# Patient Record
Sex: FEMALE | Race: WHITE | Employment: FULL TIME | URBAN - METROPOLITAN AREA
[De-identification: names, ages, dates, MRNs, and addresses within clinical notes are randomized per-mention and may not be internally consistent; named-entity substitution may affect disease eponyms.]

---

## 2019-04-05 PROBLEM — R53.82 CHRONIC FATIGUE: Status: ACTIVE | Noted: 2018-04-06

## 2019-04-05 PROBLEM — F33.0 MILD EPISODE OF RECURRENT MAJOR DEPRESSIVE DISORDER (HCC): Status: ACTIVE | Noted: 2018-09-20

## 2019-04-05 PROBLEM — F51.01 PRIMARY INSOMNIA: Status: ACTIVE | Noted: 2019-04-02

## 2019-04-05 PROBLEM — K29.60 EROSIVE GASTRITIS: Status: ACTIVE | Noted: 2017-09-05

## 2019-04-05 PROBLEM — K21.9 HIATAL HERNIA WITH GERD: Status: ACTIVE | Noted: 2017-09-05

## 2019-04-05 PROBLEM — M54.12 RADICULOPATHY OF CERVICAL REGION: Status: ACTIVE | Noted: 2019-04-05

## 2019-04-05 PROBLEM — R68.81 EARLY SATIETY: Status: ACTIVE | Noted: 2017-06-05

## 2019-04-05 PROBLEM — Z86.010 HISTORY OF COLONIC POLYPS: Status: ACTIVE | Noted: 2017-06-05

## 2019-04-05 PROBLEM — K44.9 HIATAL HERNIA WITH GERD: Status: ACTIVE | Noted: 2017-09-05

## 2019-04-05 PROBLEM — K58.2 IRRITABLE BOWEL SYNDROME WITH BOTH CONSTIPATION AND DIARRHEA: Status: ACTIVE | Noted: 2017-06-05

## 2019-04-05 PROBLEM — K30 FUNCTIONAL DYSPEPSIA: Status: ACTIVE | Noted: 2017-06-05

## 2019-04-05 PROBLEM — G56.02 LEFT CARPAL TUNNEL SYNDROME: Status: ACTIVE | Noted: 2018-02-23

## 2019-04-05 PROBLEM — G44.221 CHRONIC TENSION-TYPE HEADACHE, INTRACTABLE: Status: ACTIVE | Noted: 2018-11-02

## 2019-04-15 ENCOUNTER — HOSPITAL ENCOUNTER (OUTPATIENT)
Dept: GENERAL RADIOLOGY | Age: 49
Discharge: HOME | End: 2019-04-15
Attending: NURSE PRACTITIONER
Payer: COMMERCIAL

## 2019-04-15 DIAGNOSIS — M25.511 PAIN OF BOTH SHOULDER JOINTS: ICD-10-CM

## 2019-04-15 DIAGNOSIS — M25.512 PAIN OF BOTH SHOULDER JOINTS: ICD-10-CM

## 2019-04-15 PROCEDURE — 73030 X-RAY EXAM OF SHOULDER: CPT

## 2019-06-17 PROBLEM — R13.14 PHARYNGOESOPHAGEAL DYSPHAGIA: Status: ACTIVE | Noted: 2019-05-02

## 2019-07-25 ENCOUNTER — HOSPITAL ENCOUNTER (OUTPATIENT)
Dept: GENERAL RADIOLOGY | Age: 49
Discharge: HOME | End: 2019-07-25
Payer: COMMERCIAL

## 2019-07-25 PROCEDURE — 72040 X-RAY EXAM NECK SPINE 2-3 VW: CPT

## 2019-08-05 ENCOUNTER — HOSPITAL ENCOUNTER (OUTPATIENT)
Dept: PHYSICAL THERAPY | Age: 49
Discharge: HOME | End: 2019-08-05
Attending: NURSE PRACTITIONER
Payer: COMMERCIAL

## 2019-08-05 DIAGNOSIS — M40.03 POSTURAL KYPHOSIS OF CERVICOTHORACIC REGION: ICD-10-CM

## 2019-08-05 DIAGNOSIS — M54.2 NECK PAIN: ICD-10-CM

## 2019-08-05 PROCEDURE — 97162 PT EVAL MOD COMPLEX 30 MIN: CPT

## 2019-08-05 NOTE — THERAPY EVALUATION
Mele Black  : 1970  Primary: [facility] [place]  Secondary:  Therapy Center at [facility] Heidi Keenanjace [facility]. [place] [address], [address]  [phone]   [phone]      OUTPATIENT PHYSICAL THERAPY:Initial Assessment 2019    ICD-10: Treatment Diagnosis:   Cervicalgia (M54.2)      Chronic Pain Syndrome (G89.21)     Muscle weakness (generalized) (M62.81)       Precautions/Allergies:   Sulfa (sulfonamide antibiotics)   Fall Risk Score: 0 (? 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 Mele Black has attended 1 physical therapy session including initial evaluation. Mele Black presents with S/S of chronic neck pain,constant headaches and decreased functional tolerance. Her condition is complicated by significant difficulties sleeping and a history of anxiety and depression. The evaluation determined that her cervical and shoulder range of motion and strength are within normal limits although painful. She presents with minimal postural deficits. Mele Black will benefit from pain science education, introduction to mindfulness and relaxation techniques, aerobic activity,and  use of  manual therapeutic techniques (ie. Distraction, SOR, myofascial release/soft tissue mobilization) as appropriate to address Nivia Gannon's current condition. In addition the patient would highly benefit from a referral to a multi disciplinary pain management program.    Mele Black will benefit from skilled PT (medically necessary) to address above deficits affecting participation in basic ADLs and overall functional tolerance. PROBLEM LIST (Impacting functional limitations):  1. Chronic Pain?  Central Sensitization  2. Decreased ADL/Functional Activities  3. Anxiety and Depression  4. Poor sleep habits  5. Decreased Activity Tolerance/ lack of exercise  6. Increased Fatigue  7. Decreased [place] with Home Exercise Program INTERVENTIONS PLANNED:  1. Pain Science Education  2. Relaxation Techniques  3. Aerobic Exercise  4. Health and Wellness Education  5. Manual Therapy  6. Range of Motion (ROM)  7. Therapeutic Activites  8. Therapeutic Exercise/Strengthening     TREATMENT PLAN:  Effective Dates: 8/5/19 TO 9/5/2019 (30 days). Frequency/Duration: 2 times a week for 30 Days  GOALS: (Goals have been discussed and agreed upon with patient.)  SHORT-TERM FUNCTIONAL GOALS: Time Frame: 4 weeks  1. Duane Finney will report <=4/10 pain to cervical spine with performance of functional spinal mobility and rotation. 2.  Duane Finney will demonstrate improved NDI score, indicating spinal improvement from 17/50 to 13/50 affecting minimal to no difficulty with performance of activities of daily living. 3.  Duane Finney will understand the basic principles of pain science   4. Duane Finney will participate in daily use of relaxation/mindfulness techniques to help decrease her pain and stress levels. [address] [place] [address] Way will be able walk for 20 minutes a day     DISCHARGE GOALS: Time Frame: 8 weeks  1. Duane Finney will report <=3/10 pain to cervical spine with performance of functional spinal mobility and rotation and minimal to no difficulty with such tasks. 2. Duane Finney will demonstrate improved NDI score, indicating spinal improvement from 13/50 to 10/50 affecting minimal to no difficulty with performance of cervical mobility and strengthening. 3. Duane Finney to be independent with advanced HEP for cervical region and UE's.      Rehabilitation Potential For Stated Goals: Guarded due comorbidities  Regarding Nivia Gannon's therapy, I certify that the treatment plan above will be carried out by a therapist or under their direction. Thank you for this referral,  Rupinder Corea, PT CHT     Referring Physician Signature: Petrona Nurse, NP              Date                    HISTORY:   History of Present Injury/Illness (Reason for Referral): Over a year gradual onset bilateral upper trap and neck pain. Was diagnosed with GERD and IBS about 3 years ago. Chronic history of anxiety and depression. Patient states that she works about 60 hours a week at a sedentary job which is quite stressful. She feels that the only way to reduce her stress is to not work and this is impossible. She has no time to exercise. · Aggravating factors: end of the day the pain is the worst, very stiff. Has difficulty sleeping at night wakes up and is up about 2 hours. · Relieving factors: nothing      Past Medical History/Comorbidities:   Ms. Neelima Hernández  has a past medical history of Anxiety, Depressed, GERD (gastroesophageal reflux disease), Hypertension, IBS (irritable bowel syndrome), and Insomnia. Ms. Neelima Hernández  has a past surgical history that includes pr egd deliver thermal energy sphnctr/cardia gerd; hx colonoscopy; hx tubal ligation; and hx lipectomy.     Active Ambulatory Problems     Diagnosis Date Noted    Chronic fatigue 04/06/2018    Chronic tension-type headache, intractable 11/02/2018    Early satiety 06/05/2017    Erosive gastritis 09/05/2017    Functional dyspepsia 06/05/2017    Hiatal hernia with GERD 09/05/2017    History of colonic polyps 06/05/2017    Irritable bowel syndrome with both constipation and diarrhea 06/05/2017    Left carpal tunnel syndrome 02/23/2018    Mild episode of recurrent major depressive disorder ([facility] [address] 09/20/2018    Primary insomnia 04/02/2019    Radiculopathy of cervical region 04/05/2019    Pharyngoesophageal dysphagia 05/02/2019     Resolved Ambulatory Problems     Diagnosis Date Noted    No Resolved Ambulatory Problems     Past Medical History: Diagnosis Date    Anxiety     Depressed     GERD (gastroesophageal reflux disease)     Hypertension     IBS (irritable bowel syndrome)     Insomnia      Social History/Living Environment:    lives with family, has 21year old son with sickle cell anemia    Social History     Socioeconomic History    Marital status:      Spouse name: Not on file    Number of children: Not on file    Years of education: Not on file    Highest education level: Not on file   Occupational History    Not on file   Social Needs    Financial resource strain: Not on file    Food insecurity:     Worry: Not on file     Inability: Not on file    Transportation needs:     Medical: Not on file     Non-medical: Not on file   Tobacco Use    Smoking status: Never Smoker    Smokeless tobacco: Never Used   Substance and Sexual Activity    Alcohol use: Never     Frequency: Never    Drug use: Never    Sexual activity: Not Currently   Lifestyle    Physical activity:     Days per week: Not on file     Minutes per session: Not on file    Stress: Not on file   Relationships    Social connections:     Talks on phone: Not on file     Gets together: Not on file     Attends Buddhist service: Not on file     Active member of club or organization: Not on file     Attends meetings of clubs or organizations: Not on file     Relationship status: Not on file    Intimate partner violence:     Fear of current or ex partner: Not on file     Emotionally abused: Not on file     Physically abused: Not on file     Forced sexual activity: Not on file   Other Topics Concern    Not on file   Social History Narrative    Not on file      Prior Level of Function/Work/Activity:  Full time human resources for [employer]        Current Medications:    Current Outpatient Medications:     omeprazole (PRILOSEC) 20 mg capsule, Take 20 mg by mouth two (2) times a day., Disp: , Rfl:     FLUoxetine (PROZAC) 10 mg tablet, Take 5 mg by mouth daily. , Disp: , Rfl:     ergocalciferol (ERGOCALCIFEROL) 50,000 unit capsule, Take 1 Cap by mouth every seven (7) days. , Disp: 12 Cap, Rfl: 0    amitriptyline (ELAVIL) 25 mg tablet, Take 1 Tab by mouth nightly., Disp: 30 Tab, Rfl: 1    gabapentin (NEURONTIN) 100 mg capsule, Take 1 tab PO TID., Disp: 90 Cap, Rfl: 5    cyanocobalamin 1,000 mcg tablet, Take 1 Tab by mouth daily. , Disp: 90 Tab, Rfl: 3    cod liver oil oil, Take  by mouth., Disp: , Rfl:     vitamin E, dl,tocopheryl acet, (VITAMIN E, DL, ACETATE,) 1,000 unit cap, take one tablet oral daily, Disp: , Rfl:     hydroCHLOROthiazide (MICROZIDE) 12.5 mg capsule, Take 12.5 mg by mouth., Disp: , Rfl:     cyclobenzaprine (FLEXERIL) 5 mg tablet, Take 5 mg by mouth., Disp: , Rfl:     nystatin-triamcinolone (MYCOLOG II) topical cream, Apply topically 2 (two) times a day., Disp: , Rfl:         Ambulatory/Rehab Services H2 Model Falls Risk Assessment    Risk Factors:       (2)  Symptomatic Depression Ability to Rise from Chair:       (0)  Ability to rise in a single movement    Falls Prevention Plan:       No modifications necessary   Total: (5 or greater = High Risk): 2    ©2010 [facility] [employer]. All Rights Reserved. [facility] Patent #[id number]. Federal Law prohibits the replication, distribution or use without written permission from [facility] [employer]         Date Last Reviewed:  8/5/2019     3+: HIGH COMPLEXITY   EXAMINATION:   Observation/Orthostatic Postural Assessment:          Normal postural presentation. Upper chest breathing pattern.   Palpation:          Increased tone bilateral upper traps  ROM:      AROM/PROM                  Joint: Eval Date:   8/5/19  Re-Assess Date:  Re-Assess Date:    Active ROM         Cervical Extension 50 pain         Cervical Flexion 50         RIGHT LEFT RIGHT LEFT RIGHT LEFT   Cervical Sidebending 45 45       Cervical Rotation 50 60 pain                Shoulder Flexion 170 170       Shoulder Abduction 175 175                Lumbar Flexion Not assessed today        Lumber Extension               Strength:     Eval Date:  Re-Assess Date:  Re-Assess Date:      RIGHT LEFT RIGHT LEFT RIGHT LEFT   Shoulder Flexion 5-/5 5-/5       Shoulder Abduction (C5) 5-/5 5-/5       Shoulder Internal Rotation 5/5 5/5       Shoulder External Rotation 5-/5 5-/5       Elbow Flexion (C6) 5/5 5/5       Elbow Extension (C7) 5/5 5/5       Wrist Flexion (C7) 5/5 5/5       Wrist Extension (C6) 5/5 5/5       Resisted Thumb Extension/Finger Abduction (C8/T1) Normal     Normal       Resisted Cervical Rotation (C1):         Resisted Shoulder Shrug (C2, 3, 4):  4/5 4/5        Strength                    Reflex Testing (Biceps, Brachioradialis, Triceps):    Special Tests:    Spurling's Test: Positive/Negative              Sharp Pursar (instability of the atlanto-axial joint):    *Denies dysarthria, diplopia, drop attacks, dizziness, dysphagia      Neurological Screen: Radiating symptoms? Yes/No     Functional Mobility:  Shoulder range of motion WNL   Balance:  NA today     Body Structures Involved:  1. CNS sensitivity  2. Muscles   Body Functions Affected:  1. Sensory/Pain  2. Neuromusculoskeletal   Activities and Participation Affected:  1. Mobility  2. Sleep   Number of elements that affect the Plan of Care: 4+: HIGH COMPLEXITY   CLINICAL PRESENTATION:   Presentation: MODERATE COMPLEXITY   CLINICAL DECISION MAKING:   Outcome Measure: Tool Used: Neck Disability Index (NDI)  Score:  Initial: 17/50  Most Recent: X/50 (Date: -- )   Interpretation of Score: The Neck Disability Index is a revised form of the Oswestry Low Back Pain Index and is designed to measure the activities of daily living in person's with neck pain. Each section is scored on a 0-5 scale, 5 representing the greatest disability. The scores of each section are added together for a total score of 50.      Medical Necessity:   · Skilled intervention continues to be required due to address above deficits affecting participation in basic ADLs and overall functional tolerance. Reason for Services/Other Comments:  · Patient continues to require skilled intervention due to address above deficits affecting participation in basic ADLs and overall functional tolerance.    Use of outcome tool(s) and clinical judgement create a POC that gives a: Questionable prediction of patient's progress: MODERATE COMPLEXITY       Future Appointments   Date Time Provider Jorje Moulton   8/8/2019  7:30 AM PapsusyfuJ Luis arango, RT [facility]   8/12/2019  7:30 AM PapenfuJLuis arangon, RT [facility]   8/16/2019  7:30 AM PapenfussJ Luisn, RT [facility]   8/19/2019  7:30 AM PapsusyfuJ Luis arango, RT [facility]   8/19/2019  3:20 PM Andrew Hernandez NP [facility]   8/23/2019  7:30 AM J Luis Sim, RT [facility]   8/28/2019  4:00 PM J Luis Sim, RT [facility]   9/25/2019  8:00 AM Jean Bailey MD [facility]       Total Treatment Duration:  PT Patient Time In/Time Out  Time In: 1603  Time Out: [address], RT

## 2019-08-08 ENCOUNTER — HOSPITAL ENCOUNTER (OUTPATIENT)
Dept: PHYSICAL THERAPY | Age: 49
Discharge: HOME | End: 2019-08-08
Attending: NURSE PRACTITIONER
Payer: COMMERCIAL

## 2019-08-08 PROCEDURE — 97110 THERAPEUTIC EXERCISES: CPT

## 2019-08-08 PROCEDURE — 97140 MANUAL THERAPY 1/> REGIONS: CPT

## 2019-08-08 NOTE — PROGRESS NOTES
Smiley Galvez  : 1970  Payor: Mame Gonzalez / Plan: [facility] / Product Type: PPO /  [address] [facility] [place] [address], [address] [place] [address]  [phone]   [phone]      ICD-10: Treatment Diagnosis:   Cervicalgia (M54.2)      Chronic Pain Syndrome (G89.21)     Muscle weakness (generalized) (M62.81)                                                   DIAGNOSIS/TREATMENT      OUTPATIENT PHYSICAL THERAPY: Daily Treatment Note 2019 Visit Count:  2    Pre-treatment Symptoms/Complaints:  I have less pain today, I think that it because I was able to sleep better last night. Went to bed around 10:30 slept till 6:00. Pain: Initial:   5 Post Session:  3/10 no back pain after relaxation tech   Medications Last Reviewed:  2019   Updated Objective Findings:  See evaluation   TREATMENT:   THERAPEUTIC EXERCISE: (15 minutes):  Exercises per grid below to improve mobility, strength and balance. Required minimal visual, verbal and manual cues to promote proper body alignment and promote proper body posture. Progressed resistance and complexity of movement as indicated. Date:  19 Date:   Date:     Activity/Exercise Parameters Parameters Parameters   Mindfulness/breathing practice  10 min     AROM cervical supine  2 min     patient education Pain science principles                                   MANUAL THERAPY: (30 minutes): Joint mobilization and Soft tissue mobilization was utilized and necessary because of the patient's restricted joint motion and restricted motion of soft tissue. (SOR, MFR, manual traction) cervical spine    MedBridge Portal  Treatment/Session Summary:    Response to Treatment:  No increase in pain or adverse reactions  Communication/Consultation:  Faxed initial evaluation to MD  Equipment provided today:  HEP.   Recommendations/Intent for next treatment session: Next visit will focus on patient education.     Total Treatment Billable Duration:  45 Rx  PT Patient Time In/Time Out  Time In: 9645  Time Out: [address], RT    Future Appointments   Date Time Provider Jroje Moulton   8/12/2019  7:30 AM Michele Sim, RT [facility]   8/19/2019  7:30 AM Michele Sim, RT [facility]   8/19/2019  3:20 PM Brandy Hodgkins, NP [facility]   8/23/2019  7:30 AM [facility]   8/28/2019  4:00 PM Michele Sim, RT [facility]   9/25/2019  8:00 AM Maik Rodriguez MD [facility]

## 2019-08-12 ENCOUNTER — HOSPITAL ENCOUNTER (OUTPATIENT)
Dept: PHYSICAL THERAPY | Age: 49
Discharge: HOME | End: 2019-08-12
Attending: NURSE PRACTITIONER
Payer: COMMERCIAL

## 2019-08-12 PROCEDURE — 97110 THERAPEUTIC EXERCISES: CPT

## 2019-08-12 PROCEDURE — 97140 MANUAL THERAPY 1/> REGIONS: CPT

## 2019-08-16 ENCOUNTER — APPOINTMENT (OUTPATIENT)
Dept: PHYSICAL THERAPY | Age: 49
End: 2019-08-16
Attending: NURSE PRACTITIONER
Payer: COMMERCIAL

## 2019-08-19 ENCOUNTER — HOSPITAL ENCOUNTER (OUTPATIENT)
Dept: PHYSICAL THERAPY | Age: 49
Discharge: HOME | End: 2019-08-19
Attending: NURSE PRACTITIONER
Payer: COMMERCIAL

## 2019-08-19 PROBLEM — G89.4 CHRONIC PAIN DISORDER: Status: ACTIVE | Noted: 2019-08-19

## 2019-08-19 PROBLEM — M54.12 CERVICAL RADICULAR PAIN: Status: ACTIVE | Noted: 2019-08-19

## 2019-08-19 PROBLEM — E55.9 VITAMIN D DEFICIENCY: Status: ACTIVE | Noted: 2019-08-19

## 2019-08-19 PROBLEM — E53.8 VITAMIN B 12 DEFICIENCY: Status: ACTIVE | Noted: 2019-08-19

## 2019-08-19 PROCEDURE — 97110 THERAPEUTIC EXERCISES: CPT

## 2019-08-19 NOTE — PROGRESS NOTES
Monster Garcia  : 1970  Payor: Danya Meter / Plan: [facility] / Product Type: PPO /  [address] [facility] at [address] [place] [address]. [facility], [address], [facility], [address] [place] [address]  [phone]   [phone]      ICD-10: Treatment Diagnosis:   Cervicalgia (M54.2)      Chronic Pain Syndrome (G89.21)     Muscle weakness (generalized) (M62.81)                                                   DIAGNOSIS/TREATMENT      OUTPATIENT PHYSICAL THERAPY: Daily Treatment Note 2019 Visit Count:  4    Pre-treatment Symptoms/Complaints: Patient states that she is 50% better. She has been working through the Pain Ed workbook, tracking her steps daily, doing some mindfulness training prior to sleep. Patient states that she is going to see her primary care physician today. She says that she is going to tell her that sending her to physical therapy was the best referral she has ever gotten. Pain: Initial:   0 Post Session:  0 no complaints of pain   Medications Last Reviewed:  2019   Updated Objective Findings:  See evaluation   TREATMENT:   THERAPEUTIC EXERCISE: (30 minutes):  Exercises per grid below to improve mobility, strength and balance. Required minimal visual, verbal and manual cues to promote proper body alignment and promote proper body posture. Progressed resistance and complexity of movement as indicated. Date:  19 Date:  19 Date:  19   Activity/Exercise Parameters Parameters Parameters   Mindfulness/breathing practice  10 min 10 min 15 min   AROM cervical supine  2 min     patient education Pain science principles Pain alarm system, goal setting for exercise and meditation  Pain ed, benefits of ex, continued work on goal setting.  Nutrition basics                                 MANUAL THERAPY: (0 minutes): Joint mobilization and Soft tissue mobilization was utilized and necessary because of the patient's restricted joint motion and restricted motion of soft tissue. (SOR, MFR, manual traction) cervical spine    MedBridge Portal  Treatment/Session Summary:  Patient has been compliant with self ed program and has been going through her workbook writing down answers to the questions at end of each chapter. Jaiden Prince Response to Treatment: 50% decrease in pain. Communication/Consultation:  Continue to work on pain ed workbook, mindfullness/breathing tech, daily walking.   Equipment provided today: none  Recommendations/Intent for next treatment session: Next visit will focus on patient education add cervical strengthening ex    Total Treatment Billable Duration:  45 Rx  PT Patient Time In/Time Out  Time In: 0732  Time Out: [address], RT    Future Appointments   Date Time Provider Jorje Moulton   8/19/2019  3:20 PM Andrea Marks NP [facility]   8/28/2019  4:00 PM Rustam Sim, [facility]   9/3/2019  7:30 AM [facility]   9/25/2019  8:00 AM Humaira Singh MD [facility]

## 2019-08-23 ENCOUNTER — APPOINTMENT (OUTPATIENT)
Dept: PHYSICAL THERAPY | Age: 49
End: 2019-08-23
Attending: NURSE PRACTITIONER
Payer: COMMERCIAL

## 2019-08-28 ENCOUNTER — HOSPITAL ENCOUNTER (OUTPATIENT)
Dept: PHYSICAL THERAPY | Age: 49
Discharge: HOME | End: 2019-08-28
Attending: NURSE PRACTITIONER
Payer: COMMERCIAL

## 2019-08-28 ENCOUNTER — APPOINTMENT (OUTPATIENT)
Dept: PHYSICAL THERAPY | Age: 49
End: 2019-08-28
Attending: NURSE PRACTITIONER
Payer: COMMERCIAL

## 2019-08-28 PROCEDURE — 97110 THERAPEUTIC EXERCISES: CPT

## 2019-08-28 PROCEDURE — 97140 MANUAL THERAPY 1/> REGIONS: CPT

## 2019-08-28 NOTE — PROGRESS NOTES
Javi Suarez  : 1970  Payor: Eliot Duran / Plan: [facility] / Product Type: O /  [address] [facility] at [address] [place] [address]. [facility], [address], [facility], [address] [place] [address]  [phone]   [phone]      ICD-10: Treatment Diagnosis:   Cervicalgia (M54.2)      Chronic Pain Syndrome (G89.21)     Muscle weakness (generalized) (M62.81)                                                   DIAGNOSIS/TREATMENT      OUTPATIENT PHYSICAL THERAPY: Daily Treatment Note 2019 Visit Count:  5    Pre-treatment Symptoms/Complaints: Patient continues to work on meditation, walking and education. On whole feeling better. Today is a bad day, came after work, very stressed. Pain: Initial:   8 Post Session:  5   Medications Last Reviewed:  2019   Updated Objective Findings:  Upper chest breathing generalized increased tone throughout cervical musculature. TREATMENT:   THERAPEUTIC EXERCISE: (15 minutes):  Exercises per grid below to improve mobility, strength and balance. Required minimal visual, verbal and manual cues to promote proper body alignment and promote proper body posture. Progressed resistance and complexity of movement as indicated. Date:  19 Date:  19 Date:  19 Date   19   Activity/Exercise Parameters Parameters Parameters    Mindfulness/breathing practice  10 min 10 min 15 min 15 min   AROM cervical supine  2 min      patient education Pain science principles Pain alarm system, goal setting for exercise and meditation  Pain ed, benefits of ex, continued work on goal setting.  Nutrition basics                                      MANUAL THERAPY: (30 minutes): Joint mobilization and Soft tissue mobilization was utilized and necessary because of the patient's restricted joint motion and restricted motion of soft tissue. (SOR, MFR, manual traction) cervical spine    MedBridge Portal  Treatment/Session Summary:  Patient has been compliant with self ed program and has been going through her workbook writing down answers to the questions at end of each chapter. Jane Green Response to Treatment: decrease in pain. Communication/Consultation:  Continue to work on pain ed workbook, mindfullness/breathing tech, daily walking.   Equipment provided today: none  Recommendations/Intent for next treatment session: Next visit will focus on patient education add cervical strengthening ex    Total Treatment Billable Duration:  45 Rx  PT Patient Time In/Time Out  Time In: 1553  Time Out: [address], RT    Future Appointments   Date Time Provider Jorje Moulton   8/28/2019  4:00 PM Marylin Sim, RT [facility]   9/3/2019  7:30 AM Marylin Sim, RT [facility]   9/25/2019  8:00 AM Abad Knutson MD [facility]   10/14/2019  9:45 AM MAT LAB [facility] MAT MAT   10/21/2019  3:00 PM RHONA [place] MAT

## 2019-09-03 ENCOUNTER — HOSPITAL ENCOUNTER (OUTPATIENT)
Dept: PHYSICAL THERAPY | Age: 49
Discharge: HOME | End: 2019-09-03
Attending: NURSE PRACTITIONER
Payer: COMMERCIAL

## 2019-09-03 PROCEDURE — 97110 THERAPEUTIC EXERCISES: CPT

## 2019-09-03 NOTE — PROGRESS NOTES
Alonso Almendarez  : 1970  Payor: Dania Beltran / Plan: [place] Pending sale to [facility] / Product Type: PPO /  Maida Padilla at [address] [facility] [address], [facility], [address] [place] [address]  [phone]   [phone]      ICD-10: Treatment Diagnosis:   Cervicalgia (M54.2)      Chronic Pain Syndrome (G89.21)     Muscle weakness (generalized) (M62.81)                                                   DIAGNOSIS/TREATMENT      OUTPATIENT PHYSICAL THERAPY: Daily Treatment Note 9/3/2019 Visit Count:  6    Pre-treatment Symptoms/Complaints: NDI score 10/55  Pain: Initial:   3 Post Session: 3   Medications Last Reviewed:  9/3/2019   Updated Objective Findings: see DC note   TREATMENT:   THERAPEUTIC EXERCISE: (15 minutes):  Exercises per grid below to improve mobility, strength and balance. Required minimal visual, verbal and manual cues to promote proper body alignment and promote proper body posture. Progressed resistance and complexity of movement as indicated. Date:  19 Date:  19 Date:  19 Date   19   Activity/Exercise Parameters Parameters Parameters    Mindfulness/breathing practice  10 min 10 min 15 min 15 min   AROM cervical supine  2 min      patient education Pain science principles Pain alarm system, goal setting for exercise and meditation  Pain ed, benefits of ex, continued work on goal setting. Nutrition basics Pain science principles long term plans, self managment                                     MANUAL THERAPY: (0 minutes): Joint mobilization and Soft tissue mobilization was utilized and necessary because of the patient's restricted joint motion and restricted motion of soft tissue. (SOR, MFR, manual traction) cervical spine    [facility] Portal  Treatment/Session Summary:  Patient has been compliant with self ed program and has been going through her workbook writing down answers to the questions at end of each chapter.  Patient will continue to pursue mediation, aerobic ex, nutrition improvement, self education, and sleep improvement   Response to Treatment: decrease in pain. Communication/Consultation:  Continue to work independent self care.   Equipment provided today: none  Recommendations: DC to independent home program    Total Treatment Billable Duration:  15 Rx  PT Patient Time In/Time Out  Time In: 8014  Time Out: 0805  Steven Ohm, RT    Future Appointments   Date Time Provider Jorje Moulton   9/25/2019  8:00 AM Janine Morfin MD [facility]   10/14/2019  9:45 AM MAT LAB [facility]   10/21/2019  3:00 PM Brianda Pinedo NP [facility]

## 2019-09-03 NOTE — THERAPY DISCHARGE
Javi Suarez  : 1970  Primary: Eren Mathew [facility]*  Secondary:  [facility] Heidi Mao   [address]. [address], [address]  [phone]   [phone]      OUTPATIENT PHYSICAL THERAPY: DISCHARGE 9/3/2019    ICD-10: Treatment Diagnosis:   Cervicalgia (M54.2)      Chronic Pain Syndrome (G89.21)     Muscle weakness (generalized) (M62.81)       Precautions/Allergies:   Sulfa (sulfonamide antibiotics)   Fall Risk Score: 0 (? 5 = High Risk)  MD Orders: Eval and Treat MEDICAL/REFERRING DIAGNOSIS:  Neck pain   DATE OF ONSET: chronic over one year  REFERRING PHYSICIAN: Sanju Pedersen NP  RETURN PHYSICIAN APPOINTMENT: 19     FINAL ASSESSMENT:  Ms. Javi Suarez has attended 6 physical therapy sessions including initial evaluation. Javi Suarez presented with S/S of chronic neck pain,constant headaches and decreased functional tolerance. Her condition is complicated by significant difficulties sleeping and a history of anxiety and depression. The evaluation determined that her cervical and shoulder range of motion and strength are within normal limits although painful. She presents with minimal postural deficits. Javi Suarez benefited from pain science education, introduction to mindfulness and relaxation techniques, recommendations concerning improving sleep quality, nutrition basics, aerobic activity and manual therapy techniques. At this time her NDI score has improved from 17/55 to 10/55. TREATMENT PLAN:  Effective Dates: 19 TO 2019 (30 days). Frequency/Duration: 2 times a week for 30 Days  GOALS: (Goals have been discussed and agreed upon with patient.)  SHORT-TERM FUNCTIONAL GOALS: Time Frame: 4 weeks  1. Javi Suarez will report <=4/10 pain to cervical spine with performance of functional spinal mobility and rotation. GOAL MET 9/3/2019    2.   Javi Suarez will demonstrate improved NDI score, indicating spinal improvement from 17/50 to 13/50 affecting minimal to no difficulty with performance of activities of daily living. GOAL MET 9/3/2019    3. Serafin Patel will understand the basic principles of pain science GOAL MET 9/3/2019  4. Serafin Patel will participate in daily use of relaxation/mindfulness techniques to help decrease her pain and stress levels. GOAL MET 9/3/2019    5. Serafin Patel will be able walk for 20 minutes a day Goal not met, patient has not been able to \"find the time on a regular basis\"    DISCHARGE GOALS: Time Frame: 8 weeks  1. Serafin Patel will report <=3/10 pain to cervical spine with performance of functional spinal mobility and rotation and minimal to no difficulty with such tasks. 2. Serafin Patel will demonstrate improved NDI score, indicating spinal improvement from 13/50 to 10/50 affecting minimal to no difficulty with performance of cervical mobility and strengthening. GOAL MET 9/3/2019    3. Serafin Patel to be independent with advanced HEP for cervical region and UE's. GOAL MET 9/3/2019                  HISTORY:   History of Present Injury/Illness (Reason for Referral): Over a year gradual onset bilateral upper trap and neck pain. Was diagnosed with GERD and IBS about 3 years ago. Chronic history of anxiety and depression. Patient states that she works about 60 hours a week at a sedentary job which is quite stressful. She feels that the only way to reduce her stress is to not work and this is impossible. She has no time to exercise. · Aggravating factors: stress at work and at home. · Relieving factors: meditation techniques, walking, improving sleep quality      Past Medical History/Comorbidities:   Ms. Severiano Bellamy  has a past medical history of Anxiety, Depressed, GERD (gastroesophageal reflux disease), Hypertension, IBS (irritable bowel syndrome), and Insomnia.   Ms. Severiano Bellamy  has a past surgical history that includes pr egd deliver thermal energy sphnctr/cardia gerd; hx colonoscopy; hx tubal ligation; and hx lipectomy.     Active Ambulatory Problems     Diagnosis Date Noted    Chronic fatigue 04/06/2018    Chronic tension-type headache, intractable 11/02/2018    Early satiety 06/05/2017    Erosive gastritis 09/05/2017    Functional dyspepsia 06/05/2017    Hiatal hernia with GERD 09/05/2017    History of colonic polyps 06/05/2017    Irritable bowel syndrome with both constipation and diarrhea 06/05/2017    Left carpal tunnel syndrome 02/23/2018    Mild episode of recurrent major depressive disorder ([facility] [address] 09/20/2018    Primary insomnia 04/02/2019    Radiculopathy of cervical region 04/05/2019    Pharyngoesophageal dysphagia 05/02/2019    Vitamin B 12 deficiency 08/19/2019    Chronic pain disorder 08/19/2019    Vitamin D deficiency 08/19/2019    Cervical radicular pain 08/19/2019     Resolved Ambulatory Problems     Diagnosis Date Noted    No Resolved Ambulatory Problems     Past Medical History:   Diagnosis Date    Anxiety     Depressed     GERD (gastroesophageal reflux disease)     Hypertension     IBS (irritable bowel syndrome)     Insomnia      Social History/Living Environment:    lives with family, has 21year old son with sickle cell anemia    Social History     Socioeconomic History    Marital status:      Spouse name: Not on file    Number of children: Not on file    Years of education: Not on file    Highest education level: Not on file   Occupational History    Not on file   Social Needs    Financial resource strain: Not on file    Food insecurity:     Worry: Not on file     Inability: Not on file    Transportation needs:     Medical: Not on file     Non-medical: Not on file   Tobacco Use    Smoking status: Never Smoker    Smokeless tobacco: Never Used   Substance and Sexual Activity    Alcohol use: Never     Frequency: Never    Drug use: Never    Sexual activity: Not Currently   Lifestyle    Physical activity:     Days per week: Not on file     Minutes per session: Not on file    Stress: Not on file   Relationships    Social connections:     Talks on phone: Not on file     Gets together: Not on file     Attends Confucianism service: Not on file     Active member of club or organization: Not on file     Attends meetings of clubs or organizations: Not on file     Relationship status: Not on file    Intimate partner violence:     Fear of current or ex partner: Not on file     Emotionally abused: Not on file     Physically abused: Not on file     Forced sexual activity: Not on file   Other Topics Concern    Not on file   Social History Narrative    Not on file      Prior Level of Function/Work/Activity:  Full time human resources for [employer]        Current Medications:    Current Outpatient Medications:     MAGNESIUM GLUCONATE, BULK,, Take  by mouth., Disp: , Rfl:     clorazepate (TRANXENE) 3.75 mg tablet, Take 3.75 mg by mouth., Disp: , Rfl:     perphenazine (TRILAFON) 2 mg tablet, Take  by mouth., Disp: , Rfl:     gabapentin (NEURONTIN) 100 mg capsule, Take 2 Caps by mouth nightly., Disp: 60 Cap, Rfl: 5    ergocalciferol (ERGOCALCIFEROL) 50,000 unit capsule, Take 1 Cap by mouth every seven (7) days. , Disp: 12 Cap, Rfl: 2    omeprazole (PRILOSEC) 20 mg capsule, Take 20 mg by mouth two (2) times a day., Disp: , Rfl:     FLUoxetine (PROZAC) 10 mg tablet, Take 5 mg by mouth daily. , Disp: , Rfl:     ergocalciferol (ERGOCALCIFEROL) 50,000 unit capsule, Take 1 Cap by mouth every seven (7) days. , Disp: 12 Cap, Rfl: 0    cyanocobalamin 1,000 mcg tablet, Take 1 Tab by mouth daily. , Disp: 90 Tab, Rfl: 3    cod liver oil oil, Take  by mouth., Disp: , Rfl:     vitamin E, dl,tocopheryl acet, (VITAMIN E, DL, ACETATE,) 1,000 unit cap, take one tablet oral daily, Disp: , Rfl:     hydroCHLOROthiazide (MICROZIDE) 12.5 mg capsule, Take 12.5 mg by mouth., Disp: , Rfl:     cyclobenzaprine (FLEXERIL) 5 mg tablet, Take 5 mg by mouth., Disp: , Rfl:     nystatin-triamcinolone (MYCOLOG II) topical cream, Apply topically 2 (two) times a day., Disp: , Rfl:              Date Last Reviewed:  9/3/2019   EXAMINATION:   Observation/Orthostatic Postural Assessment:          Normal postural presentation. Upper chest breathing pattern. Palpation:          Increased tone bilateral upper traps  ROM:      AROM/PROM                  Joint: Eval Date:   8/5/19  Re-Assess Date:  9/3/19  Re-Assess Date:    Active ROM         Cervical Extension 50 pain   60 no pain      Cervical Flexion 50  55       RIGHT LEFT RIGHT LEFT RIGHT LEFT   Cervical Sidebending 45 45 45 45     Cervical Rotation 50 60 pain 70 70              Shoulder Flexion 170 170 170 170     Shoulder Abduction 175 175 175 175              Lumbar Flexion Not assessed today        Lumber Extension               Strength:     Eval Date:  Re-Assess Date:  Re-Assess Date:      RIGHT LEFT RIGHT LEFT RIGHT LEFT   Shoulder Flexion 5-/5 5-/5 5 5     Shoulder Abduction (C5) 5-/5 5-/5 5 5     Shoulder Internal Rotation 5/5 5/5 5 5     Shoulder External Rotation 5-/5 5-/5 5 5     Elbow Flexion (C6) 5/5 5/5 5 5     Elbow Extension (C7) 5/5 5/5 5 5     Wrist Flexion (C7) 5/5 5/5 5 5     Wrist Extension (C6) 5/5 5/5       Resisted Thumb Extension/Finger Abduction (C8/T1) Normal     Normal normal normal     Resisted Cervical Rotation (C1):         Resisted Shoulder Shrug (C2, 3, 4):  4/5 4/5 5- 5-      Strength                       Neurological Screen: Radiating symptoms? No     Functional Mobility:  Shoulder range of motion WNL   Balance:  NA today       1. CLINICAL PRESENTATION:   CLINICAL DECISION MAKING:   Outcome Measure: Tool Used: Neck Disability Index (NDI)  Score:  Initial: 17/50  Most Recent: 10/50 (Date: 9/3/19)   Interpretation of Score:  The Neck Disability Index is a revised form of the Oswestry Low Back Pain Index and is designed to measure the activities of daily living in person's with neck pain. Each section is scored on a 0-5 scale, 5 representing the greatest disability. The scores of each section are added together for a total score of 50. Medical Necessity:   · Skilled intervention continues to be required due to address above deficits affecting participation in basic ADLs and overall functional tolerance. Reason for Services/Other Comments:  · Patient continues to require skilled intervention due to address above deficits affecting participation in basic ADLs and overall functional tolerance.        Future Appointments   Date Time Provider Jorje Moulton   9/25/2019  8:00 AM Donna Browne MD [facility]   10/14/2019  9:45 AM MAT LAB [facility]   10/21/2019  3:00 PM Marylene Patricia, NP [facility]       Total Treatment Duration:  PT Patient Time In/Time Out  Time In: 0735  Time Out: 0820    Rhett Sim, LILLI CHT

## 2019-09-23 PROBLEM — R20.2 NUMBNESS AND TINGLING: Status: ACTIVE | Noted: 2019-09-23

## 2019-09-23 PROBLEM — R20.0 NUMBNESS AND TINGLING: Status: ACTIVE | Noted: 2019-09-23

## 2019-09-23 PROBLEM — M79.642 LEFT HAND PAIN: Status: ACTIVE | Noted: 2019-09-23

## 2019-11-27 ENCOUNTER — HOSPITAL ENCOUNTER (OUTPATIENT)
Dept: GENERAL RADIOLOGY | Age: 49
Discharge: HOME | End: 2019-11-27
Attending: NURSE PRACTITIONER
Payer: COMMERCIAL

## 2019-11-27 DIAGNOSIS — M54.50 ACUTE MIDLINE LOW BACK PAIN WITHOUT SCIATICA: ICD-10-CM

## 2019-11-27 PROCEDURE — 72100 X-RAY EXAM L-S SPINE 2/3 VWS: CPT

## 2019-12-03 NOTE — PROGRESS NOTES
This has been fully explained to the patient, who indicates understanding that per Malaika NP noted Xray showed suspected lumbar muscular strain. I can refer to PT for evaluation.

## 2019-12-04 ENCOUNTER — HOSPITAL ENCOUNTER (OUTPATIENT)
Dept: PHYSICAL THERAPY | Age: 49
Discharge: HOME | End: 2019-12-04
Payer: COMMERCIAL

## 2019-12-04 DIAGNOSIS — S39.012A LUMBAR STRAIN, INITIAL ENCOUNTER: ICD-10-CM

## 2019-12-04 PROCEDURE — 97110 THERAPEUTIC EXERCISES: CPT

## 2019-12-04 PROCEDURE — 97162 PT EVAL MOD COMPLEX 30 MIN: CPT

## 2019-12-04 NOTE — THERAPY EVALUATION
Arti Valencia  : 1970      Payor: Erin Winters / Plan: [place] [facility] / Product Type: PPO /  Sadi Cuevas at [address] [facility]. [facility] [address], [facility], [address] [place] [address]  [phone]   [phone]       OUTPATIENT PHYSICAL THERAPY:Initial Assessment 2019    ICD-10: Treatment Diagnosis:    Low back pain (M54.5)              Chronic pain syndrome (G89.29)        Muscle weakness (M62.81)              Precautions/Allergies:   Sulfa (sulfonamide antibiotics)   Fall Risk Score: 1 (? 5 = High Risk)  MD Orders: Eval and Treat  MEDICAL/REFERRING DIAGNOSIS:  Lumbar strain, initial encounter [S39.012A]   DATE OF ONSET: one year  REFERRING PHYSICIAN: Belinda Sahni NP  RETURN PHYSICIAN APPOINTMENT: TBD     INITIAL ASSESSMENT:  Ms. Arti Valencia presents to physical therapy with chronic pain complaints involving the back that have gotten worse over the last two weeks for no specific reason. Range of motion of the lumbar spine is within normal limits although painful in all directions. She also presents with generalized muscle weakness and pain complaints with resisted muscle testing of the lower extremities. Postural presentation is normal These S/S are consistent with chronic pain syndrome. Arti Valencia will benefit from skilled physical therapy (medically necessary) to address above deficits affecting participation in basic ADLs and functional mobility/tolerance. Patient will benefit from pain neuroscience education, aerobic conditioning,  Mindfulness techniques,  therapeutic exercises and activities, postural strengthening/education, and comprehensive home exercises program to address current impairments and functional limitations. PROBLEM LIST (Impacting functional limitations):  1. Decreased Strength  2. Decreased ADL/Functional Activities  3. Decreased Ambulation Ability  4. Increased Pain  5.  Decreased Activity Tolerance  6. Increased Fatigue  7. Difficulty Sleeping  8. Decreased Flexibility/Joint Mobility  9. Decreased [place] with Home Exercise Program INTERVENTIONS PLANNED:  1. Patient Education  2. Aerobic conditioning  3. Mindfulness/relaxation training  4. Home Exercise Program (HEP)  5. Manual Therapy  6. Neuromuscular Re-education/Strengthening  7. Range of Motion (ROM)  8. Therapeutic Activites  9. Therapeutic Exercise/Strengthening     TREATMENT PLAN:  Effective Dates: 12/419 TO 2/2/2020 (60 days). Frequency/Duration: 2 times a week for 60 Days  GOALS: (Goals have been discussed and agreed upon with patient.)  Short Term Goals 4 weeks   1. Barbara Archer will be independent with HEP  2. Barbara Boys will participate in LE stretching program to increase flexibility  3. Barbara Boys will participate in core stabilization exercises to help with stabilization during ADLs  4. Barbara Boys will participate in LE strengthening program with weights/resistance as appropriate to help with gait and elevations  5. Barbara Boys will participate in aerobic conditioning program and will tolerate 10 min of constant walking  6. Barbara Boys will understand basic pain science priniciples  7. Long Term Goals 8 weeks   1. Barbara Boys will demonstrate a 8 point improvement on the Oswestry to show improvement in function  2. Barbara Boys will report 3/10 pain at rest and during ADLs  3. Barbara Boys will demonstrate 5/5 LE strength on manual muscle testing  4. Barbara Archer will be able to ambulate for 20 minutes without increase in symptoms. Outcome Measure: Tool Used: Modified Oswestry Low Back Pain Questionnaire  Score:  Initial: 20/50  Most Recent: X/50 (Date: -- )   Interpretation of Score: Each section is scored on a 0-5 scale, 5 representing the greatest disability. The scores of each section are added together for a total score of 50.         Medical Necessity:   · Skilled intervention continues to be required due to above deficits affecting participation in basic ADLs and overall functional tolerance. Reason for Services/Other Comments:  · Patient continues to require skilled intervention due to  above deficits affecting participation in basic ADLs and overall functional tolerance. Total Treatment Duration:  PT Patient Time In/Time Out  Time In: 0730  Time Out: 0830    Rehabilitation Potential For Stated Goals: GOOD  Regarding Nivia Gannon's therapy, I certify that the treatment plan above will be carried out by a therapist or under their direction. Thank you for this referral,  Saqib Snyder, PT CHT     Referring Physician Signature: Lorene Bolaños NP              Date                    HISTORY:   History of Present Injury/Illness (Reason for Referral):  Gradual increase in back pain has been present for about a year. Pain got worse about two weeks ago noticed difficulty driving more than 20 min. -Present symptoms/complaints (on day of evaluation)  Pain Scale:  · Current: 4/10  · Best: 2/10  · Worst: 8/10      · Aggravating factors: driving, sitting   · Relieving factors: hot shower, rest, doing meditation 10 min at night      Past Medical History/Comorbidities:   Ms. Gerber Soto  has a past medical history of Anxiety, Depressed, GERD (gastroesophageal reflux disease), Hypertension, IBS (irritable bowel syndrome), and Insomnia. Ms. Gerber Soto  has a past surgical history that includes pr egd deliver thermal energy sphnctr/cardia gerd; hx colonoscopy; hx tubal ligation; and hx lipectomy.   Social History/Living Environment:     lives with family    Social History     Socioeconomic History    Marital status:      Spouse name: Not on file    Number of children: Not on file    Years of education: Not on file    Highest education level: Not on file   Occupational History    Not on file   Social Needs    Financial resource strain: Not on file   Human [employer]-Nataliia insecurity:     Worry: Not on file     Inability: Not on file    Transportation needs:     Medical: Not on file     Non-medical: Not on file   Tobacco Use    Smoking status: Never Smoker    Smokeless tobacco: Never Used   Substance and Sexual Activity    Alcohol use: Never     Frequency: Never    Drug use: Never    Sexual activity: Not Currently   Lifestyle    Physical activity:     Days per week: Not on file     Minutes per session: Not on file    Stress: Not on file   Relationships    Social connections:     Talks on phone: Not on file     Gets together: Not on file     Attends Faith service: Not on file     Active member of club or organization: Not on file     Attends meetings of clubs or organizations: Not on file     Relationship status: Not on file    Intimate partner violence:     Fear of current or ex partner: Not on file     Emotionally abused: Not on file     Physically abused: Not on file     Forced sexual activity: Not on file   Other Topics Concern    Not on file   Social History Narrative    Not on file     Prior Level of Function/Work/Activity:  Until two weeks ago stated she did not have trouble driving  Previous Treatment Approach  Physical therapy for 6 visits in the summer 2019 was helpful   Dominant Side right  Other Clinical Tests  Recent x rays  Active Ambulatory Problems     Diagnosis Date Noted    Chronic fatigue 04/06/2018    Chronic tension-type headache, intractable 11/02/2018    Early satiety 06/05/2017    Erosive gastritis 09/05/2017    Functional dyspepsia 06/05/2017    Hiatal hernia with GERD 09/05/2017    History of colonic polyps 06/05/2017    Irritable bowel syndrome with both constipation and diarrhea 06/05/2017    Left carpal tunnel syndrome 02/23/2018    Mild episode of recurrent major depressive disorder ([facility] [address] 09/20/2018    Primary insomnia 04/02/2019    Radiculopathy of cervical region 04/05/2019    Pharyngoesophageal dysphagia 05/02/2019    B12 deficiency 08/19/2019    Chronic pain syndrome 08/19/2019    Vitamin D deficiency 08/19/2019    Cervical radicular pain 08/19/2019    Numbness and tingling 09/23/2019    Left hand pain 09/23/2019     Resolved Ambulatory Problems     Diagnosis Date Noted    No Resolved Ambulatory Problems     Past Medical History:   Diagnosis Date    Anxiety     Depressed     GERD (gastroesophageal reflux disease)     Hypertension     IBS (irritable bowel syndrome)     Insomnia      Note: Patient denies any increase of symptoms with cough, sneeze or valsalva. Patient denies any saddle paresthesia or bowel/bladder deficits. Current Medications:    Current Outpatient Medications:     docusate sodium (COLACE CLEAR) 50 mg capsule, kimani 1 capsule as needed. , Disp: , Rfl:     tiZANidine (ZANAFLEX) 4 mg tablet, Take 1 Tab by mouth every six (6) hours as needed for Pain., Disp: 40 Tab, Rfl: 0    gabapentin (NEURONTIN) 100 mg capsule, Take 2 Caps by mouth nightly., Disp: 180 Cap, Rfl: 1    hydroCHLOROthiazide (MICROZIDE) 12.5 mg capsule, Take 1 Cap by mouth every morning., Disp: 90 Cap, Rfl: 1    colchicine (MITIGARE) 0.6 mg capsule, Take 0.6 mg by mouth., Disp: , Rfl:     MITIGARE 0.6 mg capsule, TK 1 C PO BID, Disp: , Rfl: 0    valACYclovir (VALTREX) 1 gram tablet, TK 1 T PO  TID FOR 7 DAYS, Disp: , Rfl: 0    MAGNESIUM GLUCONATE, BULK,, Take  by mouth., Disp: , Rfl:     ergocalciferol (ERGOCALCIFEROL) 50,000 unit capsule, Take 1 Cap by mouth every seven (7) days. , Disp: 12 Cap, Rfl: 2    omeprazole (PRILOSEC) 20 mg capsule, Take 20 mg by mouth two (2) times a day., Disp: , Rfl:     cyanocobalamin 1,000 mcg tablet, Take 1 Tab by mouth daily. , Disp: 90 Tab, Rfl: 3    cod liver oil oil, Take  by mouth., Disp: , Rfl:     vitamin E, dl,tocopheryl acet, (VITAMIN E, DL, ACETATE,) 1,000 unit cap, take one tablet oral daily, Disp: , Rfl:     nystatin-triamcinolone (MYCOLOG II) topical cream, Apply topically 2 (two) times a day., Disp: , Rfl:       Ambulatory/Rehab Services H2 Model Falls Risk Assessment    Risk Factors:       (2)  Symptomatic Depression Ability to Rise from Chair:       (0)  Ability to rise in a single movement    Falls Prevention Plan:       Physical Limitations to Exercise (specify):  pain complaints   Total: (5 or greater = High Risk): 2    ©2010 [facility]. All Rights Reserved. [facility] Patent #[id number]. Federal Law prohibits the replication, distribution or use without written permission from [facility] [address] [place] [address]       Date Last Reviewed:  12/4/2019   Number of Personal Factors/Comorbidities that affect the Plan of Care: 3+: HIGH COMPLEXITY   EXAMINATION:   Observation/Orthostatic Postural Assessment:          Normal postural presentation, note upper chest breathing pattern and guarded motion with active spinal movements all directions. Palpation:          Pain with light palpation PAs lumbar spine.   ROM:            AROM/PROM         Joint: Eval Date: 12/4/19  Re-Assess Date:  Re-Assess Date:    Active ROM RIGHT LEFT RIGHT LEFT RIGHT LEFT   Knee Extension 0 0       Knee Flexion 130 130       Hip Flexion         Hip Abduction         Lumbar Flexion 65 pain endrange and return to neutral        Lumbar Extension 20 pain endrange        Lumbar Side-bending 25 pain 30 pain        Lumbar Rotation           Strength:     Eval Date:  12/4/19  Re-Assess Date:  Re-Assess Date:      RIGHT LEFT RIGHT LEFT RIGHT LEFT   Knee Flexion (L5-S2) 4/5 pain in hamstring 4/5 pain hamstring       Knee Extension (L3, L4) 4/5 pain in thigh 4/5 pain in back       Hip Flexion (L1, L2) 3+/5 3+/5       Hip Extension         Hip Abduction (L5, S1) 3/5  3/5       Ankle Dorsiflexion  4/5 4/5       Great Toe Extension (L5) NT NT       Ankle Plantar Flexion (S1-S2) 4 4           Single leg stance right/left: 7/5 seconds              Deep squat:  Able to touch floor with hands    Special Tests:   · CHRISTINE= negative  · SLR (<60 degrees)= negative  · SLUMP= negative  · SI Joint Tests: negative    Joint Mobility Eval Date:  Re-Assess Date:  Re-Assess Date:     RIGHT LEFT RIGHT LEFT RIGHT LEFT   Lumbar WNL WNL       Hip WNL WNL         Neurological Screen:    RADIATING SYMPTOMS?: YES complaints of intermittent radiating pain down either or both posterior legs. States that she does not know of a precipitating factor  ·   Functional Mobility:  Affecting participation in basic ADLs and functional tasks. Balance:     Single Leg Stance: R= 7/ L= 5  TUG=  30 second STS= 10 s   Body Structures Involved:  1. CNS mediated pain  2. Muscles   Body Functions Affected:  1. Sensory/Pain  2. Neuromusculoskeletal  3. Movement Related Activities and Participation Affected:  1. Mobility  2.  Self Care   Number of elements that affect the Plan of Care: 4+: HIGH COMPLEXITY   CLINICAL PRESENTATION:   Presentation: Evolving clinical presentation with changing clinical characteristics: MODERATE COMPLEXITY   CLINICAL DECISION MAKING:      Use of outcome tool(s) and clinical judgement create a POC that gives a: Questionable prediction of patient's progress: MODERATE COMPLEXITY     See associated treatment note for treatment provided today      Future Appointments   Date Time Provider Jorje Moulton   12/13/2019  9:40 AM RHONA Pitts, RT          remember to save as eval note

## 2019-12-04 NOTE — PROGRESS NOTES
Dahiana Leyva  : 1970  Payor: Cindy Score / Plan: [facility] / Product Type: PPO /  [address] at [address] [facility], [address], [id number] [place] [address]  [phone]   [phone]                                                               OUTPATIENT PHYSICAL THERAPY: Daily Treatment Note 2019   Visit Count:  1    ICD-10: Treatment Diagnosis:    Low back pain (M54.5)                                      Chronic pain syndrome (G89.29)        Muscle weakness (M62.81)           Pre-treatment Symptoms/Complaints:  Back pain  Pain: Initial:   5 Post Session:  5/10   Medications Last Reviewed:  2019   Updated Objective Findings:  See evaluation   TREATMENT:   Therapeutic Exercises: (15 minutes):  Exercises per grid below to improve mobility, strength and balance. Required minimal visual, verbal and manual cues to promote proper body alignment and promote proper body posture. Progressed resistance and complexity of movement as indicated. Date:  19 Date:   Date:     Activity/Exercise Parameters Parameters Parameters   Patient education Treatment expectations, rational, HEP     Cat camel 10 x     Child pose 10 x     Prop on elbows 1 min     Lower trunk rotation 10 x                      Manual Therapy Interventions: ( minutes): Manual interventions performed to improve joint/soft tissue mobility and ROM to improve ability to perform ADLs. Patient responded well to all manual interventions with no significant increase in pain/symptoms. Improved pain reported below.        Technique Used Grade  Level # Time(s) Effect while being performed                                                         [employer] Portal  Treatment/Session Summary:  Patient understands HEP and POC  Response to Treatment:  No increase in pain or adverse reactions  Communication/Consultation:  Faxed initial evaluation to MD  Equipment provided today: HEP.  Recommendations/Intent for next treatment session: Next visit will focu  s on aerobic activity, pain education.     Total Treatment Billable Duration:  15min Rx  PT Patient Time In/Time Out  Time In: 0730  Time Out: [address] Hernandez, RT    Future Appointments   Date Time Provider Jorje Moulton   12/9/2019  7:30 AM Sonny Sim, RT [facility]   12/12/2019  7:30 AM Sonny Sim, RT [facility]   12/13/2019  9:40 AM Lorene Bolaños NP [facility]

## 2019-12-09 ENCOUNTER — HOSPITAL ENCOUNTER (OUTPATIENT)
Dept: PHYSICAL THERAPY | Age: 49
Discharge: HOME | End: 2019-12-09
Payer: COMMERCIAL

## 2019-12-09 PROCEDURE — 97110 THERAPEUTIC EXERCISES: CPT

## 2019-12-09 NOTE — PROGRESS NOTES
Anita Hwang  : 1970  Payor: Nguyen Pulliam / Plan: [facility] / Product Type: PPO /  [address] at [address], [place], [address] [place] [address]  [phone]   [phone]                                                               OUTPATIENT PHYSICAL THERAPY: Daily Treatment Note 2019   Visit Count:  2    ICD-10: Treatment Diagnosis:    Low back pain (M54.5)                                      Chronic pain syndrome (G89.29)        Muscle weakness (M62.81)           Pre-treatment Symptoms/Complaints:  Back pain less because I didn't do anything over weekend. Can only stay till 8:00 today have another appointment. Pain: Initial:   4 Post Session:  4/10   Medications Last Reviewed:  2019   Updated Objective Findings:  See evaluation   TREATMENT:   Therapeutic Exercises: (30 minutes):  Exercises per grid below to improve mobility, strength and balance. Required minimal visual, verbal and manual cues to promote proper body alignment and promote proper body posture. Progressed resistance and complexity of movement as indicated. Date:  19 Date:  19 Date:     Activity/Exercise Parameters Parameters Parameters   Patient education Treatment expectations, rational, HEP     Cat camel 10 x 10 x    Child pose 10 x 10 x    Prop on elbows 1 min 1 min    Lower trunk rotation 10 x 10 x    Treadmill   2.5 mph 10 min     L stretch   5 x     Standing forward fold to overhead reach  6x    Standing side bending stretch  5 x ea way               Manual Therapy Interventions: ( minutes): Manual interventions performed to improve joint/soft tissue mobility and ROM to improve ability to perform ADLs. Patient responded well to all manual interventions with no significant increase in pain/symptoms. Improved pain reported below.        Technique Used Grade  Level # Time(s) Effect while being performed [employer] Portal  Treatment/Session Summary:  Patient understands HEP and POC  Response to Treatment:  No increase in pain or adverse reactions  Communication/Consultation:  Faxed initial evaluation to MD  Equipment provided today:  HEP. Recommendations/Intent for next treatment session: Next visit will focu  s on aerobic activity, pain education.     Total Treatment Billable Duration:  15min Rx  PT Patient Time In/Time Out  Time In: 6196  Time Out: 0815  Saqib Snyder RT    Future Appointments   Date Time Provider Jorje Moulton   12/12/2019  7:30 AM Sonny Sim, RT [facility]   12/13/2019  9:40 AM Lorene Bolaños NP [facility]

## 2019-12-12 ENCOUNTER — HOSPITAL ENCOUNTER (OUTPATIENT)
Dept: PHYSICAL THERAPY | Age: 49
Discharge: HOME | End: 2019-12-12
Payer: COMMERCIAL

## 2019-12-12 PROCEDURE — 97110 THERAPEUTIC EXERCISES: CPT

## 2019-12-12 NOTE — PROGRESS NOTES
Ting Carney  : 1970  Payor: Shelley Broderick / [place]: [place] [facility] / Product Type: O /  [address] [facility] at [address] [place] [address]. [facility], [address], [facility], [address] [place] [address]  [phone]   [phone]                                                               OUTPATIENT PHYSICAL THERAPY: Daily Treatment Note 2019   Visit Count:  3    ICD-10: Treatment Diagnosis:    Low back pain (M54.5)                                      Chronic pain syndrome (G89.29)        Muscle weakness (M62.81)           Pre-treatment Symptoms/Complaints:  Back pain is the same. Can only stay till 8:00 have to get to work. Pain: Initial:   4 Post Session:  3/10   Medications Last Reviewed:  2019   Updated Objective Findings:  Full lumbar flexion without pain complaints. TREATMENT:   Therapeutic Exercises: (30 minutes):  Exercises per grid below to improve mobility, strength and balance. Required minimal visual, verbal and manual cues to promote proper body alignment and promote proper body posture. Progressed resistance and complexity of movement as indicated. Date:  19 Date:  19 Date:  19   Activity/Exercise Parameters Parameters Parameters   Patient education Treatment expectations, rational, HEP     Cat camel 10 x 10 x 10 x    Child pose 10 x 10 x 10 x   Prop on elbows 1 min 1 min    Lower trunk rotation 10 x 10 x 10 x   Treadmill   2.5 mph 10 min  2.5 mph 10 min   L stretch   5 x  5 x   Standing forward fold to overhead reach  6x 5 x   Standing side bending stretch  5 x ea way 5 x ea   Hip hinge   10 x   Supine marching    10 x   Sciatic nerve glides   10 x ea   Squat with overhead reach   Heels on 2x4 10 x        Manual Therapy Interventions: ( minutes): Manual interventions performed to improve joint/soft tissue mobility and ROM to improve ability to perform ADLs.  Patient responded well to all manual interventions with no significant increase in pain/symptoms. Improved pain reported below. Technique Used Grade  Level # Time(s) Effect while being performed                                                         [employer] Portal  Treatment/Session Summary:  Patient understands HEP and POC  Response to Treatment:  No increase in pain or adverse reactions  Communication/Consultation:  Try to walk daily  Equipment provided today:  HEP. Recommendations/Intent for next treatment session: Next visit will focu  s on aerobic activity, pain education, addition of functional movements.     Total Treatment Billable Duration: 30 min Rx  PT Patient Time In/Time Out  Time In: 0730  Time Out: 0800  Harjit Buck RT    Future Appointments   Date Time Provider Jorje Moulton   12/13/2019  9:40 AM Belinda Hernández NP [facility]   12/16/2019  2:45 PM Reddy Sim Drilling[facility]   12/27/2019  8:15 AM Reddy Sim Drilling, RT [facility]   12/30/2019  2:45 PM PapReddy mason Drilling, RT [facility]

## 2019-12-16 ENCOUNTER — APPOINTMENT (OUTPATIENT)
Dept: PHYSICAL THERAPY | Age: 49
End: 2019-12-16
Payer: COMMERCIAL

## 2019-12-27 ENCOUNTER — HOSPITAL ENCOUNTER (OUTPATIENT)
Dept: PHYSICAL THERAPY | Age: 49
Discharge: HOME | End: 2019-12-27
Payer: COMMERCIAL

## 2019-12-27 PROCEDURE — 97110 THERAPEUTIC EXERCISES: CPT

## 2019-12-27 NOTE — PROGRESS NOTES
Alix Pérez  : 1970  Payor: Loura Kussmaul / Plan: [place] [facility] / Product Type: PPO /  [address] [facility], [address], [place], [id number] [place] [address]  [phone]   [phone]                                                               OUTPATIENT PHYSICAL THERAPY: Daily Treatment Note 2019   Visit Count:  4    ICD-10: Treatment Diagnosis:    Low back pain (M54.5)                                      Chronic pain syndrome (G89.29)        Muscle weakness (M62.81)           Pre-treatment Symptoms/Complaints: pain is resolving likes doing the stretching ex  Pain: Initial:   3 Post Session:  3/10   Medications Last Reviewed:  2019   Updated Objective Findings:  Full lumbar motion all planes without pain complaints. TREATMENT:   Therapeutic Exercises: (45 minutes):  Exercises per grid below to improve mobility, strength and balance. Required minimal visual, verbal and manual cues to promote proper body alignment and promote proper body posture. Progressed resistance and complexity of movement as indicated.      Date:  19 Date:  19 Date:  19 Date  19   Activity/Exercise Parameters Parameters Parameters    Patient education Treatment expectations, rational, HEP      Cat camel 10 x 10 x 10 x  10 x   Child pose 10 x 10 x 10 x 10 x   Prop on elbows 1 min 1 min     Lower trunk rotation 10 x 10 x 10 x 10 x   Treadmill   2.5 mph 10 min  2.5 mph 10 min 3.0 mph 15 min   L stretch   5 x  5 x 5 x   Standing forward fold to overhead reach  6x 5 x    Standing side bending stretch  5 x ea way 5 x ea    Hip hinge   10 x 20 x   Supine marching    10 x 10 x ea   Sciatic nerve glides   10 x ea 10 x ea   Squat with overhead reach   Heels on 2x4 10 x    Cone  lumbar flex/ext    3 x 5 floor   Warrior 1 and 2    5 x ea pose               Manual Therapy Interventions: ( minutes): Manual interventions performed to improve joint/soft tissue mobility and ROM to improve ability to perform ADLs. Patient responded well to all manual interventions with no significant increase in pain/symptoms. Improved pain reported below. Technique Used Grade  Level # Time(s) Effect while being performed                                                         [employer] Portal  Treatment/Session Summary:  Patient understands HEP and POC  Response to Treatment:  No increase in pain or adverse reactions  Communication/Consultation:  Try to walk daily consider yoga at home  Equipment provided today:  HEP. Recommendations/Intent for next treatment session: Next visit will focu  s on aerobic activity, pain education, addition of functional movements.     Total Treatment Billable Duration: 30 min Rx  PT Patient Time In/Time Out  Time In: 0815  Time Out: 0900  Tom Silva, RT    Future Appointments   Date Time Provider Jorje Moulton   12/30/2019  2:45 PM Kai Sim, RT [facility]   3/6/2020  9:30 AM MAT LAB [facility]   3/12/2020  5:00 PM Marek Reid NP [facility]

## 2019-12-30 ENCOUNTER — HOSPITAL ENCOUNTER (OUTPATIENT)
Dept: PHYSICAL THERAPY | Age: 49
Discharge: HOME | End: 2019-12-30
Payer: COMMERCIAL

## 2019-12-30 PROCEDURE — 97110 THERAPEUTIC EXERCISES: CPT

## 2019-12-30 NOTE — PROGRESS NOTES
Lorraine Lewis  : 1970  Payor: Aletha Johnson / [place] [facility] / Product Type: O /  [address] [facility] [address]. [address], [id number] [place] [address]  [phone]   [phone]                                                               OUTPATIENT PHYSICAL THERAPY: Daily Treatment Note 2019   Visit Count:  5    ICD-10: Treatment Diagnosis:    Low back pain (M54.5)                                      Chronic pain syndrome (G89.29)        Muscle weakness (M62.81)           Pre-treatment Symptoms/Complaints: pain is resolving likes doing the stretching ex ABBY 15  Pain: Initial:   2 Post Session: 2/10   Medications Last Reviewed:  2019   Updated Objective Findings:  Full lumbar motion all planes without pain complaints. TREATMENT:   Therapeutic Exercises: (30 minutes):  Exercises per grid below to improve mobility, strength and balance. Required minimal visual, verbal and manual cues to promote proper body alignment and promote proper body posture. Progressed resistance and complexity of movement as indicated.      Date:  19 Date:  19 Date:  19 Date  19 Date  19   Activity/Exercise Parameters Parameters Parameters     Patient education Treatment expectations, rational, HEP    DC HEP benefits of yoga practice   Cat camel 10 x 10 x 10 x  10 x 5 x   Child pose 10 x 10 x 10 x 10 x 5 x   Prop on elbows 1 min 1 min      Lower trunk rotation 10 x 10 x 10 x 10 x    Treadmill   2.5 mph 10 min  2.5 mph 10 min 3.0 mph 15 min 3.0 mph 10 min   L stretch   5 x  5 x 5 x    Standing forward fold to overhead reach  6x 5 x     Standing side bending stretch  5 x ea way 5 x ea     Hip hinge   10 x 20 x 5 x   Supine marching    10 x 10 x ea    Sciatic nerve glides   10 x ea 10 x ea    Squat with overhead reach   Heels on 2x4 10 x     Cone  lumbar flex/ext    3 x 5 floor    Warrior 1 and 2    5 x ea pose 5 x ea Manual Therapy Interventions: ( minutes): Manual interventions performed to improve joint/soft tissue mobility and ROM to improve ability to perform ADLs. Patient responded well to all manual interventions with no significant increase in pain/symptoms. Improved pain reported below. Technique Used Grade  Level # Time(s) Effect while being performed                                                         [employer] Portal  Treatment/Session Summary:  Patient DC to HEP  Response to Treatment:  No increase in pain or adverse reactions  Communication/Consultation:  Try to walk daily consider yoga at home  Equipment provided today:  HEP.   Recommendations DC to HEP    Total Treatment Billable Duration: 30 min Rx  PT Patient Time In/Time Out  Time In: 1450  Time Out: [address], RT    Future Appointments   Date Time Provider Jorje Moulton   3/6/2020  9:30 AM MAT LAB [facility]   3/12/2020  5:00 PM Georgi Rondon NP [facility]

## 2019-12-30 NOTE — THERAPY DISCHARGE
Cass Chávez  : 1970      Payor: Philip Eric / Plan: [facility] / Product Type: PPO /  [address] [facility], [address], [address] [facility]  [phone]   [phone]       OUTPATIENT PHYSICAL THERAPY:Discharge 2019    ICD-10: Treatment Diagnosis:    Low back pain (M54.5)              Chronic pain syndrome (G89.29)        Muscle weakness (M62.81)              Precautions/Allergies:   Sulfa (sulfonamide antibiotics)   Fall Risk Score: 1 (? 5 = High Risk)  MD Orders: Eval and Treat  MEDICAL/REFERRING DIAGNOSIS:  low back   DATE OF ONSET: one year  REFERRING PHYSICIAN: Katelyn Solo NP  RETURN PHYSICIAN APPOINTMENT: TBD     FINAL ASSESSMENT:  Ms. Cass Chávez has been discharged to an independent program of aerobic activity, yoga stretching ex and advice to continue with daily meditation program. She also has been provided with an updated pain science education manual. At this time her functional ABBY scale has improved from 20/50 to 15/50 and her lumbar range of motion is within normal limits. TREATMENT PLAN:  Effective Dates:  TO 2020 (60 days). Frequency/Duration: 2 times a week for 60 Days  GOALS: (Goals have been discussed and agreed upon with patient.)  Short Term Goals 4 weeks   1. Cass Chávez will be independent with HEP GOAL MET 2019  2. Cass Chávez will participate in LE stretching program to increase flexibility GOAL MET 2019  3. Cass Chávez will participate in core stabilization exercises to help with stabilization during ADLs GOAL MET 2019  4. Cass Chávez will participate in LE strengthening program with weights/resistance as appropriate to help with gait and elevations GOAL MET 2019  5. Cass Chávez will participate in aerobic conditioning program and will tolerate 10 min of constant walking GOAL MET 2019  6.  Kristen Trevino Yong Nunes will understand basic pain science principles GOAL MET 12/30/2019    Long Term Goals 8 weeks   1. Kalyani Copeland will demonstrate a 8 point improvement on the Oswestry to show improvement in function   2. Kalyani Copeland will report 3/10 pain at rest and during ADLs  3. Kalyani Copeland will demonstrate 5/5 LE strength on manual muscle testing GOAL MET 12/30/2019  4. Kalyani Copeland will be able to ambulate for 20 minutes without increase in symptoms. GOAL MET 12/30/2019      Outcome Measure: Tool Used: Modified Oswestry Low Back Pain Questionnaire  Score:  Initial: 20/50  Most Recent: 15/50 (Date: 12/30/19 )   Interpretation of Score: Each section is scored on a 0-5 scale, 5 representing the greatest disability. The scores of each section are added together for a total score of 50. Total Treatment Duration:  PT Patient Time In/Time Out  Time In: 1450  Time Out: 1535                HISTORY:   History of Present Injury/Illness (Reason for Referral):  Gradual increase in back pain has been present for about a year. Pain got worse about two weeks ago noticed difficulty driving more than 20 min. -Present symptoms/complaints (on day of evaluation)  Pain Scale:  · Current: 3/10  · Best: 2/10  · Worst: 5/10      · Aggravating factors: driving, sitting, stress  Relieving factors: hot shower, rest, doing meditation 10 min at night, riding ex bike    Past Medical History/Comorbidities:   Ms. Yong Nunes  has a past medical history of Anxiety, Depressed, GERD (gastroesophageal reflux disease), Hypertension, IBS (irritable bowel syndrome), and Insomnia. Ms. Yong Nunes  has a past surgical history that includes pr egd deliver thermal energy sphnctr/cardia gerd; hx colonoscopy; hx tubal ligation; and hx lipectomy.   Social History/Living Environment:     lives with family       Date Last Reviewed:  12/30/2019   Number of Personal Factors/Comorbidities that affect the Plan of Care: 3+: HIGH COMPLEXITY EXAMINATION:   Observation/Orthostatic Postural Assessment:          Normal postural presentation, note upper chest breathing pattern and guarded motion with active spinal movements all directions. Palpation:          Pain with light palpation PAs lumbar spine. ROM:            AROM/PROM         Joint: Eval Date: 12/4/19  Re-Assess Date: 12/30/19  Re-Assess Date:    Active ROM RIGHT LEFT RIGHT LEFT RIGHT LEFT   Knee Extension 0 0 0 0     Knee Flexion 130 130 130 130     Hip Flexion         Hip Abduction         Lumbar Flexion 65 pain endrange and return to neutral  70 no pain complaints      Lumbar Extension 20 pain endrange  20 pain end range      Lumbar Side-bending 25 pain 30 pain  30 no pain 30 no pain     Lumbar Rotation           Strength:     Eval Date:  12/4/19  Re-Assess Date:  Re-Assess Date:      RIGHT LEFT RIGHT LEFT RIGHT LEFT   Knee Flexion (L5-S2) 4/5 pain in hamstring 4/5 pain hamstring 5 5     Knee Extension (L3, L4) 4/5 pain in thigh 4/5 pain in back 5 5     Hip Flexion (L1, L2) 3+/5 3+/5 4 4     Hip Extension         Hip Abduction (L5, S1) 3/5  3/5 4 4     Ankle Dorsiflexion  4/5 4/5       Great Toe Extension (L5) NT NT       Ankle Plantar Flexion (S1-S2) 4 4 4 4         Single leg stance right/left10/10 seconds              Deep squat:  Able to touch floor with hands    Special Tests:   · CHRISTINE= negative  · SLR (<60 degrees)= negative  · SLUMP= negative  · SI Joint Tests: negative      Neurological Screen:    RADIATING SYMPTOMS?: YES complaints of intermittent radiating pain down either or both posterior legs. States that she does not know of a precipitating factor  ·   Functional Mobility:  Affecting participation in basic ADLs and functional tasks.     Balance:     30 second STS= 13     1.  1.    CLINICAL PRESENTATION:   CLINICAL DECISION MAKING:        See associated treatment note for treatment provided today      Future Appointments   Date Time Provider Jorje Moulton   3/6/2020  9:30 AM MAT LAB [facility] MAT MAT   3/12/2020  5:00 PM Kishan Whittington NP [facility] MAT MAT         Aidan Sim, RT          remember to save as eval note

## 2020-04-24 PROBLEM — F31.9 BIPOLAR DEPRESSION (HCC): Status: ACTIVE | Noted: 2018-09-20

## 2020-12-10 ENCOUNTER — HOSPITAL ENCOUNTER (OUTPATIENT)
Dept: GENERAL RADIOLOGY | Age: 50
Discharge: HOME | End: 2020-12-10
Payer: COMMERCIAL

## 2020-12-10 DIAGNOSIS — M79.641 RIGHT HAND PAIN: ICD-10-CM

## 2020-12-10 PROCEDURE — 73130 X-RAY EXAM OF HAND: CPT

## 2021-02-10 PROBLEM — E16.2 HYPOGLYCEMIA: Status: ACTIVE | Noted: 2021-02-10

## 2021-02-10 PROBLEM — F41.9 ANXIETY: Status: ACTIVE | Noted: 2021-02-10

## 2021-06-07 ENCOUNTER — HOSPITAL ENCOUNTER (OUTPATIENT)
Dept: CT IMAGING | Age: 51
Discharge: HOME | End: 2021-06-07
Attending: PHYSICIAN ASSISTANT

## 2021-06-07 DIAGNOSIS — R25.1 TREMOR: ICD-10-CM

## 2021-06-07 DIAGNOSIS — R42 DIZZINESS: ICD-10-CM

## 2021-06-07 DIAGNOSIS — R20.2 PARESTHESIA OF RIGHT LEG: ICD-10-CM

## 2021-06-07 DIAGNOSIS — R25.3 MUSCLE TWITCHING: ICD-10-CM

## 2021-06-07 NOTE — PROGRESS NOTES
Pt notified that her CT scan was normal and to keep her follow-up appt with Masha next week. Pt verbalized understanding.

## 2021-06-30 ENCOUNTER — HOSPITAL ENCOUNTER (OUTPATIENT)
Dept: SLEEP MEDICINE | Age: 51
Discharge: HOME | End: 2021-06-30
Payer: COMMERCIAL

## 2021-06-30 PROCEDURE — 95810 POLYSOM 6/> YRS 4/> PARAM: CPT

## 2021-07-22 ENCOUNTER — HOSPITAL ENCOUNTER (OUTPATIENT)
Dept: GENERAL RADIOLOGY | Age: 51
Discharge: HOME | End: 2021-07-22
Payer: COMMERCIAL

## 2021-07-22 DIAGNOSIS — M15.9 GENERALIZED OSTEOARTHRITIS: ICD-10-CM

## 2021-07-22 DIAGNOSIS — R89.4 SEROLOGIC ABNORMALITY: ICD-10-CM

## 2021-07-22 DIAGNOSIS — E55.9 HYPOVITAMINOSIS D: ICD-10-CM

## 2021-07-22 DIAGNOSIS — M54.10 RADICULOPATHY, UNSPECIFIED SPINAL REGION: ICD-10-CM

## 2021-07-22 DIAGNOSIS — R76.0 ANTINUCLEAR ANTIBODY (ANA) TITER GREATER THAN 1:80: ICD-10-CM

## 2021-07-22 PROCEDURE — 72100 X-RAY EXAM L-S SPINE 2/3 VWS: CPT

## 2021-08-02 PROBLEM — G25.0 ESSENTIAL TREMOR: Status: ACTIVE | Noted: 2021-08-02

## 2021-08-02 PROBLEM — G25.81 RESTLESS LEGS SYNDROME (RLS): Status: ACTIVE | Noted: 2021-08-02

## 2021-08-02 PROBLEM — M79.2 NEUROPATHIC PAIN: Status: ACTIVE | Noted: 2021-08-02

## 2021-08-02 PROBLEM — Z79.899 ENCOUNTER FOR MEDICATION MANAGEMENT: Status: ACTIVE | Noted: 2021-08-02

## 2021-08-11 PROBLEM — K59.09 OTHER CONSTIPATION: Status: ACTIVE | Noted: 2021-04-22

## 2021-08-11 PROBLEM — K21.00 GERD WITH ESOPHAGITIS: Status: ACTIVE | Noted: 2019-07-24

## 2021-08-11 PROBLEM — K29.80 DUODENITIS: Status: ACTIVE | Noted: 2019-07-24

## 2021-08-11 PROBLEM — R11.0 NAUSEA: Status: ACTIVE | Noted: 2021-02-02

## 2021-08-11 PROBLEM — K52.832 LYMPHOCYTIC COLITIS: Status: ACTIVE | Noted: 2021-02-02

## 2021-08-11 PROBLEM — F33.0 MILD EPISODE OF RECURRENT MAJOR DEPRESSIVE DISORDER (HCC): Status: ACTIVE | Noted: 2018-09-20

## 2021-08-11 PROBLEM — K22.2 SCHATZKI'S RING: Status: ACTIVE | Noted: 2019-07-24

## 2021-08-11 PROBLEM — K50.00 TERMINAL ILEITIS WITHOUT COMPLICATION (HCC): Status: ACTIVE | Noted: 2021-04-22

## 2021-10-12 ENCOUNTER — HOSPITAL ENCOUNTER (OUTPATIENT)
Dept: GENERAL RADIOLOGY | Age: 51
Discharge: HOME | End: 2021-10-12
Attending: PHYSICIAN ASSISTANT
Payer: COMMERCIAL

## 2021-10-12 DIAGNOSIS — M79.671 RIGHT FOOT PAIN: ICD-10-CM

## 2021-10-12 DIAGNOSIS — M54.6 ACUTE LEFT-SIDED THORACIC BACK PAIN: ICD-10-CM

## 2021-10-12 DIAGNOSIS — M54.2 CERVICALGIA: ICD-10-CM

## 2021-10-12 PROCEDURE — 72040 X-RAY EXAM NECK SPINE 2-3 VW: CPT

## 2021-10-12 PROCEDURE — 73630 X-RAY EXAM OF FOOT: CPT

## 2021-10-12 PROCEDURE — 72072 X-RAY EXAM THORAC SPINE 3VWS: CPT

## 2021-10-12 NOTE — PROGRESS NOTES
Cervical spine shows no acute injury but diffuse spondylosis (degenerative changes) starting at C3 down through the cervical spine to C7 level.

## 2021-10-13 NOTE — PROGRESS NOTES
Pt notified that her thoracic spine shows no acute injury just minimal spondylosis (degenerative changes). Pt verbalized understanding.

## 2021-10-13 NOTE — PROGRESS NOTES
Pt notified that her cervical spine shows no acute injury but diffuse spondylosis (degenerative changes) starting at C3 down through the cervical spine to C7 level. Pt verbalized understanding.

## 2021-11-08 ENCOUNTER — HOSPITAL ENCOUNTER (OUTPATIENT)
Dept: PHYSICAL THERAPY | Age: 51
Discharge: HOME | End: 2021-11-08
Payer: COMMERCIAL

## 2021-11-08 DIAGNOSIS — S16.1XXA NECK STRAIN, INITIAL ENCOUNTER: ICD-10-CM

## 2021-11-08 DIAGNOSIS — M25.512 ACUTE PAIN OF LEFT SHOULDER: ICD-10-CM

## 2021-11-08 DIAGNOSIS — S39.012A LUMBAR STRAIN, INITIAL ENCOUNTER: ICD-10-CM
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
## 2021-11-08 PROCEDURE — 97162 PT EVAL MOD COMPLEX 30 MIN: CPT

## 2021-11-08 PROCEDURE — 97110 THERAPEUTIC EXERCISES: CPT

## 2021-11-08 NOTE — PROGRESS NOTES
Emiliano Fee  : 1970  Payor: BLUE CROSS / [place] [employer] [facility] / Product Type: PPO /  [address] [place]. [address], [address] [place] [address] Road  [phone]   [phone]                                                          Reinaldo Pandya PA-C      OUTPATIENT PHYSICAL THERAPY: Daily Treatment Note 2021 Visit Count:  1    Tx Diagnosis:  Cervicalgia (M54.2)  Pain in thoracic spine (M54.6)  Pain in Left Shoulder (M25.512)  Low back pain (M54.5)  Muscle spasm of back (M62.830)      Pre-treatment Symptoms/Complaints: See Initial Eval Dated 21 for more details. Pain: Initial:8/10  Medications Last Reviewed:  2021     Post Session: 8/10   Updated Objective Findings: See Initial Eval for more details. TREATMENT:   THERAPEUTIC EXERCISE: (20 minutes):  Exercises per grid below to improve mobility, strength and balance. Required minimal visual, verbal and manual cues to promote proper body alignment and promote proper body posture. Progressed resistance and complexity of movement as indicated. Date:  21 Date:   Date:     Activity/Exercise Parameters Parameters Parameters   Education HEP, POC, PT goals, anatomy/pathology     Open books x15/side     Lower trunk rotation 3x10     Cat-camel x15     myra pose x15                       THERAPEUTIC ACTIVITY: ( 0 minutes): Activities per gid below to improve functional movement related mobility, strength and balance to improve neuro-muscular carryover to daily functional activities for improving patient's quality of life. Required visual, verbal and manual cues to promote proper body alignment and promote proper body posture/mechanics. Progressed resistance and complexity of movement as indicated.      Date:   Date:   Date:     Activity/Exercise Parameters Parameters Parameters                                                   MANUAL THERAPY: (0 minutes): Joint mobilization, Soft tissue mobilization was utilized and necessary because of the patient's restricted joint motion and restricted motion of soft tissue mobility. Date  11/8/2021    Technique Used Grade  Level # Time(s) Effect while being performed                                                                 HEP Log Date       2.     3.    4.    5.           [facility] Portal  Treatment/Session Summary:    Response to Treatment: Pt demonstrated understanding of POC and initial HEP. No increase in pain or adverse reactions. Communication/Consultation:  POC, HEP, PT goals, Faxed initial evaluation to MD.   Equipment provided today: HEP Handout   Recommendations/Intent for next treatment session:   Next visit will focus on Core Stability Pain Science Education Quad strengthening Hip strengthening RTC strengthening, periscapular strengthening         Treatment Plan of Care Effective Dates: 11/8/2021 TO 1/7/2022 (60 days).   Frequency/Duration: 2 times a week for 60 Days             Total Treatment Billable Duration:   20  Rx plus Eval   PT Patient Time In/Time Out  Time In: 5973  Time Out: 97 [address] Leo PT    Future Appointments   Date Time Provider Jorje Moulton   11/10/2021 12:15 PM POA INT MRI [facility]   11/11/2021 11:15 AM Rima Verdugo, PT [facility]   11/15/2021  9:35 AM Kimberly Ba MD [facility] POA   11/16/2021  9:00 AM Larsedrick Majors, PT [facility]   11/19/2021  9:00 AM Denae Blanchard, PT [facility]   11/22/2021 10:30 AM Laray Majors, PT [facility]   11/30/2021  9:45 AM Laray Majors, PT [facility]   12/2/2021  9:45 AM Jewel Mitchell, PT [facility]   12/7/2021  9:00 AM Laray Majors, PT [facility]   12/9/2021  9:00 AM Laray Majors, PT [facility]   12/14/2021  9:00 AM Laray Majors, PT [facility]   12/16/2021  9:30 AM Laray Majors, PT [facility]   12/16/2021  3:30 PM Erasto Jacobs, [facility]   2/2/2022 11:00 AM Suzan Truong MD [facility]   2/15/2022  3:00 PM Vinicio Smyth MD [facility]
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
## 2021-11-11 ENCOUNTER — HOSPITAL ENCOUNTER (OUTPATIENT)
Dept: PHYSICAL THERAPY | Age: 51
Discharge: HOME | End: 2021-11-11
Payer: COMMERCIAL

## 2021-11-11 PROCEDURE — 97110 THERAPEUTIC EXERCISES: CPT

## 2021-11-11 NOTE — PROGRESS NOTES
Shaina Frias  : 1970  Payor: BLUE CROSS / [place] Formerly [facility] / Product Type: PPO /  [place] m Langing at [address] [place] [address], [address]  [phone]   [phone]                                                          Miguel Eduardo PA-C      OUTPATIENT PHYSICAL THERAPY: Daily Treatment Note 2021 Visit Count:  2    Tx Diagnosis:  Cervicalgia (M54.2)  Pain in thoracic spine (M54.6)  Pain in Left Shoulder (M25.512)  Low back pain (M54.5)  Muscle spasm of back (M62.830)      Pre-treatment Symptoms/Complaints: Pt states the exercises for her back feel helpful but notes that sometime on Monday she felt another \"pop\" in her L shoulder and then felt some tingling in her whole hand. She took some medicine and went to sleep and feels fine now. Pain: Initial:8/10  Medications Last Reviewed:  2021     Post Session: 8/10   Updated Objective Findings: See Initial Eval for more details. TREATMENT:   THERAPEUTIC EXERCISE: (40 minutes):  Exercises per grid below to improve mobility, strength and balance. Required minimal visual, verbal and manual cues to promote proper body alignment and promote proper body posture. Progressed resistance and complexity of movement as indicated. Date:  21 Date:  21 Date:     Activity/Exercise Parameters Parameters Parameters   Education HEP, POC, PT goals, anatomy/pathology Discussed possible causes of non-painful pops in shoulder, possible cause of occasional tingling to hand    Open books x15/side x15/side    Lower trunk rotation 3x10 3x10, arms out at side    Cat-camel x15     myra pose x15     Treadmill  8 min    L stretch  x15    Standing pec stretch  10x10 sec holds    bridges  3x10    Rows  [id number] lbs    Lat pulldowns  [id number] lbs    Shoulder taps  Next session          THERAPEUTIC ACTIVITY: ( 0 minutes):  Activities per gid below to improve functional movement related mobility, strength and balance to improve neuro-muscular carryover to daily functional activities for improving patient's quality of life. Required visual, verbal and manual cues to promote proper body alignment and promote proper body posture/mechanics. Progressed resistance and complexity of movement as indicated. Date:   Date:   Date:     Activity/Exercise Parameters Parameters Parameters                                                   MANUAL THERAPY: (0 minutes): Joint mobilization, Soft tissue mobilization was utilized and necessary because of the patient's restricted joint motion and restricted motion of soft tissue mobility. Date  11/11/2021    Technique Used Grade  Level # Time(s) Effect while being performed                                                                 HEP Log Date       2.     3.    4.    5.           [employer]  Treatment/Session Summary:    Response to Treatment: Pt continues to present with full and pain-free ROM to B shoulders. As pt reports correlation between pop in shoulder and tingling to hand, it is likely thoracic outlet in origin vs cervicogenic. Added standing pec stretch to address possible pec tightness as cause and added to HEP. Communication/Consultation:  POC, HEP, PT goals, Faxed initial evaluation to MD.   Equipment provided today: HEP Handout   Recommendations/Intent for next treatment session:   Next visit will focus on Core Stability Pain Science Education Quad strengthening Hip strengthening RTC strengthening, periscapular strengthening         Treatment Plan of Care Effective Dates: 11/8/2021 TO 1/7/2022 (60 days).   Frequency/Duration: 2 times a week for 60 Days             Total Treatment Billable Duration:   20  Rx plus Karen Multani, PT    Future Appointments   Date Time Provider Jorje Moulton   11/15/2021  9:35 AM MD ALEJANDRO Jamil   11/16/2021  9:00 AM Krissy Camp PT [facility] 11/22/2021  6:30 AM [facility] MRI UNIT 1 [facility]   11/22/2021  4:30 PM Matthias Verdugo, PT [facility]   11/30/2021  1:45 PM Karoronan Victor, PT [facility]   12/2/2021  9:45 AM Dahiana Dennis, Lydia Abdi, PT [facility]   12/7/2021  9:00 AM Karo Piper, PT [facility]   12/9/2021  9:00 AM Karo Piper, PT [facility]   12/14/2021  9:00 AM Karo Piper, PT [facility]   12/16/2021  9:30 AM Karo Piper, PT [facility]   12/16/2021  3:30 PM Susan Shin PA-C [facility]   2/2/2022 11:00 AM David Denny MD [facility]   2/15/2022  3:00 PM Roalndo Monk MD [facility]

## 2021-11-16 ENCOUNTER — HOSPITAL ENCOUNTER (OUTPATIENT)
Dept: PHYSICAL THERAPY | Age: 51
Discharge: HOME | End: 2021-11-16
Payer: COMMERCIAL

## 2021-11-16 PROCEDURE — 97110 THERAPEUTIC EXERCISES: CPT

## 2021-11-16 NOTE — PROGRESS NOTES
Amita Tricia  : 1970  Payor: BLUE CROSS / Plan: [place] [employer] / Product Type: O /  [address] [facility] at [address] [place] [address]. [address] [place] [address]  [phone]   [phone]                                                          Meena Frank PA-C      OUTPATIENT PHYSICAL THERAPY: Daily Treatment Note 2021 Visit Count:  3    Tx Diagnosis:  Cervicalgia (M54.2)  Pain in thoracic spine (M54.6)  Pain in Left Shoulder (M25.512)  Low back pain (M54.5)  Muscle spasm of back (M62.830)      Pre-treatment Symptoms/Complaints: Got a new script from Dr. Tawnya Vargas for her B feet and wants to add a new case for that. Neck and back are doing alright - still feels some pain on the right side of her back and L shoulder still hurts across the pec area. Intensity of the shoulder pain still feels the same. Pain: Initial:8/10  Medications Last Reviewed:  2021     Post Session: 8/10   Updated Objective Findings:         TREATMENT:   THERAPEUTIC EXERCISE: (40 minutes):  Exercises per grid below to improve mobility, strength and balance. Required minimal visual, verbal and manual cues to promote proper body alignment and promote proper body posture. Progressed resistance and complexity of movement as indicated.      Date:  21 Date:  21 Date:  21   Activity/Exercise Parameters Parameters Parameters   Education HEP, POC, PT goals, anatomy/pathology Discussed possible causes of non-painful pops in shoulder, possible cause of occasional tingling to hand    Open books x15/side x15/side x15/side   Lower trunk rotation 3x10 3x10, arms out at side    Cat-camel x15     myra pose x15     Treadmill  8 min 8 min   L stretch  x15 x15   Standing pec stretch  10x10 sec holds    bridges  3x10 3x15   Rows  [id number] lbs    Lat pulldowns  [id number] lbs [id number] lbs   Shoulder taps  Next session 3x10, at wall   Knee pull glute stretch   10x10 sec holds   scap retract with ER   3x10, red   Bird dogs   3x10, legs only         THERAPEUTIC ACTIVITY: ( 0 minutes): Activities per gid below to improve functional movement related mobility, strength and balance to improve neuro-muscular carryover to daily functional activities for improving patient's quality of life. Required visual, verbal and manual cues to promote proper body alignment and promote proper body posture/mechanics. Progressed resistance and complexity of movement as indicated. Date:   Date:   Date:     Activity/Exercise Parameters Parameters Parameters                                                   MANUAL THERAPY: (0 minutes): Joint mobilization, Soft tissue mobilization was utilized and necessary because of the patient's restricted joint motion and restricted motion of soft tissue mobility. Date  11/16/2021    Technique Used Grade  Level # Time(s) Effect while being performed                                                                 HEP Log Date       2.     3.    4.    5.           [employer] Portal  Treatment/Session Summary:    Response to Treatment: Continued focus on gentle lumbar ROM for pain modulation. Able to add bird dogs to progress core muscle activation to reduce spasm and tone. Also able to increase volume of periscapular strengthening and stabilization. Also incorporated pec isometrics for pain relief. Communication/Consultation:  POC, HEP, PT goals, Faxed initial evaluation to MD.   Equipment provided today: HEP Handout   Recommendations/Intent for next treatment session:   Next visit will focus on Core Stability Pain Science Education Quad strengthening Hip strengthening RTC strengthening, periscapular strengthening         Treatment Plan of Care Effective Dates: 11/8/2021 TO 1/7/2022 (60 days).   Frequency/Duration: 2 times a week for 60 Days             Total Treatment Billable Duration:    PT Patient Time In/Time Out  Time In: 0900  Time Out: [address] [facility] Kartik PT    Future Appointments   Date Time Provider Jorje Ríosi   11/17/2021  2:30 PM Laray Majors, [facility] [place] [facility]   11/22/2021  6:30 AM [facility] MRI UNIT 1 [facility]   11/22/2021  4:30 PM Laray Majors, PT [facility]   11/23/2021  2:30 PM Laray Majors, PT [facility]   11/29/2021  4:45 PM Laray Majors, PT [facility]   11/30/2021  1:45 PM Laray Majors, PT [facility]   12/2/2021  9:45 AM Erich Carpenter, PT [facility]   12/6/2021  8:15 AM Laray Majors, PT [facility]   12/7/2021  9:00 AM Laray Majors, PT [facility]   12/9/2021  9:00 AM Laray Majors, PT [facility]   12/14/2021  9:00 AM Laray Majors, PT [facility]   12/16/2021  9:30 AM Laray Majors, PT [facility]   12/16/2021  3:30 PM Sarah Ashley PA-C [facility]   2/2/2022 11:00 AM Rose Chen MD [facility]   2/15/2022  3:00 PM Zach Sanchez MD [facility]

## 2021-11-17 ENCOUNTER — HOSPITAL ENCOUNTER (OUTPATIENT)
Dept: PHYSICAL THERAPY | Age: 51
Discharge: HOME | End: 2021-11-17
Payer: COMMERCIAL

## 2021-11-17 DIAGNOSIS — M79.672 LEFT FOOT PAIN: ICD-10-CM

## 2021-11-17 DIAGNOSIS — M79.671 RIGHT FOOT PAIN: ICD-10-CM

## 2021-11-17 PROCEDURE — 97110 THERAPEUTIC EXERCISES: CPT

## 2021-11-17 PROCEDURE — 97161 PT EVAL LOW COMPLEX 20 MIN: CPT

## 2021-11-17 NOTE — PROGRESS NOTES
Shaina Frias  : 1970  Payor: BLUE CROSS / [place] [facility] / Product Type: PPO /  [address] at [address], [address] [address]  [phone]   [phone]                                                          Miguel Eduardo, [place]      OUTPATIENT PHYSICAL THERAPY: Daily Treatment Note 2021 Visit Count:  1    Tx Diagnosis:  Left foot pain [M79.672]  Right foot pain [M79.671]         Pre-treatment Symptoms/Complaints: See Initial Eval Dated 21 for more details. Pain: Initial:0/10  Medications Last Reviewed:  2021     Post Session: 0/10   Updated Objective Findings: See Initial Eval for more details. TREATMENT:   THERAPEUTIC EXERCISE: (25 minutes):  Exercises per grid below to improve mobility, strength and balance. Required minimal visual, verbal and manual cues to promote proper body alignment and promote proper body posture. Progressed resistance and complexity of movement as indicated. Date:  21 Date:   Date:     Activity/Exercise Parameters Parameters Parameters   Education HEP, POC, PT goals, anatomy/pathology     SLB 1x30 sec     Toe yoga Bilateral, 3 min     gastroc stretch 3x30 sec, bilateral     Posterior tibialis raises 3x12     Heel raises 3x12                 THERAPEUTIC ACTIVITY: ( 0 minutes): Activities per gid below to improve functional movement related mobility, strength and balance to improve neuro-muscular carryover to daily functional activities for improving patient's quality of life. Required visual, verbal and manual cues to promote proper body alignment and promote proper body posture/mechanics. Progressed resistance and complexity of movement as indicated.      Date:   Date:   Date:     Activity/Exercise Parameters Parameters Parameters                                                   MANUAL THERAPY: (0 minutes): Joint mobilization, Soft tissue mobilization was utilized and necessary because of the patient's restricted joint motion and restricted motion of soft tissue mobility. Date  11/17/2021    Technique Used Grade  Level # Time(s) Effect while being performed                                                                 HEP Log Date       2.     3.    4.    5.           [employer] Portal  Treatment/Session Summary:    Response to Treatment: Pt demonstrated understanding of POC and initial HEP. No increase in pain or adverse reactions. Communication/Consultation:  POC, HEP, PT goals, Faxed initial evaluation to MD.   Equipment provided today: HEP Handout   Recommendations/Intent for next treatment session:   Next visit will focus on Core Stability Hip strengthening foot intrinsic strengthening. Treatment Plan of Care Effective Dates: 11/17/2021 TO 1/16/2022 (60 days).   Frequency/Duration: 1 time a week for 60 Days             Total Treatment Billable Duration:   25  Rx plus Karen Villegas, PT    Future Appointments   Date Time Provider Jorje Moulton   11/17/2021  2:30 PM Saroj Meza, PT [facility] AND [place] [facility]   11/22/2021  6:30 AM [facility] MRI UNIT 1 [facility]   11/22/2021  4:30 PM Saroj Atkinse, PT [facility]   11/23/2021  2:30 PM [place] Millye, PT [facility]   11/29/2021  4:45 PM Saroj Terinte, PT [facility]   11/30/2021  1:45 PM Saroj Terinte, PT [facility]   12/2/2021  9:45 AM Thom Khanna, PT [facility]   12/6/2021  8:15 AM Saroj Millye, PT [facility]   12/6/2021 10:30 AM Ramón Jimenez PA-C [facility]   12/7/2021  9:00 AM [place] Millye, PT [facility]   12/9/2021  9:00 AM Saroj Millye, PT [facility]   12/14/2021  9:00 AM Saroj Terinte, PT [facility]   12/16/2021  9:30 AM Saroj Meza, PT [facility]   12/16/2021  3:30 PM Ramón Jimenez PA-C [facility]   2/2/2022 11:00 AM Mary Ellen Barillas MD [facility]   2/15/2022  3:00 PM Schuyler Petty MD [id number] [place]
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
## 2021-11-19 ENCOUNTER — APPOINTMENT (OUTPATIENT)
Dept: PHYSICAL THERAPY | Age: 51
End: 2021-11-19
Payer: COMMERCIAL

## 2021-11-22 ENCOUNTER — HOSPITAL ENCOUNTER (OUTPATIENT)
Dept: PHYSICAL THERAPY | Age: 51
Discharge: HOME | End: 2021-11-22
Payer: COMMERCIAL

## 2021-11-22 ENCOUNTER — APPOINTMENT (OUTPATIENT)
Dept: PHYSICAL THERAPY | Age: 51
End: 2021-11-22
Payer: COMMERCIAL

## 2021-11-22 ENCOUNTER — HOSPITAL ENCOUNTER (OUTPATIENT)
Dept: MRI IMAGING | Age: 51
Discharge: HOME | End: 2021-11-22
Attending: NURSE PRACTITIONER
Payer: COMMERCIAL

## 2021-11-22 DIAGNOSIS — G44.89 CHRONIC MIXED HEADACHE SYNDROME: ICD-10-CM

## 2021-11-22 DIAGNOSIS — G43.001 MIGRAINE WITHOUT AURA AND WITH STATUS MIGRAINOSUS, NOT INTRACTABLE: ICD-10-CM

## 2021-11-22 PROCEDURE — 97110 THERAPEUTIC EXERCISES: CPT

## 2021-11-22 PROCEDURE — 70551 MRI BRAIN STEM W/O DYE: CPT

## 2021-11-22 NOTE — PROGRESS NOTES
Dung Gutierrez  : 1970  Payor: BLUE CROSS / Plan: [place] [facility] / Product Type: PPO /  [address] [facility] at [address] [place] [address]. [address] [place] [address], [facility], [address] [place] [address] Road  [phone]   [phone]                                                          Selma Fofana PA-C      OUTPATIENT PHYSICAL THERAPY: Daily Treatment Note 2021 Visit Count:  4    Tx Diagnosis:  Cervicalgia (M54.2)  Pain in thoracic spine (M54.6)  Pain in Left Shoulder (M25.512)  Low back pain (M54.5)  Muscle spasm of back (M62.830)      Pre-treatment Symptoms/Complaints: Low back is giving her the most trouble today - did a lot of driving today. Her back was also bothering her a lot yesterday because she was on her feet cooking and cleaning for ~9 hours - felt like she was back to baseline by the next morning. Went to her referring provider about her shoulder - ordered and MRI but not scheduled yet. Pain: Initial:8/10  Medications Last Reviewed:  2021     Post Session: 8/10   Updated Objective Findings:         TREATMENT:   THERAPEUTIC EXERCISE: (50 minutes):  Exercises per grid below to improve mobility, strength and balance. Required minimal visual, verbal and manual cues to promote proper body alignment and promote proper body posture. Progressed resistance and complexity of movement as indicated.      Date:  21 Date:  21 Date:  21 Date  21   Activity/Exercise Parameters Parameters Parameters    Education HEP, POC, PT goals, anatomy/pathology Discussed possible causes of non-painful pops in shoulder, possible cause of occasional tingling to hand     Open books x15/side x15/side x15/side x15/side   Lower trunk rotation 3x10 3x10, arms out at side     Cat-camel x15      myra pose x15      Treadmill  8 min 8 min 8 min   L stretch  x15 x15    Standing pec stretch  10x10 sec holds     bridges  3x10 3x15 [id number] lbs   Rows  [id number] lbs  [id number] lbs   Lat pulldowns  [id number] lbs [id number] lbs [id number] lbs   Shoulder taps  Next session 3x10, at wall 3x10, at wall   Knee pull glute stretch   10x10 sec holds    scap retract with ER   3x10, red    Bird dogs   3x10, legs only 3x10, legs only   ER iso to punch, standing    3x10, yellow band   Sit to stand    [id number] lbs   Farmer's carries    3 large laps, 15 lbs each hand   Modified push ups    3x10, edge of shuttle   Sidestepping and monster walks    2x40 feet, red         THERAPEUTIC ACTIVITY: ( 0 minutes): Activities per gid below to improve functional movement related mobility, strength and balance to improve neuro-muscular carryover to daily functional activities for improving patient's quality of life. Required visual, verbal and manual cues to promote proper body alignment and promote proper body posture/mechanics. Progressed resistance and complexity of movement as indicated. Date:   Date:   Date:     Activity/Exercise Parameters Parameters Parameters                                                   MANUAL THERAPY: (0 minutes): Joint mobilization, Soft tissue mobilization was utilized and necessary because of the patient's restricted joint motion and restricted motion of soft tissue mobility. Date  11/22/2021    Technique Used Grade  Level # Time(s) Effect while being performed                                                                 HEP Log Date       2.     3.    4.    5.           [employer]  Treatment/Session Summary:    Response to Treatment: Increased focus on strengthening exercises today, incorporating hip, core, pec, and periscapular strengthening to address pt complaints of low back, neck, and B shoulder pain. Pt demonstrates good tolerance to increased load and denies increase in pain or symptoms into hand.     Communication/Consultation:  POC, HEP, PT goals, Faxed initial evaluation to MD.   Equipment provided today: HEP Handout   Recommendations/Intent for next treatment session:   Next visit will focus on Core Stability Pain Science Education Quad strengthening Hip strengthening RTC strengthening, periscapular strengthening         Treatment Plan of Care Effective Dates: 11/8/2021 TO 1/7/2022 (60 days).   Frequency/Duration: 2 times a week for 60 Days             Total Treatment Billable Duration:    PT Patient Time In/Time Out  Time In: 1610  Time Out: Titi Quiroz, PT    Future Appointments   Date Time Provider Jorje Moulton   11/24/2021  7:00 AM Aiden Pulido NP [facility]   11/29/2021  4:45 PM Malcolm Padron, PT [facility]   11/30/2021  1:45 PM [facility]   12/2/2021  9:45 AM [facility]   12/6/2021  8:15 AM Malcolm Padron, PT [facility]   12/7/2021  9:00 AM Malcolm Padron, PT [facility]   12/9/2021  9:00 AM Malcolm Padron, PT [facility]   12/13/2021  9:45 AM Malcolm Padron, PT [facility]   12/14/2021  9:00 AM Malcolm Padron, [facility]   12/15/2021  9:45 AM Malcolm Padron, PT [facility]   12/16/2021  3:30 PM Reinaldo Pandya PA-C [facility]   2/2/2022 11:00 AM Lio Lazo MD [facility]   2/15/2022  3:00 PM Dennis Lobo MD [facility]

## 2021-11-23 ENCOUNTER — APPOINTMENT (OUTPATIENT)
Dept: PHYSICAL THERAPY | Age: 51
End: 2021-11-23
Payer: COMMERCIAL

## 2021-11-29 ENCOUNTER — HOSPITAL ENCOUNTER (OUTPATIENT)
Dept: PHYSICAL THERAPY | Age: 51
Discharge: HOME | End: 2021-11-29
Payer: COMMERCIAL

## 2021-11-29 ENCOUNTER — HOSPITAL ENCOUNTER (OUTPATIENT)
Dept: MRI IMAGING | Age: 51
Discharge: HOME | End: 2021-11-29
Attending: PHYSICIAN ASSISTANT
Payer: COMMERCIAL

## 2021-11-29 DIAGNOSIS — M25.512 ACUTE PAIN OF LEFT SHOULDER: ICD-10-CM

## 2021-11-29 PROCEDURE — 73221 MRI JOINT UPR EXTREM W/O DYE: CPT

## 2021-11-29 PROCEDURE — 97110 THERAPEUTIC EXERCISES: CPT

## 2021-11-29 NOTE — PROGRESS NOTES
Jose Manuel Gauthier  : 1970  Payor[place] / [place] [facility] / Product Type: PPO /  [address] [place]. [address] [place] [address] [address] [place] [address], [facility], [address] [place] [address] Road  [phone]   [phone]                                                          Марина Petersen PA-C      OUTPATIENT PHYSICAL THERAPY: Daily Treatment Note 2021 Visit Count:  5    Tx Diagnosis:  Cervicalgia (M54.2)  Pain in thoracic spine (M54.6)  Pain in Left Shoulder (M25.512)  Low back pain (M54.5)  Muscle spasm of back (M62.830)      Pre-treatment Symptoms/Complaints: Low back is hurting more today - she thinks it may be related to stress from going back to work. She also says she's been really busy and hasn't been able to try any exercises to manage her pain. Pain: Initial:8/10  Medications Last Reviewed:  2021     Post Session: 8/10   Updated Objective Findings:         TREATMENT:   THERAPEUTIC EXERCISE: (45 minutes):  Exercises per grid below to improve mobility, strength and balance. Required minimal visual, verbal and manual cues to promote proper body alignment and promote proper body posture. Progressed resistance and complexity of movement as indicated.      Date:  21 Date:  21 Date:  21 Date  21 Date  21   Activity/Exercise Parameters Parameters Parameters     Education HEP, POC, PT goals, anatomy/pathology Discussed possible causes of non-painful pops in shoulder, possible cause of occasional tingling to hand   Discussed impact of stress and sleep on pain   Open books x15/side x15/side x15/side x15/side x15/side   Lower trunk rotation 3x10 3x10, arms out at side      Cat-camel x15       myra pose x15       Treadmill  8 min 8 min 8 min 8 min   L stretch  x15 x15     Standing pec stretch  10x10 sec holds      bridges  3x10 3x15 [id number] lbs    Rows  [id number] lbs  6a54d76 lbs [id number] lbs   Lat pulldowns  8p75e48 lbs 3d11e36 lbs 2a67x62 lbs 0i47v13 lbs   Shoulder taps  Next session 3x10, at wall 3x10, at wall    Knee pull glute stretch   10x10 sec holds     scap retract with ER   3x10, red     Bird dogs   3x10, legs only 3x10, legs only    ER iso to punch, standing    3x10, yellow band    Sit to stand    8j66q71 lbs 2o69z81 lbs   Farmer's carries    3 large laps, 15 lbs each hand 3 large laps, 15 lbs each hand   Modified push ups    3x10, edge of shuttle    Sidestepping and monster walks    2x40 feet, red 2x40 feet, red, 5 lbs at chest   Hip hinge     Wall tap, 10 lbs for practice         THERAPEUTIC ACTIVITY: ( 0 minutes): Activities per gid below to improve functional movement related mobility, strength and balance to improve neuro-muscular carryover to daily functional activities for improving patient's quality of life. Required visual, verbal and manual cues to promote proper body alignment and promote proper body posture/mechanics. Progressed resistance and complexity of movement as indicated. Date:   Date:   Date:     Activity/Exercise Parameters Parameters Parameters                                                   MANUAL THERAPY: (0 minutes): Joint mobilization, Soft tissue mobilization was utilized and necessary because of the patient's restricted joint motion and restricted motion of soft tissue mobility. Date  11/29/2021    Technique Used Grade  Level # Time(s) Effect while being performed                                                                 HEP Log Date   Push ups, shoulder taps, ER iso punches 11/29   2.     3.    4.    5.           [employer]  Treatment/Session Summary:    Response to Treatment: Focused on hip and core strengthening to address pt complaints of increased low back pain. Pt reported some muscle tightening throughout session but overall tolerated session well with no increase in pain.     Communication/Consultation:  POC, HEP, PT goals, Faxed initial evaluation to MD.   Equipment provided today: HEP Handout   Recommendations/Intent for next treatment session:   Next visit will focus on Core Stability Pain Science Education Quad strengthening Hip strengthening RTC strengthening, periscapular strengthening         Treatment Plan of Care Effective Dates: 11/8/2021 TO 1/7/2022 (60 days).   Frequency/Duration: 2 times a week for 60 Days             Total Treatment Billable Duration:  45 min Rx  PT Patient Time In/Time Out  Time In: 1645  Time Out: [address], PT    Future Appointments   Date Time Provider Jorje Moulton   11/30/2021  1:45 PM Frelizich Mario, PT [facility] AND [facility]   12/2/2021  9:45 AM [facility] Ortega, PT [facility]   12/6/2021  8:15 AM Frederich Fuchs, PT [facility]   12/7/2021  9:00 AM Frederich Fuchs, PT [facility]   12/9/2021  9:00 AM Frederich Fuchs, PT [facility]   12/9/2021  4:20 PM John Vega MD [facility]   12/13/2021  9:45 AM Frederich Fuchs, PT [facility]   12/14/2021  9:00 AM Frederich Fuchs, PT [facility]   12/15/2021  9:45 AM [facility]uchs[facility]   12/16/2021  3:30 PM Miguel Eduardo PA-C [facility]   2/2/2022 11:00 AM John Curry MD [facility]   2/15/2022  3:00 PM John Vega MD [facility]

## 2021-11-29 NOTE — PROGRESS NOTES
MRI shows findings suggestive of bursitis & mild tendon strain of supraspinatus tendon. There is no rotator cuff tear. I can refer her to ortho if she wants to consider a joint injection and/or PT can review these findings and better target the confirmed injured areas in her sessions with them.

## 2021-11-30 ENCOUNTER — HOSPITAL ENCOUNTER (OUTPATIENT)
Dept: PHYSICAL THERAPY | Age: 51
Discharge: HOME | End: 2021-11-30
Payer: COMMERCIAL

## 2021-11-30 PROCEDURE — 97110 THERAPEUTIC EXERCISES: CPT

## 2021-11-30 NOTE — PROGRESS NOTES
Shira Cristina  : 1970  [place]UE CR[place] / [place] [facility] / Product Type: PPO /  [address] [place]. [address] [place] [address] [address] [place] [address], [facility], [address] [place] [address] Road  [phone]   [phone]                                                          Anahi Brooks PA-C      OUTPATIENT PHYSICAL THERAPY: Daily Treatment Note 2021 Visit Count:  6    Tx Diagnosis:  Cervicalgia (M54.2)  Pain in thoracic spine (M54.6)  Pain in Left Shoulder (M25.512)  Low back pain (M54.5)  Muscle spasm of back (M62.830)      Pre-treatment Symptoms/Complaints: Got the results of her MRI. Per pt, indicated bursitis and strain but no tear of rotator cuff. Acknowledges that a lot of her muscular tension is related to stress. Planning on changing jobs soon when she's able. Pain: Initial:8/10  Medications Last Reviewed:  2021     Post Session: 8/10   Updated Objective Findings:         TREATMENT:   THERAPEUTIC EXERCISE: (38 minutes):  Exercises per grid below to improve mobility, strength and balance. Required minimal visual, verbal and manual cues to promote proper body alignment and promote proper body posture. Progressed resistance and complexity of movement as indicated.      Date:  21 Date:  21 Date  21 Date  21 Date  21   Activity/Exercise Parameters Parameters      Education Discussed possible causes of non-painful pops in shoulder, possible cause of occasional tingling to hand   Discussed impact of stress and sleep on pain    Open books x15/side x15/side x15/side x15/side    Lower trunk rotation 3x10, arms out at side       Cat-camel        myra pose        Treadmill 8 min 8 min 8 min 8 min 10 min   L stretch x15 x15      Standing pec stretch 10x10 sec holds       bridges 3x10 3x15 3l97q24 lbs     Rows 7h93k63 lbs  8x74o87 lbs 9f82l83 lbs 9s40v47 lbs   Lat pulldowns 0u69x17 lbs 2a13g62 lbs 9v06x87 lbs 7s91j31 lbs 7a93j25 lbs   Shoulder taps Next session 3x10, at wall 3x10, at wall     Knee pull glute stretch  10x10 sec holds      scap retract with ER  3x10, red      Bird dogs  3x10, legs only 3x10, legs only     ER iso to punch, standing   3x10, yellow band  3x10, red band   Sit to stand   9u56y49 lbs 7f28b06 lbs    Farmer's carries   3 large laps, 15 lbs each hand 3 large laps, 15 lbs each hand 3 large laps, 15 lbs each hand   Modified push ups   3x10, edge of shuttle  Edge of shuttle, x30   Sidestepping and monster walks   2x40 feet, red 2x40 feet, red, 5 lbs at chest    Hip hinge    Wall tap, 10 lbs for practice    scaption     2x10x3 lbs   Shoulder T's     3x10, red tubing   D2 shoulder flexion, standing     3x10, red tubing         THERAPEUTIC ACTIVITY: ( 0 minutes): Activities per gid below to improve functional movement related mobility, strength and balance to improve neuro-muscular carryover to daily functional activities for improving patient's quality of life. Required visual, verbal and manual cues to promote proper body alignment and promote proper body posture/mechanics. Progressed resistance and complexity of movement as indicated. Date:   Date:   Date:     Activity/Exercise Parameters Parameters Parameters                                                   MANUAL THERAPY: (0 minutes): Joint mobilization, Soft tissue mobilization was utilized and necessary because of the patient's restricted joint motion and restricted motion of soft tissue mobility. Date  11/30/2021    Technique Used Grade  Level # Time(s) Effect while being performed                                                                 HEP Log Date   Push ups, shoulder taps, ER iso punches 11/29   2.     3.    4.    5.           [employer] Portal  Treatment/Session Summary:    Response to Treatment: Focused on shoulder and thoracic region due to consecutive sessions.  Continued periscapular strengthening and introduced loaded scaption holds to address supraspinatus strain found on imaging. Pt denied increase in pain in BUE throughout session but pt reported muscular fatigue, particularly to L shoulder. Communication/Consultation:  POC, HEP, PT goals, Faxed initial evaluation to MD.   Equipment provided today: HEP Handout   Recommendations/Intent for next treatment session:   Next visit will focus on Core Stability Pain Science Education Quad strengthening Hip strengthening RTC strengthening, periscapular strengthening         Treatment Plan of Care Effective Dates: 11/8/2021 TO 1/7/2022 (60 days).   Frequency/Duration: 2 times a week for 60 Days             Total Treatment Billable Duration:  38 min Rx  PT Patient Time In/Time Out  Time In: 1345  Time Out: Brad Tristan, PT    Future Appointments   Date Time Provider Jorje Moulton   12/2/2021  9:45 AM Marissa Felder, PT [facility]   12/6/2021  8:15 AM Ramona Verdugo, PT [facility]   12/7/2021  9:00 AM Laverda Mindi, PT [facility]   12/9/2021  9:00 AM Laverda Mindi, PT [facility]   12/9/2021  4:20 PM Victorina Mcmahan MD [facility]   12/13/2021  9:45 AM Laverda Mindi, PT [facility]   12/14/2021  9:00 AM Laverda Mindi, PT [facility]   12/15/2021  9:45 AM Laverda Mindi, PT [facility]   12/16/2021  3:30 PM Jimbo Schulz PA-C [facility]   2/2/2022 11:00 AM Carl Jo MD [facility]   2/15/2022  3:00 PM Victorina Mcmahan MD [facility]

## 2021-11-30 NOTE — PROGRESS NOTES
Pt notified that her MRI shows findings suggestive of bursitis & mild tendon strain of supraspinatus tendon. There is no rotator cuff tear. Pt states that she is already doing PT on her shoulder and would like to proceed with a referral to POA. She also states that her Physical Therapist is still concerned with the tight tendon in her hand since she can not get any relief with it and would like to know how you would like to proceed with that. She also spoke with [employer] who states that they are still missing the last page of her paperwork and wants to know if we can get that filled out and faxed over to them ASAP. Thank you.

## 2021-12-02 ENCOUNTER — HOSPITAL ENCOUNTER (OUTPATIENT)
Dept: PHYSICAL THERAPY | Age: 51
Discharge: HOME | End: 2021-12-02
Payer: COMMERCIAL

## 2021-12-02 PROCEDURE — 97110 THERAPEUTIC EXERCISES: CPT

## 2021-12-02 NOTE — PROGRESS NOTES
Ann Quesada  : 1970  [place]: [place]UE CR[place] / [place] [employer] [facility] / Product Type: PPO /  [address] [facility], [address], [id number] [place] [address]  [phone]   [phone]                                                          Sandy Wen PA-C      OUTPATIENT PHYSICAL THERAPY: Daily Treatment Note 2021 Visit Count:  3    Tx Diagnosis:  Left foot pain [M79.672]  Right foot pain [M79.671]         Pre-treatment Symptoms/Complaints: Pt reports that she is doing well. Pain: Initial:0/10  Medications Last Reviewed:  2021     Post Session: 0/10   Updated Objective Findings: See Initial Eval for more details. TREATMENT:   THERAPEUTIC EXERCISE: (30 minutes):  Exercises per grid below to improve mobility, strength and balance. Required minimal visual, verbal and manual cues to promote proper body alignment and promote proper body posture. Progressed resistance and complexity of movement as indicated. Date:  21 Date:  2021 Date:     Activity/Exercise Parameters Parameters Parameters   Education HEP, POC, PT goals, anatomy/pathology     SLB 1x30 sec     Toe yoga Bilateral, 3 min     gastroc stretch 3x30 sec, bilateral 3 min    Posterior tibialis raises 3x12 3x10 with ball    Heel raises 3x12 30XS    Toe walks  20 FT 3 XS    Heel walks  20FT 3 XS    Towel curls  2XS LONG TOWEL    Sidesteps/monster walks  2 laps red band    SLS  2 min total on each side          THERAPEUTIC ACTIVITY: ( 0 minutes): Activities per gid below to improve functional movement related mobility, strength and balance to improve neuro-muscular carryover to daily functional activities for improving patient's quality of life. Required visual, verbal and manual cues to promote proper body alignment and promote proper body posture/mechanics. Progressed resistance and complexity of movement as indicated.      Date:   Date:   Date: Activity/Exercise Parameters Parameters Parameters                                                   MANUAL THERAPY: (0 minutes): Joint mobilization, Soft tissue mobilization was utilized and necessary because of the patient's restricted joint motion and restricted motion of soft tissue mobility. Date  12/2/2021    Technique Used Grade  Level # Time(s) Effect while being performed                                                                 HEP Log Date       2.     3.    4.    5.           [employer] Portal  Treatment/Session Summary:    Response to Treatment: Focused on foot and ankle strengthening. No pain wwith exercises but tolerate new exercises well. Communication/Consultation:  POC, HEP, PT goals, Faxed initial evaluation to MD.   Equipment provided today: HEP Handout   Recommendations/Intent for next treatment session:   Next visit will focus on Core Stability Hip strengthening foot intrinsic strengthening. Treatment Plan of Care Effective Dates: 11/17/2021 TO 1/16/2022 (60 days).   Frequency/Duration: 1 time a week for 60 Days             Total Treatment Billable Duration:   30`  Rx   PT Patient Time In/Time Out  Time In: 0940  [facility]: [facility]    Future Appointments   Date Time Provider Jorje Moulton   12/6/2021  8:15 AM Kathryn Antu, PT [facility]   12/7/2021  9:00 AM Kathryn Antu, PT [facility]   12/9/2021  9:00 AM Kathryn Antu, PT [facility]   12/9/2021  4:20 PM Jelly Esparza MD [id number] [place]   12/13/2021  8:30 AM Shakira Rodarte PA [facility]   12/13/2021  9:45 AM Kathryn Antu, PT [facility]   12/14/2021  9:00 AM Kathryn Antu, PT [facility]   12/15/2021  9:45 AM Kathryn Antu, PT [facility]   12/16/2021  3:30 PM Vidya Reyna PA-C [facility]   2/2/2022 11:00 AM Tj Foote MD [facility]   2/15/2022  3:00 PM Jelly Esparza MD [facility]

## 2021-12-06 ENCOUNTER — HOSPITAL ENCOUNTER (OUTPATIENT)
Dept: PHYSICAL THERAPY | Age: 51
Discharge: HOME | End: 2021-12-06
Payer: COMMERCIAL

## 2021-12-06 PROCEDURE — 97110 THERAPEUTIC EXERCISES: CPT

## 2021-12-06 NOTE — PROGRESS NOTES
Dung Gutierrez  : 1970  Payor: BLUE CROSS / Plan: [facility] / Product Type: [facility] /  [address] [facility] at [address] [place] [address]. [address], [id number] [place] [address]  [phone]   [phone]                                                          Selma Fofana PA-C      OUTPATIENT PHYSICAL THERAPY: Daily Treatment Note 2021 Visit Count:  7    Tx Diagnosis:  Cervicalgia (M54.2)  Pain in thoracic spine (M54.6)  Pain in Left Shoulder (M25.512)  Low back pain (M54.5)  Muscle spasm of back (M62.830)      Pre-treatment Symptoms/Complaints: States her low back has been the biggest problem - she reports a spasm after doing a lot of work while bent over and then more immediate pain in her low back the following day when bending down into a cabinet. Pain: Initial:8/10  Medications Last Reviewed:  2021     Post Session: 8/10   Updated Objective Findings:         TREATMENT:   THERAPEUTIC EXERCISE: (45 minutes):  Exercises per grid below to improve mobility, strength and balance. Required minimal visual, verbal and manual cues to promote proper body alignment and promote proper body posture. Progressed resistance and complexity of movement as indicated.      Date:  21 Date  21 Date  21 Date  21 Date  21   Activity/Exercise Parameters       Education   Discussed impact of stress and sleep on pain     Open books x15/side x15/side x15/side     Lower trunk rotation        Cat-camel        myra pose        Treadmill 8 min 8 min 8 min 10 min 10 min   L stretch x15    x15   Standing pec stretch        bridges 3x15 6w57a66 lbs      Rows  1j11s17 lbs 0o81j74 lbs 6d07d74 lbs    Lat pulldowns 6n36k10 lbs 8r63p42 lbs 8y41i71 lbs 7j05d52 lbs    Shoulder taps 3x10, at wall 3x10, at wall      Knee pull glute stretch 10x10 sec holds       scap retract with ER 3x10, red       Bird dogs 3x10, legs only 3x10, legs only      ER iso to punch, standing  3x10, yellow band  3x10, red band    Sit to stand  0d57f48 lbs 9k56d78 lbs  2z17d94 lbs   Farmer's carries  3 large laps, 15 lbs each hand 3 large laps, 15 lbs each hand 3 large laps, 15 lbs each hand 3 large laps, 20 lbs   Modified push ups  3x10, edge of shuttle  Edge of shuttle, x30    Sidestepping and monster walks  2x40 feet, red 2x40 feet, red, 5 lbs at chest  2x40 feet, purple, 8 lbs at chest   Hip hinge   Wall tap, 10 lbs for practice     scaption    2x10x3 lbs    Shoulder T's    3x10, red tubing    D2 shoulder flexion, standing    3x10, red tubing    Pallof     3x10, thick red band   Rack pulls     2f23m56 lbs, plus warm up sets         THERAPEUTIC ACTIVITY: ( 0 minutes): Activities per gid below to improve functional movement related mobility, strength and balance to improve neuro-muscular carryover to daily functional activities for improving patient's quality of life. Required visual, verbal and manual cues to promote proper body alignment and promote proper body posture/mechanics. Progressed resistance and complexity of movement as indicated. Date:   Date:   Date:     Activity/Exercise Parameters Parameters Parameters                                                   MANUAL THERAPY: (0 minutes): Joint mobilization, Soft tissue mobilization was utilized and necessary because of the patient's restricted joint motion and restricted motion of soft tissue mobility. Date  12/6/2021    Technique Used Grade  Level # Time(s) Effect while being performed                                                                 HEP Log Date   Push ups, shoulder taps, ER iso punches 11/29   2.     3.    4.    5.           [employer] Portal  Treatment/Session Summary:    Response to Treatment: Focused on hip and core strengthening today to address pt's primary complaint of low back pain today. Progressed established exercises as noted.  Initiated rack pulls today for body mechanics training and to strengthen posterior chain. Pt required instruction for proper hip hinge and to avoid shoulder shrugging but pt receptive to correction and demonstrated good form by the end of the session. Communication/Consultation:  POC, HEP, PT goals, Faxed initial evaluation to MD.   Equipment provided today: HEP Handout   Recommendations/Intent for next treatment session:   Next visit will focus on Core Stability Pain Science Education Quad strengthening Hip strengthening RTC strengthening, periscapular strengthening         Treatment Plan of Care Effective Dates: 11/8/2021 TO 1/7/2022 (60 days).   Frequency/Duration: 2 times a week for 60 Days             Total Treatment Billable Duration:  45 min Rx  PT Patient Time In/Time Out  Time In: 0750  Time Out: [facility], PT    Future Appointments   Date Time Provider Jorje Moulton   12/7/2021  9:00 AM Matthias Verdugo, PT [facility] AND [facility]   12/9/2021  9:00 AM Javier Cram, PT [facility]   12/9/2021  4:20 PM Sheri Fabry, MD [id number] [place]   12/13/2021  8:30 AM ANNY Lowry [facility]   12/13/2021  9:45 AM J[facility] Cram, PT [facility]   12/14/2021  9:00 AM Javier Cram, PT [facility]   12/15/2021  9:45 AM Javier Cram, PT [facility]   12/16/2021  3:30 PM Shana Marc PA-C [facility]   2/2/2022 11:00 AM MD TERESA BuckleyNE [facility]   2/15/2022  3:00 PM Sheri Fabry, MD [facility]

## 2021-12-07 ENCOUNTER — HOSPITAL ENCOUNTER (OUTPATIENT)
Dept: PHYSICAL THERAPY | Age: 51
Discharge: HOME | End: 2021-12-07
Payer: COMMERCIAL

## 2021-12-07 PROCEDURE — 97110 THERAPEUTIC EXERCISES: CPT

## 2021-12-07 NOTE — PROGRESS NOTES
Shira Cristina  : 1970  Payor: BLUE CROSS / Plan: [facility] / Product Type: PPO /  [place] at [address], [address] [place] [address]  [phone]   [phone]                                                          Anahi Brooks PA-C      OUTPATIENT PHYSICAL THERAPY: Daily Treatment Note 2021 Visit Count:  8    Tx Diagnosis:  Left foot pain [M79.672]  Right foot pain [M79.671]         Pre-treatment Symptoms/Complaints: See Discharge Summary dated 21 for details   Pain: Initial:0/10  Medications Last Reviewed:  2021     Post Session: 010   Updated Objective Findings: See Discharge Summary dated 21 for details        TREATMENT:   THERAPEUTIC EXERCISE: (42 minutes):  Exercises per grid below to improve mobility, strength and balance. Required minimal visual, verbal and manual cues to promote proper body alignment and promote proper body posture. Progressed resistance and complexity of movement as indicated. Date:  21 Date:  2021 Date:  21   Activity/Exercise Parameters Parameters Parameters   Education HEP, POC, PT goals, anatomy/pathology  Updated measurements, discussed progression and regression of HEP   SLB 1x30 sec     Toe yoga Bilateral, 3 min     gastroc stretch 3x30 sec, bilateral 3 min 3x30 sec, incline board   Posterior tibialis raises 3x12 3x10 with ball    Heel raises 3x12 30XS    Toe walks  20 FT 3 XS 2x40 feet   Heel walks  20FT 3 XS 2x40 feet   Towel curls  2XS LONG TOWEL    Sidesteps/monster walks  2 laps red band    SLS  2 min total on each side 3x30 sec each foot, on airex   STS   3x15, 15 lbs at chest   Step ups   3x10, 6 inch to SLB         THERAPEUTIC ACTIVITY: ( 0 minutes):  Activities per gid below to improve functional movement related mobility, strength and balance to improve neuro-muscular carryover to daily functional activities for improving patient's quality of life. Required visual, verbal and manual cues to promote proper body alignment and promote proper body posture/mechanics. Progressed resistance and complexity of movement as indicated. Date:   Date:   Date:     Activity/Exercise Parameters Parameters Parameters                                                   MANUAL THERAPY: (0 minutes): Joint mobilization, Soft tissue mobilization was utilized and necessary because of the patient's restricted joint motion and restricted motion of soft tissue mobility. Date  12/7/2021    Technique Used Grade  Level # Time(s) Effect while being performed                                                                 HEP Log Date       2.     3.    4.    5.           [facility]  Treatment/Session Summary:    Response to Treatment: See Discharge Summary dated 12/7/21 for details   Communication/Consultation:  See Discharge Summary dated 12/7/21 for details   Equipment provided today: See Discharge Summary dated 12/7/21 for details   Recommendations/Intent for next treatment session:   See Discharge Summary dated 12/7/21 for details       Treatment Plan of Care Effective Dates: 11/17/2021 TO 1/16/2022 (60 days).   Frequency/Duration: 1 time a week for 60 Days             Total Treatment Billable Duration:   42 min  Rx   PT Patient Time In/Time Out  Time In: 0984  Time Out: [address], PT    Future Appointments   Date Time Provider Jorje Moulton   12/9/2021  9:00 AM Matthias Verdugo, PT [facility]   12/9/2021  4:20 PM Mary Adair MD [facility]   12/13/2021  8:30 AM ANNY Mays [facility] PO[facility]   12/13/2021  9:45 AM Spenser Garrick, PT [facility]   12/14/2021  9:00 AM Spenser Mccauley, PT [facility]   12/15/2021  9:45 AM Spenser Garrick, PT [facility]   12/16/2021  3:30 PM JASON Lozada MAT [facility]   2/2/2022 11:00 AM Jenise Og MD [facility] BS[facility]   2/15/2022  3:00 PM Mary Adair MD [facility] [address]
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
## 2021-12-09 ENCOUNTER — HOSPITAL ENCOUNTER (OUTPATIENT)
Dept: PHYSICAL THERAPY | Age: 51
Discharge: HOME | End: 2021-12-09
Payer: COMMERCIAL

## 2021-12-09 PROCEDURE — 97110 THERAPEUTIC EXERCISES: CPT

## 2021-12-09 NOTE — PROGRESS NOTES
Po Royal  : 1970  Payor: BLUE CROSS / Plan: [place] [facility]ly [facility] / Product Type: PPO /  [address] at [address]. [address] [place] [address], [facility], [address] [place] [address] Road  [phone]   [phone]                                                          Cheryl Garcia PA-C      OUTPATIENT PHYSICAL THERAPY: Daily Treatment Note 2021 Visit Count:  9    Tx Diagnosis:  Cervicalgia (M54.2)  Pain in thoracic spine (M54.6)  Pain in Left Shoulder (M25.512)  Low back pain (M54.5)  Muscle spasm of back (M62.830)      Pre-treatment Symptoms/Complaints: Pt states she couldn't walk on her foot last night and feels like it's flared up. Feels a little better this morning. Pain: Initial:8/10  Medications Last Reviewed:  2021     Post Session: 8/10   Updated Objective Findings: No antalgia noted on TM        TREATMENT:   THERAPEUTIC EXERCISE: (44 minutes):  Exercises per grid below to improve mobility, strength and balance. Required minimal visual, verbal and manual cues to promote proper body alignment and promote proper body posture. Progressed resistance and complexity of movement as indicated.      Date:  21 Date  21 Date  21 Date  21 Date  21 Date  21   Activity/Exercise Parameters        Education   Discussed impact of stress and sleep on pain      Open books x15/side x15/side x15/side      Lower trunk rotation         Cat-camel         myra pose         Treadmill 8 min 8 min 8 min 10 min 10 min 10 min   L stretch x15    x15    Standing pec stretch         bridges 3x15 0c04u75 lbs       Rows  4v53a96 lbs 0w95y24 lbs 2y47f05 lbs     Lat pulldowns 1t36f68 lbs 1c33q23 lbs 2b43o85 lbs 9a90n24 lbs     Shoulder taps 3x10, at wall 3x10, at wall       Knee pull glute stretch 10x10 sec holds        scap retract with ER 3x10, red        Bird dogs 3x10, legs only 3x10, legs only       ER iso to punch, standing  3x10, yellow band  3x10, red band     Sit to stand  0p98r72 lbs 1m68j78 lbs  2h60p96 lbs    Overhead STS      3x10x5 lbs   Prado's carries  3 large laps, 15 lbs each hand 3 large laps, 15 lbs each hand 3 large laps, 15 lbs each hand 3 large laps, 20 lbs 3 large laps, 25 lbs   Modified push ups  3x10, edge of shuttle  Edge of shuttle, x30     Sidestepping and monster walks  2x40 feet, red 2x40 feet, red, 5 lbs at chest  2x40 feet, purple, 8 lbs at chest 2x40 feet, purple, 10 lbs at chest   Hip hinge   Wall tap, 10 lbs for practice      scaption    2x10x3 lbs  2x10x3 lbs   Shoulder T's    3x10, red tubing     D2 shoulder flexion, standing    3x10, red tubing  3x10, red tubing, Bilateral   Pallof     3x10, thick red band 3x10, thick red band   Rack pulls     3t30p80 lbs, plus warm up sets    Resisted backward walking      x10x27 lbs   Seated glute stretch      10x10 sec         THERAPEUTIC ACTIVITY: ( 0 minutes): Activities per gid below to improve functional movement related mobility, strength and balance to improve neuro-muscular carryover to daily functional activities for improving patient's quality of life. Required visual, verbal and manual cues to promote proper body alignment and promote proper body posture/mechanics. Progressed resistance and complexity of movement as indicated. Date:   Date:   Date:     Activity/Exercise Parameters Parameters Parameters                                                   MANUAL THERAPY: (0 minutes): Joint mobilization, Soft tissue mobilization was utilized and necessary because of the patient's restricted joint motion and restricted motion of soft tissue mobility.         Date  12/9/2021    Technique Used Grade  Level # Time(s) Effect while being performed                                                                 HEP Log Date   Push ups, shoulder taps, ER iso punches 11/29   2.     3.    4.    5.           [employer] Portal  Treatment/Session Summary:    Response to Treatment: Reported pain to lateral R hip with sidestepping today that pt distinguished from muscle fatigue. Addressed with seated glute stretch. Pt reported some relief following, indicating pain is likely myogenic in nature. Continued focus on hip and core strengthening. Able to incorporate overhead STS to further challenge periscapular and postural muscles in addition to glutes. Communication/Consultation:  POC, HEP, PT goals, Faxed initial evaluation to MD.   Equipment provided today: HEP Handout   Recommendations/Intent for next treatment session:   Next visit will focus on Core Stability Pain Science Education Quad strengthening Hip strengthening RTC strengthening, periscapular strengthening         Treatment Plan of Care Effective Dates: 11/8/2021 TO 1/7/2022 (60 days).   Frequency/Duration: 2 times a week for 60 Days             Total Treatment Billable Duration:  44 min Rx  PT Patient Time In/Time Out  Time In: 6764  Time Out: 845 [facility],     Future Appointments   Date Time Provider Jorje Moulton   12/9/2021  4:20 PM Jelly Esparza MD [facility]   12/13/2021  8:30 AM Shakira Rodarte PA [facility] POA   12/13/2021  9:45 AM Kathryn Almonte, PT [facility]   12/14/2021  9:00 AM Kathryn Almonte, PT [facility] AND [facility]   12/15/2021  9:45 AM Kathryn Almonte, PT [facility]   12/16/2021 10:25 AM Susan Fernandes MD POAG POA   12/16/2021  3:30 PM Vidya Reyna PA-C [facility]   2/2/2022 11:00 AM Tj Foote MD BSNE BSNE   2/15/2022  3:00 PM Jelly Esparza MD [facility]

## 2021-12-13 ENCOUNTER — HOSPITAL ENCOUNTER (OUTPATIENT)
Dept: PHYSICAL THERAPY | Age: 51
Discharge: HOME | End: 2021-12-13
Payer: COMMERCIAL

## 2021-12-13 PROCEDURE — 97110 THERAPEUTIC EXERCISES: CPT

## 2021-12-13 NOTE — PROGRESS NOTES
Alanna Faith  : 1970  Payor: BLUE CROSS / Plan: [facility] / Product Type: PPO /  Alejandra Susan at [address] [facility]., [address] [place] [address], [address] [place] [address]  [phone]   [phone]                                                          Richard Jang PA-C      OUTPATIENT PHYSICAL THERAPY: Daily Treatment Note 2021 Visit Count:  10    Tx Diagnosis:  Cervicalgia (M54.2)  Pain in thoracic spine (M54.6)  Pain in Left Shoulder (M25.512)  Low back pain (M54.5)  Muscle spasm of back (M62.830)      Pre-treatment Symptoms/Complaints: Got an injection for her shoulder this morning. Feels no better and no worse right now. Pain: Initial:8/10  Medications Last Reviewed:  2021     Post Session: 8/10   Updated Objective Findings: Equal step length, good arm swing and trunk rotation on treadmill        TREATMENT:   THERAPEUTIC EXERCISE: (37 minutes):  Exercises per grid below to improve mobility, strength and balance. Required minimal visual, verbal and manual cues to promote proper body alignment and promote proper body posture. Progressed resistance and complexity of movement as indicated.      Date  21 Date  21 Date  21 Date  21 Date  21 Date  21   Activity/Exercise         Education  Discussed impact of stress and sleep on pain       Open books x15/side x15/side       Lower trunk rotation         Cat-camel         myra pose         Treadmill 8 min 8 min 10 min 10 min 10 min 10 min   L stretch    x15     Standing pec stretch         bridges 5r97x19 lbs        Rows 9y81q74 lbs 8q95t18 lbs 7h62n81 lbs      Lat pulldowns 8b01d18 lbs 7u90m27 lbs 2l60c77 lbs      Shoulder taps 3x10, at wall        Knee pull glute stretch         scap retract with ER         Bird dogs 3x10, legs only        ER iso to punch, standing 3x10, yellow band  3x10, red band      Sit to stand 6m77v78 lbs 0i65c95 lbs  5f96r29 lbs 3f50z25 lbs   Overhead STS     3x10x5 lbs    Prado's carries 3 large laps, 15 lbs each hand 3 large laps, 15 lbs each hand 3 large laps, 15 lbs each hand 3 large laps, 20 lbs 3 large laps, 25 lbs 3 large laps, 25 lbs   Modified push ups 3x10, edge of shuttle  Edge of shuttle, x30      Sidestepping and monster walks 2x40 feet, red 2x40 feet, red, 5 lbs at chest  2x40 feet, purple, 8 lbs at chest 2x40 feet, purple, 10 lbs at chest 2x40 feet, purple   Hip hinge  Wall tap, 10 lbs for practice       scaption   2x10x3 lbs  2x10x3 lbs    Shoulder T's   3x10, red tubing      D2 shoulder flexion, standing   3x10, red tubing  3x10, red tubing, Bilateral    Pallof    3x10, thick red band 3x10, thick red band    Rack pulls    4p18m61 lbs, plus warm up sets  9a09b83 lbs, plus warm up sets   Resisted backward walking     x10x27 lbs x10x27 lbs   Seated glute stretch     10x10 sec    Step ups      1p69k53 lbs contralateral, 6 inch         THERAPEUTIC ACTIVITY: ( 0 minutes): Activities per gid below to improve functional movement related mobility, strength and balance to improve neuro-muscular carryover to daily functional activities for improving patient's quality of life. Required visual, verbal and manual cues to promote proper body alignment and promote proper body posture/mechanics. Progressed resistance and complexity of movement as indicated. Date:   Date:   Date:     Activity/Exercise Parameters Parameters Parameters                                                   MANUAL THERAPY: (0 minutes): Joint mobilization, Soft tissue mobilization was utilized and necessary because of the patient's restricted joint motion and restricted motion of soft tissue mobility.         Date  12/13/2021    Technique Used Grade  Level # Time(s) Effect while being performed                                                                 HEP Log Date   Push ups, shoulder taps, ER iso punches 11/29   2.     3.    4.    5.           [employer] Portal  Treatment/Session Summary:    Response to Treatment: Deferred working on shoulder strengthening due to injection ~1-2 hours prior to session. Focused on hip and core strengthening. Able to increase resistance with established exercises as noted with no negative signs or symptoms. Also added step ups with contralateral weight carry for dynamic core stabilization. Pt fatigued throughout session and required ~3 seated rest breaks. Communication/Consultation:     Equipment provided today:    Recommendations/Intent for next treatment session:   Next visit will focus on Core Stability Pain Science Education Quad strengthening Hip strengthening RTC strengthening, Progress note next session         Treatment Plan of Care Effective Dates: 11/8/2021 TO 1/7/2022 (60 days).   Frequency/Duration: 2 times a week for 60 Days             Total Treatment Billable Duration:  37 min Rx  PT Patient Time In/Time Out  Time In: 0915  Time Out: 229 [facility], PT    Future Appointments   Date Time Provider Jorje Moulton   12/15/2021  9:45 AM Veronique Badillo, PT [facility] AND [facility]   12/16/2021 10:25 AM Dion Jones MD [facility] POA   12/16/2021  3:30 PM Chauncey Cornejo PA-C [facility]   12/20/2021  3:30 PM RHONA Ma   1/20/2022  8:00 AM Meng Hernandez [facility] KENZIE PO   2/2/2022 11:00 AM MD KATIA Esqueda   2/15/2022  3:00 PM Giselle Solis MD [facility]

## 2021-12-14 ENCOUNTER — APPOINTMENT (OUTPATIENT)
Dept: PHYSICAL THERAPY | Age: 51
End: 2021-12-14
Payer: COMMERCIAL

## 2021-12-15 ENCOUNTER — HOSPITAL ENCOUNTER (OUTPATIENT)
Dept: PHYSICAL THERAPY | Age: 51
Discharge: HOME | End: 2021-12-15
Payer: COMMERCIAL

## 2021-12-15 PROCEDURE — 97110 THERAPEUTIC EXERCISES: CPT

## 2021-12-15 NOTE — PROGRESS NOTES
Will Francis  : 1970  Payor: BLUE CROSS / Plan: [facility] / Product Type: PPO /  Meghna Mayda at [address] [facility] [address], [address] [place] [address]  [phone]   [phone]                                                          Solo Han PA-C      OUTPATIENT PHYSICAL THERAPY: Daily Treatment Note 12/15/2021 Visit Count:  11    Tx Diagnosis:  Cervicalgia (M54.2)  Pain in thoracic spine (M54.6)  Pain in Left Shoulder (M25.512)  Low back pain (M54.5)  Muscle spasm of back (M62.830)      Pre-treatment Symptoms/Complaints: See Progress Report dated 12/15/21 for details   Pain: Initial:8/10  Medications Last Reviewed:  12/15/2021     Post Session: 8/10   Updated Objective Findings: See Progress Report dated 12/15/21 for details        TREATMENT:   THERAPEUTIC EXERCISE: (40 minutes):  Exercises per grid below to improve mobility, strength and balance. Required minimal visual, verbal and manual cues to promote proper body alignment and promote proper body posture. Progressed resistance and complexity of movement as indicated.      Date  21 Date  21 Date  21 Date  21 Date  21 Date  21 Date  12/15/21   Activity/Exercise          Education  Discussed impact of stress and sleep on pain     Updated measurements, discussed POC   Open books x15/side x15/side        Lower trunk rotation          Cat-camel          myra pose          Treadmill 8 min 8 min 10 min 10 min 10 min 10 min 10 min   L stretch    x15      Standing pec stretch          bridges 4c63o70 lbs         Rows 0j73k74 lbs 5q93t08 lbs 7n47h62 lbs       Lat pulldowns 2q12o66 lbs 1g61f13 lbs 1x46y34 lbs       Shoulder taps 3x10, at wall         Knee pull glute stretch          scap retract with ER          Bird dogs 3x10, legs only         ER iso to punch, standing 3x10, yellow band  3x10, red band       Sit to stand 5n02g76 lbs 8g99i16 lbs 3k56p65 lbs  8m42t87 lbs    Overhead STS     3x10x5 lbs     Prado's carries 3 large laps, 15 lbs each hand 3 large laps, 15 lbs each hand 3 large laps, 15 lbs each hand 3 large laps, 20 lbs 3 large laps, 25 lbs 3 large laps, 25 lbs 3 large laps, 25 lbs   Modified push ups 3x10, edge of shuttle  Edge of shuttle, x30       Sidestepping and monster walks 2x40 feet, red 2x40 feet, red, 5 lbs at chest  2x40 feet, purple, 8 lbs at chest 2x40 feet, purple, 10 lbs at chest 2x40 feet, purple    Hip hinge  Wall tap, 10 lbs for practice        scaption   2x10x3 lbs  2x10x3 lbs  3x10x4 lbs, with scap setting   Shoulder T's   3x10, red tubing       D2 shoulder flexion, standing   3x10, red tubing  3x10, red tubing, Bilateral     Pallof    3x10, thick red band 3x10, thick red band     Rack pulls    9e88z34 lbs, plus warm up sets  8m24c71 lbs, plus warm up sets 1x14e72  2g32e03  9z92o54   Resisted backward walking     x10x27 lbs x10x27 lbs    Seated glute stretch     10x10 sec     Step ups      6t03o47 lbs contralateral, 6 inch 2i16t30 lbs contralateral, 6 inch   Bent over rows       4x94n77 lbs each hand   Bicep curls       3x10x8 lbs with scap setting   Overhead press       Next visit         THERAPEUTIC ACTIVITY: ( 0 minutes): Activities per gid below to improve functional movement related mobility, strength and balance to improve neuro-muscular carryover to daily functional activities for improving patient's quality of life. Required visual, verbal and manual cues to promote proper body alignment and promote proper body posture/mechanics. Progressed resistance and complexity of movement as indicated. Date:   Date:   Date:     Activity/Exercise Parameters Parameters Parameters                                                   MANUAL THERAPY: (0 minutes): Joint mobilization, Soft tissue mobilization was utilized and necessary because of the patient's restricted joint motion and restricted motion of soft tissue mobility. Date  12/15/2021    Technique Used Grade  Level # Time(s) Effect while being performed                                                                 HEP Log Date   Push ups, shoulder taps, ER iso punches 11/29   2.     3.    4.    5.           [employer] Portal  Treatment/Session Summary:    Response to Treatment: See Progress Report dated 12/15/21 for details   Communication/Consultation:  See Progress Report dated 12/15/21 for details   Equipment provided today: See Progress Report dated 12/15/21 for details   Recommendations/Intent for next treatment session:   Next visit will focus on Core Stability Pain Science Education Quad strengthening Hip strengthening RTC strengthening, Progress note next session         Treatment Plan of Care Effective Dates: 11/8/2021 TO 1/7/2022 (60 days).   Frequency/Duration: 2 times a week for 60 Days             Total Treatment Billable Duration:  40 min Rx  PT Patient Time In/Time Out  Time In: 6194  Time Out: [address],     Future Appointments   Date Time Provider Jorje Ríosi   12/16/2021 10:25 AM Jhoan Orantes MD [facility] PO   12/16/2021  3:30 PM Sandy Wen PA-C [facility]   12/20/2021  3:30 PM RHONA Wong    1/20/2022  8:00 AM Korene Epley Olivia, [facility]   2/2/2022 11:00 AM MD TERESA MoonNE BSNE   2/15/2022  3:00 PM Zachery Mena MD [facility]
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
## 2021-12-16 ENCOUNTER — APPOINTMENT (OUTPATIENT)
Dept: PHYSICAL THERAPY | Age: 51
End: 2021-12-16
Payer: COMMERCIAL

## 2021-12-21 ENCOUNTER — HOSPITAL ENCOUNTER (OUTPATIENT)
Dept: PHYSICAL THERAPY | Age: 51
Discharge: HOME | End: 2021-12-21
Payer: COMMERCIAL

## 2021-12-21 PROCEDURE — 97110 THERAPEUTIC EXERCISES: CPT

## 2021-12-21 NOTE — PROGRESS NOTES
Jose Manuel Gauthier  : 1970  Payor: BLUE CROSS / Plan: [facility] / Product Type: PPO /  [id number] [facility] at [address] [place] [address] [address] [place] [address], [facility], [address] [place] [address]  [phone]   [phone]                                                          Pauline Vázquez MD      OUTPATIENT PHYSICAL THERAPY: Daily Treatment Note 2021 Visit Count:  12    Tx Diagnosis:  Cervicalgia (M54.2)  Pain in thoracic spine (M54.6)  Pain in Left Shoulder (M25.512)  Low back pain (M54.5)  Muscle spasm of back (M62.830)      Pre-treatment Symptoms/Complaints: Doing alright. Pain: Initial:8/10  Medications Last Reviewed:  2021     Post Session: 8/10   Updated Objective Findings: See Progress Report dated 12/15/21 for details        TREATMENT:   THERAPEUTIC EXERCISE: (30 minutes):  Exercises per grid below to improve mobility, strength and balance. Required minimal visual, verbal and manual cues to promote proper body alignment and promote proper body posture. Progressed resistance and complexity of movement as indicated.      Date  21 Date  21 Date  21 Date  21 Date  21 Date  12/15/21 Date  21   Activity/Exercise          Education Discussed impact of stress and sleep on pain     Updated measurements, discussed POC    Open books x15/side         Lower trunk rotation          Cat-camel          myra pose          Treadmill 8 min 10 min 10 min 10 min 10 min 10 min 5 min   L stretch   x15       Standing pec stretch          bridges          Rows [id number] lbs [id number] lbs        Lat pulldowns 3g11h50 lbs [id number] lbs        Shoulder taps          Knee pull glute stretch          scap retract with ER          Bird dogs          ER iso to punch, standing  3x10, red band        Sit to stand 4z75q77 lbs  [id number] lbs  7p42i58 lbs     Overhead STS    3x10x5 lbs   3x10x8 lbs   Farmer's carries 3 large laps, 15 lbs each hand 3 large laps, 15 lbs each hand 3 large laps, 20 lbs 3 large laps, 25 lbs 3 large laps, 25 lbs 3 large laps, 25 lbs    Modified push ups  Edge of shuttle, x30        Sidestepping and monster walks 2x40 feet, red, 5 lbs at chest  2x40 feet, purple, 8 lbs at chest 2x40 feet, purple, 10 lbs at chest 2x40 feet, purple     Hip hinge Wall tap, 10 lbs for practice         scaption  2x10x3 lbs  2x10x3 lbs  3x10x4 lbs, with scap setting    Shoulder T's  3x10, red tubing        D2 shoulder flexion, standing  3x10, red tubing  3x10, red tubing, Bilateral      Pallof   3x10, thick red band 3x10, thick red band      Rack pulls   1f27t95 lbs, plus warm up sets  3i76l37 lbs, plus warm up sets [id number]  [id number]  [id number]    Resisted backward walking    x10x27 lbs x10x27 lbs     Seated glute stretch    10x10 sec      Step ups     3y86l66 lbs contralateral, 6 inch 5c52g29 lbs contralateral, 6 inch    Bent over rows      1t47o47 lbs each hand 3y66c79 lbs each hand   Bicep curls      3x10x8 lbs with scap setting    Shoulder flexion with ER iso       X30, yellow   Overhead press      Next visit 2i42s18 lbs   Deadlift       2 warm up sets, 2x5x64 lbs         THERAPEUTIC ACTIVITY: ( 0 minutes): Activities per gid below to improve functional movement related mobility, strength and balance to improve neuro-muscular carryover to daily functional activities for improving patient's quality of life. Required visual, verbal and manual cues to promote proper body alignment and promote proper body posture/mechanics. Progressed resistance and complexity of movement as indicated. Date:   Date:   Date:     Activity/Exercise Parameters Parameters Parameters                                                   MANUAL THERAPY: (0 minutes): Joint mobilization, Soft tissue mobilization was utilized and necessary because of the patient's restricted joint motion and restricted motion of soft tissue mobility.         Date  12/21/2021    Technique Used Grade  Level # Time(s) Effect while being performed                                                                 HEP Log Date   Push ups, shoulder taps, ER iso punches 11/29   2.     3.    4.    5.           [facility] Portal  Treatment/Session Summary:    Response to Treatment: Increased focus on core compound lifts today. Pt initially with limited flexion ROM but improved with repetition of overhead press. Pt was also able to progress to full deadlifts from the floor. Pt required some cueing for lat activation and bar path but demonstrated neutral spinal alignment. Pt should be able to progress resistance next session. Shortened session due to pt tardiness. Communication/Consultation:  3 weeks until d/c   Equipment provided today: HEP handout   Recommendations/Intent for next treatment session:   Next visit will focus on Core Stability Pain Science Education Quad strengthening Hip strengthening RTC strengthening,        Treatment Plan of Care Effective Dates: 11/8/2021 TO 1/7/2022 (60 days).   Frequency/Duration: 2 times a week for 60 Days             Total Treatment Billable Duration:  30 min Rx  PT Patient Time In/Time Out  Time In: 0813  Time Out: 12[address] Joseph Dc PT    Future Appointments   Date Time Provider Jorje Moulton   12/23/2021  8:15 AM [facility]   12/28/2021  4:00 PM [facility]   1/3/2022  7:30 AM La[facility]da Mindi, PT [facility]   1/5/2022  7:30 AM Laverda Mindi, PT [facility]   1/10/2022  2:30 PM Laverda Mindi, PT [facility]   1/12/2022  7:30 AM Laverda Mindi, PT [facility]   1/17/2022  7:30 AM Laverda Mindi, PT [facility]   1/19/2022  7:30 AM Laverda Mindi, PT [facility]   1/20/2022  8:00 AM ANNY Lagos [facility]   1/27/2022 10:00 AM Galina Miller MD [facility]   2/2/2022 11:00 AM Carl Jo MD [facility]   2/15/2022  3:00 PM Victorina Mcmahan MD [facility]

## 2021-12-23 ENCOUNTER — HOSPITAL ENCOUNTER (OUTPATIENT)
Dept: PHYSICAL THERAPY | Age: 51
Discharge: HOME | End: 2021-12-23
Payer: COMMERCIAL

## 2021-12-23 PROCEDURE — 97110 THERAPEUTIC EXERCISES: CPT

## 2021-12-23 NOTE — PROGRESS NOTES
Anabell Plant  : 1970  Payor[place] CR[place] / [place] [facility] / Product Type: PPO /  [address] at [address] [facility]. [address] [place] [address], [facility], [address] [place] [address] Road  [phone]   [phone]                                                          Galina Miller MD      OUTPATIENT PHYSICAL THERAPY: Daily Treatment Note 2021 Visit Count:  13    Tx Diagnosis:  Cervicalgia (M54.2)  Pain in thoracic spine (M54.6)  Pain in Left Shoulder (M25.512)  Low back pain (M54.5)  Muscle spasm of back (M62.830)      Pre-treatment Symptoms/Complaints: Didn't get much sleep last night. Pain: Initial:8/10  Medications Last Reviewed:  2021     Post Session: 8/10   Updated Objective Findings: See Progress Report dated 12/15/21 for details        TREATMENT:   THERAPEUTIC EXERCISE: (44 minutes):  Exercises per grid below to improve mobility, strength and balance. Required minimal visual, verbal and manual cues to promote proper body alignment and promote proper body posture. Progressed resistance and complexity of movement as indicated.      Date  21 Date  21 Date  21 Date  21 Date  21 Date  12/15/21 Date  21 Date  21   Activity/Exercise           Education Discussed impact of stress and sleep on pain     Updated measurements, discussed POC     Open books x15/side          Lower trunk rotation           Cat-camel           myra pose           Treadmill 8 min 10 min 10 min 10 min 10 min 10 min 10 min 10 min   L stretch   x15        Standing pec stretch           bridges           Rows 8q45w93 lbs 0c02o91 lbs         Lat pulldowns 4l00s23 lbs 2b59v19 lbs         Shoulder taps           Knee pull glute stretch           scap retract with ER           Bird dogs           ER iso to punch, standing  3x10, red band         Sit to stand 3y91x78 lbs  7t25j11 lbs  8t91k13 lbs      Overhead STS    3x10x5 lbs   3x10x8 lbs Farmer's carries 3 large laps, 15 lbs each hand 3 large laps, 15 lbs each hand 3 large laps, 20 lbs 3 large laps, 25 lbs 3 large laps, 25 lbs 3 large laps, 25 lbs     Modified push ups  Edge of shuttle, x30         Sidestepping and monster walks 2x40 feet, red, 5 lbs at chest  2x40 feet, purple, 8 lbs at chest 2x40 feet, purple, 10 lbs at chest 2x40 feet, purple  2x40 feet, purple, 10 lbs at chest 2x40 feet, purple, 10 lbs at chest   Hip hinge Wall tap, 10 lbs for practice          scaption  2x10x3 lbs  2x10x3 lbs  3x10x4 lbs, with scap setting     Shoulder T's  3x10, red tubing         D2 shoulder flexion, standing  3x10, red tubing  3x10, red tubing, Bilateral       Pallof   3x10, thick red band 3x10, thick red band       Rack pulls   3n45d87 lbs, plus warm up sets  6h17d23 lbs, plus warm up sets 2m65w25  3t76a94  3o82t68     Resisted backward walking    x10x27 lbs x10x27 lbs   x10x30 lbs   Seated glute stretch    10x10 sec       Step ups     8u83j74 lbs contralateral, 6 inch 4p56e35 lbs contralateral, 6 inch     Bent over rows      2y45l19 lbs each hand 1x25k61 lbs each hand    Bicep curls      3x10x8 lbs with scap setting     Shoulder flexion with ER iso       X30, yellow    Overhead press      Next visit 8m56h60 lbs 3x5x15 lbs   Deadlift       2 warm up sets, 2x5x64 lbs 2 warm up sets, 2x5x64 lbs   Lat activation deadlift drill        x30   Ring stretch        x30         THERAPEUTIC ACTIVITY: ( 0 minutes): Activities per gid below to improve functional movement related mobility, strength and balance to improve neuro-muscular carryover to daily functional activities for improving patient's quality of life. Required visual, verbal and manual cues to promote proper body alignment and promote proper body posture/mechanics. Progressed resistance and complexity of movement as indicated.      Date:   Date:   Date:     Activity/Exercise Parameters Parameters Parameters MANUAL THERAPY: (0 minutes): Joint mobilization, Soft tissue mobilization was utilized and necessary because of the patient's restricted joint motion and restricted motion of soft tissue mobility. Date  12/23/2021    Technique Used Grade  Level # Time(s) Effect while being performed                                                                 HEP Log Date   Push ups, shoulder taps, ER iso punches 11/29   2.     3.    4.    5.           [facility]  Treatment/Session Summary:    Response to Treatment: Continued focus on core compound lifts. Pt required some corrective exercises to improve form and intermittent verbal cueing for form but demonstrated improvement with repetition. Overall, it appears load tolerance is improving. Communication/Consultation:  3 weeks until d/c   Equipment provided today: HEP handout   Recommendations/Intent for next treatment session:   Next visit will focus on Core Stability Pain Science Education Quad strengthening Hip strengthening RTC strengthening,        Treatment Plan of Care Effective Dates: 11/8/2021 TO 1/7/2022 (60 days).   Frequency/Duration: 2 times a week for 60 Days             Total Treatment Billable Duration:  44 min Rx  PT Patient Time In/Time Out  Time In: 0805  Time Out: [address], PT    Future Appointments   Date Time Provider Jorje Moulton   12/28/2021  4:00 PM [facility]   1/3/2022  7:30 AM [facility]   1/5/2022  7:30 AM Trey Drew, PT [facility]   1/5/2022  5:30 PM Callie Lauren NP [facility]   1/10/2022  2:30 PM Trey Drew, PT [facility]   1/12/2022  7:30 AM Trey Drew, PT [facility]   1/17/2022  7:30 AM Trey Drew, PT [facility]   1/19/2022  7:30 AM Trey Drew, PT [facility]   1/20/2022  8:00 AM ANNY Duque [facility] POA   1/27/2022 10:00 AM Kassy Fortune MD [facility] POA   2/2/2022 11:00 AM Marissa Monique Josue Mack MD [facility] BS[facility]   2/15/2022  3:00 PM Giuliana Anders MD [facility]

## 2021-12-28 ENCOUNTER — HOSPITAL ENCOUNTER (OUTPATIENT)
Dept: PHYSICAL THERAPY | Age: 51
Discharge: HOME | End: 2021-12-28
Payer: COMMERCIAL

## 2021-12-28 PROCEDURE — 97110 THERAPEUTIC EXERCISES: CPT

## 2021-12-28 NOTE — PROGRESS NOTES
Marysol Nicole  : 1970  Payor: [place]UE CROSS / [place]UE IActionable [facility] / Product Type: PPO /  [address] at [address] Lella Dakin., [address], [id number] [place] [address] Road  [phone]   [phone]                                                          Yenny Simpson MD      OUTPATIENT PHYSICAL THERAPY: Daily Treatment Note 2021 Visit Count:  14    Tx Diagnosis:  Cervicalgia (M54.2)  Pain in thoracic spine (M54.6)  Pain in Left Shoulder (M25.512)  Low back pain (M54.5)  Muscle spasm of back (M62.830)      Pre-treatment Symptoms/Complaints: Had a good day off for David. No new complaints. Pain: Initial:8/10  Medications Last Reviewed:  2021     Post Session: 8/10   Updated Objective Findings: See Progress Report dated 12/15/21 for details        TREATMENT:   THERAPEUTIC EXERCISE: (45 minutes):  Exercises per grid below to improve mobility, strength and balance. Required minimal visual, verbal and manual cues to promote proper body alignment and promote proper body posture. Progressed resistance and complexity of movement as indicated.      Date  21 Date  21 Date  21 Date  21 Date  12/15/21 Date  21 Date  21 Date  21   Activity/Exercise           Education     Updated measurements, discussed POC      Open books           Lower trunk rotation           Cat-camel           myra pose           Treadmill 10 min 10 min 10 min 10 min 10 min 10 min 10 min 10 min   L stretch  x15         Standing pec stretch           bridges           Rows 5q69d62 lbs          Lat pulldowns 1u78d15 lbs          Shoulder taps           Knee pull glute stretch           scap retract with ER           Bird dogs           ER iso to punch, standing 3x10, red band          Sit to stand  3z00d76 lbs  5b54e13 lbs       Overhead STS   3x10x5 lbs   3x10x8 lbs     Farmer's carries 3 large laps, 15 lbs each hand 3 large laps, 20 lbs 3 large laps, 25 lbs 3 large laps, 25 lbs 3 large laps, 25 lbs      Modified push ups Edge of shuttle, x30          Sidestepping and monster walks  2x40 feet, purple, 8 lbs at chest 2x40 feet, purple, 10 lbs at chest 2x40 feet, purple  2x40 feet, purple, 10 lbs at chest 2x40 feet, purple, 10 lbs at chest 2x40 feet, purple, 10 lbs at chest   Hip hinge           scaption 2x10x3 lbs  2x10x3 lbs  3x10x4 lbs, with scap setting      Shoulder T's 3x10, red tubing          D2 shoulder flexion, standing 3x10, red tubing  3x10, red tubing, Bilateral     Bilateral standing Y's, 3x10, yellow band   Pallof  3x10, thick red band 3x10, thick red band        Rack pulls  9d18h37 lbs, plus warm up sets  3p30f32 lbs, plus warm up sets 8c01v67  5c05q41  4h20x14      Resisted backward walking   x10x27 lbs x10x27 lbs   x10x30 lbs    Seated glute stretch   10x10 sec        Step ups    2f33z25 lbs contralateral, 6 inch 3z12k33 lbs contralateral, 6 inch      Bent over rows     0u32y53 lbs each hand 3t39m33 lbs each hand  4y44x81 lbs, bar   Bicep curls     3x10x8 lbs with scap setting      Shoulder flexion with ER iso      X30, yellow     Overhead press     Next visit 0b48n69 lbs 3x5x15 lbs 3x5x20 lbs   Deadlift      2 warm up sets, 2x5x64 lbs 2 warm up sets, 2x5x64 lbs 2 warm up sets, 3x5x74   Lat activation deadlift drill       x30 2x10   Ring stretch       x30 x20         THERAPEUTIC ACTIVITY: ( 0 minutes): Activities per gid below to improve functional movement related mobility, strength and balance to improve neuro-muscular carryover to daily functional activities for improving patient's quality of life. Required visual, verbal and manual cues to promote proper body alignment and promote proper body posture/mechanics. Progressed resistance and complexity of movement as indicated.      Date:   Date:   Date:     Activity/Exercise Parameters Parameters Parameters                                                   MANUAL THERAPY: (0 minutes): Joint mobilization, Soft tissue mobilization was utilized and necessary because of the patient's restricted joint motion and restricted motion of soft tissue mobility. Date  12/28/2021    Technique Used Grade  Level # Time(s) Effect while being performed                                                                 HEP Log Date   Push ups, shoulder taps, ER iso punches 11/29   2.     3.    4.    5.           [facility] Portal  Treatment/Session Summary:    Response to Treatment: Continued focus on core compound lifts. Good tolerance to all exercises today. Pt would continue to benefit from progressive resistance exercise. Communication/Consultation:  3 weeks until d/c   Equipment provided today: HEP handout   Recommendations/Intent for next treatment session:   Next visit will focus on Core Stability Pain Science Education Quad strengthening Hip strengthening RTC strengthening,        Treatment Plan of Care Effective Dates: 11/8/2021 TO 1/7/2022 (60 days).   Frequency/Duration: 2 times a week for 60 Days             Total Treatment Billable Duration:  45 min Rx  PT Patient Time In/Time Out  Time In: 1555  Time Out: [address] [place], PT    Future Appointments   Date Time Provider Jorje Moulton   12/29/2021  7:30 AM Roxy Ades, PT [facility] [place] [facility]   1/3/2022  7:30 AM Roxy Ades, PT [facility]   1/3/2022  9:20 AM Jaskaran Beatty NP [facility] POA   1/5/2022  7:30 AM Roxy Ades, PT [facility]   1/5/2022  5:30 PM RHONA Vasquez [facility]   1/10/2022  2:30 PM Roxy Ades, PT [facility]   1/12/2022  7:30 AM Roxy Ades, PT [facility]   1/17/2022  7:30 AM Roxy Ades, PT [facility]   1/19/2022  7:30 AM Roxy Ades, PT [facility]   1/20/2022  8:00 AM ANNY Rudd [facility] PO[facility] POA   1/27/2022 10:00 AM MD JANET Sesay POA   2/2/2022 11:00 AM aRdha Reyes MD BSNE BSNE   2/15/2022  3:00 PM Gini Mederos MD [id number]

## 2021-12-29 ENCOUNTER — HOSPITAL ENCOUNTER (OUTPATIENT)
Dept: PHYSICAL THERAPY | Age: 51
Discharge: HOME | End: 2021-12-29
Payer: COMMERCIAL

## 2021-12-29 PROCEDURE — 97110 THERAPEUTIC EXERCISES: CPT

## 2021-12-29 NOTE — PROGRESS NOTES
Ulises Odonnell  : 1970  Pay[place]: [place]UE CROSS / [place] [facility] / Product Type: PPO /  Elsie Kraft at [address], [address] [place] [address] Road  [phone]   [phone]                                                          Vin Cook MD      OUTPATIENT PHYSICAL THERAPY: Daily Treatment Note 2021 Visit Count:  15    Tx Diagnosis:  Cervicalgia (M54.2)  Pain in thoracic spine (M54.6)  Pain in Left Shoulder (M25.512)  Low back pain (M54.5)  Muscle spasm of back (M62.830)      Pre-treatment Symptoms/Complaints: No new complaints   Pain: Initial:8/10  Medications Last Reviewed:  2021     Post Session: 8/10   Updated Objective Findings: See Progress Report dated 12/15/21 for details        TREATMENT:   THERAPEUTIC EXERCISE: (43 minutes):  Exercises per grid below to improve mobility, strength and balance. Required minimal visual, verbal and manual cues to promote proper body alignment and promote proper body posture. Progressed resistance and complexity of movement as indicated.      Date  21 Date  21 Date  21 Date  12/15/21 Date  21 Date  21 Date  21 Date  21   Activity/Exercise           Education    Updated measurements, discussed POC       Open books           Lower trunk rotation           Cat-camel           myra pose           Treadmill 10 min 10 min 10 min 10 min 10 min 10 min 10 min 10 min   L stretch x15          Standing pec stretch           bridges           Rows           Lat pulldowns           Shoulder taps           Knee pull glute stretch           scap retract with ER           Bird dogs           ER iso to punch, standing           Sit to stand 4m19g32 lbs  2t20d86 lbs        Overhead STS  3x10x5 lbs   3x10x8 lbs      Farmer's carries 3 large laps, 20 lbs 3 large laps, 25 lbs 3 large laps, 25 lbs 3 large laps, 25 lbs    4 laps, 25 lbs and 30 lbs Modified push ups           Sidestepping and monster walks 2x40 feet, purple, 8 lbs at chest 2x40 feet, purple, 10 lbs at chest 2x40 feet, purple  2x40 feet, purple, 10 lbs at chest 2x40 feet, purple, 10 lbs at chest 2x40 feet, purple, 10 lbs at chest    Hip hinge           scaption  2x10x3 lbs  3x10x4 lbs, with scap setting       Shoulder T's           D2 shoulder flexion, standing  3x10, red tubing, Bilateral     Bilateral standing Y's, 3x10, yellow band    Pallof 3x10, thick red band 3x10, thick red band         Rack pulls 8t01t31 lbs, plus warm up sets  1d60g35 lbs, plus warm up sets 7j12k53  2e99x29  5l13t65       Resisted backward walking  x10x27 lbs x10x27 lbs   x10x30 lbs  x10x33 lbs   Seated glute stretch  10x10 sec         Step ups   9k45n44 lbs contralateral, 6 inch 7o48f22 lbs contralateral, 6 inch    2x10 each leg, 12 inch   Bent over rows    7h56x83 lbs each hand 5c17a99 lbs each hand  0n52b66 lbs, bar    Bicep curls    3x10x8 lbs with scap setting       Shoulder flexion with ER iso     X30, yellow      Overhead press    Next visit 2d35p34 lbs 3x5x15 lbs 3x5x20 lbs    Deadlift     2 warm up sets, 2x5x64 lbs 2 warm up sets, 2x5x64 lbs 2 warm up sets, 3x5x74    Lat activation deadlift drill      x30 2x10    Ring stretch      x30 x20    Back squat        4x5x5 lbs   Prying goblet squat (BW only)        x2 min   Back rack stretch        2 min   Rocking hip stretch        10x10 sec         THERAPEUTIC ACTIVITY: ( 0 minutes): Activities per gid below to improve functional movement related mobility, strength and balance to improve neuro-muscular carryover to daily functional activities for improving patient's quality of life. Required visual, verbal and manual cues to promote proper body alignment and promote proper body posture/mechanics. Progressed resistance and complexity of movement as indicated.      Date:   Date:   Date:     Activity/Exercise Parameters Parameters Parameters MANUAL THERAPY: (0 minutes): Joint mobilization, Soft tissue mobilization was utilized and necessary because of the patient's restricted joint motion and restricted motion of soft tissue mobility. Date  12/29/2021    Technique Used Grade  Level # Time(s) Effect while being performed                                                                 HEP Log Date   Push ups, shoulder taps, ER iso punches 11/29   2.     3.    4.    5.           [facility] Portal  Treatment/Session Summary:    Response to Treatment: Introduced weighted back squats today. Pt limited in squat depth initially and demonstrated heel lift. Depth not improved with heel elevation, indicated dorsiflexion as unlikely factor for limited depth. Pt then stretched her hips in the deep squat position and demonstrated increased depth following (just above parallel). Communication/Consultation:  3 weeks until d/c   Equipment provided today: HEP handout   Recommendations/Intent for next treatment session:   Next visit will focus on Core Stability Pain Science Education Quad strengthening Hip strengthening RTC strengthening,        Treatment Plan of Care Effective Dates: 11/8/2021 TO 1/7/2022 (60 days).   Frequency/Duration: 2 times a week for 60 Days             Total Treatment Billable Duration:  43 min Rx  PT Patient Time In/Time Out  Time In: 0730  Time Out: [address] [facility] [place], LILLI    Future Appointments   Date Time Provider Jorje Moulton   1/3/2022  7:30 AM Kim Sic, PT [facility]   1/3/2022  9:20 AM Patricio Beatty NP [facility] POAI POA   1/5/2022  7:30 AM [facility]   1/5/2022  5:30 PM Pratima Weber NP [facility]   1/10/2022  2:30 PM Kim Sic, PT [facility]   1/12/2022  7:30 AM Kim Sic, PT [facility]   1/17/2022  7:30 AM Kim Sic[facility]   1/19/2022  7:30 AM Kim Sic, PT [facility]   1/20/2022  8:00 AM Sandrine Rodarte, ANNY ALLEN PO POA   1/27/2022 10:00 AM Jt Guzman MD [facility] POA   2/2/2022 11:00 AM Doris Escalera MD BSNE BSNE   2/15/2022  3:00 PM Ady Marte MD [facility]

## 2022-01-03 ENCOUNTER — HOSPITAL ENCOUNTER (OUTPATIENT)
Dept: PHYSICAL THERAPY | Age: 52
Discharge: HOME | End: 2022-01-03
Payer: COMMERCIAL

## 2022-01-03 PROBLEM — M65.4 DE QUERVAIN'S TENOSYNOVITIS, RIGHT: Status: ACTIVE | Noted: 2022-01-03

## 2022-01-03 PROBLEM — M67.40 GANGLION CYST: Status: ACTIVE | Noted: 2022-01-03

## 2022-01-03 PROCEDURE — 97110 THERAPEUTIC EXERCISES: CPT

## 2022-01-03 NOTE — PROGRESS NOTES
Smiley Spurling  : 1970  Payor: BLUE CROSS / [place] formerly [facility] / Product Type: PPO /  Gretchen Banerjee at [address]. [address] [place] [address], [facility], [address] [place] [address]  [phone]   [phone]                                                          Julia Montoya MD      OUTPATIENT PHYSICAL THERAPY: Daily Treatment Note 1/3/2022 Visit Count:  16    Tx Diagnosis:  Cervicalgia (M54.2)  Pain in thoracic spine (M54.6)  Pain in Left Shoulder (M25.512)  Low back pain (M54.5)  Muscle spasm of back (M62.830)      Pre-treatment Symptoms/Complaints: Slipped and fell over the weekend - she's a little sore and feels like she pulled her adductor. Her back was feeling good until she got on her first work call this morning. She feels like she tenses up and is in pain after and it will continue to build until the end of the day. Pain: Initial:8/10  Medications Last Reviewed:  1/3/2022     Post Session: 8/10   Updated Objective Findings: See Progress Report dated 12/15/21 for details        TREATMENT:   THERAPEUTIC EXERCISE: (45 minutes):  Exercises per grid below to improve mobility, strength and balance. Required minimal visual, verbal and manual cues to promote proper body alignment and promote proper body posture. Progressed resistance and complexity of movement as indicated.      Date  21 Date  21 Date  12/15/21 Date  21 Date  21 Date  21 Date  21 Date  1/3/22   Activity/Exercise           Education   Updated measurements, discussed POC     Discussed performing breathing exercises in between stressful phone calls at work   Open books           Lower trunk rotation           Cat-camel           myra pose           Treadmill 10 min 10 min 10 min 10 min 10 min 10 min 10 min 10 min   L stretch           Standing pec stretch           bridges           Rows           Lat pulldowns        [id number] lbs   Shoulder taps Knee pull glute stretch           scap retract with ER           Bird dogs           ER iso to punch, standing           Sit to stand  1e48i89 lbs         Overhead STS 3x10x5 lbs   3x10x8 lbs       Farmer's carries 3 large laps, 25 lbs 3 large laps, 25 lbs 3 large laps, 25 lbs    4 laps, 25 lbs and 30 lbs    Modified push ups           Sidestepping and monster walks 2x40 feet, purple, 10 lbs at chest 2x40 feet, purple  2x40 feet, purple, 10 lbs at chest 2x40 feet, purple, 10 lbs at chest 2x40 feet, purple, 10 lbs at chest     Hip hinge           scaption 2x10x3 lbs  3x10x4 lbs, with scap setting        Shoulder T's           D2 shoulder flexion, standing 3x10, red tubing, Bilateral     Bilateral standing Y's, 3x10, yellow band     Pallof 3x10, thick red band          Rack pulls  9k63p08 lbs, plus warm up sets [id number]  0v76o45  0h42m75        Resisted backward walking x10x27 lbs x10x27 lbs   x10x30 lbs  x10x33 lbs x10x30 lbs   Seated glute stretch 10x10 sec          Step ups  0x62z25 lbs contralateral, 6 inch 2c40j75 lbs contralateral, 6 inch    2x10 each leg, 12 inch    Bent over rows   3f10d75 lbs each hand 0a55g09 lbs each hand  4d10e66 lbs, bar     Bicep curls   3x10x8 lbs with scap setting        Shoulder flexion with ER iso    X30, yellow       Overhead press   Next visit 1o56d08 lbs 3x5x15 lbs 3x5x20 lbs     Deadlift    2 warm up sets, 2x5x64 lbs 2 warm up sets, 2x5x64 lbs 2 warm up sets, 3x5x74     Lat activation deadlift drill     x30 2x10     Ring stretch     x30 x20  x20   Back squat       4x5x5 lbs 3x5x5 lbs   Prying goblet squat (BW only)       x2 min x2 min (bar assisted today)   Back rack stretch       2 min    Rocking hip stretch       10x10 sec 10x10 sec   Hip Add isometric        10x10 sec   Hip adductor stretch        10x10 sec         THERAPEUTIC ACTIVITY: ( 0 minutes):  Activities per gid below to improve functional movement related mobility, strength and balance to improve neuro-muscular carryover to daily functional activities for improving patient's quality of life. Required visual, verbal and manual cues to promote proper body alignment and promote proper body posture/mechanics. Progressed resistance and complexity of movement as indicated. Date:   Date:   Date:     Activity/Exercise Parameters Parameters Parameters                                                   MANUAL THERAPY: (0 minutes): Joint mobilization, Soft tissue mobilization was utilized and necessary because of the patient's restricted joint motion and restricted motion of soft tissue mobility. Date  1/3/2022    Technique Used Grade  Level # Time(s) Effect while being performed                                                                 HEP Log Date   Push ups, shoulder taps, ER iso punches 11/29   2.     3.    4.    5.           [employer] Portal  Treatment/Session Summary:    Response to Treatment: Limited progression today due to exacerbation of pain to L hip adductor from fall over the weekend. Pt symptomatic with squats, so introduced adduction isometrics and gentle stretching with pain threshold of 4/10. Communication/Consultation:     Equipment provided today:    Recommendations/Intent for next treatment session:   Next visit will focus on Core Stability Pain Science Education Quad strengthening Hip strengthening RTC strengthening,        Treatment Plan of Care Effective Dates: 11/8/2021 TO 1/7/2022 (60 days).   Frequency/Duration: 2 times a week for 60 Days             Total Treatment Billable Duration:  45 min Rx  PT Patient Time In/Time Out  Time In: 0730  Time Out: Pr-[address] [place] [place], PT    Future Appointments   Date Time Provider Jorje Moulton   1/3/2022  9:20 AM Conor Beatty NP SSA POAI POA   1/5/2022  7:30 AM Brett Stroud PT [facility] AND [facility]   1/5/2022  5:30 PM RHONA Nicole BSCPC   1/10/2022  2:30 PM Brett Stroud PT [facility]   1/12/2022  7:30 AM Brett Stroud PT [facility]   1/17/2022  7:30 AM Durga Wallace, PT [facility]   1/19/2022  7:30 AM Durga Wallace, PT [facility] AND [facility]   1/20/2022  8:00 AM ANNY Barrientos PO POA   1/27/2022 10:00 AM Linda Salinas MD [facility] POA   2/2/2022 11:00 AM Ana Martin MD BSNE BSNE   2/15/2022  3:00 PM Oral Lr MD [facility][1]

## 2022-01-05 ENCOUNTER — HOSPITAL ENCOUNTER (OUTPATIENT)
Dept: PHYSICAL THERAPY | Age: 52
Discharge: HOME | End: 2022-01-05
Payer: COMMERCIAL

## 2022-01-05 PROCEDURE — 97110 THERAPEUTIC EXERCISES: CPT

## 2022-01-05 NOTE — PROGRESS NOTES
Annamaria Santos  : 1970  [place]: [place]UE CR[place] / [place] [facility] / Product Type: PPO /  [id number] [facility] at [address] [place] [address]. [address] [place] [address] [address] [place] [address], [facility], [address] [place] [address]  [phone]   [phone]                                                          Linda Salinas MD      OUTPATIENT PHYSICAL THERAPY: Daily Treatment Note 2022 Visit Count:  17    Tx Diagnosis:  Cervicalgia (M54.2)  Pain in thoracic spine (M54.6)  Pain in Left Shoulder (M25.512)  Low back pain (M54.5)  Muscle spasm of back (M62.830)      Pre-treatment Symptoms/Complaints: Still having trouble with her adductor. She was working on some ab exercises that involved hip flexion and it really flared her up. Pain: Initial:8/10  Medications Last Reviewed:  2022     Post Session: 8/10   Updated Objective Findings: See Progress Report dated 12/15/21 for details        TREATMENT:   THERAPEUTIC EXERCISE: (47 minutes):  Exercises per grid below to improve mobility, strength and balance. Required minimal visual, verbal and manual cues to promote proper body alignment and promote proper body posture. Progressed resistance and complexity of movement as indicated.      Date  21 Date  12/15/21 Date  21 Date  21 Date  21 Date  21 Date  1/3/22 Date  22   Activity/Exercise           Education  Updated measurements, discussed POC     Discussed performing breathing exercises in between stressful phone calls at work modification of activity to reduce active hip flexion, gradual return to usual activities, ice if needed   Open books           Lower trunk rotation           Cat-camel           myra pose           Treadmill 10 min 10 min 10 min 10 min 10 min 10 min 10 min 10 min   L stretch           Standing pec stretch           bridges           Rows           Lat pulldowns       2v72f36 lbs    Shoulder taps           Knee pull glute stretch           scap retract with ER           Bird dogs           ER iso to punch, standing           Sit to stand 9t12t77 lbs          Overhead STS   3x10x8 lbs     (regular STS) 6u47q64 lbs   Farmer's carries 3 large laps, 25 lbs 3 large laps, 25 lbs    4 laps, 25 lbs and 30 lbs     Modified push ups           Sidestepping and monster walks 2x40 feet, purple  2x40 feet, purple, 10 lbs at chest 2x40 feet, purple, 10 lbs at chest 2x40 feet, purple, 10 lbs at chest   2x40 feet, purple, 10 lbs at chest   Hip hinge           scaption  3x10x4 lbs, with scap setting         Shoulder T's           D2 shoulder flexion, standing     Bilateral standing Y's, 3x10, yellow band      Pallof           Rack pulls 3n91j96 lbs, plus warm up sets 1b63n65  5j71h93  7m60m45         Resisted backward walking x10x27 lbs   x10x30 lbs  x10x33 lbs x10x30 lbs (lateral today) 1x5 each, 17 lbs   Seated glute stretch           Step ups 3d78z83 lbs contralateral, 6 inch 8m88s46 lbs contralateral, 6 inch    2x10 each leg, 12 inch     Bent over rows  3q84i27 lbs each hand 5p71y66 lbs each hand  2l72b98 lbs, bar      Bicep curls  3x10x8 lbs with scap setting         Shoulder flexion with ER iso   X30, yellow        Overhead press  Next visit 3e90s78 lbs 3x5x15 lbs 3x5x20 lbs   3x5x20 lbs   Deadlift   2 warm up sets, 2x5x64 lbs 2 warm up sets, 2x5x64 lbs 2 warm up sets, 3x5x74      Lat activation deadlift drill    x30 2x10      Ring stretch    x30 x20  x20 x20 to snow leonor   Back squat      4x5x5 lbs 3x5x5 lbs    Prying goblet squat (BW only)      x2 min x2 min (bar assisted today)    Back rack stretch      2 min     Rocking hip stretch      10x10 sec 10x10 sec    Hip Add isometric       10x10 sec    Hip adductor stretch       10x10 sec    Wall sit with ADD iso squeeze        10x10 sec         THERAPEUTIC ACTIVITY: ( 0 minutes):  Activities per gid below to improve functional movement related mobility, strength and balance to improve neuro-muscular carryover to daily functional activities for improving patient's quality of life. Required visual, verbal and manual cues to promote proper body alignment and promote proper body posture/mechanics. Progressed resistance and complexity of movement as indicated. Date:   Date:   Date:     Activity/Exercise Parameters Parameters Parameters                                                   MANUAL THERAPY: (0 minutes): Joint mobilization, Soft tissue mobilization was utilized and necessary because of the patient's restricted joint motion and restricted motion of soft tissue mobility. Date  1/5/2022    Technique Used Grade  Level # Time(s) Effect while being performed                                                                 HEP Log Date   Push ups, shoulder taps, ER iso punches 11/29   2.     3.    4.    5.           [employer] Portal  Treatment/Session Summary:    Response to Treatment: Progressed hip ADD isometrics today to help reduce pain from recent adductor strain. Modified squats today to lessen load on adductors. Good tolerance to activities today. Communication/Consultation:     Equipment provided today:    Recommendations/Intent for next treatment session:   Next visit will focus on Core Stability Pain Science Education Quad strengthening Hip strengthening RTC strengthening,        Treatment Plan of Care Effective Dates: 11/8/2021 TO 1/7/2022 (60 days).   Frequency/Duration: 2 times a week for 60 Days             Total Treatment Billable Duration:  47 min Rx  PT Patient Time In/Time Out  Time In: 2631  Time Out: [address] [facility], PT    Future Appointments   Date Time Provider Jorje Moulton   1/5/2022  5:30 PM Viktor Luhter NP [facility]   1/10/2022  2:30 PM Brett Stroud PT [facility] AND [facility]   1/12/2022  7:30 AM Brett Stroud PT [facility]   1/17/2022  7:30 AM [facility]   1/19/2022  7:30 AM Brett Stroud PT [facility]   1/20/2022  8:00 AM Carlos A Rodarte Jose lorenzo, [place] SSA POAI POA   1/21/2022  7:45 AM Rubi Beatty NP POAG POA   1/27/2022 10:00 AM Linda Castle MD POAG POA   2/2/2022 11:00 AM José Miguel Stark MD BSNE BSNE   2/15/2022  3:00 PM Michelle Calles MD [facility]

## 2022-01-06 ENCOUNTER — HOSPITAL ENCOUNTER (OUTPATIENT)
Dept: GENERAL RADIOLOGY | Age: 52
Discharge: HOME | End: 2022-01-06
Attending: NURSE PRACTITIONER
Payer: COMMERCIAL

## 2022-01-06 PROCEDURE — 73630 X-RAY EXAM OF FOOT: CPT

## 2022-01-10 ENCOUNTER — HOSPITAL ENCOUNTER (OUTPATIENT)
Dept: PHYSICAL THERAPY | Age: 52
Discharge: HOME | End: 2022-01-10
Payer: COMMERCIAL

## 2022-01-10 PROCEDURE — 97110 THERAPEUTIC EXERCISES: CPT

## 2022-01-10 NOTE — PROGRESS NOTES
Kathe Sands  : 1970  Payor: SAMANTHA CROSS / Plan: [facility] / Product Type: [facility] /  [id number] [facility] at [address] [place] [address]. Holly Ortiz., [address] [place], [address] [place] [address] Road  [phone]   [phone]                                                          Ab Gandara MD      OUTPATIENT PHYSICAL THERAPY: Daily Treatment Note 1/10/2022 Visit Count:  18    Tx Diagnosis:  Cervicalgia (M54.2)  Pain in thoracic spine (M54.6)  Pain in Left Shoulder (M25.512)  Low back pain (M54.5)  Muscle spasm of back (M62.830)      Pre-treatment Symptoms/Complaints: Still having trouble with her adductor but she's modifying her activities as needed   Pain: Initial:8/10  Medications Last Reviewed:  1/10/2022     Post Session: 8/10   Updated Objective Findings: See Progress Report dated 12/15/21 for details        TREATMENT:   THERAPEUTIC EXERCISE: (40 minutes):  Exercises per grid below to improve mobility, strength and balance. Required minimal visual, verbal and manual cues to promote proper body alignment and promote proper body posture. Progressed resistance and complexity of movement as indicated.      Date  12/15/21 Date  21 Date  21 Date  21 Date  21 Date  1/3/22 Date  1/5/22 1/10/22   Activity/Exercise           Education Updated measurements, discussed POC     Discussed performing breathing exercises in between stressful phone calls at work modification of activity to reduce active hip flexion, gradual return to usual activities, ice if needed    Open books           Lower trunk rotation           Cat-camel           myra pose           Treadmill 10 min 10 min 10 min 10 min 10 min 10 min 10 min 10 min   L stretch           Standing pec stretch           bridges           Rows        TRX rows, 3x10   Lat pulldowns      6k61i11 lbs     Shoulder taps           Knee pull glute stretch           scap retract with ER           Bird dogs ER iso to punch, standing           Sit to stand           Overhead STS  3x10x8 lbs     (regular STS) 3y44g59 lbs    Farmer's carries 3 large laps, 25 lbs    4 laps, 25 lbs and 30 lbs      Modified push ups           Sidestepping and monster walks  2x40 feet, purple, 10 lbs at chest 2x40 feet, purple, 10 lbs at chest 2x40 feet, purple, 10 lbs at chest   2x40 feet, purple, 10 lbs at chest 2x40 feet, purple, 15 lbs at chest   Hip hinge           scaption 3x10x4 lbs, with scap setting          Shoulder T's           D2 shoulder flexion, standing    Bilateral standing Y's, 3x10, yellow band       Pallof           Rack pulls [id number]  5d53k97  8t43h40          Resisted backward walking   x10x30 lbs  x10x33 lbs x10x30 lbs (lateral today) 1x5 each, 17 lbs x10x30 lbs backward, 1x5 each 17 lbs lateral   Seated glute stretch           Step ups 5s98e82 lbs contralateral, 6 inch    2x10 each leg, 12 inch      Bent over rows 7d45o83 lbs each hand 0u03o98 lbs each hand  3i75p66 lbs, bar       Bicep curls 3x10x8 lbs with scap setting          Shoulder flexion with ER iso  X30, yellow         Overhead press Next visit 8m02u40 lbs 3x5x15 lbs 3x5x20 lbs   3x5x20 lbs    Deadlift  2 warm up sets, 2x5x64 lbs 2 warm up sets, 2x5x64 lbs 2 warm up sets, 3x5x74       Lat activation deadlift drill   x30 2x10       Ring stretch   x30 x20  x20 x20 to snow leonor    Back squat     4x5x5 lbs 3x5x5 lbs  3x5x20 lbs, bench for target   Prying goblet squat (BW only)     x2 min x2 min (bar assisted today)     Back rack stretch     2 min      Rocking hip stretch     10x10 sec 10x10 sec  10x10 sec   Hip Add isometric      10x10 sec     Hip adductor stretch      10x10 sec     Wall sit with ADD iso squeeze       10x10 sec          THERAPEUTIC ACTIVITY: ( 0 minutes):  Activities per gid below to improve functional movement related mobility, strength and balance to improve neuro-muscular carryover to daily functional activities for improving patient's quality of life. Required visual, verbal and manual cues to promote proper body alignment and promote proper body posture/mechanics. Progressed resistance and complexity of movement as indicated. Date:   Date:   Date:     Activity/Exercise Parameters Parameters Parameters                                                   MANUAL THERAPY: (0 minutes): Joint mobilization, Soft tissue mobilization was utilized and necessary because of the patient's restricted joint motion and restricted motion of soft tissue mobility. Date  1/10/2022    Technique Used Grade  Level # Time(s) Effect while being performed                                                                 HEP Log Date   Push ups, shoulder taps, ER iso punches 11/29   2.     3.    4.    5.           [employer] Portal  Treatment/Session Summary:    Response to Treatment: Able to add back squats back in to program today with good tolerance. Pt required some instruction for hand placement but otherwise demonstrated good form with bench for depth gauge. Able to add TRX rows for dynamic core stabilization and periscapular strengthening. Communication/Consultation:     Equipment provided today:    Recommendations/Intent for next treatment session:   Next visit will focus on Core Stability Pain Science Education Quad strengthening Hip strengthening RTC strengthening,        Treatment Plan of Care Effective Dates: 11/8/2021 TO 1/7/2022 (60 days).   Frequency/Duration: 2 times a week for 60 Days             Total Treatment Billable Duration:  40 min Rx  PT Patient Time In/Time Out  Time In: 6155  Time Out: [address] [facility] [address], PT    Future Appointments   Date Time Provider Jorje Moulton   1/12/2022  7:30 AM Kennith Cabot, PT [facility] AND [facility]   1/12/2022 10:30 AM Jose Restrepo NP SSA [facility]   1/13/2022  9:15 AM Michelle Murray DO BSUG BSUG   1/17/2022  7:30 AM Kennith Cabot, PT [facility]   1/19/2022  7:30 AM Kennith Cabot, PT [facility]   1/20/2022  8:00 AM Zenon Rodarte PA SSA POAI POA   1/21/2022  7:45 AM Arslan Beatty NP POAG POA   1/24/2022 11:30 AM Herby Mcardle, PA-C [facility]   1/27/2022 10:00 AM Nani Arnett MD POAG POA   2/2/2022 11:00 AM Melanie Vázquez MD BSNE BSNE   2/15/2022  3:00 PM Victorino Badillo MD [facility]

## 2022-01-12 ENCOUNTER — HOSPITAL ENCOUNTER (OUTPATIENT)
Dept: PHYSICAL THERAPY | Age: 52
Discharge: HOME | End: 2022-01-12
Payer: COMMERCIAL

## 2022-01-12 PROCEDURE — 97110 THERAPEUTIC EXERCISES: CPT

## 2022-01-12 NOTE — PROGRESS NOTES
Carlo Lookout  : 1970  Payor[place] BLUE CR[place] / [place] [facility] / Product Type: PPO /  [address] [place]. [address] [place] [address] [address] [place] [address], [facility], [address] [place] [address]  [phone]   [phone]                                                          Adan Correa MD      OUTPATIENT PHYSICAL THERAPY: Daily Treatment Note 2022 Visit Count:  19    Tx Diagnosis:  Cervicalgia (M54.2)  Pain in thoracic spine (M54.6)  Pain in Left Shoulder (M25.512)  Low back pain (M54.5)  Muscle spasm of back (M62.830)      Pre-treatment Symptoms/Complaints: Pt brought in record of being enrolled in a exercise program robe through work - scanned into chart. Pain: Initial:8/10  Medications Last Reviewed:  2022     Post Session: 8/10   Updated Objective Findings: See Progress Report dated 12/15/21 for details        TREATMENT:   THERAPEUTIC EXERCISE: (47 minutes):  Exercises per grid below to improve mobility, strength and balance. Required minimal visual, verbal and manual cues to promote proper body alignment and promote proper body posture. Progressed resistance and complexity of movement as indicated.      Date  21 Date  21 Date  21 Date  21 Date  1/3/22 Date  1/5/22 1/10/22 Date  22   Activity/Exercise           Education     Discussed performing breathing exercises in between stressful phone calls at work modification of activity to reduce active hip flexion, gradual return to usual activities, ice if needed     Open books           Lower trunk rotation           Cat-camel           myra pose           Treadmill 10 min 10 min 10 min 10 min 10 min 10 min 10 min 10 min   L stretch           Standing pec stretch           bridges           Rows       TRX rows, 3x10    Lat pulldowns     [id number] lbs      Shoulder taps           Knee pull glute stretch           scap retract with ER           Bird dogs           ER iso to punch, standing           Sit to stand           Overhead STS 3x10x8 lbs     (regular STS) [id number] lbs     Farmer's carries    4 laps, 25 lbs and 30 lbs       Modified push ups           Sidestepping and monster walks 2x40 feet, purple, 10 lbs at chest 2x40 feet, purple, 10 lbs at chest 2x40 feet, purple, 10 lbs at chest   2x40 feet, purple, 10 lbs at chest 2x40 feet, purple, 15 lbs at chest 2x40 feet, purple, 15 lbs at chest   Hip hinge           scaption           Shoulder T's           D2 shoulder flexion, standing   Bilateral standing Y's, 3x10, yellow band        Pallof           Rack pulls           Resisted backward walking  x10x30 lbs  x10x33 lbs x10x30 lbs (lateral today) 1x5 each, 17 lbs x10x30 lbs backward, 1x5 each 17 lbs lateral  1x5 each 20 lbs lateral   Seated glute stretch           Step ups    2x10 each leg, 12 inch       Bent over rows [id number] lbs each hand  2i20x21 lbs, bar        Bicep curls           Shoulder flexion with ER iso X30, yellow          Overhead press [id number] lbs 3x5x15 lbs 3x5x20 lbs   3x5x20 lbs  3x5x20 lbs   Deadlift 2 warm up sets, 2x5x64 lbs 2 warm up sets, 2x5x64 lbs 2 warm up sets, 3x5x74        Lat activation deadlift drill  x30 2x10        Ring stretch  x30 x20  x20 x20 to snow leonor  x20 to snow leonor   Back squat    4x5x5 lbs 3x5x5 lbs  3x5x20 lbs, bench for target 3x5x20 lbs, bench for target   Prying goblet squat (BW only)    x2 min x2 min (bar assisted today)      Back rack stretch    2 min       Rocking hip stretch    10x10 sec 10x10 sec  10x10 sec 10x10 sec   Hip Add isometric     10x10 sec      Hip adductor stretch     10x10 sec      Wall sit with ADD iso squeeze      10x10 sec           THERAPEUTIC ACTIVITY: ( 0 minutes): Activities per gid below to improve functional movement related mobility, strength and balance to improve neuro-muscular carryover to daily functional activities for improving patient's quality of life.  Required visual, verbal and manual cues to promote proper body alignment and promote proper body posture/mechanics. Progressed resistance and complexity of movement as indicated. Date:   Date:   Date:     Activity/Exercise Parameters Parameters Parameters                                                   MANUAL THERAPY: (0 minutes): Joint mobilization, Soft tissue mobilization was utilized and necessary because of the patient's restricted joint motion and restricted motion of soft tissue mobility. Date  1/12/2022    Technique Used Grade  Level # Time(s) Effect while being performed                                                                 HEP Log Date   Push ups, shoulder taps, ER iso punches 11/29   2.     3.    4.    5.           [employer] Portal  Treatment/Session Summary:    Response to Treatment: Continued focus on core compound lifts with good tolerance. Pt should be able to continue progressing resistance next week. Communication/Consultation:     Equipment provided today:    Recommendations/Intent for next treatment session:   Next visit will focus on Core Stability Pain Science Education Quad strengthening Hip strengthening RTC strengthening,        Treatment Plan of Care Effective Dates: 11/8/2021 TO 1/7/2022 (60 days).   Frequency/Duration: 2 times a week for 60 Days             Total Treatment Billable Duration:  47 min Rx  PT Patient Time In/Time Out  Time In: 0730  Time Out: [address], PT    Future Appointments   Date Time Provider Jorje Moulton   1/12/2022 10:30 AM Jose Restrepo NP [facility]   1/12/2022  4:20 PM Sandra Young MD [facility]   1/13/2022  9:15 AM Michelle Murray DO B[facility]G BSUG   1/17/2022  7:30 AM Kennith Cabot, PT [facility]   1/19/2022  7:30 AM Kenn[facility] Cabot[facility]   1/20/2022  8:00 AM ANNY Blanton SSA [facility] POA   1/21/2022  7:45 AM Ken Beatty NP POAG POA   1/24/2022 11:30 AM Fili Colon PA-C [facility]   1/27/2022 10:00 AM Bhanu Maguire MD POAG POA   2/2/2022 11:00 AM Calvin Robbins MD BSNE BSNE   2/15/2022  3:00 PM Priscilla Hall MD [facility]

## 2022-01-13 PROBLEM — N94.89 HIGH-TONE PELVIC FLOOR DYSFUNCTION: Status: ACTIVE | Noted: 2022-01-13

## 2022-01-13 PROBLEM — D25.9 UTERINE LEIOMYOMA: Status: ACTIVE | Noted: 2022-01-13

## 2022-01-13 PROBLEM — N32.81 OAB (OVERACTIVE BLADDER): Status: ACTIVE | Noted: 2022-01-13

## 2022-01-19 ENCOUNTER — HOSPITAL ENCOUNTER (OUTPATIENT)
Dept: PHYSICAL THERAPY | Age: 52
Discharge: HOME | End: 2022-01-19
Payer: COMMERCIAL

## 2022-01-19 PROCEDURE — 97110 THERAPEUTIC EXERCISES: CPT

## 2022-01-19 NOTE — PROGRESS NOTES
Galindo Hilario  : 1970  [place] CR[place] / [place] [facility] / Product Type: PPO /  [address] Misbah CarranzaLeonarda, Ashly Hassan, [address]  [phone]   [phone]                                                          Lesly Beck MD      OUTPATIENT PHYSICAL THERAPY: Daily Treatment Note 2022 Visit Count:  20    Tx Diagnosis:  Cervicalgia (M54.2)  Pain in thoracic spine (M54.6)  Pain in Left Shoulder (M25.512)  Low back pain (M54.5)  Muscle spasm of back (M62.830)      Pre-treatment Symptoms/Complaints: Pt reports feeling about the same and more confident with managing symptoms   Pain: Initial:8/10  Medications Last Reviewed:  2022     Post Session: 8/10   Updated Objective Findings: See Progress Report dated 12/15/21 for details        TREATMENT:   THERAPEUTIC EXERCISE: (43 minutes):  Exercises per grid below to improve mobility, strength and balance. Required minimal visual, verbal and manual cues to promote proper body alignment and promote proper body posture. Progressed resistance and complexity of movement as indicated.      Date  21 Date  21 Date  21 Date  21 Date  1/3/22 Date  1/5/22 1/10/22 Date  22 Date  22   Activity/Exercise            Education     Discussed performing breathing exercises in between stressful phone calls at work modification of activity to reduce active hip flexion, gradual return to usual activities, ice if needed      Open books            Lower trunk rotation            Cat-camel            myra pose            Treadmill 10 min 10 min 10 min 10 min 10 min 10 min 10 min 10 min 10 min   L stretch         [id number]   Standing pec stretch            bridges            Rows       TRX rows, 3x10  TRX rows, 3x10   Lat pulldowns     [id number] lbs    [id number] lbs   Shoulder taps            Knee pull glute stretch            scap retract with ER Bird dogs            ER iso to punch, standing            Sit to stand            Overhead STS 3x10x8 lbs     (regular STS) [id number] lbs      Farmer's carries    4 laps, 25 lbs and 30 lbs        Modified push ups            Sidestepping and monster walks 2x40 feet, purple, 10 lbs at chest 2x40 feet, purple, 10 lbs at chest 2x40 feet, purple, 10 lbs at chest   2x40 feet, purple, 10 lbs at chest 2x40 feet, purple, 15 lbs at chest 2x40 feet, purple, 15 lbs at chest 2x40 feet, purple, 15 lbs at chest   Hip hinge            scaption            Shoulder T's            D2 shoulder flexion, standing   Bilateral standing Y's, 3x10, yellow band         Pallof            Rack pulls            Resisted backward walking  x10x30 lbs  x10x33 lbs x10x30 lbs (lateral today) 1x5 each, 17 lbs x10x30 lbs backward, 1x5 each 17 lbs lateral  1x5 each 20 lbs lateral x10x30 lbs backward, 1x5 each 20 lbs lateral   Seated glute stretch            Step ups    2x10 each leg, 12 inch        Bent over rows [id number] lbs each hand  0o09l72 lbs, bar         Bicep curls            Shoulder flexion with ER iso X30, yellow           Overhead press [id number] lbs 3x5x15 lbs 3x5x20 lbs   3x5x20 lbs  3x5x20 lbs 4x5x10 lbs DB   Deadlift 2 warm up sets, 2x5x64 lbs 2 warm up sets, 2x5x64 lbs 2 warm up sets, 3x5x74         Lat activation deadlift drill  x30 2x10         Ring stretch  x30 x20  x20 x20 to snow leonor  x20 to snow leonor x20 to snow leonor   Back squat    4x5x5 lbs 3x5x5 lbs  3x5x20 lbs, bench for target 3x5x20 lbs, bench for target 3x5x 10 lbs DB, bench for target   Prying goblet squat (BW only)    x2 min x2 min (bar assisted today)       Back rack stretch    2 min        Rocking hip stretch    10x10 sec 10x10 sec  10x10 sec 10x10 sec    Hip Add isometric     10x10 sec       Hip adductor stretch     10x10 sec       Wall sit with ADD iso squeeze      10x10 sec            THERAPEUTIC ACTIVITY: ( 0 minutes):  Activities per gid below to improve functional movement related mobility, strength and balance to improve neuro-muscular carryover to daily functional activities for improving patient's quality of life. Required visual, verbal and manual cues to promote proper body alignment and promote proper body posture/mechanics. Progressed resistance and complexity of movement as indicated. Date:   Date:   Date:     Activity/Exercise Parameters Parameters Parameters                                                   MANUAL THERAPY: (0 minutes): Joint mobilization, Soft tissue mobilization was utilized and necessary because of the patient's restricted joint motion and restricted motion of soft tissue mobility. Date  1/19/2022    Technique Used Grade  Level # Time(s) Effect while being performed                                                                 HEP Log Date   Push ups, shoulder taps, ER iso punches 11/29   2.     3.    4.    5.           [employer] Portal  Treatment/Session Summary:    Response to Treatment: Continued focus on core compound lifts with good tolerance. Spoke with patient about today being last scheduled visit and option of discharging today or scheduling one more visit with treating therapist.    Communication/Consultation:  Reviewed correct posture during activity, scheduled one more visit for discharge   Equipment provided today:    Recommendations/Intent for next treatment session:   Next visit will focus on Core Stability Pain Science Education Quad strengthening Hip strengthening RTC strengthening,        Treatment Plan of Care Effective Dates: 11/8/2021 TO 1/7/2022 (60 days).   Frequency/Duration: 2 times a week for 60 Days             Total Treatment Billable Duration:  43 min Rx  PT Patient Time In/Time Out  Time In: 0730  Time Out: [address], PT    Future Appointments   Date Time Provider Jorje Moulton   1/20/2022  8:00 AM Malvasio, Shagufta, PA SSA POAI POA   1/20/2022  1:00 PM SFE [facility] E9 ROOM 2 SFERUS SFE   1/21/2022  7:45 AM Ana Beatty, NP POAG POA   1/24/2022 11:30 AM Bebeto Worthington PA-C SSA MAT [facility]   1/26/2022  8:45 AM SFD MRI UNIT 1 SFDRMRI SFD   1/27/2022 10:00 AM Janneth Nation MD POAG POA   2/2/2022 11:00 AM Taylor Angulo MD BSNE BSNE   2/15/2022  3:00 PM Storm Wilkins MD [facility]   4/18/2022  9:15 AM Edita Bui DO [facility] B[facility]   4/28/2022  4:00 PM Storm Wilkins MD [facility]

## 2022-01-20 ENCOUNTER — HOSPITAL ENCOUNTER (OUTPATIENT)
Dept: ULTRASOUND IMAGING | Age: 52
Discharge: HOME | End: 2022-01-20
Attending: OBSTETRICS & GYNECOLOGY
Payer: COMMERCIAL

## 2022-01-20 ENCOUNTER — HOSPITAL ENCOUNTER (OUTPATIENT)
Dept: PHYSICAL THERAPY | Age: 52
Discharge: HOME | End: 2022-01-20
Payer: COMMERCIAL

## 2022-01-20 DIAGNOSIS — D25.9 UTERINE LEIOMYOMA, UNSPECIFIED LOCATION: ICD-10-CM

## 2022-01-20 PROCEDURE — 97110 THERAPEUTIC EXERCISES: CPT

## 2022-01-20 PROCEDURE — 76830 TRANSVAGINAL US NON-OB: CPT

## 2022-01-20 NOTE — PROGRESS NOTES
Ngozi Fisher  : 1970  Payor: BLUE CROSS / Plan: [facility] / Product Type: PPO /  Lerna Gonzalez at [address], [id number] [place] [address] Road  [phone]   [phone]                                                          Hussain Aly MD      OUTPATIENT PHYSICAL THERAPY: Daily Treatment Note 2022 Visit Count:  21    Tx Diagnosis:  Cervicalgia (M54.2)  Pain in thoracic spine (M54.6)  Pain in Left Shoulder (M25.512)  Low back pain (M54.5)  Muscle spasm of back (M62.830)      Pre-treatment Symptoms/Complaints: See Discharge Summary dated 22 for details   Pain: Initial:8/10  Medications Last Reviewed:  2022     Post Session: 8/10   Updated Objective Findings: See Discharge Summary dated 22 for details        TREATMENT:   THERAPEUTIC EXERCISE: (44 minutes):  Exercises per grid below to improve mobility, strength and balance. Required minimal visual, verbal and manual cues to promote proper body alignment and promote proper body posture. Progressed resistance and complexity of movement as indicated.      Date  21 Date  21 Date  21 Date  1/3/22 Date  1/5/22 1/10/22 Date  22 Date  22 Date  22   Activity/Exercise            Education    Discussed performing breathing exercises in between stressful phone calls at work modification of activity to reduce active hip flexion, gradual return to usual activities, ice if needed    Updated measurements   Open books            Lower trunk rotation            Cat-camel            myra pose            Treadmill 10 min 10 min 10 min 10 min 10 min 10 min 10 min 10 min 10 min   L stretch        [id number]    Standing pec stretch            bridges            Rows      TRX rows, 3x10  TRX rows, 3x10 TRX rows, 3x10, Y's and T's also   Lat pulldowns    [id number] lbs    [id number] lbs    Shoulder taps            Knee pull glute stretch scap retract with ER            Bird dogs            ER iso to punch, standing            Sit to stand            Overhead STS     (regular STS) [id number] lbs       Farmer's carries   4 laps, 25 lbs and 30 lbs         Modified push ups            Sidestepping and monster walks 2x40 feet, purple, 10 lbs at chest 2x40 feet, purple, 10 lbs at chest   2x40 feet, purple, 10 lbs at chest 2x40 feet, purple, 15 lbs at chest 2x40 feet, purple, 15 lbs at chest 2x40 feet, purple, 15 lbs at chest 2x40 feet, purple, 15 lbs at chest   Hip hinge            scaption            Shoulder T's            D2 shoulder flexion, standing  Bilateral standing Y's, 3x10, yellow band          Pallof            Rack pulls            Resisted backward walking x10x30 lbs  x10x33 lbs x10x30 lbs (lateral today) 1x5 each, 17 lbs x10x30 lbs backward, 1x5 each 17 lbs lateral  1x5 each 20 lbs lateral x10x30 lbs backward, 1x5 each 20 lbs lateral    Seated glute stretch            Step ups   2x10 each leg, 12 inch         Bent over rows  [id number] lbs, bar          Bicep curls            Shoulder flexion with ER iso            Overhead press 3x5x15 lbs 3x5x20 lbs   3x5x20 lbs  3x5x20 lbs 4x5x10 lbs DB    Deadlift 2 warm up sets, 2x5x64 lbs 2 warm up sets, 3x5x74          Lat activation deadlift drill x30 2x10          Ring stretch x30 x20  x20 x20 to snow leonor  x20 to snow leonor x20 to snow leonor x20 to snow leonor   Back squat   4x5x5 lbs 3x5x5 lbs  3x5x20 lbs, bench for target 3x5x20 lbs, bench for target 3x5x 10 lbs DB, bench for target    Prying goblet squat (BW only)   x2 min x2 min (bar assisted today)        Back rack stretch   2 min         Rocking hip stretch   10x10 sec 10x10 sec  10x10 sec 10x10 sec     Hip Add isometric    10x10 sec        Hip adductor stretch    10x10 sec        Wall sit with ADD iso squeeze     10x10 sec             THERAPEUTIC ACTIVITY: ( 0 minutes):  Activities per gid below to improve functional movement related mobility, strength and balance to improve neuro-muscular carryover to daily functional activities for improving patient's quality of life. Required visual, verbal and manual cues to promote proper body alignment and promote proper body posture/mechanics. Progressed resistance and complexity of movement as indicated. Date:   Date:   Date:     Activity/Exercise Parameters Parameters Parameters                                                   MANUAL THERAPY: (0 minutes): Joint mobilization, Soft tissue mobilization was utilized and necessary because of the patient's restricted joint motion and restricted motion of soft tissue mobility. Date  1/20/2022    Technique Used Grade  Level # Time(s) Effect while being performed                                                                 HEP Log Date   Push ups, shoulder taps, ER iso punches 11/29   2.     3.    4.    5.           [employer] Portal  Treatment/Session Summary:    Response to Treatment: See Discharge Summary dated 1/20/22 for details   Communication/Consultation:  See Discharge Summary dated 1/20/22 for details   Equipment provided today: See Discharge Summary dated 1/20/22 for details   Recommendations/Intent for next treatment session:   See Discharge Summary dated 1/20/22 for details       Treatment Plan of Care Effective Dates: 11/8/2021 TO 1/7/2022 (60 days).   Frequency/Duration: 2 times a week for 60 Days             Total Treatment Billable Duration:  44 min Rx  PT Patient Time In/Time Out  Time In: 1346  Time Out: [place], PT    Future Appointments   Date Time Provider Jorje Moulton   1/21/2022  7:45 AM Trinity Beatty NP POAG POA   1/24/2022  7:30 AM Pauline Day, LILLI [facility]   1/24/2022 11:30 AM Felicia Orellana PA-C [facility]   1/26/2022  8:45 AM SFD MRI UNIT 1 SFDRMRI SFD   1/26/2022 10:00 AM Linnea Lewis NP POAG POA   1/27/2022 10:00 AM Yenny Simpson MD POAG POA   1/29/2022  9:00 AM SFE MRI UNIT 1 SFERMRI SFE   2/2/2022 11:00 AM Malina Curry MD BSNE BSNE   2/15/2022  3:00 PM Gaurang Spivey MD [facility]   3/15/2022  1:30 PM ANNY Morgan   4/18/2022  9:15 AM Keshia Ash DO B[facility]G BSUG   4/28/2022  4:00 PM Gaurang Spivey MD [facility]
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
## 2022-01-24 ENCOUNTER — APPOINTMENT (OUTPATIENT)
Dept: PHYSICAL THERAPY | Age: 52
End: 2022-01-24
Payer: COMMERCIAL

## 2022-01-26 ENCOUNTER — HOSPITAL ENCOUNTER (OUTPATIENT)
Dept: MRI IMAGING | Age: 52
Discharge: HOME | End: 2022-01-26
Attending: NURSE PRACTITIONER
Payer: COMMERCIAL

## 2022-01-26 DIAGNOSIS — G44.021 INTRACTABLE CHRONIC CLUSTER HEADACHE: ICD-10-CM

## 2022-01-26 PROCEDURE — A9576 INJ PROHANCE MULTIPACK: HCPCS | Performed by: NURSE PRACTITIONER

## 2022-01-26 PROCEDURE — 70553 MRI BRAIN STEM W/O & W/DYE: CPT

## 2022-01-26 PROCEDURE — 74011250636 HC RX REV CODE- 250/636: Performed by: NURSE PRACTITIONER

## 2022-01-26 RX ORDER — SODIUM CHLORIDE 0.9 % (FLUSH) 0.9 %
10 SYRINGE (ML) INJECTION
Status: COMPLETED | OUTPATIENT
Start: 2022-01-26 | End: 2022-01-26

## 2022-01-26 RX ADMIN — Medication 10 ML: at 08:54

## 2022-01-26 RX ADMIN — GADOTERIDOL 15 ML: 279.3 INJECTION, SOLUTION INTRAVENOUS at 08:54

## 2022-01-26 NOTE — PROGRESS NOTES
Please reassure patient that her MRI was negative. Follow with neurology for management of uncontrolled headaches as planned.

## 2022-01-29 ENCOUNTER — HOSPITAL ENCOUNTER (OUTPATIENT)
Dept: MRI IMAGING | Age: 52
Discharge: HOME | End: 2022-01-29
Attending: NURSE PRACTITIONER
Payer: COMMERCIAL

## 2022-01-29 DIAGNOSIS — M79.672 LEFT FOOT PAIN: ICD-10-CM

## 2022-01-29 PROCEDURE — 73718 MRI LOWER EXTREMITY W/O DYE: CPT

## 2022-02-01 ENCOUNTER — HOSPITAL ENCOUNTER (OUTPATIENT)
Dept: MRI IMAGING | Age: 52
Discharge: HOME | End: 2022-02-01
Attending: PHYSICIAN ASSISTANT
Payer: COMMERCIAL

## 2022-02-01 DIAGNOSIS — M54.16 LUMBAR BACK PAIN WITH RADICULOPATHY AFFECTING RIGHT LOWER EXTREMITY: ICD-10-CM

## 2022-02-01 PROCEDURE — 72148 MRI LUMBAR SPINE W/O DYE: CPT

## 2022-02-01 NOTE — PROGRESS NOTES
Lumbar spine is completely normal!  They did find evidence of fibroids on uterus though which is a benign issue but just something to be aware that she has.

## 2022-02-07 ENCOUNTER — HOSPITAL ENCOUNTER (OUTPATIENT)
Dept: PHYSICAL THERAPY | Age: 52
Discharge: HOME | End: 2022-02-07
Payer: COMMERCIAL

## 2022-02-07 DIAGNOSIS — M79.671 RIGHT FOOT PAIN: ICD-10-CM

## 2022-02-07 DIAGNOSIS — M79.672 LEFT FOOT PAIN: ICD-10-CM

## 2022-02-07 PROCEDURE — 97110 THERAPEUTIC EXERCISES: CPT

## 2022-02-07 PROCEDURE — 97162 PT EVAL MOD COMPLEX 30 MIN: CPT

## 2022-02-07 NOTE — THERAPY EVALUATION
Marcela Aguiar  : 1970      Payor: BLUE CR[place] / [place] [facility] / Product Type: O /  [address] [facility] at [address] [place] [address]. [facility], [address], [facility], [address] [place] [address]  [phone]   [phone]        OUTPATIENT PHYSICAL THERAPY:Initial Assessment:  2022    ICD-10: Treatment Diagnosis:  Right foot pain [M79.671]  Left foot pain [M79.672]     Precautions/Allergies:   Sulfa (sulfonamide antibiotics)   Fall Risk Score: 0 (? 5 = High Risk)  MD Orders: Eval and Treat MEDICAL/REFERRING DIAGNOSIS:  Right foot pain [M79.671]  Left foot pain [M79.672]   DATE OF ONSET: >3 months  REFERRING PHYSICIAN: Eliz Brush MD  RETURN PHYSICIAN APPOINTMENT: TBD by patient     INITIAL ASSESSMENT:   Ms. Cammy Partida presents to physical therapy with decreased strength, ROM, joint mobility, functional mobility. These S/S are consistent with referring diagnosis. Patient will benefit from skilled physical therapy for manual therapeutic techniques (as appropriate), therapeutic exercises and activities, neuromuscular re-education, balance training and comprehensive home exercises program to address current impairments and functional limitations. Marcela Aguiar will benefit from skilled PT (medically necessary) in order to address above deficits affecting participation in basic ADLs and overall functional tolerance. PROBLEM LIST (Impacting functional limitations):   1. Decreased Strength  2. Decreased ADL/Functional Activities  3. Decreased Ambulation Ability/Technique  4. Decreased Balance  5. Increased Pain  6. Decreased Activity Tolerance  7. Decreased [place] with Home Exercise Program INTERVENTIONS PLANNED:  1. Balance Exercise  2. Bed Mobility  3. Cold  4. Cryotherapy  5. Family Education  6. Gait Training  7. Heat  8. Home Exercise Program (HEP)  9. Manual Therapy  10. Neuromuscular Re-education/Strengthening  11.  Range of Motion (ROM)  12. Therapeutic Activites  13. Therapeutic Exercise/Strengthening  14. Transfer Training  15. Ultrasound (US)   TREATMENT PLAN:  Effective Dates: 2/7/2022 TO 4/8/2022 (60 days). Frequency/Duration: 1 time a week for 60 Day   Short-Term Goals~4 weeks  Goal Met   1. Jana Sinks will be compliant with home exercise program within 4 weeks in order to improve active participation with management of patient's symptoms and/or functional deficits. 1.  [] Date:   2. Jana Sinks will report <=3/10 pain with walking >15 minutes in order to participate in daily exercise and daily activities 2. [] Date:   3. Pt will improve FAAM ADL subscore by 6 points to demonstrate improved functional ability 3. [] Date:   4.  4.  [] Date:   5.  5.  [] Date:   6.  6.  [] Date:              Long Term Goals~8 weeks Goal Met   1. Jana Sinks will be independent with home exercise program within 8 weeks in order to improve independence with management of patient's symptoms and/or functional deficits. 1.  [] Date:   2. Pt will improve FAAM ADL subscore by at least 12 points to demonstrate improved functional ability 2. [] Date:   3. Pt will be able to walks at least 30 minutes with no more than 1/10 pain in her L foot to demonstrate improved functional ability. 3.  [] Date:   4.  4.  [] Date:   5.  5.  [] Date:       Rehabilitation Potential For Stated Goals: Fair  Regarding Nivia Gannon's therapy, I certify that the treatment plan above will be carried out by a therapist or under their direction. Thank you for this referral,  Herlinda Dia, PT     Referring Physician Signature: Natalie Maria MD                        HISTORY:   History of Present Injury/Illness (Reason for Referral):  Pt reports insidious onset of L dorsal foot pain. Her R foot hurts as well but she knows it's due to arthritis, and she's primarily concerned about her L foot.  She states walking increases sensitivity of her foot and she notes that her L foot cramps every so often at night. Pt also reports increased pain when jogging up the stairs when she goes in to work. Pt states the pain does not keep her from walking any distance and it does not interrupt her work, since she's mostly working from home. Pt was given exercises for her feet by this therapist but she reports not focusing on them since her back is more bothersome. Pt denies numbness/tingling to feet. -Present symptoms/complaints (on day of evaluation)   Pain Scale:  · Worst: 7/10     Aggravating factors:  Walking and stairs    Relieving factors: none   Irritability: Medium (Onset of pain is equal to alleviation)      Past Medical History/Comorbidities:   Ms. Silva Coello  has a past medical history of Anxiety, DDD (degenerative disc disease), cervical (11/29/2021), Depressed, GERD (gastroesophageal reflux disease), Hypertension, IBS (irritable bowel syndrome), Insomnia, and TMJ (dislocation of temporomandibular joint) (04/2021). Ms. Silva Coello  has a past surgical history that includes pr egd deliver thermal energy sphnctr/cardia gerd; hx colonoscopy; hx tubal ligation; hx lipectomy; and hx cataract removal (Bilateral, 02/2020).     Active Ambulatory Problems     Diagnosis Date Noted    Chronic fatigue 04/06/2018    Chronic tension-type headache, intractable 11/02/2018    Early satiety 06/05/2017    Erosive gastritis 09/05/2017    Functional dyspepsia 06/05/2017    Hiatal hernia with GERD 09/05/2017    History of colonic polyps 06/05/2017    Irritable bowel syndrome with both constipation and diarrhea 06/05/2017    Left carpal tunnel syndrome 02/23/2018    Bipolar depression ([address] 09/20/2018    Primary insomnia 04/02/2019    Radiculopathy of cervical region 04/05/2019    Pharyngoesophageal dysphagia 05/02/2019    B12 deficiency 08/19/2019    Chronic pain syndrome 08/19/2019    Vitamin D deficiency 08/19/2019    Cervical radicular pain 08/19/2019    Numbness and tingling 09/23/2019    Left hand pain 09/23/2019    Anxiety 02/10/2021    Hypoglycemia 02/10/2021    Essential tremor 08/02/2021    Restless legs syndrome (RLS) 08/02/2021    Neuropathic pain 08/02/2021    Encounter for medication management 08/02/2021    Duodenitis 07/24/2019    GERD with esophagitis 07/24/2019    Lymphocytic colitis 02/02/2021    Mild episode of recurrent major depressive disorder ([address] 09/20/2018    Other constipation 04/22/2021    Nausea 02/02/2021    Schatzki's ring 07/24/2019    Terminal ileitis without complication ([facility] [address] 20/57/6367    Ganglion cyst 01/03/2022    De Quervain's tenosynovitis, right 01/03/2022    OAB (overactive bladder) 01/13/2022    High-tone pelvic floor dysfunction 01/13/2022    Uterine leiomyoma 01/13/2022     Resolved Ambulatory Problems     Diagnosis Date Noted    No Resolved Ambulatory Problems     Past Medical History:   Diagnosis Date    DDD (degenerative disc disease), cervical 11/29/2021    Depressed     GERD (gastroesophageal reflux disease)     Hypertension     IBS (irritable bowel syndrome)     Insomnia     TMJ (dislocation of temporomandibular joint) 04/2021     Social History/Living Environment:        Social History     Socioeconomic History    Marital status:      Spouse name: Not on file    Number of children: Not on file    Years of education: Not on file    Highest education level: Not on file   Occupational History    Not on file   Tobacco Use    Smoking status: Never Smoker    Smokeless tobacco: Never Used   Vaping Use    Vaping Use: Never used   Substance and Sexual Activity    Alcohol use: Never    Drug use: Never    Sexual activity: Not Currently   Other Topics Concern    Not on file   Social History Narrative    Not on file     Social Determinants of Health     Financial Resource Strain:     Difficulty of Paying Living Expenses: Not on file   Food Insecurity:     Worried About Running Out of Food in the Last Year: Not on file    Ran Out of Food in the Last Year: Not on file   Transportation Needs:     Lack of Transportation (Medical): Not on file    Lack of Transportation (Non-Medical): Not on file   Physical Activity:     Days of Exercise per Week: Not on file    Minutes of Exercise per Session: Not on file   Stress:     Feeling of Stress : Not on file   Social Connections:     Frequency of Communication with Friends and Family: Not on file    Frequency of Social Gatherings with Friends and Family: Not on file    Attends Cheondoism Services: Not on file    Active Member of [address] [place] [address] or Organizations: Not on file    Attends Club or Organization Meetings: Not on file    Marital Status: Not on file   Intimate Partner Violence:     Fear of Current or Ex-Partner: Not on file    Emotionally Abused: Not on file    Physically Abused: Not on file    Sexually Abused: Not on file   Housing Stability:     Unable to Pay for Housing in the Last Year: Not on file    Number of [place] in the Last Year: Not on file    Unstable Housing in the Last Year: Not on file     Prior Level of Function/Work/Activity:  Normal    Other Clinical Tests  MRI Negative for soft tissue injury and X-RAY Negative for significant degenerative changes, per pt  Previous Treatment Approach   Previous Physical Therapy   Current Medications:    Current Outpatient Medications:     diclofenac (VOLTAREN) 1 % gel, Apply 1 g to affected area as needed. , Disp: , Rfl:     topiramate (TOPAMAX) 25 mg tablet, Take 1 Tablet by mouth two (2) times a day. Indications: migraine prevention, Disp: 60 Tablet, Rfl: 1    pramipexole (Mirapex) 0.125 mg tablet, Take 2 Tablets by mouth nightly.  Take 1-2 tablets po q hs, Disp: 60 Tablet, Rfl: 5    lidocaine 5 % topical cream, Actual prescription: Lidocaine 5%: Neurontin/gabapentin 5% combined topical cream/gel  Apply to affected area up to 4 times a day as needed for pain, Disp: 45 g, Rfl: 2   tiZANidine (ZANAFLEX) 4 mg tablet, Take 4 mg by mouth three (3) times daily. , Disp: , Rfl:     amitriptyline (ELAVIL) 25 mg tablet, TAKE 1 TO 2 TABLETS BY MOUTH EVERY EVENING, Disp: , Rfl:     pregabalin (LYRICA) 150 mg capsule, TAKE 1 CAPSULE BY MOUTH THREE TIMES DAILY, Disp: , Rfl:     TURMERIC PO, Take 1 Tablet by mouth daily. , Disp: , Rfl:     naratriptan (AMERGE) 2.5 mg tab, , Disp: , Rfl:     clonazePAM (KlonoPIN) 0.5 mg tablet, Take 1 Tablet by mouth daily as needed for Anxiety. , Disp: 30 Tablet, Rfl: 3    Linzess 72 mcg cap capsule, , Disp: , Rfl:     naproxen (NAPROSYN) 500 mg tablet, Take 1 Tablet by mouth two (2) times daily (with meals). , Disp: 60 Tablet, Rfl: 1    hydroCHLOROthiazide (HYDRODIURIL) 25 mg tablet, Take 1 Tablet by mouth daily. , Disp: 90 Tablet, Rfl: 1    benzoyl peroxide 2.5 % topical gel, APPLY TOPICALLY TO FACE AT BEDTIME, Disp: , Rfl:     atomoxetine (Strattera) 80 mg capsule, Take 1 Capsule by mouth daily. , Disp: 90 Capsule, Rfl: 4    citalopram (CELEXA) 20 mg tablet, Take 1 Tablet by mouth every evening., Disp: 90 Tablet, Rfl: 1    traZODone (DESYREL) 50 mg tablet, Take 0.5-1 Tablets by mouth nightly., Disp: 90 Tablet, Rfl: 1    nystatin-triamcinolone (MYCOLOG II) topical cream, Apply  to affected area two (2) times a day., Disp: , Rfl:     vitamin e 600 unit capsule, Take  by mouth daily. , Disp: , Rfl:     norethindrone acetate (AYGESTIN) 5 mg tablet, , Disp: , Rfl:     cyanocobalamin 1,000 mcg tablet, Take 1 Tablet by mouth daily. , Disp: 90 Tablet, Rfl: 3    clindamycin (CLINDAGEL) 1 % topical gel, , Disp: , Rfl:     omeprazole (PRILOSEC) 20 mg capsule, Take 20 mg by mouth two (2) times a day., Disp: , Rfl:     cod liver oil oil, Take  by mouth., Disp: , Rfl:      Date Last Reviewed:  2/7/2022       Ambulatory/Rehab Services H2 Model Falls Risk Assessment    Risk Factors:       No Risk Factors Identified Ability to Rise from Chair:       (0)  Ability to rise in a single movement    Falls Prevention Plan:       No modifications necessary   Total: (5 or greater = High Risk): 0    ©2010 [facility] [employer]. All Rights Reserved. [facility] States Patent #[id number].  Federal Law prohibits the replication, distribution or use without written permission from [facility] [employer]        Number of Personal Factors/Comorbidities that affect the Plan of Care: 1-2: MODERATE COMPLEXITY   EXAMINATION:   Observation/Orthostatic Postural Assessment:  Assessed @ Initial Visit:  Decreased space between toes, mild hallux valgus bilaterally, gross swelling to B ankles      Gait:  normal    Palpation:  Assessed @ Initial Visit: Tenderness to dorsum of L foot to moderate pressure between first and second metatarsals    Girth Measurement:   Joint: Eval Date: 2/7/2022  Re-Assess Date:  Re-Assess Date:    Ankle Figure 8 Measurement RIGHT LEFT RIGHT LEFT RIGHT LEFT                     AROM/PROM         Joint: Eval Date: 2/7/2022  Re-Assess Date:  Re-Assess Date:    Active ROM RIGHT LEFT RIGHT LEFT RIGHT LEFT   Ankle Dorsiflexion knee straight -1 2           Dorsiflexion knee bent 14 15           Ankle Plantarflexion 37 36           Ankle Inversion 33 33           Ankle Eversion 27 14                                     Passive ROM              Ankle Dorsiflexion             Ankle Plantarflexion             Ankle Inversion             Ankle Eversion               Strength:     Eval Date:2/7/2022  Re-Assess Date:  Re-Assess Date:      RIGHT LEFT RIGHT LEFT RIGHT LEFT   Ankle Dorsiflexion 5/5 5/5           Ankle Plantarflexion /5 /5           Ankle Inversion 4+/5 4+/5           Ankle Eversion 4+/5 4+/5           Knee Flexion /5 /5           Knee Extension /5 /5                            Unable to fully assess joint mobility due to increased sensitivity and pain  Joint Mobility Eval Date: 2/7/2022  Re-Assess Date: Re-Assess Date:     RIGHT LEFT RIGHT LEFT RIGHT LEFT   Subtalar             Talocrual                                         Special Tests:   Talar Tilt Test: Negative    Neurological Screen: Assessed @ Initial Visit No radiating symptoms down leg    Functional Mobility:  Assessed @ Initial Visit Limited tolerance of walking and standing    Balance and Mobility:    Test Result   Timed up and Go    30 second Sit to Stand    6 Minute Walk Test    Single Leg Balance             Body Structures Involved:  1. Bones  2. Joints  3. Muscles  4. Ligaments Body Functions Affected:  1. Sensory/Pain  2. Neuromusculoskeletal  3. Movement Related Activities and Participation Affected:  1. Mobility  2. Self Care   Number of elements that affect the Plan of Care: 3: MODERATE COMPLEXITY   CLINICAL PRESENTATION:   Presentation: Evolving clinical presentation with changing clinical characteristics: MODERATE COMPLEXITY   CLINICAL DECISION MAKING:   Tool Used: FOOT AND ANKLE ABILITY MEASURE (FAAM)  Score:  Initial: 63/84 Most Recent: X (Date: -- )   Interpretation of Score: For the \"Activities of Daily Living\", there are 21 questions each scored on a 5 point scale with 0 representing \"Unable to do\" and 4 representing \"No difficulty\". The lower the score, the greater the functional disability. 84/84 represents no disability. Minimal detectable change is 5.7 points. With the addition of the 8 questions in the \"Sports Subscale,\" there are 29 questions, each scored on a 5 point scale with 0 representing \"Unable to do\" and 4 representing \"No difficulty\". The lower the score, the greater the functional disability. 116/116 represents no disability. Minimal detectable change is 12.3 points. Medical Necessity:   · Patient is expected to demonstrate progress in strength and range of motion to improve tolerance to prolonged standing and walking.     Reason for Services/Other Comments:  · Patient continues to require present interventions due to patient's inability to walk without pain. Use of outcome tool(s) and clinical judgement create a POC that gives a: Questionable prediction of patient's progress: MODERATE COMPLEXITY       See associated treatment note for treatment provided today.            Future Appointments   Date Time Provider Jorje Saige   2/10/2022  7:15 AM POD2B [facility]   2/11/2022  4:00 PM POD2B [facility]   2/12/2022  8:30 AM POD3C [facility]   2/15/2022  7:30 AM Marylin Verdugo, PT [facility]   2/15/2022  3:00 PM Angel Abernathy MD [facility]   2/22/2022  7:30 AM Sheri Fowler, PT [facility]   3/1/2022  7:30 AM Sheri Fowler, PT [facility]   3/4/2022  7:45 AM Lonnie Beatty, NP [facility]   3/8/2022  7:30 AM Sheri Fowler, PT [facility]   3/15/2022  7:30 AM Sheri Fowler, PT [facility]   3/15/2022  1:30 PM ANNY Jasmine SSA POAI POA   4/18/2022  9:15 AM Aidee Aguiar DO [facility]   4/28/2022  4:00 PM Angel Abernathy MD [facility]       Total Treatment Duration:  PT Patient Time In/Time Out  Time In: 1032  Time Out: [address], PT

## 2022-02-07 NOTE — PROGRESS NOTES
Annamaria Santos  : 1970  Payor[place] BLUE CR[place] / [place] [facility] / Product Type: PPO /  [address] [facility], [address], [address] [place] [address] Road  [phone]   [phone]                                                          Linda Salinas MD      OUTPATIENT PHYSICAL THERAPY: Daily Treatment Note 2022 Visit Count:  1    Tx Diagnosis:  Right foot pain [M79.671]  Left foot pain [M79.672]       Pre-treatment Symptoms/Complaints: See Initial Eval Dated 22 for more details. Pain: Initial:6/10  Medications Last Reviewed:  2022     Post Session: 5/10   Updated Objective Findings: See Initial Eval for more details. TREATMENT:   THERAPEUTIC EXERCISE: (15 minutes):  Exercises per grid below to improve mobility, strength and balance. Required minimal visual, verbal and manual cues to promote proper body alignment and promote proper body posture. Progressed resistance and complexity of movement as indicated. Date:  22 Date:   Date:     Activity/Exercise Parameters Parameters Parameters   Education HEP, POC, PT goals, anatomy/pathology, discussed shoes with wider toe box     Toe splaying PROM 2 min     Active toe splaying 2 min     Great toe flexion/ext stretching 2 min     gastroc stretching 30 sec                       THERAPEUTIC ACTIVITY: ( 0 minutes): Activities per gid below to improve functional movement related mobility, strength and balance to improve neuro-muscular carryover to daily functional activities for improving patient's quality of life. Required visual, verbal and manual cues to promote proper body alignment and promote proper body posture/mechanics. Progressed resistance and complexity of movement as indicated.      Date:   Date:   Date:     Activity/Exercise Parameters Parameters Parameters                                                   MANUAL THERAPY: (0 minutes): Joint mobilization, Soft tissue mobilization was utilized and necessary because of the patient's restricted joint motion and restricted motion of soft tissue mobility. Date  2/7/2022    Technique Used Grade  Level # Time(s) Effect while being performed                                                                 HEP Log Date       2.     3.    4.    5.           [place] [facility]  Treatment/Session Summary:    Response to Treatment: Pt demonstrated understanding of POC and initial HEP. No increase in pain or adverse reactions. Communication/Consultation:  POC, HEP, PT goals, Faxed initial evaluation to MD.   Equipment provided today: none   Recommendations/Intent for next treatment session:   Next visit will focus on foot and ankle strengthening. Treatment Plan of Care Effective Dates: 2/7/2022 TO 4/8/2022 (60 days).   Frequency/Duration: 1 time a week for 60 Days             Total Treatment Billable Duration:   15  Rx plus Eval   PT Patient Time In/Time Out  Time In: 9978  Time Out: [address], PT    Future Appointments   Date Time Provider Jorje Moulton   2/10/2022  7:15 AM POD2B [facility]   2/11/2022  4:00 PM POD2B [facility]   2/12/2022  8:30 AM POD3C [facility]   2/15/2022  7:30 AM Jemima Verdugo, PT [facility]   2/15/2022  3:00 PM Victorino Badillo MD [facility]   2/22/2022  7:30 AM Afshan Earing, PT [facility]   3/1/2022  7:30 AM Afshan Earing, PT [facility]   3/4/2022  7:45 AM Arslan Beatty, NP [facility]AG POA   3/8/2022  7:30 AM Afshan Earing, PT [facility]   3/15/2022  7:30 AM Afshan Earing, PT [facility]   3/15/2022  1:30 PM Zenon Rodarte PA SSA POAI POA   4/18/2022  9:15 AM Nini Bloom DO [facility]   4/28/2022  4:00 PM Victorino Badillo MD [facility]

## 2022-02-10 ENCOUNTER — HOSPITAL ENCOUNTER (OUTPATIENT)
Dept: INFUSION THERAPY | Age: 52
Discharge: HOME | End: 2022-02-10
Payer: COMMERCIAL

## 2022-02-10 VITALS
WEIGHT: 169 LBS | BODY MASS INDEX: 33.01 KG/M2 | SYSTOLIC BLOOD PRESSURE: 116 MMHG | DIASTOLIC BLOOD PRESSURE: 85 MMHG | OXYGEN SATURATION: 99 % | TEMPERATURE: 98.3 F | HEART RATE: 104 BPM | RESPIRATION RATE: 18 BRPM

## 2022-02-10 PROCEDURE — 74011000250 HC RX REV CODE- 250: Performed by: PSYCHIATRY & NEUROLOGY

## 2022-02-10 PROCEDURE — 96365 THER/PROPH/DIAG IV INF INIT: CPT

## 2022-02-10 PROCEDURE — 74011250636 HC RX REV CODE- 250/636: Performed by: PSYCHIATRY & NEUROLOGY

## 2022-02-10 PROCEDURE — 96375 TX/PRO/DX INJ NEW DRUG ADDON: CPT

## 2022-02-10 PROCEDURE — 74011000258 HC RX REV CODE- 258: Performed by: PSYCHIATRY & NEUROLOGY

## 2022-02-10 RX ORDER — DIPHENHYDRAMINE HYDROCHLORIDE 50 MG/ML
25 INJECTION, SOLUTION INTRAMUSCULAR; INTRAVENOUS ONCE
Status: COMPLETED | OUTPATIENT
Start: 2022-02-10 | End: 2022-02-10

## 2022-02-10 RX ORDER — SODIUM CHLORIDE 9 MG/ML
1000 INJECTION, SOLUTION INTRAVENOUS ONCE
Status: COMPLETED | OUTPATIENT
Start: 2022-02-10 | End: 2022-02-10

## 2022-02-10 RX ORDER — MAGNESIUM SULFATE HEPTAHYDRATE 40 MG/ML
2 INJECTION, SOLUTION INTRAVENOUS ONCE
Status: COMPLETED | OUTPATIENT
Start: 2022-02-10 | End: 2022-02-10

## 2022-02-10 RX ORDER — KETOROLAC TROMETHAMINE 30 MG/ML
30 INJECTION, SOLUTION INTRAMUSCULAR; INTRAVENOUS ONCE
Status: COMPLETED | OUTPATIENT
Start: 2022-02-10 | End: 2022-02-10

## 2022-02-10 RX ADMIN — MAGNESIUM SULFATE HEPTAHYDRATE 2 G: 40 INJECTION, SOLUTION INTRAVENOUS at 08:34

## 2022-02-10 RX ADMIN — SODIUM CHLORIDE 1000 ML: 9 INJECTION, SOLUTION INTRAVENOUS at 07:32

## 2022-02-10 RX ADMIN — DIPHENHYDRAMINE HYDROCHLORIDE 25 MG: 50 INJECTION, SOLUTION INTRAMUSCULAR; INTRAVENOUS at 07:51

## 2022-02-10 RX ADMIN — SODIUM CHLORIDE 500 MG: 9 INJECTION, SOLUTION INTRAVENOUS at 09:45

## 2022-02-10 RX ADMIN — KETOROLAC TROMETHAMINE 30 MG: 30 INJECTION, SOLUTION INTRAMUSCULAR at 08:01

## 2022-02-10 RX ADMIN — FAMOTIDINE 20 MG: 10 INJECTION, SOLUTION INTRAVENOUS at 07:53

## 2022-02-10 NOTE — PROGRESS NOTES
Patient arrived to [facility]. Migraine protocol completed. Patient tolerated well. Any issues or concerns during appointment: None  Patient aware of next Infusion appointment on 2/11 (date) at 0 (time). Discharged ambulatory in stable condition in stable condition.

## 2022-02-11 ENCOUNTER — HOSPITAL ENCOUNTER (OUTPATIENT)
Dept: INFUSION THERAPY | Age: 52
Discharge: HOME | End: 2022-02-11
Payer: COMMERCIAL

## 2022-02-11 VITALS
HEART RATE: 100 BPM | DIASTOLIC BLOOD PRESSURE: 74 MMHG | OXYGEN SATURATION: 99 % | TEMPERATURE: 98.4 F | SYSTOLIC BLOOD PRESSURE: 129 MMHG | RESPIRATION RATE: 16 BRPM

## 2022-02-11 PROCEDURE — 96365 THER/PROPH/DIAG IV INF INIT: CPT

## 2022-02-11 PROCEDURE — 96375 TX/PRO/DX INJ NEW DRUG ADDON: CPT

## 2022-02-11 PROCEDURE — 74011000250 HC RX REV CODE- 250: Performed by: PSYCHIATRY & NEUROLOGY

## 2022-02-11 PROCEDURE — 74011250636 HC RX REV CODE- 250/636: Performed by: PSYCHIATRY & NEUROLOGY

## 2022-02-11 PROCEDURE — 96367 TX/PROPH/DG ADDL SEQ IV INF: CPT

## 2022-02-11 PROCEDURE — 74011000258 HC RX REV CODE- 258: Performed by: PSYCHIATRY & NEUROLOGY

## 2022-02-11 RX ORDER — DIPHENHYDRAMINE HYDROCHLORIDE 50 MG/ML
25 INJECTION, SOLUTION INTRAMUSCULAR; INTRAVENOUS ONCE
Status: COMPLETED | OUTPATIENT
Start: 2022-02-11 | End: 2022-02-11

## 2022-02-11 RX ORDER — MAGNESIUM SULFATE HEPTAHYDRATE 40 MG/ML
2 INJECTION, SOLUTION INTRAVENOUS ONCE
Status: COMPLETED | OUTPATIENT
Start: 2022-02-11 | End: 2022-02-11

## 2022-02-11 RX ORDER — SODIUM CHLORIDE 9 MG/ML
1000 INJECTION, SOLUTION INTRAVENOUS ONCE
Status: COMPLETED | OUTPATIENT
Start: 2022-02-11 | End: 2022-02-11

## 2022-02-11 RX ORDER — SODIUM CHLORIDE 0.9 % (FLUSH) 0.9 %
10 SYRINGE (ML) INJECTION AS NEEDED
Status: DISCONTINUED | OUTPATIENT
Start: 2022-02-11 | End: 2022-02-13 | Stop reason: HOSPADM

## 2022-02-11 RX ORDER — KETOROLAC TROMETHAMINE 30 MG/ML
30 INJECTION, SOLUTION INTRAMUSCULAR; INTRAVENOUS ONCE
Status: COMPLETED | OUTPATIENT
Start: 2022-02-11 | End: 2022-02-11

## 2022-02-11 RX ADMIN — SODIUM CHLORIDE 1000 ML: 9 INJECTION, SOLUTION INTRAVENOUS at 16:25

## 2022-02-11 RX ADMIN — FAMOTIDINE 20 MG: 10 INJECTION, SOLUTION INTRAVENOUS at 16:20

## 2022-02-11 RX ADMIN — Medication 10 ML: at 16:05

## 2022-02-11 RX ADMIN — KETOROLAC TROMETHAMINE 30 MG: 30 INJECTION, SOLUTION INTRAMUSCULAR at 16:22

## 2022-02-11 RX ADMIN — MAGNESIUM SULFATE HEPTAHYDRATE 2 G: 40 INJECTION, SOLUTION INTRAVENOUS at 17:10

## 2022-02-11 RX ADMIN — DIPHENHYDRAMINE HYDROCHLORIDE 25 MG: 50 INJECTION, SOLUTION INTRAMUSCULAR; INTRAVENOUS at 16:18

## 2022-02-11 RX ADMIN — SODIUM CHLORIDE 500 MG: 9 INJECTION, SOLUTION INTRAVENOUS at 16:30

## 2022-02-11 NOTE — PROGRESS NOTES
Arrived to the [facility]. Day 2 Migraine protocol completed. Patient tolerated well. Any issues or concerns during appointment: none. Patient aware of next infusion appointment on 2/12 at 8:30am.  Discharged ambulatory to home, PIV left in place for tomorrow.

## 2022-02-12 ENCOUNTER — HOSPITAL ENCOUNTER (OUTPATIENT)
Dept: INFUSION THERAPY | Age: 52
Discharge: HOME | End: 2022-02-12
Payer: COMMERCIAL

## 2022-02-12 VITALS
DIASTOLIC BLOOD PRESSURE: 86 MMHG | OXYGEN SATURATION: 98 % | RESPIRATION RATE: 18 BRPM | SYSTOLIC BLOOD PRESSURE: 141 MMHG | HEART RATE: 96 BPM | TEMPERATURE: 98.8 F

## 2022-02-12 PROCEDURE — 96375 TX/PRO/DX INJ NEW DRUG ADDON: CPT

## 2022-02-12 PROCEDURE — 74011000250 HC RX REV CODE- 250: Performed by: PSYCHIATRY & NEUROLOGY

## 2022-02-12 PROCEDURE — 74011250636 HC RX REV CODE- 250/636: Performed by: PSYCHIATRY & NEUROLOGY

## 2022-02-12 PROCEDURE — 74011000258 HC RX REV CODE- 258: Performed by: PSYCHIATRY & NEUROLOGY

## 2022-02-12 PROCEDURE — 96365 THER/PROPH/DIAG IV INF INIT: CPT

## 2022-02-12 RX ORDER — DIPHENHYDRAMINE HYDROCHLORIDE 50 MG/ML
25 INJECTION, SOLUTION INTRAMUSCULAR; INTRAVENOUS EVERY 24 HOURS
Status: COMPLETED | OUTPATIENT
Start: 2022-02-12 | End: 2022-02-12

## 2022-02-12 RX ORDER — KETOROLAC TROMETHAMINE 30 MG/ML
30 INJECTION, SOLUTION INTRAMUSCULAR; INTRAVENOUS EVERY 24 HOURS
Status: COMPLETED | OUTPATIENT
Start: 2022-02-12 | End: 2022-02-12

## 2022-02-12 RX ORDER — MAGNESIUM SULFATE HEPTAHYDRATE 40 MG/ML
2 INJECTION, SOLUTION INTRAVENOUS EVERY 24 HOURS
Status: COMPLETED | OUTPATIENT
Start: 2022-02-12 | End: 2022-02-12

## 2022-02-12 RX ADMIN — DIPHENHYDRAMINE HYDROCHLORIDE 25 MG: 50 INJECTION, SOLUTION INTRAMUSCULAR; INTRAVENOUS at 09:33

## 2022-02-12 RX ADMIN — SODIUM CHLORIDE 500 MG: 9 INJECTION, SOLUTION INTRAVENOUS at 09:15

## 2022-02-12 RX ADMIN — SODIUM CHLORIDE 1000 ML: 900 INJECTION, SOLUTION INTRAVENOUS at 09:17

## 2022-02-12 RX ADMIN — KETOROLAC TROMETHAMINE 30 MG: 30 INJECTION, SOLUTION INTRAMUSCULAR at 09:39

## 2022-02-12 RX ADMIN — FAMOTIDINE 20 MG: 10 INJECTION, SOLUTION INTRAVENOUS at 09:37

## 2022-02-12 RX ADMIN — MAGNESIUM SULFATE HEPTAHYDRATE 2 G: 40 INJECTION, SOLUTION INTRAVENOUS at 09:41

## 2022-02-12 NOTE — PROGRESS NOTES
Arrived to the [facility]. Day 3 migraine protocol completed, 5 l O2 placed for 15 min prior to infusion. Patient tolerated well. Any issues or concerns during appointment: no.  Patient to follow up with Md pate. Patient instructed to call provider with temperature of 100.4 or greater or nausea/vomiting/ diarrhea or pain not controlled by medications  Discharged ambulating.

## 2022-02-15 ENCOUNTER — HOSPITAL ENCOUNTER (OUTPATIENT)
Dept: PHYSICAL THERAPY | Age: 52
Discharge: HOME | End: 2022-02-15
Payer: COMMERCIAL

## 2022-02-15 PROCEDURE — 97110 THERAPEUTIC EXERCISES: CPT

## 2022-02-15 NOTE — PROGRESS NOTES
Kareem Winston  : 1970  Payor: BLUE CROSS / Plan[place] [facility] / Product Type: PPO /  [address] at [address] [place]. [address] [place] [address], [facility], [address] [place] [address]  [phone]   [phone]                                                          Mary Ellen Herring MD      OUTPATIENT PHYSICAL THERAPY: Daily Treatment Note 2/15/2022 Visit Count:  2    Tx Diagnosis:  Right foot pain [M79.671]  Left foot pain [M79.672]       Pre-treatment Symptoms/Complaints: The pain on the dorsum of her foot has improved and it only mildly hurts. Feels like the pain has moved around and now it's more on the plantar aspect and is cramping. Feels like the exercises have been helpful. Pain: Initial:6/10  Medications Last Reviewed:  2/15/2022     Post Session: 5/10   Updated Objective Findings: See Initial Eval for more details. TREATMENT:   THERAPEUTIC EXERCISE: (30 minutes):  Exercises per grid below to improve mobility, strength and balance. Required minimal visual, verbal and manual cues to promote proper body alignment and promote proper body posture. Progressed resistance and complexity of movement as indicated. Date:  22 Date:  2/15/22 Date:     Activity/Exercise Parameters Parameters Parameters   Education HEP, POC, PT goals, anatomy/pathology, discussed shoes with wider toe box     Toe splaying PROM 2 min     Active toe splaying 2 min     Great toe flexion/ext stretching 2 min     gastroc stretching 30 sec 3x30 sec    Treadmill  8 min, incline 1.0    Arch lifts  X30, standing with red band    Calf raises  On incline board, 3x10    Post tib raises  3x10    Toe walking  2x40 feet                      THERAPEUTIC ACTIVITY: ( 0 minutes): Activities per gid below to improve functional movement related mobility, strength and balance to improve neuro-muscular carryover to daily functional activities for improving patient's quality of life.  Required visual, verbal and manual cues to promote proper body alignment and promote proper body posture/mechanics. Progressed resistance and complexity of movement as indicated. Date:   Date:   Date:     Activity/Exercise Parameters Parameters Parameters                                                   MANUAL THERAPY: (0 minutes): Joint mobilization, Soft tissue mobilization was utilized and necessary because of the patient's restricted joint motion and restricted motion of soft tissue mobility. Date  2/15/2022    Technique Used Grade  Level # Time(s) Effect while being performed                                                                 HEP Log Date       2.     3.    4.    5.           [employer] Portal  Treatment/Session Summary:    Response to Treatment: Shortened session per pt request to make another appointment. Increased volume of calf and foot intrinsic strengthening exercises to address pt complaints of cramping to the bottom of her foot. Good tolerance to all exercises aside from muscle fatigue. Communication/Consultation:  POC, HEP, PT goals, Faxed initial evaluation to MD.   Equipment provided today: none   Recommendations/Intent for next treatment session:   Next visit will focus on foot and ankle strengthening. Treatment Plan of Care Effective Dates: 2/15/2022 TO 4/8/2022 (60 days).   Frequency/Duration: 1 time a week for 60 Days             Total Treatment Billable Duration:   30  Rx  PT Patient Time In/Time Out  Time In: 0730  Time Out: 0800  Piero Guerrero PT    Future Appointments   Date Time Provider Jorje Moulton   2/15/2022  3:00 PM Nikki Marks MD [facility]   2/17/2022  8:45 AM Jorge Olson, PT, DPT [facility]   2/22/2022  7:30 AM Chasidy Elizabeth, PT [facility]   3/1/2022  7:30 AM Chasidy Elizabeth, PT [facility]   3/4/2022  7:45 AM Edgar Beatty, [facility]   3/8/2022  7:30 AM Chasidy Elizabeth, PT [facility]   3/15/2022  7:30 AM Chasidy Elizabeth, PT [facility]   3/15/2022  1:30 PM ANNY Jasmine SSA [facility]   4/18/2022  9:15 AM Aidee Aguiar DO [facility]   4/28/2022  4:00 PM Angel Abernathy MD [facility]

## 2022-02-17 ENCOUNTER — HOSPITAL ENCOUNTER (OUTPATIENT)
Dept: PHYSICAL THERAPY | Age: 52
Discharge: HOME | End: 2022-02-17
Attending: OBSTETRICS & GYNECOLOGY
Payer: COMMERCIAL

## 2022-02-17 DIAGNOSIS — N32.81 OAB (OVERACTIVE BLADDER): ICD-10-CM

## 2022-02-17 DIAGNOSIS — N94.89 HIGH-TONE PELVIC FLOOR DYSFUNCTION: ICD-10-CM

## 2022-02-17 PROCEDURE — 97161 PT EVAL LOW COMPLEX 20 MIN: CPT

## 2022-02-17 PROCEDURE — 97110 THERAPEUTIC EXERCISES: CPT

## 2022-02-17 NOTE — PROGRESS NOTES
Alex Nickerson  : 1970  Primary: Mary Rodriguez Of [facility] Walsh*  Secondary:  [facility]  [address] , [address], [place] [address]  [phone]   [phone]      OUTPATIENT PHYSICAL THERAPY: Daily Treatment Note 2022   Visit Count:  1  ICD-10: Treatment Diagnosis: Lack of coordination (muscle incoordination) (R27.8), Pelvic floor dysfunction in female (M62.98), Urge incontinence (N39.41) and Myalgia (M79.1)  Precautions/Allergies:   Sulfa (sulfonamide antibiotics)   TREATMENT PLAN:  Effective Dates: 2022 TO 2022 (90 days). Frequency/Duration: 1 time a week for 90 Day(s) MEDICAL/REFERRING DIAGNOSIS:  OAB (overactive bladder) [N32.81]  High-tone pelvic floor dysfunction [N94.89]   DATE OF ONSET: chronic  REFERRING PHYSICIAN: Linda Lakhani DO MD Orders: Evaluate and treat  Return MD Appointment: 22     Pre-treatment Symptoms/Complaints:  UUI, HTPFD  Pain: Initial: Pain Intensity 1: 0  Post Session:  0/10   Medications Last Reviewed:  2022  Updated Objective Findings:  See evaluation note from today   TREATMENT:   THERAPEUTIC EXERCISE: (10 minutes):  Exercises per grid below to improve mobility and coordination. Required moderate visual, verbal and tactile cues to promote proper body breathing techniques and voluntary relaxation of PFM. Progressed range and repetitions as indicated.   TE= therapeutic exercise, TA= therapeutic activity, MT= manual therapy, NE= neuro re-education   Date:  22 Date:   Date:     Activity/Exercise Parameters Parameters Parameters   PFM coordination PF drops to improve mm tension and coordinaiton     Diaphragmatic breathing To improve PFM coordination and excursion     HEP Drops x10 3-4x/day; DB 2x5min       THERAPEUTIC ACTIVITY: ( 10 minutes): Functional activity education regarding anatomy, pathology and role of pelvic floor muscle (PFM) function in relation to presenting symptoms and role of pelvic floor therapy in conservative treatment. TA Educational Topic Notes Date Completed   Pathology/Anatomy/PFM Function UUI 2/17/22   Bladder health education     Urinary urge suppression     The knack     Voiding strategies     Bowel/Bladder log     Bowel health education     Constipation care     Diarrhea/Fecal leakage     Colonic massage     Toilet positioning     Defecation dynamics     Sources of fiber     Return to intercourse/Dilator training     Sexual positioning     Lubricants/vaginal moisturizers     Vaginal irritants     Body mechanics     Posture/ergonomics     Diaphragmatic breathing     Resources and technology       Pt gives verbal consent to internal vaginal assessment/treatment without chaperon present. Treatment/Session Summary:    · Response to Treatment:  Pt reports good understanding of plan of care, as well as prescribed home exercise program.  All questions were answered to pt's satisfaction. Pt was invited to call with any further questions or concerns. · Communication/Consultation:  None today  · Equipment provided today:  None today  · Recommendations/Intent for next treatment session: Next visit will focus on down training, manual therapy, flexibility.   · Variation from POC: N/A  Total Treatment Billable Duration: Evaluation 30 minutes, treatment 20 minutes (10 minutes TE, 10 minutes TA)  PT Patient Time In/Time Out  Time In: 0830  Time Out: [address], PT, DPT     Future Appointments   Date Time Provider Jorje Ríosi   2/18/2022  4:00 PM Alka Bruce NP SSA [facility]   2/22/2022  7:30 AM Chasidy Elizabeth, PT [facility]   2/23/2022  8:00 AM Jorge Olson, PT, DPT [facility]   3/1/2022  7:30 AM Chasidy Elizabeth PT [facility]   3/2/2022  9:40 AM Edgar Beatty, [facility]   3/8/2022  7:30 AM Chasidy Elizabeth, PT [facility]   3/10/2022  8:00 AM Maxine Tony, PT, DPT [facility]   3/15/2022  7:30 AM Chasidy Elizabeth, PT [facility] 3/15/2022  1:30 PM Pete Rodarte PA [facility]   3/15/2022  3:00 PM Sourav Olson, PT, DPT [facility]   3/21/2022  8:00 AM Sourav Olson PT, DPT [facility]   3/28/2022  8:00 AM Durga Shepherd PT, DPT [facility]   4/18/2022  9:15 AM Maurice Corbett DO [facility]   4/28/2022  4:00 PM Azeem Ta MD [facility]   5/16/2022  4:00 PM Azeem Ta MD [facility]   6/15/2022 12:00 PM Daniel Johnson MD [facility]

## 2022-02-17 NOTE — THERAPY EVALUATION
Nelda Pace  : 1970  Primary: Regine Torres Of Sarah Walsh*  Secondary:  [facility]  [facility] [address] [place]  [phone]   [phone]       OUTPATIENT PHYSICAL THERAPY:Initial Assessment 2022   ICD-10: Treatment Diagnosis: Lack of coordination (muscle incoordination) (R27.8), Pelvic floor dysfunction in female (M62.98), Urge incontinence (N39.41) and Myalgia (M79.1)  Precautions/Allergies:   Sulfa (sulfonamide antibiotics)   TREATMENT PLAN:  Effective Dates: 2022 TO 2022 (90 days). Frequency/Duration: 1 time a week for 90 Day(s) MEDICAL/REFERRING DIAGNOSIS:  OAB (overactive bladder) [N32.81]  High-tone pelvic floor dysfunction [N94.89]   DATE OF ONSET: chronic  REFERRING PHYSICIAN: Herrera Partida DO MD Orders: Evaluate and treat  Return MD Appointment: 22     INITIAL ASSESSMENT: Nelda Pace presents with musculoskeletal limitations including pelvic floor muscle (PFM) tension, reduced PFM Range of Motion (ROM), increased tenderness to palpation of the PFM, reduced coordination and awareness of PFM, abdominal soft tissue restrictions and poor diaphragm excursion. These limitations are impairing the patient's ability to properly coordinate perineal elevation and descent for normalized PFM function, contributing to urinary dysfunction, including urge urinary incontinence. As a result, she is limited in her/his ability to participate in maintaining normal urge control in order to make it to the restroom without urinary incontinence. PROBLEM LIST (Impacting functional limitations):  1. Decreased ADL/Functional Activities  2. Increased Pain  3. Decreased Activity Tolerance  4. Decreased Flexibility/Joint Mobility  5. Decreased [place] with Home Exercise Program  6. Decreased timing, coordination and awareness  7.  Decreased strength of pelvic floor which limits bladder control INTERVENTIONS PLANNED: (Treatment may consist of any combination of the following)  1. Neuromuscular re-education  2. Biofeedback as needed  3. HEP  4. Bladder retraining  5. Bladder education  6. Home Exercise Program (HEP)  7. Manual Therapy  8. Neuromuscular Re-education/Strengthening  9. Therapeutic Activites  10. Therapeutic Exercise/Strengthening     GOALS: (Goals have been discussed and agreed upon with patient.)  Short-Term Functional Goals: Time Frame: 6 weeks  1. Pt will demonstrate I with basic PFM HEP to improve awareness, coordination, and timing of PFM. 2. Patient will demonstrate understanding of and ability to teach back appropriate water intake, bladder irritants, toileting frequency, and positioning for improved self-management of symptoms. 3. Patient will demonstrate appropriate use of the pelvic floor muscle group (quick flicks and/or drops), without compensation, to implement urge suppression appropriately with urgency of urination and decrease the number of pads per day or UI episodes by 50%. 4. Patient will demonstrate ability to perform diaphragmatic breathing in multiple positions to assist in pelvic floor tension reduction. Discharge Goals: Time Frame: 12 weeks  1. Patient will demonstrate independence with an advanced HEP for general conditioning, core stabilization, and mobility to facilitate carry over and independent management of symptoms. 2. Patient will be independent with implementation of a timed voiding schedule and use of urge suppression to reduce urinary frequency to 6-8/day. 3. Patient will improve outcome score by 12%.       OUTCOME MEASURE:   Tool Used: Pelvic Floor Impact Questionnaire--short form 7 (PFIQ-7)   Score:  Initial: 2/17/22  · Bladder or Urine: 23.81/100  · Bowel or Rectum: 19.05/100  · Vagina or Pelvis: 0/100 Most Recent: X (Date: -- )  · Bladder or Urine: X  · Bowel or Rectum: X  · Vagina or Pelvis: X   Interpretation of Score: Each of the 7 sections is scored on a scale from 0-3; 0 representing \"Not at all\", 3 representing \"Quite a bit\". The mean value is taken from all the answered items, then multiplied by 100 to obtain the scale score, ranging from 0-100. This process is repeated for each column representing bowel, bladder, and pelvic pain. MEDICAL NECESSITY:   · Patient is expected to demonstrate progress in strength, range of motion, coordination and functional technique to improve urinary control in order to minimize/eliminate pad usage. REASON FOR SERVICES/OTHER COMMENTS:  · Patient continues to require skilled intervention due to above mentioned deficits. Total Duration:  PT Patient Time In/Time Out  Time In: 0830  Time Out: 0925    Rehabilitation Potential For Stated Goals: Good  Regarding Nivia Gannon's therapy, I certify that the treatment plan above will be carried out by a therapist or under their direction. Thank you for this referral,  Thalia Luevano, PT, DPT     Referring Physician Signature: Librado Miller DO No Signature is Required for this note. PAIN/SUBJECTIVE:   Initial: Pain Intensity 1: 0  Post Session:  0/10   HISTORY:   History of Injury/Illness (Reason for Referral):  Ms. Esmer Hall is a 45 yo female referred to pelvic floor physical therapy (PFPT) by Librado Miller DO 2/2 OAB and high tone pelvic floor dysfunction. Pt reports that symptoms began a while ago, but has been worsening. Previous treatment includes nothing. Pt reports not being able to control urinary urge in order to make it to the restroom without leakage. This has been getting worse over time. Used to wear just a panty liner, not wearing a size 3 pad. Leakage is small and large amounts. This is very embarrassing for her. She denies urinary leakage with coughing, laughing, sneezing and movement. She does have a history of IBS-C. She takes Linzess for this. Her stool is much softer now. Has a history of pelvic pain 2/2 fibroid.  Taking Aygestin which has helped to resolve pain, but dealt with this for a long time. Concern about weight gain and wonders if this medication has contributed to this. Urinary: Frequency every 2 hours, 0 x/night. Positive for urge urinary incontinence and urinary frequency. Negative for stress urinary incontinence, urinary urgency, incomplete emptying, urinary hesitancy/intermittency, dysuria, hematuria, nocturia and nocturnal enuresis. Pt does use pads for urinary protection; 1 pads per day (PPD). Wearing size 3 pad. Fluid intake: 48-56 oz water/day; bladder irritants include: 8-12oz tea or soda. Pt does not limit fluid intake due to bladder control. Bowel: Frequency at least 1x/day. Taking Linzess Negative for pain with bowel movement, pushing/straining with bowel movement, fecal incontinence, constipation and incomplete emptying. Pt does not use pads for bowel protection; 0 pads per day (PPD). [place] stool type: 5 and 6. Use of stool softeners or laxatives? NO; Linzess (managed by GI)    Sexual: Pt is not sexually active. Pelvic Organ Prolapse/Pelvic Pain: History of abdominal pain due to fibroids. Taking Aygsetin which has resolved pain. Occasional LBP    OB History: Number of pregnancies: 2 Number of vaginal deliveries: 2 Number of c-sections: 0. Patient stated goals for PT:  Patients goal(s) for PT are to \"help control not making it to the restroom\". Past Medical History/Comorbidities:   Ms. Jazz Becerril  has a past medical history of Anxiety, DDD (degenerative disc disease), cervical (11/29/2021), Depressed, GERD (gastroesophageal reflux disease), Hypertension, IBS (irritable bowel syndrome), Insomnia, and TMJ (dislocation of temporomandibular joint) (04/2021). Ms. Jazz Becerril  has a past surgical history that includes pr egd deliver thermal energy sphnctr/cardia gerd; hx colonoscopy; hx tubal ligation; hx lipectomy; and hx cataract removal (Bilateral, 02/2020).     Social History/Living Environment:   Have you ever had any pelvic trauma (orthopedic in nature, fall, MVA, etc.)? NO   Have you ever experienced any unwanted physical or sexual contact? NO   Have you ever experienced any form of medical trauma (GYN, urological, GI, etc)? NO     Alcohol use? No  Tobacco use? No    Prior Level of Function/Work/Activity:  Prior level of function: independent; OA  Occupation:   Exercise activities: Yes        Risk Factors:       No Risk Factors Identified Ability to Rise from Chair:       (1)  Pushes up, successful in one attempt   Falls Prevention Plan:       No modifications necessary   Total: (5 or greater = High Risk): 1   ©2010 [facility] . [facility] States Patent #[id number]. Federal Law prohibits the replication, distribution or use without written permission from [facility]   Current Medications:       Current Outpatient Medications:     pantoprazole (PROTONIX) 40 mg tablet, Take 40 mg by mouth two (2) times a day., Disp: , Rfl:     diclofenac (VOLTAREN) 1 % gel, Apply 1 g to affected area as needed. , Disp: , Rfl:     topiramate (TOPAMAX) 25 mg tablet, Take 1 Tablet by mouth two (2) times a day. Indications: migraine prevention, Disp: 60 Tablet, Rfl: 1    pramipexole (Mirapex) 0.125 mg tablet, Take 2 Tablets by mouth nightly. Take 1-2 tablets po q hs, Disp: 60 Tablet, Rfl: 5    lidocaine 5 % topical cream, Actual prescription: Lidocaine 5%: Neurontin/gabapentin 5% combined topical cream/gel  Apply to affected area up to 4 times a day as needed for pain, Disp: 45 g, Rfl: 2    tiZANidine (ZANAFLEX) 4 mg tablet, Take 4 mg by mouth three (3) times daily. , Disp: , Rfl:     amitriptyline (ELAVIL) 25 mg tablet, TAKE 1 TO 2 TABLETS BY MOUTH EVERY EVENING, Disp: , Rfl:     pregabalin (LYRICA) 150 mg capsule, TAKE 1 CAPSULE BY MOUTH THREE TIMES DAILY, Disp: , Rfl:     TURMERIC PO, Take 1 Tablet by mouth daily. , Disp: , Rfl: (NOT TAKING PER PATIENT 2/17/22)    naratriptan (AMERGE) 2.5 mg tab, , Disp: , Rfl: (NOT TAKING PER PATIENT 2/17/22)    clonazePAM (KlonoPIN) 0.5 mg tablet, Take 1 Tablet by mouth daily as needed for Anxiety. , Disp: 30 Tablet, Rfl: 3    Linzess 72 mcg cap capsule, , Disp: , Rfl:     naproxen (NAPROSYN) 500 mg tablet, Take 1 Tablet by mouth two (2) times daily (with meals). (Patient not taking: Reported on 2/15/2022), Disp: 60 Tablet, Rfl: 1    hydroCHLOROthiazide (HYDRODIURIL) 25 mg tablet, Take 1 Tablet by mouth daily. , Disp: 90 Tablet, Rfl: 12    benzoyl peroxide 2.5 % topical gel, APPLY TOPICALLY TO FACE AT BEDTIME, Disp: , Rfl:     atomoxetine (Strattera) 80 mg capsule, Take 1 Capsule by mouth daily. , Disp: 90 Capsule, Rfl: 4    citalopram (CELEXA) 20 mg tablet, Take 1 Tablet by mouth every evening., Disp: 90 Tablet, Rfl: 1    traZODone (DESYREL) 50 mg tablet, Take 0.5-1 Tablets by mouth nightly., Disp: 90 Tablet, Rfl: 1    nystatin-triamcinolone (MYCOLOG II) topical cream, Apply  to affected area two (2) times a day., Disp: , Rfl:     vitamin e 600 unit capsule, Take  by mouth daily. , Disp: , Rfl:     norethindrone acetate (AYGESTIN) 5 mg tablet, , Disp: , Rfl:     cyanocobalamin 1,000 mcg tablet, Take 1 Tablet by mouth daily. , Disp: 90 Tablet, Rfl: 3    clindamycin (CLINDAGEL) 1 % topical gel, , Disp: , Rfl:     omeprazole (PRILOSEC) 20 mg capsule, Take 20 mg by mouth two (2) times a day.  (Patient not taking: Reported on 2/15/2022), Disp: , Rfl:     cod liver oil oil, Take  by mouth., Disp: , Rfl:    Date Last Reviewed: 2/17/2022   Number of Personal Factors/Comorbidities that affect the Plan of Care: 0: LOW COMPLEXITY   EXAMINATION:   External Observations:   Voluntary contraction: [] absent     [x] present   Involuntary contraction: [x] absent     [] present   Involuntary relaxation: [] absent     [x] present   Perineal descent at rest: [x] absent     [] present   Perineal descent with bearing: [x] absent     [] present   Skin integrity: WNL    Pelvic Floor Muscle (PFM) Assessment:   Vaginal vault size: [] decreased     [] increased     [x] WNL   Muscle volume: [] decreased     [x] WNL     [] Defect   PFM tension: [] decreased     [x] increased     [] WNL    Contraction ability:  Voluntary contraction: [] absent     [x] weak     [] moderate      [] strong  Voluntary relaxation: [] absent     [x] partial     [] complete   MMT: 1/5   PFM endurance: DNT  Repetitions of endurance contractions: DNT  Number of quick contractions in 10 seconds: DNT  Quality of contractions: activation with compensation of abdominals and breath holding; delayed and incomplete relaxation    Tissue laxity test:  Anterior wall: [] minimum     [] moderate     [] severe      [x] WNL  Posterior wall: [] minimum     [] moderate     [] severe      [x] WNL    Palpation: via vaginal canal ; * indicates urgency with palpation  Superficial Pelvic Floor Musculature (PFM): Tender? Intermediate PFM Tender? Deep PFM Tender? Superficial Transverse Perineal [] Right  [] Left  []DNT Deep Transverse Perineal [x] Right  [x] Left  []DNT Puborectalis [x] Right  [] Left  []DNT   Ischiocavernosus [] Right  [] Left  []DNT Compressor Urethra [] Right  [] Left*  []DNT Pubococcygeus [x] Right  [] Left  []DNT   Bulbocavernosus [] Right  [] Left  []DNT   Iliococcygeus [x] Right  [x] Left  []DNT       Obturator Internus [] Right  [] Left  [x]DNT       Coccygeus [] Right  [] Left  [x]DNT     Breath assessment:  Observation: A/P chest expansion  Coordination of pelvic floor muscles with breath: minimal pelvic floor muscle excursion  Co-contraction of PFM with transversus abdominis: absent and Limited 2/2 overactivity     Body Structures Involved:  1. Nerves  2. Muscles Body Functions Affected:  1. Sensory/Pain  2. Genitourinary  3. Neuromusculoskeletal Activities and Participation Affected:  1. Learning and Applying Knowledge  2. General Tasks and Demands  3. Mobility  4.  Self Care  5.  Community, Social and [place]   Number of elements (examined above) that affect the Plan of Care: 1-2: LOW COMPLEXITY   CLINICAL PRESENTATION:   Presentation: Stable and uncomplicated: LOW COMPLEXITY   CLINICAL DECISION MAKING:   Use of outcome tool(s) and clinical judgement create a POC that gives a: Clear prediction of patient's progress: LOW COMPLEXITY

## 2022-02-22 ENCOUNTER — NURSE TRIAGE (OUTPATIENT)
Dept: OTHER | Facility: CLINIC | Age: 52
End: 2022-02-22

## 2022-02-22 ENCOUNTER — HOSPITAL ENCOUNTER (OUTPATIENT)
Dept: PHYSICAL THERAPY | Age: 52
Discharge: HOME | End: 2022-02-22
Payer: COMMERCIAL

## 2022-02-22 PROCEDURE — 97110 THERAPEUTIC EXERCISES: CPT

## 2022-02-22 NOTE — PROGRESS NOTES
Yvno Menjivar  : 1970  Payor: BLUE CROSS / Plan: [facility] / Product Type: PPO /  [id number] [facility] at [address] [place] [address]. [address] [place] [address] [address] [place] [address], [facility], [address] [place] [address]  [phone]   [phone]                                                          Linda Castle MD      OUTPATIENT PHYSICAL THERAPY: Daily Treatment Note 2022 Visit Count:  3    Tx Diagnosis:  Right foot pain [M79.671]  Left foot pain [M79.672]       Pre-treatment Symptoms/Complaints: Not having the dorsal foot pain as much anymore. Just having more of the hindfoot pain. Pain: Initial:6/10  Medications Last Reviewed:  2022     Post Session: 5/10   Updated Objective Findings: See Initial Eval for more details. TREATMENT:   THERAPEUTIC EXERCISE: (39 minutes):  Exercises per grid below to improve mobility, strength and balance. Required minimal visual, verbal and manual cues to promote proper body alignment and promote proper body posture. Progressed resistance and complexity of movement as indicated. Date:  22 Date:  2/15/22 Date:  22   Activity/Exercise Parameters Parameters Parameters   Education HEP, POC, PT goals, anatomy/pathology, discussed shoes with wider toe box     Toe splaying PROM 2 min     Active toe splaying 2 min     Great toe flexion/ext stretching 2 min     gastroc stretching 30 sec 3x30 sec 3x30 sec   Treadmill  8 min, incline 1.0 8 min, incline 1.0   Arch lifts  X30, standing with red band    Calf raises  On incline board, 3x10 On incline board, 3x10   Post tib raises  3x10 3x10   Toe walking  2x40 feet 2x40 feet   Lateral step downs   3x10, 6 inch   SLB   10 sec with red band for arch lift x10   Sled push            THERAPEUTIC ACTIVITY: ( 0 minutes):  Activities per gid below to improve functional movement related mobility, strength and balance to improve neuro-muscular carryover to daily functional activities for improving patient's quality of life. Required visual, verbal and manual cues to promote proper body alignment and promote proper body posture/mechanics. Progressed resistance and complexity of movement as indicated. Date:   Date:   Date:     Activity/Exercise Parameters Parameters Parameters                                                   MANUAL THERAPY: (0 minutes): Joint mobilization, Soft tissue mobilization was utilized and necessary because of the patient's restricted joint motion and restricted motion of soft tissue mobility. Date  2/22/2022    Technique Used Grade  Level # Time(s) Effect while being performed                                                                 HEP Log Date       2.     3.    4.    5.           [employer] Portal  Treatment/Session Summary:    Response to Treatment: Continued calf and intrinsic strengthening today with good tolerance. Pt did report some mild pain to her L knee with step downs by the third set but it dissipated after. Pt most challenged by sled pushes today but reported no increase in pain. Communication/Consultation:  POC, HEP, PT goals, Faxed initial evaluation to MD.   Equipment provided today: none   Recommendations/Intent for next treatment session:   Next visit will focus on foot and ankle strengthening. Treatment Plan of Care Effective Dates: 2/22/2022 TO 4/8/2022 (60 days).   Frequency/Duration: 1 time a week for 60 Days             Total Treatment Billable Duration:   39  Rx  PT Patient Time In/Time Out  Time In: 0730  Time Out: Amanuel Devlin [place] [address], PT    Future Appointments   Date Time Provider Jorje Moulton   2/23/2022  8:00 AM Nydia Olson, PT, DPT [facility]   3/1/2022  7:30 AM Aldentiti Hwang, PT [facility]   3/2/2022  9:40 AM Hammad Beatty NP POAP POA   3/8/2022  7:30 AM Alden Jaiden, PT [facility]   3/10/2022  8:00 AM Taylor Mendez, PT, DPT [facility]AS[facility]   3/15/2022  7:30 AM Aldentiti Hwang, PT [facility] AND [facility]   3/15/2022  1:30 PM ANNY Solis POAI POA   3/15/2022  3:00 PM NixZana, PT, [facility]   3/21/2022  8:00 AM Zana Olson, PT, DPT [facility]   3/28/2022  8:00 AM Malinda Sebastian PT, DPT [facility]   4/18/2022  9:15 AM Phil Blanca DO BSUG BSUG   4/28/2022  4:00 PM Agustin Arias MD [facility]   5/4/2022  3:00 PM Shannan Kelly MD BSNE BSNE   5/16/2022  4:00 PM Agustin Arias MD [facility]

## 2022-02-22 NOTE — TELEPHONE ENCOUNTER
Received call from Jody at [facility] with Red Flag Complaint. Subjective: Caller states \"Last night I became dizzy after the shower, I got light-headed, dizzy, and felt something pull with my left arm, and had sternum pain. Then I started to get nervous and shaky with flashing lights in my vision. I took my BP and it was 133/78 and BS 91 and I feel like I have a pulled muscle in my chest and it feels painful when I press down around the right breast and I feel like I have a migraine coming on. Sometimes I also breath heavy and fast for no reason. \"     Current Symptoms: Dizzy, lightheaded    Onset: 1 days ago; rapid    Associated Symptoms: Reduced calorie intake 1200 calories per day and 64 oz (48-56) water States she is doing [employer] online program    Pain Severity: 9/10 last night, 4-5/10 today; pulling; intermittent    Temperature: None at this time, but got shaky last night    What has been tried:     LMP: NA Pregnant: NA    Recommended disposition: See PCP in office today, at least by Friday. If no openings, advised patient to be seen in UC or walk-in and discussed to seek care in ER if s/s worsen. Care advice provided, patient verbalizes understanding; denies any other questions or concerns; instructed to call back for any new or worsening symptoms. Patient/Caller agrees with recommended disposition; writer provided warm transfer to Carlitos Ramirez at [facility] for appointment scheduling    Attention Provider: Thank you for allowing me to participate in the care of your patient. The patient was connected to triage in response to information provided to the [facility]. Please do not respond through this encounter as the response is not directed to a shared pool.       Reason for Disposition   Patient wants to be seen    Protocols used: [id number]

## 2022-02-23 ENCOUNTER — HOSPITAL ENCOUNTER (OUTPATIENT)
Dept: PHYSICAL THERAPY | Age: 52
Discharge: HOME | End: 2022-02-23
Attending: OBSTETRICS & GYNECOLOGY
Payer: COMMERCIAL

## 2022-02-23 PROCEDURE — 97110 THERAPEUTIC EXERCISES: CPT

## 2022-02-23 PROCEDURE — 97140 MANUAL THERAPY 1/> REGIONS: CPT

## 2022-02-23 PROCEDURE — 97530 THERAPEUTIC ACTIVITIES: CPT

## 2022-02-23 NOTE — PROGRESS NOTES
Mine Soler  : 1970  Primary: [facility]*  Secondary:  [facility] at [facility]  [address] , [address], [place] [address]  [phone]   [phone]      OUTPATIENT PHYSICAL THERAPY: Daily Treatment Note 2022   Visit Count:  2  ICD-10: Treatment Diagnosis: Lack of coordination (muscle incoordination) (R27.8), Pelvic floor dysfunction in female (M62.98), Urge incontinence (N39.41) and Myalgia (M79.1)  Precautions/Allergies:   Sulfa (sulfonamide antibiotics)   TREATMENT PLAN:  Effective Dates: 2022 TO 2022 (90 days). Frequency/Duration: 1 time a week for 90 Day(s) MEDICAL/REFERRING DIAGNOSIS:  OAB (overactive bladder) [N32.81]  High-tone pelvic floor dysfunction [N94.89]   DATE OF ONSET: chronic  REFERRING PHYSICIAN: Lizz Kennedy DO MD Orders: Evaluate and treat  Return MD Appointment: 22     Pre-treatment Symptoms/Complaints:  UUI, HTPFD    Did well with breathing. Found drops to be a little challenging. No new changes. Pain: Initial: Pain Intensity 1: 0  Post Session:  0/10   Medications Last Reviewed:  2022  Updated Objective Findings:  moderate tension throughout, accessory use with contraction, delayed relaxation; oblique activation with breathing   TREATMENT:   THERAPEUTIC EXERCISE: (20 minutes):  Exercises per grid below to improve mobility and coordination. Required moderate visual, verbal and tactile cues to promote proper body breathing techniques and voluntary relaxation of PFM. Progressed range and repetitions as indicated.   TE= therapeutic exercise, TA= therapeutic activity, MT= manual therapy, NE= neuro re-education   Date:  22 Date:  22 Date:     Activity/Exercise Parameters Parameters Parameters   PFM coordination PF drops to improve mm tension and coordinaiton Drops to improve PFM relaxation and tension    Diaphragmatic breathing To improve PFM coordination and excursion To improve PFM coordination and excursion    HEP Drops x10 3-4x/day; DB 2x5min Continue with drops and DB; add below stretches 2x/day    Lumbar rotation  x20    Adductor stretch  3x30s    Happy baby  3x30s; modified w/ feet on bench for HEP      THERAPEUTIC ACTIVITY: ( 10 minutes): Functional activity training to improve toilet positioning and pelvic floor muscle relaxation, to increase anorectal angle in order to improve bowel/bladder emptying and minimize straining/paradoxical PFM activation during defecation. TA Educational Topic Notes Date Completed   Pathology/Anatomy/PFM Function UUI 2/17/22   Bladder health education     Urinary urge suppression     [employer]     Voiding strategies     Bowel/Bladder log     Bowel health education     Constipation care     Diarrhea/Fecal leakage     Colonic massage     Toilet positioning  2/23/22   Defecation dynamics     Sources of fiber     Return to intercourse/Dilator training     Sexual positioning     Lubricants/vaginal moisturizers     Vaginal irritants     Body mechanics     Posture/ergonomics     Diaphragmatic breathing     Resources and technology       MANUAL THERAPY: (25 minutes): Soft tissue mobilization was utilized and necessary because of the patient's painful spasm and restricted motion of soft tissue.     Date Type Location Comments   2/23/2022 Internal assessment/treatment Via vaginal canal Single digital MT to all PFM to improve relaxation, length; CR/SCS to improve discomfort    STM Abdominal wall Skin rolling and scar mobilization    STM adductors Skin rolling                           (Used abbreviations: MET - muscle energy technique; SCS- Strain counter strain; CTM-Connective tissue mobilizations; CR- Contract/Relax; SP- Sustained pressure; PIT- Positional inhibition techniques; STM Soft -tissue mobilization; MM- Myofascial mobilization; TrP-Trigger point release; IASTM- Instrument assisted soft tissue mobilizations, TDN-Trigger point dry needling)    Pt gives verbal consent to internal vaginal assessment/treatment without chaperon present. Treatment/Session Summary:    · Response to Treatment:  Global tension throughout the PFM today with some improvements/yeilding to gentle MT. She does have pain and urgency with palpation at various PFM layers with increased LBP with palpation on L puborectalis. With relaxation techniques there is improvement/reduction of pain. Pt requires frequent reminding for mm relaxation vs. Tightening. Flexibility/mobility initiated today with addition to HEP, which she tolerated well during session. Discussed performance and use of toilet positioning in order to improve PFM relaxation during defecation in order to improve ease of bowel evacuation 2/2 IBS-C. Pt verbalized good understanding by end of session. Pt will continue to benefit from skilled PT in order to improve ability for relaxation and mobility of PFM in order to implement bladder control technique to reduce urinary urgency and UUI. · Communication/Consultation:  None today  · Equipment provided today:  None today  · Recommendations/Intent for next treatment session: Next visit will focus on down training, manual therapy, progress flexibility, check in on toilet positioning, urge suppression?   · Variation from POC: N/A  Total Treatment Billable Duration: 20 minutes TE, 10 minutes TA, 25 minutes MT  PT Patient Time In/Time Out  Time In: 3944  Time Out: Patti Mallory, PT, DPT     Future Appointments   Date Time Provider Jorje Moulton   2/23/2022  8:00 AM Sourav Olson, PT, DPT [facility]   2/23/2022 10:00 AM Jose Solis NP [facility]   3/1/2022  7:30 AM Eun Napier PT [facility]   3/8/2022  7:30 AM Eun Napier PT [facility]   3/10/2022  8:00 AM Durga Shepherd, PT, DPT [facility]   3/15/2022  7:30 AM Eun Napier PT [facility]   3/15/2022  1:30 PM Pete Rodarte, PA [facility] POAI POA   3/15/2022  3:00 PM [facility]   3/18/2022  8:00 AM Zachary Beattyr, NP POAG POA   3/21/2022  8:00 AM [facility]ino [facility]   3/28/2022  8:00 AM Anuradha Mccauley, PT, [facility]   4/18/2022  9:15 AM Holland Gleason DO BSUG BSUG   4/28/2022  4:00 PM Zarina Ellis MD [facility]   5/4/2022  3:00 PM Carmen Whaley MD BSNE BSNE   5/16/2022  4:00 PM Zarina Ellis MD [facility]

## 2022-03-01 ENCOUNTER — HOSPITAL ENCOUNTER (OUTPATIENT)
Dept: PHYSICAL THERAPY | Age: 52
Discharge: HOME | End: 2022-03-01
Payer: COMMERCIAL

## 2022-03-01 PROCEDURE — 97110 THERAPEUTIC EXERCISES: CPT

## 2022-03-01 NOTE — PROGRESS NOTES
Neelima Lou  : 1970  Payor: BLUE CROSS / Plan[place] [facility] / Product Type: [facility] /  [facility] at [address] [place] [address]. [facility], [address], [address] [place] [address]  [phone]   [phone]                                                          Salma José MD      OUTPATIENT PHYSICAL THERAPY: Daily Treatment Note 3/1/2022 Visit Count:  4    Tx Diagnosis:  Right foot pain [M79.671]  Left foot pain [M79.672]       Pre-treatment Symptoms/Complaints: Has had trouble with her back since the weekend when she bent forward to clean out her spice rack. None of the exercises she has learned (LTR, child's pose, etc) helped. Pain: Initial:6/10  Medications Last Reviewed:  3/1/2022     Post Session: 5/10   Updated Objective Findings: No notable guarding with gait        TREATMENT:   THERAPEUTIC EXERCISE: (41 minutes):  Exercises per grid below to improve mobility, strength and balance. Required minimal visual, verbal and manual cues to promote proper body alignment and promote proper body posture. Progressed resistance and complexity of movement as indicated.      Date:  22 Date:  2/15/22 Date:  22 Date  3/1/22   Activity/Exercise Parameters Parameters Parameters    Education HEP, POC, PT goals, anatomy/pathology, discussed shoes with wider toe box      Toe splaying PROM 2 min      Active toe splaying 2 min      Great toe flexion/ext stretching 2 min      gastroc stretching 30 sec 3x30 sec 3x30 sec 3x30 sec   Treadmill  8 min, incline 1.0 8 min, incline 1.0 8 min, incline 1.5, 29 kcal   Arch lifts  X30, standing with red band     Calf raises  On incline board, 3x10 On incline board, 3x10    Post tib raises  3x10 3x10    Toe walking  2x40 feet 2x40 feet    Lateral step downs   3x10, 6 inch 3x10, 6 inch   SLB   10 sec with red band for arch lift x10    Sled push       Sidestepping and monster walks    2x40 ft, purple, on toes   Tandem walking with heel raise    1x40 feet   Rolling patterns    X5/side          THERAPEUTIC ACTIVITY: ( 0 minutes): Activities per gid below to improve functional movement related mobility, strength and balance to improve neuro-muscular carryover to daily functional activities for improving patient's quality of life. Required visual, verbal and manual cues to promote proper body alignment and promote proper body posture/mechanics. Progressed resistance and complexity of movement as indicated. Date:   Date:   Date:     Activity/Exercise Parameters Parameters Parameters                                                   MANUAL THERAPY: (0 minutes): Joint mobilization, Soft tissue mobilization was utilized and necessary because of the patient's restricted joint motion and restricted motion of soft tissue mobility. Date  3/1/2022    Technique Used Grade  Level # Time(s) Effect while being performed                                                                 HEP Log Date       2.     3.    4.    5.           [employer] Portal  Treatment/Session Summary:    Response to Treatment: Continued calf muscle endurance with good tolerance. Also incorporated rolling patterns for management of LBP in the future to allow for continued performance of HEP   Communication/Consultation:  POC, HEP, PT goals, Faxed initial evaluation to MD.   Equipment provided today: none   Recommendations/Intent for next treatment session:   Next visit will focus on foot and ankle strengthening. Treatment Plan of Care Effective Dates: 3/1/2022 TO 4/8/2022 (60 days).   Frequency/Duration: 1 time a week for 60 Days             Total Treatment Billable Duration:   41  Rx  PT Patient Time In/Time Out  Time In: 0730  Time Out: [address]ilio Hernandez PT    Future Appointments   Date Time Provider Jorje Moulton   3/3/2022  1:10 PM SFE [facility] BI ROOM 2 SFERMAM SFE   3/3/2022  1:45 PM SFE [facility] BI GE LOGIC S8 US UNIT 1 SFERMAM SFE   3/8/2022  7:30 AM Sumeet Quiles, PT [facility]   3/10/2022  8:00 AM Boris Olson, PT, DPT [facility]   3/15/2022  7:30 AM Sumeet Quiles, PT [facility] AND HOME [facility]   3/15/2022  1:30 PM Betty Rodarte, PA SSA POAI POA   3/15/2022  3:00 PM [facility], [facility]   3/18/2022  8:00 AM Leah Beatty, NP POAG POA   3/21/2022  8:00 AM Ed Parada, PT, DPT [facility]   3/28/2022  8:00 AM Ed Parada, PT, DPT [facility]   4/18/2022  9:15 AM Salvador Monday,  BSUG BSUG   4/28/2022  4:00 PM Gabe Chambers MD [facility]   5/4/2022  3:00 PM Kendrick Shen MD BSNE BSNE   5/16/2022  4:00 PM Gabe Chambers MD [facility]

## 2022-03-03 ENCOUNTER — HOSPITAL ENCOUNTER (OUTPATIENT)
Dept: MAMMOGRAPHY | Age: 52
Discharge: HOME | End: 2022-03-03
Attending: NURSE PRACTITIONER
Payer: COMMERCIAL

## 2022-03-03 DIAGNOSIS — N64.4 BREAST PAIN IN FEMALE: ICD-10-CM

## 2022-03-03 PROCEDURE — 76642 ULTRASOUND BREAST LIMITED: CPT

## 2022-03-03 PROCEDURE — 77065 DX MAMMO INCL CAD UNI: CPT

## 2022-03-08 ENCOUNTER — HOSPITAL ENCOUNTER (OUTPATIENT)
Dept: PHYSICAL THERAPY | Age: 52
Discharge: HOME | End: 2022-03-08
Payer: COMMERCIAL

## 2022-03-08 PROCEDURE — 97110 THERAPEUTIC EXERCISES: CPT

## 2022-03-08 NOTE — PROGRESS NOTES
Elle Dowling  : 1970  Payor: BLUE CROSS / [place] [facility] / Product Type: PPO /  [address] at [address] [place]. [address] [place] [address] [address] [place] [address], [facility], [address] [place] [address]  [phone]   [phone]                                                          Nani Arnett MD      OUTPATIENT PHYSICAL THERAPY: Daily Treatment Note 3/8/2022 Visit Count:  5    Tx Diagnosis:  Right foot pain [M79.671]  Left foot pain [M79.672]       Pre-treatment Symptoms/Complaints: No new complaints. Had a good weekend in [place]. Pain: Initial:6/10  Medications Last Reviewed:  3/8/2022     Post Session: 5/10   Updated Objective Findings:         TREATMENT:   THERAPEUTIC EXERCISE: (41 minutes):  Exercises per grid below to improve mobility, strength and balance. Required minimal visual, verbal and manual cues to promote proper body alignment and promote proper body posture. Progressed resistance and complexity of movement as indicated.      Date:  22 Date:  2/15/22 Date:  22 Date  3/1/22 Date  3/8/22   Activity/Exercise Parameters Parameters Parameters     Education HEP, POC, PT goals, anatomy/pathology, discussed shoes with wider toe box       Toe splaying PROM 2 min       Active toe splaying 2 min       Great toe flexion/ext stretching 2 min       gastroc stretching 30 sec 3x30 sec 3x30 sec 3x30 sec 3x30 sec   Treadmill  8 min, incline 1.0 8 min, incline 1.0 8 min, incline 1.5, 29 kcal 8 min, incline 1.5, 40 kcal   Arch lifts  X30, standing with red band      Calf raises  On incline board, 3x10 On incline board, 3x10  [id number] lbs, incline board   Post tib raises  3x10 3x10     Toe walking  2x40 feet 2x40 feet     Lateral step downs   3x10, 6 inch 3x10, 6 inch 3x10, 6 inch   SLB   10 sec with red band for arch lift x10     Sled push     75 lbs, 2x40 feet   Sidestepping and monster walks    2x40 ft, purple, on toes 2x40 ft, purple, on toes   Tandem walking with heel raise    1x40 feet 1x40 feet   Rolling patterns    X5/side     SL RDL     2x8x15, bilateral         THERAPEUTIC ACTIVITY: ( 0 minutes): Activities per gid below to improve functional movement related mobility, strength and balance to improve neuro-muscular carryover to daily functional activities for improving patient's quality of life. Required visual, verbal and manual cues to promote proper body alignment and promote proper body posture/mechanics. Progressed resistance and complexity of movement as indicated. Date:   Date:   Date:     Activity/Exercise Parameters Parameters Parameters                                                                               MANUAL THERAPY: (0 minutes): Joint mobilization, Soft tissue mobilization was utilized and necessary because of the patient's restricted joint motion and restricted motion of soft tissue mobility. Date  3/8/2022    Technique Used Grade  Level # Time(s) Effect while being performed                                                                 HEP Log Date 1.       2.     3.    4.    5.           [employer] Portal  Treatment/Session Summary:    Response to Treatment: Progressed SL balance and foot intrinsic endurance with SL RDLs. Pt was fatigued by the end of the exercise but otherwise demonstrated neutral spinal alignment and good balance. As pt has had minimal pain in her foot over the past 1-2 weeks, she should be appropriate to discharge next week with HEP. Communication/Consultation:  POC, HEP, PT goals, Faxed initial evaluation to MD.   Equipment provided today: none   Recommendations/Intent for next treatment session:   Next visit will focus on foot and ankle strengthening. Treatment Plan of Care Effective Dates: 3/8/2022 TO 4/8/2022 (60 days).   Frequency/Duration: 1 time a week for 60 Days             Total Treatment Billable Duration:   41  Rx  PT Patient Time In/Time Out  Time In: 0730  Time Out: [address], PT    Future Appointments   Date Time Provider Jorje Moulton   3/10/2022  8:00 AM Celestino Olson, PT, DPT [facility]   3/15/2022  7:30 AM Sudha Beauchamp, PT [facility]   3/15/2022  1:30 PM Malvasio, Niel Lefort, PA SSA POAI POA   3/15/2022  3:00 PM Anuradha Mccauley, PT, [facility]   3/18/2022  8:00 AM Zachary Beatty, NP POAG POA   3/21/2022  8:00 AM Anuradha Mccauley PT, [facility]PT [facility]   3/28/2022  8:00 AM Anuradha Mccauley PT, DPT SFOORPT [facility]   4/18/2022  9:15 AM Holland Gleason DO BSUG BSUG   4/28/2022  4:00 PM Zarina Ellis MD [facility]   5/4/2022  3:00 PM Carmen Whaley MD BSNE BSNE   5/16/2022  4:00 PM Zarina Ellis MD [facility]

## 2022-03-10 ENCOUNTER — HOSPITAL ENCOUNTER (OUTPATIENT)
Dept: PHYSICAL THERAPY | Age: 52
Discharge: HOME | End: 2022-03-10
Attending: OBSTETRICS & GYNECOLOGY
Payer: COMMERCIAL

## 2022-03-10 PROCEDURE — 97530 THERAPEUTIC ACTIVITIES: CPT

## 2022-03-10 PROCEDURE — 97110 THERAPEUTIC EXERCISES: CPT

## 2022-03-10 PROCEDURE — 97140 MANUAL THERAPY 1/> REGIONS: CPT

## 2022-03-10 NOTE — PROGRESS NOTES
Jami Parks  : 1970  Primary: Livan Marcelino Of Sarah Walsh*  Secondary:  [facility]  [address] , [address], [place] [address]  [phone]   [phone]      OUTPATIENT PHYSICAL THERAPY: Daily Treatment Note 3/10/2022   Visit Count:  3  ICD-10: Treatment Diagnosis: Lack of coordination (muscle incoordination) (R27.8), Pelvic floor dysfunction in female (M62.98), Urge incontinence (N39.41) and Myalgia (M79.1)  Precautions/Allergies:   Sulfa (sulfonamide antibiotics)   TREATMENT PLAN:  Effective Dates: 2022 TO 2022 (90 days). Frequency/Duration: 1 time a week for 90 Day(s) MEDICAL/REFERRING DIAGNOSIS:  OAB (overactive bladder) [N32.81]  High-tone pelvic floor dysfunction [N94.89]   DATE OF ONSET: chronic  REFERRING PHYSICIAN: Sue Car DO MD Orders: Evaluate and treat  Return MD Appointment: 22     Pre-treatment Symptoms/Complaints:  UUI, HTPFD    Doing well with exercises. Urges are improving with leakage. Using toilet positioning to help empty bowel better. Pain: Initial: Pain Intensity 1: 0  Post Session:  0/10   Medications Last Reviewed:  3/10/2022  Updated Objective Findings:  moderate tension throughout, accessory use with contraction, delayed relaxation; oblique activation with breathing   TREATMENT:   THERAPEUTIC EXERCISE: (20 minutes):  Exercises per grid below to improve mobility and coordination. Required moderate visual, verbal and tactile cues to promote proper body breathing techniques and voluntary relaxation of PFM. Progressed range and repetitions as indicated.   TE= therapeutic exercise, TA= therapeutic activity, MT= manual therapy, NE= neuro re-education   Date:  22 Date:  22 Date:  3/10/22   Activity/Exercise Parameters Parameters Parameters   PFM coordination PF drops to improve mm tension and coordinaiton Drops to improve PFM relaxation and tension Drops w/ digital palpation   Diaphragmatic breathing To improve PFM coordination and excursion To improve PFM coordination and excursion W/ stretching and digital palpation to improve PFM ROM   HEP Drops x10 3-4x/day; DB 2x5min Continue with drops and DB; add below stretches 2x/day Continue w/ drops and DB; urge suppression; Stretching 2x/day   Lumbar rotation  x20 x30   Adductor stretch  3x30s 3x30s   Happy baby  3x30s; modified w/ feet on bench for HEP    Single knee to chest stretch   3x30s B   Piriformis stretch   3x30s B; demo'd seated   Hamstring stretch   3x30s     THERAPEUTIC ACTIVITY: ( 10 minutes): Functional activity training on role and use of urge suppression in order to delay voiding, minimize urge urinary incontinence and improve control in the presence of urge triggers (ie. running water, key in lock, cold, etc.) and Patient instructed in use of various voiding techniques including double voiding and splinting (perineal support and intravaginal support) to improve bladder emptying. TA Educational Topic Notes Date Completed   Pathology/Anatomy/PFM Function UUI 2/17/22   Bladder health education     Urinary urge suppression  3/10/22   The knack     Voiding strategies Double voiding 3/10/22   Bowel/Bladder log     Bowel health education     Constipation care     Diarrhea/Fecal leakage     Colonic massage     Toilet positioning  2/23/22   Defecation dynamics     Sources of fiber     Return to intercourse/Dilator training     Sexual positioning     Lubricants/vaginal moisturizers     Vaginal irritants     Body mechanics     Posture/ergonomics     Diaphragmatic breathing     Resources and technology       MANUAL THERAPY: (25 minutes): Soft tissue mobilization was utilized and necessary because of the patient's painful spasm and restricted motion of soft tissue.     Date Type Location Comments   3/10/2022 Internal assessment/treatment Via vaginal canal Single digital MT to all PFM to improve relaxation, length; CR/SCS to improve discomfort    STM Abdominal wall Skin rolling and scar mobilization    STM adductors Skin rolling                           (Used abbreviations: MET - muscle energy technique; SCS- Strain counter strain; CTM-Connective tissue mobilizations; CR- Contract/Relax; SP- Sustained pressure; PIT- Positional inhibition techniques; STM Soft -tissue mobilization; MM- Myofascial mobilization; TrP-Trigger point release; IASTM- Instrument assisted soft tissue mobilizations, TDN-Trigger point dry needling)    Pt gives verbal consent to internal vaginal assessment/treatment without chaperon present. Treatment/Session Summary:    · Response to Treatment: Continues to have moderate tension throughout all PFM layers with tenderness R>L. Minimal urge reproduction today. Stretching reviewed as pt had a few questions about proper performance of lumbar rotation stretch. Stretching program progressed with continued emphasis on relaxation. Pt instructed today in role and use of urge suppression; she has tried using holds in past to control urges and this doesn't work well. We discussed the need to use short, gentle contraction vs. Holds in order to inhibit bladder activation and reduce urge intensity. Pt verbalize understanding.   · Communication/Consultation:  None today  · Equipment provided today:  None today  · Recommendations/Intent for next treatment session: Next visit will focus on down training, manual therapy, progress flexibility, check in urge suppression  · Variation from POC: N/A  Total Treatment Billable Duration: 20 minutes TE, 10 minutes TA, 25 minutes MT  PT Patient Time In/Time Out  Time In: 0800  Time Out: 0900  Jyoti Cui PT, DPT     Future Appointments   Date Time Provider Jorje Mcfaddenisti   3/15/2022  7:30 AM Sumeet Quiles PT [facility]   3/15/2022  1:30 PM Betty Rodarte PA [facility] POA   3/15/2022  3:00 PM [facility]ada [facility], D[facility]   3/16/2022  9:35 AM Cristina Ferrell MD [facility] POA   3/18/2022 8:00 AM Li Beatty, NP [facility]   3/21/2022  8:00 [facility]   3/28/2022  8:00 AM Marilee Nunez, [facility]   4/18/2022  9:15 AM Fernando Chirinos DO [facility]   4/28/2022  4:00 PM Tatianna Cee MD [facility]   5/4/2022  3:00 PM Mariaelena Scott MD [facility] BS[facility]   5/16/2022  4:00 PM Tatianna Cee MD [facility]

## 2022-03-15 ENCOUNTER — HOSPITAL ENCOUNTER (OUTPATIENT)
Dept: PHYSICAL THERAPY | Age: 52
Discharge: HOME | End: 2022-03-15
Attending: OBSTETRICS & GYNECOLOGY
Payer: COMMERCIAL

## 2022-03-15 ENCOUNTER — HOSPITAL ENCOUNTER (OUTPATIENT)
Dept: PHYSICAL THERAPY | Age: 52
Discharge: HOME | End: 2022-03-15
Payer: COMMERCIAL

## 2022-03-15 PROCEDURE — 97110 THERAPEUTIC EXERCISES: CPT

## 2022-03-15 NOTE — PROGRESS NOTES
Mine Soler  : 1970  Payor: BLUE CROSS / Plan: [facility] / Product Type: PPO /  [address] [facility], [address], [address]  [phone]   [phone]                                                          Cash Peña MD      OUTPATIENT PHYSICAL THERAPY: Daily Treatment Note 3/15/2022 Visit Count:  6    Tx Diagnosis:  Right foot pain [M79.671]  Left foot pain [M79.672]       Pre-treatment Symptoms/Complaints: See Discharge Summary dated 3/15/22 for details    Pain: Initial:6/10  Medications Last Reviewed:  3/15/2022     Post Session: 5/10   Updated Objective Findings: See Discharge Summary dated 3/15/22 for details        TREATMENT:   THERAPEUTIC EXERCISE: (42 minutes):  Exercises per grid below to improve mobility, strength and balance. Required minimal visual, verbal and manual cues to promote proper body alignment and promote proper body posture. Progressed resistance and complexity of movement as indicated.      Date:  22 Date:  2/15/22 Date:  22 Date  3/1/22 Date  3/8/22 Date  3/15/22   Activity/Exercise Parameters Parameters Parameters      Education HEP, POC, PT goals, anatomy/pathology, discussed shoes with wider toe box     Updated measurements, updated HEP   Toe splaying PROM 2 min        Active toe splaying 2 min        Great toe flexion/ext stretching 2 min        gastroc stretching 30 sec 3x30 sec 3x30 sec 3x30 sec 3x30 sec    Treadmill  8 min, incline 1.0 8 min, incline 1.0 8 min, incline 1.5, 29 kcal 8 min, incline 1.5, 40 kcal 8 min, incline 2, 37 kcal   Arch lifts  X30, standing with red band       Calf raises  On incline board, 3x10 On incline board, 3x10  [id number] lbs, incline board    Post tib raises  3x10 3x10      Toe walking  2x40 feet 2x40 feet   2x40 feet   Lateral step downs   3x10, 6 inch 3x10, 6 inch 3x10, 6 inch 3x10, 6 inch   SLB   10 sec with red band for arch lift x10      Sled push     75 lbs, 2x40 feet    Sidestepping and monster walks    2x40 ft, purple, on toes 2x40 ft, purple, on toes    Tandem walking with heel raise    1x40 feet 1x40 feet 1x40 feet, 10 lbs each hand   Rolling patterns    X5/side      SL RDL     2x8x15, bilateral [id number], bilateral         THERAPEUTIC ACTIVITY: ( 0 minutes): Activities per gid below to improve functional movement related mobility, strength and balance to improve neuro-muscular carryover to daily functional activities for improving patient's quality of life. Required visual, verbal and manual cues to promote proper body alignment and promote proper body posture/mechanics. Progressed resistance and complexity of movement as indicated. Date:   Date:   Date:     Activity/Exercise Parameters Parameters Parameters                                                                               MANUAL THERAPY: (0 minutes): Joint mobilization, Soft tissue mobilization was utilized and necessary because of the patient's restricted joint motion and restricted motion of soft tissue mobility. Date  3/15/2022    Technique Used Grade  Level # Time(s) Effect while being performed                                                                 HEP Log Date 1.       2.     3.    4.    5.           [employer]  Treatment/Session Summary:    Response to Treatment: See Discharge Summary dated 3/15/22 for details   Communication/Consultation:  See Discharge Summary dated 3/15/22 for details   Equipment provided today: none   Recommendations/Intent for next treatment session:   See Discharge Summary dated 3/15/22 for details         Treatment Plan of Care Effective Dates: 3/15/2022 TO 4/8/2022 (60 days).   Frequency/Duration: 1 time a week for 60 Days             Total Treatment Billable Duration:   42  Rx  PT Patient Time In/Time Out  Time In: 0732  Time Out: [facility],     Future Appointments   Date Time Provider Jorje Saige   3/15/2022  1:30 PM Boni Jack, [address] [facility] POA   3/15/2022  3:00 PM Daisy Santos, PT, DPT [facility]   3/16/2022  9:35 AM Gorge Sever, MD [facility] PO[facility]   3/18/2022  8:00 AM Terry Beatty, NP [facility]   3/21/2022  8:00 AM Daisy Santos, PT, DPT [facility]   3/28/2022  8:00 AM Daisy Santos, PT, DPT [facility]   4/18/2022  9:15 AM Blank Novoa DO [facility]   4/28/2022  4:00 PM Marjorie Connor MD [facility]   5/4/2022  3:00 PM Montez Stovall MD [facility]   5/16/2022  4:00 PM Marjorie Connor MD [facility]

## 2022-03-15 NOTE — THERAPY DISCHARGE
Rae Schmidt  : 1970      Payor: BLUE CR[place] / Plan[place] [facility] / Product Type: O /  [id number] [facility] at [address] [place] [address]. [address], [address] [place] [address]  [phone]   [phone]        OUTPATIENT PHYSICAL THERAPY: Discharge Summary:  3/15/2022    ICD-10: Treatment Diagnosis:  Right foot pain [M79.671]  Left foot pain [M79.672]     Precautions/Allergies:   Sulfa (sulfonamide antibiotics)   Fall Risk Score: 0 (? 5 = High Risk)  MD Orders: Eval and Treat MEDICAL/REFERRING DIAGNOSIS:   Right foot pain   DATE OF ONSET: >3 months  REFERRING PHYSICIAN: Janneth Nation MD  RETURN PHYSICIAN APPOINTMENT: TBD by patient     Discharge Summary:   Ms. Susan Schwarz has made significant progress with ankle ROM, ankle strength, and reported decrease in pain. Pt does still complain of a \"burning\" sensation when walking >15 minutes, though she feels that may be due to neuropathy. Pt demonstrates independence with HEP and should be appropriate to discharge at this time. Rae Schmidt will benefit from skilled PT (medically necessary) in order to address above deficits affecting participation in basic ADLs and overall functional tolerance. TREATMENT PLAN:  Effective Dates: 3/15/2022 TO 2022 (60 days). Frequency/Duration: 1 time a week for 60 Day   Short-Term Goals~4 weeks  Goal Met   1. Rae Schmidt will be compliant with home exercise program within 4 weeks in order to improve active participation with management of patient's symptoms and/or functional deficits. 1.  [x] Date: 3/15/22   2. Rae Schmidt will report <=3/10 pain with walking >15 minutes in order to participate in daily exercise and daily activities 2. [x] Date: 3/15/22   3. Pt will improve FAAM ADL subscore by 6 points to demonstrate improved functional ability 3.   [] Date:   4.  4.  [] Date:   5.  5.  [] Date:   6.  6.  [] Date:              Long Term Goals~8 weeks Goal Met   1. Goldy Sims will be independent with home exercise program within 8 weeks in order to improve independence with management of patient's symptoms and/or functional deficits. 1.  [x] Date: 3/15/22   2. Pt will improve FAAM ADL subscore by at least 12 points to demonstrate improved functional ability 2. [] Date:   3. Pt will be able to walks at least 30 minutes with no more than 1/10 pain in her L foot to demonstrate improved functional ability. 3.  [] Date:   4.  4.  [] Date:   5.  5.  [] Date:       Rehabilitation Potential For Stated Goals: Fair  Regarding Nivia Gannon's therapy, I certify that the treatment plan above will be carried out by a therapist or under their direction. Thank you for this referral,  Lennie Ramirez PT     Referring Physician Signature: Prabhakar Rendon MD                        HISTORY:   History of Present Injury/Illness (Reason for Referral):  Pt reports insidious onset of L dorsal foot pain. Her R foot hurts as well but she knows it's due to arthritis, and she's primarily concerned about her L foot. She states walking increases sensitivity of her foot and she notes that her L foot cramps every so often at night. Pt also reports increased pain when jogging up the stairs when she goes in to work. Pt states the pain does not keep her from walking any distance and it does not interrupt her work, since she's mostly working from home. Pt was given exercises for her feet by this therapist but she reports not focusing on them since her back is more bothersome. Pt denies numbness/tingling to feet. Discharge 3/15/22: She has some days when she's fine and other days where her foot bothers her some.  Complains of \"cramping\" to the bottom of her L foot when she does child's pose    -Present symptoms/complaints (on day of evaluation)   Pain Scale:  · Worst: 7/10     Aggravating factors:  Walking and stairs    Relieving factors: none   Irritability: Medium (Onset of pain is equal to alleviation)      Past Medical History/Comorbidities:   Ms. Francesco Douglass  has a past medical history of Anxiety, DDD (degenerative disc disease), cervical (11/29/2021), Depressed, GERD (gastroesophageal reflux disease), Hypertension, IBS (irritable bowel syndrome), Insomnia, and TMJ (dislocation of temporomandibular joint) (04/2021). Ms. Francesco Douglass  has a past surgical history that includes pr egd deliver thermal energy sphnctr/cardia gerd; hx colonoscopy; hx tubal ligation; hx lipectomy; and hx cataract removal (Bilateral, 02/2020).     Active Ambulatory Problems     Diagnosis Date Noted    Chronic fatigue 04/06/2018    Chronic tension-type headache, intractable 11/02/2018    Early satiety 06/05/2017    Erosive gastritis 09/05/2017    Functional dyspepsia 06/05/2017    Hiatal hernia with GERD 09/05/2017    History of colonic polyps 06/05/2017    Irritable bowel syndrome with both constipation and diarrhea 06/05/2017    Left carpal tunnel syndrome 02/23/2018    Bipolar depression ([address] 09/20/2018    Primary insomnia 04/02/2019    Radiculopathy of cervical region 04/05/2019    Pharyngoesophageal dysphagia 05/02/2019    B12 deficiency 08/19/2019    Chronic pain syndrome 08/19/2019    Vitamin D deficiency 08/19/2019    Cervical radicular pain 08/19/2019    Numbness and tingling 09/23/2019    Left hand pain 09/23/2019    Anxiety 02/10/2021    Hypoglycemia 02/10/2021    Essential tremor 08/02/2021    Restless legs syndrome (RLS) 08/02/2021    Neuropathic pain 08/02/2021    Encounter for medication management 08/02/2021    Duodenitis 07/24/2019    GERD with esophagitis 07/24/2019    Lymphocytic colitis 02/02/2021    Mild episode of recurrent major depressive disorder ([address] 09/20/2018    Other constipation 04/22/2021    Nausea 02/02/2021    Schatzki's ring 07/24/2019    Terminal ileitis without complication ([address] 34/43/0681    Ganglion cyst 01/03/2022   Lester Srinivasan Quervain's tenosynovitis, right 01/03/2022    OAB (overactive bladder) 01/13/2022    High-tone pelvic floor dysfunction 01/13/2022    Uterine leiomyoma 01/13/2022     Resolved Ambulatory Problems     Diagnosis Date Noted    No Resolved Ambulatory Problems     Past Medical History:   Diagnosis Date    DDD (degenerative disc disease), cervical 11/29/2021    Depressed     GERD (gastroesophageal reflux disease)     Hypertension     IBS (irritable bowel syndrome)     Insomnia     TMJ (dislocation of temporomandibular joint) 04/2021     Social History/Living Environment:        Social History     Socioeconomic History    Marital status:      Spouse name: Not on file    Number of children: Not on file    Years of education: Not on file    Highest education level: Not on file   Occupational History    Not on file   Tobacco Use    Smoking status: Never Smoker    Smokeless tobacco: Never Used   Vaping Use    Vaping Use: Never used   Substance and Sexual Activity    Alcohol use: Never    Drug use: Never    Sexual activity: Not Currently   Other Topics Concern    Not on file   Social History Narrative    Not on file     Social Determinants of Health     Financial Resource Strain:     Difficulty of Paying Living Expenses: Not on file   Food Insecurity:     Worried About Running Out of Food in the Last Year: Not on file    Valentino of Food in the Last Year: Not on file   Transportation Needs:     Lack of Transportation (Medical): Not on file    Lack of Transportation (Non-Medical):  Not on file   Physical Activity:     Days of Exercise per Week: Not on file    Minutes of Exercise per Session: Not on file   Stress:     Feeling of Stress : Not on file   Social Connections:     Frequency of Communication with Friends and Family: Not on file    Frequency of Social Gatherings with Friends and Family: Not on file    Attends Yarsani Services: Not on file   [employer] of Clubs or Organizations: Not on file    Attends Club or Organization Meetings: Not on file    Marital Status: Not on file   Intimate Partner Violence:     Fear of Current or Ex-Partner: Not on file    Emotionally Abused: Not on file    Physically Abused: Not on file    Sexually Abused: Not on file   Housing Stability:     Unable to Pay for Housing in the Last Year: Not on file    Number of [place] in the Last Year: Not on file    Unstable Housing in the Last Year: Not on file     Prior Level of Function/Work/Activity:  Normal    Other Clinical Tests  MRI Negative for soft tissue injury and X-RAY Negative for significant degenerative changes, per pt  Previous Treatment Approach   Previous Physical Therapy   Current Medications:    Current Outpatient Medications:     topiramate (TOPAMAX) 50 mg tablet, Take 1 Tablet by mouth two (2) times a day. Indications: migraine prevention, Disp: 60 Tablet, Rfl: 2    cholecalciferol (VITAMIN D3) (50,000 UNITS /1250 MCG) capsule, Take 1 Capsule by mouth every seven (7) days. , Disp: 12 Capsule, Rfl: 2    celecoxib (CeleBREX) 200 mg capsule, Take 200 mg by mouth two (2) times a day., Disp: , Rfl:     pantoprazole (PROTONIX) 40 mg tablet, Take 40 mg by mouth two (2) times a day., Disp: , Rfl:     diclofenac (VOLTAREN) 1 % gel, Apply 1 g to affected area as needed. , Disp: , Rfl:     pramipexole (Mirapex) 0.125 mg tablet, Take 2 Tablets by mouth nightly. Take 1-2 tablets po q hs, Disp: 60 Tablet, Rfl: 5    lidocaine 5 % topical cream, Actual prescription: Lidocaine 5%: Neurontin/gabapentin 5% combined topical cream/gel  Apply to affected area up to 4 times a day as needed for pain, Disp: 45 g, Rfl: 2    tiZANidine (ZANAFLEX) 4 mg tablet, Take 4 mg by mouth three (3) times daily. , Disp: , Rfl:     amitriptyline (ELAVIL) 25 mg tablet, TAKE 1 TO 2 TABLETS BY MOUTH EVERY EVENING, Disp: , Rfl:     pregabalin (LYRICA) 150 mg capsule, TAKE 1 CAPSULE BY MOUTH THREE TIMES DAILY, Disp: , Rfl:     TURMERIC PO, Take 1 Tablet by mouth daily. , Disp: , Rfl:     clonazePAM (KlonoPIN) 0.5 mg tablet, Take 1 Tablet by mouth daily as needed for Anxiety. , Disp: 30 Tablet, Rfl: 3    Linzess 72 mcg cap capsule, , Disp: , Rfl:     hydroCHLOROthiazide (HYDRODIURIL) 25 mg tablet, Take 1 Tablet by mouth daily. , Disp: 90 Tablet, Rfl: 1    benzoyl peroxide 2.5 % topical gel, APPLY TOPICALLY TO FACE AT BEDTIME, Disp: , Rfl:     atomoxetine (Strattera) 80 mg capsule, Take 1 Capsule by mouth daily. , Disp: 90 Capsule, Rfl: 4    citalopram (CELEXA) 20 mg tablet, Take 1 Tablet by mouth every evening., Disp: 90 Tablet, Rfl: 1    traZODone (DESYREL) 50 mg tablet, Take 0.5-1 Tablets by mouth nightly., Disp: 90 Tablet, Rfl: 1    nystatin-triamcinolone (MYCOLOG II) topical cream, Apply  to affected area two (2) times a day., Disp: , Rfl:     vitamin e 600 unit capsule, Take  by mouth daily. , Disp: , Rfl:     norethindrone acetate (AYGESTIN) 5 mg tablet, , Disp: , Rfl:     cyanocobalamin 1,000 mcg tablet, Take 1 Tablet by mouth daily. , Disp: 90 Tablet, Rfl: 3    clindamycin (CLINDAGEL) 1 % topical gel, , Disp: , Rfl:     cod liver oil oil, Take  by mouth., Disp: , Rfl:      Date Last Reviewed:  3/15/2022       Ambulatory/Rehab Services H2 Model Falls Risk Assessment    Risk Factors:       No Risk Factors Identified Ability to Rise from Chair:       (0)  Ability to rise in a single movement    Falls Prevention Plan:       No modifications necessary   Total: (5 or greater = High Risk): 0    ©2010 [facility] [address] [place] [address] States Patent #[id number].  Federal Law prohibits the replication, distribution or use without written permission from [facility]        Number of Personal Factors/Comorbidities that affect the Plan of Care: 1-2: MODERATE COMPLEXITY   EXAMINATION:   Observation/Orthostatic Postural Assessment:  Assessed @ Initial Visit:  Decreased space between toes, mild hallux valgus bilaterally, gross swelling to B ankles      Gait:  normal    Palpation:  Assessed @ Initial Visit: Tenderness to dorsum of L foot to moderate pressure between first and second metatarsals    Girth Measurement:   Joint: Eval Date: 2/7/2022  Re-Assess Date:  Re-Assess Date:    Ankle Figure 8 Measurement RIGHT LEFT RIGHT LEFT RIGHT LEFT                     AROM/PROM         Joint: Eval Date: 2/7/2022  Re-Assess Date: 3/15/22  Re-Assess Date:    Active ROM RIGHT LEFT RIGHT LEFT RIGHT LEFT   Ankle Dorsiflexion knee straight -1 2 10 6       Dorsiflexion knee bent 14 15           Ankle Plantarflexion 37 36           Ankle Inversion 33 33           Ankle Eversion 27 14                                     Passive ROM              Ankle Dorsiflexion             Ankle Plantarflexion             Ankle Inversion             Ankle Eversion               Strength:     Eval Date:2/7/2022  Re-Assess Date: 3/15/22  Re-Assess Date:      RIGHT LEFT RIGHT LEFT RIGHT LEFT   Ankle Dorsiflexion 5/5 5/5           Ankle Plantarflexion /5 /5 5/5 5-/5       Ankle Inversion 4+/5 4+/5 5/5 5/5       Ankle Eversion 4+/5 4+/5           Knee Flexion /5 /5           Knee Extension /5 /5                            Unable to fully assess joint mobility due to increased sensitivity and pain  Joint Mobility Eval Date: 3/15/2022  Re-Assess Date:  Re-Assess Date:     RIGHT LEFT RIGHT LEFT RIGHT LEFT   Subtalar             Talocrual                                         Special Tests:   Talar Tilt Test: Negative    Neurological Screen: Assessed @ Initial Visit No radiating symptoms down leg    Functional Mobility:  Assessed @ Initial Visit Limited tolerance of walking and standing    Balance and Mobility:    Test Result   Timed up and Go    30 second Sit to Stand    6 Minute Walk Test    Single Leg Balance             Body Structures Involved:  1. Bones  2. Joints  3. Muscles  4. Ligaments Body Functions Affected:  1. Sensory/Pain  2. Neuromusculoskeletal  3. Movement Related Activities and Participation Affected:  1. Mobility  2. Self Care   Number of elements that affect the Plan of Care: 3: MODERATE COMPLEXITY   CLINICAL PRESENTATION:   Presentation: Evolving clinical presentation with changing clinical characteristics: MODERATE COMPLEXITY   CLINICAL DECISION MAKING:   Tool Used: FOOT AND ANKLE ABILITY MEASURE (FAAM)  Score:  Initial: 63/84 Most Recent: 61 (Date: 3/15/22 )   Interpretation of Score: For the \"Activities of Daily Living\", there are 21 questions each scored on a 5 point scale with 0 representing \"Unable to do\" and 4 representing \"No difficulty\". The lower the score, the greater the functional disability. 84/84 represents no disability. Minimal detectable change is 5.7 points. With the addition of the 8 questions in the \"Sports Subscale,\" there are 29 questions, each scored on a 5 point scale with 0 representing \"Unable to do\" and 4 representing \"No difficulty\". The lower the score, the greater the functional disability. 116/116 represents no disability. Minimal detectable change is 12.3 points. Medical Necessity:   · Patient is expected to demonstrate progress in strength and range of motion to improve tolerance to prolonged standing and walking. Reason for Services/Other Comments:  · Patient continues to require present interventions due to patient's inability to walk without pain. Use of outcome tool(s) and clinical judgement create a POC that gives a: Questionable prediction of patient's progress: MODERATE COMPLEXITY       See associated treatment note for treatment provided today.            Future Appointments   Date Time Provider Jorje Moulton   3/15/2022  7:30 AM Shakeel Gutiérrez PT [facility]   3/15/2022  1:30 PM Dewitt Mortimer, PA SSA POAI POA   3/15/2022  3:00 PM Tesha Olson, PT, DPT [facility]   3/16/2022  9:35 AM Salma José MD SSA POAI POA   3/18/2022  8:00 AM Zaria Beatty NP POAG POA   3/21/2022  8:00 [facility]   3/28/2022  8:00 AM [facility]   4/18/2022  9:15 AM Marcial Benson DO BSUG BSUG   4/28/2022  4:00 PM Diana Norton MD [facility]   5/4/2022  3:00 PM Casa Harden MD [facility] BSNE   5/16/2022  4:00 PM Diana Norton MD [facility]       Total Treatment Duration:       Baby General, PT

## 2022-03-16 ENCOUNTER — HOSPITAL ENCOUNTER (OUTPATIENT)
Dept: PHYSICAL THERAPY | Age: 52
Discharge: HOME | End: 2022-03-16
Attending: OBSTETRICS & GYNECOLOGY
Payer: COMMERCIAL

## 2022-03-16 PROCEDURE — 97110 THERAPEUTIC EXERCISES: CPT

## 2022-03-16 PROCEDURE — 97140 MANUAL THERAPY 1/> REGIONS: CPT

## 2022-03-16 NOTE — PROGRESS NOTES
Mesha Fonseca  : 1970  Primary: [facility]  Secondary:  [facility]  [address] , [address], [place] [address]  [phone]   [phone]      OUTPATIENT PHYSICAL THERAPY: Daily Treatment Note 3/16/2022   Visit Count:  4  ICD-10: Treatment Diagnosis: Lack of coordination (muscle incoordination) (R27.8), Pelvic floor dysfunction in female (M62.98), Urge incontinence (N39.41) and Myalgia (M79.1)  Precautions/Allergies:   Sulfa (sulfonamide antibiotics)   TREATMENT PLAN:  Effective Dates: 2022 TO 2022 (90 days). Frequency/Duration: 1 time a week for 90 Day(s) MEDICAL/REFERRING DIAGNOSIS:  OAB (overactive bladder) [N32.81]  High-tone pelvic floor dysfunction [N94.89]   DATE OF ONSET: chronic  REFERRING PHYSICIAN: Rosendo Nicole DO MD Orders: Evaluate and treat  Return MD Appointment: 22     Pre-treatment Symptoms/Complaints:  UUI, HTPFD    Using quick contractions vs. squeeze and hold and this is helping to control urine better with urges. Notices, on occasion, that she with have some L LBP with needing to urinate. This is occasional. Pain is sharp and lower back. Pt reports falling in the [employer] restroom on 21. States that she slipped and \"did a split\" with her L LE behind her. She has had L groin pain since then. This is also the side her LBP is on. She saw her PCP about this in January. Pain has not improved. Pain often limits her motion. Pain: Initial: Pain Intensity 1: 0  Post Session:  2/10 \"soreness\" inner thigh   Medications Last Reviewed:  3/16/2022  Updated Objective Findings:  TTP levator ani B, QL & proximal adductor on L side; L>R tension of PFM; - thigh thrust, inconclusive Scour test   TREATMENT:   THERAPEUTIC EXERCISE: (30 minutes):  Exercises per grid below to improve mobility and coordination.   Required moderate visual, verbal and tactile cues to promote proper body breathing techniques and voluntary relaxation of PFM. Progressed range and repetitions as indicated. TE= therapeutic exercise, TA= therapeutic activity, MT= manual therapy, NE= neuro re-education   Date:  2/17/22 Date:  2/23/22 Date:  3/10/22 Date:  3/16/22   Activity/Exercise Parameters Parameters Parameters    PFM coordination PF drops to improve mm tension and coordinaiton Drops to improve PFM relaxation and tension Drops w/ digital palpation PF drops w/ digital palpation   Diaphragmatic breathing To improve PFM coordination and excursion To improve PFM coordination and excursion W/ stretching and digital palpation to improve PFM ROM    HEP Drops x10 3-4x/day; DB 2x5min Continue with drops and DB; add below stretches 2x/day Continue w/ drops and DB; urge suppression; Stretching 2x/day    Lumbar rotation  x20 x30 x30   Adductor stretch  3x30s 3x30s 3x30s   Happy baby  3x30s; modified w/ feet on bench for HEP  X10, modified with feet on ball   Single knee to chest stretch   3x30s B 3x30s B   Piriformis stretch   3x30s B; demo'd seated 3x30s B ER  3x30s B IR   Hamstring stretch   3x30s    Stepper    6 minutes L1   Karl pose    3x30s   Hip ROM    Passive & active     THERAPEUTIC ACTIVITY: ( 0 minutes): Functional activity training on role and use of urge suppression in order to delay voiding, minimize urge urinary incontinence and improve control in the presence of urge triggers (ie. running water, key in lock, cold, etc.) and Patient instructed in use of various voiding techniques including double voiding and splinting (perineal support and intravaginal support) to improve bladder emptying.   TA Educational Topic Notes Date Completed   Pathology/Anatomy/PFM Function UUI 2/17/22   Bladder health education     Urinary urge suppression  3/10/22   The knack     Voiding strategies Double voiding 3/10/22   Bowel/Bladder log     Bowel health education     Constipation care     Diarrhea/Fecal leakage     Colonic massage     Toilet positioning 2/23/22   Defecation dynamics     Sources of fiber     Return to intercourse/Dilator training     Sexual positioning     Lubricants/vaginal moisturizers     Vaginal irritants     Body mechanics     Posture/ergonomics     Diaphragmatic breathing     Resources and technology       MANUAL THERAPY: (25 minutes): Soft tissue mobilization was utilized and necessary because of the patient's painful spasm and restricted motion of soft tissue. Date Type Location Comments   3/16/2022 Internal assessment/treatment Via vaginal canal Single digital MT to all PFM to improve relaxation, length; CR/SCS to improve discomfort   Not today STM Abdominal wall Skin rolling and scar mobilization    STM adductors Skin rolling                           (Used abbreviations: MET - muscle energy technique; SCS- Strain counter strain; CTM-Connective tissue mobilizations; CR- Contract/Relax; SP- Sustained pressure; PIT- Positional inhibition techniques; STM Soft -tissue mobilization; MM- Myofascial mobilization; TrP-Trigger point release; IASTM- Instrument assisted soft tissue mobilizations, TDN-Trigger point dry needling)    Pt gives verbal consent to internal vaginal assessment/treatment without chaperon present. Treatment/Session Summary:    · Response to Treatment: Pt demonstrating good coordination of PFM drop with decreasing tension throughout the PFM overall. She continues to have mild-moderate tension throughout the L side and tenderness B at levator ani. She revealed today that she had a fall back in December and since then has had L groin pain. Description of pain appears to be more muscular, along proximal adductor. Discussed that groin pain may be causing mm guarding along the L side in both PF and QL. Encouraged pt to pay attention to guarding patterns this week. Will plan to assess hip strength and progress with strengthening activities as indicated at next visit.   · Communication/Consultation:  possible referral to ortho for hip if not improving  · Equipment provided today:  None today  · Recommendations/Intent for next treatment session: Next visit will focus on down training, manual therapy, progress flexibility, hip strength testing/strengthening activities  · Variation from POC: N/A  Total Treatment Billable Duration: 30 minutes TE, 25 minutes MT  PT Patient Time In/Time Out  Time In: 0745  Time Out: Ameya, PT, DPT     Future Appointments   Date Time Provider Jorje Saige   3/16/2022  9:35 AM Maikel Lo MD [facility] POAI POA   3/18/2022  8:00 AM Hammad Beatty NP [facility] POA   3/21/2022  8:00 AM [facility]   3/28/2022  8:00 AM [facility]   3/29/2022  8:00 AM ANNY Loeng [facility] POAI POA   4/18/2022  9:15 AM Tonia Stephen DO [facility] BSUG   4/28/2022  4:00 PM Saturnino Gonzalez MD [facility]   5/4/2022  3:00 PM Albina Santacruz MD BSNE BSNE   5/16/2022  4:00 PM Saturnino Gonzalez MD [facility]

## 2022-03-18 PROBLEM — G25.0 ESSENTIAL TREMOR: Status: ACTIVE | Noted: 2021-08-02

## 2022-03-18 PROBLEM — M54.12 RADICULOPATHY OF CERVICAL REGION: Status: ACTIVE | Noted: 2019-04-05

## 2022-03-18 PROBLEM — M79.631 RIGHT FOREARM PAIN: Status: ACTIVE | Noted: 2022-03-18

## 2022-03-18 PROBLEM — M79.2 NEUROPATHIC PAIN: Status: ACTIVE | Noted: 2021-08-02

## 2022-03-18 PROBLEM — D25.9 UTERINE LEIOMYOMA: Status: ACTIVE | Noted: 2022-01-13

## 2022-03-18 PROBLEM — M18.11 ARTHRITIS OF CARPOMETACARPAL (CMC) JOINT OF RIGHT THUMB: Status: ACTIVE | Noted: 2022-03-18

## 2022-03-18 PROBLEM — K52.832 LYMPHOCYTIC COLITIS: Status: ACTIVE | Noted: 2021-02-02

## 2022-03-18 PROBLEM — G44.221 CHRONIC TENSION-TYPE HEADACHE, INTRACTABLE: Status: ACTIVE | Noted: 2018-11-02

## 2022-03-18 PROBLEM — M25.531 RIGHT WRIST PAIN: Status: ACTIVE | Noted: 2022-03-18

## 2022-03-18 PROBLEM — E16.2 HYPOGLYCEMIA: Status: ACTIVE | Noted: 2021-02-10

## 2022-03-18 PROBLEM — M67.40 GANGLION CYST: Status: ACTIVE | Noted: 2022-01-03

## 2022-03-19 PROBLEM — M54.12 CERVICAL RADICULAR PAIN: Status: ACTIVE | Noted: 2019-08-19

## 2022-03-19 PROBLEM — Z86.0100 HISTORY OF COLONIC POLYPS: Status: ACTIVE | Noted: 2017-06-05

## 2022-03-19 PROBLEM — Z86.010 HISTORY OF COLONIC POLYPS: Status: ACTIVE | Noted: 2017-06-05

## 2022-03-19 PROBLEM — F31.9 BIPOLAR DEPRESSION (HCC): Status: ACTIVE | Noted: 2018-09-20

## 2022-03-19 PROBLEM — K50.00 TERMINAL ILEITIS WITHOUT COMPLICATION (HCC): Status: ACTIVE | Noted: 2021-04-22

## 2022-03-19 PROBLEM — E53.8 B12 DEFICIENCY: Status: ACTIVE | Noted: 2019-08-19

## 2022-03-19 PROBLEM — R13.14 PHARYNGOESOPHAGEAL DYSPHAGIA: Status: ACTIVE | Noted: 2019-05-02

## 2022-03-19 PROBLEM — G25.81 RESTLESS LEGS SYNDROME (RLS): Status: ACTIVE | Noted: 2021-08-02

## 2022-03-19 PROBLEM — K58.2 IRRITABLE BOWEL SYNDROME WITH BOTH CONSTIPATION AND DIARRHEA: Status: ACTIVE | Noted: 2017-06-05

## 2022-03-19 PROBLEM — R68.81 EARLY SATIETY: Status: ACTIVE | Noted: 2017-06-05

## 2022-03-19 PROBLEM — K30 FUNCTIONAL DYSPEPSIA: Status: ACTIVE | Noted: 2017-06-05

## 2022-03-19 PROBLEM — F41.9 ANXIETY: Status: ACTIVE | Noted: 2021-02-10

## 2022-03-19 PROBLEM — M79.642 LEFT HAND PAIN: Status: ACTIVE | Noted: 2019-09-23

## 2022-03-19 PROBLEM — K44.9 HIATAL HERNIA WITH GERD: Status: ACTIVE | Noted: 2017-09-05

## 2022-03-19 PROBLEM — M65.4 DE QUERVAIN'S TENOSYNOVITIS, RIGHT: Status: ACTIVE | Noted: 2022-01-03

## 2022-03-19 PROBLEM — K29.80 DUODENITIS: Status: ACTIVE | Noted: 2019-07-24

## 2022-03-19 PROBLEM — N94.89 HIGH-TONE PELVIC FLOOR DYSFUNCTION: Status: ACTIVE | Noted: 2022-01-13

## 2022-03-19 PROBLEM — K21.00 GERD WITH ESOPHAGITIS: Status: ACTIVE | Noted: 2019-07-24

## 2022-03-19 PROBLEM — M62.89 HIGH-TONE PELVIC FLOOR DYSFUNCTION: Status: ACTIVE | Noted: 2022-01-13

## 2022-03-19 PROBLEM — E55.9 VITAMIN D DEFICIENCY: Status: ACTIVE | Noted: 2019-08-19

## 2022-03-19 PROBLEM — K21.9 HIATAL HERNIA WITH GERD: Status: ACTIVE | Noted: 2017-09-05

## 2022-03-19 PROBLEM — K59.09 OTHER CONSTIPATION: Status: ACTIVE | Noted: 2021-04-22

## 2022-03-19 PROBLEM — G89.4 CHRONIC PAIN SYNDROME: Status: ACTIVE | Noted: 2019-08-19

## 2022-03-19 PROBLEM — N32.81 OAB (OVERACTIVE BLADDER): Status: ACTIVE | Noted: 2022-01-13

## 2022-03-19 PROBLEM — R11.0 NAUSEA: Status: ACTIVE | Noted: 2021-02-02

## 2022-03-19 PROBLEM — R20.0 NUMBNESS AND TINGLING: Status: ACTIVE | Noted: 2019-09-23

## 2022-03-19 PROBLEM — R53.82 CHRONIC FATIGUE: Status: ACTIVE | Noted: 2018-04-06

## 2022-03-19 PROBLEM — R20.2 NUMBNESS AND TINGLING: Status: ACTIVE | Noted: 2019-09-23

## 2022-03-19 PROBLEM — G56.02 LEFT CARPAL TUNNEL SYNDROME: Status: ACTIVE | Noted: 2018-02-23

## 2022-03-19 PROBLEM — K22.2 SCHATZKI'S RING: Status: ACTIVE | Noted: 2019-07-24

## 2022-03-19 PROBLEM — F51.01 PRIMARY INSOMNIA: Status: ACTIVE | Noted: 2019-04-02

## 2022-03-19 PROBLEM — Z79.899 ENCOUNTER FOR MEDICATION MANAGEMENT: Status: ACTIVE | Noted: 2021-08-02

## 2022-03-20 PROBLEM — F33.0 MILD EPISODE OF RECURRENT MAJOR DEPRESSIVE DISORDER (HCC): Status: ACTIVE | Noted: 2018-09-20

## 2022-03-20 PROBLEM — K29.60 EROSIVE GASTRITIS: Status: ACTIVE | Noted: 2017-09-05

## 2022-03-21 ENCOUNTER — HOSPITAL ENCOUNTER (OUTPATIENT)
Dept: PHYSICAL THERAPY | Age: 52
Discharge: HOME | End: 2022-03-21
Attending: OBSTETRICS & GYNECOLOGY
Payer: COMMERCIAL

## 2022-03-21 PROCEDURE — 97110 THERAPEUTIC EXERCISES: CPT

## 2022-03-21 PROCEDURE — 97140 MANUAL THERAPY 1/> REGIONS: CPT

## 2022-03-21 NOTE — PROGRESS NOTES
Waclifsp Drew  : 1970  Primary: [facility]*  Secondary:  [facility]  [address] , [address], [place] [address]  [phone]   [phone]      OUTPATIENT PHYSICAL THERAPY: Daily Treatment Note 3/21/2022   Visit Count:  5  ICD-10: Treatment Diagnosis: Lack of coordination (muscle incoordination) (R27.8), Pelvic floor dysfunction in female (M62.98), Urge incontinence (N39.41) and Myalgia (M79.1)  Precautions/Allergies:   Sulfa (sulfonamide antibiotics)   TREATMENT PLAN:  Effective Dates: 2022 TO 2022 (90 days). Frequency/Duration: 1 time a week for 90 Day(s) MEDICAL/REFERRING DIAGNOSIS:  OAB (overactive bladder) [N32.81]  High-tone pelvic floor dysfunction [N94.89]   DATE OF ONSET: chronic  REFERRING PHYSICIAN: Librado Miller DO MD Orders: Evaluate and treat  Return MD Appointment: 22     Pre-treatment Symptoms/Complaints:  UUI, HTPFD    Woke up this AM w/ her back bothering her some. Presents with a slight L side limp. She took a muscle relaxer this AM and hopes this will help. Pain: Initial: Pain Intensity 1: 0  Post Session:  0/10    Medications Last Reviewed:  3/21/2022  Updated Objective Findings:  Global hip strength w/ MMT: 3/5; Functionally WNL   TREATMENT:   THERAPEUTIC EXERCISE: (40 minutes):  Exercises per grid below to improve mobility, strength and coordination. Required moderate visual, verbal and tactile cues to promote proper body alignment, promote proper body posture, promote proper body mechanics and promote proper body breathing techniques. Progressed resistance, range, repetitions and complexity of movement as indicated.   TE= therapeutic exercise, TA= therapeutic activity, MT= manual therapy, NE= neuro re-education   Date:  22 Date:  22 Date:  3/10/22 Date:  3/16/22 Date:  3/21/22   Activity/Exercise Parameters Parameters Parameters Parameters Parameters   PFM coordination PF drops to improve mm tension and coordinaiton Drops to improve PFM relaxation and tension Drops w/ digital palpation PF drops w/ digital palpation    Diaphragmatic breathing To improve PFM coordination and excursion To improve PFM coordination and excursion W/ stretching and digital palpation to improve PFM ROM     HEP Drops x10 3-4x/day; DB 2x5min Continue with drops and DB; add below stretches 2x/day Continue w/ drops and DB; urge suppression; Stretching 2x/day     Lumbar rotation  x20 x30 x30 3x30s B   Adductor stretch  3x30s 3x30s 3x30s 3x30s B   Happy baby  3x30s; modified w/ feet on bench for HEP  X10, modified with feet on ball 3x30s (DKTC)   Single knee to chest stretch   3x30s B 3x30s B 3x30s B   Piriformis stretch   3x30s B; demo'd seated 3x30s B ER  3x30s B IR 3x30s B   Hamstring stretch   3x30s     Stepper    6 minutes L1 10 minutes L1   Karl pose    3x30s    Hip ROM    Passive & active    Hip abduction     X25, lime   Bridge      3x10   Calf stretch     3x30s B     THERAPEUTIC ACTIVITY: ( 0 minutes): Functional activity training on role and use of urge suppression in order to delay voiding, minimize urge urinary incontinence and improve control in the presence of urge triggers (ie. running water, key in lock, cold, etc.) and Patient instructed in use of various voiding techniques including double voiding and splinting (perineal support and intravaginal support) to improve bladder emptying.   TA Educational Topic Notes Date Completed   Pathology/Anatomy/PFM Function UUI 2/17/22   Bladder health education     Urinary urge suppression  3/10/22   The gisell     Voiding strategies Double voiding 3/10/22   Bowel/Bladder log     Bowel health education     Constipation care     Diarrhea/Fecal leakage     Colonic massage     Toilet positioning  2/23/22   Defecation dynamics     Sources of fiber     Return to intercourse/Dilator training     Sexual positioning     Lubricants/vaginal moisturizers     Vaginal irritants Body mechanics     Posture/ergonomics     Diaphragmatic breathing     Resources and technology       MANUAL THERAPY: (15 minutes): Soft tissue mobilization was utilized and necessary because of the patient's pain. Date Type Location Comments   3/21/2022 Internal assessment/treatment Via vaginal canal Single digital MT to all PFM to improve relaxation, length; CR/SCS to improve discomfort   Not today STM Abdominal wall Skin rolling and scar mobilization   Not today STM adductors Skin rolling                           (Used abbreviations: MET - muscle energy technique; SCS- Strain counter strain; CTM-Connective tissue mobilizations; CR- Contract/Relax; SP- Sustained pressure; PIT- Positional inhibition techniques; STM Soft -tissue mobilization; MM- Myofascial mobilization; TrP-Trigger point release; IASTM- Instrument assisted soft tissue mobilizations, TDN-Trigger point dry needling)    Pt gives verbal consent to internal vaginal assessment/treatment without chaperon present. Treatment/Session Summary:    · Response to Treatment: Pt demonstrates global hip weakness with difficulty maintaining positioning for hip strengthen testing. She is performing hip strengthening exercises daily as prescribed to her in a previous POC for her hips and back. Encouraged pt to continue with strength and stretching exercises daily. She continues to have tenderness with palpation along L adductor and L anterior PFM w/ light pressure. Globally muscle tension/tone throughout the PFM and adductors is improving w/o noted spasms during manual therapy. Overall, she is greatly improved with urge control and UUI.   · Communication/Consultation:  None today  · Equipment provided today:  None today  · Recommendations/Intent for next treatment session: Next visit will focus on down training, manual therapy, progress flexibility, hip strength testing/strengthening activities  · Variation from POC: N/A  Total Treatment Billable Duration: 40 minutes TE, 15 minutes MT  PT Patient Time In/Time Out  Time In: 6982  Time Out: Patti Warner 117, PT, DPT     Future Appointments   Date Time Provider Jorje Saige   3/24/2022 10:45 AM PO[facility]   3/25/2022 11:40 AM Zaria Beatty, RHONA POAG POA   3/28/2022  8:00 AM Jazmine Olson, PT, DPT [facility]   3/29/2022  8:00 AM ANNY Plunkett SSA POAI POA   4/8/2022  8:45 AM Salma José MD SSA POAI POA   4/18/2022  9:15 AM Marcial Benson DO BSUG BSUG   4/28/2022  4:00 PM Diana Norton MD [facility]   5/4/2022  3:00 PM Casa Harden MD BSNE BSNE   5/16/2022  4:00 PM Diana Norton MD [facility]

## 2022-03-24 PROBLEM — M79.631 RIGHT FOREARM PAIN: Status: ACTIVE | Noted: 2022-03-18

## 2022-03-24 PROBLEM — M25.531 RIGHT WRIST PAIN: Status: ACTIVE | Noted: 2022-03-18

## 2022-03-24 PROBLEM — M18.11 ARTHRITIS OF CARPOMETACARPAL (CMC) JOINT OF RIGHT THUMB: Status: ACTIVE | Noted: 2022-03-18

## 2022-03-28 ENCOUNTER — HOSPITAL ENCOUNTER (OUTPATIENT)
Dept: PHYSICAL THERAPY | Age: 52
Discharge: HOME | End: 2022-03-28
Attending: OBSTETRICS & GYNECOLOGY
Payer: COMMERCIAL

## 2022-03-28 PROCEDURE — 97110 THERAPEUTIC EXERCISES: CPT

## 2022-03-28 NOTE — THERAPY DISCHARGE
Sonal Escobedo  : 1970  Primary: Kacey [facility] Of [facility]*  Secondary:  [facility] [place] [address], [address], [place] [address]  [phone]   [phone]       OUTPATIENT PHYSICAL THERAPY:Discharge Summary 3/28/2022   ICD-10: Treatment Diagnosis: Lack of coordination (muscle incoordination) (R27.8), Pelvic floor dysfunction in female (M62.98), Urge incontinence (N39.41) and Myalgia (M79.1)  Precautions/Allergies:   Sulfa (sulfonamide antibiotics)   TREATMENT PLAN:  Effective Dates: 2022 TO 2022 (90 days). Frequency/Duration: 1 time a week for 90 Day(s) MEDICAL/REFERRING DIAGNOSIS:  Overactive bladder [N32.81]   DATE OF ONSET: chronic  REFERRING PHYSICIAN: Piedad Davila DO MD Orders: Evaluate and treat  Return MD Appointment: 22   DISCHARGE SUMMARY: Ms. Di Eng has been seen in skilled PT from 22 to 3/28/22. Patient has attended 6 out of 6 scheduled visits, with 0 cancellation(s) and 0 no shows. Treatment has emphasized PFM relaxation/downtraining, flexibility/mobility and bladder health education and retraining. Patient responded well to therapy, with improvements in urinary urge control and decreased urinary leakage. She continues to experience chronic low back pain which is likely contributing to increased levator ani tension. We discussed continuing to emphasis relaxation and flexibility post strengthening activities. Pt has achieved goals as indicated below and all questions have been answered to their satisfaction. Pt was invited to call with any further questions or concerns as needed. INITIAL ASSESSMENT: Sonal Escobedo presents with musculoskeletal limitations including pelvic floor muscle (PFM) tension, reduced PFM Range of Motion (ROM), increased tenderness to palpation of the PFM, reduced coordination and awareness of PFM, abdominal soft tissue restrictions and poor diaphragm excursion.   These limitations are impairing the patient's ability to properly coordinate perineal elevation and descent for normalized PFM function, contributing to urinary dysfunction, including urge urinary incontinence. As a result, she is limited in her/his ability to participate in maintaining normal urge control in order to make it to the restroom without urinary incontinence. GOALS: (Goals have been discussed and agreed upon with patient.)  Short-Term Functional Goals: Time Frame: 6 weeks  1. Pt will demonstrate I with basic PFM HEP to improve awareness, coordination, and timing of PFM. (MET 3/28/22)  2. Patient will demonstrate understanding of and ability to teach back appropriate water intake, bladder irritants, toileting frequency, and positioning for improved self-management of symptoms. (MET 3/28/22)   3. Patient will demonstrate appropriate use of the pelvic floor muscle group (quick flicks and/or drops), without compensation, to implement urge suppression appropriately with urgency of urination and decrease the number of pads per day or UI episodes by 50%. (MET 3/28/22)  4. Patient will demonstrate ability to perform diaphragmatic breathing in multiple positions to assist in pelvic floor tension reduction. (MET 3/28/22)  Discharge Goals: Time Frame: 12 weeks  1. Patient will demonstrate independence with an advanced HEP for general conditioning, core stabilization, and mobility to facilitate carry over and independent management of symptoms. (MET 3/28/22)  2. Patient will be independent with implementation of a timed voiding schedule and use of urge suppression to reduce urinary frequency to 6-8/day. (Pt able to utilize urge suppression in order to put on urgency  3. Patient will improve outcome score by 12%.  (NOT MET, PFIQ slightly increased despite pt reports of 80% improvement in urinary symptoms 3/28/22)    OUTCOME MEASURE:   Tool Used: Pelvic Floor Impact Questionnaire--short form 7 (PFIQ-7)   Score:  Initial: 2/17/22  · Bladder or Urine: 23.81/100  · Bowel or Rectum: 19.05/100  · Vagina or Pelvis: 0/100 Most Recent: 3/28/22  · Bladder or Urine: 28.57/100  · Bowel or Rectum: 0/100  · Vagina or Pelvis: 0/100   Interpretation of Score: Each of the 7 sections is scored on a scale from 0-3; 0 representing \"Not at all\", 3 representing \"Quite a bit\". The mean value is taken from all the answered items, then multiplied by 100 to obtain the scale score, ranging from 0-100. This process is repeated for each column representing bowel, bladder, and pelvic pain. Pt reports 80% improved in urinary symptoms since start of PT. Total Duration:  PT Patient Time In/Time Out  Time In: 0800  Time Out: 0850    Rehabilitation Potential For Stated Goals: Good  Regarding Nivia Gannon's therapy, I certify that the treatment plan above will be carried out by a therapist or under their direction. Thank you for this referral,  Reji Albarado, PT, DPT     Referring Physician Signature: Erich Barajas DO No Signature is Required for this note. PAIN/SUBJECTIVE:   Initial: Pain Intensity 1: 0  Post Session:  0/10   HISTORY:   History of Injury/Illness (Reason for Referral):  Ms. Nehemias Kasper is a 47 yo female referred to pelvic floor physical therapy (PFPT) by Erich Barajas DO 2/2 OAB and high tone pelvic floor dysfunction. Pt reports that symptoms began a while ago, but has been worsening. Previous treatment includes nothing. Pt reports not being able to control urinary urge in order to make it to the restroom without leakage. This has been getting worse over time. Used to wear just a panty liner, not wearing a size 3 pad. Leakage is small and large amounts. This is very embarrassing for her. She denies urinary leakage with coughing, laughing, sneezing and movement. She does have a history of IBS-C. She takes Linzess for this. Her stool is much softer now. Has a history of pelvic pain 2/2 fibroid.  Taking Aygestin which has helped to resolve pain, but dealt with this for a long time. Concern about weight gain and wonders if this medication has contributed to this. 3/28/22 update: pt doing well with reduced urgency and UUI. Pt reports good ability for utilizing urge suppression techniques in order to improve urge control and minimize UUI. She is still wearing pads during the day for protection, however states that she is wearing \"just in case\". Urinary: Frequency \"probably going about every hours because I'm constantly drinking water\", 0 x/night. Positive for urge urinary incontinence and urinary frequency. Negative for stress urinary incontinence, urinary urgency, incomplete emptying, urinary hesitancy/intermittency, dysuria, hematuria, nocturia and nocturnal enuresis. Pt does use pads for urinary protection; 1 pads per day (PPD). Wearing size 3 pad. \"wearing just in case\". Fluid intake: 56-60 oz water/day; bladder irritants include: limiting to about 16oz of juice, tea or soda . Pt does not limit fluid intake due to bladder control. Bowel: Frequency at least 1x/day. Taking Linzess Negative for pain with bowel movement, pushing/straining with bowel movement, fecal incontinence, constipation and incomplete emptying. Pt does not use pads for bowel protection; 0 pads per day (PPD). [place] stool type: 5 and 6. Use of stool softeners or laxatives? NO; Linzess (managed by GI)    Sexual: Pt is not sexually active. Pelvic Organ Prolapse/Pelvic Pain: History of abdominal pain due to fibroids. Taking Aygsetin which has resolved pain. Occasional LBP (sees pain management for LBP, has seen PT in past)    OB History: Number of pregnancies: 2 Number of vaginal deliveries: 2 Number of c-sections: 0.        Current Medications:       Current Outpatient Medications:     pantoprazole (PROTONIX) 40 mg tablet, Take 40 mg by mouth two (2) times a day., Disp: , Rfl:     diclofenac (VOLTAREN) 1 % gel, Apply 1 g to affected area as needed. , Disp: , Rfl:     topiramate (TOPAMAX) 25 mg tablet, Take 1 Tablet by mouth two (2) times a day. Indications: migraine prevention, Disp: 60 Tablet, Rfl: 1    pramipexole (Mirapex) 0.125 mg tablet, Take 2 Tablets by mouth nightly. Take 1-2 tablets po q hs, Disp: 60 Tablet, Rfl: 5    lidocaine 5 % topical cream, Actual prescription: Lidocaine 5%: Neurontin/gabapentin 5% combined topical cream/gel  Apply to affected area up to 4 times a day as needed for pain, Disp: 45 g, Rfl: 2    tiZANidine (ZANAFLEX) 4 mg tablet, Take 4 mg by mouth three (3) times daily. , Disp: , Rfl:     amitriptyline (ELAVIL) 25 mg tablet, TAKE 1 TO 2 TABLETS BY MOUTH EVERY EVENING, Disp: , Rfl:     pregabalin (LYRICA) 150 mg capsule, TAKE 1 CAPSULE BY MOUTH THREE TIMES DAILY, Disp: , Rfl:     TURMERIC PO, Take 1 Tablet by mouth daily. , Disp: , Rfl: (NOT TAKING PER PATIENT 2/17/22)    naratriptan (AMERGE) 2.5 mg tab, , Disp: , Rfl: (NOT TAKING PER PATIENT 2/17/22)    clonazePAM (KlonoPIN) 0.5 mg tablet, Take 1 Tablet by mouth daily as needed for Anxiety. , Disp: 30 Tablet, Rfl: 3    Linzess 72 mcg cap capsule, , Disp: , Rfl:     naproxen (NAPROSYN) 500 mg tablet, Take 1 Tablet by mouth two (2) times daily (with meals). (Patient not taking: Reported on 2/15/2022), Disp: 60 Tablet, Rfl: 1    hydroCHLOROthiazide (HYDRODIURIL) 25 mg tablet, Take 1 Tablet by mouth daily. , Disp: 90 Tablet, Rfl: 12    benzoyl peroxide 2.5 % topical gel, APPLY TOPICALLY TO FACE AT BEDTIME, Disp: , Rfl:     atomoxetine (Strattera) 80 mg capsule, Take 1 Capsule by mouth daily. , Disp: 90 Capsule, Rfl: 4    citalopram (CELEXA) 20 mg tablet, Take 1 Tablet by mouth every evening., Disp: 90 Tablet, Rfl: 1    traZODone (DESYREL) 50 mg tablet, Take 0.5-1 Tablets by mouth nightly., Disp: 90 Tablet, Rfl: 1    nystatin-triamcinolone (MYCOLOG II) topical cream, Apply  to affected area two (2) times a day., Disp: , Rfl:     vitamin e 600 unit capsule, Take  by mouth daily. , Disp: , Rfl:     norethindrone acetate (AYGESTIN) 5 mg tablet, , Disp: , Rfl:     cyanocobalamin 1,000 mcg tablet, Take 1 Tablet by mouth daily. , Disp: 90 Tablet, Rfl: 3    clindamycin (CLINDAGEL) 1 % topical gel, , Disp: , Rfl:     omeprazole (PRILOSEC) 20 mg capsule, Take 20 mg by mouth two (2) times a day. (Patient not taking: Reported on 2/15/2022), Disp: , Rfl:     cod liver oil oil, Take  by mouth., Disp: , Rfl:    Date Last Reviewed: 3/28/2022   EXAMINATION:   External Observations:   Voluntary contraction: [] absent     [x] present   Involuntary contraction: [] absent     [x] present   Involuntary relaxation: [] absent     [x] present   Perineal descent at rest: [x] absent     [] present   Perineal descent with bearing: [x] absent     [] present   Skin integrity: WNL    Pelvic Floor Muscle (PFM) Assessment:   Vaginal vault size: [] decreased     [] increased     [x] WNL   Muscle volume: [] decreased     [x] WNL     [] Defect   PFM tension: [] decreased     [x] increased     [] WNL (mildly at Levator ani)    Contraction ability:  Voluntary contraction: [] absent     [] weak     [x] moderate      [] strong  Voluntary relaxation: [] absent     [] partial     [x] complete   MMT: 2/5   PFM endurance: DNT  Repetitions of endurance contractions: DNT  Number of quick contractions in 10 seconds: DNT  Quality of contractions: good coordination of contraction and relaxation w/ decreased accessory mm use; improve coordination and speed of relaxation    Tissue laxity test:  Anterior wall: [] minimum     [] moderate     [] severe      [x] WNL  Posterior wall: [] minimum     [] moderate     [] severe      [x] WNL    Palpation: via vaginal canal   Superficial Pelvic Floor Musculature (PFM): Tender? Intermediate PFM Tender? Deep PFM Tender?    Superficial Transverse Perineal [] Right  [] Left  []DNT Deep Transverse Perineal [] Right  [x] Left  []DNT Puborectalis [] Right  [] Left  []DNT Ischiocavernosus [] Right  [] Left  []DNT Compressor Urethra [] Right  [] Left  []DNT Pubococcygeus [] Right  [] Left  []DNT   Bulbocavernosus [] Right  [] Left  []DNT   Iliococcygeus [] Right  [x] Left  []DNT       Obturator Internus [] Right  [] Left  [x]DNT       Coccygeus [] Right  [] Left  [x]DNT     Breath assessment:  Observation: A/P chest expansion  Coordination of pelvic floor muscles with breath: minimal pelvic floor muscle excursion  Co-contraction of PFM with transversus abdominis: absent and Limited 2/2 overactivity      External palpation:  Muscle/muscle group Tender?    Adductors [] Right  [x] Left  []DNT   Gluteals [] Right  [] Left  [x]DNT   Piriformis [] Right  [] Left  [x]DNT   Iliopsoas [] Right  [] Left  [x]DNT   Abdominal wall [] Right  [] Left  []DNT   Pubic symphysis [] Right  [x] Left  []DNT

## 2022-03-28 NOTE — PROGRESS NOTES
Nikkie Kim  : 1970  Primary: [facility]*  Secondary:  [facility]  [address] , [address], [place] [address]  [phone]   [phone]      OUTPATIENT PHYSICAL THERAPY: Daily Treatment Note 3/28/2022   Visit Count:  6  ICD-10: Treatment Diagnosis: Lack of coordination (muscle incoordination) (R27.8), Pelvic floor dysfunction in female (M62.98), Urge incontinence (N39.41) and Myalgia (M79.1)  Precautions/Allergies:   Sulfa (sulfonamide antibiotics)   TREATMENT PLAN:  Effective Dates: 2022 TO 2022 (90 days). Frequency/Duration: 1 time a week for 90 Day(s) MEDICAL/REFERRING DIAGNOSIS:  OAB (overactive bladder) [N32.81]  High-tone pelvic floor dysfunction [N94.89]   DATE OF ONSET: chronic  REFERRING PHYSICIAN: Taylor Simon DO MD Orders: Evaluate and treat  Return MD Appointment: 22     Pre-treatment Symptoms/Complaints:  UUI, HTPFD    Doing well with bladder symptoms. Reports 80% improvement. Pain: Initial: Pain Intensity 1: 0  Post Session:  0/10    Medications Last Reviewed:  3/28/2022  Updated Objective Findings:  See discharge assessment   TREATMENT:   THERAPEUTIC EXERCISE: (40 minutes):  Exercises per grid below to improve mobility, strength and coordination. Required moderate visual, verbal and tactile cues to promote proper body alignment, promote proper body posture, promote proper body mechanics and promote proper body breathing techniques. Progressed resistance, range, repetitions and complexity of movement as indicated.   TE= therapeutic exercise, TA= therapeutic activity, MT= manual therapy, NE= neuro re-education   Date:  22 Date:  22 Date:  3/10/22 Date:  3/16/22 Date:  3/21/22 Date:  3/28/22   Activity/Exercise Parameters Parameters Parameters Parameters Parameters    PFM coordination PF drops to improve mm tension and coordinaiton Drops to improve PFM relaxation and tension Drops w/ digital palpation PF drops w/ digital palpation     Diaphragmatic breathing To improve PFM coordination and excursion To improve PFM coordination and excursion W/ stretching and digital palpation to improve PFM ROM      HEP Drops x10 3-4x/day; DB 2x5min Continue with drops and DB; add below stretches 2x/day Continue w/ drops and DB; urge suppression; Stretching 2x/day      Lumbar rotation  x20 x30 x30 3x30s B x20   Adductor stretch  3x30s 3x30s 3x30s 3x30s B 3x30s B   Happy baby  3x30s; modified w/ feet on bench for HEP  X10, modified with feet on ball 3x30s (DKTC) x10 w/ DB, modified w/ feet on SB   Single knee to chest stretch   3x30s B 3x30s B 3x30s B 3x30s B   Piriformis stretch   3x30s B; demo'd seated 3x30s B ER  3x30s B IR 3x30s B 3x30s B   Hamstring stretch   3x30s   5x10s   Stepper    6 minutes L1 10 minutes L1 10 minutes L1   Karl pose    3x30s     Hip ROM    Passive & active     Hip abduction     X25, lime    Bridge      3x10    Calf stretch     3x30s B      THERAPEUTIC ACTIVITY: ( 8 minutes): Functional activity education on avoiding bladder irritants, substitutions for common irritants, recommended fluid intake (types and spacing), appropriate voiding frequency, weight management and overall contributing factors of bowel health on bladder health/function in order to improve independent self management.  Patient was provided a list of common bladder irritants including caffeine, carbonation, alcohol, artificial sweeteners, chocolate, acidic drinks, and tomato based drinks., Functional activity training on role and use of urge suppression in order to delay voiding, minimize urge urinary incontinence and improve control in the presence of urge triggers (ie. running water, key in lock, cold, etc.) and Reducing use of pad/panty liners to minimize tissue irritation long term  TA Educational Topic Notes Date Completed   Pathology/Anatomy/PFM Function UUI 2/17/22   Bladder health education     Urinary urge suppression  3/10/22   The knack     Voiding strategies Double voiding 3/10/22   Bowel/Bladder log     Bowel health education     Constipation care     Diarrhea/Fecal leakage     Colonic massage     Toilet positioning  2/23/22   Defecation dynamics     Sources of fiber     Return to intercourse/Dilator training     Sexual positioning     Lubricants/vaginal moisturizers     Vaginal irritants Decreasing pad usage as able 3/28/22   Body mechanics     Posture/ergonomics     Diaphragmatic breathing     Resources and technology       Treatment/Session Summary:    · Response to Treatment: Ms. Carline Stewart has been seen in skilled PT from 2/17/22 to 3/28/22. Patient has attended 6 out of 6 scheduled visits, with 0 cancellation(s) and 0 no shows. Treatment has emphasized PFM relaxation/downtraining, flexibility/mobility and bladder health education and retraining. Patient responded well to therapy, with improvements in urinary urge control and decreased urinary leakage. She continues to experience chronic low back pain which is likely contributing to increased levator ani tension. We discussed continuing to emphasis relaxation and flexibility post strengthening activities. Pt has achieved goals as indicated below and all questions have been answered to their satisfaction. Pt was invited to call with any further questions or concerns as needed.   · Communication/Consultation:  None today  · Equipment provided today:  None today  Total Treatment Billable Duration: 40 minutes TE, 8 minutes TA  PT Patient Time In/Time Out  Time In: 0800  Time Out: Patti Mallory, PT, DPT     Future Appointments   Date Time Provider Jorje Moulton   3/29/2022  8:00 AM Meng Garcia [facility] POA   3/30/2022 11:30 AM Lebron Boothe NP [facility] MAT [facility]   3/30/2022  1:00 PM Manpreet Beatty NP [facility]AP POA   3/31/2022  4:45 PM Tiarra Doctor, PT [facility]   4/5/2022  7:15 AM Tiarra Choe, PT [facility]   4/13/2022  2:00 PM Rakan Mabry, PT [facility] PO POA   4/18/2022  9:15 AM Rebekah Booker DO [facility]   4/28/2022  4:00 PM Aziza Nunes MD [facility]   5/4/2022  7:55 AM Fan Aguilar MD [facility]   5/4/2022  1:25 PM Le Hi MD [facility]   5/4/2022  3:00 PM David Arellano MD [facility]   5/16/2022  4:00 PM Aziza Nunes MD [facility]

## 2022-03-30 PROBLEM — M25.50 POLYARTHRALGIA: Status: ACTIVE | Noted: 2022-03-30

## 2022-03-31 ENCOUNTER — HOSPITAL ENCOUNTER (OUTPATIENT)
Dept: PHYSICAL THERAPY | Age: 52
Discharge: HOME | End: 2022-03-31
Payer: COMMERCIAL

## 2022-03-31 PROCEDURE — 97110 THERAPEUTIC EXERCISES: CPT

## 2022-03-31 PROCEDURE — 97140 MANUAL THERAPY 1/> REGIONS: CPT

## 2022-03-31 PROCEDURE — 97161 PT EVAL LOW COMPLEX 20 MIN: CPT

## 2022-03-31 NOTE — PROGRESS NOTES
Mimi Hughes  : 1970  Payor: BLUE CROSS / Plan: [place] [facility] / Product Type: [facility] /  [address] [facility] at [address] [place] [address]. [address], [address] [place] [address]  [phone]   [phone]                                                          Yesi Self*      OUTPATIENT PHYSICAL THERAPY: Daily Treatment Note 3/31/2022 Visit Count:  1    ICD-10: Treatment Diagnosis:   Arthralgia of bilateral temporomandibular joint [M26.623]  Myalgia of mastication muscle [M79.11]  Myalgia of auxiliary muscles, head and neck [M79.12]     Precautions/Allergies:   Sulfa (sulfonamide antibiotics)   Fall Risk Score: 0 (? 5 = High Risk)  MD Orders: Eval and Treat MEDICAL/REFERRING DIAGNOSIS:  Arthralgia of bilateral temporomandibular joint [M26.623]  Myalgia of mastication muscle [M79.11]  Myalgia of auxiliary muscles, head and neck [M79.12]   DATE OF ONSET: 3 weeks ago  REFERRING PHYSICIAN: Malinda Grewal 860: TBD by patient            Pre-treatment Symptoms/Complaints: See Initial Eval Dated 2022 for more details. Pain: Initial:3/10  Medications Last Reviewed:  3/31/2022     Post Session: 6/10   Updated Objective Findings: See Initial Eval for more details. TREATMENT:   THERAPEUTIC EXERCISE: (8 minutes):  Exercises per grid below to improve mobility, strength and balance. Required minimal visual, verbal and manual cues to promote proper body alignment and promote proper body posture. Progressed resistance and complexity of movement as indicated. Date:  3/31/2022 Date:   Date:     Activity/Exercise Parameters Parameters Parameters   Education HEP, POC, PT goals, anatomy/pathology     UBE      Rings      Mandible opening  15x each     Mandible lateral excursion  15x each     UT LS [id number]     Scapular Retraction 20x                                   THERAPEUTIC ACTIVITY: ( 0 minutes):  Activities per gid below to improve functional movement related mobility, strength and balance to improve neuro-muscular carryover to daily functional activities for improving patient's quality of life. Required visual, verbal and manual cues to promote proper body alignment and promote proper body posture/mechanics. Progressed resistance and complexity of movement as indicated. Date:  3/31/2022 Date:   Date:     Activity/Exercise Parameters Parameters Parameters                                                                               MANUAL THERAPY: (15 minutes): Joint mobilization, Soft tissue mobilization was utilized and necessary because of the patient's restricted joint motion and restricted motion of soft tissue mobility. Date  3/31/2022    Technique Used Grade  Level # Time(s) Effect while being performed          Jt mobs II III C 3-7 8 min Improved opening 27 to 30mm          Manual Tx  Cervical 5min Decreased pain          Tissue Mobilization  Cervical mandible 2min                        HEP Log Date 1.    3/31/2022   2.  3/31/2022   3. 3/31/2022   4.    5.           [employer]  Treatment/Session Summary:    Response to Treatment: Pt demonstrated understanding of POC and initial HEP. Patient developed an increase in migraine pain with treatment and had difficulty relaxing for mandible mobilization, will try AM treatment vs after work stress. Communication/Consultation:  POC, HEP, PT goals, Faxed initial evaluation to MD.   Equipment provided today: HEP Handout   Recommendations/Intent for next treatment session:   Next visit will focus on Manual Therapy Pain Science Education Traction soft tissue mobilization Scapula and postural strengthening. Treatment Plan of Care Effective Dates: 3/31/2022 TO 5/31/2022 (60 days).   Frequency/Duration: 2 times a week for 60 Days             Total Treatment Billable Duration:   23  Rx plus Eval   PT Patient Time In/Time Out  Time In: 1640  Time Out: [address] PT    Future Appointments   Date Time Provider Jorje Ríosi   4/5/2022  7:15 AM Alethea Walsh, PT [facility]   4/13/2022  2:00 PM Nnamdi Chester, PT [facility] POA   4/18/2022  9:15 AM Heidi Beckett DO [facility]   4/19/2022  1:00 PM William Reddy MD [facility] POA   4/21/2022 11:00 AM Nnamdi Chester, PT [facility] POA   4/28/2022  4:00 PM Hema Mast MD [facility]   5/4/2022  1:25 PM Arturo Banuelos MD [facility] POA   5/4/2022  3:00 PM Sania Schmitz MD [facility] BS[facility]   5/16/2022  4:00 PM Hema Mast MD [facility]

## 2022-03-31 NOTE — THERAPY EVALUATION
Sonal Escobedo  : 1970  Primary: [place] [facility] [place]  Secondary:  [facility] [place] [id number], [address] [facility]  [phone]   [phone]      OUTPATIENT PHYSICAL THERAPY:Initial Evaluation3/    ICD-10: Treatment Diagnosis:   Arthralgia of bilateral temporomandibular joint [M26.623]  Myalgia of mastication muscle [M79.11]  Myalgia of auxiliary muscles, head and neck [M79.12]    Precautions/Allergies:   Sulfa (sulfonamide antibiotics)   Fall Risk Score: 0 (? 5 = High Risk)  MD Orders: Eval and Treat MEDICAL/REFERRING DIAGNOSIS:  Arthralgia of bilateral temporomandibular joint [M26.623]  Myalgia of mastication muscle [M79.11]  Myalgia of auxiliary muscles, head and neck [M79.12]   DATE OF ONSET: 3 weeks ago  REFERRING PHYSICIAN: Malinda Grewal 860: TBD by patient      INITIAL ASSESSMENT:  Ms. Sonal Escobedo has attended 1 physical therapy session including initial evaluation. Sonal Escobedo presents with S/S of increased pain, decreased ROM, decreased strength, decreased functional tolerance consistent with S/S of referring . Sonal Escobedo will benefit from home exercise program, therapeutic and postural strengthening exercises, manual therapeutic techniques (ie. Distraction, SOR, myofascial release/soft tissue mobilization) as appropriate to address Nivia Gannon's current condition. Sonal Escobedo will benefit from skilled PT (medically necessary) to address above deficits affecting participation in basic ADLs and overall functional tolerance. PROBLEM LIST (Impacting functional limitations):  1. Decreased Strength  2. Decreased ADL/Functional Activities  3. Increased Pain  4. Decreased Activity Tolerance  5. Increased Fatigue  6. Decreased Flexibility/Joint Mobility  7.  Decreased [place] with Home Exercise Program INTERVENTIONS PLANNED:  1. Balance Exercise  2. Cryotherapy  3. Family Education  4. Heat  5. Home Exercise Program (HEP)  6. Manual Therapy  7. Neuromuscular Re-education/Strengthening  8. Range of Motion (ROM)  9. Therapeutic Activites  10. Therapeutic Exercise/Strengthenin   TREATMENT PLAN:  Effective Dates: 3/31/2022 TO 5/30/2022 (60 days). Frequency/Duration: 2 times a week for 60 Days  GOALS: (Goals have been discussed and agreed upon with patient.)     Short-Term Goals~4 weeks  Goal Met   1. Eddy Jasso will report <=2/10 pain to right TMJ with normal mouth opening and closing. 1.  [] Date:   2. Eddy Jasso will demonstrate improved NDI score, indicating spinal improvement from 10/50 to 5/50 affecting minimal to no difficulty with performance of cervical mobility and strengthening. 2.  [] Date:   3. Eddy Jasso to be independent with initial HEP for TMJ, cervical region and UE's. 3.  [] Date:   4. Eddy Jasso will improve ROM to >=90% to assist with improved function during instrumental activities of daily living. 4.  [] Date:   5. Eddy Jasso will improve MMT to >=4+/5 to all UE strengthening to return to PLOF and improve functional tolerance. 5.  [] Date:         Long Term Goals~8 weeks Goal Met   1. Eddy Jasso will report <=0-2/10 pain to right TMJ with performance of functional mastication, opening and closing with minimal to no difficulty. 1.  [] Date:   2. Eddy Jasso will demonstrate improved NDI score, indicating spinal improvement from 5/50 to 3/50 affecting minimal to no difficulty with performance of cervical mobility and strengthening. 2.  [] Date:   3. Eddy Jasso to be independent with advanced HEP for cervical region and UE's. 3.  [] Date:               Outcome Measure: Tool Used: Neck Disability Index (NDI)  Score:  Initial: 10/50  Most Recent: X/50 (Date: -- )   Interpretation of Score:  The Neck Disability Index is a revised form of the Oswestry Low Back Pain Index and is designed to measure the activities of daily living in person's with neck pain. Each section is scored on a 0-5 scale, 5 representing the greatest disability. The scores of each section are added together for a total score of 50. Medical Necessity:   · Skilled intervention continues to be required due to address above deficits affecting participation in basic ADLs and overall functional tolerance. Reason for Services/Other Comments:  · Patient continues to require skilled intervention due to address above deficits affecting participation in basic ADLs and overall functional tolerance. Total Treatment Duration:  PT Patient Time In/Time Out  Time In: 1640  Time Out: 1735      Rehabilitation Potential For Stated Goals: GOOD  Regarding Nivia Gannon's therapy, I certify that the treatment plan above will be carried out by a therapist or under their direction. Thank you for this referral,  Sergio Kim, PT     Referring Physician Signature: Abrilluis m Manriqueona _______________________________ Date _____________            HISTORY:   History of Present Injury/Illness (Reason for Referral  Patient reports Hx of jaw pain starting in 2020 secondary to stress at work, leading to her jaw locking in November of 2021. Patient states that she did not want to proceed with recommended sx since injections helped symptoms. She reports doing well managing jaw pain and migraines until 3 weeks ago after having her teeth cleaned. Complains of  limited opening of jaw for eating, clicking with closing, pain in right SCM with opening.     Location:   Pain 1(P1): Right TMJ   Pain 2(P2): Right SCM    Pain Severity:  Current:3/10  Best: 1/10  Worst: 6/10    · Aggravating factors: eating and Chewing  · Relieving factors: Rest  · Irritability: High (onset of Pain is shorter than alleviation of Pain)      Past Medical History/Comorbidities:   Ms. Purvi Zhou  has a past medical history of Anxiety, DDD (degenerative disc disease), cervical (11/29/2021), Depressed, GERD (gastroesophageal reflux disease), Hypertension, IBS (irritable bowel syndrome), Insomnia, and TMJ (dislocation of temporomandibular joint) (04/2021). Ms. Leigha Minor  has a past surgical history that includes pr egd deliver thermal energy sphnctr/cardia gerd; hx colonoscopy; hx tubal ligation; hx lipectomy; and hx cataract removal (Bilateral, 02/2020).     Active Ambulatory Problems     Diagnosis Date Noted    Chronic fatigue 04/06/2018    Chronic tension-type headache, intractable 11/02/2018    Early satiety 06/05/2017    Erosive gastritis 09/05/2017    Functional dyspepsia 06/05/2017    Hiatal hernia with GERD 09/05/2017    History of colonic polyps 06/05/2017    Irritable bowel syndrome with both constipation and diarrhea 06/05/2017    Left carpal tunnel syndrome 02/23/2018    Bipolar depression ([address] 09/20/2018    Primary insomnia 04/02/2019    Radiculopathy of cervical region 04/05/2019    Pharyngoesophageal dysphagia 05/02/2019    B12 deficiency 08/19/2019    Chronic pain syndrome 08/19/2019    Vitamin D deficiency 08/19/2019    Cervical radicular pain 08/19/2019    Numbness and tingling 09/23/2019    Left hand pain 09/23/2019    Anxiety 02/10/2021    Hypoglycemia 02/10/2021    Essential tremor 08/02/2021    Restless legs syndrome (RLS) 08/02/2021    Neuropathic pain 08/02/2021    Encounter for medication management 08/02/2021    Duodenitis 07/24/2019    GERD with esophagitis 07/24/2019    Lymphocytic colitis 02/02/2021    Mild episode of recurrent major depressive disorder ([address] 09/20/2018    Other constipation 04/22/2021    Nausea 02/02/2021    Schatzki's ring 07/24/2019    Terminal ileitis without complication ([address] 10/48/8787    Ganglion cyst 01/03/2022    De Quervain's tenosynovitis, right 01/03/2022    OAB (overactive bladder) 01/13/2022    High-tone pelvic floor dysfunction 01/13/2022    Uterine leiomyoma 01/13/2022    Right wrist pain 03/18/2022    Right forearm pain 03/18/2022    Arthritis of carpometacarpal [place]) joint of right thumb 03/18/2022    Polyarthralgia 03/30/2022     Resolved Ambulatory Problems     Diagnosis Date Noted    No Resolved Ambulatory Problems     Past Medical History:   Diagnosis Date    DDD (degenerative disc disease), cervical 11/29/2021    Depressed     GERD (gastroesophageal reflux disease)     Hypertension     IBS (irritable bowel syndrome)     Insomnia     TMJ (dislocation of temporomandibular joint) 04/2021     Social History/Living Environment:        Social History     Socioeconomic History    Marital status:      Spouse name: Not on file    Number of children: Not on file    Years of education: Not on file    Highest education level: Not on file   Occupational History    Not on file   Tobacco Use    Smoking status: Never Smoker    Smokeless tobacco: Never Used   Vaping Use    Vaping Use: Never used   Substance and Sexual Activity    Alcohol use: Never    Drug use: Never    Sexual activity: Not Currently   Other Topics Concern    Not on file   Social History Narrative    Not on file     Social Determinants of Health     Financial Resource Strain:     Difficulty of Paying Living Expenses: Not on file   Food Insecurity:     Worried About Running Out of Food in the Last Year: Not on file    Valentino of Food in the Last Year: Not on file   Transportation Needs:     Lack of Transportation (Medical): Not on file    Lack of Transportation (Non-Medical):  Not on file   Physical Activity:     Days of Exercise per Week: Not on file    Minutes of Exercise per Session: Not on file   Stress:     Feeling of Stress : Not on file   Social Connections:     Frequency of Communication with Friends and Family: Not on file    Frequency of Social Gatherings with Friends and Family: Not on file    Attends Tenriism Services: Not on file   [employer] of Clubs or Organizations: Not on file    Attends Club or Organization Meetings: Not on file    Marital Status: Not on file   Intimate Partner Violence:     Fear of Current or Ex-Partner: Not on file    Emotionally Abused: Not on file    Physically Abused: Not on file    Sexually Abused: Not on file   Housing Stability:     Unable to Pay for Housing in the Last Year: Not on file    Number of [place] in the Last Year: Not on file    Unstable Housing in the Last Year: Not on file      Prior Level of Function/Work/Activity:  Normal    Other Clinical Tests:          X-RAY Positive for bilateral TMJ Arthralgia    Previous Treatment Approaches:          Massage and Injections  Current Medications:    Current Outpatient Medications:     orphenadrine citrate (NORFLEX) 100 mg sr tablet, TAKE 1 TABLET BY MOUTH TWICE DAILY, Disp: , Rfl:     potassium chloride (K-DUR, KLOR-CON M20) 20 mEq tablet, Take 1 tablet (20 mEq) by mouth See Admin Instructions Take 1 tablet (20mEq) by mouth two times a day for 3 days and then take 1 tablet (20 mEq) daily. , Disp: , Rfl:     lidocaine 5 % topical cream, Actual prescription: Lidocaine 5%: Neurontin/gabapentin 5% combined topical cream/gel  Apply to affected area up to 4 times a day as needed for pain, Disp: 45 g, Rfl: 2    topiramate (TOPAMAX) 50 mg tablet, Take 1 Tablet by mouth two (2) times a day. Indications: migraine prevention, Disp: 60 Tablet, Rfl: 2    cholecalciferol (VITAMIN D3) (50,000 UNITS /1250 MCG) capsule, Take 1 Capsule by mouth every seven (7) days. , Disp: 12 Capsule, Rfl: 2    celecoxib (CeleBREX) 200 mg capsule, Take 200 mg by mouth two (2) times a day., Disp: , Rfl:     pantoprazole (PROTONIX) 40 mg tablet, Take 40 mg by mouth two (2) times a day., Disp: , Rfl:     diclofenac (VOLTAREN) 1 % gel, Apply 1 g to affected area as needed. , Disp: , Rfl:     pramipexole (Mirapex) 0.125 mg tablet, Take 2 Tablets by mouth nightly.  Take 1-2 tablets po q hs, Disp: 60 Tablet, Rfl: 5    lidocaine 5 % topical cream, Actual prescription: Lidocaine 5%: Neurontin/gabapentin 5% combined topical cream/gel  Apply to affected area up to 4 times a day as needed for pain, Disp: 45 g, Rfl: 2    tiZANidine (ZANAFLEX) 4 mg tablet, Take 4 mg by mouth three (3) times daily. , Disp: , Rfl:     amitriptyline (ELAVIL) 25 mg tablet, TAKE 1 TO 2 TABLETS BY MOUTH EVERY EVENING, Disp: , Rfl:     pregabalin (LYRICA) 150 mg capsule, TAKE 1 CAPSULE BY MOUTH THREE TIMES DAILY, Disp: , Rfl:     TURMERIC PO, Take 1 Tablet by mouth daily. , Disp: , Rfl:     clonazePAM (KlonoPIN) 0.5 mg tablet, Take 1 Tablet by mouth daily as needed for Anxiety. , Disp: 30 Tablet, Rfl: 3    Linzess 72 mcg cap capsule, , Disp: , Rfl:     hydroCHLOROthiazide (HYDRODIURIL) 25 mg tablet, Take 1 Tablet by mouth daily. , Disp: 90 Tablet, Rfl: 1    benzoyl peroxide 2.5 % topical gel, APPLY TOPICALLY TO FACE AT BEDTIME, Disp: , Rfl:     atomoxetine (Strattera) 80 mg capsule, Take 1 Capsule by mouth daily. , Disp: 90 Capsule, Rfl: 4    citalopram (CELEXA) 20 mg tablet, Take 1 Tablet by mouth every evening., Disp: 90 Tablet, Rfl: 1    traZODone (DESYREL) 50 mg tablet, Take 0.5-1 Tablets by mouth nightly., Disp: 90 Tablet, Rfl: 1    nystatin-triamcinolone (MYCOLOG II) topical cream, Apply  to affected area two (2) times a day., Disp: , Rfl:     vitamin e 600 unit capsule, Take  by mouth daily. , Disp: , Rfl:     norethindrone acetate (AYGESTIN) 5 mg tablet, , Disp: , Rfl:     cyanocobalamin 1,000 mcg tablet, Take 1 Tablet by mouth daily. , Disp: 90 Tablet, Rfl: 3    clindamycin (CLINDAGEL) 1 % topical gel, , Disp: , Rfl:     cod liver oil oil, Take  by mouth., Disp: , Rfl:         Ambulatory/Rehab Services H2 Model Falls Risk Assessment    Risk Factors:       No Risk Factors Identified Ability to Rise from Chair:       (0)  Ability to rise in a single movement    Falls Prevention Plan:       No modifications necessary   Total: (5 or greater = High Risk): 0    ©2010 [facility] Patent #[id number]. Federal Law prohibits the replication, distribution or use without written permission from [facility]         Date Last Reviewed:  3/31/2022     0: LOW COMPLEXITY   EXAMINATION:   Observation/Orthostatic Postural Assessment:           Forward head and Shoulders  Palpation:         Tenderness right Anterior Temporalis, Deep Masseter, SCM, Pterygoid and Suboccipital  ROM:      AROM/PROM                  Joint: Eval Date: 3/31/2022  Re-Assess Date:  Re-Assess Date:    Active ROM           Cervical Extension 42          Cervical Flexion 53           RIGHT LEFT RIGHT LEFT RIGHT LEFT   Cervical Sidebending 20 20           Cervical Rotation 65 60                        Shoulder Flexion WNL WNL           Shoulder Abduction                          Mandibular opening 27mm ( with right deflection)            Lateral Excursion 2mm 2mm                 Strength:     Eval Date: 3/31/2022  Re-Assess Date:  Re-Assess Date:      RIGHT LEFT RIGHT LEFT RIGHT LEFT   Shoulder Flexion  5/5  5/5           Shoulder Abduction (C5)  5/5  5/5           Shoulder Internal Rotation  5/5  5/5           Shoulder External Rotation  5/5  5/5           Cervical Flexion  4/5  4/5           Cervical Extension  4/5  4/5           Cervical Side Bending  4/5  4/5           Cervical Rotation  4/5  4/5           Scapula 4/5 4/5           Manual:  Joint Directon Grade Treatment Effect   Right TMJ Distraction and Lateral Glide II and III Reproduced Symptoms   Left TMJ Distraction and Lateral Glide II and III Unremarkable   Cervical Distraction II Unremarkable     Reflex Testing (Biceps, Brachioradialis, Triceps):Normal     Reflexes  Location LEFT Right   Biceps Normal Normal   Brachioradialis Normal Normal   Triceps Normal Normal         Special Tests: Cervical Distraction:Negative              Upper Limb Tension Test Median Nerve:Right: - Left: -              Upper Limb Tension Test Ulnar: Right: - Left: -              Upper Limb Tension Test Radial: Right: - Left: -     Patient denies Dysarthria, , Diplopia, Drop attacks,  or Dysphagia      Neurological Screen: Radiating symptoms: NO     Functional Mobility:  Limited with opening/closing of Mandible for mastication of food       Body Structures Involved:  1. Nerves  2. Joints  3. Muscles  4. Ligaments Body Functions Affected:  1. Sensory/Pain  2. Neuromusculoskeletal  3. Movement Related Activities and Participation Affected:  1. Mobility  2. Self Care     Number of elements that affect the Plan of Care: 1-2: LOW COMPLEXITY   CLINICAL PRESENTATION:   Presentation: Stable and uncomplicated: LOW COMPLEXITY   CLINICAL DECISION MAKING:      Use of outcome tool(s) and clinical judgement create a POC that gives a: Clear prediction of patient's progress: LOW COMPLEXITY   See treatment note for associated treatment provided today.       Future Appointments   Date Time Provider Jorje Moulton   3/31/2022  4:45 PM Haris Patel, PT [facility]   4/5/2022  7:15 AM Haris Patel, PT [facility]   4/13/2022  2:00 PM Crista Lux PT [facility] POA   4/18/2022  9:15 AM Mara Dooley DO [facility] B[facility]G   4/19/2022  1:00 PM Anthony Barroso MD [facility] POA   4/21/2022 11:00 AM Crista Lux PT [facility] PO[facility] POA   4/28/2022  4:00 PM Angel Nicole MD [facility]   5/4/2022  1:25 PM Nghia Rodriguez MD [facility] POAI POA   5/4/2022  3:00 PM Amari Hendricks MD BSNE BSNE   5/16/2022  4:00 PM [facility], PT

## 2022-04-05 ENCOUNTER — HOSPITAL ENCOUNTER (OUTPATIENT)
Dept: PHYSICAL THERAPY | Age: 52
Discharge: HOME | End: 2022-04-05
Payer: COMMERCIAL

## 2022-04-05 PROCEDURE — 97110 THERAPEUTIC EXERCISES: CPT

## 2022-04-05 PROCEDURE — 97140 MANUAL THERAPY 1/> REGIONS: CPT

## 2022-04-05 NOTE — PROGRESS NOTES
Mimi Hughes  : 1970  Payor: BLUE CROSS / Plan: [place] [facility] / Product Type: [facility] /  [address] [facility] at [address] [place] [address]. [address], [address] [place] [address]  [phone]   [phone]                                                          Yesi Self*      OUTPATIENT PHYSICAL THERAPY: Daily Treatment Note 2022 Visit Count:  2    ICD-10: Treatment Diagnosis:   Arthralgia of bilateral temporomandibular joint [M26.623]  Myalgia of mastication muscle [M79.11]  Myalgia of auxiliary muscles, head and neck [M79.12]     Precautions/Allergies:   Sulfa (sulfonamide antibiotics)   Fall Risk Score: 0 (? 5 = High Risk)  MD Orders: Eval and Treat MEDICAL/REFERRING DIAGNOSIS:  Arthralgia of bilateral temporomandibular joint [M26.623]  Myalgia of mastication muscle [M79.11]  Myalgia of auxiliary muscles, head and neck [M79.12]   DATE OF ONSET: 3 weeks ago  REFERRING PHYSICIAN: Malinda Grewal 860: TBD by patient            Pre-treatment Symptoms/Complaints: Patient reports having a migraine more often this weekend after first treatment, less tension with AM appointment   Pain: Initial:0/10  Medications Last Reviewed:  2022     Post Session: 2/10   Updated Objective Findings: See Initial Eval for more details. TREATMENT:   THERAPEUTIC EXERCISE: (8 minutes):  Exercises per grid below to improve mobility, strength and balance. Required minimal visual, verbal and manual cues to promote proper body alignment and promote proper body posture. Progressed resistance and complexity of movement as indicated.      Date:  3/31/2022 Date:  2022 Date:     Activity/Exercise Parameters Parameters Parameters   Education HEP, POC, PT goals, anatomy/pathology     UBE      Rings      Mandible opening  15x each 15x each    Mandible lateral excursion  15x each 15x each    UT LS [id number]    Scapular Retraction 20x 20x                                  THERAPEUTIC ACTIVITY: ( 0 minutes): Activities per gid below to improve functional movement related mobility, strength and balance to improve neuro-muscular carryover to daily functional activities for improving patient's quality of life. Required visual, verbal and manual cues to promote proper body alignment and promote proper body posture/mechanics. Progressed resistance and complexity of movement as indicated. Date:  4/5/2022 Date:   Date:     Activity/Exercise Parameters Parameters Parameters                                                                               MANUAL THERAPY: (23 minutes): Joint mobilization, Soft tissue mobilization was utilized and necessary because of the patient's restricted joint motion and restricted motion of soft tissue mobility. Date  4/5/2022    Technique Used Grade  Level # Time(s) Effect while being performed          Jt mobs II III C 3-7 8 min Improved opening 27 to 30mm          Manual Tx  Cervical 5min Decreased pain          Tissue Mobilization  Cervical mandible 2min                        HEP Log Date 1.    4/5/2022   2.  4/5/2022   3. 4/5/2022   4.    5.           [employer]  Treatment/Session Summary:    Response to Treatment: Pt had significant improvement in tolerance to TM joint mobs with improved opening ROM. Communication/Consultation:  POC, HEP, PT goals, Faxed initial evaluation to MD.   Equipment provided today: HEP Handout   Recommendations/Intent for next treatment session:   Next visit will focus on Manual Therapy Pain Science Education Traction soft tissue mobilization Scapula and postural strengthening. Treatment Plan of Care Effective Dates: 3/31/2022 TO 5/31/2022 (60 days).   Frequency/Duration: 2 times a week for 60 Days             Total Treatment Billable Duration:   38  Rx  PT Patient Time In/Time Out  Time In: (P) 6251  Time Out: (P) 8365  Amy Grace Demetrio Lennox, PT    Future Appointments   Date Time Provider Jorje Ríosi   4/7/2022  7:15 AM Tiarra Doctor, PT [facility]   4/11/2022  7:15 AM Tiarra Doctor, PT [facility]   4/13/2022  2:00 PM Alanna Mesa, PT [facility] POA   4/15/2022  9:45 AM Tiarra Doctor, PT [facility]   4/18/2022  9:15 AM Janette Quarles DO [facility] B[facility]G   4/19/2022  7:15 AM Tiarra Doctor, PT [facility]   4/19/2022  1:00 PM Felisa Soto MD POAG POA   4/21/2022 11:00 AM Alanna Mesa, PT [facility] POA   4/26/2022  7:15 AM Tiarra Doctor, PT [facility]   4/28/2022  4:00 PM Chay Morel MD [facility]   4/29/2022  7:30 AM Tiarra Doctor, PT [facility]   5/4/2022  1:25 PM Ahsan Palencia MD [facility] PO[facility] POA   5/4/2022  3:00 PM Rosario Curiel MD BSNE BSNE   5/16/2022  4:00 PM Chay Morel MD [facility]

## 2022-04-07 ENCOUNTER — HOSPITAL ENCOUNTER (OUTPATIENT)
Dept: PHYSICAL THERAPY | Age: 52
Discharge: HOME | End: 2022-04-07
Payer: COMMERCIAL

## 2022-04-07 PROCEDURE — 97110 THERAPEUTIC EXERCISES: CPT

## 2022-04-07 PROCEDURE — 97140 MANUAL THERAPY 1/> REGIONS: CPT

## 2022-04-07 NOTE — PROGRESS NOTES
Nikkie Kim  : 1970  Payor[place] CR[place] / [place] [facility] / Product Type: PPO /  Maylin Surfside at [address] Suite [facility], [address] [place] [address]  [phone]   [phone]                                                          Chucho Espinoza*      OUTPATIENT PHYSICAL THERAPY: Daily Treatment Note 2022 Visit Count:  3    ICD-10: Treatment Diagnosis:   Arthralgia of bilateral temporomandibular joint [M26.623]  Myalgia of mastication muscle [M79.11]  Myalgia of auxiliary muscles, head and neck [M79.12]     Precautions/Allergies:   Sulfa (sulfonamide antibiotics)   Fall Risk Score: 0 (? 5 = High Risk)  MD Orders: Eval and Treat MEDICAL/REFERRING DIAGNOSIS:  Arthralgia of bilateral temporomandibular joint [M26.623]  Myalgia of mastication muscle [M79.11]  Myalgia of auxiliary muscles, head and neck [M79.12]   DATE OF ONSET: 3 weeks ago  REFERRING PHYSICIAN: Malinda Grewal 860: TBD by patient            Pre-treatment Symptoms/Complaints: Patient reports being able to yawn for the first time   Pain: Initial:010  Medications Last Reviewed:  2022     Post Session: 2/10   Updated Objective Findings: See Initial Eval for more details. TREATMENT:   THERAPEUTIC EXERCISE: (23 minutes):  Exercises per grid below to improve mobility, strength and balance. Required minimal visual, verbal and manual cues to promote proper body alignment and promote proper body posture. Progressed resistance and complexity of movement as indicated.      Date:  3/31/2022 Date:  2022 Date:  2022   Activity/Exercise Parameters Parameters Parameters   Education HEP, POC, PT goals, anatomy/pathology     UBE      Rings   [id number]   Mandible opening  15x each 15x each 20x    Mandible lateral excursion  15x each 15x each 20x   UT LS [id number] [id number]   Scapular Retraction 20x 20x 30x THERAPEUTIC ACTIVITY: ( 0 minutes): Activities per gid below to improve functional movement related mobility, strength and balance to improve neuro-muscular carryover to daily functional activities for improving patient's quality of life. Required visual, verbal and manual cues to promote proper body alignment and promote proper body posture/mechanics. Progressed resistance and complexity of movement as indicated. Date:  4/7/2022 Date:   Date:     Activity/Exercise Parameters Parameters Parameters                                                                               MANUAL THERAPY: (23 minutes): Joint mobilization, Soft tissue mobilization was utilized and necessary because of the patient's restricted joint motion and restricted motion of soft tissue mobility. Date  4/7/2022    Technique Used Grade  Level # Time(s) Effect while being performed          Jt mobs II III C 3-7 5 min Improved opening 32mm          Manual Tx  Cervical 5min Decreased pain          Tissue Mobilization  Cervical mandible 5min Decreased muscle tone                       HEP Log Date 1.    4/7/2022   2.  4/7/2022   3. 4/7/2022   4.    5.           [facility] Portal  Treatment/Session Summary:    Response to Treatment: Pt had improved ROM opening, reviewed posture and stretching. Communication/Consultation:  POC, HEP, Posture   Equipment provided today: Not today   Recommendations/Intent for next treatment session:   Next visit will focus on Manual Therapy Pain Science Education Traction soft tissue mobilization Scapula and postural strengthening. Treatment Plan of Care Effective Dates: 3/31/2022 TO 5/31/2022 (60 days).   Frequency/Duration: 2 times a week for 60 Days             Total Treatment Billable Duration:   38  Rx  PT Patient Time In/Time Out  Time In: 0715  Time Out: 0800  Bartolome Nickerson PT    Future Appointments   Date Time Provider Jorje Moulton   4/11/2022 7:15 AM Guanako Jaimie, PT [facility]   4/13/2022  2:00 PM Lexie Chela, PT [facility] POA   4/15/2022  9:45 AM Guanako Jaimie, PT [facility]   4/18/2022  9:15 AM Bonita Tidwell DO [facility]   4/19/2022  7:15 AM Guanako Jaimie, PT [facility]   4/19/2022  1:00 PM Catherine Flynn MD [facility] POA   4/21/2022 11:00 AM Lexie Chela, PT [facility] POA   4/26/2022  7:15 AM Guanako Jaimie, PT [facility]   4/28/2022  4:00 PM Anoop Martin MD [facility]   4/29/2022  7:30 AM Guanako Jaimie, PT [facility]   5/4/2022  1:25 PM Alexy Rivas MD [facility] POA   5/4/2022  3:00 PM Merly Watkins MD [facility] BS[facility]   5/16/2022  4:00 PM Anoop Martin MD [facility]

## 2022-04-11 ENCOUNTER — HOSPITAL ENCOUNTER (OUTPATIENT)
Dept: PHYSICAL THERAPY | Age: 52
Discharge: HOME | End: 2022-04-11
Payer: COMMERCIAL

## 2022-04-11 PROCEDURE — 97110 THERAPEUTIC EXERCISES: CPT

## 2022-04-11 PROCEDURE — 97140 MANUAL THERAPY 1/> REGIONS: CPT

## 2022-04-11 NOTE — PROGRESS NOTES
Cynthiashannan Fairbanks  : 1970  [place] CR[place] / [place] [facility] / Product Type: PPO /  [address] at [address]ka [address], [address] [place] [address] Road  [phone]   [phone]                                                          Graham *      OUTPATIENT PHYSICAL THERAPY: Daily Treatment Note 2022 Visit Count:  4    ICD-10: Treatment Diagnosis:   Arthralgia of bilateral temporomandibular joint [M26.623]  Myalgia of mastication muscle [M79.11]  Myalgia of auxiliary muscles, head and neck [M79.12]     Precautions/Allergies:   Sulfa (sulfonamide antibiotics)   Fall Risk Score: 0 (? 5 = High Risk)  MD Orders: Eval and Treat MEDICAL/REFERRING DIAGNOSIS:  Arthralgia of bilateral temporomandibular joint [M26.623]  Myalgia of mastication muscle [M79.11]  Myalgia of auxiliary muscles, head and neck [M79.12]   DATE OF ONSET: 3 weeks ago  REFERRING PHYSICIAN: Malinda Grewal 860: TBD by patient            Pre-treatment Symptoms/Complaints: Patient reports being continued improvement and eating better   Pain: Initial:010  Medications Last Reviewed:  2022     Post Session: 1/10   Updated Objective Findings: AROM Mandible opening 34mm        TREATMENT:   THERAPEUTIC EXERCISE: (30 minutes):  Exercises per grid below to improve mobility, strength and balance. Required minimal visual, verbal and manual cues to promote proper body alignment and promote proper body posture. Progressed resistance and complexity of movement as indicated.      Date:  3/31/2022 Date:  2022 Date:  2022 Date:  2022   Activity/Exercise Parameters Parameters Parameters    Education HEP, POC, PT goals, anatomy/pathology      UBE       Rings   [id number]    Mandible opening  15x each 15x each 20x  20x   Mandible lateral excursion  15x each 15x each 20x 20x   UT LS [id number] [id number] 6o72sik 6s28ats   Scapular Retraction 20x 20x 30x    Foam roll   FE Abd    [id number]   Bilateral ER    2x10 orange foam roll   Pull apart    2x10 orange foam roll                THERAPEUTIC ACTIVITY: ( 0 minutes): Activities per gid below to improve functional movement related mobility, strength and balance to improve neuro-muscular carryover to daily functional activities for improving patient's quality of life. Required visual, verbal and manual cues to promote proper body alignment and promote proper body posture/mechanics. Progressed resistance and complexity of movement as indicated. Date:  4/11/2022 Date:   Date:     Activity/Exercise Parameters Parameters Parameters                                                                               MANUAL THERAPY: (10 minutes): Joint mobilization, Soft tissue mobilization was utilized and necessary because of the patient's restricted joint motion and restricted motion of soft tissue mobility. Date  4/11/2022    Technique Used Grade  Level # Time(s) Effect while being performed          Jt mobs II III TMJ 5 min Improved opening 34mm          Manual Tx  Cervical            Tissue Mobilization  Cervical mandible 5min Decreased muscle tone                       HEP Log Date 1.    4/11/2022   2.  4/11/2022   3. 4/11/2022   4.    5.           [employer] Portal  Treatment/Session Summary:    Response to Treatment: Pt responded well to foam roll exercises and had good tolerance to TMJ mobilizations. Communication/Consultation:  POC, HEP, Posture   Equipment provided today: HEP Handout   Recommendations/Intent for next treatment session:   Next visit will focus on Manual Therapy Pain Science Education Traction soft tissue mobilization Scapula and postural strengthening. Treatment Plan of Care Effective Dates: 3/31/2022 TO 5/31/2022 (60 days).   Frequency/Duration: 2 times a week for 60 Days             Total Treatment Billable Duration:   40  Rx  PT Patient Time In/Time Out  Time In: 0715  Time Out: 0800  Timothy Vazquez, PT    Future Appointments   Date Time Provider Jorje Ríosi   4/11/2022  7:15 AM Juanita Raw, PT [facility]   4/15/2022  9:45 AM Juanita Raw, PT [facility]   4/18/2022  9:15 AM Radha Conde DO [facility]   4/19/2022  7:15 AM Juanita Raw, PT [facility]   4/19/2022  1:00 PM Elpidio Gonzalez MD [facility] POA   4/26/2022  7:15 AM Juanita Raw, PT [facility]   4/28/2022  4:00 PM Rochelle Abraham MD [facility]   4/29/2022  7:30 AM Juanita Raw, PT [facility]   5/4/2022  1:25 PM Marjorie So MD [facility] POA   5/4/2022  3:00 PM Keiko Whalen MD BS[facility] BS[facility]   5/16/2022  4:00 PM Rochelle Abraham MD [facility]

## 2022-04-14 ENCOUNTER — NURSE TRIAGE (OUTPATIENT)
Dept: OTHER | Facility: CLINIC | Age: 52
End: 2022-04-14

## 2022-04-14 NOTE — TELEPHONE ENCOUNTER
Received call from Malinda Jeff at [facility] with [employer]. Subjective: Caller states \"Chest spasm\"     Current Symptoms: Getting worse for the past month, chest spasm--NP aware, did EKG for similar issue thinks it's an issue with anxiety and not cardiac. Dry mouth. Saw pain management NP  today, was told to follow up with PCP, nothing new or worse since. No new or worsening symptoms, no triage indicated. Care advice provided, patient verbalizes understanding; denies any other questions or concerns; instructed to call back for any new or worsening symptoms. Patient/Caller agrees with recommended disposition; writer provided warm transfer to [facility] at [facility] for appointment scheduling    Attention Provider: Thank you for allowing me to participate in the care of your patient. The patient was connected to triage in response to information provided to the ECC. Please do not respond through this encounter as the response is not directed to a shared pool.       Reason for Disposition   Caller has already spoken with another triager and has no further questions    Protocols used: NO CONTACT OR DUPLICATE CONTACT CALL-ADULT-[place]

## 2022-04-15 ENCOUNTER — HOSPITAL ENCOUNTER (OUTPATIENT)
Dept: GENERAL RADIOLOGY | Age: 52
Discharge: HOME | End: 2022-04-15
Attending: NURSE PRACTITIONER
Payer: COMMERCIAL

## 2022-04-15 ENCOUNTER — HOSPITAL ENCOUNTER (OUTPATIENT)
Dept: PHYSICAL THERAPY | Age: 52
Discharge: HOME | End: 2022-04-15
Payer: COMMERCIAL

## 2022-04-15 DIAGNOSIS — R07.89 COSTOCHONDRAL CHEST PAIN: ICD-10-CM

## 2022-04-15 PROCEDURE — 71046 X-RAY EXAM CHEST 2 VIEWS: CPT

## 2022-04-15 PROCEDURE — 97110 THERAPEUTIC EXERCISES: CPT

## 2022-04-15 PROCEDURE — 97140 MANUAL THERAPY 1/> REGIONS: CPT

## 2022-04-15 NOTE — PROGRESS NOTES
Lisbet Cobos  : 1970  [place] / [place] [facility] / Product Type: PPO /  [id number] [facility] at [address] [place] [address]. [facility], Suite [facility], [address] [place] [address]  [phone]   [phone]                                                          Analisa Akhtar*      OUTPATIENT PHYSICAL THERAPY: Daily Treatment Note 4/15/2022 Visit Count:  5    ICD-10: Treatment Diagnosis:   Arthralgia of bilateral temporomandibular joint [M26.623]  Myalgia of mastication muscle [M79.11]  Myalgia of auxiliary muscles, head and neck [M79.12]     Precautions/Allergies:   Sulfa (sulfonamide antibiotics)   Fall Risk Score: 0 (? 5 = High Risk)  MD Orders: Eval and Treat MEDICAL/REFERRING DIAGNOSIS:  Arthralgia of bilateral temporomandibular joint [M26.623]  Myalgia of mastication muscle [M79.11]  Myalgia of auxiliary muscles, head and neck [M79.12]   DATE OF ONSET: 3 weeks ago  REFERRING PHYSICIAN: Malinda Grewal 860: TBD by patient            Pre-treatment Symptoms/Complaints: Patient reports no problem with eating or yawning. Pain: Initial:0/10  Medications Last Reviewed:  4/15/2022     Post Session: 1/10   Updated Objective Findings: AROM Mandible opening 34mm        TREATMENT:   THERAPEUTIC EXERCISE: (32 minutes):  Exercises per grid below to improve mobility, strength and balance. Required minimal visual, verbal and manual cues to promote proper body alignment and promote proper body posture. Progressed resistance and complexity of movement as indicated.      Date:  3/31/2022 Date:  2022 Date:  2022 Date:  2022 Date:  4/15/2022   Activity/Exercise Parameters Parameters Parameters     Education HEP, POC, PT goals, anatomy/pathology       UBE        Rings   [id number]     Mandible opening  15x each 15x each 20x  20x 20x   Mandible lateral excursion  15x each 15x each 20x 20x 20x   UT LS 2h78vge 7f39eol 4v61gxy 6i90qqx    Scapular Retraction 20x 20x 30x     Foam roll   FE Abd    [id number]   Bilateral ER    2x10 orange foam roll 2x10 blue foam roll   Pull apart    2x10 orange foam roll 2x10 blue foam roll   D2     15x each blue foam roll         THERAPEUTIC ACTIVITY: ( 0 minutes): Activities per gid below to improve functional movement related mobility, strength and balance to improve neuro-muscular carryover to daily functional activities for improving patient's quality of life. Required visual, verbal and manual cues to promote proper body alignment and promote proper body posture/mechanics. Progressed resistance and complexity of movement as indicated. Date:  4/15/2022 Date:   Date:     Activity/Exercise Parameters Parameters Parameters                                                                               MANUAL THERAPY: (10 minutes): Joint mobilization, Soft tissue mobilization was utilized and necessary because of the patient's restricted joint motion and restricted motion of soft tissue mobility. Date  4/15/2022    Technique Used Grade  Level # Time(s) Effect while being performed          Jt mobs II III TMJ 5 min Improved opening 34mm          Manual Tx  Cervical            Tissue Mobilization  Cervical mandible 5min Decreased muscle tone                       HEP Log Date 1.    4/15/2022   2.  4/15/2022   3. 4/15/2022   4.    5.           [facility] Portal  Treatment/Session Summary:    Response to Treatment: Pt responded well to progression of foam roll exercises and continues to improve with mandible opening. Communication/Consultation:  POC, HEP, Posture   Equipment provided today: HEP Handout   Recommendations/Intent for next treatment session:   Next visit will focus on Manual Therapy Pain Science Education Traction soft tissue mobilization Scapula and postural strengthening. Treatment Plan of Care Effective Dates: 3/31/2022 TO 5/31/2022 (60 days). Frequency/Duration: 2 times a week for 60 Days             Total Treatment Billable Duration:   42  Rx  PT Patient Time In/Time Out  Time In: 0943  Time Out: [address], PT    Future Appointments   Date Time Provider Jorje Saige   4/15/2022  9:45 AM Ana Rosa Flores, PT [facility]   4/18/2022  9:15 AM Amari Wynne DO [facility]   4/19/2022  7:15 AM Oneidanathalie Flores, PT [facility]   4/19/2022  1:00 PM Sherie Regalado MD [facility] POA   4/20/2022  5:30 PM Magdy Ruiz NP [facility]   4/26/2022  7:15 AM Oneidanathalie Flores, PT [facility]   4/28/2022  4:00 PM Alyssa Mariscal MD [facility]   4/29/2022  7:30 AM Ana Rosa Flores, PT [facility]   5/4/2022  1:25 PM Tommy Grant MD [facility]A   5/4/2022  3:00 PM Manuel Still MD [facility] BS[facility]   5/16/2022  4:00 PM Alyssa Mariscal MD [facility]

## 2022-04-19 ENCOUNTER — HOSPITAL ENCOUNTER (OUTPATIENT)
Dept: PHYSICAL THERAPY | Age: 52
Discharge: HOME | End: 2022-04-19
Payer: COMMERCIAL

## 2022-04-19 PROCEDURE — 97140 MANUAL THERAPY 1/> REGIONS: CPT

## 2022-04-19 PROCEDURE — 97110 THERAPEUTIC EXERCISES: CPT

## 2022-04-19 NOTE — PROGRESS NOTES
Lisbet Cobos  : 1970  Payor: BLUE CROSS / Plan: [facility] / Product Type: PPO /  Malikaita Angelucci at [address] [facility]. [facility]., [address], [address] [place] [address]  [phone]   [phone]                                                          Analisa Akhtar*      OUTPATIENT PHYSICAL THERAPY: Daily Treatment Note 2022 Visit Count:  6    ICD-10: Treatment Diagnosis:   Arthralgia of bilateral temporomandibular joint [M26.623]  Myalgia of mastication muscle [M79.11]  Myalgia of auxiliary muscles, head and neck [M79.12]     Precautions/Allergies:   Sulfa (sulfonamide antibiotics)   Fall Risk Score: 0 (? 5 = High Risk)  MD Orders: Eval and Treat MEDICAL/REFERRING DIAGNOSIS:  Arthralgia of bilateral temporomandibular joint [M26.623]  Myalgia of mastication muscle [M79.11]  Myalgia of auxiliary muscles, head and neck [M79.12]   DATE OF ONSET: 3 weeks ago  REFERRING PHYSICIAN: Malinda Grewal 860: TBD by patient            Pre-treatment Symptoms/Complaints: Patient reports doing well no pain, just one HA last week   Pain: Initial:0/10  Medications Last Reviewed:  2022     Post Session: 0/10   Updated Objective Findings: AROM Mandible opening 36mm        TREATMENT:   THERAPEUTIC EXERCISE: (32 minutes):  Exercises per grid below to improve mobility, strength and balance. Required minimal visual, verbal and manual cues to promote proper body alignment and promote proper body posture. Progressed resistance and complexity of movement as indicated.      Date:  2022 Date:  2022 Date:  2022 Date:  4/15/2022 Date:  2022   Activity/Exercise Parameters Parameters      Education        UBE        Rings  [id number]      Mandible opening  15x each 20x  20x 20x 20x   Mandible lateral excursion  15x each 20x 20x 20x 20x   UT LS 7r15vsg 2a64jwm 8c52qsb     Scapular Retraction 20x 30x      Foam roll   FE Abd   [id number] 81j38yqz 71m62llo 3 way   Bilateral ER   2x10 orange foam roll 2x10 blue foam roll    Pull apart   2x10 orange foam roll 2x10 blue foam roll    D2    15x each blue foam roll    Prone 3 way arm raise      2x10   Cervical ext w/rotation     x10   Cervical/Thoracic rotation     x10                 THERAPEUTIC ACTIVITY: ( 0 minutes): Activities per gid below to improve functional movement related mobility, strength and balance to improve neuro-muscular carryover to daily functional activities for improving patient's quality of life. Required visual, verbal and manual cues to promote proper body alignment and promote proper body posture/mechanics. Progressed resistance and complexity of movement as indicated. Date:  4/19/2022 Date:   Date:     Activity/Exercise Parameters Parameters Parameters                                                                               MANUAL THERAPY: (10 minutes): Joint mobilization, Soft tissue mobilization was utilized and necessary because of the patient's restricted joint motion and restricted motion of soft tissue mobility. Date  4/19/2022    Technique Used Grade  Level # Time(s) Effect while being performed          Jt mobs II III TMJ 5 min Improved opening 34mm          Manual Tx  Cervical            Tissue Mobilization  Cervical mandible 5min Decreased muscle tone                       HEP Log Date 1.    4/19/2022   2.  4/19/2022   3. 4/19/2022   4.    5.           [facility] Portal  Treatment/Session Summary:    Response to Treatment: Pt continues to improve with mandible opening and tolerance to eating. Communication/Consultation:  POC, HEP, Posture   Equipment provided today: HEP Handout   Recommendations/Intent for next treatment session:   Next visit will focus on Manual Therapy Pain Science Education Traction soft tissue mobilization Scapula and postural strengthening.          Treatment Plan of Care Effective Dates: 3/31/2022 TO 5/31/2022 (60 days).   Frequency/Duration: 2 times a week for 60 Days             Total Treatment Billable Duration:   42  Rx  PT Patient Time In/Time Out  Time In: 0710  Time Out: 0800  Nicci Dela Cruz PT    Future Appointments   Date Time Provider Jorje Saige   4/19/2022  1:00 PM Olya Conde MD [facility] POA   4/20/2022  5:30 PM Saurabh Watkins NP [facility] MAT [address]   4/26/2022  7:15 AM Breonna Moreno, PT [facility]   4/28/2022 10:15 AM MAT LAB [facility]   4/28/2022  4:00 PM Colette Vale MD [facility]   4/29/2022  7:30 AM Breonna Moreno, PT [facility]   5/4/2022  1:25 PM Loren Razo MD [facility] POA   5/4/2022  3:00 PM Idania Owens MD [facility] BS[facility]   5/16/2022  4:00 PM Colette Vale MD [facility]   4/19/2023 10:00 AM Sarbjit Nicholson DO [facility]

## 2022-04-26 ENCOUNTER — HOSPITAL ENCOUNTER (OUTPATIENT)
Dept: PHYSICAL THERAPY | Age: 52
Discharge: HOME | End: 2022-04-26
Payer: COMMERCIAL

## 2022-04-26 PROCEDURE — 97110 THERAPEUTIC EXERCISES: CPT

## 2022-04-26 PROCEDURE — 97140 MANUAL THERAPY 1/> REGIONS: CPT

## 2022-04-26 NOTE — THERAPY DISCHARGE
Johana Rich  : 1970  Primary: [facility] Mor[facility]*  Secondary:  [facility] Heidi Cavanaugh [address] [place] [address] [address]. [address], [address] [facility]  [phone]   [phone]      OUTPATIENT PHYSICAL THERAPY:Discharge Summary2022    ICD-10: Treatment Diagnosis:   Arthralgia of bilateral temporomandibular joint [M26.623]  Myalgia of mastication muscle [M79.11]  Myalgia of auxiliary muscles, head and neck [M79.12]    Precautions/Allergies:   Sulfa (sulfonamide antibiotics)   Fall Risk Score: 0 (? 5 = High Risk)  MD Orders: Eval and Treat MEDICAL/REFERRING DIAGNOSIS:  Arthralgia of bilateral temporomandibular joint [M26.623]  Myalgia of mastication muscle [M79.11]  Myalgia of auxiliary muscles, head and neck [M79.12]   DATE OF ONSET: 3 weeks ago  REFERRING PHYSICIAN: Malinda Grewal 860: TBD by patient      INITIAL ASSESSMENT:  Ms. Johana Rich has attended 1 physical therapy session including initial evaluation. Johana Rich presents with S/S of increased pain, decreased ROM, decreased strength, decreased functional tolerance consistent with S/S of referring . Johana Rich will benefit from home exercise program, therapeutic and postural strengthening exercises, manual therapeutic techniques (ie. Distraction, SOR, myofascial release/soft tissue mobilization) as appropriate to address Nivia Gannon's current condition. Johana Rich will benefit from skilled PT (medically necessary) to address above deficits affecting participation in basic ADLs and overall functional tolerance. DISCHARGE NOTE (2022): Patient has been seen for 7 sessions of physical therapy from 3/31/2022 to 2022. Patient reports feeling 90% better with chewing and yawning.  Patient has currently met all goals for Physical Therapy and is independent with comprehensive home exercise program.    PROBLEM LIST (Impacting functional limitations):  1. Decreased Strength  2. Decreased ADL/Functional Activities  3. Increased Pain  4. Decreased Activity Tolerance  5. Increased Fatigue  6. Decreased Flexibility/Joint Mobility  7. Decreased Hana with Home Exercise Program INTERVENTIONS PLANNED:  1. Balance Exercise  2. Cryotherapy  3. Family Education  4. Heat  5. Home Exercise Program (HEP)  6. Manual Therapy  7. Neuromuscular Re-education/Strengthening  8. Range of Motion (ROM)  9. Therapeutic Activites  10. Therapeutic Exercise/Strengthenin   TREATMENT PLAN:  Effective Dates: 3/31/2022 TO 5/30/2022 (60 days). Frequency/Duration: 2 times a week for 60 Days  GOALS: (Goals have been discussed and agreed upon with patient.)     Short-Term Goals~4 weeks  Goal Met   1. Eddy Jasso will report <=2/10 pain to right TMJ with normal mouth opening and closing. 1.  [x] Date: 4/26/2022   2. Eddy Jasso will demonstrate improved NDI score, indicating spinal improvement from 10/50 to 5/50 affecting minimal to no difficulty with performance of cervical mobility and strengthening. 2.  [x] Date: 4/26/2022   3. Eddy Jasso to be independent with initial HEP for TMJ, cervical region and UE's. 3.  [x] Date: 4/26/2022   4. Eddy Jasso will improve ROM to >=90% to assist with improved function during instrumental activities of daily living. 4.  [x] Date: 4/26/2022   5. Eddy Jasso will improve MMT to >=4+/5 to all UE strengthening to return to PLOF and improve functional tolerance. 5.  [x] Date: 4/26/2022         Long Term Goals~8 weeks Goal Met   1. Eddy Jasso will report <=0-2/10 pain to right TMJ with performance of functional mastication, opening and closing with minimal to no difficulty. 1.  [x] Date: 4/26/2022   2.  Eddy Jasso will demonstrate improved NDI score, indicating spinal improvement from 5/50 to 3/50 affecting minimal to no difficulty with performance of cervical mobility and strengthening. 2.  [x] Date: 4/26/2022   3. Eddy Jasso to be independent with advanced HEP for cervical region and UE's. 3.  [x] Date: 4/26/2022               Outcome Measure: Tool Used: Neck Disability Index (NDI)  Score:  Initial: 10/50    Interpretation of Score: The Neck Disability Index is a revised form of the Oswestry Low Back Pain Index and is designed to measure the activities of daily living in person's with neck pain. Each section is scored on a 0-5 scale, 5 representing the greatest disability. The scores of each section are added together for a total score of 50. Observation/Orthostatic Postural Assessment:           Forward head and Shoulders  Palpation:         Mild Tenderness Deep Masseter, SCM, Pterygoid and Suboccipital  ROM:      AROM/PROM              Joint: Eval Date: 3/31/2022  Re-Assess Date: 4/26/2022    Active ROM       Cervical Extension 42  50    Cervical Flexion 53  60     RIGHT LEFT RIGHT LEFT   Cervical Sidebending 20 20 20 20   Cervical Rotation 65 60 70 70          Shoulder Flexion WNL WNL WNL WNL   Shoulder Abduction   WNL WNL          Mandibular opening 27mm ( with right deflection)  36mm ( with mild right deflection)   Lateral Excursion 2mm 2mm 5mm 5mm         Strength:     Eval Date: 3/31/2022  Re-Assess Date: 4/26/2022      RIGHT LEFT RIGHT LEFT   Shoulder Flexion  5/5  5/5 5/5 5/5   Shoulder Abduction (C5)  5/5  5/5 5/5 5/5   Shoulder Internal Rotation  5/5  5/5 5/5 5/5   Shoulder External Rotation  5/5  5/5 5/5 5/5   Cervical Flexion  4/5  4/5 4+/5 4+/5   Cervical Extension  4/5  4/5 4+/5 4+/5   Cervical Side Bending  4/5  4/5 4+/5 4+/5   Cervical Rotation  4/5  4/5 4+/5 4+/5   Scapula 4/5 4/5 4+/5 4+/5       Manual:  Joint Directon Grade Treatment Effect   Right TMJ Distraction and Lateral Glide II and III Unremarkable   Left TMJ Distraction and Lateral Glide II and III Unremarkable   Cervical Distraction II Unremarkable     Reflex Testing (Biceps, Brachioradialis, Triceps):Normal     Reflexes  Location LEFT Right   Biceps Normal Normal   Brachioradialis Normal Normal   Triceps Normal Normal         Special Tests:    Cervical Distraction:Negative              Upper Limb Tension Test Median Nerve:Right: - Left: -              Upper Limb Tension Test Ulnar: Right: - Left: -              Upper Limb Tension Test Radial: Right: - Left: -      Patient denies Dysarthria, , Diplopia, Drop attacks,  or Dysphagia      Neurological Screen: Radiating symptoms: NO     Functional Mobility:  Returned to normal activity      Reason for Services/Other Comments:    Patient has been seen for 7 sessions of physical therapy from 3/31/2022 to 4/26/2022. Patient reports feeling 90% better with chewing and yawning. Patient has currently met all goals for Physical Therapy and is independent with comprehensive home exercise program.    Regarding Ricky Gannon's therapy, I certify that the treatment plan above will be carried out by a therapist or under their direction. Thank you for this referral,  Patricia Onofre PT     Referring Physician Signature: Hank Durán No Signature is Required for this note.

## 2022-04-26 NOTE — PROGRESS NOTES
Komal Huertas  : 1970  Payor: BLUE CROSS / Plan: [place] [employer]UE [employer] [facility] / Product Type: PPO /  [address] [facility], [address] [facility], [address] [place] [address]  [phone]   [phone]                                                          Jr Henson*      OUTPATIENT PHYSICAL THERAPY: Daily Treatment Note 2022 Visit Count:  7    ICD-10: Treatment Diagnosis:   Arthralgia of bilateral temporomandibular joint [M26.623]  Myalgia of mastication muscle [M79.11]  Myalgia of auxiliary muscles, head and neck [M79.12]     Precautions/Allergies:   Sulfa (sulfonamide antibiotics)   Fall Risk Score: 0 (? 5 = High Risk)  MD Orders: Eval and Treat MEDICAL/REFERRING DIAGNOSIS:  Arthralgia of bilateral temporomandibular joint [M26.623]  Myalgia of mastication muscle [M79.11]  Myalgia of auxiliary muscles, head and neck [M79.12]   DATE OF ONSET: 3 weeks ago  REFERRING PHYSICIAN: Malinda Grewal 860: TBD by patient            Pre-treatment Symptoms/Complaints: Please See Discharge Summary dated 2022 for more details. Pain: Initial:0/10  Medications Last Reviewed:  2022     Post Session: 0/10   Updated Objective Findings: AROM Mandible opening 36mm        TREATMENT:   THERAPEUTIC EXERCISE: (30 minutes):  Exercises per grid below to improve mobility, strength and balance. Required minimal visual, verbal and manual cues to promote proper body alignment and promote proper body posture. Progressed resistance and complexity of movement as indicated.      Date:  2022 Date:  2022 Date:  4/15/2022 Date:  2022 Date:  2022   Activity/Exercise Parameters       Education        UBE        Rings [id number]       Mandible opening  20x  20x 20x 20x 20x   Mandible lateral excursion  20x 20x 20x 20x 20x   UT LS [id number]      Scapular Retraction 30x       Foam roll   FE Abd  [id number] 3 way [id number] 3 way   Bilateral ER  2x10 orange foam roll 2x10 blue foam roll  2x10 blue foam roll   Pull apart  2x10 orange foam roll 2x10 blue foam roll  2x10 blue foam roll   D2   15x each blue foam roll  2x10 blue foam roll   Prone 3 way arm raise     2x10    Cervical ext w/rotation    x10    Cervical/Thoracic rotation    x10                  THERAPEUTIC ACTIVITY: ( 0 minutes): Activities per gid below to improve functional movement related mobility, strength and balance to improve neuro-muscular carryover to daily functional activities for improving patient's quality of life. Required visual, verbal and manual cues to promote proper body alignment and promote proper body posture/mechanics. Progressed resistance and complexity of movement as indicated. Date:  4/26/2022 Date:   Date:     Activity/Exercise Parameters Parameters Parameters                                                                               MANUAL THERAPY: (12 minutes): Joint mobilization, Soft tissue mobilization was utilized and necessary because of the patient's restricted joint motion and restricted motion of soft tissue mobility. Date  4/26/2022    Technique Used Grade  Level # Time(s) Effect while being performed          Jt mobs II III TMJ 5 min Improved opening 34mm          Manual Tx  Cervical            Tissue Mobilization  Cervical mandible 7min Decreased muscle tone                       HEP Log Date 1.    4/26/2022   2.  4/26/2022   3. 4/26/2022   4.    5.           [employer]  Treatment/Session Summary:    Response to Treatment: Please See Discharge Summary dated 4/26/2022 for more details. Communication/Consultation:  Faxed Discharge Summary to MD.   Equipment provided today: HEP Handout   Recommendations/Intent for next treatment session:   Discharge. Treatment Plan of Care Effective Dates: 3/31/2022 TO 5/31/2022 (60 days).   Frequency/Duration: 2 times a week for 60 Days             Total Treatment Billable Duration:   42  Rx  PT Patient Time In/Time Out  Time In: 0715  Time Out: 0800  Dolores Lopez, PT    Future Appointments   Date Time Provider Jorje Moulton   4/28/2022 10:15 AM MAT LAB [facility] MAT [facility]   4/28/2022  4:00 PM Chay Morel MD [facility]   4/29/2022  7:30 AM Tiarra Choe, PT [facility]   5/2/2022  9:00 AM Kemal Smith MD [facility]   5/4/2022  1:25 PM Ahsan Palencia MD [facility] PO[facility] POA   5/4/2022  3:00 PM Rosario Curiel MD BSNE BSNE   5/16/2022  4:00 PM Chay Morel MD [facility]   4/19/2023 10:00 AM DO Abraham Oseguera [address]

## 2022-04-29 ENCOUNTER — APPOINTMENT (OUTPATIENT)
Dept: PHYSICAL THERAPY | Age: 52
End: 2022-04-29
Payer: COMMERCIAL

## 2022-04-29 ENCOUNTER — HOSPITAL ENCOUNTER (OUTPATIENT)
Dept: PHYSICAL THERAPY | Age: 52
Discharge: HOME | End: 2022-04-29
Payer: COMMERCIAL

## 2022-04-29 DIAGNOSIS — M25.552 LEFT HIP PAIN: ICD-10-CM

## 2022-04-29 PROCEDURE — 97140 MANUAL THERAPY 1/> REGIONS: CPT

## 2022-04-29 PROCEDURE — 97161 PT EVAL LOW COMPLEX 20 MIN: CPT

## 2022-04-29 NOTE — THERAPY EVALUATION
Lester Fortune  : 1970    Pay[place] / [place] [facility] / Product Type: O /    [address] [facility] at [address] [place] [address]. [address] [place] [address] [address], [place], [address] [place] [address] Road  [phone]   [phone]        OUTPATIENT PHYSICAL THERAPY:Initial Assessment 2022    ICD-10: Treatment Diagnosis:   Pain in left hip (M25.552)  Stiffness of Left hip, not elsewhere classified (X74.565)   Other Joint Derangements Left Hip (T82.139)  Sprain of Sacroiliac joint (S33.6XXA)          Precautions/Allergies: Other medication and Sulfa (sulfonamide antibiotics)   Fall Risk Score: 1 (? 5 = High Risk)  MD Orders: Eval and Treat  MEDICAL/REFERRING DIAGNOSIS:  Left hip pain [M25.552]   DATE OF ONSET: 2021 From slipping on bathroom floor  REFERRING PHYSICIAN: William Reddy MD  RETURN PHYSICIAN APPOINTMENT: TBD by patient      INITIAL ASSESSMENT:  Lester Fortune presents to physical therapy with decreased postural and hip/core strength, ROM, joint mobility, flexibility, functional mobility, and increased pain. No pelvic malalignment upon initial evaluation but decreased posture. These S/S are consistent with Left hip impingement and Sacroiliac joint dysfunction. Lester Fortune will benefit from skilled physical therapy (medically necessary) to address above deficits affecting participation in basic ADLs and functional mobility/tolerance. Patient will benefit from manual therapeutic techniques (stretching, joint mobilizations, soft tissue mobilization/myofascial release), therapeutic exercises and activities, postural strengthening/education, and comprehensive home exercises program to address current impairments and functional limitations.        PROBLEM LIST (Impacting functional limitations):  · Decreased Strength  · Decreased ADL/Functional Activities  · Decreased Transfer Abilities  · Decreased Ambulation Ability/Technique  · Decreased Balance  · Increased Pain  · Decreased Activity Tolerance  · Increased Fatigue  · Increased Shortness of Breath  · Decreased Flexibility/Joint Mobility  · Decreased [place] with Home Exercise Program INTERVENTIONS PLANNED:  1. Balance Exercise  2. Bed Mobility  3. Cold  4. Cryotherapy  5. Electrical Stimulation  6. Family Education  7. Gait Training  8. Heat  9. Home Exercise Program (HEP)  10. Manual Therapy  11. Neuromuscular Re-education/Strengthening  12. Range of Motion (ROM)  13. Therapeutic Activites  14. Therapeutic Exercise/Strengthening  15. Transfer Training  16. Ultrasound (US)  17. Aquatic Therapy   TREATMENT PLAN:  Effective Dates: 4/29/2022 TO 7/28/2022 (90 days). Frequency/Duration: 2 times a week for 90 Days  GOALS: (Goals have been discussed and agreed upon with patient.)   Short-Term Goals~4 weeks  Goal Met   1. Ernie Meraz will be independent with HEP for strength and ROM 1.  [] Date:   2. Ernie Meraz will participate in LE strengthening exercises for hip, knee, ankle with weight as appropriate for 3 sets of 10 2.  [] Date:   3. Ernie Meraz will report <=2/10 pain with left groin/hip and demonstrate minimal to no difficulty 3. [] Date:   4. Ernie Meraz will demonstrate improvement of MMT from initial eval to 4+ to 5/5 B LE in order to return to participate in ADLs with minimal to no difficulty. 4.  [] Date:   5. Ernie Meraz will participate in static and dynamic balance activities for 5 minutes to help improve proprioception and decrease risk of falls  5. [] Date:   6. Ernie Meraz to increase lower extremity functional scale by 5 points to show improvement in areas of difficulty 6. [] Date:         Long Term Goals~8 weeks Goal Met   1. Ernie Meraz will be independent in HEP of stretching and strengthening 1. [] Date:   2. Ernie Meraz will be able to perform floor to stand transfers independently with decreased compensation  2.   [] Date:   3. Ck Velásquez will get in and out of car without increased left hip pain 3. [] Date:   4. Ck Velásquez to increase lower extremity functional scale by 10 points to show improvement in areas of difficulty 4. [] Date:            CLINICAL DECISION MAKING:   Outcome Measure: Tool Used: Lower Extremity Functional Scale (LEFS)  Score:  Initial: 67/80 Most Recent: X/80 (Date: -- )   Interpretation of Score: 20 questions each scored on a 5 point scale with 0 representing \"extreme difficulty or unable to perform\" and 4 representing \"no difficulty\". The lower the score, the greater the functional disability. 80/80 represents no disability. Minimal detectable change is 9 points. Medical Necessity:   · Skilled intervention continues to be required due to deficits and impairments seen upon initial evaluation affecting patient's participation in ADLs and functional tasks. Reason for Services/Other Comments:  · Patient continues to require skilled intervention due to deficits and impairments seen upon initial evaluation affecting patient's participation in ADLs and functional tasks. Total Treatment Duration:  PT Patient Time In/Time Out  Time In: 0735  Time Out: 0815    Rehabilitation Potential For Stated Goals: good  Regarding Nivia GIRALDO Teressa's therapy, I certify that the treatment plan above will be carried out by a therapist or under their direction. Thank you for this referral,  Goyo Reynolds, PT     Referring Physician Signature: Jesús Moser MD _______________________________ Date _____________            HISTORY:   History of Present Injury/Illness (Reason for Referral):  Patient reports injuring left hip after slipping on a slick floor in the bathroom of a store causing left leg to go back and right leg going forward. Since the that time she has left groin and anterior left hip pain with activity, mainly getting in and out of a car and getting up from the floor.  She has completed 6 weeks of previous Physical Therapy for left groin pain and 6 weeks for bilateral foot pain which included hip strengthening without improvement of symptoms of left groin pain. >Present Symptoms (on day of evaluation)  · Pain; Currently= 4/10  · Best= 2/10  · Worst= 8/10  ·   · Aggravating factors:  Standing, bending, Car transfer and Floor to stand transfer  · Relieving factors: Rest  · Irritability: Medium (Onset of pain is equal to alleviation)      Past Medical History/Comorbidities:   Ms. Akira Mars  has a past medical history of Anxiety, DDD (degenerative disc disease), cervical (11/29/2021), Depressed, GERD (gastroesophageal reflux disease), Hypertension, IBS (irritable bowel syndrome), Insomnia, and TMJ (dislocation of temporomandibular joint) (04/2021). Ms. Akira Mars  has a past surgical history that includes pr egd deliver thermal energy sphnctr/cardia gerd; hx colonoscopy; hx tubal ligation; hx lipectomy; and hx cataract removal (Bilateral, 02/2020).     Active Ambulatory Problems     Diagnosis Date Noted    Chronic fatigue 04/06/2018    Chronic tension-type headache, intractable 11/02/2018    Early satiety 06/05/2017    Erosive gastritis 09/05/2017    Functional dyspepsia 06/05/2017    Hiatal hernia with GERD 09/05/2017    History of colonic polyps 06/05/2017    Irritable bowel syndrome with both constipation and diarrhea 06/05/2017    Left carpal tunnel syndrome 02/23/2018    Bipolar depression ([facility] [address] 09/20/2018    Primary insomnia 04/02/2019    Radiculopathy of cervical region 04/05/2019    Pharyngoesophageal dysphagia 05/02/2019    B12 deficiency 08/19/2019    Chronic pain syndrome 08/19/2019    Vitamin D deficiency 08/19/2019    Cervical radicular pain 08/19/2019    Numbness and tingling 09/23/2019    Left hand pain 09/23/2019    Anxiety 02/10/2021    Hypoglycemia 02/10/2021    Essential tremor 08/02/2021    Restless legs syndrome (RLS) 08/02/2021    Neuropathic pain 08/02/2021    Encounter for medication management 08/02/2021    Duodenitis 07/24/2019    GERD with esophagitis 07/24/2019    Lymphocytic colitis 02/02/2021    Mild episode of recurrent major depressive disorder ([facility] [address] 09/20/2018    Other constipation 04/22/2021    Nausea 02/02/2021    Schatzki's ring 07/24/2019    Terminal ileitis without complication ([facility] [address] 05/63/2964    Ganglion cyst 01/03/2022    De Quervain's tenosynovitis, right 01/03/2022    OAB (overactive bladder) 01/13/2022    High-tone pelvic floor dysfunction 01/13/2022    Uterine leiomyoma 01/13/2022    Right wrist pain 03/18/2022    Right forearm pain 03/18/2022    Arthritis of carpometacarpal [place]) joint of right thumb 03/18/2022    Polyarthralgia 03/30/2022     Resolved Ambulatory Problems     Diagnosis Date Noted    No Resolved Ambulatory Problems     Past Medical History:   Diagnosis Date    DDD (degenerative disc disease), cervical 11/29/2021    Depressed     GERD (gastroesophageal reflux disease)     Hypertension     IBS (irritable bowel syndrome)     Insomnia     TMJ (dislocation of temporomandibular joint) 04/2021     Social History/Living Environment:          Social History     Socioeconomic History    Marital status:      Spouse name: Not on file    Number of children: Not on file    Years of education: Not on file    Highest education level: Not on file   Occupational History    Not on file   Tobacco Use    Smoking status: Never Smoker    Smokeless tobacco: Never Used   Vaping Use    Vaping Use: Never used   Substance and Sexual Activity    Alcohol use: Never    Drug use: Never    Sexual activity: Not Currently   Other Topics Concern    Not on file   Social History Narrative    Not on file     Social Determinants of Health     Financial Resource Strain:     Difficulty of Paying Living Expenses: Not on file   Food Insecurity:     Worried About Running Out of Food in the Last Year: Not on file  Ran Out of Food in the Last Year: Not on file   Transportation Needs:     Lack of Transportation (Medical): Not on file    Lack of Transportation (Non-Medical): Not on file   Physical Activity:     Days of Exercise per Week: Not on file    Minutes of Exercise per Session: Not on file   Stress:     Feeling of Stress : Not on file   Social Connections:     Frequency of Communication with Friends and Family: Not on file    Frequency of Social Gatherings with Friends and Family: Not on file    Attends [employer]ist Services: Not on file    Active Member of [address] [place] [address] [employer] or Organizations: Not on file    Attends Club or Organization Meetings: Not on file    Marital Status: Not on file   Intimate Partner Violence:     Fear of Current or Ex-Partner: Not on file    Emotionally Abused: Not on file    Physically Abused: Not on file    Sexually Abused: Not on file   Housing Stability:     Unable to Pay for Housing in the Last Year: Not on file    Number of [place] in the Last Year: Not on file    Unstable Housing in the Last Year: Not on file     Prior Level of Function/Work/Activity:  Normal  Dominant Side:  right  Previous Treatment Approach:  Previous Physical Therapy   Other Clinical Tests:  X-RAY Negative for Degenerative changes or Fx    Current Medications:    Current Outpatient Medications:     omeprazole 20 mg TbLD, , Disp: , Rfl:     polyethylene glycol (Miralax) 17 gram/dose powder, Take 17 g by mouth daily. , Disp: , Rfl:     citalopram (CELEXA) 20 mg tablet, Take 1 Tablet by mouth every evening., Disp: 90 Tablet, Rfl: 1    traZODone (DESYREL) 50 mg tablet, Take 0.5-1 Tablets by mouth nightly., Disp: 90 Tablet, Rfl: 1    clonazePAM (KlonoPIN) 0.5 mg tablet, Take 1 Tablet by mouth daily as needed for Anxiety. , Disp: 30 Tablet, Rfl: 3    cyclobenzaprine (FLEXERIL) 10 mg tablet, Take 10 mg by mouth three (3) times daily as needed for Muscle Spasm(s). , Disp: , Rfl:     hydroCHLOROthiazide (HYDRODIURIL) 25 mg tablet, TAKE 1 TABLET DAILY, Disp: 90 Tablet, Rfl: 3    cholecalciferol (VITAMIN D3) (50,000 UNITS /1250 MCG) capsule, Take 1 Capsule by mouth every seven (7) days. , Disp: 12 Capsule, Rfl: 2    diclofenac (VOLTAREN) 1 % gel, Apply 1 g to affected area as needed. , Disp: , Rfl:     pramipexole (Mirapex) 0.125 mg tablet, Take 2 Tablets by mouth nightly. Take 1-2 tablets po q hs, Disp: 60 Tablet, Rfl: 5    amitriptyline (ELAVIL) 25 mg tablet, TAKE 1 TO 2 TABLETS BY MOUTH EVERY EVENING, Disp: , Rfl:     pregabalin (LYRICA) 150 mg capsule, TAKE 1 CAPSULE BY MOUTH THREE TIMES DAILY, Disp: , Rfl:     TURMERIC PO, Take 1 Tablet by mouth daily. , Disp: , Rfl:     Linzess 72 mcg cap capsule, , Disp: , Rfl:     benzoyl peroxide 2.5 % topical gel, APPLY TOPICALLY TO FACE AT BEDTIME, Disp: , Rfl:     atomoxetine (Strattera) 80 mg capsule, Take 1 Capsule by mouth daily. , Disp: 90 Capsule, Rfl: 4    nystatin-triamcinolone (MYCOLOG II) topical cream, Apply  to affected area two (2) times a day., Disp: , Rfl:     vitamin e 600 unit capsule, Take  by mouth daily. , Disp: , Rfl:     norethindrone acetate (AYGESTIN) 5 mg tablet, , Disp: , Rfl:     cyanocobalamin 1,000 mcg tablet, Take 1 Tablet by mouth daily. , Disp: 90 Tablet, Rfl: 3    clindamycin (CLINDAGEL) 1 % topical gel, , Disp: , Rfl:     cod liver oil oil, Take  by mouth., Disp: , Rfl:         Ambulatory/Rehab Services H2 Model Falls Risk Assessment    Risk Factors:       No Risk Factors Identified Ability to Rise from Chair:       (1)  Pushes up, successful in one attempt    Falls Prevention Plan:       No modifications necessary   Total: (5 or greater = High Risk): 1    ©2010 [facility] of [employer]. All Rights Reserved. [facility] Patent #[id number].  Federal Law prohibits the replication, distribution or use without written permission from [facility] [employer])       Date Last Reviewed:  4/29/2022   Number of Personal Factors/Comorbidities that affect the Plan of Care: 0: LOW COMPLEXITY   EXAMINATION:   · Observation/Orthostatic Postural Assessment:  Slight elevation left iliac crest standing, Anterior rotation Left innominate  · Gait= Slightly antalgic first 5 steps after sitting  Palpation:  Assessed @ Initial Visit: Tenderness to Anterior left hip and superior left SI joint  ROM: Assessed @ Initial Visit:  AROM/PROM         Joint: Eval Date: 4/29/2022  Re-Assess Date:  Re-Assess Date:    Active ROM RIGHT LEFT RIGHT LEFT RIGHT LEFT   Knee Extension WNL WNL           Knee Flexion WNL WNL           Hip Flexion 110 95           Hip Abduction 40 30           Prone Hip Extension w/ Knee bent  10 5           Hip IR/ER 30/50 20/35             Strength:     Eval Date: 4/29/2022  Re-Assess Date:  Re-Assess Date:      RIGHT LEFT RIGHT LEFT RIGHT LEFT   Knee Flexion 5/5 4+/5           Knee Extension 5/5 4+/5           Hip Flexion 5/5 4/5           Hip Abduction 4+/5 4/5           Hip Extension 4+/5 4/5                            Special Tests: Assessed @ Initial Visit   · Scouring:Negative  · Fabers:Positive  · FADIRs:Positive  · SLR:Negative     Neurological Screen: Patient demonstrates/reports no loss in sensation    Functional Mobility:  Affecting participation in ambulation, standing and transfers    Balance and Mobility:    Test Result   Timed up and Go NT   30 second Sit to Stand NT   Single Leg Balance Right: <30 seconds     Left:<15 seconds          Body Structures Involved:  1. Nerves  2. Bones  3. Joints  4. Muscles  5. Ligaments Body Functions Affected:  1. Sensory/Pain  2. Reproductive  3. Neuromusculoskeletal  4. Movement Related Activities and Participation Affected:  1. Mobility  2.  Self Care   Number of elements that affect the Plan of Care: 1-2: LOW COMPLEXITY   CLINICAL PRESENTATION:   Presentation: Stable and uncomplicated: LOW COMPLEXITY         Use of outcome tool(s) and clinical judgement create a POC that gives a: Clear prediction of patient's progress: LOW COMPLEXITY   See treatment note for associated treatment provided today.     Future Appointments   Date Time Provider Jorje Moulton   5/2/2022  9:00 AM Ron Olmos MD [facility] UCDS UCD   5/2/2022  1:30 PM Elisha Yao NP [facility] POAI POA   5/4/2022  1:25 PM Juliana Caraballo MD [facility] POAI POA   5/4/2022  3:00 PM Lobo Wyatt MD BSNE BSNE   5/16/2022  4:00 PM Bibiana Edgar MD [facility]   8/2/2022 10:20 AM Bibiana Edgar MD [facility]   4/19/2023 10:00 AM Erich Barajas DO BSUG BSUG         Gracy March, PT

## 2022-04-29 NOTE — PROGRESS NOTES
Tony Rowan  : 1970  [place] / [place] [facility] / Product Type: PPO /  [address] [facility] at [address] [place] [address]. [address] [place] [address] [address] [place] [address], [address]  [phone]   [phone]                                                          Evin Rultedge MD      OUTPATIENT PHYSICAL THERAPY: Daily Treatment Note 2022 Visit Count:  1    ICD-10: Treatment Diagnosis:   Pain in left hip (M25.552)  Stiffness of Left hip, not elsewhere classified (M25.652)   Other Joint Derangements Left Hip (M24.852)  Sprain of Sacroiliac joint (S33.6XXA)              Precautions/Allergies: Other medication and Sulfa (sulfonamide antibiotics)   Fall Risk Score: 1 (? 5 = High Risk)  MD Orders: Eval and Treat  MEDICAL/REFERRING DIAGNOSIS:  Left hip pain [M25.552]   DATE OF ONSET: 2021 From slipping on bathroom floor  REFERRING PHYSICIAN: Evin Rutledge MD  RETURN PHYSICIAN APPOINTMENT: TBD by patient            Pre-treatment Symptoms/Complaints: See Initial Eval Dated 2022 for more details. Pain: Initial:4/10  Medications Last Reviewed:  2022     Post Session: 3/10   Updated Objective Findings: See Initial Eval for more details. TREATMENT:   THERAPEUTIC EXERCISE: (0 minutes):  Exercises per grid below to improve mobility, strength and balance. Required minimal visual, verbal and manual cues to promote proper body alignment and promote proper body posture. Progressed resistance and complexity of movement as indicated. Date:  2022 Date:   Date:     Activity/Exercise Parameters Parameters Parameters   Education HEP, POC, PT goals, anatomy/pathology                                               THERAPEUTIC ACTIVITY: ( 0 minutes):  Activities per gid below to improve functional movement related mobility, strength and balance to improve neuro-muscular carryover to daily functional activities for improving patient's quality of life. Required visual, verbal and manual cues to promote proper body alignment and promote proper body posture/mechanics. Progressed resistance and complexity of movement as indicated. Date:  4/29/2022 Date:   Date:     Activity/Exercise Parameters Parameters Parameters                                                                               MANUAL THERAPY: (10 minutes): Joint mobilization, Soft tissue mobilization was utilized and necessary because of the patient's restricted joint motion and restricted motion of soft tissue mobility. Date  4/29/2022    Technique Used Grade  Level # Time(s) Effect while being performed          Belt Hip mobilizations III Left 6min Improved mobility                        MET  Left SI 4min For anterior rotation left innominate                       HEP Log Date 1.    4/29/2022   2.  4/29/2022   3. 4/29/2022   4.    5.           [employer] Portal  Treatment/Session Summary:    Response to Treatment: Pt demonstrated understanding of POC and initial HEP. No increase in pain or adverse reactions. Communication/Consultation:  POC, HEP, PT goals, Faxed initial evaluation to MD.   Equipment provided today: HEP Handout   Recommendations/Intent for next treatment session:   Next visit will focus on Flexion-based Exercises Extension-Based Exercises (EOTA) Manual Therapy Core Stability Pain Science Education Quad strengthening Hip strengthening soft tissue mobilization. Treatment Plan of Care Effective Dates: 4/29/2022 TO 7/28/2022 (90 days).   Frequency/Duration: 2 times a week for 90 Days             Total Treatment Billable Duration:   10  Rx plus Eval   PT Patient Time In/Time Out  Time In: 3070  Time Out: [address], PT    Future Appointments   Date Time Provider Jorje Moulton   5/2/2022  9:00 AM Severiano Gulling, Daune Cotta, MD [facility] PARUL UCD   5/2/2022  1:30 PM RHONA Baez POA   5/4/2022  1:25 PM Ting Reynaldo Sierra MD [facility] POAI POA   5/4/2022  3:00 PM Meme Buitrago MD BSNE BSNE   5/16/2022  4:00 PM Daniela Marin MD [facility]   8/2/2022 10:20 AM Daniela Marin MD [id number]   4/19/2023 10:00 AM Di Beck DO B[facility]G BSUG

## 2022-05-05 ENCOUNTER — HOSPITAL ENCOUNTER (OUTPATIENT)
Dept: PHYSICAL THERAPY | Age: 52
Discharge: HOME | End: 2022-05-05
Payer: COMMERCIAL

## 2022-05-05 PROCEDURE — 97140 MANUAL THERAPY 1/> REGIONS: CPT

## 2022-05-05 PROCEDURE — 97110 THERAPEUTIC EXERCISES: CPT

## 2022-05-09 ENCOUNTER — HOSPITAL ENCOUNTER (OUTPATIENT)
Dept: PHYSICAL THERAPY | Age: 52
Discharge: HOME | End: 2022-05-09
Payer: COMMERCIAL

## 2022-05-09 PROCEDURE — 97110 THERAPEUTIC EXERCISES: CPT

## 2022-05-09 PROCEDURE — 97140 MANUAL THERAPY 1/> REGIONS: CPT

## 2022-05-09 NOTE — PROGRESS NOTES
Lori Marvin  : 1970  [place] / [place] [facility] / Product Type: PPO /  [id number] [facility] at [address] [place] [address] [address] [place] [address], [facility], [address] [place] [address]  [phone]   [phone]                                                          Catherine Flynn MD      OUTPATIENT PHYSICAL THERAPY: Daily Treatment Note 2022 Visit Count:  3    ICD-10: Treatment Diagnosis:   Pain in left hip (M25.552)  Stiffness of Left hip, not elsewhere classified (M25.652)   Other Joint Derangements Left Hip (M24.852)  Sprain of Sacroiliac joint (S33.6XXA)              Precautions/Allergies: Other medication and Sulfa (sulfonamide antibiotics)   Fall Risk Score: 1 (= 5 = High Risk)  MD Orders: Eval and Treat  MEDICAL/REFERRING DIAGNOSIS:  Left hip pain [M25.552]   DATE OF ONSET: 2021 From slipping on bathroom floor  REFERRING PHYSICIAN: Catherine Flynn MD  RETURN PHYSICIAN APPOINTMENT: TBD by patient            Pre-treatment Symptoms/Complaints: Patient reports doing much better, no pain in left hip   Pain: Initial:0/10  Medications Last Reviewed:  2022     Post Session: 0/10   Updated Objective Findings: See Initial Eval for more details. TREATMENT:   THERAPEUTIC EXERCISE: (32 minutes):  Exercises per grid below to improve mobility, strength and balance. Required minimal visual, verbal and manual cues to promote proper body alignment and promote proper body posture. Progressed resistance and complexity of movement as indicated.      Date:  2022 Date:  2022 Date:  2022   Activity/Exercise Parameters Parameters Parameters   Education HEP, POC, PT goals, anatomy/pathology     Nu step  8min L 3 8min L 3   L stretch  [id number] 11s63aet   Seated Piriformis stretch  3l61xrb 6g42tqf   SKTC  2q61wsm 5r24ohf   CHRISTINE  1x96cit 4i63jwm   Bridging  20x Alt LE ext 10x   Clam  20x blue Hooklying 2x10 black   SL Hip Abd  20x L    Prone LE ext 2x10   3 way hip swing   2x10         THERAPEUTIC ACTIVITY: ( 0 minutes): Activities per gid below to improve functional movement related mobility, strength and balance to improve neuro-muscular carryover to daily functional activities for improving patient's quality of life. Required visual, verbal and manual cues to promote proper body alignment and promote proper body posture/mechanics. Progressed resistance and complexity of movement as indicated. Date:  5/9/2022 Date:   Date:     Activity/Exercise Parameters Parameters Parameters                                                   MANUAL THERAPY: (8 minutes): Joint mobilization, Soft tissue mobilization was utilized and necessary because of the patient's restricted joint motion and restricted motion of soft tissue mobility. Date  5/9/2022    Technique Used Grade  Level # Time(s) Effect while being performed          Belt Hip mobilizations III Left 5min Improved mobility          PROM  Left hip 3min           MET  Left SI  For anterior rotation left innominate                       HEP Log Date    5/9/2022   2.  5/9/2022   3. 5/9/2022   4.    5.           [employer] Portal  Treatment/Session Summary:    Response to Treatment: Patient had good tolerance to increased strengthening and ROM   Communication/Consultation:  POC, HEP, PT goals, Faxed initial evaluation to MD.   Equipment provided today: HEP Handout   Recommendations/Intent for next treatment session:   Next visit will focus on Flexion-based Exercises Extension-Based Exercises (EOTA) Manual Therapy Core Stability Pain Science Education Quad strengthening Hip strengthening soft tissue mobilization. Treatment Plan of Care Effective Dates: 4/29/2022 TO 7/28/2022 (90 days).   Frequency/Duration: 2 times a week for 90 Days             Total Treatment Billable Duration:   42  Rx  PT Patient Time In/Time Out  Time In: 0715  Time Out: 0800  Hema Bland PT    Future Appointments   Date Time Provider Jorje Ríosi   5/11/2022  7:15 AM Guanako Etienne, PT [facility] AND [facility]   5/13/2022 10:50 AM Alexy Rivas MD [facility]   5/16/2022  1:00 PM Catherine Flynn MD [facility]   5/17/2022  7:15 AM Guanako Etienne, PT [facility]   5/19/2022  7:15 AM Guanako Etienne, PT [facility]   5/20/2022  9:20 AM MAT LAB [facility] MAT [facility]   5/24/2022 10:00 AM Noni Olmos MD [facility]

## 2022-05-10 PROBLEM — R00.2 PALPITATIONS: Status: ACTIVE | Noted: 2022-05-10

## 2022-05-11 ENCOUNTER — HOSPITAL ENCOUNTER (OUTPATIENT)
Dept: PHYSICAL THERAPY | Age: 52
Discharge: HOME | End: 2022-05-11
Payer: COMMERCIAL

## 2022-05-11 PROCEDURE — 97110 THERAPEUTIC EXERCISES: CPT

## 2022-05-11 NOTE — PROGRESS NOTES
Nicky Warner  : 1970  [place] / [place] [facility] / Product Type: PPO /  [id number] [facility] at [address] [place] [address]. [address] [place] [address] [address] [place] [address], [address]  [phone]   [phone]                                                          Michelle Jackson MD      OUTPATIENT PHYSICAL THERAPY: Daily Treatment Note 2022 Visit Count:  4    ICD-10: Treatment Diagnosis:   Pain in left hip (M25.552)  Stiffness of Left hip, not elsewhere classified (M25.652)   Other Joint Derangements Left Hip (M24.852)  Sprain of Sacroiliac joint (S33.6XXA)              Precautions/Allergies: Other medication and Sulfa (sulfonamide antibiotics)   Fall Risk Score: 1 (= 5 = High Risk)  MD Orders: Eval and Treat  MEDICAL/REFERRING DIAGNOSIS:  Left hip pain [M25.552]   DATE OF ONSET: 2021 From slipping on bathroom floor  REFERRING PHYSICIAN: Michelle Jackson MD  RETURN PHYSICIAN APPOINTMENT: TBD by patient            Pre-treatment Symptoms/Complaints: Patient reports unrelated swelling in ankles, Left knee and hip is doing better. Pain: Initial:0/10  Medications Last Reviewed:  2022     Post Session: 0/10   Updated Objective Findings: See Initial Eval for more details. TREATMENT:   THERAPEUTIC EXERCISE: (40 minutes):  Exercises per grid below to improve mobility, strength and balance. Required minimal visual, verbal and manual cues to promote proper body alignment and promote proper body posture. Progressed resistance and complexity of movement as indicated.        Date:  2022 Date:  2022 Date:  2022 Date:  2022   Activity/Exercise Parameters Parameters Parameters Parameters   Education HEP, POC, PT goals, anatomy/pathology      Nu step  8min L 3 8min L 3 8min L 3   L stretch  29m29gqs 81y94jgx 79e56nwd   Seated Piriformis stretch  8w63ijy 6c75mtn 3k36onc   SKTC  8z60fzb 5c48rsi 7j96jdz   CHRISTINE  4p90tjg 1v71frt 9i31gqd Bridging  20x Alt LE ext 10x    Clam  20x blue Hooklying 2x10 black    SL Hip Abd  20x L     Prone LE ext   2x10    3 way hip swing   2x10    Lateral walk    2 laps green   Monster walk    2 laps green   LAQ    20x 5sec 2#   RDL    TRX 3x5         THERAPEUTIC ACTIVITY: ( 0 minutes): Activities per gid below to improve functional movement related mobility, strength and balance to improve neuro-muscular carryover to daily functional activities for improving patient's quality of life. Required visual, verbal and manual cues to promote proper body alignment and promote proper body posture/mechanics. Progressed resistance and complexity of movement as indicated. Date:  5/11/2022 Date:   Date:     Activity/Exercise Parameters Parameters Parameters                                                   MANUAL THERAPY: (0 minutes): Joint mobilization, Soft tissue mobilization was utilized and necessary because of the patient's restricted joint motion and restricted motion of soft tissue mobility. Date  5/11/2022    Technique Used Grade  Level # Time(s) Effect while being performed          Belt Hip mobilizations III Left  Improved mobility          PROM  Left hip            MET  Left SI  For anterior rotation left innominate                       HEP Log Date    5/11/2022   2.  5/11/2022   3. 5/11/2022   4.    5.           [facility] Portal  Treatment/Session Summary:    Response to Treatment: Patient had fair tolerance to increased closed chain kinetic activity for valgus control of hip and knee   Communication/Consultation:  POC exercise progression   Equipment provided today: HEP Handout   Recommendations/Intent for next treatment session:   Next visit will focus on Flexion-based Exercises Extension-Based Exercises (EOTA) Manual Therapy Core Stability Pain Science Education Quad strengthening Hip strengthening soft tissue mobilization.          Treatment Plan of Care Effective Dates: 4/29/2022 TO 7/28/2022 (90 days).   Frequency/Duration: 2 times a week for 90 Days             Total Treatment Billable Duration:   40  Rx  PT Patient Time In/Time Out  Time In: 0715  Time Out: 0800  Rocio Pinedo PT    Future Appointments   Date Time Provider Jorje Moulton   5/13/2022 10:50 AM Onel Huntley MD [facility]   5/16/2022  1:00 PM Volodymyr Doran MD [facility]   5/17/2022  7:15 AM Cisco Urmila, PT [facility]   5/19/2022  7:15 AM Cisco Urmila, PT [facility]   5/20/2022  9:20 AM MAT LAB [facility]   5/24/2022 10:00 AM Hank Olmos MD [facility]

## 2022-05-12 ENCOUNTER — HOSPITAL ENCOUNTER (EMERGENCY)
Age: 52
Discharge: HOME | End: 2022-05-12
Attending: EMERGENCY MEDICINE
Payer: COMMERCIAL

## 2022-05-12 ENCOUNTER — APPOINTMENT (OUTPATIENT)
Dept: GENERAL RADIOLOGY | Age: 52
End: 2022-05-12
Attending: EMERGENCY MEDICINE
Payer: COMMERCIAL

## 2022-05-12 VITALS
BODY MASS INDEX: 34.99 KG/M2 | RESPIRATION RATE: 31 BRPM | SYSTOLIC BLOOD PRESSURE: 131 MMHG | WEIGHT: 178.2 LBS | HEIGHT: 60 IN | HEART RATE: 102 BPM | OXYGEN SATURATION: 100 % | DIASTOLIC BLOOD PRESSURE: 83 MMHG | TEMPERATURE: 98.2 F

## 2022-05-12 DIAGNOSIS — I10 PRIMARY HYPERTENSION: Primary | ICD-10-CM

## 2022-05-12 DIAGNOSIS — R60.9 PERIPHERAL EDEMA: ICD-10-CM

## 2022-05-12 LAB
ALBUMIN SERPL-MCNC: 3.7 G/DL (ref 3.5–5)
ALBUMIN/GLOB SERPL: 1.4 {RATIO}
ALP SERPL-CCNC: 80 U/L (ref 45–117)
ALT SERPL-CCNC: 36 U/L (ref 13–61)
ANION GAP SERPL CALC-SCNC: 8 MMOL/L (ref 7–16)
AST SERPL-CCNC: 14 U/L (ref 15–37)
BASOPHILS # BLD: 0.1 K/UL (ref 0–0.2)
BASOPHILS NFR BLD: 0 % (ref 0–2)
BILIRUB SERPL-MCNC: 0.4 MG/DL (ref 0.2–1.1)
BNP SERPL-MCNC: 75 PG/ML (ref 0–450)
BUN SERPL-MCNC: 11 MG/DL (ref 7–18)
CALCIUM SERPL-MCNC: 8.9 MG/DL (ref 8.3–10.4)
CHLORIDE SERPL-SCNC: 110 MMOL/L (ref 98–107)
CO2 SERPL-SCNC: 27 MMOL/L (ref 21–32)
CREAT SERPL-MCNC: 0.91 MG/DL (ref 0.6–1)
DIFFERENTIAL METHOD BLD: ABNORMAL
EOSINOPHIL # BLD: 0.1 K/UL (ref 0–0.8)
EOSINOPHIL NFR BLD: 1 % (ref 0.5–7.8)
ERYTHROCYTE [DISTWIDTH] IN BLOOD BY AUTOMATED COUNT: 15.6 % (ref 11.9–14.6)
GLOBULIN SER CALC-MCNC: 2.7 G/DL (ref 2.3–3.5)
GLUCOSE SERPL-MCNC: 88 MG/DL (ref 65–100)
HCT VFR BLD AUTO: 39.5 % (ref 35.8–46.3)
HGB BLD-MCNC: 13.4 G/DL (ref 11.7–15.4)
IMM GRANULOCYTES # BLD AUTO: 0.8 K/UL (ref 0–0.5)
IMM GRANULOCYTES NFR BLD AUTO: 5 % (ref 0–5)
LYMPHOCYTES # BLD: 3.2 K/UL (ref 0.5–4.6)
LYMPHOCYTES NFR BLD: 20 % (ref 13–44)
MAGNESIUM SERPL-MCNC: 2.1 MG/DL (ref 1.2–2.6)
MCH RBC QN AUTO: 27.3 PG (ref 26.1–32.9)
MCHC RBC AUTO-ENTMCNC: 33.9 G/DL (ref 31.4–35)
MCV RBC AUTO: 80.4 FL (ref 79.6–97.8)
MONOCYTES # BLD: 1.4 K/UL (ref 0.1–1.3)
MONOCYTES NFR BLD: 9 % (ref 4–12)
NEUTS SEG # BLD: 10.7 K/UL (ref 1.7–8.2)
NEUTS SEG NFR BLD: 66 % (ref 43–78)
NRBC # BLD: 0 K/UL (ref 0–0.2)
PLATELET # BLD AUTO: 218 K/UL (ref 150–450)
PMV BLD AUTO: 12.1 FL (ref 9.4–12.3)
POTASSIUM SERPL-SCNC: 4.5 MMOL/L (ref 3.5–5.1)
PROT SERPL-MCNC: 6.4 G/DL (ref 6.4–8.2)
RBC # BLD AUTO: 4.91 M/UL (ref 4.05–5.2)
SODIUM SERPL-SCNC: 145 MMOL/L (ref 136–145)
WBC # BLD AUTO: 16.2 K/UL (ref 4.3–11.1)

## 2022-05-12 PROCEDURE — 74011250636 HC RX REV CODE- 250/636: Performed by: EMERGENCY MEDICINE

## 2022-05-12 PROCEDURE — 93005 ELECTROCARDIOGRAM TRACING: CPT | Performed by: EMERGENCY MEDICINE

## 2022-05-12 PROCEDURE — 80053 COMPREHEN METABOLIC PANEL: CPT

## 2022-05-12 PROCEDURE — 85025 COMPLETE CBC W/AUTO DIFF WBC: CPT

## 2022-05-12 PROCEDURE — 99285 EMERGENCY DEPT VISIT HI MDM: CPT

## 2022-05-12 PROCEDURE — 83735 ASSAY OF MAGNESIUM: CPT

## 2022-05-12 PROCEDURE — 74011250637 HC RX REV CODE- 250/637: Performed by: EMERGENCY MEDICINE

## 2022-05-12 PROCEDURE — 96374 THER/PROPH/DIAG INJ IV PUSH: CPT

## 2022-05-12 PROCEDURE — 94762 N-INVAS EAR/PLS OXIMTRY CONT: CPT

## 2022-05-12 PROCEDURE — 83880 ASSAY OF NATRIURETIC PEPTIDE: CPT

## 2022-05-12 PROCEDURE — 71045 X-RAY EXAM CHEST 1 VIEW: CPT

## 2022-05-12 RX ORDER — SODIUM CHLORIDE 0.9 % (FLUSH) 0.9 %
5-10 SYRINGE (ML) INJECTION AS NEEDED
Status: DISCONTINUED | OUTPATIENT
Start: 2022-05-12 | End: 2022-05-12 | Stop reason: HOSPADM

## 2022-05-12 RX ORDER — SODIUM CHLORIDE 0.9 % (FLUSH) 0.9 %
5-10 SYRINGE (ML) INJECTION EVERY 8 HOURS
Status: DISCONTINUED | OUTPATIENT
Start: 2022-05-12 | End: 2022-05-12 | Stop reason: HOSPADM

## 2022-05-12 RX ORDER — FUROSEMIDE 10 MG/ML
40 INJECTION INTRAMUSCULAR; INTRAVENOUS
Status: COMPLETED | OUTPATIENT
Start: 2022-05-12 | End: 2022-05-12

## 2022-05-12 RX ADMIN — NITROGLYCERIN 1 INCH: 20 OINTMENT TOPICAL at 09:22

## 2022-05-12 RX ADMIN — FUROSEMIDE 40 MG: 10 INJECTION, SOLUTION INTRAMUSCULAR; INTRAVENOUS at 09:21

## 2022-05-12 NOTE — ED PROVIDER NOTES
75-year-old female presenting to the emergency department today complaining of swelling in the feet for the last 10 to 14 days without improvement after starting Lasix. The patient was on hydrochlorothiazide but her doctor changed her 20 mg Lasix last week. The patient was told yesterday that she needed to increase it to twice daily says even when she takes the medicine it does not make her urinate anything more than normal.  Patient continues to urinate regularly though. Patient says she is now having some increased tightness in the upper back and heaviness of her breathing. She denies any chest pain. Denies any tobacco alcohol or recreational drug abuse. The patient denies any known history of kidney or heart disease.            Past Medical History:   Diagnosis Date    Anxiety     DDD (degenerative disc disease), cervical 11/29/2021    C3-C7 Disc Degeneration w/ Posterior Spondylotic Disc Displaceemnts per MRI    Depressed     GERD (gastroesophageal reflux disease)     Hypertension     IBS (irritable bowel syndrome)     Insomnia     TMJ (dislocation of temporomandibular joint) 04/2021       Past Surgical History:   Procedure Laterality Date    HX CATARACT REMOVAL Bilateral 02/2020    HX COLONOSCOPY      HX LIPECTOMY      HX TUBAL LIGATION      RI EGD DELIVER THERMAL ENERGY SPHNCTR/CARDIA GERD           Family History:   Problem Relation Age of Onset    High Cholesterol Mother     GERD Mother     Hypertension Father     Heart Disease Father        Social History     Socioeconomic History    Marital status:      Spouse name: Not on file    Number of children: Not on file    Years of education: Not on file    Highest education level: Not on file   Occupational History    Not on file   Tobacco Use    Smoking status: Never Smoker    Smokeless tobacco: Never Used   Vaping Use    Vaping Use: Never used   Substance and Sexual Activity    Alcohol use: Never    Drug use: Never    Sexual activity: Not Currently   Other Topics Concern    Not on file   Social History Narrative    Not on file     Social Determinants of Health     Financial Resource Strain:     Difficulty of Paying Living Expenses: Not on file   Food Insecurity:     Worried About Running Out of Food in the Last Year: Not on file    Valentino of Food in the Last Year: Not on file   Transportation Needs:     Lack of Transportation (Medical): Not on file    Lack of Transportation (Non-Medical): Not on file   Physical Activity:     Days of Exercise per Week: Not on file    Minutes of Exercise per Session: Not on file   Stress:     Feeling of Stress : Not on file   Social Connections:     Frequency of Communication with Friends and Family: Not on file    Frequency of Social Gatherings with Friends and Family: Not on file    Attends [employer] Services: Not on file    Active Member of [address] [place] [address] [employer] or Organizations: Not on file    Attends Club or Organization Meetings: Not on file    Marital Status: Not on file   Intimate Partner Violence:     Fear of Current or Ex-Partner: Not on file    Emotionally Abused: Not on file    Physically Abused: Not on file    Sexually Abused: Not on file   Housing Stability:     Unable to Pay for Housing in the Last Year: Not on file    Number of [place] in the Last Year: Not on file    Unstable Housing in the Last Year: Not on file         ALLERGIES: Other medication and Sulfa (sulfonamide antibiotics)    Review of Systems   Constitutional: Positive for fatigue. Respiratory: Positive for shortness of breath. Cardiovascular: Positive for leg swelling. All other systems reviewed and are negative.       Vitals:    05/12/22 0931 05/12/22 0952 05/12/22 1000 05/12/22 1011   BP: (!) 142/89 129/84 (!) 128/90 (!) 140/87   Pulse: 84 94 91    Resp: (!) 36 (!) 34 (!) 31    Temp:       SpO2: 99% 96% 100%    Weight:       Height:                Physical Exam     GENERAL:The patient has Body mass index is 34.8 kg/m². Well-hydrated. VITAL SIGNS: Heart rate, blood pressure, respiratory rate reviewed as recorded in  nurse's notes  EYES: Pupils reactive. Extraocular motion intact. No conjunctival redness or drainage. NECK: Supple, no meningeal signs. Trachea midline. No masses or thyromegaly. LUNGS: Breath sounds clear and equal bilaterally no accessory muscle use. CHEST: No deformity  CARDIOVASCULAR: Regular rate and rhythm  ABDOMEN: Soft without tenderness. No palpable masses or organomegaly. No  peritoneal signs. No rigidity. EXTREMITIES: No clubbing or cyanosis. Mild pitting edema in the lower extremities. Normal muscle tone. No  restricted range of motion appreciated. NEUROLOGIC: Sensation is grossly intact. Cranial nerve exam reveals face is  symmetrical, tongue is midline speech is clear. SKIN: No rash or petechiae. Good skin turgor palpated. PSYCHIATRIC: Alert and oriented. Appropriate behavior and judgment. MDM  Number of Diagnoses or Management Options  Diagnosis management comments: CHF, COPD, pneumonia, PE,    MI, coronary artery disease, unstable angina, coronary artery disease,    Atrial fibrillation, cardiac arrhythmia, PVC, medication induced palpitations, heart block,  electrolyte induced palpitations,    Aortic dissection, aortic aneurysm,    GERD, musculoskeletal pain, costochondritis, rib fracture, pleurisy,         Amount and/or Complexity of Data Reviewed  Clinical lab tests: ordered and reviewed  Tests in the radiology section of CPT®: ordered and reviewed  Tests in the medicine section of CPT®: ordered and reviewed  Review and summarize past medical records: yes  Independent visualization of images, tracings, or specimens: yes      ED Course as of 05/12/22 1024   u May 12, 2022   0950 XR Chest Port  IMPRESSIONs: No acute findings. [OV]   6906 Patient has been urinating well here in the emergency department.   I talked to her about her hypertension and we discussed her medications. She will call her family doctor to clarify which medicine she supposed be taking for the hypertension. [GUANAKITO]   1024 Patient states she just recently got an injection of steroids in her left knee which is why her white count is elevated. [GUANAKITO]      ED Course User Index  [GUANAKITO] Kavon Worthington DO       EKG    Date/Time: 5/12/2022 9:09 AM  Performed by: Kavon Worthington DO  Authorized by: Kavon Worthington DO     ECG reviewed by ED Physician in the absence of a cardiologist: yes    Comments:      Normal sinus rhythm rate of 91 bpm with a narrow QRS complex no ST elevations or depressions appreciated.

## 2022-05-12 NOTE — ED TRIAGE NOTES
Pt ambulatory from [facility] to [facility]. Pt here with complaint of foot swelling. States this all began a few weeks ago and she consulted with her PCP about this issue. States her PCP changed her BP medications from HCTZ to lasix. Initially pt was instructed to take 20 mg daily, but she feels like the swelling has only gotten worse. She spoke with her PCP about this again yesterday and was told to start taking 20 mg BID. PT states that today she feels like her upper back is tight and that she is very bloated. States she is having slight difficulty breathing. Denies chest pain.

## 2022-05-12 NOTE — ED NOTES
I have reviewed discharge instructions with the patient. The patient verbalized understanding. Patient left ED via Discharge Method: ambulatory to Home with self. Opportunity for questions and clarification provided. Patient given 0 scripts. To continue your aftercare when you leave the hospital, you may receive an automated call from our care team to check in on how you are doing. This is a free service and part of our promise to provide the best care and service to meet your aftercare needs.  If you have questions, or wish to unsubscribe from this service please call [phone]. Thank you for Choosing our [employer] Emergency Department.

## 2022-05-17 ENCOUNTER — HOSPITAL ENCOUNTER (OUTPATIENT)
Dept: PHYSICAL THERAPY | Age: 52
Discharge: HOME | End: 2022-05-17
Payer: COMMERCIAL

## 2022-05-17 PROCEDURE — 97110 THERAPEUTIC EXERCISES: CPT

## 2022-05-17 NOTE — PROGRESS NOTES
Yolanda Quiroz  : 1970  [place]UE [place] / [place] [facility] / Product Type: PPO /  Justyn Pinedoe at [address] [facility]. [address] [place] [address], [address]  [phone]   [phone]                                                          Kaylee Valdez MD      OUTPATIENT PHYSICAL THERAPY: Daily Treatment Note 2022 Visit Count:  5    ICD-10: Treatment Diagnosis:   Pain in left hip (M25.552)  Stiffness of Left hip, not elsewhere classified (M25.652)   Other Joint Derangements Left Hip (M24.852)  Sprain of Sacroiliac joint (S33.6XXA)              Precautions/Allergies: Other medication and Sulfa (sulfonamide antibiotics)   Fall Risk Score: 1 (= 5 = High Risk)  MD Orders: Eval and Treat  MEDICAL/REFERRING DIAGNOSIS:  Left hip pain [M25.552]   DATE OF ONSET: 2021 From slipping on bathroom floor  REFERRING PHYSICIAN: Kaylee Valdez MD  RETURN PHYSICIAN APPOINTMENT: TBD by patient            Pre-treatment Symptoms/Complaints: Patient reports having hip injection yesterday, MD said to hold mobilizations, much less swelling and left knee pain after changing BP meds. Pain: Initial:0/10  Medications Last Reviewed:  2022     Post Session: 0/10   Updated Objective Findings: See Initial Eval for more details. TREATMENT:   THERAPEUTIC EXERCISE: (40 minutes):  Exercises per grid below to improve mobility, strength and balance. Required minimal visual, verbal and manual cues to promote proper body alignment and promote proper body posture. Progressed resistance and complexity of movement as indicated.        Date:  2022 Date:  2022 Date:  2022 Date:  2022 Date:  2022   Activity/Exercise Parameters Parameters Parameters Parameters    Education HEP, POC, PT goals, anatomy/pathology       Nu step  8min L 3 8min L 3 8min L 3 10min L 3   L stretch  67f39dxh 49s54ufl 49h24vwr 21d51xlr   Seated Piriformis stretch  1f60hki 2b42rtg 4s53lei 3q48jkc   SKTC  9m49fme 2w48bje 3g03xnd    CHRISTINE  9l25ele 5k04mtn 0z87apd 6i40moh   Bridging  20x Alt LE ext 10x     Clam  20x blue Hooklying 2x10 black     SL Hip Abd  20x L      Prone LE ext   2x10     3 way hip swing   2x10     Lateral walk    2 laps green 2 laps pink   Monster walk    2 laps green 2 laps pink   LAQ    20x 5sec 2# 3x10  3#   RDL    TRX 3x5 TRX 3x5   Sled     3 laps 50#                 THERAPEUTIC ACTIVITY: ( 0 minutes): Activities per gid below to improve functional movement related mobility, strength and balance to improve neuro-muscular carryover to daily functional activities for improving patient's quality of life. Required visual, verbal and manual cues to promote proper body alignment and promote proper body posture/mechanics. Progressed resistance and complexity of movement as indicated. Date:  5/17/2022 Date:   Date:     Activity/Exercise Parameters Parameters Parameters                                                   MANUAL THERAPY: (0 minutes): Joint mobilization, Soft tissue mobilization was utilized and necessary because of the patient's restricted joint motion and restricted motion of soft tissue mobility.         Date  5/17/2022    Technique Used Grade  Level # Time(s) Effect while being performed          Belt Hip mobilizations III Left  Improved mobility          PROM  Left hip            MET  Left SI  For anterior rotation left innominate                       HEP Log Date    5/17/2022   2.  5/17/2022   3. 5/17/2022   4.    5.           [employer] Portal  Treatment/Session Summary:    Response to Treatment: Patient improving with tolerance to activity and strengthening   Communication/Consultation:  POC exercise progression   Equipment provided today: HEP Handout   Recommendations/Intent for next treatment session:   Next visit will focus on Flexion-based Exercises Extension-Based Exercises (EOTA) Manual Therapy Core Stability Pain Science Education Quad strengthening Hip strengthening soft tissue mobilization. Treatment Plan of Care Effective Dates: 4/29/2022 TO 7/28/2022 (90 days).   Frequency/Duration: 2 times a week for 90 Days             Total Treatment Billable Duration:   40  Rx  PT Patient Time In/Time Out  Time In: 0720  Time Out: 7[facility]    Future Appointments   Date Time Provider Jorje Moulton   5/19/2022  7:15 AM Rachelle Gibson PT [facility] AND [facility]   5/20/2022  9:20 AM MAT LAB [facility]   5/24/2022 10:00 AM Carola Olmos MD [facility]

## 2022-05-19 ENCOUNTER — HOSPITAL ENCOUNTER (OUTPATIENT)
Dept: PHYSICAL THERAPY | Age: 52
Discharge: HOME | End: 2022-05-19
Payer: COMMERCIAL

## 2022-05-19 PROCEDURE — 97110 THERAPEUTIC EXERCISES: CPT

## 2022-05-19 NOTE — PROGRESS NOTES
Komal Huertas  : 1970  Payor: BLUE CROSS / Plan: [place] [facility] / Product Type: PPO /  [address] [facility] at [address] [place] [address]. [address] [place] [address] [address] [place] [address], [address]  [phone]   [phone]                                                          Alverto Escobar MD      OUTPATIENT PHYSICAL THERAPY: Daily Treatment Note 2022 Visit Count:  6    ICD-10: Treatment Diagnosis:   Pain in left hip (M25.552)  Stiffness of Left hip, not elsewhere classified (M25.652)   Other Joint Derangements Left Hip (M24.852)  Sprain of Sacroiliac joint (S33.6XXA)              Precautions/Allergies: Other medication and Sulfa (sulfonamide antibiotics)   Fall Risk Score: 1 (= 5 = High Risk)  MD Orders: Eval and Treat  MEDICAL/REFERRING DIAGNOSIS:  Left hip pain [M25.552]   DATE OF ONSET: 2021 From slipping on bathroom floor  REFERRING PHYSICIAN: Alverto Escobar MD  RETURN PHYSICIAN APPOINTMENT: TBD by patient            Pre-treatment Symptoms/Complaints: Patient reports just fatigue in left hip at the end of day, some left knee pain this AM, \"I think it's the weather\". Pain: Initial:0/10  Medications Last Reviewed:  2022     Post Session: 0/10   Updated Objective Findings: See Initial Eval for more details. TREATMENT:   THERAPEUTIC EXERCISE: (40 minutes):  Exercises per grid below to improve mobility, strength and balance. Required minimal visual, verbal and manual cues to promote proper body alignment and promote proper body posture. Progressed resistance and complexity of movement as indicated.        Date:  2022 Date:  2022 Date:  2022 Date:  2022 Date:  2022   Activity/Exercise Parameters Parameters Parameters     Education        Nu step 8min L 3 8min L 3 8min L 3 10min L 3 10min hills L5 57cals   L stretch 98m26yvg 01r84zmu 97v65urb 31t46rxm 57c03lzz   Seated Piriformis stretch 4n11dbo 0f11bic 9x79cxu 9g15egm 7v64xbc SKTC 8g99sav 1f99rnm 1x68zmu     CHRISTINE 2k65yqp 0d43oso 8r27gza 6k47mef 0i82oyb seated   HSS     1y47ljb seated   Bridging 20x Alt LE ext 10x      Clam 20x blue Hooklying 2x10 black      SL Hip Abd 20x L       Prone LE ext  2x10   Quad kicks 2x10   3 way hip swing  2x10      Lateral walk   2 laps green 2 laps pink 2 laps pink   Monster walk   2 laps green 2 laps pink 2 laps pink   LAQ   20x 5sec 2# 3x10  3# 20x 5sec 4#   RDL   TRX 3x5 TRX 3x5    Sled    3 laps 50#    Squat     TRX 3x5   Lateral step up     Heel tap 20x 4\"         THERAPEUTIC ACTIVITY: ( 0 minutes): Activities per gid below to improve functional movement related mobility, strength and balance to improve neuro-muscular carryover to daily functional activities for improving patient's quality of life. Required visual, verbal and manual cues to promote proper body alignment and promote proper body posture/mechanics. Progressed resistance and complexity of movement as indicated. Date:  5/19/2022 Date:   Date:     Activity/Exercise Parameters Parameters Parameters                                                   MANUAL THERAPY: (0 minutes): Joint mobilization, Soft tissue mobilization was utilized and necessary because of the patient's restricted joint motion and restricted motion of soft tissue mobility.         Date  5/19/2022    Technique Used Grade  Level # Time(s) Effect while being performed          Belt Hip mobilizations III Left  Improved mobility          PROM  Left hip            MET  Left SI  For anterior rotation left innominate                       HEP Log Date    5/19/2022   2.  5/19/2022   3. 5/19/2022   4.    5.           [employer] Portal  Treatment/Session Summary:    Response to Treatment: Patient had good tolerance to increased strengthening   Communication/Consultation:  POC exercise progression   Equipment provided today: HEP Handout   Recommendations/Intent for next treatment session:   Next visit will focus on Flexion-based Exercises Extension-Based Exercises (EOTA) Manual Therapy Core Stability Pain Science Education Quad strengthening Hip strengthening soft tissue mobilization. Treatment Plan of Care Effective Dates: 4/29/2022 TO 7/28/2022 (90 days).   Frequency/Duration: 2 times a week for 90 Days             Total Treatment Billable Duration:   40  Rx  PT Patient Time In/Time Out  Time In: 0715  Time Out: 0800  Onel Day, PT    Future Appointments   Date Time Provider Jorje Moulton   5/20/2022  9:20 AM MAT LAB [facility]   5/24/2022 10:00 AM Gonzalo Olmos MD [facility]

## 2022-05-23 ENCOUNTER — HOSPITAL ENCOUNTER (OUTPATIENT)
Dept: PHYSICAL THERAPY | Age: 52
Setting detail: RECURRING SERIES
Discharge: HOME | End: 2022-05-26
Payer: COMMERCIAL

## 2022-05-23 PROCEDURE — 97110 THERAPEUTIC EXERCISES: CPT

## 2022-05-23 ASSESSMENT — PAIN SCALES - GENERAL: PAINLEVEL_OUTOF10: 2

## 2022-05-23 NOTE — PROGRESS NOTES
Stevo Hernandez  : 1970  [facility]  Secondary:  [address] @ [facility] [address]  Phone: [phone]  Fax: [phone] No data recorded  No data recorded    PT Visit Info:    7 **   OUTPATIENT PHYSICAL THERAPY:OP NOTE TYPE: Treatment Note 2022     Appt Desk   Episode      Treatment Diagnosis:  Pain in left hip (M25.552)  Medical/Referring Diagnosis:  Pain in left hip [M25.552]  Referring Physician:  Rafa Rivera MD MD Orders:  PT Eval and Treat   Date of Onset:  No data recorded   Allergies:  Sulfa antibiotics  Restrictions/Precautions:    No data recordedNo data recorded   Interventions Planned (Treatment may consist of any combination of the following):    No data recorded   Subjective Comments:   (Patient reports increased tightness in bilateral hips )  Initial:     2/10 Post Session:     2/10  Medications Last Reviewed:  2022  Updated Objective Findings:  None Today  Treatment   THERAPEUTIC EXERCISE: (40 minutes):    Exercises per grid below to improve mobility, strength, balance and dynamic movement of left hip to improve functional bending, lifting, carrying and reaching. Required minimal verbal cues to promote proper body alignment and promote proper body mechanics. Progressed resistance, repetitions and complexity of movement as indicated.    Date:  2022 Date:   Date:     Activity/Exercise Parameters Parameters Parameters   Nustep 8min hills L5     L stretch 35f00gdm     Calf stretch 1h43mxq     Seated HSS 7o73pao     Seated Piriformis stretch 2k36fwa     Seated hip ER stretch 8z79wmu     LAQ 11t3amr 5#     Lateral walk 3 laps pink     Monster walk 3 laps pink     Squat 3x5 TRX     RDL 3x5 TRX     Andrade Carry 2x2 laps 15# ea     Walking lunge              MANUAL THERAPY: (0 minutes):   Joint mobilization and Soft tissue mobilization was utilized and necessary because of the patient's restricted joint motion, painful spasm, loss of articular motion and restricted motion of soft tissue. Date:   Date:   Date:     Activity/Exercise Parameters Parameters Parameters                                                   Treatment/Session Summary:    Treatment Assessment:    Patient responded well to increased strengthening without reports of tightness  Communication/Consultation:  None today  Equipment provided today:  None  Recommendations/Intent for next treatment session: Next visit will focus on SL scapular stability exercises.     Total Treatment Billable Duration:  40 minutes  Time In: 0715  Time Out: 0800    Rebecca Lee, PT       Post Session Pain  Charge Capture  [employer] Portal  MD Guidelines  Scanned Media  Benefits  MyChart

## 2022-05-24 ENCOUNTER — APPOINTMENT (OUTPATIENT)
Dept: PHYSICAL THERAPY | Age: 52
End: 2022-05-24
Payer: COMMERCIAL

## 2022-05-24 ENCOUNTER — OFFICE VISIT (OUTPATIENT)
Dept: CARDIOLOGY CLINIC | Age: 52
End: 2022-05-24
Payer: COMMERCIAL

## 2022-05-24 ENCOUNTER — HOSPITAL ENCOUNTER (OUTPATIENT)
Dept: ULTRASOUND IMAGING | Age: 52
Discharge: HOME | End: 2022-05-27

## 2022-05-24 VITALS
BODY MASS INDEX: 34.83 KG/M2 | HEART RATE: 116 BPM | SYSTOLIC BLOOD PRESSURE: 110 MMHG | WEIGHT: 177.4 LBS | DIASTOLIC BLOOD PRESSURE: 60 MMHG | HEIGHT: 60 IN

## 2022-05-24 DIAGNOSIS — I87.2 VENOUS INSUFFICIENCY: ICD-10-CM

## 2022-05-24 DIAGNOSIS — R00.2 PALPITATIONS: Primary | ICD-10-CM

## 2022-05-24 PROCEDURE — 93015 CV STRESS TEST SUPVJ I&R: CPT | Performed by: INTERNAL MEDICINE

## 2022-05-24 RX ORDER — LISINOPRIL AND HYDROCHLOROTHIAZIDE 25; 20 MG/1; MG/1
1 TABLET ORAL DAILY
COMMUNITY
Start: 2022-05-13 | End: 2022-08-03 | Stop reason: SDUPTHER

## 2022-05-24 NOTE — PROGRESS NOTES
[address]  [phone]           EXERCISE TREADMILL STRESS TEST            Date: 2022    Name: Klaudia Morgan  : 1970  MRN: [id number]    Ordering Physician:  Carmen Khan MD    Family Physician: Kanwal Bates NP, APRN - CNP    Age: 46 y.o. Sex: female      Klaudia Morgan presents today for Treadmill Stress Testing. TEST INDICATIONS:dyspnea      May 2022 - 24 hr monitor - normal tracing, no diary entries    Lab Results   Component Value Date    TSH 0.901 2022     INFORMED CONSENT:  The nature of the procedure and its benefits were discussed with the patient. Risks include, but are not limited to: Inability to complete the study,  arrhythmias, MI,  and death. The patient had no further questions and agreed to proceed. FULL STRESS TEST REPORT APPEARS SCANNED TO CARDIOLOGY TAB. Total Exercise Time: 6.00    Peak BP response: normal      Impression:   1. Good exercise tolerance  2. No angina  3. Normal exercise EKG    Normal holter monitor, no inappropriate tachycardia    Patient is encouraged to engage in moderate physical activity for at least 30 minutes on at least 6 days of the week, and to follow a diet rich in whole grains, fruits and vegetables, proven to reduce the incidence of events related to cardiovascular disease. For more detailed information, patient is referred to www.cardiosmart.org.           Carmen Khan MD  2022  10:31 AM

## 2022-05-25 ENCOUNTER — HOSPITAL ENCOUNTER (OUTPATIENT)
Dept: PHYSICAL THERAPY | Age: 52
Setting detail: RECURRING SERIES
Discharge: HOME | End: 2022-05-28
Payer: COMMERCIAL

## 2022-05-25 LAB
STRESS ANGINA INDEX: 0
STRESS BASELINE ST DEPRESSION: 0 MM
STRESS ST DEPRESSION: 0 MM
STRESS TARGET HR: 168 BPM

## 2022-05-25 PROCEDURE — 97110 THERAPEUTIC EXERCISES: CPT

## 2022-05-25 PROCEDURE — 97140 MANUAL THERAPY 1/> REGIONS: CPT

## 2022-05-25 ASSESSMENT — PAIN SCALES - GENERAL: PAINLEVEL_OUTOF10: 7

## 2022-05-25 NOTE — PROGRESS NOTES
Tom Elise  : 1970  Primary: [facility] [address]  Secondary:  [address] @ [address] [facility] [address]  Phone: [phone]  Fax: [phone] No data recorded  No data recorded    PT Visit Info:     2    6+   OUTPATIENT PHYSICAL THERAPY:OP NOTE TYPE: Treatment Note 2022     Appt Desk   Episode      Treatment Diagnosis:  Pain in left hip (M25.552)  Medical/Referring Diagnosis:  Pain in left hip [M25.552]  Referring Physician:  Yessica Urena MD MD Orders:  PT Eval and Treat   Date of Onset:  No data recorded   Allergies:  Sulfa antibiotics  Restrictions/Precautions:    No data recordedNo data recorded   Interventions Planned (Treatment may consist of any combination of the following):    No data recorded   Subjective Comments:  Patient reports increased  right knee pain this AM, she is not sure if it is coming from low back  Initial:     7/10 Post Session:     4/10  Medications Last Reviewed:  2022  Updated Objective Findings:  None Today  Treatment   THERAPEUTIC EXERCISE: (30 minutes):    Exercises per grid below to improve mobility, strength, balance and dynamic movement of left hip to improve functional bending, lifting, carrying and reaching. Required minimal verbal cues to promote proper body alignment and promote proper body mechanics. Progressed resistance, repetitions and complexity of movement as indicated.    Date:  2022 Date:  2022 Date:     Activity/Exercise Parameters Parameters Parameters   Nustep 8min hills L5 8min L 3.5    L stretch 74b22coh 46g87erc    Calf stretch 4w49qxs 2j14krp    Seated HSS 7a92qad 9i74lfx    Seated Piriformis stretch 6c08myk 2v97uzq    Seated hip ER stretch 9a82moc 6l47tbh    LTR  10x    Bridging   11m1vkj    LAQ 19t2dsz 5# 95p8xii 5#    Lateral walk 3 laps pink     Monster walk 3 laps pink     Squat 3x5 TRX     RDL 3x5 TRX     Andrade Carry 2x2 laps 15# ea     Walking lunge MANUAL THERAPY: (8 minutes):   Joint mobilization and Soft tissue mobilization was utilized and necessary because of the patient's restricted joint motion, painful spasm, loss of articular motion and restricted motion of soft tissue. Date:  5/25/2022 Date:   Date:     Activity/Exercise Parameters Parameters Parameters         Jt mobilizations lumbar rotation Grd III 5min                       MET SI for anterior rotation right 3min                 Treatment/Session Summary:    Treatment Assessment:   Patient had decreased pain and equal innominate after manual therapy  Communication/Consultation:  None today  Equipment provided today:  None  Recommendations/Intent for next treatment session: Next visit will focus on SL scapular stability exercises.     Total Treatment Billable Duration:  38 minutes  Time In: 0715  Time Out: 0800    Magalie Denise, SLADE       Post Session Pain  Charge Capture  [employer] Portal  MD Guidelines  Scanned Media  Benefits  MyChart

## 2022-05-31 ENCOUNTER — APPOINTMENT (OUTPATIENT)
Dept: PHYSICAL THERAPY | Age: 52
End: 2022-05-31
Payer: COMMERCIAL

## 2022-06-01 ENCOUNTER — HOSPITAL ENCOUNTER (OUTPATIENT)
Dept: PHYSICAL THERAPY | Age: 52
Setting detail: RECURRING SERIES
Discharge: HOME | End: 2022-06-04
Payer: COMMERCIAL

## 2022-06-01 PROCEDURE — 97110 THERAPEUTIC EXERCISES: CPT

## 2022-06-01 ASSESSMENT — PAIN SCALES - GENERAL: PAINLEVEL_OUTOF10: 1

## 2022-06-01 NOTE — PROGRESS NOTES
Yuan Levy  : 1970  Primary: [facility] [address]  Secondary:  [address] @ [address] [facility] [address]  Phone: [phone]  Fax: [phone] No data recorded  No data recorded    PT Visit Info:     3    6+   OUTPATIENT PHYSICAL THERAPY:OP NOTE TYPE: Treatment Note 2022     Appt Desk   Episode      Treatment Diagnosis:  Pain in left hip (M25.552)  Medical/Referring Diagnosis:  No admission diagnoses are documented for this encounter. Referring Physician:  Cassidy Fonseca MD MD Orders:  PT Eval and Treat   Date of Onset:  No data recorded   Allergies:  Sulfa antibiotics  Restrictions/Precautions:    No data recordedNo data recorded   Interventions Planned (Treatment may consist of any combination of the following):    No data recorded   Subjective Comments:  Patient reports doing better with hip and knee pain , manual therpy helped LBP but it returned in 2 days  Initial:     1/10 Post Session:     1/10  Medications Last Reviewed:  2022  Updated Objective Findings:  Anterior rotation right innominate  Treatment   THERAPEUTIC EXERCISE: (40 minutes):    Exercises per grid below to improve mobility, strength, balance and dynamic movement of left hip to improve functional bending, lifting, carrying and reaching. Required minimal verbal cues to promote proper body alignment and promote proper body mechanics. Progressed resistance, repetitions and complexity of movement as indicated.    Date:  2022 Date:  2022 Date:     Activity/Exercise Parameters Parameters Parameters   Nustep 8min hills L5 8min L 3.5 8min hills L5   L stretch 17t73jqv 85f53vht 96t32css   Calf stretch 5r32piv 5u01brb 2w88xpk   Seated HSS 1x53sgf 3d16rxr 7k11tku   Seated Piriformis stretch 8j78enj 9b62mro 0l90zkj   Seated hip ER stretch 5a98let 5a98bni 8s10rdy   LTR  10x    Bridging   75c9zqg    LAQ 79k9tjx 5# 40r9wag 5#    Lateral walk 3 laps pink  3 laps purple

## 2022-06-02 ENCOUNTER — HOSPITAL ENCOUNTER (OUTPATIENT)
Dept: PHYSICAL THERAPY | Age: 52
Setting detail: RECURRING SERIES
Discharge: HOME | End: 2022-06-05
Payer: COMMERCIAL

## 2022-06-02 PROCEDURE — 97110 THERAPEUTIC EXERCISES: CPT

## 2022-06-02 ASSESSMENT — PAIN SCALES - GENERAL: PAINLEVEL_OUTOF10: 9

## 2022-06-02 NOTE — PROGRESS NOTES
Kathia Arnold  : 1970  Primary: [facility] [address]  Secondary:  [address] @ [address] [facility] [address]  Phone: [phone]  Fax: [phone] No data recorded  No data recorded    PT Visit Info:     4    6+   OUTPATIENT PHYSICAL THERAPY:OP NOTE TYPE: Treatment Note 2022     Appt Desk   Episode      Treatment Diagnosis:  Pain in left hip (M25.552)  Medical/Referring Diagnosis:  No admission diagnoses are documented for this encounter. Referring Physician:  Margaret Rapp MD MD Orders:  PT Eval and Treat   Date of Onset:  No data recorded   Allergies:  Sulfa antibiotics  Restrictions/Precautions:    No data recordedNo data recorded   Interventions Planned (Treatment may consist of any combination of the following):    No data recorded   Subjective Comments:  Patient reports increased pain in bilateral LE's  Initial:     9/10 Post Session:     2/10  Medications Last Reviewed:  2022  Updated Objective Findings:  Anterior rotation right innominate  Treatment   THERAPEUTIC EXERCISE: (40 minutes):    Exercises per grid below to improve mobility, strength, balance and dynamic movement of left hip to improve functional bending, lifting, carrying and reaching. Required minimal verbal cues to promote proper body alignment and promote proper body mechanics. Progressed resistance, repetitions and complexity of movement as indicated.    Date:  2022 Date:  2022 Date:  2022 Date:  2022    Activity/Exercise Parameters Parameters Parameters     Nustep 8min hills L5 8min L 3.5 8min hills L5 8min hills L5    L stretch 16f62ess 32o82kru 64c04ahz 47c67swn    Calf stretch 8t46usb 0i34jhf 4o71zup 9u50uez    Seated HSS 7i53drl 7t07lpe 7f02xul 9b04mhj    Seated Piriformis stretch 0n37mid 9g31foo 4r48seu 2n18hux    Seated hip ER stretch 2e34mho 9q33opg 9b89mip 8t92quy    LTR  10x      Bridging   38a5sen      LAQ 32e1fyz 5# 22a1qqu 5#  89w7rlk 5# Lateral walk 3 laps pink  3 laps purple 3 laps purple    Monster walk 3 laps pink  3 laps purple 3 laps purple    Squat 3x5 TRX  3x5 TRX 3x5 TRX    RDL 3x5 TRX  3x5 TRX 3x5 TRX    Andrade Carry 2x2 laps 15# ea   2/3 laps 15# ea    Walking lunge   2 laps     Sled   3 laps 75#       MANUAL THERAPY: (3 minutes):   Joint mobilization and Soft tissue mobilization was utilized and necessary because of the patient's restricted joint motion, painful spasm, loss of articular motion and restricted motion of soft tissue. Date:6/2/2022 Date:   Date:     Activity/Exercise Parameters Parameters Parameters         Jt mobilizations lumbar rotation                        MET SI for anterior rotation right 3min                 Treatment/Session Summary:    Treatment Assessment:   Patient had significant decrease in pain and good tolerance to activity  Communication/Consultation:  None today  Equipment provided today:  None  Recommendations/Intent for next treatment session: Next visit will focus on progression of LE strengthening.     Total Treatment Billable Duration:  43 minutes  Time In: 0715  Time Out: 0800    Terrence Reich PT       Post Session Pain  Charge Capture  MedBridge Portal  MD Guidelines  Scanned Media  Benefits  MyChart

## 2022-06-03 ENCOUNTER — NURSE ONLY (OUTPATIENT)
Dept: INTERNAL MEDICINE CLINIC | Facility: CLINIC | Age: 52
End: 2022-06-03

## 2022-06-03 ENCOUNTER — OFFICE VISIT (OUTPATIENT)
Dept: INTERNAL MEDICINE CLINIC | Facility: CLINIC | Age: 52
End: 2022-06-03
Payer: COMMERCIAL

## 2022-06-03 VITALS
BODY MASS INDEX: 34.99 KG/M2 | DIASTOLIC BLOOD PRESSURE: 52 MMHG | WEIGHT: 178.2 LBS | TEMPERATURE: 98.3 F | SYSTOLIC BLOOD PRESSURE: 97 MMHG | HEIGHT: 60 IN | HEART RATE: 104 BPM | OXYGEN SATURATION: 98 % | RESPIRATION RATE: 16 BRPM

## 2022-06-03 DIAGNOSIS — R79.89 ELEVATED SERUM CREATININE: ICD-10-CM

## 2022-06-03 DIAGNOSIS — I10 PRIMARY HYPERTENSION: ICD-10-CM

## 2022-06-03 DIAGNOSIS — L72.9 CYST OF SKIN: Primary | ICD-10-CM

## 2022-06-03 DIAGNOSIS — G44.221 CHRONIC TENSION-TYPE HEADACHE, INTRACTABLE: Primary | ICD-10-CM

## 2022-06-03 PROCEDURE — 99214 OFFICE O/P EST MOD 30 MIN: CPT | Performed by: NURSE PRACTITIONER

## 2022-06-03 RX ORDER — UBROGEPANT 50 MG/1
TABLET ORAL
Qty: 16 TABLET | Refills: 11 | Status: SHIPPED | OUTPATIENT
Start: 2022-06-03

## 2022-06-03 RX ORDER — OMEPRAZOLE 40 MG/1
CAPSULE, DELAYED RELEASE ORAL
COMMUNITY
Start: 2022-04-19

## 2022-06-03 RX ORDER — LINACLOTIDE 72 UG/1
1 CAPSULE, GELATIN COATED ORAL DAILY
COMMUNITY
Start: 2022-04-10

## 2022-06-03 RX ORDER — POLYETHYLENE GLYCOL 3350 17 G/17G
17 POWDER, FOR SOLUTION ORAL DAILY
COMMUNITY
End: 2022-06-03

## 2022-06-03 RX ORDER — NAPROXEN 500 MG/1
1 TABLET ORAL PRN
COMMUNITY

## 2022-06-03 RX ORDER — FUROSEMIDE 20 MG/1
TABLET ORAL
COMMUNITY
Start: 2022-05-04 | End: 2022-06-03

## 2022-06-03 RX ORDER — LIDOCAINE HYDROCHLORIDE 20 MG/ML
SOLUTION OROPHARYNGEAL
COMMUNITY
Start: 2022-05-26 | End: 2022-06-03

## 2022-06-03 RX ORDER — SUCRALFATE 1 G/1
TABLET ORAL
COMMUNITY
Start: 2022-05-26 | End: 2022-07-05

## 2022-06-03 ASSESSMENT — ENCOUNTER SYMPTOMS
ABDOMINAL DISTENTION: 0
DIARRHEA: 0
COLOR CHANGE: 0
EYE REDNESS: 0
ABDOMINAL PAIN: 0
EYE PAIN: 0
SHORTNESS OF BREATH: 0
CONSTIPATION: 0
BACK PAIN: 0
APNEA: 0
EYE ITCHING: 0
NAUSEA: 0
SINUS PAIN: 0
COUGH: 0
EYE DISCHARGE: 0

## 2022-06-03 ASSESSMENT — PATIENT HEALTH QUESTIONNAIRE - PHQ9
SUM OF ALL RESPONSES TO PHQ QUESTIONS 1-9: 12
2. FEELING DOWN, DEPRESSED OR HOPELESS: 2
SUM OF ALL RESPONSES TO PHQ QUESTIONS 1-9: 12
1. LITTLE INTEREST OR PLEASURE IN DOING THINGS: 2
5. POOR APPETITE OR OVEREATING: 1
8. MOVING OR SPEAKING SO SLOWLY THAT OTHER PEOPLE COULD HAVE NOTICED. OR THE OPPOSITE, BEING SO FIGETY OR RESTLESS THAT YOU HAVE BEEN MOVING AROUND A LOT MORE THAN USUAL: 0
SUM OF ALL RESPONSES TO PHQ9 QUESTIONS 1 & 2: 4
10. IF YOU CHECKED OFF ANY PROBLEMS, HOW DIFFICULT HAVE THESE PROBLEMS MADE IT FOR YOU TO DO YOUR WORK, TAKE CARE OF THINGS AT HOME, OR GET ALONG WITH OTHER PEOPLE: 1
6. FEELING BAD ABOUT YOURSELF - OR THAT YOU ARE A FAILURE OR HAVE LET YOURSELF OR YOUR FAMILY DOWN: 1
SUM OF ALL RESPONSES TO PHQ QUESTIONS 1-9: 12
3. TROUBLE FALLING OR STAYING ASLEEP: 2
SUM OF ALL RESPONSES TO PHQ QUESTIONS 1-9: 12
4. FEELING TIRED OR HAVING LITTLE ENERGY: 2
7. TROUBLE CONCENTRATING ON THINGS, SUCH AS READING THE NEWSPAPER OR WATCHING TELEVISION: 2
9. THOUGHTS THAT YOU WOULD BE BETTER OFF DEAD, OR OF HURTING YOURSELF: 0
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
## 2022-06-04 LAB
CREAT UR-MCNC: 28 MG/DL
MICROALBUMIN UR-MCNC: <0.5 MG/DL
MICROALBUMIN/CREAT UR-RTO: NORMAL MG/G

## 2022-06-06 DIAGNOSIS — G43.D0 ABDOMINAL MIGRAINE, NOT INTRACTABLE: Primary | ICD-10-CM

## 2022-06-06 RX ORDER — TOPIRAMATE 50 MG/1
50 TABLET, FILM COATED ORAL 2 TIMES DAILY
Qty: 180 TABLET | Refills: 3 | Status: SHIPPED | OUTPATIENT
Start: 2022-06-06 | End: 2022-06-07 | Stop reason: SDUPTHER

## 2022-06-06 NOTE — PROGRESS NOTES
topiramate for abdominal migraine prevention.          Mamta Fuentes MD  Consultative Neurology, Neurodiagnostics   [facility]    [address] [place] [address], [address] [place] [address]  Phone:  [phone]  Fax:   [phone]

## 2022-06-07 ENCOUNTER — HOSPITAL ENCOUNTER (OUTPATIENT)
Dept: PHYSICAL THERAPY | Age: 52
Setting detail: RECURRING SERIES
Discharge: HOME | End: 2022-06-10
Payer: COMMERCIAL

## 2022-06-07 DIAGNOSIS — G43.D0 ABDOMINAL MIGRAINE, NOT INTRACTABLE: ICD-10-CM

## 2022-06-07 PROCEDURE — 97110 THERAPEUTIC EXERCISES: CPT

## 2022-06-07 RX ORDER — TOPIRAMATE 50 MG/1
50 TABLET, FILM COATED ORAL 2 TIMES DAILY
Qty: 180 TABLET | Refills: 3 | Status: SHIPPED | OUTPATIENT
Start: 2022-06-07 | End: 2022-06-10 | Stop reason: DRUGHIGH

## 2022-06-07 ASSESSMENT — PAIN SCALES - GENERAL: PAINLEVEL_OUTOF10: 8

## 2022-06-07 NOTE — PROGRESS NOTES
Antonio Francois  : 1970  Primary: [address]  Secondary:  [address] [facility] [address]  Phone: [phone]  Fax: [phone] No data recorded  No data recorded    PT Visit Info:     5    6+   OUTPATIENT PHYSICAL THERAPY:OP NOTE TYPE: Treatment Note 2022     Appt Desk   Episode      Treatment Diagnosis:  Pain in left hip (M25.552)  Medical/Referring Diagnosis:  No admission diagnoses are documented for this encounter. Referring Physician:  Shivani Rivera MD MD Orders:  PT Eval and Treat   Date of Onset:  No data recorded   Allergies:  Furosemide, Sulfa antibiotics, and Other  Restrictions/Precautions:    No data recordedNo data recorded   Interventions Planned (Treatment may consist of any combination of the following):    No data recorded   Subjective Comments:  Patient reports no pain in left LE all pain is coming from right low back into leg  Initial:     8/10 Post Session:     8/10  Medications Last Reviewed:  2022  Updated Objective Findings:  Anterior rotation right innominate  Treatment   THERAPEUTIC EXERCISE: (39 minutes):    Exercises per grid below to improve mobility, strength, balance and dynamic movement of left hip to improve functional bending, lifting, carrying and reaching. Required minimal verbal cues to promote proper body alignment and promote proper body mechanics. Progressed resistance, repetitions and complexity of movement as indicated.    Date:  2022 Date:  2022 Date:  2022 Date:  2022 Date:  2022   Activity/Exercise Parameters Parameters Parameters     Nustep 8min hills L5 8min L 3.5 8min hills L5 8min hills L5 8min hills L5   L stretch 12a57eqy 96s85ghw 35k27auy 29v53txr 31l46gmu   Calf stretch 3c95vpj 9g37rge 5c56fym 5n44oiw 9f66miz   Seated HSS 1t37lus 8u41ndw 5o34svy 8y36qfb 2o65ddo   Seated Piriformis stretch 8t20eaz 7x89hxb 6p70uay 6d88zpq 8l56qxq   Seated hip ER stretch 1j13rge 8y68mpm 8l83mnk 5m09uev 9p28sna   LTR  10x      Bridging   53g5bxl   78e3wqt blk abd band   Clam     2x10 blk   Abd \"L\"     2x10 blk   Prone Hip ext     2x10   LAQ 03h5wxk 5# 91b0vfc 5#  23q0qaw 5# 49q4pyl 5#   Lateral walk 3 laps pink  3 laps purple 3 laps purple    Monster walk 3 laps pink  3 laps purple 3 laps purple    Squat 3x5 TRX  3x5 TRX 3x5 TRX    RDL 3x5 TRX  3x5 TRX 3x5 TRX    Andrade Carry 2x2 laps 15# ea   2/3 laps 15# ea    Walking lunge   2 laps     Sled   3 laps 75#       MANUAL THERAPY: (0 minutes):   Joint mobilization and Soft tissue mobilization was utilized and necessary because of the patient's restricted joint motion, painful spasm, loss of articular motion and restricted motion of soft tissue. Date:6/2/2022 Date:   Date:     Activity/Exercise Parameters Parameters Parameters         Jt mobilizations lumbar rotation                        MET SI for anterior rotation right                  Treatment/Session Summary:    Treatment Assessment:   Patient's treatment was modified secondary to subjective complaints of right hip and leg pain. Communication/Consultation:  None today  Equipment provided today:  None  Recommendations/Intent for next treatment session: Next visit will focus on progression of LE strengthening.     Total Treatment Billable Duration:  39 minutes  Time In: 0715  Time Out: 0800    Rebecca Lee, PT       Post Session Pain  Charge Capture  MedBridge Portal  MD Guidelines  Scanned Media  Benefits  MyChart

## 2022-06-08 ENCOUNTER — TELEPHONE (OUTPATIENT)
Dept: BEHAVIORAL/MENTAL HEALTH CLINIC | Age: 52
End: 2022-06-08

## 2022-06-08 ENCOUNTER — TELEPHONE (OUTPATIENT)
Dept: INTERNAL MEDICINE CLINIC | Facility: CLINIC | Age: 52
End: 2022-06-08

## 2022-06-08 ENCOUNTER — HOSPITAL ENCOUNTER (OUTPATIENT)
Dept: ULTRASOUND IMAGING | Age: 52
Discharge: HOME | End: 2022-06-11

## 2022-06-08 DIAGNOSIS — M54.50 CHRONIC BILATERAL LOW BACK PAIN WITHOUT SCIATICA: Primary | ICD-10-CM

## 2022-06-08 DIAGNOSIS — G89.29 CHRONIC BILATERAL LOW BACK PAIN WITHOUT SCIATICA: Primary | ICD-10-CM

## 2022-06-08 DIAGNOSIS — L72.9 CYST OF SKIN: ICD-10-CM

## 2022-06-08 NOTE — TELEPHONE ENCOUNTER
I received the following message from the patient. [facility] neurologist said my tremors and involuntary body movement may be part of my anxiety issue. Can we address this with my next visit. This is a continuous issue I am having now. \"  Next appt is 8/2. I will forward this to Dr Mariela Mendosa for review.

## 2022-06-08 NOTE — TELEPHONE ENCOUNTER
----- Message from BRANDON Power CNP sent at 6/8/2022 11:28 AM EDT -----  Please let Kiko Joshua know that her US is suggestive of benign lipoma. If this is bothersome to her I can refer to general surgery for consult.

## 2022-06-09 ENCOUNTER — HOSPITAL ENCOUNTER (OUTPATIENT)
Dept: PHYSICAL THERAPY | Age: 52
Setting detail: RECURRING SERIES
Discharge: HOME | End: 2022-06-12
Payer: COMMERCIAL

## 2022-06-09 ENCOUNTER — TELEPHONE (OUTPATIENT)
Dept: NEUROLOGY | Age: 52
End: 2022-06-09

## 2022-06-09 ENCOUNTER — TELEPHONE (OUTPATIENT)
Dept: ORTHOPEDIC SURGERY | Age: 52
End: 2022-06-09

## 2022-06-09 PROCEDURE — 97140 MANUAL THERAPY 1/> REGIONS: CPT

## 2022-06-09 PROCEDURE — 97110 THERAPEUTIC EXERCISES: CPT

## 2022-06-09 ASSESSMENT — PAIN SCALES - GENERAL: PAINLEVEL_OUTOF10: 10

## 2022-06-09 NOTE — PROGRESS NOTES
Wilfrid Sparks  : 1970     Payor: Jacqueline Meadows / Plan: [address] / Product Type: PPO /     [address] at [address] [place] [address]. [facility], [address] [place] [address]  [phone]   [phone]        OUTPATIENT PHYSICAL THERAPY:Initial Assessment 2022    ICD-10: Treatment Diagnosis:   Pain in left hip (M25.552)  Stiffness of Left hip, not elsewhere classified (D75.742)   Other Joint Derangements Left Hip (M24.852)  Sprain of Sacroiliac joint (S33.6XXA)              Precautions/Allergies: Other medication and Sulfa (sulfonamide antibiotics)   Fall Risk Score: 1 (? 5 = High Risk)  MD Orders: Eval and Treat  MEDICAL/REFERRING DIAGNOSIS:  Left hip pain [M25.552]   DATE OF ONSET: 2021 From slipping on bathroom floor  REFERRING PHYSICIAN: Malen Cabot, MD  RETURN PHYSICIAN APPOINTMENT: TBD by patient       INITIAL ASSESSMENT:  Wilfrid Sparks presents to physical therapy with decreased postural and hip/core strength, ROM, joint mobility, flexibility, functional mobility, and increased pain. No pelvic malalignment upon initial evaluation but decreased posture. These S/S are consistent with Left hip impingement and Sacroiliac joint dysfunction.     Wilfrid Sparks will benefit from skilled physical therapy (medically necessary) to address above deficits affecting participation in basic ADLs and functional mobility/tolerance. Patient will benefit from manual therapeutic techniques (stretching, joint mobilizations, soft tissue mobilization/myofascial release), therapeutic exercises and activities, postural strengthening/education, and comprehensive home exercises program to address current impairments and functional limitations. PROGRESS NOTE (2022): Patient has been seen for 10 sessions of physical therapy from 2022 to 2022. Patient reports feeling 95% better with left hip pain and activity.  She limited with stability with higher level activity. Patient will benefit from continued skilled physical therapy to address remaining goals and deficits.         PROBLEM LIST (Impacting functional limitations):  · Decreased Strength  · Decreased ADL/Functional Activities  · Decreased Transfer Abilities  · Decreased Ambulation Ability/Technique  · Decreased Balance  · Increased Pain  · Decreased Activity Tolerance  · Increased Fatigue  · Increased Shortness of Breath  · Decreased Flexibility/Joint Mobility  · Decreased [place] with Home Exercise Program INTERVENTIONS PLANNED:  1. Balance Exercise  2. Bed Mobility  3. Cold  4. Cryotherapy  5. Electrical Stimulation  6. Family Education  7. Gait Training  8. Heat  9. Home Exercise Program (HEP)  10. Manual Therapy  11. Neuromuscular Re-education/Strengthening  12. Range of Motion (ROM)  13. Therapeutic Activites  14. Therapeutic Exercise/Strengthening  15. Transfer Training  16. Ultrasound (US)  17. Aquatic Therapy   TREATMENT PLAN:  Effective Dates: 4/29/2022 TO 7/28/2022 (90 days). Frequency/Duration: 2 times a week for 90 Days  GOALS: (Goals have been discussed and agreed upon with patient.)    Short-Term Goals~4 weeks  Goal Met   1. Chris Epperson will be independent with HEP for strength and ROM 1.  [x]? Date: 6/2/2022   2. Chris Epperson will participate in LE strengthening exercises for hip, knee, ankle with weight as appropriate for 3 sets of 10 2. [x]? Date: 6/2/2022   3. Chris Epperson will report <=2/10 pain with left groin/hip and demonstrate minimal to no difficulty 3. []? Date:   4. Chris Epperson will demonstrate improvement of MMT from initial eval to 4+ to 5/5 B LE in order to return to participate in ADLs with minimal to no difficulty. 4.  [x]? Date: 6/2/2022   5. Chris Epperson will participate in static and dynamic balance activities for 5 minutes to help improve proprioception and decrease risk of falls  5. [x]? Date: 6/2/2022   6. Leola Ramirez to increase lower extremity functional scale by 5 points to show improvement in areas of difficulty 6. [x]? Date: 6/2/2022             Long Term Goals~8 weeks Goal Met   1. Leola Ramirez will be independent in HEP of stretching and strengthening 1. [x]? Date: 6/2/2022   2. Leola Ramirez will be able to perform floor to stand transfers independently with decreased compensation  2. [x]? Date: 6/2/2022   3. Leola Ramirez will get in and out of car without increased left hip pain 3.  []? Date:   4. Leola Ramirez to increase lower extremity functional scale by 10 points to show improvement in areas of difficulty 4. []? Date:                 Outcome Measure: Tool Used: Lower Extremity Functional Scale (LEFS)  Score:  Initial: 67/80 Most Recent: 74/80 (Date: 6/2/2022 )   Interpretation of Score: 20 questions each scored on a 5 point scale with 0 representing \"extreme difficulty or unable to perform\" and 4 representing \"no difficulty\". The lower the score, the greater the functional disability. 80/80 represents no disability.   Minimal detectable change is 9 points.     · Observation/Orthostatic Postural Assessment:  Slight elevation left iliac crest standing, Anterior rotation Left innominate  · Gait= Normal  Palpation:  No Tenderness  ROM:   AROM/PROM                Joint: Eval Date: 4/29/2022   Re-Assess Date: 6/2/2022   Re-Assess Date:     Active ROM RIGHT LEFT RIGHT LEFT RIGHT LEFT   Knee Extension WNL WNL WNL WNL ·   ·     Knee Flexion WNL WNL  WNL WNL  ·   ·     Hip Flexion 110 95  WNL WNL  ·   ·     Hip Abduction 40 30  WNL WNL ·   ·     Prone Hip Extension w/ Knee bent  10 5  10 10  ·   ·     Hip IR/ER 30/50 20/35  WNL  WNL ·   ·        Strength:      Eval Date: 4/29/2022   Re-Assess Date: 6/2/2022   Re-Assess Date:       RIGHT LEFT RIGHT LEFT RIGHT LEFT   Knee Flexion 5/5 4+/5 5/5   5/5 ·   ·     Knee Extension 5/5 4+/5  5/5  5/5 ·   ·     Hip Flexion 5/5 4/5  5/5 4+/5  ·   ·     Hip Abduction 4+/5 4/5  4+/5 4+/5  ·   ·     Hip Extension 4+/5 4/5  4+/5 4+/5  ·   ·               ·   ·           Special Tests:   · Scouring:Negative  · Fabers:Positive  · FADIRs:Positive  · SLR:Negative                Neurological Screen: Patient demonstrates/reports no loss in sensation     Functional Mobility:  Affecting participation in ambulation, standing and transfers     Balance and Mobility:     Test Result   Timed up and Go 7.5   30 second Sit to Stand 14   Single Leg Balance Right: <30 seconds     Left:<30 seconds                   Medical Necessity:   · Skilled intervention continues to be required due to deficits and impairments seen upon initial evaluation affecting patient's participation in ADLs and functional tasks. Reason for Services/Other Comments:  · Patient continues to require skilled intervention due to deficits and impairments seen upon initial evaluation affecting patient's participation in ADLs and functional tasks.           Rehabilitation Potential For Stated Goals: good  Regarding Lorierosemary JOSÉ Diego's therapy, I certify that the treatment plan above will be carried out by a therapist or under their direction.   Thank you for this referral,  Adrian Roche PT     Referring Physician Signature: Mk Torres MD Signature not required
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
## 2022-06-09 NOTE — PROGRESS NOTES
Selin Neighbor  : 1970  Primary: Christo Paredes  Secondary:  [address] [facility] [address]  Phone: [phone]  Fax: [phone] No data recorded  No data recorded    PT Visit Info:     6    6+   OUTPATIENT PHYSICAL THERAPY:OP NOTE TYPE: Treatment Note 2022     Appt Desk   Episode      Treatment Diagnosis:  Pain in left hip (M25.552)  Medical/Referring Diagnosis:  No admission diagnoses are documented for this encounter. Referring Physician:  Dave Moreno MD MD Orders:  PT Eval and Treat   Date of Onset:  No data recorded   Allergies:  Furosemide, Sulfa antibiotics, and Other  Restrictions/Precautions:    No data recordedNo data recorded   Interventions Planned (Treatment may consist of any combination of the following):    No data recorded   Subjective Comments:  Patient reports right sided low back and leg pain, mild left hip pain  Initial:     10/10 Post Session:     2/10  Medications Last Reviewed:  2022  Updated Objective Findings:  Anterior rotation right innominate  Treatment   THERAPEUTIC EXERCISE: (34 minutes):    Exercises per grid below to improve mobility, strength, balance and dynamic movement of left hip to improve functional bending, lifting, carrying and reaching. Required minimal verbal cues to promote proper body alignment and promote proper body mechanics. Progressed resistance, repetitions and complexity of movement as indicated.    Date:  2022 Date:  2022 Date:  2022 Date:  2022 Date:  2022 Date:  2022   Activity/Exercise Parameters Parameters Parameters      Nustep 8min hills L5 8min L 3.5 8min hills L5 8min hills L5 8min hills L5 8min hills L5   L stretch 21k29vdf 42n41qxg 36i55nbq 99w97mvx 83m71glw 10j73yhd   Calf stretch 1i92eur 4v24dlx 8f76cic 9r93qwa 0t40vrw 9n63zqs   Seated HSS 1s03fii 4u13mjk 3e41cbx 2v35iiz 7m75sne 6k80ofc   Seated Piriformis stretch 8s90ufp 2h79mco 3u48mrb 6p90uuh 1t16srk 3f90iml   Seated hip ER stretch 4j03dxp 7v89hxu 9p44iqh 1i50iyp 2i31cjb 3w42ajn   LTR  10x       Bridging   98n7wym   17t4aji blk abd band    Clam     2x10 blk    Abd \"L\"     2x10 blk    Prone Hip ext     2x10    LAQ 25o5oat 5# 83v4vre 5#  04u0pra 5# 13t3kas 5#    Lateral walk 3 laps pink  3 laps purple 3 laps purple  3 laps purple   Monster walk 3 laps pink  3 laps purple 3 laps purple  3 laps purple   Squat 3x5 TRX  3x5 TRX 3x5 TRX  3x5 TRX   RDL 3x5 TRX  3x5 TRX 3x5 TRX  3x5 TRX   Andrade Carry 2x2 laps 15# ea   2/3 laps 15# ea     Walking lunge   2 laps      Sled   3 laps 75#        MANUAL THERAPY: (8 minutes):   Joint mobilization and Soft tissue mobilization was utilized and necessary because of the patient's restricted joint motion, painful spasm, loss of articular motion and restricted motion of soft tissue. Date:6/9/2022 Date:   Date:     Activity/Exercise Parameters Parameters Parameters         Jt mobilizations lumbar rotation 4min                       MET SI for anterior rotation right 4min                 Treatment/Session Summary:    Treatment Assessment:   Patient had decreased pain and improved tolerance to activity after treatment  Communication/Consultation:  None today  Equipment provided today:  None  Recommendations/Intent for next treatment session: Next visit will focus on progression of LE strengthening.     Total Treatment Billable Duration:  42 minutes  Time In: 0720  Time Out: 0805    Marino Rubio PT       Post Session Pain  Charge Capture  MedBridge Portal  MD Guidelines  Scanned Media  Benefits  MyChart

## 2022-06-10 ENCOUNTER — CLINICAL DOCUMENTATION (OUTPATIENT)
Dept: ORTHOPEDIC SURGERY | Age: 52
End: 2022-06-10

## 2022-06-10 ENCOUNTER — TELEPHONE (OUTPATIENT)
Dept: ORTHOPEDIC SURGERY | Age: 52
End: 2022-06-10

## 2022-06-10 ENCOUNTER — TELEMEDICINE (OUTPATIENT)
Dept: INTERNAL MEDICINE CLINIC | Facility: CLINIC | Age: 52
End: 2022-06-10
Payer: COMMERCIAL

## 2022-06-10 DIAGNOSIS — M54.41 CHRONIC RIGHT-SIDED LOW BACK PAIN WITH RIGHT-SIDED SCIATICA: Primary | ICD-10-CM

## 2022-06-10 DIAGNOSIS — F31.9 BIPOLAR DEPRESSION (HCC): ICD-10-CM

## 2022-06-10 DIAGNOSIS — M25.50 POLYARTHRALGIA: ICD-10-CM

## 2022-06-10 DIAGNOSIS — G89.29 CHRONIC RIGHT-SIDED LOW BACK PAIN WITH RIGHT-SIDED SCIATICA: Primary | ICD-10-CM

## 2022-06-10 DIAGNOSIS — G44.89 HEADACHE SYNDROME: ICD-10-CM

## 2022-06-10 DIAGNOSIS — G89.4 CHRONIC PAIN SYNDROME: ICD-10-CM

## 2022-06-10 LAB
ALBUMIN 24H MFR UR ELPH: 0 %
ALPHA1 GLOB 24H MFR UR ELPH: 0 %
ALPHA2 GLOB 24H MFR UR ELPH: 0 %
B-GLOBULIN MFR UR ELPH: 0 %
COLLECT DURATION TIME UR: NORMAL HR
GAMMA GLOB 24H MFR UR ELPH: 0 %
INTERPRETATION UR IFE-IMP: NORMAL
Lab: NORMAL
M PROTEIN 24H MFR UR ELPH: NORMAL %
M PROTEIN 24H UR ELPH-MRATE: NORMAL MG/24 HR
PROT 24H UR-MRATE: NORMAL MG/24 HR (ref 30–150)
PROT UR-MCNC: 8.4 MG/DL
SPECIMEN VOL ?TM UR: NORMAL ML

## 2022-06-10 PROCEDURE — 99214 OFFICE O/P EST MOD 30 MIN: CPT | Performed by: NURSE PRACTITIONER

## 2022-06-10 RX ORDER — TOPIRAMATE 25 MG/1
25 TABLET ORAL 2 TIMES DAILY
Qty: 180 TABLET | Refills: 1 | Status: SHIPPED | OUTPATIENT
Start: 2022-06-10 | End: 2022-10-14

## 2022-06-10 ASSESSMENT — ENCOUNTER SYMPTOMS
ABDOMINAL PAIN: 0
SHORTNESS OF BREATH: 0
CONSTIPATION: 0
EYE ITCHING: 0
EYE REDNESS: 0
NAUSEA: 0
APNEA: 0
ABDOMINAL DISTENTION: 0
DIARRHEA: 0
SINUS PAIN: 0
COUGH: 0
EYE DISCHARGE: 0
COLOR CHANGE: 0
BACK PAIN: 1
EYE PAIN: 0
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
## 2022-06-10 NOTE — TELEPHONE ENCOUNTER
Patient stated that she is already taking Klonopin 0.5mg 1/2 tablet at night. She stated that if she takes a whole tablet it is very difficult to wake up as she is taking Elavil 25mg at night, Trazodone 50mg at night, Mirapex 0.125mg at night, Celexa 20mg at night. Neurologist advised that the stuttering that she is having is being caused from the high levels of anxiety as well. Said that he did testing and said that there is nothing neurologically found in the testing. Stated that she will discuss with counselor next week. Does not feel she needs sooner appt here but would like to discuss at next appt.

## 2022-06-10 NOTE — TELEPHONE ENCOUNTER
Does she want to take a small dose of klonopin as needed for anxiety? I know she is seeing a therapist and should discuss this with her therapist as well.  Thanks

## 2022-06-10 NOTE — PROGRESS NOTES
[unfilled]  [address] [place], [place] [id number]      PROGRESS NOTE    SUBJECTIVE:   Jacob Cancino is a 46 y.o. female seen for a follow up visit regarding the following chief complaint:     No chief complaint on file. HPI  Patient presents for follow up Chronic pain. She had seen RA yesterday for management of fibromyalgia and OA. Following pain management, Dr. Zhane Jensen for lumbago. She is receiving joint injections with slight relief. She continues to have pain with ambulation and trouble driving at times as it is in her right leg. Continues to have chronic fatigue and daytime drowsiness; She is scheduled for sleep medicine in August. She would like to consider adjusting medications to help with this in the meantime as her drowsiness is making it worse for her to balance tasks. She also has much stress and PTSD with job situation; She is also following psychiatry for mental health management. History of normal sleep study per patient. Sleeping at night but doesn't wake up rested. Feeling like stress and depression is causing her to be increasingly tired and continually effecting her ADLs. She returned back to work recently and noticed increase fatigue, anxiety, stress and pain symptoms.     Current Outpatient Medications   Medication Sig Dispense Refill    topiramate (TOPAMAX) 25 MG tablet Take 1 tablet by mouth 2 times daily 180 tablet 1    cyanocobalamin 1000 MCG tablet Take 1 tablet by mouth daily 90 tablet 3    LINZESS 72 MCG CAPS capsule Take 1 capsule by mouth daily      naproxen (NAPROSYN) 500 MG tablet Take 1 tablet by mouth as needed      norethindrone (AYGESTIN) 5 MG tablet Take 1 tablet by mouth daily      omeprazole (PRILOSEC) 40 MG delayed release capsule Take 1 capsule (40 mg) by mouth 2 (two) times a day 30 mins before breakfast and 30 mins before dinner      sucralfate (CARAFATE) 1 GM tablet Take 1 tablet (1 g) by mouth 3 (three) times a day for 10 days Dissolve tablet in 1-2 oz of water and drink it.  cod liver oil CAPS Take 1 capsule by mouth daily      Ubrogepant (UBRELVY) 50 MG TABS Take 1 tablet at the onset of migraine, may repeat in 2 hours. Do not take more then 3 days a week. 16 tablet 11    lisinopril-hydroCHLOROthiazide (PRINZIDE;ZESTORETIC) 20-25 MG per tablet Take 1 tablet by mouth daily      TURMERIC PO Take 1 tablet by mouth daily      amitriptyline (ELAVIL) 25 MG tablet TAKE 1 TO 2 TABLETS BY MOUTH EVERY EVENING      atomoxetine (STRATTERA) 80 MG capsule Take 80 mg by mouth daily      Benzoyl Peroxide 2.5 % GEL APPLY TOPICALLY TO FACE AT BEDTIME      celecoxib (CELEBREX) 200 MG capsule Take 200 mg by mouth 2 times daily      vitamin D (CHOLECALCIFEROL) 92052 UNIT CAPS Take 50,000 Units by mouth every 7 days      citalopram (CELEXA) 20 MG tablet Take 20 mg by mouth every evening      clonazePAM (KLONOPIN) 0.5 MG tablet Take 0.5 mg by mouth daily as needed.       cyclobenzaprine (FLEXERIL) 10 MG tablet Take 10 mg by mouth 3 times daily as needed      diclofenac sodium (VOLTAREN) 1 % GEL Apply 1 g topically as needed      hydroCHLOROthiazide (HYDRODIURIL) 25 MG tablet TAKE 1 TABLET DAILY      Lidocaine, Anorectal, 5 % CREA Actual prescription: Lidocaine 5%: Neurontin/gabapentin 5% combined topical cream/gelApply to affected area up to 4 times a day as needed for pain      nystatin-triamcinolone (MYCOLOG II) 780354-1.1 UNIT/GM-% cream Apply topically 2 times daily      orphenadrine (NORFLEX) 100 MG extended release tablet TAKE 1 TABLET BY MOUTH TWICE DAILY      pantoprazole (PROTONIX) 40 MG tablet Take 40 mg by mouth 2 times daily      pramipexole (MIRAPEX) 0.125 MG tablet Take 0.25 mg by mouth      pregabalin (LYRICA) 150 MG capsule TAKE 1 CAPSULE BY MOUTH THREE TIMES DAILY      traZODone (DESYREL) 50 MG tablet Take 25-50 mg by mouth      vitamin E 600 units capsule Take by mouth daily       No current facility-administered medications for this visit. Allergies   Allergen Reactions    Furosemide Other (See Comments)     Edema of lower extremities      Sulfa Antibiotics Other (See Comments) and Rash     GI Upset    Other Other (See Comments)     Oral steroids       Past Medical History:   Diagnosis Date    Anxiety     DDD (degenerative disc disease), cervical 11/29/2021    C3-C7 Disc Degeneration w/ Posterior Spondylotic Disc Displaceemnts per MRI    Depressed     GERD (gastroesophageal reflux disease)     Hypertension     IBS (irritable bowel syndrome)     Insomnia     TMJ (dislocation of temporomandibular joint) 04/2021     Past Surgical History:   Procedure Laterality Date    CATARACT REMOVAL Bilateral 02/2020    COLONOSCOPY      EGD DELIVER THERMAL ENERGY SPHNCTR/CARDIA GERD      LIPECTOMY      TUBAL LIGATION       Family History   Problem Relation Age of Onset    Heart Disease Father     Hypertension Father     High Cholesterol Mother     GERD Mother      Social History     Tobacco Use    Smoking status: Never Smoker    Smokeless tobacco: Never Used   Substance Use Topics    Alcohol use: Never       Review of Systems   Constitutional: Positive for fatigue. Negative for activity change, appetite change, chills and fever. HENT: Negative for congestion, ear pain and sinus pain. Eyes: Negative for pain, discharge, redness and itching. Respiratory: Negative for apnea, cough and shortness of breath. Cardiovascular: Negative for chest pain, palpitations and leg swelling. Gastrointestinal: Negative for abdominal distention, abdominal pain, constipation, diarrhea and nausea. Endocrine: Negative for cold intolerance and heat intolerance. Genitourinary: Negative for difficulty urinating, dysuria, enuresis and urgency. Musculoskeletal: Positive for arthralgias and back pain. Negative for joint swelling, myalgias and neck pain. Skin: Negative for color change and rash.    Neurological: Negative for dizziness, weakness and headaches. Psychiatric/Behavioral: Negative for behavioral problems and sleep disturbance. The patient is nervous/anxious. OBJECTIVE:  There were no vitals taken for this visit. Physical Exam  Constitutional:       General: She is not in acute distress. Appearance: Normal appearance. She is not toxic-appearing. Cardiovascular:      Rate and Rhythm: Normal rate. Pulmonary:      Effort: Pulmonary effort is normal. No respiratory distress. Skin:     General: Skin is warm and dry. Neurological:      Mental Status: She is alert. Mental status is at baseline. Psychiatric:         Mood and Affect: Mood normal.         Behavior: Behavior normal.         Thought Content: Thought content normal.           ASSESSMENT and PLAN  Diagnoses and all orders for this visit:    Chronic right-sided low back pain with right-sided sciatica    Headache syndrome  -     topiramate (TOPAMAX) 25 MG tablet; Take 1 tablet by mouth 2 times daily    Chronic pain syndrome    Bipolar depression (HCC)    Polyarthralgia      Decrease topamax to 25mg BID to try to compensate for increased fatigue. We discussed contacting Dr. Lucas Francisco for possible CELY due to physical/mental health stress in relation to emotional demands of work. Will reach out to Dr. Jaiden Abreu for OV notes to review. Pursuant to the emergency declaration under the [facility], 1135 waiver authority and the [employer] Act, this Virtual Visit was conducted, with patient's consent, to reduce the patient's risk of exposure to COVID-19 and provide continuity of care for an established patient. Services were provided through a video synchronous discussion virtually to substitute for in-person clinic visit. Patient consented to virtual visit.   Greater than 50% of this 35 min visit was spent counseling the patient about test results, prognosis, importance of compliance, education about disease process, benefits of medications, instructions for management of acute symptoms, and follow up plans. Follow-up and Dispositions    · Return in about 4 weeks (around 7/8/2022) for recheck.          Jonathan Scherer NP, APRN - CNP

## 2022-06-10 NOTE — TELEPHONE ENCOUNTER
Rob Sin you working on this or do you know anything about this? The patient already had her MRI even though it was initially denied by her insurance. I don't know we proceed when this happens.

## 2022-06-10 NOTE — TELEPHONE ENCOUNTER
Call Lorraine Strong     [phone]   They need  More  Documentation in order to approve  Her  Mri    STW  appeal  Needs to be  Sent to   [facility]  instead  Of  [facility] provider access  Ph [phone] [address] [place]

## 2022-06-11 NOTE — TELEPHONE ENCOUNTER
She should not take klonopin at night and sleep only on elavil and trazodone and take klonopin half tablet as needed for anxiety in the day time. She should make sure she is not dizzy or drowsy before she does anything that requires for her to be alert like driving when she is taking klonopin in the day.  Thanks

## 2022-06-13 ENCOUNTER — TELEPHONE (OUTPATIENT)
Dept: ORTHOPEDIC SURGERY | Age: 52
End: 2022-06-13

## 2022-06-13 NOTE — TELEPHONE ENCOUNTER
Patient complains of mid back pain and low back pain. Patient has been diagnosed with Fibromyalgia. The pain is worse is in thoracic/cervical spine.

## 2022-06-14 ENCOUNTER — HOSPITAL ENCOUNTER (OUTPATIENT)
Dept: PHYSICAL THERAPY | Age: 52
Setting detail: RECURRING SERIES
Discharge: HOME | End: 2022-06-17
Payer: COMMERCIAL

## 2022-06-14 PROCEDURE — 97110 THERAPEUTIC EXERCISES: CPT

## 2022-06-14 ASSESSMENT — PAIN SCALES - GENERAL: PAINLEVEL_OUTOF10: 0

## 2022-06-14 NOTE — PROGRESS NOTES
Otto Jimenez  : 1970  Primary: Antonio Paredes  Secondary:  [address] [facility] [address]  Phone: [phone]  Fax: [phone] No data recorded  No data recorded    PT Visit Info:     7    6+   OUTPATIENT PHYSICAL THERAPY:OP NOTE TYPE: Treatment Note 2022     Appt Desk   Episode      Treatment Diagnosis:  Pain in left hip (M25.552)  Medical/Referring Diagnosis:  No admission diagnoses are documented for this encounter. Referring Physician:  Ron Brasher MD MD Orders:  PT Eval and Treat   Date of Onset:  No data recorded   Allergies:  Furosemide, Sulfa antibiotics, and Other  Restrictions/Precautions:    No data recordedNo data recorded   Interventions Planned (Treatment may consist of any combination of the following):    No data recorded   Subjective Comments:  Patient reports that self mob helps but sitting in the car irritates symptoms  Initial:     010 Post Session:     0/10  4/10 right hip  Medications Last Reviewed:  2022  Updated Objective Findings:  Anterior rotation right innominate  Treatment   THERAPEUTIC EXERCISE: (39 minutes):    Exercises per grid below to improve mobility, strength, balance and dynamic movement of left hip to improve functional bending, lifting, carrying and reaching. Required minimal verbal cues to promote proper body alignment and promote proper body mechanics. Progressed resistance, repetitions and complexity of movement as indicated.    Date:  2022 Date:  2022 Date:  2022 Date:  2022 Date:  2022 Date:  2022 Date:  2022   Activity/Exercise Parameters Parameters Parameters       Nustep 8min hills L5 8min L 3.5 8min hills L5 8min hills L5 8min hills L5 8min hills L5 8min hills L6   L stretch 38m69mkk 14v83dwa 19o62qdh 01d64kwj 41x55rli 09e30faw 06p26xgl   Calf stretch 5a43him 8g34yqd 7j04wiu 9h41ahf 1m13trt 4p38vma 1z69nsz   Seated HSS 6h97hpi 4o83hmc 9i79qmi 0n86vjk 9u86jbx 9d10wcs 9o92myd   Seated Piriformis stretch 7b70efk 6e17ljg 2m91xuf 0m97ntn 1i52giu 5t84mkt 3v75fsl   Seated hip ER stretch 9g46xoc 4s28jzz 5d77eny 7m18wmx 3k98lgr 6l50cps 6w58imk   LTR  10x        Bridging   89k2nmm   88v5ufp blk abd band     Clam     2x10 blk     Abd \"L\"     2x10 blk     Prone Hip ext     2x10     LAQ 78d7lir 5# 21e2loo 5#  94k6aaq 5# 35x7cfa 5#     Lateral walk 3 laps pink  3 laps purple 3 laps purple  3 laps purple 3 laps purple   Monster walk 3 laps pink  3 laps purple 3 laps purple  3 laps purple 3 laps purple   Squat 3x5 TRX  3x5 TRX 3x5 TRX  3x5 TRX 3x5 box   RDL 3x5 TRX  3x5 TRX 3x5 TRX  3x5 TRX 3x5 TRX   Andrade Carry 2x2 laps 15# ea   2/3 laps 15# ea      Walking lunge   2 laps    2 laps 2/5#   Sled   3 laps 75#         MANUAL THERAPY: (3 minutes):   Joint mobilization and Soft tissue mobilization was utilized and necessary because of the patient's restricted joint motion, painful spasm, loss of articular motion and restricted motion of soft tissue. Date:  6/9/2022 Date: 6/14/2022 Date:     Activity/Exercise Parameters Parameters Parameters         Jt mobilizations lumbar rotation 4min                       MET SI for anterior rotation right 4min 3min                Treatment/Session Summary:    Treatment Assessment:   Patient had less pain and improved mobility after manual therapy, reviewed positioning for driving and enter exit car  Communication/Consultation:  None today  Equipment provided today:  None  Recommendations/Intent for next treatment session: Next visit will focus on progression of LE strengthening.     Total Treatment Billable Duration:  42 minutes  Time In: 0715  Time Out: 0800    Josy Zarate PT       Post Session Pain  Charge Capture  [employer] Portal  MD Guidelines  Scanned Media  Benefits  MyChart

## 2022-06-15 ENCOUNTER — OFFICE VISIT (OUTPATIENT)
Dept: ORTHOPEDIC SURGERY | Age: 52
End: 2022-06-15
Payer: COMMERCIAL

## 2022-06-15 VITALS — HEIGHT: 61 IN | BODY MASS INDEX: 33.72 KG/M2 | WEIGHT: 178.6 LBS

## 2022-06-15 DIAGNOSIS — M54.6 MIDLINE THORACIC BACK PAIN, UNSPECIFIED CHRONICITY: ICD-10-CM

## 2022-06-15 DIAGNOSIS — M54.50 LOW BACK PAIN, UNSPECIFIED BACK PAIN LATERALITY, UNSPECIFIED CHRONICITY, UNSPECIFIED WHETHER SCIATICA PRESENT: ICD-10-CM

## 2022-06-15 DIAGNOSIS — M50.30 DDD (DEGENERATIVE DISC DISEASE), CERVICAL: ICD-10-CM

## 2022-06-15 DIAGNOSIS — M54.2 NECK PAIN: Primary | ICD-10-CM

## 2022-06-15 DIAGNOSIS — M54.6 THORACIC SPINE PAIN: ICD-10-CM

## 2022-06-15 PROCEDURE — 99204 OFFICE O/P NEW MOD 45 MIN: CPT | Performed by: PHYSICIAN ASSISTANT

## 2022-06-15 NOTE — LETTER
Guillermina Mendes       1970         46 y.o.     Last seen at [facility]___       Imaging_____________________  Ramila Vitale PA-C  Referring Physician:                                                                 Chief Complaint:          Character/Association:      Pain worsened by: Standing   Sitting   Walking   Getting Up   Stairs   Laying                                        Night   Cough/Sneeze      Treatment to date: NSAID   PT   Chiro   CHAZ   SNRB   Facet   PO steroids                                                                               Steroid shot           MR      Previous Spine Surgery:                                             Previous Ortho Surgery:    Occupation/Activities:                                                 PMH:                                                                                  Gait:   Mild / Mod / Severe Antalgic   Forward Flexed   Myelopathic  Shuffle  Cane  Walker                                                   Spine:                             Alignment:                            Tenderness:                                                Hip:  IR _____  ER _____ Flexion _____   Bursal Tenderness:            Pelvic unlevel      Knee:    ROM       Right __________  Left __________  Alignment __________                 Effusion                  Crepitance                    Patella Grind               Stability      Neuro:      DTR      SLR      Clonus      Babinski         Gonzalez      Tinels Spurlings       Motor:                                                                         Sensory:
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
## 2022-06-15 NOTE — PATIENT INSTRUCTIONS
Patient Education        Learning About a Cervical Epidural Injection  What is a cervical epidural steroid injection? A cervical epidural steroid injection is a shot of medicine into the area around the spinal cord in your neck. You may get it to help with pain, tingling, or numbness in your neck, shoulder, or arm. It may have a steroid toreduce swelling and pain and a local anesthetic to numb the nerves. How is a cervical epidural steroid injection done? The doctor will use a tiny needle to numb the skin where you are getting theinjection. After the skin is numb, your doctor will use a larger needle for the epidural injection. He or she may use X-ray or ultrasound to help guide the needle. Heladio Dose feel some pressure. But you should not feel pain. How long does an epidural steroid injection take? It takes about 10 to 15 minutes to get this injection. You will probably gohome about 20 to 30 minutes after you get it. What can you expect after a cervical epidural steroid injection? If your injection included local anesthetic medicine, your neck, shoulder, arm,or hand may feel heavy or numb right after the shot. With a local anesthetic, your pain may be gone right away. But it may return after a few hours. This is because the steroid hasn't started working yet. Before the steroid starts to work, your neck, shoulder, or arm may be sore fora few days. These injections don't always work. When they do, it takes 1 to 5 days. Thepain relief can last for several days to a few months or longer. Some people are dizzy or feel sick to their stomach after getting this shot. These symptoms usually don't last very long. You may want to do less than normal for a few days. But you may also be able toreturn to your daily routine. If your pain is better, you may be able to keep doing your normal activities or physical therapy. But try not to overdo it, even if your pain has improved a lot.  If your pain is only a little better or if it comes back, your doctor may want you to get another injection in a few weeks. If your pain has not changed,talk to your doctor about other treatment choices. Follow-up care is a key part of your treatment and safety. Be sure to make and go to all appointments, and call your doctor if you are having problems. It's also a good idea to know your test results and keep alist of the medicines you take. Where can you learn more? Go to https://Joust.Accolo. org and sign in to your sarvaMAIL account. Enter L710 in the SOLO box to learn more about \"Learning About a Cervical Epidural Injection. \"     If you do not have an account, please click on the \"Sign Up Now\" link. Current as of: July 1, 2021               Content Version: 13.2  © 8207-4290 [email or web address]. Care instructions adapted under license by [facility] ([facility]). If you have questions about a medical condition or this instruction, always ask your healthcare professional. [place] [address] any warranty or liability for your use of this information. Patient Education        Cervical Disc Disease: Care Instructions  Your Care Instructions     Cervical disc disease results from damage, disease, or wear and tear to the discs between the bones (vertebra) in your neck. The discs act as shock absorbers for the spine and keep the spine flexible. When a disc is damaged, it can bulge out and press against the nerve roots or spinal cord. This is sometimes called a herniated or \"slipped disc. \" This pressure can cause pain and numbness or tingling in your arms and hands. It can also cause weakness inyour legs. An accident can damage a disc and cause it to break open (rupture). Aging andhard physical work can also cause damage to cervical discs. The first treatments for cervical disc disease include physical therapy, special neck exercises, heat, and pain medicine.  If these fail, your doctor may inject steroids and pain medicine into your neck. Surgery is usually done onlyif other treatments have not worked. Follow-up care is a key part of your treatment and safety. Be sure to make and go to all appointments, and call your doctor if you are having problems. It's also a good idea to know your test results and keep alist of the medicines you take. How can you care for yourself at home?  Take pain medicines exactly as directed. ? If the doctor gave you a prescription medicine for pain, take it as prescribed. ? If you are not taking a prescription pain medicine, ask your doctor if you can take an over-the-counter medicine.  Don't spend too long in one position. Take short breaks to move around and change positions.  Wear a seat belt and shoulder harness when you are in a car.  Sleep with a pillow under your head and neck that keeps your neck straight.  Follow your doctor's instructions for gentle neck-stretching exercises.  Do not smoke. Smoking can slow healing of your discs. If you need help quitting, talk to your doctor about stop-smoking programs and medicines. These can increase your chances of quitting for good.  Avoid strenuous work or exercise until your doctor says it is okay. When should you call for help? Call 911 anytime you think you may need emergency care. For example, call if:     You are unable to move an arm or a leg at all. Call your doctor now or seek immediate medical care if:     You have new or worse symptoms in your arms, legs, belly, or buttocks. Symptoms may include:  ? Numbness or tingling. ? Weakness. ? Pain.      You lose bladder or bowel control. Watch closely for changes in your health, and be sure to contact your doctor if:     You do not get better as expected. Where can you learn more? Go to https://sheron.Movolo.com. org and sign in to your [employer] account.  Enter N118 in the [employer] [place] box to learn more about \"Cervical Disc Disease: Care Instructions. \"     If you do not have an account, please click on the \"Sign Up Now\" link. Current as of: July 1, 2021               Content Version: 13.2  © 2006-2022 Healthwise[employer] Inc[employer]. Care instructions adapted under license by [facility] ([facility]). If you have questions about a medical condition or this instruction, always ask your healthcare professional. [address] any warranty or liability for your use of this information.

## 2022-06-15 NOTE — LETTER
Guillermina Tavernierreilly MEJIAT  1970  MRN [id number]                                              ROOM NUMBER______      Radiographic Studies:    Cervical MRI      Thoracic MRI         Lumbar MRI          Pelvis MRI        CONTRAST    CT Myelogram: _______________   NCS/EMG ________________ ( UE  /  LE )     MRI of ___________________          Other: ____________________      Injections:    KNEE    HIP  Depomedrol _____ mg Euflexxa _____    _______________ TFESI/SNRB  _______________ SI Joint  _______________ CHAZ    _______________ Facet  _______________Piriformis/ Sciatica      Medications:    Oral Steroids _______________  NSAIDS _______________    Muscle Relaxers _______________  Neurontin/Lyrica _______________    Pain Medicine _______________  Other _______________                       Physical Therapy:    Lumbar     Thoracic      Cervical     Hip       Knee       Shoulder               Traction          Ultrasound          Dry Needling      Referral:    Pain referral:  [facility]   [facility]   Other: ______________________________    Follow-up/ Refer__________________________________________________    Authorization to hold blood thinners:___________________________________

## 2022-06-16 ENCOUNTER — HOSPITAL ENCOUNTER (OUTPATIENT)
Dept: PHYSICAL THERAPY | Age: 52
Setting detail: RECURRING SERIES
Discharge: HOME | End: 2022-06-19
Payer: COMMERCIAL

## 2022-06-16 PROCEDURE — 97110 THERAPEUTIC EXERCISES: CPT

## 2022-06-16 ASSESSMENT — PAIN SCALES - GENERAL: PAINLEVEL_OUTOF10: 0

## 2022-06-16 NOTE — PROGRESS NOTES
Severiano Dowd  : 1970     Payor: Sheehan Guard / Plan: [address] / Product Type: PPO /     [address] at [address] [facility]. [address] [place] [address] [address] [place] [address], [facility], [address] [place] [address] Road  [phone]   [phone]        OUTPATIENT PHYSICAL THERAPY:Discharge Summary 2022    ICD-10: Treatment Diagnosis:   Pain in left hip (M25.552)  Stiffness of Left hip, not elsewhere classified (N57.543)   Other Joint Derangements Left Hip (M24.852)  Sprain of Sacroiliac joint (S33.6XXA)              Precautions/Allergies: Other medication and Sulfa (sulfonamide antibiotics)   Fall Risk Score: 1 (? 5 = High Risk)  MD Orders: Eval and Treat  MEDICAL/REFERRING DIAGNOSIS:  Left hip pain [M25.552]   DATE OF ONSET: 2021 From slipping on bathroom floor  REFERRING PHYSICIAN: Dave Moreno MD  RETURN PHYSICIAN APPOINTMENT: TBD by patient       INITIAL ASSESSMENT:  Severiano Dowd presents to physical therapy with decreased postural and hip/core strength, ROM, joint mobility, flexibility, functional mobility, and increased pain. No pelvic malalignment upon initial evaluation but decreased posture. These S/S are consistent with Left hip impingement and Sacroiliac joint dysfunction.     Severiano Dowd will benefit from skilled physical therapy (medically necessary) to address above deficits affecting participation in basic ADLs and functional mobility/tolerance. Patient will benefit from manual therapeutic techniques (stretching, joint mobilizations, soft tissue mobilization/myofascial release), therapeutic exercises and activities, postural strengthening/education, and comprehensive home exercises program to address current impairments and functional limitations. PROGRESS NOTE (2022): Patient has been seen for 10 sessions of physical therapy from 2022 to 2022. Patient reports feeling 95% better with left hip pain and activity.  She limited with stability with higher level activity. Patient will benefit from continued skilled physical therapy to address remaining goals and deficits. DISCHARGE NOTE (6/16/2022): Patient has been seen for 14 sessions of physical therapy from 4/29/2022 to 6/16/2022. Patient reports feeling 95% better with left hip pain and activity. She has currently met most goals for referring diagnosis and is independent with home exercise program.        PROBLEM LIST (Impacting functional limitations):  · Decreased Strength  · Decreased ADL/Functional Activities  · Decreased Transfer Abilities  · Decreased Ambulation Ability/Technique  · Decreased Balance  · Increased Pain  · Decreased Activity Tolerance  · Increased Fatigue  · Increased Shortness of Breath  · Decreased Flexibility/Joint Mobility  · Decreased [place] with Home Exercise Program INTERVENTIONS PLANNED:  1. Balance Exercise  2. Bed Mobility  3. Cold  4. Cryotherapy  5. Electrical Stimulation  6. Family Education  7. Gait Training  8. Heat  9. Home Exercise Program (HEP)  10. Manual Therapy  11. Neuromuscular Re-education/Strengthening  12. Range of Motion (ROM)  13. Therapeutic Activites  14. Therapeutic Exercise/Strengthening  15. Transfer Training  16. Ultrasound (US)  17. Aquatic Therapy   TREATMENT PLAN:  Effective Dates: 4/29/2022 TO 7/28/2022 (90 days). Frequency/Duration: 2 times a week for 90 Days  GOALS: (Goals have been discussed and agreed upon with patient.)    Short-Term Goals~4 weeks  Goal Met   1. Nereyda Daniel will be independent with HEP for strength and ROM 1.  [x]? Date: 6/2/2022   2. Nereyda Daniel will participate in LE strengthening exercises for hip, knee, ankle with weight as appropriate for 3 sets of 10 2. [x]? Date: 6/2/2022   3. Nereyda Daniel will report <=2/10 pain with left groin/hip and demonstrate minimal to no difficulty 3. [x]? Date: 6/16/2022   4.  Nereyda Daniel will demonstrate improvement of MMT from initial eval to 4+ to 5/5 B LE in order to return to participate in ADLs with minimal to no difficulty. 4.  [x]? Date: 6/2/2022   5. Karla Hernandez will participate in static and dynamic balance activities for 5 minutes to help improve proprioception and decrease risk of falls  5. [x]? Date: 6/2/2022   6. Karla Hernandez to increase lower extremity functional scale by 5 points to show improvement in areas of difficulty 6. [x]? Date: 6/2/2022             Long Term Goals~8 weeks Goal Met   1. Karla Hernandez will be independent in HEP of stretching and strengthening 1. [x]? Date: 6/2/2022   2. Karla Hernandez will be able to perform floor to stand transfers independently with decreased compensation  2. [x]? Date: 6/2/2022   3. Karla Hernandez will get in and out of car without increased left hip pain 3. [x]? Date: 6/16/2022   4. Karla Hernandez to increase lower extremity functional scale by 10 points to show improvement in areas of difficulty 4. []? Date:                 Outcome Measure: Tool Used: Lower Extremity Functional Scale (LEFS)  Score:  Initial: 67/80 Most Recent: 74/80 (Date: 6/2/2022 )   Interpretation of Score: 20 questions each scored on a 5 point scale with 0 representing \"extreme difficulty or unable to perform\" and 4 representing \"no difficulty\". The lower the score, the greater the functional disability. 80/80 represents no disability.   Minimal detectable change is 9 points.     · Observation/Orthostatic Postural Assessment:  Slight elevation left iliac crest standing, Anterior rotation Left innominate  · Gait= Normal  Palpation:  No Tenderness  ROM:   AROM/PROM                Joint: Eval Date: 4/29/2022   Re-Assess Date: 6/2/2022     Active ROM RIGHT LEFT RIGHT LEFT   Knee Extension WNL WNL WNL WNL   Knee Flexion WNL WNL  WNL WNL    Hip Flexion 110 95  WNL WNL    Hip Abduction 40 30  WNL WNL   Prone Hip Extension w/ Knee bent  10 5  10 10    Hip IR/ER 30/50 20/35  WNL  WNL    Strength:      Eval Date: 4/29/2022   Re-Assess Date: 6/2/2022       RIGHT LEFT RIGHT LEFT   Knee Flexion 5/5 4+/5 5/5   5/5   Knee Extension 5/5 4+/5  5/5  5/5   Hip Flexion 5/5 4/5  5/5 4+/5    Hip Abduction 4+/5 4/5  4+/5 4+/5    Hip Extension 4+/5 4/5  4+/5 4+/5                      Special Tests:   · Scouring:Negative  · Fabers:Negative  · FADIRs:Negative  · SLR:Negative                Neurological Screen: Patient demonstrates/reports no loss in sensation     Functional Mobility:  Affecting participation in ambulation, standing and transfers     Balance and Mobility:     Test Result   Timed up and Go 7.5   30 second Sit to Stand 14   Single Leg Balance Right: <30 seconds     Left:<30 seconds                     Reason for Services/Other Comments:  Patient has been seen for 14 sessions of physical therapy from 4/29/2022 to 6/16/2022. Patient reports feeling 95% better with left hip pain and activity. She has currently met most goals for referring diagnosis and is independent with home exercise program           Regarding Sarah Cortez's therapy, I certify that the treatment plan above will be carried out by a therapist or under their direction.   Thank you for this referral,  Quinn Ortiz PT     Referring Physician Signature: Yobani Salazar MD Signature not required

## 2022-06-16 NOTE — PROGRESS NOTES
Bennett Israel  : 1970  Primary: Kostas Castelan Sc  Secondary:  [address] @ [place] [address] [place]. [address]  Phone: [phone]  Fax: [phone] No data recorded  No data recorded    PT Visit Info:     8    6+   OUTPATIENT PHYSICAL THERAPY:OP NOTE TYPE: Treatment Note 2022     Appt Desk   Episode      Treatment Diagnosis:  Pain in left hip (M25.552)  Medical/Referring Diagnosis:  Pain in left hip [M25.552]  Referring Physician:  Cristal Bates MD MD Orders:  PT Eval and Treat   Date of Onset:  No data recorded   Allergies:  Furosemide, Sulfa antibiotics, and Other  Restrictions/Precautions:    No data recordedNo data recorded   Interventions Planned (Treatment may consist of any combination of the following):    No data recorded   Subjective Comments:  Patient reports having a  new order to treat Cervical spine  Initial:     0/10 Post Session:     0/10  4/10 right hip  Medications Last Reviewed:  2022  Updated Objective Findings:  Anterior rotation right innominate  Treatment   THERAPEUTIC EXERCISE: (38 minutes):    Exercises per grid below to improve mobility, strength, balance and dynamic movement of left hip to improve functional bending, lifting, carrying and reaching. Required minimal verbal cues to promote proper body alignment and promote proper body mechanics. Progressed resistance, repetitions and complexity of movement as indicated.    Date:  2022 Date:  2022 Date:  2022 Date:  2022 Date:  2022 Date:  2022 Date:  2022 Date:  2022   Activity/Exercise Parameters Parameters Parameters        Nustep 8min hills L5 8min L 3.5 8min hills L5 8min hills L5 8min hills L5 8min hills L5 8min hills L6 8min hills L6   L stretch 77m92ndv 87g16fed 53v38bsc 34a19gys 57x06jpe 59w24rqd 47g50uth 47a10dgo   Calf stretch 8e46zgk 6p11kxm 5d19wxa 7m56zlt 1f77hbb 5j06ihp 1w60lzt 3w07awf   Seated HSS 9q33chc 3p57pox

## 2022-06-17 ENCOUNTER — TELEPHONE (OUTPATIENT)
Dept: ORTHOPEDIC SURGERY | Age: 52
End: 2022-06-17

## 2022-06-17 NOTE — TELEPHONE ENCOUNTER
Pt just called and  She  Received  a phone call from  [facility]     She  Had  Asked to be  Referred to where she already  Goes    Dr Soha Ureña at  [address] [place] [address] Nw

## 2022-06-17 NOTE — TELEPHONE ENCOUNTER
Spoke with patient and reassured her that we would send over referral to [facility] for Dr. Lazarus Kay.

## 2022-06-20 ENCOUNTER — TELEPHONE (OUTPATIENT)
Dept: ORTHOPEDIC SURGERY | Age: 52
End: 2022-06-20

## 2022-06-20 ENCOUNTER — CLINICAL DOCUMENTATION (OUTPATIENT)
Dept: ORTHOPEDIC SURGERY | Age: 52
End: 2022-06-20

## 2022-06-20 NOTE — TELEPHONE ENCOUNTER
I received the following message back from the patient  \"My memory is back. I was taking this in the morning and it was making me too drowsy. So she moved it to night. I could really tell the difference this morning of not taking it last night. All day has been upside down. \"

## 2022-06-21 ENCOUNTER — OFFICE VISIT (OUTPATIENT)
Dept: INTERNAL MEDICINE CLINIC | Facility: CLINIC | Age: 52
End: 2022-06-21
Payer: COMMERCIAL

## 2022-06-21 ENCOUNTER — HOSPITAL ENCOUNTER (OUTPATIENT)
Dept: PHYSICAL THERAPY | Age: 52
Setting detail: RECURRING SERIES
Discharge: HOME | End: 2022-06-24
Payer: COMMERCIAL

## 2022-06-21 VITALS
WEIGHT: 182.4 LBS | DIASTOLIC BLOOD PRESSURE: 70 MMHG | RESPIRATION RATE: 16 BRPM | BODY MASS INDEX: 35.81 KG/M2 | HEIGHT: 60 IN | TEMPERATURE: 98.3 F | OXYGEN SATURATION: 100 % | SYSTOLIC BLOOD PRESSURE: 110 MMHG | HEART RATE: 83 BPM

## 2022-06-21 DIAGNOSIS — B35.9 TINEA: Primary | ICD-10-CM

## 2022-06-21 PROCEDURE — 97140 MANUAL THERAPY 1/> REGIONS: CPT

## 2022-06-21 PROCEDURE — 99213 OFFICE O/P EST LOW 20 MIN: CPT | Performed by: INTERNAL MEDICINE

## 2022-06-21 PROCEDURE — 97110 THERAPEUTIC EXERCISES: CPT

## 2022-06-21 PROCEDURE — 97161 PT EVAL LOW COMPLEX 20 MIN: CPT

## 2022-06-21 RX ORDER — KETOCONAZOLE 20 MG/G
CREAM TOPICAL
Qty: 30 G | Refills: 1 | Status: SHIPPED | OUTPATIENT
Start: 2022-06-21 | End: 2022-09-06

## 2022-06-21 ASSESSMENT — ENCOUNTER SYMPTOMS
DIARRHEA: 0
RHINORRHEA: 0
BLOOD IN STOOL: 0
NAUSEA: 0
SINUS PRESSURE: 0
COUGH: 0
VOMITING: 0
SHORTNESS OF BREATH: 0
CHEST TIGHTNESS: 0
ABDOMINAL PAIN: 0

## 2022-06-21 ASSESSMENT — PAIN DESCRIPTION - LOCATION: LOCATION: ARM;BACK

## 2022-06-21 ASSESSMENT — PAIN SCALES - GENERAL: PAINLEVEL_OUTOF10: 3

## 2022-06-21 NOTE — PROGRESS NOTES
Jenae Conteh  : 1970  Primary: Bella Tenisha Sc  Secondary:  Mari Schulte @ [address] [place] [address]  Phone: [phone]  Fax: [phone] No data recorded  Plan of Care/Certification Expiration Date: 22      PT Visit Info: Total # of Visits to Date: 1  Progress Note Counter: 1      OUTPATIENT PHYSICAL THERAPY:OP NOTE TYPE: Treatment Note 2022       Episode  }Appt Desk               Treatment Diagnosis: Cervicalgia (M54.2)  Radiculopathy, Cervical Region (M54.12)  Medical/Referring Diagnosis:  Neck pain [M54.2]  Referring Physician:  Nate Chandler MD Orders:  PT Eval and Treat   Date of Onset:  Onset Date: 10/08/21     Allergies:   Furosemide, Sulfa antibiotics, and Other  Restrictions/Precautions:  Restrictions/Precautions: None  No data recorded   Interventions Planned (Treatment may consist of any combination of the following):    No data recorded   Subjective Comments:  See Eval  Initial:}  Arm,Back 3/10Post Session:     Arm,Back 1/10  Medications Last Reviewed:  2022  Updated Objective Findings:  See evaluation note from today  Treatment       THERAPEUTIC EXERCISE: (8} minutes):  Exercises per grid below to improve mobility, strength and balance. Required minimal visual, verbal and manual cues to promote proper body alignment and promote proper body posture. Progressed resistance and complexity of movement as indicated. Date:  2022 Date:   Date:     Activity/Exercise Parameters Parameters Parameters   Education HEP, POC, PT goals, anatomy/pathology     UBE 6min     Rings [id number]     Scapular Retraction 15X                             THERAPEUTIC ACTIVITY: ( 0 minutes): Activities per gid below to improve functional movement related mobility, strength and balance to improve neuro-muscular carryover to daily functional activities for improving patient's quality of life.  Required visual, verbal and manual cues to promote proper body alignment and promote proper body posture/mechanics. Progressed resistance and complexity of movement as indicated. Date:  6/21/2022 Date:   Date:     Activity/Exercise Parameters Parameters Parameters                                                   MANUAL THERAPY: (15 minutes): Joint mobilization, Soft tissue mobilization was utilized and necessary because of the patient's restricted joint motion and restricted motion of soft tissue mobility. Date  6/21/2022    Technique Used Grade  Level # Time(s) Effect while being performed          Manual Traction  Cervical 5min No radicular pain          Jt Mobs CPA III Cervical 5min Improved Mobility          Manual ROM  Cervical 5min Improved Mobility                       HEP Log Date    6/21/2022   2.     3.    4.    5.              Treatment/Session Summary:    Treatment Assessment:  See Eval  Communication/Consultation:  Spoke with patient in regards to Posture and HEP. Equipment provided today:  None  Recommendations/Intent for next treatment session: Next visit will focus on Progression of ROM and postural strengthening.     Total Treatment Billable Duration:  23 minutes and EVAL  an2}  Time In: 0715  Time Out: 4898    Hayde Gaines, PT       Charge Capture  }Post Session Pain  Med[employer] Portal  MD Guidelines  Scanned Media  Benefits  MyChart    Future Appointments   Date Time Provider Aleah Preston   6/23/2022  9:00 AM Hayde Gaines PT [facility]   6/27/2022 10:30 AM Kaylynn Mcclelland MD POAI [facility]   7/6/2022 11:00 AM Alix Hernandes APRN - CNP MAT [facility]   7/12/2022  9:00 AM Jerry Pozo MD NESS [facility]   8/2/2022 10:20 AM Maria E Allen MD [id number] [facility]   8/18/2022 11:00 AM Marcos Mayorga APRN - CNP PSCD [facility]   11/7/2022  2:30 PM Erma Aragon MD BSNI [facility]   4/19/2023 10:00 AM Irma Chatman DO BSUG [facility]

## 2022-06-21 NOTE — PROGRESS NOTES
[facility]      2022    Patient Name: Leni Jc  :  1970    Subjective:    Chief Complaint:  Chief Complaint   Patient presents with    Rash     Pt c/o rash on right thigh that popped up on Saturday.           HPI c/o rash that she noticed on her R upper thigh 4 days ago; she is not having any itching; she denies trauma, insect bite;     Past Medical History:   Diagnosis Date    Anxiety     DDD (degenerative disc disease), cervical 2021    C3-C7 Disc Degeneration w/ Posterior Spondylotic Disc Displaceemnts per MRI    Depressed     GERD (gastroesophageal reflux disease)     Hypertension     IBS (irritable bowel syndrome)     Insomnia     TMJ (dislocation of temporomandibular joint) 2021       Past Surgical History:   Procedure Laterality Date    CATARACT REMOVAL Bilateral 2020    COLONOSCOPY      EGD DELIVER THERMAL ENERGY SPHNCTR/CARDIA GERD      LIPECTOMY      TUBAL LIGATION         Family History   Problem Relation Age of Onset    Heart Disease Father     Hypertension Father     High Cholesterol Mother     GERD Mother        Social History     Tobacco Use    Smoking status: Never Smoker    Smokeless tobacco: Never Used   Substance Use Topics    Alcohol use: Never    Drug use: Never                 Current Outpatient Medications:     ketoconazole (NIZORAL) 2 % cream, Apply topically daily x2 weeks, Disp: 30 g, Rfl: 1    Wheat Dextrin (BENEFIBER PO), , Disp: , Rfl:     topiramate (TOPAMAX) 25 MG tablet, Take 1 tablet by mouth 2 times daily, Disp: 180 tablet, Rfl: 1    cyanocobalamin 1000 MCG tablet, Take 1 tablet by mouth daily, Disp: 90 tablet, Rfl: 3    LINZESS 72 MCG CAPS capsule, Take 1 capsule by mouth daily, Disp: , Rfl:     naproxen (NAPROSYN) 500 MG tablet, Take 1 tablet by mouth as needed, Disp: , Rfl:     norethindrone (AYGESTIN) 5 MG tablet, Take 1 tablet by mouth daily, Disp: , Rfl:     omeprazole (PRILOSEC) 40 MG delayed release capsule, Take 1 capsule (40 mg) by mouth 2 (two) times a day 30 mins before breakfast and 30 mins before dinner, Disp: , Rfl:     sucralfate (CARAFATE) 1 GM tablet, Take 1 tablet (1 g) by mouth 3 (three) times a day for 10 days Dissolve tablet in 1-2 oz of water and drink it., Disp: , Rfl:     cod liver oil CAPS, Take 1 capsule by mouth daily, Disp: , Rfl:     Ubrogepant (UBRELVY) 50 MG TABS, Take 1 tablet at the onset of migraine, may repeat in 2 hours. Do not take more then 3 days a week., Disp: 16 tablet, Rfl: 11    lisinopril-hydroCHLOROthiazide (PRINZIDE;ZESTORETIC) 20-25 MG per tablet, Take 1 tablet by mouth daily, Disp: , Rfl:     TURMERIC PO, Take 1 tablet by mouth daily, Disp: , Rfl:     amitriptyline (ELAVIL) 25 MG tablet, TAKE 1 TO 2 TABLETS BY MOUTH EVERY EVENING, Disp: , Rfl:     atomoxetine (STRATTERA) 80 MG capsule, Take 80 mg by mouth daily, Disp: , Rfl:     Benzoyl Peroxide 2.5 % GEL, APPLY TOPICALLY TO FACE AT BEDTIME, Disp: , Rfl:     celecoxib (CELEBREX) 200 MG capsule, Take 200 mg by mouth 2 times daily, Disp: , Rfl:     vitamin D (CHOLECALCIFEROL) 96613 UNIT CAPS, Take 50,000 Units by mouth every 7 days, Disp: , Rfl:     citalopram (CELEXA) 20 MG tablet, Take 20 mg by mouth every evening, Disp: , Rfl:     clonazePAM (KLONOPIN) 0.5 MG tablet, Take 0.5 mg by mouth daily as needed. , Disp: , Rfl:     cyclobenzaprine (FLEXERIL) 10 MG tablet, Take 10 mg by mouth 3 times daily as needed, Disp: , Rfl:     diclofenac sodium (VOLTAREN) 1 % GEL, Apply 1 g topically as needed, Disp: , Rfl:     hydroCHLOROthiazide (HYDRODIURIL) 25 MG tablet, TAKE 1 TABLET DAILY, Disp: , Rfl:     Lidocaine, Anorectal, 5 % CREA, Actual prescription: Lidocaine 5%: Neurontin/gabapentin 5% combined topical cream/gelApply to affected area up to 4 times a day as needed for pain, Disp: , Rfl:     nystatin-triamcinolone (MYCOLOG II) 443003-3.1 UNIT/GM-% cream, Apply topically 2 times daily, Disp: , Rfl:    orphenadrine (NORFLEX) 100 MG extended release tablet, TAKE 1 TABLET BY MOUTH TWICE DAILY, Disp: , Rfl:     pantoprazole (PROTONIX) 40 MG tablet, Take 40 mg by mouth 2 times daily , Disp: , Rfl:     pramipexole (MIRAPEX) 0.125 MG tablet, Take 0.25 mg by mouth, Disp: , Rfl:     pregabalin (LYRICA) 150 MG capsule, TAKE 1 CAPSULE BY MOUTH THREE TIMES DAILY, Disp: , Rfl:     traZODone (DESYREL) 50 MG tablet, Take 25-50 mg by mouth, Disp: , Rfl:     vitamin E 600 units capsule, Take by mouth daily, Disp: , Rfl:     Allergies   Allergen Reactions    Furosemide Other (See Comments)     Edema of lower extremities      Sulfa Antibiotics Other (See Comments) and Rash     GI Upset    Other Other (See Comments)     Oral steroids       Review of Systems   Constitutional: Negative for chills, fatigue and fever. HENT: Negative for congestion, postnasal drip, rhinorrhea and sinus pressure. Eyes: Negative for visual disturbance. Respiratory: Negative for cough, chest tightness and shortness of breath. Cardiovascular: Negative for chest pain and palpitations. Gastrointestinal: Negative for abdominal pain, blood in stool, diarrhea, nausea and vomiting. Genitourinary: Negative for dysuria, frequency and urgency. Musculoskeletal: Negative for arthralgias and myalgias. Skin: Positive for rash. Neurological: Negative for numbness and headaches. Psychiatric/Behavioral: Negative for dysphoric mood and sleep disturbance. The patient is not nervous/anxious. Objective:  /70   Pulse 83   Temp 98.3 °F (36.8 °C) (Temporal)   Resp 16   Ht 5' (1.524 m)   Wt 182 lb 6.4 oz (82.7 kg)   SpO2 100%   BMI 35.62 kg/m²     Examination:  Physical Exam  Constitutional:       Appearance: Normal appearance. HENT:      Head: Normocephalic and atraumatic.       Right Ear: Tympanic membrane and ear canal normal.      Left Ear: Tympanic membrane and ear canal normal.      Nose: Nose normal.      Mouth/Throat: Mouth: Mucous membranes are moist.   Eyes:      Extraocular Movements: Extraocular movements intact. Conjunctiva/sclera: Conjunctivae normal.      Pupils: Pupils are equal, round, and reactive to light. Cardiovascular:      Rate and Rhythm: Normal rate and regular rhythm. Pulses: Normal pulses. Heart sounds: Normal heart sounds. Pulmonary:      Effort: Pulmonary effort is normal.      Breath sounds: Normal breath sounds. Abdominal:      General: Abdomen is flat. Bowel sounds are normal.      Palpations: Abdomen is soft. Musculoskeletal:      Cervical back: Normal range of motion and neck supple. Skin:     General: Skin is warm and dry. Findings: Rash present. Neurological:      General: No focal deficit present. Mental Status: She is alert. Mental status is at baseline. Psychiatric:         Mood and Affect: Mood normal.         Thought Content: Thought content normal.           Assessment/Plan:    Tory Bahena was seen today for rash. Diagnoses and all orders for this visit:    Tinea  Instructed to take medications as prescribed, and to call if no improvement in symptoms. -     ketoconazole (NIZORAL) 2 % cream; Apply topically daily x2 weeks          Follow-up and Dispositions    · Return in about 2 weeks (around 7/5/2022), or if symptoms worsen or fail to improve, for f/u. Medication Reconciliation:  Current Medications Verified: Current medications/ immunizations were reviewed, including purpose, with patient. Family History, Social History, Current and Past Medical History was reviewed with patient and updated at today's office visit. Medication Reconciliation list was given to patient/ family. Patient was advised to discard old medication lists and provide all providers with current list at each visit and carry list with them in case of emergency.     Completed By:   Rose Schroeder MD    [place] [address] [facility] [address], [address], [address] [facility]  [phone]  [phone] fax  11:25 AM

## 2022-06-21 NOTE — PLAN OF CARE
[place]co  : 1970  Primary: Rosa M Paredes  Secondary:  [address] [facility] [address]  Phone: [phone]  Fax: [phone] No data recorded  Plan of Care/Certification Expiration Date: 22      PT Visit Info: Total # of Visits to Date: 1  Progress Note Counter: 1      OUTPATIENT PHYSICAL THERAPY:OP NOTE TYPE: Initial Assessment 2022               Episode  Appt Desk         Treatment Diagnosis:  ICD-10: Treatment Diagnosis: Cervicalgia (M54.2)  Radiculopathy, Cervical Region (M54.12)    Medical/Referring Diagnosis:  Neck pain [M54.2]  Referring Physician:  Jarad Banks MD Orders:  PT Eval and Treat   Return MD Appt:  TBD  Date of Onset:  Onset Date: 10/08/21     Allergies:  Furosemide, Sulfa antibiotics, and Other  Restrictions/Precautions:    No data recordedNo data recorded   Medications Last Reviewed:  2022     SUBJECTIVE   History of Injury/Illness (Reason for Referral):  Patient is familiar to this clinic for previous treatment of Shoulder pain, left hip pain, and TMD. She returning to address current s/s of mostly left arm tingling and parascapular pain from Cervical radiculopathy. Patient Stated Goal(s): \"Perform daily activity without left arm, neck and scapular pain\"  Initial:   Arm,Back 3/10 Post Session:   Andres Pantoja 1/10  Past Medical History/Comorbidities:   Ms. Doron Hu  has a past medical history of Anxiety, DDD (degenerative disc disease), cervical, Depressed, GERD (gastroesophageal reflux disease), Hypertension, IBS (irritable bowel syndrome), Insomnia, and TMJ (dislocation of temporomandibular joint). Ms. Doron Hu  has a past surgical history that includes egd deliver thermal energy sphnctr/cardia gerd; Cataract removal (Bilateral, 2020); lipectomy; Tubal ligation; and Colonoscopy.   Social History/Living Environment:   Lives With: Family     Prior Level of Function/Work/Activity:   Prior level of function: Independent  Occupation: Full time employment  No data recordedNo data recorded   Learning: Will there be a co-learner?: No  What is the preferred language of the patient/guardian?: English  Is an  required?: No  How does the patient/guardian prefer to learn new concepts?: Listening; Reading; Demonstration; Pictures/Videos     Fall Risk Scale: Total Score: 0  [place] Fall Risk: Low (0-24)     Dominant Side:  right handed        OBJECTIVE   Observation/Orthostatic Postural Assessment:           Forward head and shoulders  Palpation:          TTP bilateral UT, LS and SP of C7 and C4  ROM:      AROM/PROM                  Joint: Eval Date: 6/21/22  Re-Assess Date:  Re-Assess Date:    Active ROM           Cervical Extension 35          Cervical Flexion 40           RIGHT LEFT RIGHT LEFT RIGHT LEFT   Cervical Sidebending 40 35           Cervical Rotation 55 53                        Shoulder Flexion WNL WNL           Shoulder Abduction WNL WNL                        Lumbar Flexion WNL WNL           Lumber Extension WNL WNL               Strength:     Eval Date: 6/21/22  Re-Assess Date:  Re-Assess Date:      RIGHT LEFT RIGHT LEFT RIGHT LEFT   Shoulder Flexion 5/5 5/5           Shoulder Abduction (C5) 5/5 5/5           Shoulder Internal Rotation 5/5 5/5           Shoulder External Rotation 5/5 4+/5           Elbow Flexion (C6) 5/5 5/5           Elbow Extension (C7) 5/5 5/5           Wrist Flexion (C7) 5/5 5/5           Wrist Extension (C6) 5/5 5/5           Resisted Thumb Extension/Finger Abduction (C8/T1) Normal     Normal           Resisted Cervical Rotation (C1): Normal Normal           Scapula 4/5 4/5            Strength    NT /lbs  NT Nancy Paulo                        Manual:  Joint Directon Grade Treatment Effect   C3-7 Central PA II III Decreased radicular symptoms left UE Reflex Testing (Biceps, Brachioradialis, Triceps): Normal    Location LEFT RIGHT   Biceps 2 2   Brachioradialis 2 2   Triceps 2 2       Special Tests:    Distraction: Negative        Sharp Herminia's: Negative              Upper Limb Tension Test Median Nerve: negative              Upper Limb Tension Test Ulnar: negative              Upper Limb Tension Test Radial: negative       Neurological Screen: Radiating symptoms: Yes     Functional Mobility:  Limited with cervical rotation for driving and maintaining correct posture at work    Balance: \"No Testing Needed\"    ASSESSMENT   Initial Assessment:    Ciaran Spain presents to physical therapy with decreased strength, ROM, joint mobility, functional mobility. These S/S are consistent with referring diagnosis. Patient will benefit from skilled physical therapy for manual therapeutic techniques (as appropriate), therapeutic exercises and activities, balance and comprehensive home exercises program to address current impairments and functional limitations. Problem List: (Impacting functional limitations): Body Structures, Functions, Activity Limitations Requiring Skilled Therapeutic Intervention: Decreased functional mobility ; Decreased ROM; Decreased body mechanics; Decreased tolerance to work activity; Decreased strength; Decreased endurance; Increased pain; Decreased posture     Therapy Prognosis:   Therapy Prognosis: Good     Assessment Complexity:   Low Complexity  PLAN   Effective Dates: 6/21/22 TO Plan of Care/Certification Expiration Date: 09/21/22     Frequency/Duration: No data recorded   Interventions Planned (Treatment may consist of any combination of the following):    No data recorded   Goals: (Goals have been discussed and agreed upon with patient.)    Short-Term Goals~4 weeks  Goal Met   Ciaran Spain will report <=3/10 pain to cervical spine with performance of functional spinal mobility and rotation.  1.  [] Date:   Alejandro Brothers to be independent with initial HEP for cervical region and UE's. 2.  [] Date:   Alejandro Brothers will improve ROM to >=90% to assist with improved function during instrumental activities of daily living. 3.  [] Date:   Alejandro Brothers will improve MMT to >=4+/5 to all UE strengthening to return to PLOF and improve functional tolerance. 4.  [] Date:       Long Term Goals~8 weeks Goal Met   Alejandro Brothers will report <=1/10 pain to cervical spine with performance of functional spinal mobility and rotation and minimal to no difficulty with such tasks. 1.  [] Date:   Alejandro Brothers will demonstrate improved NDI score, indicating spinal improvement from 16/50 to 8/50 affecting minimal to no difficulty with performance of cervical mobility and strengthening. 2.  [] Date:   Alejandro Brothers to be independent with advanced HEP for cervical region and UE's. 3.  [] Date:                    Outcome Measure: Tool Used: Neck Disability Index (NDI)  Score:  Initial: 16/50  Most Recent: X/50 (Date: -- )   Interpretation of Score: The Neck Disability Index is a revised form of the Oswestry Low Back Pain Index and is designed to measure the activities of daily living in person's with neck pain. Each section is scored on a 0-5 scale, 5 representing the greatest disability. The scores of each section are added together for a total score of 50. Medical Necessity:   Patient is expected to demonstrate progress in strength, range of motion and functional technique to improve safety during lifting and activity required for home and work. Reason For Services/Other Comments:  Patient continues to require modification of therapeutic interventions to increase complexity of exercises.   Total Duration:  Time In: 0715  Time Out: 5368    Regarding Aida Cortez's therapy, I certify that the treatment plan above will be carried out by a therapist or under

## 2022-06-23 ENCOUNTER — HOSPITAL ENCOUNTER (OUTPATIENT)
Dept: PHYSICAL THERAPY | Age: 52
Setting detail: RECURRING SERIES
Discharge: HOME | End: 2022-06-26
Payer: COMMERCIAL

## 2022-06-23 ENCOUNTER — APPOINTMENT (OUTPATIENT)
Dept: PHYSICAL THERAPY | Age: 52
End: 2022-06-23
Payer: COMMERCIAL

## 2022-06-23 PROCEDURE — 92610 EVALUATE SWALLOWING FUNCTION: CPT

## 2022-06-23 PROCEDURE — 97110 THERAPEUTIC EXERCISES: CPT

## 2022-06-23 ASSESSMENT — PAIN SCALES - GENERAL: PAINLEVEL_OUTOF10: 0

## 2022-06-23 NOTE — PROGRESS NOTES
Jr Scott  : 1970  Primary: Nicki Ndiaye Sc  Secondary:  [address] [facility] [address]  Phone: [phone]  Fax: [phone] No data recorded  Plan of Care/Certification Expiration Date: 22      PT Visit Info: Total # of Visits to Date: 2  Progress Note Counter: 2      OUTPATIENT PHYSICAL THERAPY:OP NOTE TYPE: Treatment Note 2022       Episode  }Appt Desk               Treatment Diagnosis: Cervicalgia (M54.2)  Radiculopathy, Cervical Region (M54.12)  Medical/Referring Diagnosis:  Neck pain [M54.2]  Referring Physician:  Yong Strong MD Orders:  PT Eval and Treat   Date of Onset:  Onset Date: 10/08/21     Allergies:   Furosemide, Sulfa antibiotics, and Other  Restrictions/Precautions:  Restrictions/Precautions: None  No data recorded   Interventions Planned (Treatment may consist of any combination of the following):     No data recorded   Subjective Comments:  Patient reports min to no symptoms after last treatment  Initial:}    0/10Post Session:       0/10  Medications Last Reviewed:  2022  Updated Objective Findings:  See evaluation note from today  Treatment       THERAPEUTIC EXERCISE: (24} minutes):  Exercises per grid below to improve mobility, strength and balance. Required minimal visual, verbal and manual cues to promote proper body alignment and promote proper body posture. Progressed resistance and complexity of movement as indicated. Date:  2022 Date:  2022 Date:     Activity/Exercise Parameters Parameters Parameters    HEP, POC, PT goals, anatomy/pathology     UBE 6min 6min L3    Rings [id number]    Scapular RetractionFoam roll 3 way stretch 15X       [id number] each    Bilateral EREducation  20x blue    Horizontal Abd  20x blue    D2 Flexion  20x blue                      THERAPEUTIC ACTIVITY: ( 0 minutes):  Activities per gid below to improve functional movement related mobility, strength and balance to improve neuro-muscular carryover to daily functional activities for improving patient's quality of life. Required visual, verbal and manual cues to promote proper body alignment and promote proper body posture/mechanics. Progressed resistance and complexity of movement as indicated. Date:  6/23/2022 Date:   Date:     Activity/Exercise Parameters Parameters Parameters                                                   MANUAL THERAPY: (0 minutes): Joint mobilization, Soft tissue mobilization was utilized and necessary because of the patient's restricted joint motion and restricted motion of soft tissue mobility. Date  6/23/2022    Technique Used Grade  Level # Time(s) Effect while being performed          Manual Traction  Cervical  No radicular pain          Jt Mobs CPA III Cervical  Improved Mobility          Manual ROM  Cervical  Improved Mobility                       HEP Log Date    6/23/2022   2.     3.    4.    5.              Treatment/Session Summary:    Treatment Assessment:  Patient was limited with time secondary to rocio HICKEY appointment, treatment focused on advancing postural exercises   Communication/Consultation:  Spoke with patient in regards to Posture and HEP. Equipment provided today:  None  Recommendations/Intent for next treatment session: Next visit will focus on Progression of ROM and postural strengthening.     Total Treatment Billable Duration:  24 minutes  an2}  Time In: 0720  Time Out: 0745    Nona Ganser, PT       Charge Capture  }Post Session Pain  MedBridge Portal  MD Guidelines  Scanned Media  Benefits  MyChart    Future Appointments   Date Time Provider Aleah Chapmani   6/27/2022  7:15 AM Nona Ganser, PT [facility]   6/27/2022 10:30 AM MD MCKINLEY Ellison AMB   6/28/2022  8:45 AM Nona Ganser, PT [facility]   6/30/2022  7:15 AM Nona Ganser, PT [facility]   7/6/2022 11:00 AM Alix Gonzalez, APRN - CNP [facility] AMB 7/7/2022  7:15 AM Buzz Ashley, PT [facility]   7/12/2022  7:15 AM Buzz Ashley, PT [facility]   7/12/2022  9:00 AM Marycruz Tsang MD NESS [facility] AMB   7/13/2022  9:40 AM Kenya Cedeno MD [id number] AM[facility]   7/14/2022  7:15 AM Buzz Ashley, PT [facility]   7/19/2022  7:15 AM Buzz Ashley, PT [facility]   7/21/2022  7:15 AM Buzz Ashley, PT [facility]   7/26/2022  7:15 AM Buzz Ashley, PT [facility]   7/28/2022  7:15 AM Buzz Ashley, PT [facility]   8/2/2022  7:15 AM Buzz Ashley, PT [facility]   8/2/2022 10:20 AM Kenya Cedeno MD [id number] AM[facility]   8/4/2022  7:15 AM Buzz Ashley, PT [facility]   8/9/2022  7:15 AM Buzz Ashley, PT [facility]   8/11/2022  7:15 AM Buzz Ashley, PT [facility]   8/18/2022 11:00 AM Shyann Cardona, APRN - CNP PSCD [facility] AMB   11/7/2022  2:30 PM Trung Gambino MD BS[facility] AMB   4/19/2023 10:00 AM Jazmyn Chatman DO B[facility]G [facility] AMB

## 2022-06-24 ENCOUNTER — TELEMEDICINE (OUTPATIENT)
Dept: INTERNAL MEDICINE CLINIC | Facility: CLINIC | Age: 52
End: 2022-06-24
Payer: COMMERCIAL

## 2022-06-24 DIAGNOSIS — M79.10 MYALGIA: Primary | ICD-10-CM

## 2022-06-24 DIAGNOSIS — R60.0 FLUID RETENTION IN LEGS: ICD-10-CM

## 2022-06-24 PROCEDURE — 99214 OFFICE O/P EST MOD 30 MIN: CPT | Performed by: NURSE PRACTITIONER

## 2022-06-24 ASSESSMENT — ENCOUNTER SYMPTOMS
ABDOMINAL DISTENTION: 0
DIARRHEA: 0
COUGH: 0
SHORTNESS OF BREATH: 0
BACK PAIN: 0
ABDOMINAL PAIN: 0
APNEA: 0
NAUSEA: 0
SINUS PAIN: 0
COLOR CHANGE: 0
EYE DISCHARGE: 0
EYE REDNESS: 0
EYE PAIN: 0
EYE ITCHING: 0
CONSTIPATION: 0

## 2022-06-24 NOTE — PROGRESS NOTES
(PRINZIDE;ZESTORETIC) 20-25 MG per tablet Take 1 tablet by mouth daily      TURMERIC PO Take 1 tablet by mouth daily      amitriptyline (ELAVIL) 25 MG tablet TAKE 1 TO 2 TABLETS BY MOUTH EVERY EVENING      atomoxetine (STRATTERA) 80 MG capsule Take 80 mg by mouth daily      Benzoyl Peroxide 2.5 % GEL APPLY TOPICALLY TO FACE AT BEDTIME      celecoxib (CELEBREX) 200 MG capsule Take 200 mg by mouth 2 times daily      vitamin D (CHOLECALCIFEROL) 37674 UNIT CAPS Take 50,000 Units by mouth every 7 days      citalopram (CELEXA) 20 MG tablet Take 20 mg by mouth every evening      clonazePAM (KLONOPIN) 0.5 MG tablet Take 0.5 mg by mouth daily as needed.  cyclobenzaprine (FLEXERIL) 10 MG tablet Take 10 mg by mouth 3 times daily as needed      diclofenac sodium (VOLTAREN) 1 % GEL Apply 1 g topically as needed      hydroCHLOROthiazide (HYDRODIURIL) 25 MG tablet TAKE 1 TABLET DAILY      Lidocaine, Anorectal, 5 % CREA Actual prescription: Lidocaine 5%: Neurontin/gabapentin 5% combined topical cream/gelApply to affected area up to 4 times a day as needed for pain      nystatin-triamcinolone (MYCOLOG II) 237147-4.1 UNIT/GM-% cream Apply topically 2 times daily      orphenadrine (NORFLEX) 100 MG extended release tablet TAKE 1 TABLET BY MOUTH TWICE DAILY      pantoprazole (PROTONIX) 40 MG tablet Take 40 mg by mouth 2 times daily       pramipexole (MIRAPEX) 0.125 MG tablet Take 0.25 mg by mouth      pregabalin (LYRICA) 150 MG capsule TAKE 1 CAPSULE BY MOUTH THREE TIMES DAILY      traZODone (DESYREL) 50 MG tablet Take 25-50 mg by mouth      vitamin E 600 units capsule Take by mouth daily       No current facility-administered medications for this visit.      Allergies   Allergen Reactions    Furosemide Other (See Comments)     Edema of lower extremities      Sulfa Antibiotics Other (See Comments) and Rash     GI Upset    Other Other (See Comments)     Oral steroids       Past Medical History:   Diagnosis Date Appearance: Normal appearance. She is not ill-appearing or toxic-appearing. Cardiovascular:      Rate and Rhythm: Normal rate and regular rhythm. Pulmonary:      Effort: Pulmonary effort is normal. No respiratory distress. Musculoskeletal:      Right lower leg: Edema present. Left lower leg: Edema present. Comments: +1 edema to both lower legs/ankles   Skin:     General: Skin is warm and dry. Neurological:      Mental Status: She is alert. Mental status is at baseline. Psychiatric:         Mood and Affect: Mood normal.         Behavior: Behavior normal.         Thought Content: Thought content normal.           ASSESSMENT and PLAN  Dayana Brewster was seen today for joint swelling. Diagnoses and all orders for this visit:    Myalgia    Fluid retention in legs      We discussed wearing tedhose in the meantime to assist with better circulation and improve fluid retention; will try to reach out to GI to discuss possible NSAID prn due to chronic pain that seems to be resolved by this. Pursuant to the emergency declaration under the [facility] waiver authority and the [employer] Act, this Virtual Visit was conducted, with patient's consent, to reduce the patient's risk of exposure to COVID-19 and provide continuity of care for an established patient. Services were provided through a video synchronous discussion virtually to substitute for in-person clinic visit. Patient consented to virtual visit. Follow-up and Dispositions    · Return if symptoms worsen or fail to improve; will contact GI for second opinion.          Romain Valentin NP, APRN - CNP

## 2022-06-27 ENCOUNTER — OFFICE VISIT (OUTPATIENT)
Dept: ORTHOPEDIC SURGERY | Age: 52
End: 2022-06-27
Payer: COMMERCIAL

## 2022-06-27 ENCOUNTER — HOSPITAL ENCOUNTER (OUTPATIENT)
Dept: PHYSICAL THERAPY | Age: 52
Setting detail: RECURRING SERIES
Discharge: HOME | End: 2022-06-30
Payer: COMMERCIAL

## 2022-06-27 ENCOUNTER — TELEPHONE (OUTPATIENT)
Dept: INTERNAL MEDICINE CLINIC | Facility: CLINIC | Age: 52
End: 2022-06-27

## 2022-06-27 DIAGNOSIS — M25.859 FEMOROACETABULAR IMPINGEMENT: Primary | ICD-10-CM

## 2022-06-27 DIAGNOSIS — M25.552 LEFT HIP PAIN: ICD-10-CM

## 2022-06-27 PROCEDURE — 99213 OFFICE O/P EST LOW 20 MIN: CPT | Performed by: ORTHOPAEDIC SURGERY

## 2022-06-27 PROCEDURE — 97140 MANUAL THERAPY 1/> REGIONS: CPT

## 2022-06-27 PROCEDURE — 97110 THERAPEUTIC EXERCISES: CPT

## 2022-06-27 ASSESSMENT — PAIN SCALES - GENERAL: PAINLEVEL_OUTOF10: 8

## 2022-06-27 NOTE — TELEPHONE ENCOUNTER
----- Message from BRANDON Dempsey CNP sent at 6/24/2022  6:08 PM EDT -----  Regarding: GI question  Could you contact Ms. Cortez's GI office/nurse at [facility] and request his opinion/consent for trial of NSAID prn for her chronic fibromyalgia pain as this is the only thing that seems to be effective? He advised her to refrain from NSAIDs originally due to chronic gastritis. Thankyou.

## 2022-06-27 NOTE — PROGRESS NOTES
Efrain Wade  : 1970  Primary: Myke Diaz Sc  Secondary:  [address] [facility] [address]  Phone: [phone]  Fax: [phone] No data recorded  Plan of Care/Certification Expiration Date: 22      PT Visit Info: Total # of Visits to Date: 3  Progress Note Counter: 3      OUTPATIENT PHYSICAL THERAPY:OP NOTE TYPE: Treatment Note 2022       Episode  }Appt Desk               Treatment Diagnosis: Cervicalgia (M54.2)  Radiculopathy, Cervical Region (M54.12)  Medical/Referring Diagnosis:  Neck pain [M54.2]  Referring Physician:  Lilo Morelos MD Orders:  PT Eval and Treat   Date of Onset:  Onset Date: 10/08/21     Allergies:   Furosemide, Sulfa antibiotics, and Other  Restrictions/Precautions:  Restrictions/Precautions: None  No data recorded   Interventions Planned (Treatment may consist of any combination of the following):     No data recorded   Subjective Comments:  Patient reports increased right shoulder and arm pain today  Initial:}    8/10Post Session:       2/10  Medications Last Reviewed:  2022  Updated Objective Findings:  See evaluation note from today  Treatment       THERAPEUTIC EXERCISE: (23 minutes):  Exercises per grid below to improve mobility, strength and balance. Required minimal visual, verbal and manual cues to promote proper body alignment and promote proper body posture. Progressed resistance and complexity of movement as indicated. Date:  2022 Date:  2022 Date:     Activity/Exercise Parameters Parameters Parameters    HEP, POC, PT goals, anatomy/pathology     UBE 6min 6min L3 8min 4/4 L3   Rings [id number]   Scapular 15X  20x   UT LS stretch   [id number]   RetractionFoam roll 3 way stretch  40u93mts each    Bilateral EREducation  20x blue    Horizontal Abd  20x blue    D2 Flexion  20x blue                      THERAPEUTIC ACTIVITY: ( 0 minutes):  Activities per gid below to improve functional movement related mobility, strength and balance to improve neuro-muscular carryover to daily functional activities for improving patient's quality of life. Required visual, verbal and manual cues to promote proper body alignment and promote proper body posture/mechanics. Progressed resistance and complexity of movement as indicated. Date:  6/27/2022 Date:   Date:     Activity/Exercise Parameters Parameters Parameters                                                   MANUAL THERAPY: (15 minutes): Joint mobilization, Soft tissue mobilization was utilized and necessary because of the patient's restricted joint motion and restricted motion of soft tissue mobility. Date  6/27/2022    Technique Used Grade  Level # Time(s) Effect while being performed          Manual Traction  Cervical 8min No radicular pain          Jt Mobs CPA III Cervical 5min Improved Mobility          Manual ROM  Cervical 2min Improved Mobility                       HEP Log Date    6/27/2022   2.     3.    4.    5.              Treatment/Session Summary:    Treatment Assessment:  Patient responded well with manual therapy and manual TX with report of significant decrease of pain post treatment. Communication/Consultation:  Spoke with patient in regards to Posture and HEP. Equipment provided today:  None  Recommendations/Intent for next treatment session: Next visit will focus on Progression of ROM and postural strengthening.     Total Treatment Billable Duration:  38 minutes  an2}  Time In: 0715  Time Out: 0800    Salty Sutherland PT       Charge Capture  }Post Session Pain  MedBridge Portal  MD Guidelines  Scanned Media  Benefits  MyChart    Future Appointments   Date Time Provider Aleah Preston   6/27/2022 10:30 AM MD MCKINLEY Briones AMB   6/30/2022  7:15 AM Salty Sutherland PT [facility] [place] [facility]   7/6/2022 11:00 AM BRANDON Lombardo - CNP [facility] AM[facility]   7/7/2022  7:15 AM Salty Sutherland PT

## 2022-06-27 NOTE — TELEPHONE ENCOUNTER
Truong Polanco from [facility] states that Milad Anna said it should be okay for pt to be on a short-course if absolutely necessary but she needs to stay on a PPI bid while on NSAIDS.

## 2022-06-27 NOTE — TELEPHONE ENCOUNTER
ANTHONY on 6/27/22 @ 9:35 AM for [facility] to return my call regarding NSAIDs for pt.

## 2022-06-27 NOTE — PROGRESS NOTES
Name: Jeovany Cage  YOB: 1970  Gender: female  MRN: [id number]      CC: Follow-up (L hip recheck)       HPI: Jeovany Cage is a 46 y.o. female who returns for follow up on left hip pain. She reports great relief from the corticosteroid injection she received last visit. She has completed physical therapy which she believed she benefited greatly from. She reports no pain as long as she doesn't overexert her leg. Physical Examination:  General: no acute distress  Lungs: breathing easily  CV: regular rhythm by pulse  Left Hip: Nonpainful on exam today she tolerates range of motion without pain negative provocative impingement testing motor and sensory intact        Assessment:     ICD-10-CM    1. Femoroacetabular impingement  M25.859    2. Left hip pain  M25.552        Plan:   She is progressed very well conservative management from a hip standpoint. Continue home exercise program she can follow-up with me as needed            Ad Ferrara MD, [address] [place] [address] and Sports Medicine

## 2022-06-28 ENCOUNTER — APPOINTMENT (OUTPATIENT)
Dept: PHYSICAL THERAPY | Age: 52
End: 2022-06-28
Payer: COMMERCIAL

## 2022-06-29 ENCOUNTER — TELEPHONE (OUTPATIENT)
Dept: ORTHOPEDIC SURGERY | Age: 52
End: 2022-06-29

## 2022-06-30 ENCOUNTER — HOSPITAL ENCOUNTER (OUTPATIENT)
Dept: PHYSICAL THERAPY | Age: 52
Setting detail: RECURRING SERIES
Discharge: HOME | End: 2022-07-03
Payer: COMMERCIAL

## 2022-06-30 PROCEDURE — 97140 MANUAL THERAPY 1/> REGIONS: CPT

## 2022-06-30 PROCEDURE — 97110 THERAPEUTIC EXERCISES: CPT

## 2022-06-30 ASSESSMENT — PAIN SCALES - GENERAL: PAINLEVEL_OUTOF10: 3

## 2022-06-30 NOTE — PROGRESS NOTES
[facility]  : 1970  Primary: David Baeza Sc  Secondary:  Aquiles Mcmullen @ [address] [facility] [address]  Phone: [phone]  Fax: [phone] No data recorded  Plan of Care/Certification Expiration Date: 22      PT Visit Info: Total # of Visits to Date: 4  Progress Note Counter: 4      OUTPATIENT PHYSICAL THERAPY:OP NOTE TYPE: Treatment Note 2022       Episode  }Appt Desk               Treatment Diagnosis: Cervicalgia (M54.2)  Radiculopathy, Cervical Region (M54.12)  Medical/Referring Diagnosis:  Neck pain [M54.2]  Referring Physician:  Feliz Kyle MD Orders:  PT Eval and Treat   Date of Onset:  Onset Date: 10/08/21     Allergies:   Furosemide, Sulfa antibiotics, and Other  Restrictions/Precautions:  Restrictions/Precautions: None  No data recorded   Interventions Planned (Treatment may consist of any combination of the following):     No data recorded   Subjective Comments:  Patient reports no NT into right or left UE, some right shoulder sorness and occational neck popping  Initial:}    3/10Post Session:       2/10  Medications Last Reviewed:  2022  Updated Objective Findings:  TTP distal right shoulder, pain 7/10  Treatment       THERAPEUTIC EXERCISE: (30 minutes):  Exercises per grid below to improve mobility, strength and balance. Required minimal visual, verbal and manual cues to promote proper body alignment and promote proper body posture. Progressed resistance and complexity of movement as indicated.      Date:  2022 Date:  2022 Date:  2022 Date:  2022   Activity/Exercise Parameters Parameters Parameters     HEP, POC, PT goals, anatomy/pathology      UBE 6min 6min L3 8min 4/4 L3 8min 4/4 L4   Rings [id number]   Scapular 15X  20x    UT LS stretch   [id number]    RetractionFoam roll 3 way stretch  [id number] each     Bilateral ER  20x blue  20x blue wall   Horizontal Abd  20x blue  20x blue wall   D2 Flexion  20x blue  15x each wall   Row    2x10 20#   Pull Down    2x10 30#         THERAPEUTIC ACTIVITY: ( 0 minutes): Activities per gid below to improve functional movement related mobility, strength and balance to improve neuro-muscular carryover to daily functional activities for improving patient's quality of life. Required visual, verbal and manual cues to promote proper body alignment and promote proper body posture/mechanics. Progressed resistance and complexity of movement as indicated. Date:  6/30/2022 Date:   Date:     Activity/Exercise Parameters Parameters Parameters                                                   MANUAL THERAPY: (8 minutes): Joint mobilization, Soft tissue mobilization was utilized and necessary because of the patient's restricted joint motion and restricted motion of soft tissue mobility. Date  6/30/2022    Technique Used Grade  Level # Time(s) Effect while being performed          Manual Traction  Cervical            Jt Mobs CPA, rotation seated III Cervical 5min Improved Mobility          Manual ROM  Cervical 3min Improved Mobility                       HEP Log Date    6/30/2022   2.     3.    4.    5.              Treatment/Session Summary:    Treatment Assessment:  Patient had increased fatigue with bilateral scapular exercises. Communication/Consultation:  Spoke with patient in regards to Posture and HEP. Equipment provided today:  None  Recommendations/Intent for next treatment session: Next visit will focus on Progression of ROM and postural strengthening.     Total Treatment Billable Duration:  38 minutes  an2}  Time In: 3625  Time Out: 1328    Veronica Blue PT       Charge Capture  }Post Session Pain  MedBridge Portal  MD Guidelines  Scanned Media  Benefits  MyChart    Future Appointments   Date Time Provider Aleah Preston   7/6/2022 11:00 AM Maye Holter, APRN - CNP MAT [facility]   7/7/2022  7:15 AM Veronica Blue PT [facility]   7/12/2022  7:15 AM Shivam Dung, PT [facility]   7/12/2022  9:00 AM Nel Rader MD NESS [facility]   7/13/2022  9:40 AM Benito Paulino MD [id number] [facility]   7/14/2022  7:15 AM Shivam Dung, PT [facility]   7/19/2022  7:15 AM Shivam Dung, PT [facility]   7/21/2022  7:15 AM Shivam Dung, PT [facility]   7/26/2022  7:15 AM Shivam Dung, PT [facility]   7/28/2022  7:15 AM Shivam Dung, PT [facility]   8/2/2022  7:15 AM Hsivam Dung, PT [facility]   8/2/2022 10:20 AM Benito Paulino MD [id number] [facility]   8/4/2022  7:15 AM Shivam Dung, PT [facility]   8/9/2022  7:15 AM Shivam Dung, PT [facility]   8/11/2022  7:15 AM Shivam Dung, PT [facility]   8/18/2022 11:00 AM Jacksonse Pranav APRN - CNP PSCD [facility]   11/7/2022  2:30 PM Arelis Alva MD BSNI [facility]   4/19/2023 10:00 AM Johnson Chatman DO B[facility]EN [facility]

## 2022-07-01 ENCOUNTER — CLINICAL DOCUMENTATION (OUTPATIENT)
Dept: ORTHOPEDIC SURGERY | Age: 52
End: 2022-07-01

## 2022-07-01 NOTE — PROGRESS NOTES
[id number] [facility] APPEAL APPROVAL RECEIVED, SCANNED TO CHART, EMAILED TO THEM.

## 2022-07-05 ENCOUNTER — OFFICE VISIT (OUTPATIENT)
Dept: CARDIOLOGY CLINIC | Age: 52
End: 2022-07-05
Payer: COMMERCIAL

## 2022-07-05 VITALS
DIASTOLIC BLOOD PRESSURE: 60 MMHG | HEIGHT: 60 IN | BODY MASS INDEX: 35.14 KG/M2 | WEIGHT: 179 LBS | HEART RATE: 110 BPM | SYSTOLIC BLOOD PRESSURE: 102 MMHG

## 2022-07-05 DIAGNOSIS — I95.1 ORTHOSTATIC HYPOTENSION: Primary | ICD-10-CM

## 2022-07-05 PROCEDURE — 99214 OFFICE O/P EST MOD 30 MIN: CPT | Performed by: INTERNAL MEDICINE

## 2022-07-05 ASSESSMENT — ENCOUNTER SYMPTOMS: BACK PAIN: 1

## 2022-07-05 NOTE — PATIENT INSTRUCTIONS
a day for men and 1 drink a day for women. Alcohol may interfere with your medicine. In addition, alcohol can make your low blood pressure worse by causing your body to lose water. ? Wear compression stockings to help improve blood flow. When should you call for help? Call 911 anytime you think you may need emergency care. For example, call if:     You passed out (lost consciousness). Watch closely for changes in your health, and be sure to contact your doctor if:     You do not get better as expected. Where can you learn more? Go to [email or web address] and sign in to your [employer] account. Enter P080 in the Ambassador box to learn more about \"Orthostatic Hypotension: Care Instructions. \"     If you do not have an account, please click on the \"Sign Up Now\" link. Current as of: January 10, 2022               Content Version: 13.3  © 2006-2022 [employer]. Care instructions adapted under license by [facility] ([facility]). If you have questions about a medical condition or this instruction, always ask your healthcare professional. [place] [address] any warranty or liability for your use of this information. 1. Consume 12-16 oz of water before getting out of bed each morning  2. Consume water throughout the day  3. Eat on a regular schedule with high quality, high protein snack every few hours  4. Wear support socks/compression stockings as often as possible, you may get these over the counter at any pharmacy  5. When symptoms begin to occur, assume a sitting or lying position until they resolve.  Perform counter pressure maneuvers (hand , crossed legs, etc)

## 2022-07-05 NOTE — PROGRESS NOTES
[facility] CARDIOLOGY  [address] [facility], [address]  PHONE: [phone]      22    NAME:  Samuel Wilhelm  : 1970  MRN: [id number]         SUBJECTIVE:   Samuel Wilhelm is a 46 y.o. female seen for a follow up visit regarding the following:     Chief Complaint   Patient presents with    Palpitations            HPI:  Follow up  Palpitations   . Patient returns having been to her pain management provider where she mentioned getting out of her car and body felt limp and felt she was going to faint. Has happened 6-7 times, she estimates. First episode was 3-4 months ago. Occurs previously about once a month, now once a week. Last 2-3 minutes. All have been while getting out of the car. She takes her lisinopril in the morning, she eats breakfast and had eaten the days these happened. She was previously consuming very large amounts of caffeine, she has since cut that way down, mostly drinking water and decaf beverages. Only new med is prn Drew Sandifer, she takes it infrequently    She is taking 50 mg hctz daily with lisinopril. Chart review for the last month shows her BP has ranged 97/52 - 120/70. PAST CARDIAC HISTORY:  May 2022 - 24 hr monitor - normal tracing, no diary entries  Normal TMET, [facility] TMS + 6, low risk  Normal LE venous doppler          Past Medical History, Past Surgical History, Family history, Social History, and Medications were all reviewed with the patient today and updated as necessary. Prior to Admission medications    Medication Sig Start Date End Date Taking?  Authorizing Provider   Wheat Dextrin (BENEFIBER PO)  22  Yes Historical Provider, MD   topiramate (TOPAMAX) 25 MG tablet Take 1 tablet by mouth 2 times daily 6/10/22  Yes BRANDON Kyle CNP   cyanocobalamin 1000 MCG tablet Take 1 tablet by mouth daily 22  Yes BRANDON Tuttle CNP   LINZESS 72 MCG CAPS capsule Take 1 capsule by mouth daily 4/10/22  Yes Historical Provider, MD   naproxen (NAPROSYN) 500 MG tablet Take 1 tablet by mouth as needed   Yes Historical Provider, MD   norethindrone (AYGESTIN) 5 MG tablet Take 1 tablet by mouth daily 3/15/22  Yes Historical Provider, MD   omeprazole (PRILOSEC) 40 MG delayed release capsule Take 1 capsule (40 mg) by mouth 2 (two) times a day 30 mins before breakfast and 30 mins before dinner 4/19/22  Yes Historical Provider, MD   cod liver oil CAPS Take 1 capsule by mouth daily   Yes Historical Provider, MD   Ubrogepant (UBRELVY) 50 MG TABS Take 1 tablet at the onset of migraine, may repeat in 2 hours. Do not take more then 3 days a week. 6/3/22  Yes Alexander Borges MD   lisinopril-hydroCHLOROthiazide (PRINZIDE;ZESTORETIC) 20-25 MG per tablet Take 1 tablet by mouth daily 5/13/22  Yes Historical Provider, MD   TURMERIC PO Take 1 tablet by mouth daily   Yes Ar Automatic Reconciliation   amitriptyline (ELAVIL) 25 MG tablet TAKE 1 TO 2 TABLETS BY MOUTH EVERY EVENING 12/16/21  Yes Ar Automatic Reconciliation   atomoxetine (STRATTERA) 80 MG capsule Take 80 mg by mouth daily 10/26/21  Yes Ar Automatic Reconciliation   Benzoyl Peroxide 2.5 % GEL APPLY TOPICALLY TO FACE AT BEDTIME 8/30/21  Yes Ar Automatic Reconciliation   celecoxib (CELEBREX) 200 MG capsule Take 200 mg by mouth 2 times daily   Yes Ar Automatic Reconciliation   vitamin D (CHOLECALCIFEROL) 50365 UNIT CAPS Take 50,000 Units by mouth every 7 days 3/2/22  Yes Ar Automatic Reconciliation   citalopram (CELEXA) 20 MG tablet Take 20 mg by mouth every evening 10/26/21  Yes Ar Automatic Reconciliation   clonazePAM (KLONOPIN) 0.5 MG tablet Take 0.5 mg by mouth daily as needed.  12/11/21  Yes Ar Automatic Reconciliation   cyclobenzaprine (FLEXERIL) 10 MG tablet Take 10 mg by mouth 3 times daily as needed   Yes Ar Automatic Reconciliation   diclofenac sodium (VOLTAREN) 1 % GEL Apply 1 g topically as needed 12/27/21  Yes Ar Automatic Reconciliation   Lidocaine, Anorectal, 5 % CREA Actual prescription: Lidocaine 5%: Neurontin/gabapentin 5% combined topical cream/gelApply to affected area up to 4 times a day as needed for pain 1/27/22  Yes Ar Automatic Reconciliation   nystatin-triamcinolone (MYCOLOG II) 382225-1.1 UNIT/GM-% cream Apply topically 2 times daily 8/31/21  Yes Ar Automatic Reconciliation   pantoprazole (PROTONIX) 40 MG tablet Take 40 mg by mouth 2 times daily  2/14/22  Yes Ar Automatic Reconciliation   pramipexole (MIRAPEX) 0.125 MG tablet Take 0.25 mg by mouth 2/2/22  Yes Ar Automatic Reconciliation   pregabalin (LYRICA) 150 MG capsule TAKE 1 CAPSULE BY MOUTH THREE TIMES DAILY 12/19/21  Yes Ar Automatic Reconciliation   traZODone (DESYREL) 50 MG tablet Take 25-50 mg by mouth 10/26/21  Yes Ar Automatic Reconciliation   vitamin E 600 units capsule Take by mouth daily   Yes Ar Automatic Reconciliation   ketoconazole (NIZORAL) 2 % cream Apply topically daily x2 weeks  Patient not taking: Reported on 7/5/2022 6/21/22   Kike Campbell MD     Allergies   Allergen Reactions    Furosemide Other (See Comments)     Edema of lower extremities      Sulfa Antibiotics Other (See Comments) and Rash     GI Upset    Other Other (See Comments)     Oral steroids     Past Medical History:   Diagnosis Date    Anxiety     DDD (degenerative disc disease), cervical 11/29/2021    C3-C7 Disc Degeneration w/ Posterior Spondylotic Disc Displaceemnts per MRI    Depressed     GERD (gastroesophageal reflux disease)     Hypertension     IBS (irritable bowel syndrome)     Insomnia     TMJ (dislocation of temporomandibular joint) 04/2021     Past Surgical History:   Procedure Laterality Date    CATARACT REMOVAL Bilateral 02/2020    COLONOSCOPY      EGD DELIVER THERMAL ENERGY SPHNCTR/CARDIA GERD      LIPECTOMY      TUBAL LIGATION       Family History   Problem Relation Age of Onset    Heart Disease Father     Hypertension Father     High Cholesterol Mother     GERD Mother Social History     Tobacco Use    Smoking status: Never Smoker    Smokeless tobacco: Never Used   Substance Use Topics    Alcohol use: Never       ROS:    Review of Systems   Constitutional: Positive for malaise/fatigue. Musculoskeletal: Positive for back pain and joint pain. Neurological: Positive for light-headedness. PHYSICAL EXAM:   /60 (Site: Right Upper Arm, Position: Standing)   Pulse (!) 110   Ht 5' (1.524 m)   Wt 179 lb (81.2 kg)   BMI 34.96 kg/m²      Wt Readings from Last 3 Encounters:   07/05/22 179 lb (81.2 kg)   06/21/22 182 lb 6.4 oz (82.7 kg)   06/15/22 178 lb 9.6 oz (81 kg)     BP Readings from Last 3 Encounters:   07/05/22 102/60   06/21/22 110/70   06/03/22 (!) 97/52     Pulse Readings from Last 3 Encounters:   07/05/22 (!) 110   06/21/22 83   06/03/22 (!) 104           Physical Exam  Constitutional:       Appearance: Normal appearance. HENT:      Head: Normocephalic and atraumatic. Eyes:      Conjunctiva/sclera: Conjunctivae normal.   Neck:      Vascular: No carotid bruit. Cardiovascular:      Rate and Rhythm: Normal rate and regular rhythm. Heart sounds: No murmur heard. No friction rub. No gallop. Pulmonary:      Effort: No respiratory distress. Breath sounds: No wheezing or rales. Abdominal:      General: Abdomen is flat. There is no distension. Palpations: Abdomen is soft. Tenderness: There is no abdominal tenderness. Musculoskeletal:         General: No swelling. Cervical back: Neck supple. Skin:     General: Skin is warm and dry. Neurological:      General: No focal deficit present. Mental Status: She is alert. Psychiatric:         Mood and Affect: Mood normal.         Behavior: Behavior normal.         Medical problems and test results were reviewed with the patient today.      DATA REVIEW  Lab Results   Component Value Date    TSH 0.901 05/02/2022     Lab Results   Component Value Date    WBC 16.2 (H) 05/12/2022    HGB 13.4 05/12/2022    HCT 39.5 05/12/2022    MCV 80.4 05/12/2022     05/12/2022     LIPID PANEL     Lab Results   Component Value Date    CHOL 118 10/12/2021    CHOL 166 06/25/2020    CHOL 143 11/15/2019     Lab Results   Component Value Date    TRIG 52 10/12/2021    TRIG 109 06/25/2020    TRIG 88 11/15/2019     Lab Results   Component Value Date    HDL 32 (L) 10/12/2021    HDL 53 06/25/2020    HDL 48 11/15/2019     Lab Results   Component Value Date    LDLCALC 74 10/12/2021    LDLCALC 91 06/25/2020    LDLCALC 77 11/15/2019     Lab Results   Component Value Date    LABVLDL 22 06/25/2020    LABVLDL 18 11/15/2019    VLDL 12 10/12/2021     No results found for: CHOLHDLRATIO    BMP  Lab Results   Component Value Date/Time     05/20/2022 08:50 AM    K 4.0 05/20/2022 08:50 AM    CL 97 05/20/2022 08:50 AM    CO2 24 05/20/2022 08:50 AM    BUN 17 05/20/2022 08:50 AM    CREATININE 1.11 05/20/2022 08:50 AM    GLUCOSE 89 05/20/2022 08:50 AM    CALCIUM 9.0 05/20/2022 08:50 AM          EKG        CXR/IMAGING        DEVICE INTERROGATION        OUTSIDE RECORDS REVIEW    Records from outside providers have been reviewed and summarized as noted in the HPI, past history and data review sections of this note, and reviewed with patient. .       ASSESSMENT and PLAN    Joseluis Fuentes was seen today for palpitations. Diagnoses and all orders for this visit:    Orthostatic hypotension          IMPRESSION:    Symptomatic hypotension, worse since she reduced caffeine. Stop hctz, continue lisinopril/hct for now and re-check BP in 12 weeks. Advised conservative management of orthostatic symptoms with hydration, good po intake, compression socks, isometric exercises with symptoms. Return in about 3 months (around 10/5/2022). Thank you for allowing me to participate in this patient's care. Please call or contact me if there are any questions or concerns regarding the above.       AILEEN LLOYD, MD  07/05/22  11:03 AM

## 2022-07-06 ENCOUNTER — OFFICE VISIT (OUTPATIENT)
Dept: INTERNAL MEDICINE CLINIC | Facility: CLINIC | Age: 52
End: 2022-07-06
Payer: COMMERCIAL

## 2022-07-06 VITALS
SYSTOLIC BLOOD PRESSURE: 110 MMHG | HEART RATE: 102 BPM | BODY MASS INDEX: 35.53 KG/M2 | DIASTOLIC BLOOD PRESSURE: 70 MMHG | WEIGHT: 181 LBS | TEMPERATURE: 97.5 F | HEIGHT: 60 IN | OXYGEN SATURATION: 99 %

## 2022-07-06 DIAGNOSIS — G89.4 CHRONIC PAIN SYNDROME: ICD-10-CM

## 2022-07-06 DIAGNOSIS — M54.12 RADICULOPATHY OF CERVICAL REGION: Primary | ICD-10-CM

## 2022-07-06 PROCEDURE — 99214 OFFICE O/P EST MOD 30 MIN: CPT | Performed by: NURSE PRACTITIONER

## 2022-07-06 ASSESSMENT — PATIENT HEALTH QUESTIONNAIRE - PHQ9
SUM OF ALL RESPONSES TO PHQ9 QUESTIONS 1 & 2: 4
8. MOVING OR SPEAKING SO SLOWLY THAT OTHER PEOPLE COULD HAVE NOTICED. OR THE OPPOSITE, BEING SO FIGETY OR RESTLESS THAT YOU HAVE BEEN MOVING AROUND A LOT MORE THAN USUAL: 3
1. LITTLE INTEREST OR PLEASURE IN DOING THINGS: 2
3. TROUBLE FALLING OR STAYING ASLEEP: 2
10. IF YOU CHECKED OFF ANY PROBLEMS, HOW DIFFICULT HAVE THESE PROBLEMS MADE IT FOR YOU TO DO YOUR WORK, TAKE CARE OF THINGS AT HOME, OR GET ALONG WITH OTHER PEOPLE: 2
SUM OF ALL RESPONSES TO PHQ QUESTIONS 1-9: 20
4. FEELING TIRED OR HAVING LITTLE ENERGY: 3
2. FEELING DOWN, DEPRESSED OR HOPELESS: 2
SUM OF ALL RESPONSES TO PHQ QUESTIONS 1-9: 20
6. FEELING BAD ABOUT YOURSELF - OR THAT YOU ARE A FAILURE OR HAVE LET YOURSELF OR YOUR FAMILY DOWN: 2
SUM OF ALL RESPONSES TO PHQ QUESTIONS 1-9: 20
9. THOUGHTS THAT YOU WOULD BE BETTER OFF DEAD, OR OF HURTING YOURSELF: 0
SUM OF ALL RESPONSES TO PHQ QUESTIONS 1-9: 20
7. TROUBLE CONCENTRATING ON THINGS, SUCH AS READING THE NEWSPAPER OR WATCHING TELEVISION: 3
5. POOR APPETITE OR OVEREATING: 3

## 2022-07-06 ASSESSMENT — ENCOUNTER SYMPTOMS
CONSTIPATION: 0
ABDOMINAL PAIN: 0
ABDOMINAL DISTENTION: 0
EYE PAIN: 0
DIARRHEA: 0
SINUS PAIN: 0
EYE REDNESS: 0
COUGH: 0
NAUSEA: 0
COLOR CHANGE: 0
EYE ITCHING: 0
SHORTNESS OF BREATH: 0
EYE DISCHARGE: 0
APNEA: 0
BACK PAIN: 0

## 2022-07-06 ASSESSMENT — COLUMBIA-SUICIDE SEVERITY RATING SCALE - C-SSRS
1. WITHIN THE PAST MONTH, HAVE YOU WISHED YOU WERE DEAD OR WISHED YOU COULD GO TO SLEEP AND NOT WAKE UP?: NO
2. HAVE YOU ACTUALLY HAD ANY THOUGHTS OF KILLING YOURSELF?: NO
6. HAVE YOU EVER DONE ANYTHING, STARTED TO DO ANYTHING, OR PREPARED TO DO ANYTHING TO END YOUR LIFE?: NO

## 2022-07-06 NOTE — PROGRESS NOTES
[unfilled]  [address] [place] [id number]      PROGRESS NOTE    SUBJECTIVE:   Quinten Lefort is a 46 y.o. female seen for a follow up visit regarding the following chief complaint:     Chief Complaint   Patient presents with    Follow-up     discuss blood pressure meds       HPI     Patient presents for follow up cardiology and pain management. She was recently seen by her cardiologist due to symptoms of presyncopal episodes. She was advised to decrease HCTZ from 50 mg to 25mg and continue zestril 20mg daily due to suspected orthostatic hypotension. She has only started this today. BP normal range. Pain management: She has started on celebrex prn with Prilosec. He states that his is helping manage her lower extremity pains but is not touching her shoulder and neck pains. She continues to have upper shoulder trapezius swelling and tenderness with more radiating sharp pains along right side of neck. She is scheduled to follow Dr. Lyudmila Luna to assess cervical spine next week. Current Outpatient Medications   Medication Sig Dispense Refill    ketoconazole (NIZORAL) 2 % cream Apply topically daily x2 weeks 30 g 1    Wheat Dextrin (BENEFIBER PO)       topiramate (TOPAMAX) 25 MG tablet Take 1 tablet by mouth 2 times daily 180 tablet 1    cyanocobalamin 1000 MCG tablet Take 1 tablet by mouth daily 90 tablet 3    LINZESS 72 MCG CAPS capsule Take 1 capsule by mouth daily      naproxen (NAPROSYN) 500 MG tablet Take 1 tablet by mouth as needed      norethindrone (AYGESTIN) 5 MG tablet Take 1 tablet by mouth daily      omeprazole (PRILOSEC) 40 MG delayed release capsule Take 1 capsule (40 mg) by mouth 2 (two) times a day 30 mins before breakfast and 30 mins before dinner      cod liver oil CAPS Take 1 capsule by mouth daily      Ubrogepant (UBRELVY) 50 MG TABS Take 1 tablet at the onset of migraine, may repeat in 2 hours. Do not take more then 3 days a week.  16 tablet 11    lisinopril-hydroCHLOROthiazide (PRINZIDE;ZESTORETIC) 20-25 MG per tablet Take 1 tablet by mouth daily      TURMERIC PO Take 1 tablet by mouth daily      amitriptyline (ELAVIL) 25 MG tablet TAKE 1 TO 2 TABLETS BY MOUTH EVERY EVENING      atomoxetine (STRATTERA) 80 MG capsule Take 80 mg by mouth daily      Benzoyl Peroxide 2.5 % GEL APPLY TOPICALLY TO FACE AT BEDTIME      celecoxib (CELEBREX) 200 MG capsule Take 200 mg by mouth 2 times daily      vitamin D (CHOLECALCIFEROL) 74268 UNIT CAPS Take 50,000 Units by mouth every 7 days      citalopram (CELEXA) 20 MG tablet Take 20 mg by mouth every evening      clonazePAM (KLONOPIN) 0.5 MG tablet Take 0.5 mg by mouth daily as needed.  cyclobenzaprine (FLEXERIL) 10 MG tablet Take 10 mg by mouth 3 times daily as needed      diclofenac sodium (VOLTAREN) 1 % GEL Apply 1 g topically as needed      Lidocaine, Anorectal, 5 % CREA Actual prescription: Lidocaine 5%: Neurontin/gabapentin 5% combined topical cream/gelApply to affected area up to 4 times a day as needed for pain      nystatin-triamcinolone (MYCOLOG II) 874861-8.1 UNIT/GM-% cream Apply topically 2 times daily      pantoprazole (PROTONIX) 40 MG tablet Take 40 mg by mouth 2 times daily       pramipexole (MIRAPEX) 0.125 MG tablet Take 0.25 mg by mouth      pregabalin (LYRICA) 150 MG capsule TAKE 1 CAPSULE BY MOUTH THREE TIMES DAILY      traZODone (DESYREL) 50 MG tablet Take 25-50 mg by mouth      vitamin E 600 units capsule Take by mouth daily       No current facility-administered medications for this visit.      Allergies   Allergen Reactions    Furosemide Other (See Comments)     Edema of lower extremities      Sulfa Antibiotics Other (See Comments) and Rash     GI Upset    Other Other (See Comments)     Oral steroids       Past Medical History:   Diagnosis Date    Anxiety     DDD (degenerative disc disease), cervical 11/29/2021    C3-C7 Disc Degeneration w/ Posterior Spondylotic Disc Displaceemnts per MRI    Depressed     GERD (gastroesophageal reflux disease)     Hypertension     IBS (irritable bowel syndrome)     Insomnia     TMJ (dislocation of temporomandibular joint) 04/2021     Past Surgical History:   Procedure Laterality Date    CATARACT REMOVAL Bilateral 02/2020    COLONOSCOPY      EGD DELIVER THERMAL ENERGY SPHNCTR/CARDIA GERD      LIPECTOMY      TUBAL LIGATION       Family History   Problem Relation Age of Onset    Heart Disease Father     Hypertension Father     High Cholesterol Mother     GERD Mother      Social History     Tobacco Use    Smoking status: Never Smoker    Smokeless tobacco: Never Used   Substance Use Topics    Alcohol use: Never       Review of Systems   Constitutional: Negative for activity change, appetite change, chills, fatigue and fever. HENT: Negative for congestion, ear pain and sinus pain. Eyes: Negative for pain, discharge, redness and itching. Respiratory: Negative for apnea, cough and shortness of breath. Cardiovascular: Negative for chest pain, palpitations and leg swelling. Gastrointestinal: Negative for abdominal distention, abdominal pain, constipation, diarrhea and nausea. Endocrine: Negative for cold intolerance and heat intolerance. Genitourinary: Negative for difficulty urinating, dysuria, enuresis and urgency. Musculoskeletal: Positive for myalgias and neck pain. Negative for arthralgias, back pain and joint swelling. Skin: Negative for color change and rash. Neurological: Negative for dizziness, weakness and headaches. Psychiatric/Behavioral: Negative for behavioral problems and sleep disturbance. The patient is not nervous/anxious.           OBJECTIVE:  /70 (Site: Left Upper Arm, Position: Sitting, Cuff Size: Large Adult)   Pulse (!) 102   Temp 97.5 °F (36.4 °C) (Temporal)   Ht 5' (1.524 m)   Wt 181 lb (82.1 kg)   SpO2 99%   BMI 35.35 kg/m²      Physical Exam  Constitutional:       General: She is not in acute distress. Appearance: Normal appearance. She is not ill-appearing or toxic-appearing. Cardiovascular:      Rate and Rhythm: Normal rate and regular rhythm. Pulmonary:      Effort: Pulmonary effort is normal. No respiratory distress. Musculoskeletal:         General: Tenderness present. Normal range of motion. Comments: Tenderness and trace edema along trapezius muscle tissue at neck line to left side. Skin:     General: Skin is warm and dry. Neurological:      Mental Status: She is alert. Mental status is at baseline. Psychiatric:         Mood and Affect: Mood normal.         Behavior: Behavior normal.         Thought Content: Thought content normal.           ASSESSMENT and PLAN  Garth Mercedes was seen today for follow-up. Diagnoses and all orders for this visit:    Radiculopathy of cervical region    Chronic pain syndrome    We discussed taking celebrex today upon returning home. She is aware of the risk for gastritis and taking prilosec per GI to help compensate some of this. Her pain is severe and appears to be resistant to primary methods of treatment. Will reach out to rheumatology to inquire about any other options for treating her pain. Follow with pain management next week as scheduled. Greater than 50% of this 25 min visit was spent counseling the patient about test results, prognosis, importance of compliance, education about disease process, benefits of medications, instructions for management of acute symptoms, and follow up plans. Follow-up and Dispositions    · Return in about 3 months (around 10/6/2022) for recheck.          Yandel Miranda NP, APRN - CNP

## 2022-07-07 ENCOUNTER — HOSPITAL ENCOUNTER (OUTPATIENT)
Dept: PHYSICAL THERAPY | Age: 52
Setting detail: RECURRING SERIES
Discharge: HOME | End: 2022-07-10
Payer: COMMERCIAL

## 2022-07-07 PROCEDURE — 97140 MANUAL THERAPY 1/> REGIONS: CPT

## 2022-07-07 PROCEDURE — 97110 THERAPEUTIC EXERCISES: CPT

## 2022-07-07 ASSESSMENT — PAIN SCALES - GENERAL: PAINLEVEL_OUTOF10: 8

## 2022-07-07 NOTE — PROGRESS NOTES
Makenzie Wu  : 1970  Primary: [employer]  Secondary:  Angelic Hernandez @ [address] [facility] [address]  Phone: [phone]  Fax: [phone] No data recorded  Plan of Care/Certification Expiration Date: 22      PT Visit Info: Total # of Visits to Date: 5  Progress Note Counter: 5      OUTPATIENT PHYSICAL THERAPY:OP NOTE TYPE: Treatment Note 2022       Episode  }Appt Desk               Treatment Diagnosis: Cervicalgia (M54.2)  Radiculopathy, Cervical Region (M54.12)  Medical/Referring Diagnosis:  Neck pain [M54.2]  Referring Physician:  Kinjal Diaz MD Orders:  PT Eval and Treat   Date of Onset:  Onset Date: 10/08/21     Allergies:   Furosemide, Sulfa antibiotics, and Other  Restrictions/Precautions:  Restrictions/Precautions: None  No data recorded   Interventions Planned (Treatment may consist of any combination of the following):     No data recorded   Subjective Comments:  Patient reports increased tension across shoulders and neck left > right  Initial:}    8/10Post Session:       6/10  Medications Last Reviewed:  2022  Updated Objective Findings:  TTP mid right UT  Treatment       THERAPEUTIC EXERCISE: (15 minutes):  Exercises per grid below to improve mobility, strength and balance. Required minimal visual, verbal and manual cues to promote proper body alignment and promote proper body posture. Progressed resistance and complexity of movement as indicated.      Date:  2022 Date:  2022 Date:  2022 Date:  2022 Date:  2022   Activity/Exercise Parameters Parameters Parameters      HEP, POC, PT goals, anatomy/pathology       UBE 6min 6min L3 8min 4/4 L3 8min 4/4 L4 8min 4/4 L4   Rings [id number] 51u30afz 83o72dhr 73t34ivh 51y65pax   Scapular 15X  20x     UT LS stretch   [id number]  6s49bbk   Foam roll 3 way stretch  [id number] each      Bilateral ER  20x blue  20x blue wall    Horizontal Abd  20x blue  20x blue wall    D2 Flexion  20x blue  15x each wall    Row    2x10 20#    Pull Down    2x10 30#          THERAPEUTIC ACTIVITY: ( 0 minutes): Activities per gid below to improve functional movement related mobility, strength and balance to improve neuro-muscular carryover to daily functional activities for improving patient's quality of life. Required visual, verbal and manual cues to promote proper body alignment and promote proper body posture/mechanics. Progressed resistance and complexity of movement as indicated. Date:  7/7/2022 Date:   Date:     Activity/Exercise Parameters Parameters Parameters                                                   MANUAL THERAPY: (23 minutes): Joint mobilization, Soft tissue mobilization was utilized and necessary because of the patient's restricted joint motion and restricted motion of soft tissue mobility. Date  7/7/2022    Technique Used Grade  Level # Time(s) Effect while being performed          Manual Traction  Cervical 8min           Jt Mobs CPA, rotation seated III Cervical 5min Improved Mobility          Manual ROM  Cervical 3min Improved Mobility   Tissue mobilization  R UT LS Scalene 7min Decreased tone and tenderness                HEP Log Date    7/7/2022   2.     3.    4.    5.              Treatment/Session Summary:    Treatment Assessment:  Patient had decreased tightness and pain post treatment, reviewed work posture for decreasing tension across shoulders. Communication/Consultation:  Spoke with patient in regards to Posture and HEP. Equipment provided today:  None  Recommendations/Intent for next treatment session: Next visit will focus on Progression of ROM and postural strengthening.     Total Treatment Billable Duration:  38 minutes  an2}  Time In: 0715  Time Out: 0800    Samantha Causey, PT       Charge Capture  }Post Session Pain  MedBridge Portal  MD Guidelines  Scanned Media  Benefits  MyChart    Future Appointments   Date Time Provider [place]   7/12/2022  7:15 AM Missy Cowper, PT [facility]   7/12/2022  9:00 AM Erica Ratliff MD [facility]   7/13/2022  9:40 AM Azeb Garza MD [id number]   7/14/2022  7:15 AM Missy Cowper, PT [facility]   7/14/2022 10:15 AM Haile Lafleur, PA [facility]   7/19/2022  7:15 AM Missy Cowper, PT [facility]   7/21/2022  7:15 AM Missy Cowper, PT [facility]   7/26/2022  7:15 AM Missy Cowper, PT [facility]   7/28/2022  7:15 AM Missy Cowper, PT [facility]   8/2/2022  7:15 AM Missy Cowper, PT [facility]   8/2/2022 10:20 AM Azeb Garza MD [id number]   8/4/2022  7:15 AM Missy Cowper, PT [facility]   8/9/2022  7:15 AM Missy Cowper, PT [facility]   8/11/2022  7:15 AM Missy Cowper, PT [facility]   8/18/2022 11:00 AM Sury Perez APRN - CNP [facility]   10/5/2022 10:30 AM Kristin Moss MD [facility]   10/7/2022  8:30 AM Alix Jenkins APRN - CNP [facility]   11/7/2022  2:30 PM Lucien Guzman MD [facility]   4/19/2023 10:00 AM Camacho Chatman DO [facility]

## 2022-07-11 ENCOUNTER — TELEPHONE (OUTPATIENT)
Dept: ORTHOPEDIC SURGERY | Age: 52
End: 2022-07-11

## 2022-07-11 NOTE — TELEPHONE ENCOUNTER
Pt has  Called  And   Needs  Her  [facility] Dr Keely Campos works for  [employer]. Please call pt  When this is  Done.     She  Was  Supposed to go there today and they did not have  The  Referral

## 2022-07-12 ENCOUNTER — HOSPITAL ENCOUNTER (OUTPATIENT)
Dept: PHYSICAL THERAPY | Age: 52
Setting detail: RECURRING SERIES
Discharge: HOME | End: 2022-07-15
Payer: COMMERCIAL

## 2022-07-12 ENCOUNTER — OFFICE VISIT (OUTPATIENT)
Dept: PHYSICAL MEDICINE AND REHAB | Age: 52
End: 2022-07-12
Payer: COMMERCIAL

## 2022-07-12 VITALS
DIASTOLIC BLOOD PRESSURE: 70 MMHG | SYSTOLIC BLOOD PRESSURE: 104 MMHG | WEIGHT: 181 LBS | HEIGHT: 60 IN | HEART RATE: 106 BPM | BODY MASS INDEX: 35.53 KG/M2

## 2022-07-12 DIAGNOSIS — R25.2 CRAMPING OF HANDS: Primary | ICD-10-CM

## 2022-07-12 PROCEDURE — 97110 THERAPEUTIC EXERCISES: CPT

## 2022-07-12 PROCEDURE — 95886 MUSC TEST DONE W/N TEST COMP: CPT | Performed by: PHYSICAL MEDICINE & REHABILITATION

## 2022-07-12 PROCEDURE — 95912 NRV CNDJ TEST 11-12 STUDIES: CPT | Performed by: PHYSICAL MEDICINE & REHABILITATION

## 2022-07-12 ASSESSMENT — PAIN SCALES - GENERAL: PAINLEVEL_OUTOF10: 5

## 2022-07-12 NOTE — PROGRESS NOTES
Olga Mercer  : 1970  Primary: [employer]  Secondary:  Rhoda Estrada @ [address] [facility] [address]  Phone: [phone]  Fax: [phone] No data recorded  Plan of Care/Certification Expiration Date: 22      PT Visit Info: Total # of Visits to Date: 6  Progress Note Counter: 6      OUTPATIENT PHYSICAL THERAPY:OP NOTE TYPE: Treatment Note 2022       Episode  }Appt Desk               Treatment Diagnosis: Cervicalgia (M54.2)  Radiculopathy, Cervical Region (M54.12)  Medical/Referring Diagnosis:  Neck pain [M54.2]  Referring Physician:  Tram Langley MD Orders:  PT Eval and Treat   Date of Onset:  Onset Date: 10/08/21     Allergies:   Furosemide, Sulfa antibiotics, and Other  Restrictions/Precautions:  Restrictions/Precautions: None  No data recorded   Interventions Planned (Treatment may consist of any combination of the following):     No data recorded   Subjective Comments:  Patient reports doing ok today, increased swelling and soreness after last manual session. Initial:}    5/10Post Session:       3/10  Medications Last Reviewed:  2022  Updated Objective Findings:  right shoulder soreness  Treatment       THERAPEUTIC EXERCISE: (40 minutes):  Exercises per grid below to improve mobility, strength and balance. Required minimal visual, verbal and manual cues to promote proper body alignment and promote proper body posture. Progressed resistance and complexity of movement as indicated.      Date:  2022 Date:  2022 Date:  2022 Date:  2022 Date:  2022 Date:  2022   Activity/Exercise Parameters Parameters Parameters       HEP, POC, PT goals, anatomy/pathology        UBE 6min 6min L3 8min 4/4 L3 8min 4/4 L4 8min 4/4 L4 8min 4/4 L4   Rings [id number] 57p50hhu 38q40ywo 58o18eoc 87t16yil 47p20ddh   Scapular 15X  20x      UT LS stretch   5r10ock  1x75fwn    Foam roll 3 way stretch  [id number] each 70y52atl each   Bilateral ER  20x blue  20x blue wall  20x blue foam roll   Horizontal Abd  20x blue  20x blue wall  20x blue foam rol   D2 Flexion  20x blue  15x each wall  20x blue foam rol   Row    2x10 20#  3x10 23#   Pull Down    2x10 30#  3x10 33#   Andrade Carry      3 laps 2/15#                  THERAPEUTIC ACTIVITY: ( 0 minutes): Activities per gid below to improve functional movement related mobility, strength and balance to improve neuro-muscular carryover to daily functional activities for improving patient's quality of life. Required visual, verbal and manual cues to promote proper body alignment and promote proper body posture/mechanics. Progressed resistance and complexity of movement as indicated. Date:  7/12/2022 Date:   Date:     Activity/Exercise Parameters Parameters Parameters                                                   MANUAL THERAPY: (0 minutes): Joint mobilization, Soft tissue mobilization was utilized and necessary because of the patient's restricted joint motion and restricted motion of soft tissue mobility. Date  7/12/2022    Technique Used Grade  Level # Time(s) Effect while being performed          Manual Traction  Cervical            Jt Mobs CPA, rotation seated III Cervical  Improved Mobility          Manual ROM  Cervical  Improved Mobility   Tissue mobilization  R UT LS Scalene  Decreased tone and tenderness                HEP Log Date    7/12/2022   2.     3.    4.    5.              Treatment/Session Summary:    Treatment Assessment:  Patient had improved tolerance to strengthening activity   Communication/Consultation:  None today  Equipment provided today:  None  Recommendations/Intent for next treatment session: Next visit will focus on Progression of ROM and postural strengthening.     Total Treatment Billable Duration:  40 minutes  an2}  Time In: 0715  Time Out: 0800    Kendell Malcolm PT       Charge Capture  }Post Session Pain  MedBridge Portal  MD Guidelines Scanned Media  Benefits  MyChart    Future Appointments   Date Time Provider Aleah Chapmani   7/12/2022  9:00 AM Kimberli Dobbs MD [facility]   7/13/2022  9:40 AM William Batres MD [id number]   7/14/2022  7:15 AM Nicky Book, PT [facility]   7/14/2022 12:15 PM SU Watson [facility]   7/19/2022  7:15 AM Nicky Book, PT [facility]   7/21/2022  7:15 AM Nicky Book, PT [facility]   7/26/2022  7:15 AM Nicky Book, PT [facility]   7/28/2022  7:15 AM Nicky Book, PT [facility]   8/2/2022  7:15 AM Nicky Book, PT [facility]   8/2/2022 10:20 AM William Batres MD [id number]   8/4/2022  7:15 AM Nicky Book, PT [facility]   8/9/2022  7:15 AM Nicky Book, PT [facility]   8/11/2022  7:15 AM Nicky Book, PT [facility]   8/18/2022 11:00 AM Meaghan Garrison, APRN - CNP [facility]   10/5/2022 10:30 AM Daniela Felipe MD [facility]   10/7/2022  8:30 AM Alix Castaneda APRN - CNP [facility]   11/7/2022  2:30 PM Gaye Ospina MD [facility]   4/19/2023 10:00 AM Yennifer Chatman DO [facility]

## 2022-07-13 ENCOUNTER — TELEMEDICINE (OUTPATIENT)
Dept: BEHAVIORAL/MENTAL HEALTH CLINIC | Age: 52
End: 2022-07-13
Payer: COMMERCIAL

## 2022-07-13 ENCOUNTER — TELEPHONE (OUTPATIENT)
Dept: ORTHOPEDIC SURGERY | Age: 52
End: 2022-07-13

## 2022-07-13 DIAGNOSIS — F33.1 MODERATE EPISODE OF RECURRENT MAJOR DEPRESSIVE DISORDER (HCC): Primary | ICD-10-CM

## 2022-07-13 DIAGNOSIS — F41.1 GENERALIZED ANXIETY DISORDER: ICD-10-CM

## 2022-07-13 DIAGNOSIS — R52 CHRONIC GENERALIZED PAIN DISORDER: ICD-10-CM

## 2022-07-13 DIAGNOSIS — G89.29 CHRONIC GENERALIZED PAIN DISORDER: ICD-10-CM

## 2022-07-13 DIAGNOSIS — F51.05 INSOMNIA DUE TO MENTAL DISORDER: ICD-10-CM

## 2022-07-13 PROCEDURE — 90833 PSYTX W PT W E/M 30 MIN: CPT | Performed by: PSYCHIATRY & NEUROLOGY

## 2022-07-13 PROCEDURE — 99213 OFFICE O/P EST LOW 20 MIN: CPT | Performed by: PSYCHIATRY & NEUROLOGY

## 2022-07-13 RX ORDER — TRAZODONE HYDROCHLORIDE 50 MG/1
25-50 TABLET ORAL NIGHTLY
Qty: 180 TABLET | Refills: 1 | Status: SHIPPED | OUTPATIENT
Start: 2022-07-13

## 2022-07-13 RX ORDER — ATOMOXETINE 80 MG/1
80 CAPSULE ORAL DAILY
Qty: 90 CAPSULE | Refills: 1 | Status: SHIPPED | OUTPATIENT
Start: 2022-07-13

## 2022-07-13 RX ORDER — CLONAZEPAM 0.5 MG/1
0.5 TABLET ORAL DAILY PRN
Qty: 30 TABLET | Refills: 2 | Status: SHIPPED | OUTPATIENT
Start: 2022-07-13 | End: 2022-10-18

## 2022-07-13 RX ORDER — CITALOPRAM 20 MG/1
20 TABLET ORAL EVERY EVENING
Qty: 90 TABLET | Refills: 1 | Status: SHIPPED | OUTPATIENT
Start: 2022-07-13 | End: 2022-10-18

## 2022-07-13 RX ORDER — PREGABALIN 200 MG/1
CAPSULE ORAL
COMMUNITY
Start: 2022-07-11 | End: 2022-07-29

## 2022-07-13 ASSESSMENT — PATIENT HEALTH QUESTIONNAIRE - PHQ9
3. TROUBLE FALLING OR STAYING ASLEEP: 1
8. MOVING OR SPEAKING SO SLOWLY THAT OTHER PEOPLE COULD HAVE NOTICED. OR THE OPPOSITE, BEING SO FIGETY OR RESTLESS THAT YOU HAVE BEEN MOVING AROUND A LOT MORE THAN USUAL: 3
4. FEELING TIRED OR HAVING LITTLE ENERGY: 3
5. POOR APPETITE OR OVEREATING: 0
1. LITTLE INTEREST OR PLEASURE IN DOING THINGS: 3
SUM OF ALL RESPONSES TO PHQ QUESTIONS 1-9: 17
9. THOUGHTS THAT YOU WOULD BE BETTER OFF DEAD, OR OF HURTING YOURSELF: 0
10. IF YOU CHECKED OFF ANY PROBLEMS, HOW DIFFICULT HAVE THESE PROBLEMS MADE IT FOR YOU TO DO YOUR WORK, TAKE CARE OF THINGS AT HOME, OR GET ALONG WITH OTHER PEOPLE: 1
SUM OF ALL RESPONSES TO PHQ9 QUESTIONS 1 & 2: 6
SUM OF ALL RESPONSES TO PHQ QUESTIONS 1-9: 17
6. FEELING BAD ABOUT YOURSELF - OR THAT YOU ARE A FAILURE OR HAVE LET YOURSELF OR YOUR FAMILY DOWN: 1
2. FEELING DOWN, DEPRESSED OR HOPELESS: 3
7. TROUBLE CONCENTRATING ON THINGS, SUCH AS READING THE NEWSPAPER OR WATCHING TELEVISION: 3
SUM OF ALL RESPONSES TO PHQ QUESTIONS 1-9: 17
SUM OF ALL RESPONSES TO PHQ QUESTIONS 1-9: 17

## 2022-07-13 ASSESSMENT — ANXIETY QUESTIONNAIRES
GAD7 TOTAL SCORE: 15
4. TROUBLE RELAXING: 3
5. BEING SO RESTLESS THAT IT IS HARD TO SIT STILL: 3
7. FEELING AFRAID AS IF SOMETHING AWFUL MIGHT HAPPEN: 1
IF YOU CHECKED OFF ANY PROBLEMS ON THIS QUESTIONNAIRE, HOW DIFFICULT HAVE THESE PROBLEMS MADE IT FOR YOU TO DO YOUR WORK, TAKE CARE OF THINGS AT HOME, OR GET ALONG WITH OTHER PEOPLE: VERY DIFFICULT
3. WORRYING TOO MUCH ABOUT DIFFERENT THINGS: 1
2. NOT BEING ABLE TO STOP OR CONTROL WORRYING: 3
1. FEELING NERVOUS, ANXIOUS, OR ON EDGE: 3
6. BECOMING EASILY ANNOYED OR IRRITABLE: 1
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
## 2022-07-14 ENCOUNTER — OFFICE VISIT (OUTPATIENT)
Dept: ORTHOPEDIC SURGERY | Age: 52
End: 2022-07-14
Payer: COMMERCIAL

## 2022-07-14 ENCOUNTER — HOSPITAL ENCOUNTER (OUTPATIENT)
Dept: PHYSICAL THERAPY | Age: 52
Setting detail: RECURRING SERIES
Discharge: HOME | End: 2022-07-17
Payer: COMMERCIAL

## 2022-07-14 DIAGNOSIS — G25.89 SCAPULAR DYSKINESIS: Primary | ICD-10-CM

## 2022-07-14 PROCEDURE — 99214 OFFICE O/P EST MOD 30 MIN: CPT | Performed by: PHYSICIAN ASSISTANT

## 2022-07-14 PROCEDURE — 97110 THERAPEUTIC EXERCISES: CPT

## 2022-07-14 RX ORDER — METHOCARBAMOL 750 MG/1
TABLET, FILM COATED ORAL
COMMUNITY
Start: 2021-11-18 | End: 2022-07-29

## 2022-07-14 RX ORDER — ASPIRIN 81 MG
TABLET, DELAYED RELEASE (ENTERIC COATED) ORAL
COMMUNITY
Start: 2021-08-06 | End: 2022-07-29

## 2022-07-14 RX ORDER — METHOCARBAMOL 500 MG/1
TABLET, FILM COATED ORAL
COMMUNITY
Start: 2021-11-04 | End: 2022-07-29

## 2022-07-14 RX ORDER — HYDROCHLOROTHIAZIDE 25 MG/1
TABLET ORAL
COMMUNITY
Start: 2021-08-06 | End: 2022-07-29

## 2022-07-14 RX ORDER — NARATRIPTAN 2.5 MG/1
TABLET ORAL
COMMUNITY
Start: 2021-11-03 | End: 2022-07-29

## 2022-07-14 RX ORDER — KETOROLAC TROMETHAMINE 10 MG/1
TABLET, FILM COATED ORAL
COMMUNITY
Start: 2021-10-09 | End: 2022-07-29

## 2022-07-14 RX ORDER — ACYCLOVIR 50 MG/G
CREAM TOPICAL
COMMUNITY
Start: 2021-10-16 | End: 2022-07-29

## 2022-07-14 RX ORDER — ACYCLOVIR 800 MG/1
TABLET ORAL
COMMUNITY
Start: 2021-10-10 | End: 2022-07-29

## 2022-07-14 RX ORDER — FLUCONAZOLE 150 MG/1
TABLET ORAL
COMMUNITY
Start: 2021-09-09 | End: 2022-07-29

## 2022-07-14 ASSESSMENT — PAIN SCALES - GENERAL: PAINLEVEL_OUTOF10: 5

## 2022-07-14 NOTE — PROGRESS NOTES
Carl Legacy  : 1970  Primary: David Baeza Sc  Secondary:  Aquiles Mcmullen @ [address] [facility] [address]  Phone: [phone]  Fax: [phone] No data recorded  Plan of Care/Certification Expiration Date: 22      PT Visit Info: Total # of Visits to Date: 7  Progress Note Counter: 7      OUTPATIENT PHYSICAL THERAPY:OP NOTE TYPE: Treatment Note 2022       Episode  }Appt Desk               Treatment Diagnosis: Cervicalgia (M54.2)  Radiculopathy, Cervical Region (M54.12)  Medical/Referring Diagnosis:  Neck pain [M54.2]  Referring Physician:  Feliz Kyle MD Orders:  PT Eval and Treat   Date of Onset:  Onset Date: 10/08/21     Allergies:   Furosemide, Sulfa antibiotics, and Other  Restrictions/Precautions:  Restrictions/Precautions: None  No data recorded   Interventions Planned (Treatment may consist of any combination of the following):     No data recorded   Subjective Comments:  Patient reports doing good after last increase  Initial:}    5/10Post Session:       3/10  Medications Last Reviewed:  2022  Updated Objective Findings:  right shoulder soreness  Treatment       THERAPEUTIC EXERCISE: (40 minutes):  Exercises per grid below to improve mobility, strength and balance. Required minimal visual, verbal and manual cues to promote proper body alignment and promote proper body posture. Progressed resistance and complexity of movement as indicated.      Date:  2022 Date:  2022 Date:  2022 Date:  2022 Date:  2022 Date:  2022 Date:  2022   Activity/Exercise Parameters Parameters Parameters        HEP, POC, PT goals, anatomy/pathology         UBE 6min 6min L3 8min 4/4 L3 8min 4/4 L4 8min 4/4 L4 8min 4/4 L4 10min 5/5 L4   Rings [id number]   Scapular 15X  20x       UT LS stretch   [id number]  [id number]     Foam roll 3 way stretch  [id number] each [id number] each [id number] each   Bilateral ER  20x blue  20x blue wall  20x blue foam roll 20x blue foam roll   Horizontal Abd  20x blue  20x blue wall  20x blue foam roll 20x blue foam roll   D2 Flexion  20x blue  15x each wall  20x blue foam roll 20x blue foam roll   Row    2x10 20#  3x10 23#    Pull Down    2x10 30#  3x10 33#    Andrade Carry      3 laps 2/15# 4 laps 2/15#   90/90 ER       2x10 yellow         THERAPEUTIC ACTIVITY: ( 0 minutes): Activities per gid below to improve functional movement related mobility, strength and balance to improve neuro-muscular carryover to daily functional activities for improving patient's quality of life. Required visual, verbal and manual cues to promote proper body alignment and promote proper body posture/mechanics. Progressed resistance and complexity of movement as indicated. Date:  7/14/2022 Date:   Date:     Activity/Exercise Parameters Parameters Parameters                                                   MANUAL THERAPY: (0 minutes): Joint mobilization, Soft tissue mobilization was utilized and necessary because of the patient's restricted joint motion and restricted motion of soft tissue mobility. Date  7/14/2022    Technique Used Grade  Level # Time(s) Effect while being performed          Manual Traction  Cervical            Jt Mobs CPA, rotation seated III Cervical  Improved Mobility          Manual ROM  Cervical  Improved Mobility   Tissue mobilization  R UT LS Scalene  Decreased tone and tenderness                HEP Log Date    7/14/2022   2.     3.    4.    5.              Treatment/Session Summary:    Treatment Assessment:  Patient had good tolerance to progression of postural exercises   Communication/Consultation:  None today  Equipment provided today:  None  Recommendations/Intent for next treatment session: Next visit will focus on Progression of ROM and postural strengthening.     Total Treatment Billable Duration:  40 minutes  an2}  Time In: 0487  Time Out: 0800    Sherrie Sauer, PT       Charge Capture  }Post Session Pain  [facility] Portal  MD Guidelines  Scanned Media  Benefits  MyChart    Future Appointments   Date Time Provider Aleah Kary   7/14/2022 12:15 PM SU Spaulding [facility]   7/19/2022  7:15 AM Sherrie Sauer, PT [facility]   7/21/2022  7:15 AM [place] Camacho, PT [facility]   7/26/2022  7:15 AM [place] Camacho, PT [facility]   7/28/2022  7:15 AM Sherrie Camacho, PT [facility]   8/2/2022  7:15 AM [place] Camacho, PT [facility]   8/2/2022 10:20 AM Jania Jc MD [id number] AM[facility]   8/4/2022  7:15 AM Sherrie Camacho, PT [facility]   8/9/2022  7:15 AM Sherrie Camacho, PT [facility]   8/11/2022  7:15 AM [place] Camacho, PT [facility]   8/18/2022 11:00 AM Lieutenant Heaton APRN - CNP [facility]   10/5/2022 10:30 AM Tim Irving MD [facility] AMB   10/7/2022  8:30 AM Alix Hidalgo APRN - CNP [facility] AMB   11/7/2022  2:30 PM Lg Taylor MD [facility] AMB   4/19/2023 10:00 AM Yennifer Chatman DO [facility] AMB

## 2022-07-14 NOTE — PROGRESS NOTES
Name: Elora Snellen  YOB: 1970  Gender: female  MRN: [id number]    CC:   Chief Complaint   Patient presents with    Shoulder Pain     recheck right shoulder        HPI: Patient presents today for FU of shoulder pain. She is seeing multiple providers in our practice as she had several complaints at her last visit. She is currently seeing our spine team as well as Dr. William De Luna for her hip. She was given a LEFT injection on 3-29-22 and states mild relief with injection. We also discussed PT for scapulothoracic pain. Patient was also previously given a RIGHT subacromial injection on 3-15-22. Patient has also seen Candi for her cervical spine who recommended traction and possible epidural injection. Dr. Didier Yeung treated her hip with an injection and she stated relief on follow up with him. The patient notes right sided shoulder burning as well as neck pain. She notes she saw rheumatology and was diagnosed with fibromyalgia. Patient denies numbness and tingling. Patient notes pain C spine suggested cervical spine injection, but her pain management physician was not wanting to proceed.       Allergies   Allergen Reactions    Furosemide Other (See Comments)     Edema of lower extremities      Sulfa Antibiotics Other (See Comments) and Rash     GI Upset    Other Other (See Comments)     Oral steroids     Past Medical History:   Diagnosis Date    Anxiety     DDD (degenerative disc disease), cervical 11/29/2021    C3-C7 Disc Degeneration w/ Posterior Spondylotic Disc Displaceemnts per MRI    Depressed     GERD (gastroesophageal reflux disease)     Hypertension     IBS (irritable bowel syndrome)     Insomnia     TMJ (dislocation of temporomandibular joint) 04/2021     Past Surgical History:   Procedure Laterality Date    CATARACT REMOVAL Bilateral 02/2020    COLONOSCOPY      EGD DELIVER THERMAL ENERGY SPHNCTR/CARDIA GERD      LIPECTOMY      TUBAL LIGATION       Family History   Problem Relation Age of Onset    Heart Disease Father     Hypertension Father     High Cholesterol Mother     GERD Mother      Social History     Socioeconomic History    Marital status:      Spouse name: Not on file    Number of children: Not on file    Years of education: Not on file    Highest education level: Not on file   Occupational History    Not on file   Tobacco Use    Smoking status: Never    Smokeless tobacco: Never   Vaping Use    Vaping Use: Never used   Substance and Sexual Activity    Alcohol use: Never    Drug use: Never    Sexual activity: Not on file   Other Topics Concern    Not on file   Social History Narrative    Not on file     Social Determinants of Health     Financial Resource Strain: Not on file   Food Insecurity: Not on file   Transportation Needs: Not on file   Physical Activity: Not on file   Stress: Not on file   Social Connections: Not on file   Intimate Partner Violence: Not on file   Housing Stability: Not on file          AMB PAIN ASSESSMENT 6/15/2022   Location of Pain Neck   Location Modifiers Left;Right   Severity of Pain 7   Quality of Pain Other (Comment)   Duration of Pain Persistent   Frequency of Pain Constant   Date Pain First Started 10/12/2021   Aggravating Factors Standing;Walking; Other (Comment)   Limiting Behavior Yes   Relieving Factors Other (Comment); Ice;Exercise   Result of Injury Yes   Work-Related Injury No   Are there other pain locations you wish to document? Yes       Review of Systems  Non-contributory    PE right shoulder:    Full Rom of the shoulder. Full strength. Negative TTP AC joint and biceps tendon. There is obvious scapular dyskineas of the right scapular thoracic joint. Decreased sensation over the scapula in comparison tot he opposite side. A/Plan:     ICD-10-CM    1. Scapular dyskinesis  G25.89 Amb External Referral To Physical Therapy           I discussed with the patient continued cervical spine pathology.   Our spine team believes her symptoms can be stemming from her neck. I am unsure why pain management did not want to proceed with injection. From my standpoint, we will continue PT and discussed adding dry needling. We discussed FU with pain management to discuss further injections vs trigger point injections. Continue OTC medication as needed. Increased complexity secondary to diagnosis of fibromyalgia, positive MATEO, restless leg, anxiety depression migraines and questionable neuropathy. She is followed by neurology and rheumatology. Return for with pain management .         Nanette Maguire  07/17/22

## 2022-07-19 ENCOUNTER — HOSPITAL ENCOUNTER (OUTPATIENT)
Dept: PHYSICAL THERAPY | Age: 52
Setting detail: RECURRING SERIES
Discharge: HOME | End: 2022-07-22
Payer: COMMERCIAL

## 2022-07-19 PROCEDURE — 97110 THERAPEUTIC EXERCISES: CPT

## 2022-07-19 PROCEDURE — 97140 MANUAL THERAPY 1/> REGIONS: CPT

## 2022-07-19 ASSESSMENT — PAIN SCALES - GENERAL: PAINLEVEL_OUTOF10: 4

## 2022-07-19 NOTE — PROGRESS NOTES
Reji Saeed  : 1970  Primary: Deandra Root Sc  Secondary:  [address] [facility] [address]  Phone: [phone]  Fax: [phone] No data recorded  Plan of Care/Certification Expiration Date: 22      PT Visit Info: Total # of Visits to Date: 8  Progress Note Counter: 8      OUTPATIENT PHYSICAL THERAPY:OP NOTE TYPE: Treatment Note 2022       Episode  Appt Desk               Treatment Diagnosis: Cervicalgia (M54.2)  Radiculopathy, Cervical Region (M54.12)  Medical/Referring Diagnosis:  Neck pain [M54.2]  Referring Physician:  Emerson Friend MD Orders:  PT Eval and Treat   Date of Onset:  Onset Date: 10/08/21     Allergies:   Furosemide, Sulfa antibiotics, and Other  Restrictions/Precautions:  Restrictions/Precautions: None  No data recorded   Interventions Planned (Treatment may consist of any combination of the following):     No data recorded   Subjective Comments:  Patient reports seeing PA for right shoulder pain and has an order for DN  Initial:}    4/10Post Session:       3/10  Medications Last Reviewed:  2022  Updated Objective Findings:   right shoulder soreness  Treatment       THERAPEUTIC EXERCISE: ( 30 minutes):  Exercises per grid below to improve mobility, strength and balance. Required minimal visual, verbal and manual cues to promote proper body alignment and promote proper body posture. Progressed resistance and complexity of movement as indicated.      Date:  2022 Date:  2022 Date:  2022 Date:  2022 Date:  2022 Date:  2022 Date:  2022   Activity/Exercise Parameters Parameters                  UBE 6min L3 8min 4/4 L3 8min 4/4 L4 8min 4/4 L4 8min 4/4 L4 10min 5/5 L4 8min 4/4 L4   Rings [id number] 28c94bvf 83t17abo 61p56khv 62k33onb 88x09uhu 09b03nmz   Scapular  20x        UT LS stretch  7a24tui  6a45qgq      Foam roll 3 way stretch 71n25sbe each    41d60toz each 14g33qza each    Bilateral ER 20x blue  20x blue wall  20x blue foam roll 20x blue foam roll    Horizontal Abd 20x blue  20x blue wall  20x blue foam roll 20x blue foam roll    D2 Flexion 20x blue  15x each wall  20x blue foam roll 20x blue foam roll    Row   2x10 20#  3x10 23#  TRX 3x10   Pull Down   2x10 30#  3x10 33#     Andrade Carry     3 laps 2/15# 4 laps 2/15#    90/90 ER      2x10 yellow 2x10 yellow   Y T        2x10 yellow         THERAPEUTIC ACTIVITY: ( 0 minutes): Activities per gid below to improve functional movement related mobility, strength and balance to improve neuro-muscular carryover to daily functional activities for improving patient's quality of life. Required visual, verbal and manual cues to promote proper body alignment and promote proper body posture/mechanics. Progressed resistance and complexity of movement as indicated. Date:  7/19/2022 Date:   Date:     Activity/Exercise Parameters Parameters Parameters                                                   MANUAL THERAPY: (8 minutes): Joint mobilization, Soft tissue mobilization was utilized and necessary because of the patient's restricted joint motion and restricted motion of soft tissue mobility. Date  7/19/2022    Technique Used Grade  Level # Time(s) Effect while being performed          Manual Traction  Cervical            Jt Mobs CPA, rotation seated III Cervical 5min Improved Mobility          Manual ROM  Cervical  Improved Mobility   Tissue mobilization  R UT LS Scalene 3min Decreased tone and tenderness                HEP Log Date    7/19/2022   2.     3.    4.    5.              Treatment/Session Summary:    Treatment Assessment:  Patient reported less pain after tissue mobilization   Communication/Consultation:  None today  Equipment provided today:  None  Recommendations/Intent for next treatment session: Next visit will focus on Progression of ROM and postural strengthening.     Total Treatment Billable Duration:  38 minutes   Time In: 0720  Time Out: 0800    Bina Oris, PT       Charge Capture  }Post Session Pain  MedBridge Portal  MD Guidelines  Scanned Media  Benefits  MyChart    Future Appointments   Date Time Provider Aleah Kary   7/21/2022  7:15 AM Bina Oris, PT [facility]   7/26/2022  7:15 AM Bina Oris, PT [facility]   7/28/2022  7:15 AM Bina Oris, PT [facility] [place] [facility]   8/2/2022  7:15 AM Bina Oris, PT [facility] [place] [facility]   8/2/2022 10:20 AM Petra Alvarez MD [id number]   8/4/2022  7:15 AM Bina Oris, PT [facility]   8/9/2022  7:15 AM Bina Oris, PT [facility]   8/11/2022  7:15 AM Bina Oris, PT [facility] [place] [facility]   8/18/2022 11:00 AM Delma Isbell, APRN - CNP [facility]   10/5/2022 10:30 AM Mariana Gold MD [facility]   10/7/2022  8:30 AM Alix Ge, APRN - CNP [facility]   11/7/2022  2:30 PM Og Deutsch MD [facility]   4/19/2023 10:00 AM Elvin Chatman DO [facility]

## 2022-07-20 ENCOUNTER — TELEPHONE (OUTPATIENT)
Dept: BEHAVIORAL/MENTAL HEALTH CLINIC | Age: 52
End: 2022-07-20

## 2022-07-21 ENCOUNTER — HOSPITAL ENCOUNTER (OUTPATIENT)
Dept: PHYSICAL THERAPY | Age: 52
Setting detail: RECURRING SERIES
Discharge: HOME | End: 2022-07-24
Payer: COMMERCIAL

## 2022-07-21 ENCOUNTER — PATIENT MESSAGE (OUTPATIENT)
Dept: BEHAVIORAL/MENTAL HEALTH CLINIC | Age: 52
End: 2022-07-21

## 2022-07-21 PROCEDURE — 20560 NDL INSJ W/O NJX 1 OR 2 MUSC: CPT

## 2022-07-21 PROCEDURE — 97110 THERAPEUTIC EXERCISES: CPT

## 2022-07-21 ASSESSMENT — PAIN SCALES - GENERAL: PAINLEVEL_OUTOF10: 3

## 2022-07-21 NOTE — PROGRESS NOTES
Jhonny Jarrett  : 1970  Primary: Anjum Pedraza Sc  Secondary:  [address] [facility] [address]  Phone: [phone]  Fax: [phone] No data recorded  Plan of Care/Certification Expiration Date: 22      PT Visit Info: Total # of Visits to Date: 9  Progress Note Counter: 9      OUTPATIENT PHYSICAL THERAPY:OP NOTE TYPE: Treatment Note 2022       Episode  Appt Desk               Treatment Diagnosis: Cervicalgia (M54.2)  Radiculopathy, Cervical Region (M54.12)  Medical/Referring Diagnosis:  Neck pain [M54.2]  Referring Physician:  Vinita Conroy MD Orders:  PT Eval and Treat   Date of Onset:  Onset Date: 10/08/21     Allergies:   Furosemide, Sulfa antibiotics, and Other  Restrictions/Precautions:  Restrictions/Precautions: None  No data recorded   Interventions Planned (Treatment may consist of any combination of the following):     No data recorded   Subjective Comments:  Patient reports pain ok today  Initial:}    3/10Post Session:       2/10  Medications Last Reviewed:  2022  Updated Objective Findings:   right shoulder soreness  Treatment       THERAPEUTIC EXERCISE: ( 15 minutes):  Exercises per grid below to improve mobility, strength and balance. Required minimal visual, verbal and manual cues to promote proper body alignment and promote proper body posture. Progressed resistance and complexity of movement as indicated.      Date:  2022 Date:  2022 Date:  2022 Date:  2022 Date:  2022 Date:  2022 Date:  2022 Date:  2022   Activity/Exercise Parameters Parameters                    UBE 6min L3 8min 4/4 L3 8min 4/4 L4 8min 4/4 L4 8min 4/4 L4 10min 5/5 L4 8min 4/4 L4 8min 4/4 L4   Rings 00o77iov 40o84ioq 52t58zza 73u97oie 26z41dbs 59l03ksc 58g96jbg 34i54lwx   Scapular  20x      20x   UT LS stretch  3h02htr  9b53skv    5l34mbt   Foam roll 3 way stretch 43m04fvm each    31v87dse each 12r34stw each     Bilateral ER 20x blue  20x blue wall  20x blue foam roll 20x blue foam roll     Horizontal Abd 20x blue  20x blue wall  20x blue foam roll 20x blue foam roll     D2 Flexion 20x blue  15x each wall  20x blue foam roll 20x blue foam roll     Row   2x10 20#  3x10 23#  TRX 3x10    Pull Down   2x10 30#  3x10 33#      Andrade Carry     3 laps 2/15# 4 laps 2/15#     90/90 ER      2x10 yellow 2x10 yellow    Y T        2x10 yellow          THERAPEUTIC ACTIVITY: ( 0 minutes): Activities per gid below to improve functional movement related mobility, strength and balance to improve neuro-muscular carryover to daily functional activities for improving patient's quality of life. Required visual, verbal and manual cues to promote proper body alignment and promote proper body posture/mechanics. Progressed resistance and complexity of movement as indicated. Date:  7/21/2022 Date:   Date:     Activity/Exercise Parameters Parameters Parameters                                                   THERAPEUTIC DRY NEEDLING: (12 minutes) Right Supraspinatus and Levator scapula, no muscle twitch noted   2 needles for 2 muscles    MANUAL THERAPY: (0 minutes): Joint mobilization, Soft tissue mobilization was utilized and necessary because of the patient's restricted joint motion and restricted motion of soft tissue mobility.         Date  7/21/2022    Technique Used Grade  Level # Time(s) Effect while being performed          Manual Traction  Cervical            Jt Mobs CPA, rotation seated III Cervical  Improved Mobility          Manual ROM  Cervical  Improved Mobility   Tissue mobilization  R UT LS Scalene  Decreased tone and tenderness                HEP Log Date    7/21/2022   2.     3.    4.    5.              Treatment/Session Summary:    Treatment Assessment:  Patient had muscle soreness after DN   Communication/Consultation:  None today  Equipment provided today:  None  Recommendations/Intent for next treatment session: Next visit will focus on Progression of ROM and postural strengthening.     Total Treatment Billable Duration:  27 minutes   Time In: 0715  Time Out: 3779    Kendell Malcolm, PT       Charge Capture  }Post Session Pain  MedBridge Portal  MD Guidelines  Scanned Media  Benefits  MyChart    Future Appointments   Date Time Provider Aleah Preston   7/26/2022  7:15 AM Kendell Malcolm, PT [facility]   7/28/2022  7:15 AM Kendell Malcolm, PT [facility]   8/2/2022  7:15 AM Kendell Malcolm, PT [facility] [place] [facility]   8/2/2022 10:20 AM Olivier Miller MD [id number]   8/4/2022  7:15 AM Kendell Malcolm, PT [facility]   8/9/2022  7:15 AM Kendell Malcolm, PT [facility]   8/11/2022  7:15 AM Kendell Malcolm, PT [facility]   8/18/2022 11:00 AM Yousif Gallagher APRN - CNP [facility]   10/5/2022 10:30 AM Darylene Hoyle, MD [facility]   10/7/2022  8:30 AM Alix Gamino APRN - CNP [facility]   11/7/2022  2:30 PM Alison Hidalgo MD [facility]   4/19/2023 10:00 AM Girish Chatman DO [facility]

## 2022-07-22 ENCOUNTER — PATIENT MESSAGE (OUTPATIENT)
Dept: BEHAVIORAL/MENTAL HEALTH CLINIC | Age: 52
End: 2022-07-22

## 2022-07-25 NOTE — TELEPHONE ENCOUNTER
From: Jhonatan Llanos  To: Dr. Emre Hopkins: 7/22/2022 7:50 PM EDT  Subject: Appointment     Hello  I saw my counselor this week and he suggested I get an appointment with you sooner if there was an in person appointment.

## 2022-07-26 ENCOUNTER — OFFICE VISIT (OUTPATIENT)
Dept: BEHAVIORAL/MENTAL HEALTH CLINIC | Age: 52
End: 2022-07-26
Payer: COMMERCIAL

## 2022-07-26 ENCOUNTER — HOSPITAL ENCOUNTER (OUTPATIENT)
Dept: PHYSICAL THERAPY | Age: 52
Setting detail: RECURRING SERIES
Discharge: HOME | End: 2022-07-29
Payer: COMMERCIAL

## 2022-07-26 ENCOUNTER — PATIENT MESSAGE (OUTPATIENT)
Dept: BEHAVIORAL/MENTAL HEALTH CLINIC | Age: 52
End: 2022-07-26

## 2022-07-26 VITALS
OXYGEN SATURATION: 99 % | HEIGHT: 60 IN | SYSTOLIC BLOOD PRESSURE: 121 MMHG | WEIGHT: 187 LBS | DIASTOLIC BLOOD PRESSURE: 66 MMHG | BODY MASS INDEX: 36.71 KG/M2 | TEMPERATURE: 98.3 F | HEART RATE: 107 BPM

## 2022-07-26 DIAGNOSIS — F41.1 GENERALIZED ANXIETY DISORDER: ICD-10-CM

## 2022-07-26 DIAGNOSIS — F51.05 INSOMNIA DUE TO MENTAL DISORDER: ICD-10-CM

## 2022-07-26 DIAGNOSIS — F33.2 SEVERE EPISODE OF RECURRENT MAJOR DEPRESSIVE DISORDER, WITHOUT PSYCHOTIC FEATURES (HCC): Primary | ICD-10-CM

## 2022-07-26 DIAGNOSIS — R52 CHRONIC GENERALIZED PAIN DISORDER: ICD-10-CM

## 2022-07-26 DIAGNOSIS — G89.29 CHRONIC GENERALIZED PAIN DISORDER: ICD-10-CM

## 2022-07-26 PROCEDURE — 97140 MANUAL THERAPY 1/> REGIONS: CPT

## 2022-07-26 PROCEDURE — 99214 OFFICE O/P EST MOD 30 MIN: CPT | Performed by: PSYCHIATRY & NEUROLOGY

## 2022-07-26 PROCEDURE — 97110 THERAPEUTIC EXERCISES: CPT

## 2022-07-26 ASSESSMENT — PATIENT HEALTH QUESTIONNAIRE - PHQ9
10. IF YOU CHECKED OFF ANY PROBLEMS, HOW DIFFICULT HAVE THESE PROBLEMS MADE IT FOR YOU TO DO YOUR WORK, TAKE CARE OF THINGS AT HOME, OR GET ALONG WITH OTHER PEOPLE: 2
SUM OF ALL RESPONSES TO PHQ QUESTIONS 1-9: 21
SUM OF ALL RESPONSES TO PHQ9 QUESTIONS 1 & 2: 5
6. FEELING BAD ABOUT YOURSELF - OR THAT YOU ARE A FAILURE OR HAVE LET YOURSELF OR YOUR FAMILY DOWN: 2
7. TROUBLE CONCENTRATING ON THINGS, SUCH AS READING THE NEWSPAPER OR WATCHING TELEVISION: 2
SUM OF ALL RESPONSES TO PHQ QUESTIONS 1-9: 21
SUM OF ALL RESPONSES TO PHQ QUESTIONS 1-9: 21
2. FEELING DOWN, DEPRESSED OR HOPELESS: 3
1. LITTLE INTEREST OR PLEASURE IN DOING THINGS: 2
4. FEELING TIRED OR HAVING LITTLE ENERGY: 3
3. TROUBLE FALLING OR STAYING ASLEEP: 3
9. THOUGHTS THAT YOU WOULD BE BETTER OFF DEAD, OR OF HURTING YOURSELF: 0
8. MOVING OR SPEAKING SO SLOWLY THAT OTHER PEOPLE COULD HAVE NOTICED. OR THE OPPOSITE, BEING SO FIGETY OR RESTLESS THAT YOU HAVE BEEN MOVING AROUND A LOT MORE THAN USUAL: 3
SUM OF ALL RESPONSES TO PHQ QUESTIONS 1-9: 21
5. POOR APPETITE OR OVEREATING: 3

## 2022-07-26 ASSESSMENT — PAIN SCALES - GENERAL: PAINLEVEL_OUTOF10: 3

## 2022-07-26 ASSESSMENT — ANXIETY QUESTIONNAIRES
6. BECOMING EASILY ANNOYED OR IRRITABLE: 2
3. WORRYING TOO MUCH ABOUT DIFFERENT THINGS: 3
7. FEELING AFRAID AS IF SOMETHING AWFUL MIGHT HAPPEN: 2
IF YOU CHECKED OFF ANY PROBLEMS ON THIS QUESTIONNAIRE, HOW DIFFICULT HAVE THESE PROBLEMS MADE IT FOR YOU TO DO YOUR WORK, TAKE CARE OF THINGS AT HOME, OR GET ALONG WITH OTHER PEOPLE: EXTREMELY DIFFICULT
5. BEING SO RESTLESS THAT IT IS HARD TO SIT STILL: 2
1. FEELING NERVOUS, ANXIOUS, OR ON EDGE: 3
4. TROUBLE RELAXING: 3
2. NOT BEING ABLE TO STOP OR CONTROL WORRYING: 3
GAD7 TOTAL SCORE: 18
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
## 2022-07-26 NOTE — PROGRESS NOTES
Tad Durate  : 1970  Primary: [address]  Secondary:  [address] [facility] [address]  Phone: [phone]  Fax: [phone] No data recorded  Plan of Care/Certification Expiration Date: 22      PT Visit Info: Total # of Visits to Date: 10  Progress Note Counter: 10      OUTPATIENT PHYSICAL THERAPY:OP NOTE TYPE: Treatment Note 2022       Episode  Appt Desk               Treatment Diagnosis: Cervicalgia (M54.2)  Radiculopathy, Cervical Region (M54.12)  Medical/Referring Diagnosis:  Neck pain [M54.2]  Referring Physician:  Smiley Ellis MD Orders:  PT Eval and Treat   Date of Onset:  Onset Date: 10/08/21     Allergies:   Furosemide, Sulfa antibiotics, and Other  Restrictions/Precautions:  Restrictions/Precautions: None  No data recorded   Interventions Planned (Treatment may consist of any combination of the following):     No data recorded   Subjective Comments:  Patient reports that DN tremendouslly helped right UT pain, only feels stiffness when turning head to the right  Initial:}    3/10Post Session:        /10  Medications Last Reviewed:  2022  Updated Objective Findings:   right shoulder soreness  Treatment       THERAPEUTIC EXERCISE: ( 32 minutes):  Exercises per grid below to improve mobility, strength and balance. Required minimal visual, verbal and manual cues to promote proper body alignment and promote proper body posture. Progressed resistance and complexity of movement as indicated.      Date:  2022 Date:  2022 Date:  2022 Date:  2022 Date:  2022 Date:  2022 Date:  2022   Activity/Exercise                    UBE 8min 4/4 L4 8min 4/4 L4 8min 4/4 L4 10min 5/5 L4 8min 4/4 L4 8min 4/4 L4 8min 4/4 L4   Rings [id number] [id number] 13d03lun 25r59ghp [id number]   Scapular      20x    UT LS stretch  1m50bxi    [id number] 3j60enw   Nags Snags       10x R/L   Prone CT rotation       10x each   Prone cervical rotation with Ext       10x each   Foam roll 3 way stretch   [id number] each [id number] each      Bilateral ER 20x blue wall  20x blue foam roll 20x blue foam roll      Horizontal Abd 20x blue wall  20x blue foam roll 20x blue foam roll      D2 Flexion 15x each wall  20x blue foam roll 20x blue foam roll      Row 2x10 20#  3x10 23#  TRX 3x10     Pull Down 2x10 30#  3x10 33#       Andrade Carry   3 laps 2/15# 4 laps 2/15#      90/90 ER    2x10 yellow 2x10 yellow     Y T      2x10 yellow           THERAPEUTIC ACTIVITY: ( 0 minutes): Activities per gid below to improve functional movement related mobility, strength and balance to improve neuro-muscular carryover to daily functional activities for improving patient's quality of life. Required visual, verbal and manual cues to promote proper body alignment and promote proper body posture/mechanics. Progressed resistance and complexity of movement as indicated. Date:  7/26/2022 Date:   Date:     Activity/Exercise Parameters Parameters Parameters                                                   THERAPEUTIC DRY NEEDLING: (0 minutes) Right Supraspinatus and Levator scapula, no muscle twitch noted   2 needles for 2 muscles    MANUAL THERAPY: (4 minutes): Joint mobilization, Soft tissue mobilization was utilized and necessary because of the patient's restricted joint motion and restricted motion of soft tissue mobility.         Date  7/26/2022    Technique Used Grade  Level # Time(s) Effect while being performed          Manual Traction  Cervical 6min Improved cervical rotation right          Jt Mobs CPA, rotation seated III Cervical  Improved Mobility          Manual ROM  Cervical  Improved Mobility   Tissue mobilization  R UT LS Scal[place] 4 min Decreased tone and tenderness                HEP Log Date    7/26/2022   2.     3.    4.    5.              Treatment/Session Summary:    Treatment Assessment:  Patient had improved Cervical ROM

## 2022-07-26 NOTE — PROGRESS NOTES
Bernadette Jordan  : 1970  Primary: Lolly Gloria Paredes  Secondary:  Farideh Chacko @ [address] [facility] [address]  Phone: [phone]  Fax: [phone] No data recorded  Plan of Care/Certification Expiration Date: 22      PT Visit Info: Total # of Visits to Date: 10  Progress Note Counter: 10      OUTPATIENT PHYSICAL THERAPY:OP NOTE TYPE: Progress Report 2022               Episode  Appt Desk         Treatment Diagnosis:  ICD-10: Treatment Diagnosis: Cervicalgia (M54.2)  Radiculopathy, Cervical Region (M54.12)    Medical/Referring Diagnosis:  Neck pain [M54.2]  Referring Physician:  Lynnann Spurling MD Orders:  PT Eval and Treat   Return MD Appt:  TBD  Date of Onset:  Onset Date: 10/08/21     Allergies:  Furosemide, Sulfa antibiotics, and Other  Restrictions/Precautions:    Restrictions/Precautions: None  No data recorded   Medications Last Reviewed:  2022     SUBJECTIVE   History of Injury/Illness (Reason for Referral):  Patient is familiar to this clinic for previous treatment of Shoulder pain, left hip pain, and TMD. She returning to address current s/s of mostly left arm tingling and parascapular pain from Cervical radiculopathy. Patient Stated Goal(s): \"Perform daily activity without left arm, neck and scapular pain\"  Initial:     3/10 Post Session:      /10  Past Medical History/Comorbidities:   Ms. Oneida Khan  has a past medical history of Anxiety, DDD (degenerative disc disease), cervical, Depressed, GERD (gastroesophageal reflux disease), Hypertension, IBS (irritable bowel syndrome), Insomnia, and TMJ (dislocation of temporomandibular joint). Ms. Oneida Khan  has a past surgical history that includes egd deliver thermal energy sphnctr/cardia gerd; Cataract removal (Bilateral, 2020); lipectomy; Tubal ligation; and Colonoscopy.   Social History/Living Environment:   Lives With: Family     Prior Level of Function/Work/Activity: Prior level of function: Independent  Occupation: Full time employment  No data recordedNo data recorded   Learning: Will there be a co-learner?: No  What is the preferred language of the patient/guardian?: English  Is an  required?: No  How does the patient/guardian prefer to learn new concepts?: Listening; Reading; Demonstration; Pictures/Videos     Fall Risk Scale: Total Score: 0  [place] Fall Risk: Low (0-24)     Dominant Side:  right handed        OBJECTIVE   Observation/Orthostatic Postural Assessment:           Forward head and shoulders  Palpation:          TTP bilateral UT, LS and SP of C7 and C4  ROM:      AROM/PROM                  Joint: Eval Date: 6/21/22  Re-Assess Date: 7/26/2022  Re-Assess Date:    Active ROM         Cervical Extension 35  35      Cervical Flexion 40  50       RIGHT LEFT RIGHT LEFT RIGHT LEFT   Cervical Sidebending 40 35 40 35     Cervical Rotation 55 53 75 60              Shoulder Flexion WNL WNL WNL WNL     Shoulder Abduction WNL WNL WNL WNL              Lumbar Flexion WNL WNL WNL WNL     Lumber Extension WNL WNL WNL WNL         Strength:     Eval Date: 6/21/22  Re-Assess Date: 7/26/2022  Re-Assess Date:      RIGHT LEFT RIGHT LEFT RIGHT LEFT   Shoulder Flexion 5/5 5/5 5/5 5/5     Shoulder Abduction (C5) 5/5 5/5 5/5 5/5     Shoulder Internal Rotation 5/5 5/5 5/5 5/5     Shoulder External Rotation 5/5 4+/5 5/5 5/5     Elbow Flexion (C6) 5/5 5/5 5/5 5/5     Elbow Extension (C7) 5/5 5/5 5/5 5/5     Wrist Flexion (C7) 5/5 5/5 5/5 5/5     Wrist Extension (C6) 5/5 5/5 5/5 5/5     Resisted Thumb Extension/Finger Abduction (C8/T1) Normal     Normal Normal Normal     Resisted Cervical Rotation (C1): Normal Normal Normal Normal     Scapula 4/5 4/5 4+/5 4+/5                  Manual:  Joint Directon Grade Treatment Effect   C3-7 Central PA II III Unremarkable left                 Reflex Testing (Biceps, Brachioradialis, Triceps): Normal    Location LEFT RIGHT   Biceps 2 2 Brachioradialis 2 2   Triceps 2 2       Special Tests:    Distraction: Negative        Sharp Herminia's: Negative              Upper Limb Tension Test Median Nerve: negative              Upper Limb Tension Test Ulnar: negative              Upper Limb Tension Test Radial: negative       Neurological Screen: Radiating symptoms: Yes     Functional Mobility:  Limited with cervical rotation for driving and maintaining correct posture at work    Balance: \"No Testing Needed\"    ASSESSMENT   Initial Assessment:    Elora Snellen presents to physical therapy with decreased strength, ROM, joint mobility, functional mobility. These S/S are consistent with referring diagnosis. Patient will benefit from skilled physical therapy for manual therapeutic techniques (as appropriate), therapeutic exercises and activities, balance and comprehensive home exercises program to address current impairments and functional limitations. PROGRESS NOTE (7/26/2022): Patient has been seen for 10 sessions of physical therapy from 6/21/2022 to 7/26/2022. Patient reports feeling 60-70% better with cervical pain and currently no radicular pain in left UE only parascapular pain right. Patient will benefit from continued skilled physical therapy to address remaining goals and deficits. Problem List: (Impacting functional limitations): Body Structures, Functions, Activity Limitations Requiring Skilled Therapeutic Intervention: Decreased functional mobility ; Decreased ROM; Decreased body mechanics; Decreased tolerance to work activity; Decreased strength; Decreased endurance;  Increased pain; Decreased posture     Therapy Prognosis:   Therapy Prognosis: Good     Assessment Complexity:      PLAN   Effective Dates: 6/21/22 TO Plan of Care/Certification Expiration Date: 09/21/22     Frequency/Duration: No data recorded   Interventions Planned (Treatment may consist of any combination of the following):    No data recorded   Goals: (Goals have been discussed and agreed upon with patient.)    Short-Term Goals~4 weeks  Goal Met   Makenzie Wu will report <=3/10 pain to cervical spine with performance of functional spinal mobility and rotation. 1.  [x] Date: 7/26/2022   Makenzie Wu to be independent with initial HEP for cervical region and UE's. 2.  [x] Date: 7/26/2022   Makenzie Wu will improve ROM to >=90% to assist with improved function during instrumental activities of daily living. 3.  [x] Date: 7/26/2022   Makenzie Wu will improve MMT to >=4+/5 to all UE strengthening to return to PLOF and improve functional tolerance. 4.  [x] Date: 7/26/2022       Long Term Goals~8 weeks Goal Met   Makenzie Wu will report <=1/10 pain to cervical spine with performance of functional spinal mobility and rotation and minimal to no difficulty with such tasks. 1.  [] Date:   Makenzie Wu will demonstrate improved NDI score, indicating spinal improvement from 16/50 to 8/50 affecting minimal to no difficulty with performance of cervical mobility and strengthening. 2.  [] Date:   Makenzie Wu to be independent with advanced HEP for cervical region and UE's. 3.  [] Date:                    Outcome Measure: Tool Used: Neck Disability Index (NDI)  Score:  Initial: 16/50  Most Recent: X/50 (Date: -- )   Interpretation of Score: The Neck Disability Index is a revised form of the Oswestry Low Back Pain Index and is designed to measure the activities of daily living in person's with neck pain. Each section is scored on a 0-5 scale, 5 representing the greatest disability. The scores of each section are added together for a total score of 50. Medical Necessity:   Patient is expected to demonstrate progress in strength, range of motion and functional technique to improve safety during lifting and activity required for home and work.   Reason For Services/Other Comments:  Patient continues to require modification of therapeutic interventions to increase complexity of exercises. Total Duration:  Time In: 0715  Time Out: 0800    Regarding Juan Pablolesli Morrow Diego's therapy, I certify that the treatment plan above will be carried out by a therapist or under their direction. Thank you for this referral,  Veronica Blue, PT     Referring Physician Signature: Lucius Ga PA-C No Signature is Required for this note.         Post Session Pain  Charge Capture   POC Link  Treatment Note Link  MD Guidelines  [facility]

## 2022-07-27 ENCOUNTER — TELEPHONE (OUTPATIENT)
Dept: BEHAVIORAL/MENTAL HEALTH CLINIC | Age: 52
End: 2022-07-27

## 2022-07-28 ENCOUNTER — HOSPITAL ENCOUNTER (OUTPATIENT)
Dept: PHYSICAL THERAPY | Age: 52
Setting detail: RECURRING SERIES
Discharge: HOME | End: 2022-07-31
Payer: COMMERCIAL

## 2022-07-28 PROCEDURE — 97110 THERAPEUTIC EXERCISES: CPT

## 2022-07-28 PROCEDURE — 97140 MANUAL THERAPY 1/> REGIONS: CPT

## 2022-07-28 RX ORDER — LEVOMEFOLATE CALCIUM 7.5 MG TABLET
7.5 TABLET DAILY
Qty: 30 TABLET | Refills: 5 | Status: SHIPPED | OUTPATIENT
Start: 2022-07-28

## 2022-07-28 NOTE — PROGRESS NOTES
Petra Lopez  : 1970  Primary: Izzy Tracynt Sc  Secondary:  [address] @ [address] [facility] [address]  Phone: [phone]  Fax: [phone] No data recorded  Plan of Care/Certification Expiration Date: 22      PT Visit Info: Total # of Visits to Date: 6  Progress Note Counter: 11      OUTPATIENT PHYSICAL THERAPY:OP NOTE TYPE: Treatment Note 2022       Episode  Appt Desk               Treatment Diagnosis: Cervicalgia (M54.2)  Radiculopathy, Cervical Region (M54.12)  Medical/Referring Diagnosis:  Neck pain [M54.2]  Referring Physician:  Robert Dorado MD Orders:  PT Eval and Treat   Date of Onset:  Onset Date: 10/08/21     Allergies:   Furosemide, Sulfa antibiotics, and Other  Restrictions/Precautions:  Restrictions/Precautions: None  No data recorded   Interventions Planned (Treatment may consist of any combination of the following):     No data recorded   Subjective Comments:  Patient reports that MD will try trigger point injections right side of neck. Initial:}     /10Post Session:        /10  Medications Last Reviewed:  2022  Updated Objective Findings:   right shoulder soreness  Treatment       THERAPEUTIC EXERCISE: ( 32 minutes):  Exercises per grid below to improve mobility, strength and balance. Required minimal visual, verbal and manual cues to promote proper body alignment and promote proper body posture. Progressed resistance and complexity of movement as indicated.      Date:  2022 Date:  2022 Date:  2022 Date:  2022 Date:  2022 Date:  2022 Date:  2022 Date:  2022   Activity/Exercise                      UBE 8min 4/4 L4 8min 4/4 L4 8min 4/4 L4 10min 5/5 L4 8min 4/4 L4 8min 4/4 L4 8min 4/4 L4 8min 4/4 L4   Rings [id number] [id number] 43b67afj 82x23jec 01z00hlb 07w37vfs 34m57sbv   Scapular      20x     UT LS stretch  1x17aai    9t27zmb 9j69vgv 5y61qab   Nags Snags       10x R/L    Prone CT rotation       10x each    Prone cervical rotation with Ext       10x each    Foam roll 3 way stretch   [id number] each 51u44vop each       FE wall slide w/ lift off        15x  green              Bilateral ER 20x blue wall  20x blue foam roll 20x blue foam roll       Horizontal Abd 20x blue wall  20x blue foam roll 20x blue foam roll       D2 Flexion 15x each wall  20x blue foam roll 20x blue foam roll       Row 2x10 20#  3x10 23#  TRX 3x10   TRX 3x10   Pull Down 2x10 30#  3x10 33#        Andrade Carry   3 laps 2/15# 4 laps 2/15#       90/90 ER    2x10 yellow 2x10 yellow      Y T      2x10 yellow            THERAPEUTIC ACTIVITY: ( 0 minutes): Activities per gid below to improve functional movement related mobility, strength and balance to improve neuro-muscular carryover to daily functional activities for improving patient's quality of life. Required visual, verbal and manual cues to promote proper body alignment and promote proper body posture/mechanics. Progressed resistance and complexity of movement as indicated. Date:  7/28/2022 Date:   Date:     Activity/Exercise Parameters Parameters Parameters                                                   THERAPEUTIC DRY NEEDLING: (0 minutes)     MANUAL THERAPY: (10 minutes): Joint mobilization, Soft tissue mobilization was utilized and necessary because of the patient's restricted joint motion and restricted motion of soft tissue mobility.         Date  7/28/2022    Technique Used Grade  Level # Time(s) Effect while being performed          Manual Traction  Cervical  Improved cervical rotation right          Jt Mobs CPA, rotation seated III Cervical 4min Improved Mobility          Manual ROM  Cervical  Improved Mobility   Tissue mobilization  R UT LS Scalene 6 min Decreased tone and tenderness                HEP Log Date    7/28/2022   2.     3.    4.    5.              Treatment/Session Summary:    Treatment Assessment:  Patient had increased TTP right of C7, less symptoms after manual therapy   Communication/Consultation:  None today  Equipment provided today:  None  Recommendations/Intent for next treatment session: Next visit will focus on Progression of ROM and postural strengthening.     Total Treatment Billable Duration:  42 minutes   Time In: 0715  Time Out: 0800    Dejon Fu, PT       Charge Capture  }Post Session Pain  [facility] Portal  MD Guidelines  Scanned Media  Benefits  MyChart    Future Appointments   Date Time Provider Aleah Kary   7/29/2022  1:00 PM Matthew Peters, APRN - CNP [facility]   8/2/2022  7:15 AM Dejon Fu, PT [facility]   8/4/2022  7:15 AM Dejon Fu, PT [facility]   8/9/2022  7:15 AM Dejon Fu, PT [facility]   8/11/2022  7:15 AM Dejon Fu, PT [facility]   8/18/2022 11:00 AM Anup Mcghee, APRN - CNP [facility]   10/5/2022 10:30 AM Juan Pablo Redding MD [facility]   10/7/2022  8:30 AM Alix Allen APRN - CNP [facility]   10/18/2022 10:00 AM Celio Hardwick MD [id number]   11/7/2022  2:30 PM Glenda Aldrich MD [facility]   4/19/2023 10:00 AM Dexter Chatman DO [facility]

## 2022-07-29 ENCOUNTER — OFFICE VISIT (OUTPATIENT)
Dept: INTERNAL MEDICINE CLINIC | Facility: CLINIC | Age: 52
End: 2022-07-29
Payer: COMMERCIAL

## 2022-07-29 VITALS
HEART RATE: 110 BPM | SYSTOLIC BLOOD PRESSURE: 110 MMHG | OXYGEN SATURATION: 98 % | DIASTOLIC BLOOD PRESSURE: 70 MMHG | WEIGHT: 187 LBS | TEMPERATURE: 97.5 F | BODY MASS INDEX: 36.71 KG/M2 | HEIGHT: 60 IN

## 2022-07-29 DIAGNOSIS — G89.29 CHRONIC IDIOPATHIC PAIN SYNDROME: ICD-10-CM

## 2022-07-29 DIAGNOSIS — S39.012A LUMBAR STRAIN, INITIAL ENCOUNTER: Primary | ICD-10-CM

## 2022-07-29 DIAGNOSIS — E66.9 OBESITY (BMI 35.0-39.9 WITHOUT COMORBIDITY): ICD-10-CM

## 2022-07-29 PROCEDURE — 99214 OFFICE O/P EST MOD 30 MIN: CPT | Performed by: NURSE PRACTITIONER

## 2022-07-29 ASSESSMENT — ENCOUNTER SYMPTOMS
EYE DISCHARGE: 0
COLOR CHANGE: 0
APNEA: 0
EYE ITCHING: 0
NAUSEA: 0
BACK PAIN: 1
EYE REDNESS: 0
ABDOMINAL PAIN: 0
SINUS PAIN: 0
CONSTIPATION: 0
COUGH: 0
ABDOMINAL DISTENTION: 0
SHORTNESS OF BREATH: 0
EYE PAIN: 0
DIARRHEA: 0

## 2022-07-29 ASSESSMENT — PATIENT HEALTH QUESTIONNAIRE - PHQ9
1. LITTLE INTEREST OR PLEASURE IN DOING THINGS: 2
3. TROUBLE FALLING OR STAYING ASLEEP: 2
2. FEELING DOWN, DEPRESSED OR HOPELESS: 2
10. IF YOU CHECKED OFF ANY PROBLEMS, HOW DIFFICULT HAVE THESE PROBLEMS MADE IT FOR YOU TO DO YOUR WORK, TAKE CARE OF THINGS AT HOME, OR GET ALONG WITH OTHER PEOPLE: 2
7. TROUBLE CONCENTRATING ON THINGS, SUCH AS READING THE NEWSPAPER OR WATCHING TELEVISION: 2
5. POOR APPETITE OR OVEREATING: 2
9. THOUGHTS THAT YOU WOULD BE BETTER OFF DEAD, OR OF HURTING YOURSELF: 0
SUM OF ALL RESPONSES TO PHQ9 QUESTIONS 1 & 2: 4
6. FEELING BAD ABOUT YOURSELF - OR THAT YOU ARE A FAILURE OR HAVE LET YOURSELF OR YOUR FAMILY DOWN: 1
SUM OF ALL RESPONSES TO PHQ QUESTIONS 1-9: 15
SUM OF ALL RESPONSES TO PHQ QUESTIONS 1-9: 15
4. FEELING TIRED OR HAVING LITTLE ENERGY: 2
SUM OF ALL RESPONSES TO PHQ QUESTIONS 1-9: 15
SUM OF ALL RESPONSES TO PHQ QUESTIONS 1-9: 15
8. MOVING OR SPEAKING SO SLOWLY THAT OTHER PEOPLE COULD HAVE NOTICED. OR THE OPPOSITE, BEING SO FIGETY OR RESTLESS THAT YOU HAVE BEEN MOVING AROUND A LOT MORE THAN USUAL: 2

## 2022-07-29 NOTE — PROGRESS NOTES
(BENEFIBER PO)       topiramate (TOPAMAX) 25 MG tablet Take 1 tablet by mouth 2 times daily 180 tablet 1    cyanocobalamin 1000 MCG tablet Take 1 tablet by mouth daily 90 tablet 3    LINZESS 72 MCG CAPS capsule Take 1 capsule by mouth daily      naproxen (NAPROSYN) 500 MG tablet Take 1 tablet by mouth as needed      norethindrone (AYGESTIN) 5 MG tablet Take 1 tablet by mouth daily      omeprazole (PRILOSEC) 40 MG delayed release capsule Take 1 capsule (40 mg) by mouth 2 (two) times a day 30 mins before breakfast and 30 mins before dinner      cod liver oil CAPS Take 1 capsule by mouth daily      Ubrogepant (UBRELVY) 50 MG TABS Take 1 tablet at the onset of migraine, may repeat in 2 hours. Do not take more then 3 days a week. 16 tablet 11    lisinopril-hydroCHLOROthiazide (PRINZIDE;ZESTORETIC) 20-25 MG per tablet Take 1 tablet by mouth daily      TURMERIC PO Take 1 tablet by mouth daily      amitriptyline (ELAVIL) 25 MG tablet TAKE 1 TO 2 TABLETS BY MOUTH EVERY EVENING      Benzoyl Peroxide 2.5 % GEL APPLY TOPICALLY TO FACE AT BEDTIME      celecoxib (CELEBREX) 200 MG capsule Take 200 mg by mouth 2 times daily      vitamin D (CHOLECALCIFEROL) 96214 UNIT CAPS Take 50,000 Units by mouth every 7 days      cyclobenzaprine (FLEXERIL) 10 MG tablet Take 10 mg by mouth 3 times daily as needed      diclofenac sodium (VOLTAREN) 1 % GEL Apply 1 g topically as needed      Lidocaine, Anorectal, 5 % CREA Actual prescription: Lidocaine 5%: Neurontin/gabapentin 5% combined topical cream/gelApply to affected area up to 4 times a day as needed for pain      nystatin-triamcinolone (MYCOLOG II) 559994-9.1 UNIT/GM-% cream Apply topically 2 times daily      pramipexole (MIRAPEX) 0.125 MG tablet Take 0.25 mg by mouth      pregabalin (LYRICA) 150 MG capsule TAKE 1 CAPSULE BY MOUTH Four TIMES DAILY      vitamin E 600 units capsule Take by mouth daily       No current facility-administered medications for this visit.      Allergies   Allergen and sleep disturbance. The patient is not nervous/anxious. OBJECTIVE:  /70 (Site: Left Upper Arm, Position: Sitting, Cuff Size: Large Adult)   Pulse (!) 110   Temp 97.5 °F (36.4 °C) (Temporal)   Ht 5' (1.524 m)   Wt 187 lb (84.8 kg)   SpO2 98%   BMI 36.52 kg/m²      Physical Exam  Constitutional:       General: She is not in acute distress. Appearance: Normal appearance. She is obese. She is not ill-appearing or toxic-appearing. Cardiovascular:      Rate and Rhythm: Normal rate and regular rhythm. Pulmonary:      Effort: Pulmonary effort is normal. No respiratory distress. Musculoskeletal:      Comments: Pain with palpation of lumbar musculature bilaterally. No obvious deformity. Skin:     General: Skin is warm and dry. Neurological:      Mental Status: She is alert. Mental status is at baseline. Psychiatric:         Mood and Affect: Mood normal.         Behavior: Behavior normal.         Thought Content: Thought content normal.         ASSESSMENT and PLAN  Lon George was seen today for back pain. Diagnoses and all orders for this visit:    Lumbar strain, initial encounter    Obesity (BMI 35.0-39.9 without comorbidity)    Chronic idiopathic pain syndrome      We discussed following with pain management next week. Continue medication regimen, ice, lidocaine, PT and stretching exercises. Avoid overuse or heavy lifting. Refrain from work for 2 weeks. We discussed weight loss to assist with preventing lumbar strain. She is planning to discuss medication options with Dr. Santhosh Orozco due to increased weight gain/trouble losing weight since starting new medications. Greater than 50% of this 35 min visit was spent counseling the patient about test results, prognosis, importance of compliance, education about disease process, benefits of medications, instructions for management of acute symptoms, and follow up plans.        Bhupinder Harris, NP, APRN - CNP

## 2022-07-30 ENCOUNTER — PATIENT MESSAGE (OUTPATIENT)
Dept: INTERNAL MEDICINE CLINIC | Facility: CLINIC | Age: 52
End: 2022-07-30

## 2022-07-30 DIAGNOSIS — I87.2 VENOUS INSUFFICIENCY OF BOTH LOWER EXTREMITIES: Primary | ICD-10-CM

## 2022-07-30 DIAGNOSIS — I10 PRIMARY HYPERTENSION: ICD-10-CM

## 2022-07-30 DIAGNOSIS — R00.2 PALPITATIONS: ICD-10-CM

## 2022-08-01 ENCOUNTER — TELEMEDICINE (OUTPATIENT)
Dept: BEHAVIORAL/MENTAL HEALTH CLINIC | Age: 52
End: 2022-08-01
Payer: COMMERCIAL

## 2022-08-01 DIAGNOSIS — G89.29 CHRONIC GENERALIZED PAIN DISORDER: ICD-10-CM

## 2022-08-01 DIAGNOSIS — F51.05 INSOMNIA DUE TO MENTAL DISORDER: ICD-10-CM

## 2022-08-01 DIAGNOSIS — R52 CHRONIC GENERALIZED PAIN DISORDER: ICD-10-CM

## 2022-08-01 DIAGNOSIS — F41.1 GENERALIZED ANXIETY DISORDER: ICD-10-CM

## 2022-08-01 DIAGNOSIS — F33.1 MODERATE EPISODE OF RECURRENT MAJOR DEPRESSIVE DISORDER (HCC): Primary | ICD-10-CM

## 2022-08-01 PROCEDURE — 99080 SPECIAL REPORTS OR FORMS: CPT | Performed by: PSYCHIATRY & NEUROLOGY

## 2022-08-01 PROCEDURE — 99214 OFFICE O/P EST MOD 30 MIN: CPT | Performed by: PSYCHIATRY & NEUROLOGY

## 2022-08-01 ASSESSMENT — ANXIETY QUESTIONNAIRES
2. NOT BEING ABLE TO STOP OR CONTROL WORRYING: 2
IF YOU CHECKED OFF ANY PROBLEMS ON THIS QUESTIONNAIRE, HOW DIFFICULT HAVE THESE PROBLEMS MADE IT FOR YOU TO DO YOUR WORK, TAKE CARE OF THINGS AT HOME, OR GET ALONG WITH OTHER PEOPLE: VERY DIFFICULT
6. BECOMING EASILY ANNOYED OR IRRITABLE: 2
1. FEELING NERVOUS, ANXIOUS, OR ON EDGE: 3
7. FEELING AFRAID AS IF SOMETHING AWFUL MIGHT HAPPEN: 1
5. BEING SO RESTLESS THAT IT IS HARD TO SIT STILL: 2
GAD7 TOTAL SCORE: 14
4. TROUBLE RELAXING: 2
3. WORRYING TOO MUCH ABOUT DIFFERENT THINGS: 2

## 2022-08-01 ASSESSMENT — PATIENT HEALTH QUESTIONNAIRE - PHQ9
9. THOUGHTS THAT YOU WOULD BE BETTER OFF DEAD, OR OF HURTING YOURSELF: 0
SUM OF ALL RESPONSES TO PHQ QUESTIONS 1-9: 19
6. FEELING BAD ABOUT YOURSELF - OR THAT YOU ARE A FAILURE OR HAVE LET YOURSELF OR YOUR FAMILY DOWN: 1
7. TROUBLE CONCENTRATING ON THINGS, SUCH AS READING THE NEWSPAPER OR WATCHING TELEVISION: 3
4. FEELING TIRED OR HAVING LITTLE ENERGY: 3
SUM OF ALL RESPONSES TO PHQ9 QUESTIONS 1 & 2: 4
SUM OF ALL RESPONSES TO PHQ QUESTIONS 1-9: 19
10. IF YOU CHECKED OFF ANY PROBLEMS, HOW DIFFICULT HAVE THESE PROBLEMS MADE IT FOR YOU TO DO YOUR WORK, TAKE CARE OF THINGS AT HOME, OR GET ALONG WITH OTHER PEOPLE: 3
1. LITTLE INTEREST OR PLEASURE IN DOING THINGS: 2
3. TROUBLE FALLING OR STAYING ASLEEP: 2
SUM OF ALL RESPONSES TO PHQ QUESTIONS 1-9: 19
2. FEELING DOWN, DEPRESSED OR HOPELESS: 2
8. MOVING OR SPEAKING SO SLOWLY THAT OTHER PEOPLE COULD HAVE NOTICED. OR THE OPPOSITE, BEING SO FIGETY OR RESTLESS THAT YOU HAVE BEEN MOVING AROUND A LOT MORE THAN USUAL: 3
5. POOR APPETITE OR OVEREATING: 3
SUM OF ALL RESPONSES TO PHQ QUESTIONS 1-9: 19

## 2022-08-01 NOTE — PROGRESS NOTES
Patient:  Keyla Busby  Age:  46 y.o.  :  1970     SEX:  female MRN:  [id number]     RACE: Black /      SEEN:  [x]  PATIENT  []  SPOUSE []  OTHER:              PHQ-9  2022   Little interest or pleasure in doing things 2 2 2   Little interest or pleasure in doing things - - -   Feeling down, depressed, or hopeless 2 2 3   Trouble falling or staying asleep, or sleeping too much 2 2 3   Trouble falling or staying asleep, or sleeping too much - - -   Feeling tired or having little energy 3 2 3   Feeling tired or having little energy - - -   Poor appetite or overeating 3 2 3   Poor appetite, weight loss, or overeating - - -   Feeling bad about yourself - or that you are a failure or have let yourself or your family down 1 1 2   Feeling bad about yourself - or that you are a failure or have let yourself or your family down - - -   Trouble concentrating on things, such as reading the newspaper or watching television 3 2 2   Trouble concentrating on things such as school, work, reading, or watching TV - - -   Moving or speaking so slowly that other people could have noticed. Or the opposite - being so fidgety or restless that you have been moving around a lot more than usual 3 2 3   Moving or speaking so slowly that other people could have noticed; or the opposite being so fidgety that others notice - - -   Thoughts that you would be better off dead, or of hurting yourself in some way 0 0 0   Thoughts of being better off dead, or hurting yourself in some way - - -   PHQ-2 Score 4 4 5   Total Score PHQ 2 - - -   PHQ-9 Total Score 19 15 21   PHQ 9 Score - - -   If you checked off any problems, how difficult have these problems made it for you to do your work, take care of things at home, or get along with other people?  3 2 2   How difficult have these problems made it for you to do your work, take care of your home and get along with others - - -       OFELIA-7 discussion virtually to substitute for in-person clinic visit. Due to this being a TeleHealth evaluation, many elements of the physical examination are unable to be assessed. Total Time: minutes: 11-20 minutes. Chief complaint:  Pt says she has been depressed, anxious and fatigued. Subjective:  Seen virtually for a follow up. States has been depressed anxious and fatigued. Sleeps enough but gets up tired, sleep not restful. Has  \"Blown up like a balloon\" and gained a lot of weight. Does talk to friends and does her chores as best as she can. Does shopping online mostly. Does drive. Wants to take off from work for some time to recuperate. [facility] paperwork filled out for her. Supportive psychotherapy provided. She denies suicidal ideation/homicidal ideations. Denies symptoms psychosis.     Patient Active Problem List   Diagnosis    Hypoglycemia    Chronic tension-type headache, intractable    Ganglion cyst    Radiculopathy of cervical region    Lymphocytic colitis    Essential tremor    Uterine leiomyoma    Neuropathic pain    Numbness and tingling    Schatzki's ring    Chronic pain syndrome    B12 deficiency    Hiatal hernia with GERD    History of colonic polyps    Left carpal tunnel syndrome    Pharyngoesophageal dysphagia    Anxiety    Vitamin D deficiency    Cervical radicular pain    Bipolar depression (HCC)    Functional dyspepsia    Chronic fatigue    Irritable bowel syndrome with both constipation and diarrhea    Early satiety    OAB (overactive bladder)    De Quervain's tenosynovitis, right    Restless legs syndrome (RLS)    Encounter for medication management    Left hand pain    Nausea    High-tone pelvic floor dysfunction    GERD with esophagitis    Duodenitis    Other constipation    Terminal ileitis without complication (HCC)    Primary insomnia    Mild episode of recurrent major depressive disorder (HCC)    Erosive gastritis    Right wrist pain    Right forearm pain    Arthritis of carpometacarpal [place]) joint of right thumb    Polyarthralgia    Palpitations     Denies palpitation,SOB, Chest pain, headaches. In no acute distress. MEDICATION REVIEW:    Current Medications:    Current Outpatient Medications   Medication Sig    citalopram (CELEXA) 20 MG tablet Take 1 tablet by mouth every evening    clonazePAM (KLONOPIN) 0.5 MG tablet Take 1 tablet by mouth daily as needed for Anxiety for up to 90 days. traZODone (DESYREL) 50 MG tablet Take 0.5-1 tablets by mouth nightly    atomoxetine (STRATTERA) 80 MG capsule Take 1 capsule by mouth daily    ketoconazole (NIZORAL) 2 % cream Apply topically daily x2 weeks    Wheat Dextrin (BENEFIBER PO)     topiramate (TOPAMAX) 25 MG tablet Take 1 tablet by mouth 2 times daily    cyanocobalamin 1000 MCG tablet Take 1 tablet by mouth daily    LINZESS 72 MCG CAPS capsule Take 1 capsule by mouth daily    naproxen (NAPROSYN) 500 MG tablet Take 1 tablet by mouth as needed    norethindrone (AYGESTIN) 5 MG tablet Take 1 tablet by mouth daily    omeprazole (PRILOSEC) 40 MG delayed release capsule Take 1 capsule (40 mg) by mouth 2 (two) times a day 30 mins before breakfast and 30 mins before dinner    cod liver oil CAPS Take 1 capsule by mouth daily    Ubrogepant (UBRELVY) 50 MG TABS Take 1 tablet at the onset of migraine, may repeat in 2 hours. Do not take more then 3 days a week. TURMERIC PO Take 1 tablet by mouth daily    amitriptyline (ELAVIL) 25 MG tablet TAKE 1 TO 2 TABLETS BY MOUTH EVERY EVENING    Benzoyl Peroxide 2.5 % GEL APPLY TOPICALLY TO FACE AT BEDTIME    celecoxib (CELEBREX) 200 MG capsule Take 200 mg by mouth in the morning.     vitamin D (CHOLECALCIFEROL) 63443 UNIT CAPS Take 50,000 Units by mouth every 7 days    cyclobenzaprine (FLEXERIL) 10 MG tablet Take 10 mg by mouth 3 times daily as needed    diclofenac sodium (VOLTAREN) 1 % GEL Apply 1 g topically as needed    Lidocaine, Anorectal, 5 % CREA Actual prescription: Lidocaine 5%: Neurontin/gabapentin 5% combined topical cream/gelApply to affected area up to 4 times a day as needed for pain    nystatin-triamcinolone (MYCOLOG II) 878058-1.1 UNIT/GM-% cream Apply topically 2 times daily    pramipexole (MIRAPEX) 0.125 MG tablet Take 0.25 mg by mouth    pregabalin (LYRICA) 150 MG capsule TAKE 1 CAPSULE BY MOUTH Four TIMES DAILY    vitamin E 600 units capsule Take by mouth daily    lisinopril-hydroCHLOROthiazide (PRINZIDE;ZESTORETIC) 20-12.5 MG per tablet Take 1 tablet by mouth in the morning. methylfolate (DEPLIN) 7.5 MG TABS tablet Take 1 tablet by mouth in the morning. (Patient not taking: Reported on 8/1/2022)     No current facility-administered medications for this visit. Allergies   Allergen Reactions    Furosemide Other (See Comments)     Edema of lower extremities      Sulfa Antibiotics Other (See Comments) and Rash     GI Upset    Other Other (See Comments)     Oral steroids       Past Medical History, Past Surgical History, Family history, Social History, and Medications were all reviewed with the patient today and updated as necessary.      Compliant with medication: Yes   Side effects from medications:  No     EXAMINATION  Musculoskeletal    GAIT AND STATION   [x] WNL   [] RESTRICTED   [] UNSTEADY WALK        [] ABNORMAL   [] UNBALANCED         PSYCHIATRIC     GENERAL APPEARANCE:   [x]  WELL GROOMED []     DISHEVELED   []  UNKEMPT      []  UNUSUAL/BIZZARE    [] WNL       ATTITUDE:   [x] COOPERATIVE   [] GUARDED   [] SUSPICIOUS      [] HOSTILE                            BEHAVIOR:   [x] CALM   [] HYPERACTIVE   [] MANNERISMS      [] BIZZARE         SPEECH:   [x] NORMAL FOR CLIENT   [] SPONTANEOUS   [] SLURRED   [] WHISPERING      [] LOUD   [] PRESSURED   [] ARTICULATE       EYE CONTACT:   [x] WNL   [] BLANK STARE   [] INTENSE      [] AVOIDANT         MOOD:   [] EUTHYMIC   [x] ANXIOUS   [x] DEPRESSED      [] IRRITABLE   [] ANGRY   [] APATHETIC     AFFECT:   [x] CONGRUENT WITH MOOD [] FLAT   [] CONSTRICTED      [] INAPPROPRIATE   [] LABILE           THOUGHT PROCESS:   [x] LOGICAL/GOAL-DIRECTED   [] FOI   [] CIRCUMSANTIAL      [] INCOHERENT   [] TANGENTIAL   [] CONCRETE      [] PERSEVERATION           THOUGHT CONTENT:                DELUSIONS  [x] DENIES  [] GRANDIOSE  [] PERSECUTORY  [] Yazdanism  [] REFERENCE   HALLUCINATIONS  [x] DENIES  [] AUDITORY  [] VISUAL  [] OLFACTORY  [] TACTILE     [] GUSTATORY  [] SOMATIC         OBSESSIONS  [x] DENIES  [] PRESENT         SUICIDAL IDEATION  [x] DENIES  [] PRESENT W/O PLAN  [] PRESENT W/ PLAN       HOMICIDAL IDEATION  [x] DENIES  [] PRESENT W/O PLAN  [] PRESENT W/ PLAN           JUDGEMENT:   [x] GOOD   [] FAIR   [] POOR   INSIGHT:   [x] GOOD   [] FAIR   [] POOR     COGNITION:           SENSORIUM:   [x] ALERT   [] CLOUDED   [] DROWSY     ORIENTATION:   [x] INTACT   [] TIME:  PLACE  PERSON   RECENT & REMOTE MEMORY:   [] NORMAL   [x] OTHER:                  ATTENTION:   [] INTACT   [x] MILD IMPAIRMENT   [] SEVERE IMPAIRMENT     CONCENTRATION:   [] INTACT   [x] MILD IMPAIRMENT   [] SEVERE IMPAIRMENT     LANGUAGE:   [x] AVERAGE   [] ABOVE AVERAGE   [] BELOW AVERAGE     FUND OF KNOWLEDGE:   [] UNABLE TO ASSESS AT THIS TIME   [x] AVERAGE   [] ABOVE AVERAGE   [] BELOW AVERAGE      [] GOOD TO EXCELLENT KNOWLEDGE OF CURRENT EVENTS   [] POOR TO NO KNLEDGE OF CURRENT EVENTS           ABNORMAL MOVEMENTS:   [x] NONE   [] TICS   [] TREMORS   [] BIZZARE      [] FACE   [] TRUNK   [] EXTREMETIES   [] GESTURES        SLEEP:   [] GOOD   [] FAIR   [x] POOR      MUSCLE STRENGTH AND TONE   [] WNL   [] ATROPHY   [] SPASTIC        [] FLACCID   [] COGWHEEL         Diagnoses/Impressions:    ICD-10-CM    1. Moderate episode of recurrent major depressive disorder (HCC)  F33.1       2. Generalized anxiety disorder  F41.1       3. Insomnia due to mental disorder  F51.05       4.  Chronic generalized pain disorder  R52     G89.29           TREATMENT GOALS:  Symptom reduction, Medication adherence, maintain therapeutic gains    LABS/IMAGING:    []  Ordered [x]  Reviewed []  New Labs Ordered:     LAB  WBC   Date/Time Value Ref Range Status   05/12/2022 09:05 AM 16.2 (H) 4.3 - 11.1 K/uL Final     Hemoglobin   Date/Time Value Ref Range Status   05/12/2022 09:05 AM 13.4 11.7 - 15.4 g/dL Final     Hematocrit   Date/Time Value Ref Range Status   05/12/2022 09:05 AM 39.5 35.8 - 46.3 % Final     Platelets   Date/Time Value Ref Range Status   05/12/2022 09:05  150 - 450 K/uL Final     Sodium   Date/Time Value Ref Range Status   05/20/2022 08:50  134 - 144 mmol/L Final     Potassium   Date/Time Value Ref Range Status   05/20/2022 08:50 AM 4.0 3.5 - 5.2 mmol/L Final     Chloride   Date/Time Value Ref Range Status   05/20/2022 08:50 AM 97 96 - 106 mmol/L Final     CO2   Date/Time Value Ref Range Status   05/20/2022 08:50 AM 24 20 - 29 mmol/L Final     BUN   Date/Time Value Ref Range Status   05/20/2022 08:50 AM 17 6 - 24 mg/dL Final     Magnesium   Date/Time Value Ref Range Status   05/12/2022 09:05 AM 2.1 1.2 - 2.6 mg/dL Final     ALT   Date/Time Value Ref Range Status   05/20/2022 08:50 AM 46 (H) 0 - 32 IU/L Final     AST   Date/Time Value Ref Range Status   05/20/2022 08:50 AM 22 0 - 40 IU/L Final     TSH   Date/Time Value Ref Range Status   05/02/2022 12:00 AM 0.901 0.450 - 4.500 uIU/mL Final       Please refer to the lab tab in the epic and care everywhere for the most recent lab results. Plan:     [x]  Medication ordered: Continue Celexa, Klonopin, trazodone, Strattera to target depression, anxiety, insomnia, attention and concentration. [x]  Medication education/counseling provided  Medication dosage and time to take, purpose/expected benefits/risks, common side effects, lab monitoring required and reason, expected length of treatment, risk of no treatment, effects on pregnancy/nursing, financial availability.  Educated patient on  side effects/risks/benefits of meds including cardiac arrhythmias, suicidal ideations, orthostatic hypotension, serotonin syndrome, risk of jesica/hypomania from antidepressants, withdrawals from abrupt discontinuation of meds, risk of bleeding, risk of seizures, addiction potential, memory impairment with long term use of benzos, respiratory depression, high blood pressure, dizziness, drowsiness, sedation , risk of falls, Risk & benefits discussed: including but not limited to possible off-label medication usage. [x] Follow MSE for sxs improvement     I have reviewed the patients controlled substance prescription history, as maintained in the [address] [place] [address] prescription monitoring program, so that the prescription(s) for a  controlled substance can be given. Recommendations and Referrals: Follow up with : MD, requires monitoring of response to medication, requires monitoring of medication side effects. Time until next PMA:     Follow up with Mental Health Clinicians: psychotherapy interventions, improve level of functioning, monitoring to prevent decompensation /hospitalization, monitoring to maintain therapeutic gains, symptoms (resolving and controlled)           Psychotherapy note:                                __15_ Minutes of psychotherapy     [x]  Supportive psychotherapy, Patient discussed certain situational and personal stressors ongoing in her life at this time, weight management d/w the patient. Sleep hygiene d/w patient. Patient allowed to vent out her emotions. Scenarios were reviewed using role playing and CBT techniques in order to increase insight and decrease anxiety. []  Disposition planning      []  Dangerous and will not contract for safety in the community    **Pateint has been notified: They are to call 911 or go to their nearest E.R. if they are experiencing a medical emergency or suicidal ideations/homicidal ideations. **  All ancillary documentation entered reviewed by provider.       PLEASE NOTE:  This document

## 2022-08-02 ENCOUNTER — HOSPITAL ENCOUNTER (OUTPATIENT)
Dept: PHYSICAL THERAPY | Age: 52
Setting detail: RECURRING SERIES
Discharge: HOME | End: 2022-08-05
Payer: COMMERCIAL

## 2022-08-02 PROCEDURE — 97140 MANUAL THERAPY 1/> REGIONS: CPT

## 2022-08-02 PROCEDURE — 97110 THERAPEUTIC EXERCISES: CPT

## 2022-08-02 ASSESSMENT — PAIN SCALES - GENERAL: PAINLEVEL_OUTOF10: 3

## 2022-08-02 NOTE — PROGRESS NOTES
Ann Marie Knowles  : 1970  Primary: [address]  Secondary:  [address] [facility] [address]  Phone: [phone]  Fax: [phone] No data recorded  Plan of Care/Certification Expiration Date: 22      PT Visit Info: Total # of Visits to Date: 15  Progress Note Counter: 2      OUTPATIENT PHYSICAL THERAPY:OP NOTE TYPE: Treatment Note 2022       Episode  Appt Desk               Treatment Diagnosis: Cervicalgia (M54.2)  Radiculopathy, Cervical Region (M54.12)  Medical/Referring Diagnosis:  Neck pain [M54.2]  Referring Physician:  Dora Mercado MD Orders:  PT Eval and Treat   Date of Onset:  Onset Date: 10/08/21     Allergies:   Furosemide, Sulfa antibiotics, and Other  Restrictions/Precautions:  Restrictions/Precautions: None  No data recorded   Interventions Planned (Treatment may consist of any combination of the following):     No data recorded   Subjective Comments:  Patient reports muscle tightness right side of neck, heating pad helped yesterday  Initial:}    3/10Post Session:        /10  Medications Last Reviewed:  2022  Updated Objective Findings:   right shoulder soreness  Treatment       THERAPEUTIC EXERCISE: ( 23 minutes):  Exercises per grid below to improve mobility, strength and balance. Required minimal visual, verbal and manual cues to promote proper body alignment and promote proper body posture. Progressed resistance and complexity of movement as indicated.      Date:  2022 Date:  2022 Date:  2022 Date:  2022 Date:  2022 Date:  2022 Date:  2022 Date:  2022   Activity/Exercise                      UBE 8min 4/4 L4 8min 4/4 L4 10min 5/5 L4 8min 4/4 L4 8min 4/4 L4 8min 4/4 L4 8min 4/4 L4 8min 4/4 L4   Rings [id number] 26k85unr 52e67mlw [id number] [id number]   Scapular     20x   Doorway stretch [id number]   UT LS stretch [id number]    [id number] [id number] [id number] Assessment:  Patient reports less tension after DN   Communication/Consultation:  None today  Equipment provided today:  None  Recommendations/Intent for next treatment session: Next visit will focus on Progression of ROM and postural strengthening.     Total Treatment Billable Duration:  42 minutes   Time In: 0715  Time Out: 0800    Segundo Martin, PT       Charge Capture  }Post Session Pain  [facility] Portal  MD Guidelines  Scanned Media  Benefits  MyChart    Future Appointments   Date Time Provider Aleah Preston   8/4/2022  7:15 AM Lucasa Gess, PT [facility]   8/9/2022  7:15 AM Segundo Gess, PT [facility]   8/11/2022  7:15 AM Segundo Gess, PT [facility]   8/18/2022 11:00 AM BRANDON Powers - CNP [facility]   10/5/2022 10:30 AM Julia Oliveira MD [facility]   10/7/2022  8:30 AM Alix Gilbert APRN - CNP MAT [facility]   10/18/2022 10:00 AM Pepito Weaver MD [id number]   11/7/2022  2:30 PM Alexander Borges MD [facility]   4/19/2023 10:00 AM Nuno Chatman DO [facility]

## 2022-08-03 ENCOUNTER — TELEMEDICINE (OUTPATIENT)
Dept: INTERNAL MEDICINE CLINIC | Facility: CLINIC | Age: 52
End: 2022-08-03
Payer: COMMERCIAL

## 2022-08-03 ENCOUNTER — PATIENT MESSAGE (OUTPATIENT)
Dept: BEHAVIORAL/MENTAL HEALTH CLINIC | Age: 52
End: 2022-08-03

## 2022-08-03 DIAGNOSIS — M25.50 CHRONIC PAIN OF MULTIPLE JOINTS: ICD-10-CM

## 2022-08-03 DIAGNOSIS — I10 PRIMARY HYPERTENSION: ICD-10-CM

## 2022-08-03 DIAGNOSIS — G89.29 CHRONIC BILATERAL LOW BACK PAIN WITHOUT SCIATICA: ICD-10-CM

## 2022-08-03 DIAGNOSIS — G89.29 CHRONIC IDIOPATHIC PAIN SYNDROME: Primary | ICD-10-CM

## 2022-08-03 DIAGNOSIS — G89.29 CHRONIC PAIN OF MULTIPLE JOINTS: ICD-10-CM

## 2022-08-03 DIAGNOSIS — M54.50 CHRONIC BILATERAL LOW BACK PAIN WITHOUT SCIATICA: ICD-10-CM

## 2022-08-03 DIAGNOSIS — G93.32 CHRONIC FATIGUE SYNDROME: ICD-10-CM

## 2022-08-03 PROCEDURE — 99214 OFFICE O/P EST MOD 30 MIN: CPT | Performed by: NURSE PRACTITIONER

## 2022-08-03 RX ORDER — LISINOPRIL AND HYDROCHLOROTHIAZIDE 20; 12.5 MG/1; MG/1
1 TABLET ORAL DAILY
Qty: 30 TABLET | Refills: 0 | Status: SHIPPED | OUTPATIENT
Start: 2022-08-03 | End: 2022-08-31 | Stop reason: SDUPTHER

## 2022-08-03 RX ORDER — LISINOPRIL AND HYDROCHLOROTHIAZIDE 25; 20 MG/1; MG/1
1 TABLET ORAL DAILY
Qty: 90 TABLET | Refills: 1 | Status: SHIPPED | OUTPATIENT
Start: 2022-08-03 | End: 2022-08-03

## 2022-08-03 ASSESSMENT — ENCOUNTER SYMPTOMS
CONSTIPATION: 0
SINUS PAIN: 0
EYE DISCHARGE: 0
ABDOMINAL PAIN: 0
EYE ITCHING: 0
DIARRHEA: 0
COUGH: 0
APNEA: 0
EYE REDNESS: 0
NAUSEA: 0
COLOR CHANGE: 0
BACK PAIN: 1
EYE PAIN: 0
SHORTNESS OF BREATH: 0
ABDOMINAL DISTENTION: 0

## 2022-08-03 NOTE — PROGRESS NOTES
[unfilled]  [address] [place] [id number]      PROGRESS NOTE    SUBJECTIVE:   Petra Lopez is a 46 y.o. female seen for a follow up visit regarding the following chief complaint:     No chief complaint on file. HPI    Patient presents with request for pain management consult. She has been following pain management for ongoing concerns with chronic pain syndrome involving her back and multiple joint pains. She receives injections periodically with some relief. She has also been on medications intermittently and continuously to help manage her pain. She still feels that her pain is uncontrolled and she would like a second opinion. Complains of severe low back pains radiating across lumbar region, worse with standing. Severe fatigue. Sleep study in process. Additional concerns of fatigue, weakness and hypotension. She was seen by cardiology with suspected orthostatic hypotension and had BP medication reduced last month. She has been taking zestoretic 20-25mg daily but continues to have feelings of light headedness and episodes of presyncope. /70's. She states her psychiatrist recently placed her on leave due to chronic fatigue and anxiety for 90 days, in addition to symptoms of uncontrolled pain, tremors and intermittent tachycardia. Hand tremors: this is ongoing; she was seen by neurology and was told that she had benign essential tremors with normal EMG. Neurological exam was normal. She continues to have hand tremors that are affecting her typing. She is interested in medication for this. Current Outpatient Medications   Medication Sig Dispense Refill    lisinopril-hydroCHLOROthiazide (PRINZIDE;ZESTORETIC) 20-12.5 MG per tablet Take 1 tablet by mouth in the morning. 30 tablet 0    methylfolate (DEPLIN) 7.5 MG TABS tablet Take 1 tablet by mouth in the morning.  (Patient not taking: Reported on 8/1/2022) 30 tablet 5    citalopram (CELEXA) 20 MG tablet Take 1 tablet by mouth every evening 90 tablet 1    clonazePAM (KLONOPIN) 0.5 MG tablet Take 1 tablet by mouth daily as needed for Anxiety for up to 90 days. 30 tablet 2    traZODone (DESYREL) 50 MG tablet Take 0.5-1 tablets by mouth nightly 180 tablet 1    atomoxetine (STRATTERA) 80 MG capsule Take 1 capsule by mouth daily 90 capsule 1    ketoconazole (NIZORAL) 2 % cream Apply topically daily x2 weeks 30 g 1    Wheat Dextrin (BENEFIBER PO)       topiramate (TOPAMAX) 25 MG tablet Take 1 tablet by mouth 2 times daily 180 tablet 1    cyanocobalamin 1000 MCG tablet Take 1 tablet by mouth daily 90 tablet 3    LINZESS 72 MCG CAPS capsule Take 1 capsule by mouth daily      naproxen (NAPROSYN) 500 MG tablet Take 1 tablet by mouth as needed      norethindrone (AYGESTIN) 5 MG tablet Take 1 tablet by mouth daily      omeprazole (PRILOSEC) 40 MG delayed release capsule Take 1 capsule (40 mg) by mouth 2 (two) times a day 30 mins before breakfast and 30 mins before dinner      cod liver oil CAPS Take 1 capsule by mouth daily      Ubrogepant (UBRELVY) 50 MG TABS Take 1 tablet at the onset of migraine, may repeat in 2 hours. Do not take more then 3 days a week. 16 tablet 11    TURMERIC PO Take 1 tablet by mouth daily      amitriptyline (ELAVIL) 25 MG tablet TAKE 1 TO 2 TABLETS BY MOUTH EVERY EVENING      Benzoyl Peroxide 2.5 % GEL APPLY TOPICALLY TO FACE AT BEDTIME      celecoxib (CELEBREX) 200 MG capsule Take 200 mg by mouth in the morning.       vitamin D (CHOLECALCIFEROL) 98592 UNIT CAPS Take 50,000 Units by mouth every 7 days      cyclobenzaprine (FLEXERIL) 10 MG tablet Take 10 mg by mouth 3 times daily as needed      diclofenac sodium (VOLTAREN) 1 % GEL Apply 1 g topically as needed      Lidocaine, Anorectal, 5 % CREA Actual prescription: Lidocaine 5%: Neurontin/gabapentin 5% combined topical cream/gelApply to affected area up to 4 times a day as needed for pain      nystatin-triamcinolone (MYCOLOG II) 665575-9.1 UNIT/GM-% cream Apply topically 2 times daily      pramipexole (MIRAPEX) 0.125 MG tablet Take 0.25 mg by mouth      pregabalin (LYRICA) 150 MG capsule TAKE 1 CAPSULE BY MOUTH Four TIMES DAILY      vitamin E 600 units capsule Take by mouth daily       No current facility-administered medications for this visit. Allergies   Allergen Reactions    Furosemide Other (See Comments)     Edema of lower extremities      Sulfa Antibiotics Other (See Comments) and Rash     GI Upset    Other Other (See Comments)     Oral steroids       Past Medical History:   Diagnosis Date    Anxiety     DDD (degenerative disc disease), cervical 11/29/2021    C3-C7 Disc Degeneration w/ Posterior Spondylotic Disc Displaceemnts per MRI    Depressed     GERD (gastroesophageal reflux disease)     Hypertension     IBS (irritable bowel syndrome)     Insomnia     TMJ (dislocation of temporomandibular joint) 04/2021     Past Surgical History:   Procedure Laterality Date    CATARACT REMOVAL Bilateral 02/2020    COLONOSCOPY      EGD DELIVER THERMAL ENERGY SPHNCTR/CARDIA GERD      LIPECTOMY      TUBAL LIGATION       Family History   Problem Relation Age of Onset    Heart Disease Father     Hypertension Father     High Cholesterol Mother     GERD Mother      Social History     Tobacco Use    Smoking status: Never    Smokeless tobacco: Never   Substance Use Topics    Alcohol use: Never       Review of Systems   Constitutional:  Positive for fatigue. Negative for activity change, appetite change, chills and fever. HENT:  Negative for congestion, ear pain and sinus pain. Eyes:  Negative for pain, discharge, redness and itching. Respiratory:  Negative for apnea, cough and shortness of breath. Cardiovascular:  Negative for chest pain, palpitations and leg swelling. Gastrointestinal:  Negative for abdominal distention, abdominal pain, constipation, diarrhea and nausea. Endocrine: Negative for cold intolerance and heat intolerance.    Genitourinary:  Negative for difficulty urinating, dysuria, enuresis and urgency. Musculoskeletal:  Positive for arthralgias, back pain, myalgias and neck pain. Negative for joint swelling. Skin:  Negative for color change and rash. Neurological:  Positive for tremors. Negative for dizziness, weakness and headaches. Psychiatric/Behavioral:  Negative for behavioral problems and sleep disturbance. The patient is not nervous/anxious. OBJECTIVE:  There were no vitals taken for this visit. Physical Exam      ASSESSMENT and PLAN  Diagnoses and all orders for this visit:    Chronic idiopathic pain syndrome  -     [facility] - Joey Castellanos DO, Pain Management, [place]    Chronic bilateral low back pain without sciatica  -     Joey Singletary DO, Pain Management, [place]    Chronic pain of multiple joints  -     Joey Singletary DO [facility] [place]    Primary hypertension  -     lisinopril-hydroCHLOROthiazide (PRINZIDE;ZESTORETIC) 20-12.5 MG per tablet; Take 1 tablet by mouth in the morning. Chronic fatigue syndrome    We discussed referral to pain management in [facility]. Start on zestoretic 20-12.5mg daily; follow with cardiology as planned. Schedule sleep study. Plan to consult with psychiatry regarding primidone for tremors and will send medication. Pursuant to the emergency declaration under the [facility], [facility] waiver authority and the [employer] Act, this Virtual Visit was conducted, with patient's consent, to reduce the patient's risk of exposure to COVID-19 and provide continuity of care for an established patient. Services were provided through a video synchronous discussion virtually to substitute for in-person clinic visit. Patient consented to virtual visit.   Greater than 50% of this 25 min visit was spent counseling the patient about test results, prognosis, importance of compliance, education about disease process,

## 2022-08-04 ENCOUNTER — TELEPHONE (OUTPATIENT)
Dept: BEHAVIORAL/MENTAL HEALTH CLINIC | Age: 52
End: 2022-08-04

## 2022-08-04 ENCOUNTER — HOSPITAL ENCOUNTER (OUTPATIENT)
Dept: PHYSICAL THERAPY | Age: 52
Setting detail: RECURRING SERIES
Discharge: HOME | End: 2022-08-07
Payer: COMMERCIAL

## 2022-08-04 PROCEDURE — 97110 THERAPEUTIC EXERCISES: CPT

## 2022-08-04 RX ORDER — LISINOPRIL AND HYDROCHLOROTHIAZIDE 20; 12.5 MG/1; MG/1
1 TABLET ORAL DAILY
Qty: 90 TABLET | OUTPATIENT
Start: 2022-08-04

## 2022-08-04 ASSESSMENT — PAIN SCALES - GENERAL: PAINLEVEL_OUTOF10: 4

## 2022-08-04 NOTE — TELEPHONE ENCOUNTER
Received the following message from patient \"You named a certain type tremor and medication. What were they? \"

## 2022-08-04 NOTE — PROGRESS NOTES
Tonny Haque  : 1970  Primary: Kermitt Schlatter Sc  Secondary:  [address] [facility] [address]  Phone: [phone]  Fax: [phone] No data recorded  Plan of Care/Certification Expiration Date: 22      PT Visit Info: Total # of Visits to Date: 15  Progress Note Counter: 3      OUTPATIENT PHYSICAL THERAPY:OP NOTE TYPE: Treatment Note 2022       Episode  Appt Desk               Treatment Diagnosis: Cervicalgia (M54.2)  Radiculopathy, Cervical Region (M54.12)  Medical/Referring Diagnosis:  Neck pain [M54.2]  Referring Physician:  Henok Castillo MD Orders:  PT Eval and Treat   Date of Onset:  Onset Date: 10/08/21     Allergies:   Furosemide, Sulfa antibiotics, and Other  Restrictions/Precautions:  Restrictions/Precautions: None  No data recorded   Interventions Planned (Treatment may consist of any combination of the following):     No data recorded   Subjective Comments:  Patient reports a pop in left anterior shoulder when sweeping yesterday and pain today, DN helped right side and no pain there  Initial:}    4/10Post Session:        /10  Medications Last Reviewed:  2022  Updated Objective Findings:   left biceps pain  Treatment       THERAPEUTIC EXERCISE: ( 38 minutes):  Exercises per grid below to improve mobility, strength and balance. Required minimal visual, verbal and manual cues to promote proper body alignment and promote proper body posture. Progressed resistance and complexity of movement as indicated.      Date:  2022 Date:  2022 Date:  2022 Date:  2022 Date:  2022 Date:  2022   Activity/Exercise                  UBE 8min 4/4 L4 8min 4/4 L4 8min 4/4 L4 8min 4/4 L4 8min 4/4 L4 8min 4/4 L4   Rings [id number] 68t53agn 20e40haf 79a82yer 05h23vvr 37a72izd   Scapular  20x   Doorway stretch 5t94ooa Doorway stretch 9r07xut   UT LS stretch  1d30yrv 8a88jzj 3h83zpa 3i74rjr 8l31fuk   Nags Snags   10x R/L  10x R/L    Prone CT rotation   10x each      Prone cervical rotation with Ext   10x each      Foam roll 3 way stretch      2q22uhu   FE wall slide w/ lift off    15x  green              Bilateral ER      20x blue   Horizontal Abd      20x blue   D2 Flexion      20x blue   Row TRX 3x10   TRX 3x10     Pull Down         Andrade Carry         90/90 ER 2x10 yellow        Y T  2x10 yellow              THERAPEUTIC ACTIVITY: ( 0 minutes): Activities per gid below to improve functional movement related mobility, strength and balance to improve neuro-muscular carryover to daily functional activities for improving patient's quality of life. Required visual, verbal and manual cues to promote proper body alignment and promote proper body posture/mechanics. Progressed resistance and complexity of movement as indicated. Date:  8/4/2022 Date:   Date:     Activity/Exercise Parameters Parameters Parameters                                                   THERAPEUTIC DRY NEEDLING: (0 minutes)      MANUAL THERAPY: (4 minutes): Joint mobilization, Soft tissue mobilization was utilized and necessary because of the patient's restricted joint motion and restricted motion of soft tissue mobility.         Date  8/4/2022    Technique Used Grade  Level # Time(s) Effect while being performed          Manual Traction  Cervical  Improved cervical rotation right          Jt Mobs CPA, rotation seated III Cervical  Improved Mobility          Manual ROM  Cervical  Improved Mobility   Tissue mobilization  Left Biceps tendon 4min Decreased tenderness                HEP Log Date    8/4/2022   2.     3.    4.    5.              Treatment/Session Summary:    Treatment Assessment:  Patient presented with biceps tendon pain from impingement, less pain after treatment   Communication/Consultation:  None today  Equipment provided today:  None  Recommendations/Intent for next treatment session: Next visit will focus on Progression of ROM and postural strengthening.     Total Treatment Billable Duration:  42 minutes   Time In: 0715  Time Out: 0800    Rufus Eddy, PT       Charge Capture  }Post Session Pain  MedBridge Portal  MD Guidelines  Scanned Media  Benefits  MyChart    Future Appointments   Date Time Provider Aleah Kary   8/9/2022  7:15 AM Rufus Eddy, PT [facility]   8/11/2022  7:15 AM Rufus Eddy, PT [facility] AND [facility]   8/18/2022 11:00 AM Shiloh De Guzman APRN - CNP PSCD [facility]   10/5/2022 10:30 AM Wilbur Prasad MD UCDS [facility]   10/7/2022  8:30 AM BRANDON Mayorga - CNP MAT [facility]   10/18/2022 10:00 AM Yoel Campos MD [id number] [facility]   11/7/2022  2:30 PM Evelia Panchal MD BSNI [facility]   4/19/2023 10:00 AM Yarelis Chatman DO BSUG [facility]

## 2022-08-05 ENCOUNTER — TELEPHONE (OUTPATIENT)
Dept: BEHAVIORAL/MENTAL HEALTH CLINIC | Age: 52
End: 2022-08-05

## 2022-08-05 ENCOUNTER — OFFICE VISIT (OUTPATIENT)
Dept: ORTHOPEDIC SURGERY | Age: 52
End: 2022-08-05
Payer: COMMERCIAL

## 2022-08-05 DIAGNOSIS — M75.22 BICEPS TENDINITIS, LEFT: ICD-10-CM

## 2022-08-05 DIAGNOSIS — M19.012 DJD OF LEFT AC (ACROMIOCLAVICULAR) JOINT: Primary | ICD-10-CM

## 2022-08-05 PROCEDURE — 99214 OFFICE O/P EST MOD 30 MIN: CPT | Performed by: PHYSICIAN ASSISTANT

## 2022-08-05 RX ORDER — LISINOPRIL AND HYDROCHLOROTHIAZIDE 25; 20 MG/1; MG/1
TABLET ORAL
COMMUNITY
Start: 2022-08-03 | End: 2022-08-31

## 2022-08-05 NOTE — TELEPHONE ENCOUNTER
Patient stated that at her visit Dr Crow Ng had discussed a medication with her for tremors and named it a specific type of tremor and she did not want to start it but is now trying to find out what the name of the mediation is as she is discussing it with her PCP.

## 2022-08-07 ENCOUNTER — PATIENT MESSAGE (OUTPATIENT)
Dept: BEHAVIORAL/MENTAL HEALTH CLINIC | Age: 52
End: 2022-08-07

## 2022-08-08 ENCOUNTER — TELEPHONE (OUTPATIENT)
Dept: BEHAVIORAL/MENTAL HEALTH CLINIC | Age: 52
End: 2022-08-08

## 2022-08-08 NOTE — TELEPHONE ENCOUNTER
Received the following message from the patient  \"I think I need another in person visit. \"  Have sent a message to the patient to find out what it is regarding so that Dr Neda Ventura can decide how to proceed.

## 2022-08-08 NOTE — TELEPHONE ENCOUNTER
Patient advised that the [facility] had asked why she was not seen more regular. Advised patient that she is scheduled for 10/22. She will keep this appointment and will reach out if she has any further questions or concerns.

## 2022-08-09 ENCOUNTER — HOSPITAL ENCOUNTER (OUTPATIENT)
Dept: PHYSICAL THERAPY | Age: 52
Setting detail: RECURRING SERIES
Discharge: HOME | End: 2022-08-12
Payer: COMMERCIAL

## 2022-08-09 PROCEDURE — 97110 THERAPEUTIC EXERCISES: CPT

## 2022-08-09 ASSESSMENT — PAIN SCALES - GENERAL: PAINLEVEL_OUTOF10: 4

## 2022-08-09 NOTE — PROGRESS NOTES
Benedict Baird  : 1970  Primary: Bill Godinez Sc  Secondary:  [address] [facility] [address]  Phone: [phone]  Fax: [phone] No data recorded  Plan of Care/Certification Expiration Date: 22      PT Visit Info: Total # of Visits to Date: 14  Progress Note Counter: 4      OUTPATIENT PHYSICAL THERAPY:OP NOTE TYPE: Treatment Note 2022       Episode  Appt Desk               Treatment Diagnosis: Cervicalgia (M54.2)  Radiculopathy, Cervical Region (M54.12)  Medical/Referring Diagnosis:  Neck pain [M54.2]  Referring Physician:  Jonathan Gonzalez MD Orders:  PT Eval and Treat   Date of Onset:  Onset Date: 10/08/21     Allergies:   Furosemide, Sulfa antibiotics, and Other  Restrictions/Precautions:  Restrictions/Precautions: None  No data recorded   Interventions Planned (Treatment may consist of any combination of the following):     No data recorded   Subjective Comments:  Patient reports some pain in right side of neck friday last week but better today after icing over the weekend  Initial:    4/10Post Session:       3/10  Medications Last Reviewed:  2022  Updated Objective Findings:   left biceps pain  Treatment       THERAPEUTIC EXERCISE: ( 39 minutes):  Exercises per grid below to improve mobility, strength and balance. Required minimal visual, verbal and manual cues to promote proper body alignment and promote proper body posture. Progressed resistance and complexity of movement as indicated.      Date:  2022 Date:  2022 Date:  2022 Date:  2022 Date:  2022 Date:  2022   Activity/Exercise                  UBE 8min 4/4 L4 8min 4/4 L4 8min 4/4 L4 8min 4/4 L4 8min 4/4 L4 8 min hills L5   Rings [id number] 01m15zfg 44g53pfu 44w62nlj 00u71tzl 61e79are   Scapular 20x   Doorway stretch 1a89ykk Doorway stretch 7w85zjy Doorway stretch 0m52zod   UT LS stretch 6p25cuo 1n05sgp 9u42whs 8p45uym 3x48scm    Nags Snags  10x R/L  10x R/L     Prone CT rotation  10x each       Prone cervical rotation with Ext  10x each       Foam roll 3 way stretch     7u25gia 7h09lvf   FE wall slide w/ lift off   15x  green               Bilateral ER     20x blue    Horizontal Abd     20x blue    D2 Flexion     20x blue    Row   TRX 3x10   2x10 Cable 30#   Pull Down      2x10 Cable 37#   Pitzi         90/90 ER      Prone 2x10   Y T       Prone 2x10         THERAPEUTIC ACTIVITY: ( 0 minutes): Activities per gid below to improve functional movement related mobility, strength and balance to improve neuro-muscular carryover to daily functional activities for improving patient's quality of life. Required visual, verbal and manual cues to promote proper body alignment and promote proper body posture/mechanics. Progressed resistance and complexity of movement as indicated. Date:  8/9/2022 Date:   Date:     Activity/Exercise Parameters Parameters Parameters                                                   THERAPEUTIC DRY NEEDLING: (0 minutes)      MANUAL THERAPY: (4 minutes): Joint mobilization, Soft tissue mobilization was utilized and necessary because of the patient's restricted joint motion and restricted motion of soft tissue mobility.         Date  8/9/2022    Technique Used Grade  Level # Time(s) Effect while being performed          Manual Traction  Cervical  Improved cervical rotation right          Jt Mobs CPA, rotation seated III Cervical  Improved Mobility          Manual ROM  Cervical  Improved Mobility   Tissue mobilization  Left Biceps tendon 4min Decreased tenderness                HEP Log Date    8/9/2022   2.     3.    4.    5.              Treatment/Session Summary:    Treatment Assessment:  Patient was fatigued R>L with increased scapular exercises   Communication/Consultation:  None today  Equipment provided today:  None  Recommendations/Intent for next treatment session: Next visit will focus on Progression of ROM and postural strengthening.     Total Treatment Billable Duration:  43 minutes   Time In: 0715  Time Out: 0800    Ayla Honeycutt PT       Charge Capture  }Post Session Pain  MedBridge Portal  MD Guidelines  Scanned Media  Benefits  MyChart    Future Appointments   Date Time Provider Aleah Preston   8/11/2022  7:15 AM Ayla Honeycutt PT [facility]   8/12/2022  8:30 AM POA INT MRI IN[facility] AMB RAD SC   8/16/2022  1:45 PM SU Esp[place] Mt POAI [facility]   8/18/2022 11:00 AM Marshal Woo APRN - CNP PSCD [facility]   10/5/2022 10:30 AM Risa Cullen MD UCD[facility]   10/7/2022  8:30 AM Alix Martinez APRN - CNP MAT [facility]   10/18/2022 10:00 AM Rhianna Bruce MD [id number] [facility]   11/7/2022  2:30 PM Darian Andrew MD BS[facility]   4/19/2023 10:00 AM Yennifer Chatman DO B[facility]

## 2022-08-10 NOTE — PROGRESS NOTES
Name: Malachi Ordonez  YOB: 1970  Gender: female  MRN: [id number]    CC:   Chief Complaint   Patient presents with    Shoulder Pain     Left shoulder recheck        HPI: Patient presents for FU of left shoulder pain. Patient had some cervical spine pain at her last visit as well which was greatly relieved by dry needling. We also discussed follow up with pain management who initially set her up with injection which she cancelled. She notes her left shoulder had a pop when she was sweeping and began having increased symptoms. The patient is still in PT at this time.      Allergies   Allergen Reactions    Furosemide Other (See Comments)     Edema of lower extremities      Sulfa Antibiotics Other (See Comments) and Rash     GI Upset    Other Other (See Comments)     Oral steroids     Past Medical History:   Diagnosis Date    Anxiety     DDD (degenerative disc disease), cervical 11/29/2021    C3-C7 Disc Degeneration w/ Posterior Spondylotic Disc Displaceemnts per MRI    Depressed     GERD (gastroesophageal reflux disease)     Hypertension     IBS (irritable bowel syndrome)     Insomnia     TMJ (dislocation of temporomandibular joint) 04/2021     Past Surgical History:   Procedure Laterality Date    CATARACT REMOVAL Bilateral 02/2020    COLONOSCOPY      EGD DELIVER THERMAL ENERGY SPHNCTR/CARDIA GERD      LIPECTOMY      TUBAL LIGATION       Family History   Problem Relation Age of Onset    Heart Disease Father     Hypertension Father     High Cholesterol Mother     GERD Mother      Social History     Socioeconomic History    Marital status:      Spouse name: Not on file    Number of children: Not on file    Years of education: Not on file    Highest education level: Not on file   Occupational History    Not on file   Tobacco Use    Smoking status: Never    Smokeless tobacco: Never   Vaping Use    Vaping Use: Never used   Substance and Sexual Activity    Alcohol use: Never    Drug use: Never    Sexual activity: Not on file   Other Topics Concern    Not on file   Social History Narrative    Not on file     Social Determinants of Health     Financial Resource Strain: Not on file   Food Insecurity: Not on file   Transportation Needs: Not on file   Physical Activity: Not on file   Stress: Not on file   Social Connections: Not on file   Intimate Partner Violence: Not on file   Housing Stability: Not on file          AMB PAIN ASSESSMENT 6/15/2022   Location of Pain Neck   Location Modifiers Left;Right   Severity of Pain 7   Quality of Pain Other (Comment)   Duration of Pain Persistent   Frequency of Pain Constant   Date Pain First Started 10/12/2021   Aggravating Factors Standing;Walking; Other (Comment)   Limiting Behavior Yes   Relieving Factors Other (Comment); Ice;Exercise   Result of Injury Yes   Work-Related Injury No   Are there other pain locations you wish to document? Yes       Review of Systems  Non-contributory    PE left shoulder:    Full ROM of the shoulder. Appropriate strength. Tender to palpate AC joint and biceps tendon. Distal motor function, sensation and pulses intact. Positive O'briens test.      A/Plan:     ICD-10-CM    1. DJD of left AC (acromioclavicular) joint  M19.012 MRI SHOULDER LEFT WO CONTRAST      2. Biceps tendinitis, left  M75.22 MRI SHOULDER LEFT WO CONTRAST           I discussed with the patient some AC joint arthropathy as well as biceps tendinitis. We discussed she has tried and failed conservative measures at this time and ultimately her cervical spine has improved. I would like to obtain MRI as she is likely a candidate for arthroscopy. She would like to proceed and we will see her back to make definitive plans based on findings. Given the chronicity and severity of the patient's symptoms, we will obtain an MRI. We will see them back after the scan and make a determination regarding further treatment.   If there is a tear or other problem, they may require surgery. If negative, would recommend NSAID's, PT, or injection. They are amenable to this treatment plan. Return for MRI results.         Nanette Landers  08/10/22

## 2022-08-11 ENCOUNTER — CLINICAL DOCUMENTATION (OUTPATIENT)
Dept: ORTHOPEDIC SURGERY | Age: 52
End: 2022-08-11

## 2022-08-11 ENCOUNTER — HOSPITAL ENCOUNTER (OUTPATIENT)
Dept: PHYSICAL THERAPY | Age: 52
Setting detail: RECURRING SERIES
Discharge: HOME | End: 2022-08-14
Payer: COMMERCIAL

## 2022-08-11 PROCEDURE — 97110 THERAPEUTIC EXERCISES: CPT

## 2022-08-11 PROCEDURE — 97140 MANUAL THERAPY 1/> REGIONS: CPT

## 2022-08-11 ASSESSMENT — PAIN SCALES - GENERAL: PAINLEVEL_OUTOF10: 1

## 2022-08-11 NOTE — PROGRESS NOTES
Ann Marie Eleni  : 1970  Primary: Irena Craig Sc  Secondary:  [address] [facility] [address]  Phone: [phone]  Fax: [phone] No data recorded  Plan of Care/Certification Expiration Date: 22      PT Visit Info: Total # of Visits to Date: 15  Progress Note Counter: 5      OUTPATIENT PHYSICAL THERAPY:OP NOTE TYPE: Treatment Note 2022       Episode  Appt Desk               Treatment Diagnosis: Cervicalgia (M54.2)  Radiculopathy, Cervical Region (M54.12)  Medical/Referring Diagnosis:  Neck pain [M54.2]  Referring Physician:  Dora Mercado MD Orders:  PT Eval and Treat   Date of Onset:  Onset Date: 10/08/21     Allergies:   Furosemide, Sulfa antibiotics, and Other  Restrictions/Precautions:  Restrictions/Precautions: None  No data recorded   Interventions Planned (Treatment may consist of any combination of the following):     No data recorded   Subjective Comments:  Pt reports no pain in left shoulder, just stiffness in right  Initial:    1/10Post Session:       1/10  Medications Last Reviewed:  2022  Updated Objective Findings:  See evaluation note from today  Treatment       THERAPEUTIC EXERCISE: ( 15 minutes):  Exercises per grid below to improve mobility, strength and balance. Required minimal visual, verbal and manual cues to promote proper body alignment and promote proper body posture. Progressed resistance and complexity of movement as indicated.      Date:  2022 Date:  2022 Date:  2022 Date:  2022 Date:  2022 Date:  2022 Date:  2022   Activity/Exercise                 Review HEP   UBE 8min 4/4 L4 8min 4/4 L4 8min 4/4 L4 8min 4/4 L4 8min 4/4 L4 8 min hills L5 8 min hills L5   Rings [id number] [id number] 25a09tmv 56w05cqy 62y96myo 04z91vrm 48r64afh   Scapular 20x   Doorway stretch 2q72gqx Doorway stretch 3i88xqk Doorway stretch 6k98uxr Doorway stretch 3r39sia   UT LS stretch 5i25ydr 2n24ylc 6s76yxq 1i79zjl 5c02jrn  0t89tan   Nags Snags  10x R/L  10x R/L   10x R/L   Prone CT rotation  10x each        Prone cervical rotation with Ext  10x each        Foam roll 3 way stretch     1h74aon 2p85sbt    FE wall slide w/ lift off   15x  green                 Bilateral ER     20x blue     Horizontal Abd     20x blue     D2 Flexion     20x blue     Row   TRX 3x10   2x10 Cable 30#    Pull Down      2x10 Cable 37#    [employer]          90/90 ER      Prone 2x10    Y T       Prone 2x10          THERAPEUTIC ACTIVITY: ( 0 minutes): Activities per gid below to improve functional movement related mobility, strength and balance to improve neuro-muscular carryover to daily functional activities for improving patient's quality of life. Required visual, verbal and manual cues to promote proper body alignment and promote proper body posture/mechanics. Progressed resistance and complexity of movement as indicated. Date:  8/11/2022 Date:   Date:     Activity/Exercise Parameters Parameters Parameters                                                   THERAPEUTIC DRY NEEDLING: (0 minutes)      MANUAL THERAPY: (10 minutes): Joint mobilization, Soft tissue mobilization was utilized and necessary because of the patient's restricted joint motion and restricted motion of soft tissue mobility.         Date  8/11/2022    Technique Used Grade  Level # Time(s) Effect while being performed          Manual Traction  Cervical  Improved cervical rotation right          Jt Mobs CPA, rotation seated III Cervical  Improved Mobility          Manual ROM  Cervical 5min Improved Mobility   Tissue mobilization  Cervical 5min Decreased tenderness                HEP Log Date    8/11/2022   2.     3.    4.    5.              Treatment/Session Summary:    Treatment Assessment:    See Discharge Summary  Communication/Consultation:  None today  Equipment provided today:  None  Recommendations/Intent for next treatment session: Next visit will focus on Progression of ROM and postural strengthening.     Total Treatment Billable Duration:  25 minutes   Time In: 0715  Time Out: 1000 Gisell Valdovinos PT       Charge Capture  Post Session Pain  MedBridge Portal  MD Guidelines  Scanned Media  Benefits  MyChart    Future Appointments   Date Time Provider Aleah Kary   8/12/2022  8:30 AM POA INT MRI INTMXR AMB RAD SC   8/16/2022  1:45 PM SU Perez POAI [facility] AMB   8/18/2022 11:00 AM Jesus Lee APRN - CNP PSCD [facility] AMB   10/5/2022 10:30 AM Nikita Calles MD UCDS [facility] AMB   10/7/2022  8:30 AM Alix Guevara APRN - CNP MAT [facility] AMB   10/18/2022 10:00 AM Jazzy Leung MD [id number] [facility] AMB   11/7/2022  2:30 PM Luis Carlos Benavides MD BSNI [facility] AMB   4/19/2023 10:00 AM Yennifer Chatman DO BSUG [facility] AMB

## 2022-08-11 NOTE — THERAPY DISCHARGE
Tessa Rocha  : 1970  Primary: Shashank Mohamud Sc  Secondary:  [address] [facility] [address]  Phone: [phone]  Fax: [phone] No data recorded  Plan of Care/Certification Expiration Date: 22      PT Visit Info: Total # of Visits to Date: 15  Progress Note Counter: 5      OUTPATIENT PHYSICAL THERAPY:OP NOTE TYPE: Discharge Summary 2022               Episode  Appt Desk         Treatment Diagnosis:  ICD-10: Treatment Diagnosis: Cervicalgia (M54.2)  Radiculopathy, Cervical Region (M54.12)    Medical/Referring Diagnosis:  Neck pain [M54.2]  Referring Physician:  Rupali Rivera MD Orders:  PT Eval and Treat   Return MD Appt:  TBD  Date of Onset:  Onset Date: 10/08/21     Allergies:  Furosemide, Sulfa antibiotics, and Other  Restrictions/Precautions:    Restrictions/Precautions: None  No data recorded   Medications Last Reviewed:  2022     SUBJECTIVE   History of Injury/Illness (Reason for Referral):  Patient is familiar to this clinic for previous treatment of Shoulder pain, left hip pain, and TMD. She returning to address current s/s of mostly left arm tingling and parascapular pain from Cervical radiculopathy. Patient Stated Goal(s): \"Perform daily activity without left arm, neck and scapular pain\"  Initial:     1/10 Post Session:     1/10  Past Medical History/Comorbidities:   Ms. Marlo Gomez  has a past medical history of Anxiety, DDD (degenerative disc disease), cervical, Depressed, GERD (gastroesophageal reflux disease), Hypertension, IBS (irritable bowel syndrome), Insomnia, and TMJ (dislocation of temporomandibular joint). Ms. Marlo Gomez  has a past surgical history that includes egd deliver thermal energy sphnctr/cardia gerd; Cataract removal (Bilateral, 2020); lipectomy; Tubal ligation; and Colonoscopy.   Social History/Living Environment:   Lives With: Family     Prior Level of Function/Work/Activity: Prior level of function: Independent  Occupation: Full time employment  No data recordedNo data recorded   Learning: Will there be a co-learner?: No  What is the preferred language of the patient/guardian?: English  Is an  required?: No  How does the patient/guardian prefer to learn new concepts?: Listening; Reading; Demonstration; Pictures/Videos     Fall Risk Scale: Total Score: 0  [place] Fall Risk: Low (0-24)     Dominant Side:  right handed        OBJECTIVE   Observation/Orthostatic Postural Assessment:           Forward head and shoulders  Palpation:          TTP right UT  ROM:      AROM/PROM                  Joint: Eval Date: 6/21/22  Re-Assess Date: 7/26/2022  Re-Assess Date: 8/11/2022    Active ROM         Cervical Extension 35  35  40    Cervical Flexion 40  50  57     RIGHT LEFT RIGHT LEFT RIGHT LEFT   Cervical Sidebending 40 35 40 35 40 40   Cervical Rotation 55 53 75 60 75 70            Shoulder Flexion WNL WNL WNL WNL WNL WNL   Shoulder Abduction WNL WNL WNL WNL WNL WNL            Lumbar Flexion WNL WNL WNL WNL WNL WNL   Lumber Extension WNL WNL WNL WNL WNL WNL       Strength:     Eval Date: 6/21/22  Re-Assess Date: 7/26/2022  Re-Assess Date: 8/11/2022      RIGHT LEFT RIGHT LEFT RIGHT LEFT   Shoulder Flexion 5/5 5/5 5/5 5/5 5/5 5/5   Shoulder Abduction (C5) 5/5 5/5 5/5 5/5 5/5 5/5   Shoulder Internal Rotation 5/5 5/5 5/5 5/5 5/5 5/5   Shoulder External Rotation 5/5 4+/5 5/5 5/5 5/5 5/5   Elbow Flexion (C6) 5/5 5/5 5/5 5/5 5/5 5/5   Elbow Extension (C7) 5/5 5/5 5/5 5/5 5/5 5/5   Wrist Flexion (C7) 5/5 5/5 5/5 5/5 5/5 5/5   Wrist Extension (C6) 5/5 5/5 5/5 5/5 5/5 5/5   Resisted Thumb Extension/Finger Abduction (C8/T1) Normal     Normal Normal Normal Normal Normal   Resisted Cervical Rotation (C1): Normal Normal Normal Normal Normal Normal   Scapula 4/5 4/5 4+/5 4+/5 5/5 5/5                Manual:  Joint Directon Grade Treatment Effect   C3-7 Central PA II III Unremarkable left Reflex Testing (Biceps, Brachioradialis, Triceps): Normal    Location LEFT RIGHT   Biceps 2 2   Brachioradialis 2 2   Triceps 2 2       Special Tests:    Distraction: Negative        Sharp Herminia's: Negative              Upper Limb Tension Test Median Nerve: negative              Upper Limb Tension Test Ulnar: negative              Upper Limb Tension Test Radial: negative       Neurological Screen: Radiating symptoms: Yes     Functional Mobility:  Limited with cervical rotation for driving and maintaining correct posture at work    Balance: \"No Testing Needed\"    ASSESSMENT   Initial Assessment:    Benedict Baird presents to physical therapy with decreased strength, ROM, joint mobility, functional mobility. These S/S are consistent with referring diagnosis. Patient will benefit from skilled physical therapy for manual therapeutic techniques (as appropriate), therapeutic exercises and activities, balance and comprehensive home exercises program to address current impairments and functional limitations. PROGRESS NOTE (7/26/2022): Patient has been seen for 10 sessions of physical therapy from 6/21/2022 to 7/26/2022. Patient reports feeling 60-70% better with cervical pain and currently no radicular pain in left UE only parascapular pain right. Patient will benefit from continued skilled physical therapy to address remaining goals and deficits. DISCHARGE NOTE (8/11/2022): Patient has been seen for 15 sessions of physical therapy from 6/21/2022 to 8/11/2022. Patient reports feeling 80% better with pain and tolerance to activity. Patient has currently met most goals and is independent with HEP. Problem List: (Impacting functional limitations): Body Structures, Functions, Activity Limitations Requiring Skilled Therapeutic Intervention: Decreased functional mobility ; Decreased ROM; Decreased body mechanics; Decreased tolerance to work activity;  Decreased strength; Decreased endurance; Increased pain; Decreased posture     Therapy Prognosis:   Therapy Prognosis: Good     Assessment Complexity:      PLAN   Effective Dates: 6/21/22 TO Plan of Care/Certification Expiration Date: 09/21/22     Frequency/Duration: Discharge   Interventions Planned (Treatment may consist of any combination of the following):  Discharge HEP     Goals: (Goals have been discussed and agreed upon with patient.)    Short-Term Goals~4 weeks  Goal Met   Bernadette Jordan will report <=3/10 pain to cervical spine with performance of functional spinal mobility and rotation. 1.  [x] Date: 7/26/2022   Bernadette Jordan to be independent with initial HEP for cervical region and UE's. 2.  [x] Date: 7/26/2022   Bernadette Jordan will improve ROM to >=90% to assist with improved function during instrumental activities of daily living. 3.  [x] Date: 7/26/2022   Bernadette Jordan will improve MMT to >=4+/5 to all UE strengthening to return to PLOF and improve functional tolerance. 4.  [x] Date: 7/26/2022       Long Term Goals~8 weeks Goal Met   Bernadette Jordan will report <=1/10 pain to cervical spine with performance of functional spinal mobility and rotation and minimal to no difficulty with such tasks. 1.  [x] Date: 8/11/2022   Bernadette Jordan will demonstrate improved NDI score, indicating spinal improvement from 16/50 to 8/50 affecting minimal to no difficulty with performance of cervical mobility and strengthening. 2.  [] Date:    Bernadette Jordan to be independent with advanced HEP for cervical region and UE's. 3.  [x] Date: 8/11/2022                    Outcome Measure: Tool Used: Neck Disability Index (NDI)  Score:  Initial: 16/50  Most Recent: 10 /50 (Date: 8/11/2022)   Interpretation of Score: The Neck Disability Index is a revised form of the Oswestry Low Back Pain Index and is designed to measure the activities of daily living in person's with neck pain.   Each section is scored on a 0-5 scale, 5 representing the greatest disability. The scores of each section are added together for a total score of 50    Reason For Services/Other Comments:  Patient has been seen for 15 sessions of physical therapy from 6/21/2022 to 8/11/2022. Patient reports feeling 80% better with pain and tolerance to activity. Patient has currently met most goals and is independent with HEP. Regarding Mell Cortez's therapy, I certify that the treatment plan above will be carried out by a therapist or under their direction. Thank you for this referral,  Mayuri Fermin PT     Referring Physician Signature: Ty Gutierres PA-C No Signature is Required for this note.         Post Session Pain  Charge Capture   POC Link  Treatment Note Link  MD Violetta Horowitz

## 2022-08-12 ENCOUNTER — TELEPHONE (OUTPATIENT)
Dept: BEHAVIORAL/MENTAL HEALTH CLINIC | Age: 52
End: 2022-08-12

## 2022-08-12 NOTE — TELEPHONE ENCOUNTER
I received the following message from the patient \"Hello I had a prescription review with my pharmacist.  She suggested taking citalopram in the morning. Please advise if you are okay with this change. \"

## 2022-08-15 ENCOUNTER — OFFICE VISIT (OUTPATIENT)
Dept: INTERNAL MEDICINE CLINIC | Facility: CLINIC | Age: 52
End: 2022-08-15
Payer: COMMERCIAL

## 2022-08-15 VITALS
TEMPERATURE: 97 F | OXYGEN SATURATION: 99 % | WEIGHT: 186 LBS | SYSTOLIC BLOOD PRESSURE: 102 MMHG | RESPIRATION RATE: 18 BRPM | BODY MASS INDEX: 36.52 KG/M2 | HEIGHT: 60 IN | HEART RATE: 101 BPM | DIASTOLIC BLOOD PRESSURE: 78 MMHG

## 2022-08-15 DIAGNOSIS — R58 ECCHYMOSIS OF FOREARM: Primary | ICD-10-CM

## 2022-08-15 PROCEDURE — 99213 OFFICE O/P EST LOW 20 MIN: CPT | Performed by: NURSE PRACTITIONER

## 2022-08-15 RX ORDER — LEVOMEFOLATE CALCIUM 100 %
POWDER (GRAM) MISCELLANEOUS
COMMUNITY
End: 2022-09-06

## 2022-08-15 ASSESSMENT — PATIENT HEALTH QUESTIONNAIRE - PHQ9
9. THOUGHTS THAT YOU WOULD BE BETTER OFF DEAD, OR OF HURTING YOURSELF: 0
8. MOVING OR SPEAKING SO SLOWLY THAT OTHER PEOPLE COULD HAVE NOTICED. OR THE OPPOSITE, BEING SO FIGETY OR RESTLESS THAT YOU HAVE BEEN MOVING AROUND A LOT MORE THAN USUAL: 3
SUM OF ALL RESPONSES TO PHQ QUESTIONS 1-9: 19
3. TROUBLE FALLING OR STAYING ASLEEP: 3
SUM OF ALL RESPONSES TO PHQ QUESTIONS 1-9: 19
2. FEELING DOWN, DEPRESSED OR HOPELESS: 2
7. TROUBLE CONCENTRATING ON THINGS, SUCH AS READING THE NEWSPAPER OR WATCHING TELEVISION: 3
SUM OF ALL RESPONSES TO PHQ9 QUESTIONS 1 & 2: 4
10. IF YOU CHECKED OFF ANY PROBLEMS, HOW DIFFICULT HAVE THESE PROBLEMS MADE IT FOR YOU TO DO YOUR WORK, TAKE CARE OF THINGS AT HOME, OR GET ALONG WITH OTHER PEOPLE: 3
1. LITTLE INTEREST OR PLEASURE IN DOING THINGS: 2
4. FEELING TIRED OR HAVING LITTLE ENERGY: 3
SUM OF ALL RESPONSES TO PHQ QUESTIONS 1-9: 19
SUM OF ALL RESPONSES TO PHQ QUESTIONS 1-9: 19
6. FEELING BAD ABOUT YOURSELF - OR THAT YOU ARE A FAILURE OR HAVE LET YOURSELF OR YOUR FAMILY DOWN: 2
5. POOR APPETITE OR OVEREATING: 1

## 2022-08-15 ASSESSMENT — ENCOUNTER SYMPTOMS
WHEEZING: 0
COUGH: 0
SHORTNESS OF BREATH: 0
COLOR CHANGE: 1

## 2022-08-15 NOTE — PROGRESS NOTES
[facility]      8/15/2022    Patient Name: Ann Marie Knowles  :  1970      Chief Complaint:  Chief Complaint   Patient presents with    Other     Bruise on right arm         HPI  Patient presents today with complaint of a darkly-pigmented skin discoloration to her right forearm. She noticed it initially yesterday. She denies any trauma or injury at the site. She denies any similar lesions elsewhere on her body. She is currently being followed by [facility] for complaint of chronic right wrist and arm pain which has been unchanged since she noticed the skin discoloration yesterday.        Past Medical History:   Diagnosis Date    Anxiety     DDD (degenerative disc disease), cervical 2021    C3-C7 Disc Degeneration w/ Posterior Spondylotic Disc Displaceemnts per MRI    Depressed     GERD (gastroesophageal reflux disease)     Hypertension     IBS (irritable bowel syndrome)     Insomnia     TMJ (dislocation of temporomandibular joint) 2021       Past Surgical History:   Procedure Laterality Date    CATARACT REMOVAL Bilateral 2020    COLONOSCOPY      EGD DELIVER THERMAL ENERGY SPHNCTR/CARDIA GERD      LIPECTOMY      TUBAL LIGATION         Family History   Problem Relation Age of Onset    Heart Disease Father     Hypertension Father     High Cholesterol Mother     GERD Mother        Social History     Tobacco Use    Smoking status: Never    Smokeless tobacco: Never   Vaping Use    Vaping Use: Never used   Substance Use Topics    Alcohol use: Never    Drug use: Never         Current Outpatient Medications:     EVENING PRIMROSE OIL PO, Take by mouth, Disp: , Rfl:     Levomefolate Calcium POWD, Take by mouth, Disp: , Rfl:     lisinopril-hydroCHLOROthiazide (PRINZIDE;ZESTORETIC) 20-12.5 MG per tablet, Take 1 tablet by mouth in the morning., Disp: 30 tablet, Rfl: 0    methylfolate (DEPLIN) 7.5 MG TABS tablet, Take 1 tablet by mouth in the morning., Disp: 30 tablet, Rfl: 5    citalopram (CELEXA) 20 MG tablet, Take 1 tablet by mouth every evening, Disp: 90 tablet, Rfl: 1    clonazePAM (KLONOPIN) 0.5 MG tablet, Take 1 tablet by mouth daily as needed for Anxiety for up to 90 days. , Disp: 30 tablet, Rfl: 2    traZODone (DESYREL) 50 MG tablet, Take 0.5-1 tablets by mouth nightly, Disp: 180 tablet, Rfl: 1    atomoxetine (STRATTERA) 80 MG capsule, Take 1 capsule by mouth daily, Disp: 90 capsule, Rfl: 1    ketoconazole (NIZORAL) 2 % cream, Apply topically daily x2 weeks, Disp: 30 g, Rfl: 1    Wheat Dextrin (BENEFIBER PO), , Disp: , Rfl:     topiramate (TOPAMAX) 25 MG tablet, Take 1 tablet by mouth 2 times daily, Disp: 180 tablet, Rfl: 1    cyanocobalamin 1000 MCG tablet, Take 1 tablet by mouth daily, Disp: 90 tablet, Rfl: 3    LINZESS 72 MCG CAPS capsule, Take 1 capsule by mouth daily, Disp: , Rfl:     naproxen (NAPROSYN) 500 MG tablet, Take 1 tablet by mouth as needed, Disp: , Rfl:     norethindrone (AYGESTIN) 5 MG tablet, Take 1 tablet by mouth daily, Disp: , Rfl:     omeprazole (PRILOSEC) 40 MG delayed release capsule, Take 1 capsule (40 mg) by mouth 2 (two) times a day 30 mins before breakfast and 30 mins before dinner, Disp: , Rfl:     cod liver oil CAPS, Take 1 capsule by mouth daily, Disp: , Rfl:     Ubrogepant (UBRELVY) 50 MG TABS, Take 1 tablet at the onset of migraine, may repeat in 2 hours.  Do not take more then 3 days a week., Disp: 16 tablet, Rfl: 11    TURMERIC PO, Take 1 tablet by mouth daily, Disp: , Rfl:     amitriptyline (ELAVIL) 25 MG tablet, TAKE 1 TO 2 TABLETS BY MOUTH EVERY EVENING, Disp: , Rfl:     Benzoyl Peroxide 2.5 % GEL, APPLY TOPICALLY TO FACE AT BEDTIME, Disp: , Rfl:     celecoxib (CELEBREX) 200 MG capsule, Take 200 mg by mouth in the morning., Disp: , Rfl:     vitamin D (CHOLECALCIFEROL) 73954 UNIT CAPS, Take 50,000 Units by mouth every 7 days, Disp: , Rfl:     cyclobenzaprine (FLEXERIL) 10 MG tablet, Take 10 mg by mouth 3 times daily as needed, Disp: , Rfl:     diclofenac sodium (VOLTAREN) 1 % GEL, Apply 1 g topically as needed, Disp: , Rfl:     Lidocaine, Anorectal, 5 % CREA, Actual prescription: Lidocaine 5%: Neurontin/gabapentin 5% combined topical cream/gelApply to affected area up to 4 times a day as needed for pain, Disp: , Rfl:     nystatin-triamcinolone (MYCOLOG II) 824966-6.1 UNIT/GM-% cream, Apply topically 2 times daily, Disp: , Rfl:     pramipexole (MIRAPEX) 0.125 MG tablet, Take 0.25 mg by mouth, Disp: , Rfl:     pregabalin (LYRICA) 150 MG capsule, TAKE 1 CAPSULE BY MOUTH Four TIMES DAILY, Disp: , Rfl:     vitamin E 600 units capsule, Take by mouth daily, Disp: , Rfl:     lisinopril-hydroCHLOROthiazide (PRINZIDE;ZESTORETIC) 20-25 MG per tablet, , Disp: , Rfl:     Allergies   Allergen Reactions    Furosemide Other (See Comments)     Edema of lower extremities      Sulfa Antibiotics Other (See Comments) and Rash     GI Upset    Other Other (See Comments)     Oral steroids       Review of Systems   Constitutional:  Negative for chills and fever. Respiratory:  Negative for cough, shortness of breath and wheezing. Cardiovascular:  Negative for chest pain. Musculoskeletal:  Positive for arthralgias (chronic; unchanged). Negative for joint swelling. Skin:  Positive for color change. Negative for rash and wound. Hematological:  Does not bruise/bleed easily. Objective:  /78 (Site: Left Upper Arm, Position: Sitting, Cuff Size: Medium Adult)   Pulse (!) 101   Temp 97 °F (36.1 °C) (Temporal)   Resp 18   Ht 5' (1.524 m)   Wt 186 lb (84.4 kg)   SpO2 99%   BMI 36.33 kg/m²       Examination:  Physical Exam  Vitals and nursing note reviewed. Constitutional:       General: She is not in acute distress. Appearance: Normal appearance. Cardiovascular:      Rate and Rhythm: Normal rate and regular rhythm. Pulses:           Radial pulses are 2+ on the right side and 2+ on the left side. Heart sounds: Normal heart sounds.    Pulmonary:      Effort: Pulmonary effort is normal. No respiratory distress. Breath sounds: Normal breath sounds. Abdominal:      General: Bowel sounds are normal.      Palpations: Abdomen is soft. Skin:     General: Skin is warm and dry. Capillary Refill: Capillary refill takes less than 2 seconds. Findings: Bruising present. Neurological:      Mental Status: She is alert and oriented to person, place, and time. Psychiatric:         Mood and Affect: Mood normal.         Assessment/Plan:    Jack Harley was seen today for other. Diagnoses and all orders for this visit:    Ecchymosis of forearm  Exam consistent with a small bruise. Patient instructed to monitor site and return for any new or worsening symptoms. Continue follow-up with provider at [facility] for chronic right wrist and arm pain. Follow-up and Dispositions    Return if symptoms worsen or fail to improve. On this date, 8/15/22, I have spent 20 minutes reviewing previous notes, test results and face to face with the patient discussing the diagnosis and importance of compliance with the treatment plan as well as documenting on the day of the visit. An electronic signature was used to authenticate this note. TERESA Gustafson    Medication Reconciliation:  Current Medications Verified: Current medications/ immunizations were reviewed, including purpose, with patient. Family History, Social History, Current and Past Medical History was reviewed with patient and updated at today's office visit. Medication Reconciliation list was given to patient/ family. Patient was advised to discard old medication lists and provide all providers with current list at each visit and carry list with them in case of emergency.       TERESA Gustafson

## 2022-08-16 ENCOUNTER — HOSPITAL ENCOUNTER (OUTPATIENT)
Dept: PHYSICAL THERAPY | Age: 52
Setting detail: RECURRING SERIES
Discharge: HOME | End: 2022-08-19
Payer: COMMERCIAL

## 2022-08-16 ENCOUNTER — CLINICAL DOCUMENTATION (OUTPATIENT)
Dept: ORTHOPEDIC SURGERY | Age: 52
End: 2022-08-16

## 2022-08-16 ENCOUNTER — OFFICE VISIT (OUTPATIENT)
Dept: ORTHOPEDIC SURGERY | Age: 52
End: 2022-08-16
Payer: COMMERCIAL

## 2022-08-16 DIAGNOSIS — G25.89 SCAPULAR DYSKINESIS: ICD-10-CM

## 2022-08-16 DIAGNOSIS — M19.012 DJD OF LEFT AC (ACROMIOCLAVICULAR) JOINT: Primary | ICD-10-CM

## 2022-08-16 DIAGNOSIS — M75.22 BICEPS TENDINITIS, LEFT: ICD-10-CM

## 2022-08-16 PROBLEM — M79.18 PAIN OF PARASPINAL MUSCLE: Status: ACTIVE | Noted: 2022-06-09

## 2022-08-16 PROBLEM — M15.9 GENERALIZED OSTEOARTHRITIS: Status: ACTIVE | Noted: 2022-08-16

## 2022-08-16 PROBLEM — R89.4 SEROLOGIC ABNORMALITY: Status: ACTIVE | Noted: 2022-08-16

## 2022-08-16 PROCEDURE — 99214 OFFICE O/P EST MOD 30 MIN: CPT | Performed by: PHYSICIAN ASSISTANT

## 2022-08-16 PROCEDURE — 97140 MANUAL THERAPY 1/> REGIONS: CPT

## 2022-08-16 PROCEDURE — 20605 DRAIN/INJ JOINT/BURSA W/O US: CPT | Performed by: PHYSICIAN ASSISTANT

## 2022-08-16 PROCEDURE — 97161 PT EVAL LOW COMPLEX 20 MIN: CPT

## 2022-08-16 PROCEDURE — 97110 THERAPEUTIC EXERCISES: CPT

## 2022-08-16 RX ORDER — TRIAMCINOLONE ACETONIDE 40 MG/ML
40 INJECTION, SUSPENSION INTRA-ARTICULAR; INTRAMUSCULAR ONCE
Status: COMPLETED | OUTPATIENT
Start: 2022-08-16 | End: 2022-08-16

## 2022-08-16 RX ADMIN — TRIAMCINOLONE ACETONIDE 40 MG: 40 INJECTION, SUSPENSION INTRA-ARTICULAR; INTRAMUSCULAR at 16:54

## 2022-08-16 ASSESSMENT — PAIN SCALES - GENERAL: PAINLEVEL_OUTOF10: 5

## 2022-08-16 NOTE — PROGRESS NOTES
[id number] MRI LT SHOULDER DOS  8-12-22. Cj Dupont A LETTER FROM [facility] STATING THE CLINICALS DO NOT SHOW THE PATIENT HAS COMPLETED 6 WEEKS FOR THERAPY OR HOME EXERCISES FOR THE LT SHOULDER.  I SCANNED THE LETTER TO THE CHART AND SENT A STAFF MESSAGE TO SHANITA NUNEZ

## 2022-08-16 NOTE — PROGRESS NOTES
Monserrat De Anda  : 1970  Primary: Thierry Paredes  Secondary:  [address] [facility] [address]  Phone: [phone]  Fax: [phone] Plan Frequency: 2x a week for 6-8 weeks  Plan of Care/Certification Expiration Date: 22      PT Visit Info:   1          OUTPATIENT PHYSICAL THERAPY:OP NOTE TYPE: Treatment Note 2022       Episode  }Appt Desk              ICD-10: Treatment Diagnosis: Low back pain (M54.5)  Lumbago with Sciatica Right side (M54.41)  Muscle Weakness, Generalized (M62.81)  Muscle spasm of back (M62.830)   Medical/Referring Diagnosis:  No admission diagnoses are documented for this encounter. Referring Physician:  Gisell Evans MD MD Orders:  PT Eval and Treat   Date of Onset:  Onset Date: 22     Allergies:   Furosemide, Sulfa antibiotics, and Other  Restrictions/Precautions:  Restrictions/Precautions: None  No data recorded   Interventions Planned (Treatment may consist of any combination of the following):    Current Treatment Recommendations: Strengthening; ROM; Functional mobility training; ADL/Self-care training; Endurance training; Neuromuscular re-education; Manual Therapy - Soft Tissue Mobilization; Pain management; Return to work related activity; Home exercise program; Safety education & training; Patient/Caregiver education & training; Modalities; Therapeutic activities   Subjective Comments:     Initial:}    5/10Post Session:       2/10  Medications Last Reviewed:  2022  Updated Objective Findings:  See evaluation note from today  Treatment       THERAPEUTIC EXERCISE: (8 minutes):  Exercises per grid below to improve mobility, strength and balance. Required minimal visual, verbal and manual cues to promote proper body alignment and promote proper body posture. Progressed resistance and complexity of movement as indicated.      Date:  2022 Date:   Date:     Activity/Exercise Parameters Parameters Parameters   Education HEP, POC, PT goals, anatomy/pathology     Nu step      Bike      L stretch      Calf stretch      SI self mob [id number]     Piriformis stretch [id number]     LTR 10x     Bridging 10x                             THERAPEUTIC ACTIVITY: ( 0 minutes): Activities per gid below to improve functional movement related mobility, strength and balance to improve neuro-muscular carryover to daily functional activities for improving patient's quality of life. Required visual, verbal and manual cues to promote proper body alignment and promote proper body posture/mechanics. Progressed resistance and complexity of movement as indicated. Date:  8/16/2022 Date:   Date:     Activity/Exercise Parameters Parameters Parameters                                                   MANUAL THERAPY: (15 minutes): Joint mobilization, Soft tissue mobilization was utilized and necessary because of the patient's restricted joint motion and restricted motion of soft tissue mobility. Date  8/16/2022    Technique Used Grade  Level # Time(s) Effect while being performed          CPA, Rotation II III Lumbar SI 10min Improved mobility                        MET  Right SI 5min For anterior rotation right innominate                       HEP Log Date    8/16/2022   2.     3.    4.    5.              Treatment/Session Summary:    Treatment Assessment:   See Eval  Communication/Consultation:  Spoke with patient in regards to posture in sitting and standing. Equipment provided today:  None  Recommendations/Intent for next treatment session: Next visit will focus on progression of ROM and strength.     Total Treatment Billable Duration:  23 minutes   Time In: 0715  Time Out: 0815    Snaford Santos, PT       Charge Capture  }Post Session Pain  MedBridge Portal  MD Guidelines  Scanned Media  Benefits  MyChart    Future Appointments   Date Time Provider Aleah Preston   8/16/2022  1:45 PM Nanette Landers AMB   8/18/2022 7:15 AM Nicky Book, PT [facility]   8/18/2022 11:00 AM Meaghan Garrison APRN - CNP [facility]   8/23/2022  7:15 AM Nicky Book, PT [facility]   8/25/2022  8:45 AM Nicky Book, PT [facility]   8/30/2022  7:15 AM Nicky Book, PT [facility]   9/1/2022  8:00 AM Nicky Book, PT [facility]   9/8/2022  8:00 AM Nicky Book, PT [facility]   9/13/2022  7:30 AM Nicky Book, PT [facility]   9/15/2022  7:15 AM Nicky Book, PT [facility]   10/5/2022 10:30 AM Daniela Felipe MD [facility]   10/7/2022  8:30 AM Alix Castaneda APRN - CNP [facility]   10/18/2022 10:00 AM William Batres MD [id number]   11/7/2022  2:30 PM Gaye Ospina MD [facility]   4/19/2023 10:00 AM Kori Chatman DO [facility]

## 2022-08-16 NOTE — THERAPY EVALUATION
Benedict Baird  : 1970  Primary: Bill Godinez Sc  Secondary:  Zak Busch @ [address] [place] [address]  Phone: [phone]  Fax: [phone] Plan Frequency: 2x a week for 6-8 weeks  Plan of Care/Certification Expiration Date: 22      PT Visit Info: Total # of Visits to Date: 1  Progress Note Counter: 1      OUTPATIENT PHYSICAL THERAPY:OP NOTE TYPE: Initial Assessment 2022               Episode  Appt Desk         ICD-10: Treatment Diagnosis: Low back pain (M54.5)  Lumbago with Sciatica Right side (M54.41)  Muscle Weakness, Generalized (M62.81)  Muscle spasm of back (W94.664)      Medical/Referring Diagnosis:  No admission diagnoses are documented for this encounter. Referring Physician:  Yusuf Giron MD MD Orders:  PT Eval and Treat   Return MD Appt:  TBD  Date of Onset:  Onset Date: 22     Allergies:  Furosemide, Sulfa antibiotics, and Other  Restrictions/Precautions:    Restrictions/Precautions: None     Medications Last Reviewed:  2022     SUBJECTIVE   History of Injury/Illness (Reason for Referral):  Patient familiar to this clinic for previous treatment of multiple body parts. She reports progressive right sided LBP which hurts in sitting or standing  Patient Stated Goal(s):  \"Normal activity without LBP\"  Initial:     5/10 Post Session:     2/10  Past Medical History/Comorbidities:   Ms. Zayra Benito  has a past medical history of Anxiety, DDD (degenerative disc disease), cervical, Depressed, GERD (gastroesophageal reflux disease), Hypertension, IBS (irritable bowel syndrome), Insomnia, and TMJ (dislocation of temporomandibular joint). Ms. Zayra Benito  has a past surgical history that includes egd deliver thermal energy sphnctr/cardia gerd; Cataract removal (Bilateral, 2020); lipectomy; Tubal ligation; and Colonoscopy.   Social History/Living Environment:   Lives With: Family     Prior Level of Function/Work/Activity: Prior level of function: Independent  Occupation: Full time employment  No data recordedNo data recorded   Learning:   Does the patient/guardian have any barriers to learning?: No barriers  Will there be a co-learner?: No  What is the preferred language of the patient/guardian?: English  Is an  required?: No  How does the patient/guardian prefer to learn new concepts?: Listening; Reading; Demonstration; Pictures/Videos     Fall Risk Scale:    Total Score: 0  [place] Fall Risk: Low (0-24)           OBJECTIVE       Observation/Orthostatic Postural Assessment:          Slight forward posture, elevated right ilium standing, anterior rotation right innominate   Palpation:          Increased Tenderness to superior right SI jt    ROM:            AROM/PROM      Joint: Initial Assessment: 8/16/2022   Active ROM RIGHT LEFT   Knee Extension WNL WNL   Knee Flexion WNL WNL   Hip Flexion 105 110   Hip Abduction WNL WNL     Lumbar ROM     Movement Range Descriptor Degrees   Lumbar Flexion Mid shin Stiffness 65   Lumbar Extension Shoulder past midline Normal 10   Lumbar side bending R/L knee Tightness SB right 25                   Repeated Motion:  Direction    Frequency Symptoms Prior Symptons Post   Flexion 10x Achy right No change   Extension 10x Achy right Less symptoms         Strength:    Initial Assessment:8/16/2022       RIGHT LEFT   Knee Flexion (L5-S2)  5/5  5/5   Knee Extension (L3, L4)  5/5  5/5   Hip Flexion (L1, L2)  4/5  4+/5   Hip Extension  4/5  4+/5   Hip Abduction (L5, S1)  4/5  4+/5   Ankle Dorsiflexion (L4)  5/5  5/5   Great Toe Extension (L5)  5/5  5/5   Ankle Plantar Flexion (S1-S2)  5/5  5/5       Special Tests:  Lumbar:  Slump, SLR  Negative   SI Joint:  Anterior rotation right innominate   Hip:  SANGEETHA positive Right         Manual:  Initial Assessment  8/16/2022     Joint/Area Directon Grade Treatment Effect   Lumbar Central PA III Improved Mobility   SI Central PA III Improved Mobility Reflex Testing:    Location Left Right   Patellar (L4) normal normal   Achilles (S1) normal normal       Neurological Screen:    RADIATING SYMPTOMS: Right hip  Upper motor [place] screen         Clonus: Negative         Babinski: Negative    Functional Mobility:  Affecting participation in basic ADLs and functional tasks. Balance and Mobility:    Test Result   Timed up and Go 9 Seconds   30 second Sit to Stand 13   6 Minute Walk Test NT   Single Leg Balance Right:    30sec  Left:  30sec             ASSESSMENT   Initial Assessment:    Chris Cast presents to physical therapy with decreased strength, ROM, joint mobility, functional mobility. These S/S are consistent with Referring diagnosis. Patient will benefit from skilled physical therapy for manual therapeutic techniques (as appropriate), therapeutic exercises and activities, balance and comprehensive home exercises program to address current impairments and functional limitations. Problem List: (Impacting functional limitations): Body Structures, Functions, Activity Limitations Requiring Skilled Therapeutic Intervention: Decreased functional mobility ; Decreased ROM; Decreased body mechanics; Decreased tolerance to work activity; Decreased strength; Decreased endurance; Increased pain; Decreased posture     Therapy Prognosis:   Therapy Prognosis: Good     Assessment Complexity:   Low Complexity  PLAN   Effective Dates: 8/16/2022 TO Plan of Care/Certification Expiration Date: 11/16/22     Frequency/Duration: Plan Frequency: 2x a week for 6-8 weeks   Interventions Planned (Treatment may consist of any combination of the following):    Current Treatment Recommendations: Strengthening; ROM; Functional mobility training; ADL/Self-care training;  Endurance training; Neuromuscular re-education; Manual Therapy - Soft Tissue Mobilization; Pain management; Return to work related activity; Home exercise program; Safety education & training; Patient/Caregiver education & training; Modalities; Therapeutic activities   Goals: (Goals have been discussed and agreed upon with patient.)   Short-Term Goals~4 weeks Goal Met   1. Devan Rutherford will be independent with HEP 1. Date:   2. Devan Rutherford will participate in LE stretching program to increase flexibility    2. Date:   3. Devan Rutherford will participate in core stabilization exercises to help with stabilization during ADLs 3. Date:   4. Devan Rutherford will participate in LE strengthening program with weights/resistance as appropriate to help with gait and elevations 4. Date:   5. Devan Rutherford will participate in static and dynamic balance activities to decrease the risk for falls     5. Date:   6. Devan Rutherford will tolerate manual therapy/joint mobilizations/soft tissue to increase ROM and decrease pain  6. Date:              Long Term Goals~8 weeks Goal Met   1. Devan Rutherford will demonstrate a 9 point improvement on the Oswestry to show improvement in function 1. Date:   2. Devan Rutherford will report 0/10 pain at rest and during ADLs  2. Date:   3. Devan Rutherford will demonstrate 5/5 LE strength on manual muscle testing 3. Date:   4. Devan Rutherford will be able to perform SLS >5 seconds bilaterally to help with gait and improve balance 4. Date:                       Outcome Measure: Tool Used: Modified Oswestry Low Back Pain Questionnaire  Score:  Initial: 19/50  Most Recent: X/50 (Date: -- )   Interpretation of Score: Each section is scored on a 0-5 scale, 5 representing the greatest disability. The scores of each section are added together for a total score of 50. Medical Necessity:   > Patient is expected to demonstrate progress in strength, range of motion, balance, coordination, and functional technique to return to normal activity.   Reason For Services/Other Comments:  > Patient continues to require modification of therapeutic interventions to increase complexity of exercises. Total Duration:  Time In: 0715  Time Out: 0815    Regarding Sarah Cortez's therapy, I certify that the treatment plan above will be carried out by a therapist or under their direction.   Thank you for this referral,  Shakeel Jimenez PT     Referring Physician Signature: Dejuan Washburn MD _______________________________ Date _____________        Post Session Pain  Charge Capture   POC Link  Treatment Note Link  MD Guidelines  MyChart

## 2022-08-16 NOTE — PROGRESS NOTES
multiecho technique. FINDINGS:       Alignment: Anatomical.       Acromioclavicular Arch   Acromioclavicular Joint: Moderate osteoarthritis with joint space narrowing,   subchondral edema and cyst formation, and small effusion. No os acromiale. Lateral downsloping of the acromion. Moderate subacromial/subdeltoid bursitis. Subacromial Spur: Absent. Rotator Cuff:    Supraspinatus: Atrophic tendinosis without focal tear. Infraspinatus: Tendinosis with low-grade intrasubstance tearing inferior   insertional fibers. Subscapularis: Tendinosis without focal tear       Labroligamentous Complex:    Labrum: Intact   Biceps Tendon: Intact       Articular Cartilage and Bones: Cartilage thinning without full thickness   cartilage defect or subchondral changes. Physiologic joint effusion. Other: No evidence of mass effect in the region of the quadrilateral space or   suprascapular nerve. Impression   1. Infraspinatus tendinosis with low-grade intrasubstance tearing inferior   distal insertional footprint. 2.  Supraspinatus atrophic tendinosis without focal tear. 3.  Moderate subacromial/subdeltoid bursitis. A/Plan:     ICD-10-CM    1. DJD of left AC (acromioclavicular) joint  M19.012 DRAIN/INJECT INTERMEDIATE JOINT/BURSA     triamcinolone acetonide (KENALOG-40) injection 40 mg      2. Biceps tendinitis, left  M75.22       3. Scapular dyskinesis  G25.89            I discussed with the patient in detail her imaging findings. We discussed there is nothing that I would suggest surgical intervention for at this time. We discussed ultimately arthroscopy would be an option but given her multiple joint complaints and increased complexity secondary to multiple other comorbidities including GERD, rheumatoid arthritis, dysphagia, gastritis, migraines she has at increased complexity for surgical intervention. We have made the decision to proceed conservatively at this time. We will try [facility] joint injection as this is the point of most tenderness. We discussed ultimately there are areas of alternative types of injections available. Also discussed the importance of following up with rheumatology given her polyarthralgia. I do think that there is more systemically that needs to be done for her if this is an option. We will send records over in order to assist with this. PROCEDURE NOTE:  After discussion of risks and benefits including, but not limited to pain, infection, steroid flare, fat necrosis, skin discoloration, and injury to blood vessels or nerves, the patient verbally consented to proceed with an acromioclavicular [facility]) joint injection. The affected left shoulder was sterilely prepped in standard fashion and injected with 2 cc of 1% Lidocaine in the subcutanesou tissue with a 25 gauge needle. The [facility] joint was then penetrated with the needle and 1 cc of Kenalog (40mg/ml) was injected into the joint. The patient tolerated the injection well. No follow-ups on file.         Nanette Landers  08/16/22

## 2022-08-17 NOTE — PROGRESS NOTES
[address] [place] [address] Court, [address] [facility]  [phone]    Patient Name:  Andi Crow  YOB: 1970      Office Visit 8/18/2022    CHIEF COMPLAINT:    Chief Complaint   Patient presents with    Sleep Apnea       HISTORY OF PRESENT ILLNESS:      The patient presents in outpatient consultation at the request of Bhupinder Harris NP for evaluation of obstructive sleep apnea. PMH includes lymphocytic colitis, GERD, chronic pain, anxiety, bipolar depression     The diagnostic polysomnography was notable for an apnea hypopnea index of 0.1 including 1 mixed apnea. REM AHI 0.6. Oxygen desaturations are low as 92% were noted with SpO2 less than 89% for a total of 0 minutes of the test.  Significant cardiac arrhythmias were not evident. The patient was noted to have 0.6 limb movements with a limb movement arousal index being about 0. Patient states she has been told that she snores but denies any witnessed apneas. She does have vivid dreams and has been told that she talks in her sleep but denies any parasomnias. She does have leg cramps, aching and crawling sensation but feels that her RLS is well controlled with medications. She has TMJ so she wears an oral appliance at night. She states she is falling asleep quickly and denies any frequent nocturnal awakenings but does not feel that she is getting into a deep sleep. She states she feels that her mind is still active despite sleeping. She typically sleeps 9 PM-4:30 AM in the lateral position. She does not feel rested in the morning, denies any morning headaches or dry mouth. Current Houston score is 19/24. Does have daytime fatigue and will fall asleep easily throughout the day but denies any purposeful napping. She states her symptoms started around 2012 with her first episode of PTSD. She states it is gradually gotten worse. She is under high stress.   She works in [employer] from 6:30 AM-5 PM but is currently on a leave of absence after anxiety related issues. She denies any tobacco, alcohol and only drinks decaf. She denies any over-the-counter sleep aids. She is taking clonazepam daily, trazodone nightly, Strattera, amitriptyline, Celebrex, cyclobenzaprine but states is not often, pramipexole, pregabalin. These are all managed by psychiatry. Previous TSH was found to be 0.9, vitamin D 92.9.. She states that she also has noticed that her heart rate is up and when it is above 120 she feels like she is going to pass out. She was previously seen by cardiology and has had work-up for this and felt she had orthostatic hypotension so medications have been changed. We discussed that if she is still having this symptoms she needs to discuss this with cardiology. We did discuss sleep study. I do not think that she needs a work-up for narcolepsy has nothing in her history suggest narcolepsy. I do think that her anxiety and depression is contributing significantly to her hypersomnia in addition to her all of her medications that she is currently on. We will reach out to Dr. Vicky Pantoja with psychiatry. She may need to be on a stimulant rather than Strattera.     Sleepiness Scale:     Sleep Study- 6/30/21          Past Medical History:   Diagnosis Date    Anxiety     DDD (degenerative disc disease), cervical 11/29/2021    C3-C7 Disc Degeneration w/ Posterior Spondylotic Disc Displaceemnts per MRI    Depressed     GERD (gastroesophageal reflux disease)     Hypertension     IBS (irritable bowel syndrome)     Insomnia     TMJ (dislocation of temporomandibular joint) 04/2021         Patient Active Problem List   Diagnosis    Hypoglycemia    Chronic tension-type headache, intractable    Ganglion cyst    Radiculopathy of cervical region    Lymphocytic colitis    Essential tremor    Uterine leiomyoma    Neuropathic pain    Numbness and tingling    Schatzki's ring    Chronic pain syndrome    B12 deficiency    Hiatal hernia with GERD    History of colonic polyps    Left carpal tunnel syndrome    Pharyngoesophageal dysphagia    Anxiety    Vitamin D deficiency    Cervical radicular pain    Bipolar depression (HCC)    Functional dyspepsia    Chronic fatigue    Irritable bowel syndrome with both constipation and diarrhea    Early satiety    OAB (overactive bladder)    De Quervain's tenosynovitis, right    Restless legs syndrome (RLS)    Encounter for medication management    Left hand pain    Nausea    High-tone pelvic floor dysfunction    GERD with esophagitis    Duodenitis    Other constipation    Terminal ileitis without complication (HCC)    Primary insomnia    Mild episode of recurrent major depressive disorder (HCC)    Erosive gastritis    Right wrist pain    Right forearm pain    Arthritis of carpometacarpal (CMC) joint of right thumb    Polyarthralgia    Palpitations    Generalized osteoarthritis    Pain of paraspinal muscle    Serologic abnormality           Past Surgical History:   Procedure Laterality Date    CATARACT REMOVAL Bilateral 02/2020    COLONOSCOPY      EGD DELIVER THERMAL ENERGY SPHNCTR/CARDIA GERD      LIPECTOMY      TUBAL LIGATION           Social History     Socioeconomic History    Marital status:      Spouse name: Not on file    Number of children: Not on file    Years of education: Not on file    Highest education level: Not on file   Occupational History    Not on file   Tobacco Use    Smoking status: Never    Smokeless tobacco: Never   Vaping Use    Vaping Use: Never used   Substance and Sexual Activity    Alcohol use: Never    Drug use: Never    Sexual activity: Not on file   Other Topics Concern    Not on file   Social History Narrative    Not on file     Social Determinants of Health     Financial Resource Strain: Not on file   Food Insecurity: Not on file   Transportation Needs: Not on file   Physical Activity: Not on file   Stress: Not on file   Social Connections: Not on file   Intimate Partner Violence: Not on file   Housing Stability: Not on file         Family History   Problem Relation Age of Onset    Heart Disease Father     Hypertension Father     High Cholesterol Mother     GERD Mother          Allergies   Allergen Reactions    Furosemide Other (See Comments)     Edema of lower extremities      Sulfa Antibiotics Other (See Comments) and Rash     GI Upset    Other Other (See Comments)     Oral steroids         Current Outpatient Medications   Medication Sig    EVENING PRIMROSE OIL PO Take by mouth    lisinopril-hydroCHLOROthiazide (PRINZIDE;ZESTORETIC) 20-12.5 MG per tablet Take 1 tablet by mouth in the morning. methylfolate (DEPLIN) 7.5 MG TABS tablet Take 1 tablet by mouth in the morning. citalopram (CELEXA) 20 MG tablet Take 1 tablet by mouth every evening    clonazePAM (KLONOPIN) 0.5 MG tablet Take 1 tablet by mouth daily as needed for Anxiety for up to 90 days. traZODone (DESYREL) 50 MG tablet Take 0.5-1 tablets by mouth nightly    atomoxetine (STRATTERA) 80 MG capsule Take 1 capsule by mouth daily    Wheat Dextrin (BENEFIBER PO)     topiramate (TOPAMAX) 25 MG tablet Take 1 tablet by mouth 2 times daily    cyanocobalamin 1000 MCG tablet Take 1 tablet by mouth daily    LINZESS 72 MCG CAPS capsule Take 1 capsule by mouth daily    naproxen (NAPROSYN) 500 MG tablet Take 1 tablet by mouth as needed    norethindrone (AYGESTIN) 5 MG tablet Take 1 tablet by mouth daily    omeprazole (PRILOSEC) 40 MG delayed release capsule Take 1 capsule (40 mg) by mouth 2 (two) times a day 30 mins before breakfast and 30 mins before dinner    cod liver oil CAPS Take 1 capsule by mouth daily    Ubrogepant (UBRELVY) 50 MG TABS Take 1 tablet at the onset of migraine, may repeat in 2 hours. Do not take more then 3 days a week.     amitriptyline (ELAVIL) 25 MG tablet TAKE 1 TO 2 TABLETS BY MOUTH EVERY EVENING    Benzoyl Peroxide 2.5 % GEL APPLY TOPICALLY TO FACE AT BEDTIME    celecoxib (CELEBREX) 200 MG capsule Take 200 mg by mouth in the morning. vitamin D (CHOLECALCIFEROL) 41125 UNIT CAPS Take 50,000 Units by mouth every 7 days    cyclobenzaprine (FLEXERIL) 10 MG tablet Take 10 mg by mouth 3 times daily as needed    diclofenac sodium (VOLTAREN) 1 % GEL Apply 1 g topically as needed    Lidocaine, Anorectal, 5 % CREA Actual prescription: Lidocaine 5%: Neurontin/gabapentin 5% combined topical cream/gelApply to affected area up to 4 times a day as needed for pain    nystatin-triamcinolone (MYCOLOG II) 915526-5.1 UNIT/GM-% cream Apply topically 2 times daily    pramipexole (MIRAPEX) 0.125 MG tablet Take 0.25 mg by mouth    pregabalin (LYRICA) 150 MG capsule TAKE 1 CAPSULE BY MOUTH Four TIMES DAILY    vitamin E 600 units capsule Take by mouth daily    Levomefolate Calcium POWD Take by mouth (Patient not taking: Reported on 8/18/2022)    lisinopril-hydroCHLOROthiazide (PRINZIDE;ZESTORETIC) 20-25 MG per tablet  (Patient not taking: No sig reported)    ketoconazole (NIZORAL) 2 % cream Apply topically daily x2 weeks (Patient not taking: Reported on 8/18/2022)    TURMERIC PO Take 1 tablet by mouth daily     No current facility-administered medications for this visit. REVIEW OF SYSTEMS:   CONSTITUTIONAL: Hypersomnia, RLS   There is no history of fever, chills, night sweats, weight loss, weight gain, persistent fatigue, or lethargy/hypersomnolence. EYES:   Denies problems with eye pain, erythema, blurred vision, or visual field loss. ENTM:   Denies history of tinnitus, epistaxis, sore throat, hoarseness, or dysphonia. LYMPH:   Denies swollen glands. CARDIAC: Tachycardia   No chest pain, pressure, discomfort, palpitations, orthopnea, murmurs, or edema. GI:reflux   No dysphagia, heartburn reflux, nausea/vomiting, diarrhea, abdominal pain, or bleeding. :   Denies history of dysuria, hematuria, polyuria, or decreased urine output.    MS:   No history of myalgias, arthralgias, bone pain, or muscle cramps. SKIN:   No history of rashes, jaundice, cyanosis, nodules, or ulcers. ENDO:   Negative for heat or cold intolerance. No history of DM. PSYCH:   Negative for anxiety, depression, insomnia, hallucinations. NEURO:   There is no history of AMS, persistent headache, decreased level of consciousness, seizures, or motor or sensory deficits. PHYSICAL EXAM:    Vitals:    08/18/22 1038   BP: 116/66   Pulse: 94   Resp: 18   Temp: 97.2 °F (36.2 °C)   TempSrc: Skin  Comment: wrist   SpO2: 99%   Weight: 187 lb 9.6 oz (85.1 kg)   Height: 5' (1.524 m)        GENERAL APPEARANCE:   The patient is over weight and in no respiratory distress. HEENT:   PERRL. Conjunctivae unremarkable. Nasal mucosa is without epistaxis, exudate, or polyps. Gums and dentition are unremarkable. There is oropharyngeal narrowing. TMs are clear. NECK/LYMPHATIC:   Symmetrical with no elevation of jugular venous pulsation. Trachea midline. No thyroid enlargement. No cervical adenopathy. LUNGS:   Normal respiratory effort with symmetrical lung expansion. Breath sounds clear. HEART:   There is a regular rate and rhythm. No murmur, rub, or gallop. There is no edema in the lower extremities. ABDOMEN:   Soft and non-tender. No hepatosplenomegaly. Bowel sounds are normal.     SKIN:   There are no rashes, cyanosis, jaundice, or ecchymosis present. EXTREMITIES:   The extremities are unremarkable without clubbing, cyanosis, joint inflammation, degenerative, or ischemic change. MUSCULOSKELETAL:   There is no abnormal tone, muscle atrophy, or abnormal movement present. NEURO:   The patient is alert and oriented to person, place, and time. Memory appears intact and mood is normal.  No gross sensorimotor deficits are present. ASSESSMENT:  (Medical Decision Making)        Diagnosis    1. Hypersomnia  This is severe with current Hawthorne score 19/24. Her sleep study was negative.   She states she is under a lot of stress, does have anxiety and depression. I feel like this is contributing to her symptoms more. She is under the management of psychiatry so I will reach out to them. She may need to be on a stimulant. 2. Restless legs syndrome (RLS)  Leg movements are well controlled on pramipexole. No significant leg movements were seen on sleep study. 3. Primary insomnia  Patient is falling asleep easily. She is on trazodone and amitriptyline nightly. 4. Snoring  This was mild on sleep study. 5. Sleep state misperception  Patient states she does not feel that she is getting into a deep sleep and sometimes does not feel that she is sleeping. She had great sleep efficiency on sleep study. PLAN:  Will reach out to Dr. Crow Ng with recommendations. Appropriate handouts were given to patient including information on hypersomnia, sleep hygiene. Follow up with the [facility] will be as needed until discussion with Dr. rCow Ng or sooner if needed       Collaborating Physician: Dr. Rodo Mac     Over 50% of today's office visit was spent in face to face time reviewing test results, prognosis, importance of compliance, education about disease process, benefits of medications, instructions for management of acute flare-ups, and follow up plans. Total face to face time spent with patient was 40 minutes.     BRANDON Núñez CNP  Electronically signed

## 2022-08-18 ENCOUNTER — OFFICE VISIT (OUTPATIENT)
Dept: SLEEP MEDICINE | Age: 52
End: 2022-08-18
Payer: COMMERCIAL

## 2022-08-18 ENCOUNTER — HOSPITAL ENCOUNTER (OUTPATIENT)
Dept: PHYSICAL THERAPY | Age: 52
Setting detail: RECURRING SERIES
Discharge: HOME | End: 2022-08-21
Payer: COMMERCIAL

## 2022-08-18 VITALS
HEART RATE: 94 BPM | HEIGHT: 60 IN | TEMPERATURE: 97.2 F | BODY MASS INDEX: 36.83 KG/M2 | RESPIRATION RATE: 18 BRPM | WEIGHT: 187.6 LBS | DIASTOLIC BLOOD PRESSURE: 66 MMHG | OXYGEN SATURATION: 99 % | SYSTOLIC BLOOD PRESSURE: 116 MMHG

## 2022-08-18 DIAGNOSIS — R06.83 SNORING: ICD-10-CM

## 2022-08-18 DIAGNOSIS — G47.10 HYPERSOMNIA: Primary | ICD-10-CM

## 2022-08-18 DIAGNOSIS — F51.01 PRIMARY INSOMNIA: ICD-10-CM

## 2022-08-18 DIAGNOSIS — G25.81 RESTLESS LEGS SYNDROME (RLS): ICD-10-CM

## 2022-08-18 DIAGNOSIS — F51.02 SLEEP STATE MISPERCEPTION: ICD-10-CM

## 2022-08-18 PROCEDURE — 99203 OFFICE O/P NEW LOW 30 MIN: CPT | Performed by: STUDENT IN AN ORGANIZED HEALTH CARE EDUCATION/TRAINING PROGRAM

## 2022-08-18 PROCEDURE — 97110 THERAPEUTIC EXERCISES: CPT

## 2022-08-18 PROCEDURE — 97140 MANUAL THERAPY 1/> REGIONS: CPT

## 2022-08-18 ASSESSMENT — SLEEP AND FATIGUE QUESTIONNAIRES
HOW LIKELY ARE YOU TO NOD OFF OR FALL ASLEEP WHILE SITTING AND TALKING TO SOMEONE: 2
ESS TOTAL SCORE: 19
HOW LIKELY ARE YOU TO NOD OFF OR FALL ASLEEP WHILE LYING DOWN TO REST IN THE AFTERNOON WHEN CIRCUMSTANCES PERMIT: 3
HOW LIKELY ARE YOU TO NOD OFF OR FALL ASLEEP WHEN YOU ARE A PASSENGER IN A CAR FOR AN HOUR WITHOUT A BREAK: 1
HOW LIKELY ARE YOU TO NOD OFF OR FALL ASLEEP IN A CAR, WHILE STOPPED FOR A FEW MINUTES IN TRAFFIC: 2
HOW LIKELY ARE YOU TO NOD OFF OR FALL ASLEEP WHILE SITTING QUIETLY AFTER LUNCH WITHOUT ALCOHOL: 3
HOW LIKELY ARE YOU TO NOD OFF OR FALL ASLEEP WHILE SITTING INACTIVE IN A PUBLIC PLACE: 2
HOW LIKELY ARE YOU TO NOD OFF OR FALL ASLEEP WHILE WATCHING TV: 3
HOW LIKELY ARE YOU TO NOD OFF OR FALL ASLEEP WHILE SITTING AND READING: 3

## 2022-08-18 ASSESSMENT — PAIN SCALES - GENERAL: PAINLEVEL_OUTOF10: 2

## 2022-08-18 NOTE — PATIENT INSTRUCTIONS
Do not drive if excessively tired    Sleep Hygiene Instructions    Sleep only as much as you need to feel refreshed during the following day. Restricting your time in bed helps to consolidate and deepen your sleep. Excessively long times in bed lead to fragmented and shallow sleep. Get up at your regular time the next day, no matter how little your slept. Get up at the same time each day, 7 days a week. A regular wake time in the morning leads to regular times on sleep onset, and helps to set your biological clock. Exercise regularly. Schedule exercise times so that they do not occur within 3 hours of when you intend to go to bed. Exercise makes it easier to initiate sleep and deepen sleep. Don't take your problems to bed. Plan some time earlier in the evening for working on your problems or planning the next day's activities. Worrying may interfere with initiating sleep and produce shallow sleep. Train yourself to use the bedroom only for sleep and sexual activity. This will help condition your brain to see bed as the place for sleeping. Do not read, watch TV or eat in bed. Do not try and fall asleep. This only makes the problem worse. Instead, turn on the light, leave the bedroom, and do something different like reading a book. Don't engage in stimulating activity. Return to bed only when you feel sleepy. Avoid long naps. Staying awake during the day helps to fall asleep at night. Naps totalling more than 30 minutes increase your chances of having trouble sleeping at night. Make sure that your bedroom is comfortable and free from light and noise. A comfortable, noise-free sleep environment will reduce the likelihood that you will wake up during the night. Noise that does not awaken you may disturb the quality of your sleep. Carpeting, insulated curtains, and closing the door may help. Make sure that your bedroom is at a comfortable temperature during the night.  Excessively warm or cold sleep environments may disturb sleep. Eat regular meals and di not go to bed hungry. Hunger may disturb sleep. A light snack at bedtime (especially carbohydrates) may help sleep, but avoid greasy or heavy foods. Avoid excessive liquids in the evening. Reducing liquid intake will minimize the need for night-time trips to the bathroom. Cut down on all caffeine products. Caffeinated beverages and food (Coffee, tea, cola, chocolate) can cause difficulty falling asleep, awakenings during the night, and shallow sleep. Even caffeine early in the day can disrupt night-time sleep. Avoid alcohol, especially in the evening. Although alcohol helps tense people fall asleep more easily, it causes awakenings later in the night. Smoking may disturb sleep. Nicotine is a stimulant. Try not to smoke during the night when you have trouble sleeping. Learning about Sleeping Well    What does sleeping well mean? Sleeping well means getting enough sleep. How much sleep is enough varies among people. The number of hours you sleep is not as improtant as how you feel when you wake up. If you do not feel refreshed, you probably need more sleep. Another sign of not getting enough sleep is feeling tired during the day. The average totally nightly sleep time is 7 1/2 to 8 hours. Healthy adults may need a little more or a little less than this. Why is getting enough sleep important? Getting enough quality sleep is a basic part of good health. When your sleep suffers, your mood and your thoughts can suffer too. Your thoughts can suffer too. You ma find yourself feeling more grumpy or stressed. No getting enough sleep also can lead to serious problems, including injury, accidents, anxiety, and depression. What might cause poor sleeping? Many things can cause sleep problems, including:    Stress. Stress can be caused by fear about a single event, such as giving a speech.   Or you may have ongoing stress, take medicine right before bed that may keep you awake or make you feel hyper or enegerized. Your doctor can tell you if your medicine may do this and if you can take it earlier in the day. If you can't sleep:    Imagine yourself in a peaceful, pleasant scene. Focus on the details and feelings of being in a place that is relaxing. Get up and do a quiet or boring activity until you feel sleepy. Don't drink liquids after 6 p.m. if you wake up often because you have to go to the bathroom. Where can you learn more? Go to http://www.gray.com/    Enter V093 in the search box to learn more about \"Learning About Sleeping Well. \"

## 2022-08-18 NOTE — PROGRESS NOTES
Timur Castrotyrakeiko  : 1970  Primary: Lyric Cezar Paredes  Secondary:  Brooks Lanza @ [address] [address]  Phone: [phone]  Fax: [phone] Plan Frequency: 2x a week for 6-8 weeks  Plan of Care/Certification Expiration Date: 22      PT Visit Info:   2          OUTPATIENT PHYSICAL THERAPY:OP NOTE TYPE: Treatment Note 2022       Episode  }Appt Desk              ICD-10: Treatment Diagnosis: Low back pain (M54.5)  Lumbago with Sciatica Right side (M54.41)  Muscle Weakness, Generalized (M62.81)  Muscle spasm of back (M62.830)   Medical/Referring Diagnosis:  low back and buttock pain - facet arthropathy - piriformis  Referring Physician:  Sanket Butterfield MD MD Orders:  PT Eval and Treat   Date of Onset:  Onset Date: 22     Allergies:   Furosemide, Sulfa antibiotics, and Other  Restrictions/Precautions:  Restrictions/Precautions: None  No data recorded   Interventions Planned (Treatment may consist of any combination of the following):    Current Treatment Recommendations: Strengthening; ROM; Functional mobility training; ADL/Self-care training; Endurance training; Neuromuscular re-education; Manual Therapy - Soft Tissue Mobilization; Pain management; Return to work related activity; Home exercise program; Safety education & training; Patient/Caregiver education & training; Modalities; Therapeutic activities     Subjective Comments:  Patient reports some improvement with starting HEP  Initial:}    2/10Post Session:       2/10  Medications Last Reviewed:  2022  Updated Objective Findings:  None Today  Treatment       THERAPEUTIC EXERCISE: (32 minutes):  Exercises per grid below to improve mobility, strength and balance. Required minimal visual, verbal and manual cues to promote proper body alignment and promote proper body posture. Progressed resistance and complexity of movement as indicated.      Date:  2022 Date:  2022 Date: Activity/Exercise Parameters Parameters Parameters   Education HEP, POC, PT goals, anatomy/pathology     Nu step  8min L6 hills    Bike      L stretch  [id number]    Calf stretch  [id number]    Cat Camel   [id number]    Alfredo pose  [id number]    SI self mob [id number]     Piriformis stretch [id number] Seated [id number]    LTR 10x [id number]    Bridging 10x Alt 2x10    SB LE flexion  20x    SB LE Ext  20x                THERAPEUTIC ACTIVITY: ( 0 minutes): Activities per gid below to improve functional movement related mobility, strength and balance to improve neuro-muscular carryover to daily functional activities for improving patient's quality of life. Required visual, verbal and manual cues to promote proper body alignment and promote proper body posture/mechanics. Progressed resistance and complexity of movement as indicated. Date:  8/18/2022 Date:   Date:     Activity/Exercise Parameters Parameters Parameters                                                   MANUAL THERAPY: (15 minutes): Joint mobilization, Soft tissue mobilization was utilized and necessary because of the patient's restricted joint motion and restricted motion of soft tissue mobility. Date  8/18/2022    Technique Used Grade  Level # Time(s) Effect while being performed          CPA, Rotation II III Lumbar SI     Manual stretching   4min Improved mobility                 MET  Right SI 4min For anterior rotation right innominate                       HEP Log Date    8/18/2022   2.     3.    4.    5.              Treatment/Session Summary:    Treatment Assessment:     Communication/Consultation:  Spoke with patient in regards to posture in sitting and standing. Equipment provided today:  None  Recommendations/Intent for next treatment session: Next visit will focus on progression of ROM and strength.     Total Treatment Billable Duration:  40 minutes   Time In: 0715  Time Out: 0800    Harsh Vázquez, PT       Charge Capture  }Post Session Pain  [facility] Portal  MD Guidelines  Scanned Media  Benefits  MyChart    Future Appointments   Date Time Provider Aleah Preston   8/18/2022 11:00 AM Sachi Amezcua APRN - CNP [facility]   8/23/2022  7:15 AM Shakeel Jimenez, PT [facility]   8/25/2022  8:45 AM Shakeel Jimenez, PT [facility]   8/30/2022  7:15 AM Shakeel Jimenez, PT [facility]   9/1/2022  8:00 AM Shakeel Jimenez, PT [facility]   9/8/2022  8:00 AM Shakeel Jimenez, PT [facility]   9/13/2022  7:30 AM Shakeel Jimenez, PT [facility]   9/15/2022  7:15 AM Shakeel Jimenez, PT [facility]   10/5/2022 10:30 AM Kailyn Leblanc MD [facility]   10/7/2022  8:30 AM Alix Mcdonald APRN - CNP [facility]   10/18/2022 10:00 AM Peggy Culver MD [id number]   11/7/2022  2:30 PM Audra Rachel MD [facility]   4/19/2023 10:00 AM Farideh Chatman DO [facility]

## 2022-08-23 ENCOUNTER — HOSPITAL ENCOUNTER (OUTPATIENT)
Dept: PHYSICAL THERAPY | Age: 52
Setting detail: RECURRING SERIES
Discharge: HOME | End: 2022-08-26
Payer: COMMERCIAL

## 2022-08-23 PROCEDURE — 97110 THERAPEUTIC EXERCISES: CPT

## 2022-08-23 ASSESSMENT — PAIN SCALES - GENERAL: PAINLEVEL_OUTOF10: 1

## 2022-08-23 NOTE — PROGRESS NOTES
Wild Racer  : 1970  Primary: Laury Campos Sc  Secondary:  Jacob Model @ [address] [address]  Phone: [phone]  Fax: [phone] Plan Frequency: 2x a week for 6-8 weeks  Plan of Care/Certification Expiration Date: 22      PT Visit Info:   3          OUTPATIENT PHYSICAL THERAPY:OP NOTE TYPE: Treatment Note 2022       Episode  }Appt Desk              ICD-10: Treatment Diagnosis: Low back pain (M54.5)  Lumbago with Sciatica Right side (M54.41)  Muscle Weakness, Generalized (M62.81)  Muscle spasm of back (M62.830)   Medical/Referring Diagnosis:  Neck pain [M54.2]  Referring Physician:  Isaac Higgins MD MD Orders:  PT Eval and Treat   Date of Onset:  Onset Date: 22     Allergies:   Furosemide, Sulfa antibiotics, and Other  Restrictions/Precautions:  Restrictions/Precautions: None  No data recorded   Interventions Planned (Treatment may consist of any combination of the following):    Current Treatment Recommendations: Strengthening; ROM; Functional mobility training; ADL/Self-care training; Endurance training; Neuromuscular re-education; Manual Therapy - Soft Tissue Mobilization; Pain management; Return to work related activity; Home exercise program; Safety education & training; Patient/Caregiver education & training; Modalities; Therapeutic activities     Subjective Comments:  Patient reports doing better, riding in car better also  Initial:}    1/10Post Session:       1/10  Medications Last Reviewed:  2022  Updated Objective Findings:  None Today  Treatment       THERAPEUTIC EXERCISE: (38 minutes):  Exercises per grid below to improve mobility, strength and balance. Required minimal visual, verbal and manual cues to promote proper body alignment and promote proper body posture. Progressed resistance and complexity of movement as indicated.      Date:  2022 Date:  2022 Date:  2022   Activity/Exercise Parameters Parameters Parameters   Education HEP, POC, PT goals, anatomy/pathology     Nu step  8min L6 hills 8min L6 hills   Bike      L stretch  07e02xry 42q05fqv   Calf stretch  7l09trl 8b46vza   Cat Camel   2n64kdv    Alfredo pose  0n98znq    SI self mob 8w77sia     Piriformis stretch 5h56tfy Seated 6v64iut Seated 0h32ayx   LTR 10x 1z92irj 1i57yxl   Bridging 10x Alt 2x10    SB LE flexion  20x    SB LE Ext  20x    Self Mob   79b8bwx   Squat   TRX 2x10   Lateral Walk   3 laps pink   Monster Walk   3 laps pink                           THERAPEUTIC ACTIVITY: ( 0 minutes): Activities per gid below to improve functional movement related mobility, strength and balance to improve neuro-muscular carryover to daily functional activities for improving patient's quality of life. Required visual, verbal and manual cues to promote proper body alignment and promote proper body posture/mechanics. Progressed resistance and complexity of movement as indicated. Date:  8/23/2022 Date:   Date:     Activity/Exercise Parameters Parameters Parameters                                                   MANUAL THERAPY: (0 minutes): Joint mobilization, Soft tissue mobilization was utilized and necessary because of the patient's restricted joint motion and restricted motion of soft tissue mobility. Date  8/23/2022    Technique Used Grade  Level # Time(s) Effect while being performed          CPA, Rotation II III Lumbar SI     Manual stretching    Improved mobility                 MET  Right SI  For anterior rotation right innominate                       HEP Log Date    8/23/2022   2.     3.    4.    5.              Treatment/Session Summary:    Treatment Assessment:  Equal innominate after self mob, cueing for correct squat form  Communication/Consultation:  Spoke with patient in regards to posture in sitting and standing.   Equipment provided today:  None  Recommendations/Intent for next treatment session: Next visit will focus on progression of ROM and strength.     Total Treatment Billable Duration:  38 minutes   Time In: 0715  Time Out: 0800    Ayla Honeycutt, PT       Charge Capture  }Post Session Pain  MedBridge Portal  MD Guidelines  Scanned Media  Benefits  MyChart    Future Appointments   Date Time Provider Aleah Kary   8/25/2022  8:45 AM Ayla Honeycutt, PT [facility]   8/30/2022  7:15 AM Ayla Honeycutt, PT [facility]   9/1/2022  8:00 AM Ayla Honeycutt, PT [facility]   9/8/2022  8:00 AM Ayla Honeycutt, PT [facility]   9/13/2022  7:30 AM Ayla Honeycutt, PT [facility]   9/15/2022  7:15 AM Ayla Honeycutt, PT [facility]   10/5/2022 10:30 AM Risa Cullen MD [facility]   10/7/2022  8:30 AM BRANDON Urbina - CNP MAT [facility]   10/18/2022 10:00 AM Rhianna Bruce MD [id number]   11/7/2022  2:30 PM Darian Andrew MD [facility]   4/19/2023 10:00 AM Caesar Chatman DO [facility]

## 2022-08-25 ENCOUNTER — CLINICAL DOCUMENTATION (OUTPATIENT)
Dept: ORTHOPEDIC SURGERY | Age: 52
End: 2022-08-25

## 2022-08-25 ENCOUNTER — HOSPITAL ENCOUNTER (OUTPATIENT)
Dept: PHYSICAL THERAPY | Age: 52
Setting detail: RECURRING SERIES
Discharge: HOME | End: 2022-08-28
Payer: COMMERCIAL

## 2022-08-25 PROCEDURE — 97110 THERAPEUTIC EXERCISES: CPT

## 2022-08-25 PROCEDURE — 97140 MANUAL THERAPY 1/> REGIONS: CPT

## 2022-08-25 ASSESSMENT — PAIN SCALES - GENERAL: PAINLEVEL_OUTOF10: 9

## 2022-08-25 NOTE — PROGRESS NOTES
Jhonny Jarrett  : 1970  Primary: Anjum Pedraza Sc  Secondary:  [address] @ [address] [place] [address]  Phone: [phone]  Fax: [phone] Plan Frequency: 2x a week for 6-8 weeks  Plan of Care/Certification Expiration Date: 22      PT Visit Info:   4          OUTPATIENT PHYSICAL THERAPY:OP NOTE TYPE: Treatment Note 2022       Episode  }Appt Desk              ICD-10: Treatment Diagnosis: Low back pain (M54.5)  Lumbago with Sciatica Right side (M54.41)  Muscle Weakness, Generalized (M62.81)  Muscle spasm of back (M62.830)   Medical/Referring Diagnosis:  Neck pain [M54.2]  Referring Physician:  Carie Morgan MD MD Orders:  PT Eval and Treat   Date of Onset:  Onset Date: 22     Allergies:   Furosemide, Sulfa antibiotics, and Other  Restrictions/Precautions:  Restrictions/Precautions: None  No data recorded   Interventions Planned (Treatment may consist of any combination of the following):    Current Treatment Recommendations: Strengthening; ROM; Functional mobility training; ADL/Self-care training; Endurance training; Neuromuscular re-education; Manual Therapy - Soft Tissue Mobilization; Pain management; Return to work related activity; Home exercise program; Safety education & training; Patient/Caregiver education & training; Modalities; Therapeutic activities     Subjective Comments:  Patient reports being really stiff and achy throughout her intire body today  Initial:}    9/10Post Session:       4/10  Medications Last Reviewed:  2022  Updated Objective Findings:  None Today  Treatment       THERAPEUTIC EXERCISE: (10 minutes):  Exercises per grid below to improve mobility, strength and balance. Required minimal visual, verbal and manual cues to promote proper body alignment and promote proper body posture. Progressed resistance and complexity of movement as indicated.      Date:  2022 Date:  2022 Date:  2022 Date:  8/25/2022    Activity/Exercise Parameters Parameters Parameters     Education HEP, POC, PT goals, anatomy/pathology       Nu step  8min L6 hills 8min L6 hills 8min L6 hills    Bike        L stretch  54n77vtl 12f82ycu 21e78bto    Calf stretch  0t86otz 2b64wpz 6m89mjz    Cat Camel   2v18yno      Alfredo pose  6e84omg      SI self mob 9p42tac       Piriformis stretch 8a93ybp Seated 8w48ebn Seated 0w28owj     LTR 10x 5r41cic 6p22jyc     Bridging 10x Alt 2x10      SB LE flexion  20x      SB LE Ext  20x      Self Mob   59d0jim     Squat   TRX 2x10     Lateral Walk   3 laps pink     Monster Walk   3 laps pink                                   THERAPEUTIC ACTIVITY: ( 0 minutes): Activities per gid below to improve functional movement related mobility, strength and balance to improve neuro-muscular carryover to daily functional activities for improving patient's quality of life. Required visual, verbal and manual cues to promote proper body alignment and promote proper body posture/mechanics. Progressed resistance and complexity of movement as indicated. Date:  8/25/2022 Date:   Date:     Activity/Exercise Parameters Parameters Parameters                                                   MANUAL THERAPY: (15 minutes): Joint mobilization, Soft tissue mobilization was utilized and necessary because of the patient's restricted joint motion and restricted motion of soft tissue mobility.         Date  8/25/2022    Technique Used Grade  Level # Time(s) Effect while being performed          CPA, Rotation II III Lumbar SI 5min Improved mobility   Manual stretching   5min Improved mobility                 MET  Right SI 5min For anterior rotation right innominate                       HEP Log Date    8/25/2022   2.     3.    4.    5.              Treatment/Session Summary:    Treatment Assessment:  Patient reported improved symptoms after manual therapy  Communication/Consultation:  Spoke with patient in regards to posture in sitting and standing. Equipment provided today:  None  Recommendations/Intent for next treatment session: Next visit will focus on progression of ROM and strength.     Total Treatment Billable Duration:  25 minutes   Time In: 0825  Time Out: 7712    Cecelia Renae, PT       Charge Capture  }Post Session Pain  MedBridge Portal  MD Guidelines  Scanned Media  Benefits  MyChart    Future Appointments   Date Time Provider Aleah Kary   8/30/2022  7:15 AM Cecelia Renae, PT [facility]   9/1/2022  8:00 AM Cecelia Renae, PT [facility] [place] [facility]   9/8/2022  8:00 AM Cecelia Renae, PT [facility]   9/13/2022  7:30 AM Cecelia Renae, PT [facility]   9/15/2022  7:15 AM [facility]ae[facility]   10/5/2022 10:30 AM Abbey Marquis MD DS [facility]   10/7/2022  8:30 AM Ti Sullivan APRN - CNP MAT [facility]   10/18/2022 10:00 AM Nikia Melo MD [id number]   11/7/2022  2:30 PM Anu Huang MD BS[facility]   4/19/2023 10:00 AM Gerhardt Eva Rinko, DO [facility]

## 2022-08-25 NOTE — PROGRESS NOTES
A LETTER FROM -[phone], [id number] LT SHOULDER MRI IS IN REVIEW. THE MRI WAS DENIED AND ON 8- I FAXED [address] [phone].

## 2022-08-26 ENCOUNTER — TELEPHONE (OUTPATIENT)
Dept: BEHAVIORAL/MENTAL HEALTH CLINIC | Age: 52
End: 2022-08-26

## 2022-08-26 ENCOUNTER — PATIENT MESSAGE (OUTPATIENT)
Dept: BEHAVIORAL/MENTAL HEALTH CLINIC | Age: 52
End: 2022-08-26

## 2022-08-26 NOTE — TELEPHONE ENCOUNTER
From: Wild Glynn  To: Dr. Simon Smith: 8/26/2022 8:31 AM EDT  Subject: [employer] paperwork     Imer  I spoke with [employer] and they said they sent over new paperwork to complete. I need to set up an appointment to have that done.

## 2022-08-26 NOTE — TELEPHONE ENCOUNTER
Placed ADA forms on Dr Herve Jonas desk for completion. Scheduled 9/8/22 appt for this. Patient is waiting on another form to be sent for completion as well.

## 2022-08-27 DIAGNOSIS — I10 PRIMARY HYPERTENSION: ICD-10-CM

## 2022-08-29 ENCOUNTER — TELEPHONE (OUTPATIENT)
Dept: BEHAVIORAL/MENTAL HEALTH CLINIC | Age: 52
End: 2022-08-29

## 2022-08-29 RX ORDER — LISINOPRIL AND HYDROCHLOROTHIAZIDE 20; 12.5 MG/1; MG/1
1 TABLET ORAL DAILY
Qty: 30 TABLET | Refills: 0 | OUTPATIENT
Start: 2022-08-29

## 2022-08-29 NOTE — TELEPHONE ENCOUNTER
From: Devan Rutherford  To: Dr. Carlita Henson  Sent: 8/26/2022 4:11 PM EDT  Subject: Forms    They just sent again

## 2022-08-29 NOTE — TELEPHONE ENCOUNTER
I received the following message \"Hello  I am not sleeping and having bad dreams. This became really bad when I changed up the citalopram to taking it in the morning. Can I change back to night or can I take another dosage at night. \"

## 2022-08-30 ENCOUNTER — HOSPITAL ENCOUNTER (OUTPATIENT)
Dept: PHYSICAL THERAPY | Age: 52
Setting detail: RECURRING SERIES
Discharge: HOME | End: 2022-09-02
Payer: COMMERCIAL

## 2022-08-30 PROCEDURE — 97110 THERAPEUTIC EXERCISES: CPT

## 2022-08-30 ASSESSMENT — PAIN SCALES - GENERAL: PAINLEVEL_OUTOF10: 5

## 2022-08-30 NOTE — PROGRESS NOTES
Reji Saeed  : 1970  Primary: Vicky Paredes  Secondary:  [address] @ [facility] [place]. [address]  Phone: [phone]  Fax: [phone] Plan Frequency: 2x a week for 6-8 weeks  Plan of Care/Certification Expiration Date: 22      PT Visit Info:   5          OUTPATIENT PHYSICAL THERAPY:OP NOTE TYPE: Treatment Note 2022       Episode  }Appt Desk              ICD-10: Treatment Diagnosis: Low back pain (M54.5)  Lumbago with Sciatica Right side (M54.41)  Muscle Weakness, Generalized (M62.81)  Muscle spasm of back (M62.830)   Medical/Referring Diagnosis:  Neck pain [M54.2]  Referring Physician:  Mukul Duron MD MD Orders:  PT Eval and Treat   Date of Onset:  Onset Date: 22     Allergies:   Furosemide, Sulfa antibiotics, and Other  Restrictions/Precautions:  Restrictions/Precautions: None  No data recorded   Interventions Planned (Treatment may consist of any combination of the following):    Current Treatment Recommendations: Strengthening; ROM; Functional mobility training; ADL/Self-care training; Endurance training; Neuromuscular re-education; Manual Therapy - Soft Tissue Mobilization; Pain management; Return to work related activity; Home exercise program; Safety education & training; Patient/Caregiver education & training; Modalities; Therapeutic activities     Subjective Comments:  Patient reports some flair up of neuropathy today, no change in med from MD  Initial:}    5/10Post Session:       4/10  Medications Last Reviewed:  2022  Updated Objective Findings:  None Today  Treatment       THERAPEUTIC EXERCISE: (38 minutes):  Exercises per grid below to improve mobility, strength and balance. Required minimal visual, verbal and manual cues to promote proper body alignment and promote proper body posture. Progressed resistance and complexity of movement as indicated.      Date:  2022 Date:  2022 Date:  2022 Date:  8/25/2022 Date:  8/30/2022   Activity/Exercise Parameters Parameters Parameters     Education HEP, POC, PT goals, anatomy/pathology       Nu step  8min L6 hills 8min L6 hills 8min L6 hills 8min L6 hills 46 rocio   Rings     11i04ouj   L stretch  04l75xra 03j92ubb 51e10cxa 57d16gcx   Calf stretch  6k93evn 0p92kkh 1y26kfk 9i62nud   Cat Camel   4a96vgn      Alfredo pose  2v27qry      SI self mob 8m77omq       Piriformis stretch 2b81etf Seated 9j19pzk Seated 3f69dhk  Seated 8h47ayo   LTR 10x 1k79nxy 1d36gyc     Bridging 10x Alt 2x10      SB LE flexion  20x      SB LE Ext  20x      Self Mob   15g5tuw     Squat   TRX 2x10  3x5 TRX   Lateral Walk   3 laps pink  3 laps pink   Monster Walk   3 laps pink  3 laps pink   Shuttle     Bilat 3x10 62.5#  R/L  2x10 37.5#                         THERAPEUTIC ACTIVITY: ( 0 minutes): Activities per gid below to improve functional movement related mobility, strength and balance to improve neuro-muscular carryover to daily functional activities for improving patient's quality of life. Required visual, verbal and manual cues to promote proper body alignment and promote proper body posture/mechanics. Progressed resistance and complexity of movement as indicated. Date:  8/30/2022 Date:   Date:     Activity/Exercise Parameters Parameters Parameters                                                   MANUAL THERAPY: (3 minutes): Joint mobilization, Soft tissue mobilization was utilized and necessary because of the patient's restricted joint motion and restricted motion of soft tissue mobility.         Date  8/30/2022    Technique Used Grade  Level # Time(s) Effect while being performed          CPA, Rotation II III Lumbar SI  Improved mobility   Manual stretching    Improved mobility                 MET  Right SI 3min For anterior rotation right innominate                       HEP Log Date    8/30/2022   2.     3.    4.    5.              Treatment/Session Summary:    Treatment Assessment:  Patient had fair tolerance to return of core strengthening, mainly  Communication/Consultation:  Spoke with patient in regards to posture in sitting and standing. Equipment provided today:  None  Recommendations/Intent for next treatment session: Next visit will focus on progression of ROM and strength.     Total Treatment Billable Duration:  41 minutes   Time In: 6925  Time Out: 0805    Major Chauhan, PT       Charge Capture  }Post Session Pain  MedBridge Portal  MD Guidelines  Scanned Media  Benefits  MyChart    Future Appointments   Date Time Provider Aleah Preston   8/31/2022  6:00 PM Sandor Clazada, BRANDON - CNP MAT [facility]   9/1/2022  8:00 AM Major Chauhan, PT [facility]   9/8/2022  8:00 AM Major Chauhan, PT [facility]   9/8/2022  4:20 PM Vignesh Mcnally MD [id number]   9/13/2022  7:30 AM Major Chauhan, PT [facility]   9/14/2022  2:40 PM Ritu Shi MD [facility]   9/15/2022  7:15 AM Major Chauhan, PT [facility]   10/5/2022 10:30 AM Edgar Tiwari MD [facility]   10/7/2022  8:30 AM BRANDON Gloria - CNP [facility]   10/18/2022 10:00 AM Vignesh Mcnally MD [id number]   11/7/2022  2:30 PM Seamus Mcnulty MD [facility]   4/19/2023 10:00 AM Eugenio Chatman DO [facility]

## 2022-08-31 ENCOUNTER — TELEMEDICINE (OUTPATIENT)
Dept: INTERNAL MEDICINE CLINIC | Facility: CLINIC | Age: 52
End: 2022-08-31
Payer: COMMERCIAL

## 2022-08-31 DIAGNOSIS — G25.89 SCAPULAR DYSKINESIS: ICD-10-CM

## 2022-08-31 DIAGNOSIS — G89.4 CHRONIC PAIN SYNDROME: ICD-10-CM

## 2022-08-31 DIAGNOSIS — I10 PRIMARY HYPERTENSION: ICD-10-CM

## 2022-08-31 DIAGNOSIS — F31.9 BIPOLAR DEPRESSION (HCC): Primary | ICD-10-CM

## 2022-08-31 PROCEDURE — 99442 PR PHYS/QHP TELEPHONE EVALUATION 11-20 MIN: CPT | Performed by: NURSE PRACTITIONER

## 2022-08-31 RX ORDER — LISINOPRIL AND HYDROCHLOROTHIAZIDE 20; 12.5 MG/1; MG/1
1 TABLET ORAL DAILY
Qty: 90 TABLET | Refills: 1 | Status: SHIPPED | OUTPATIENT
Start: 2022-08-31 | End: 2022-10-05 | Stop reason: SINTOL

## 2022-08-31 RX ORDER — LIDOCAINE 50 MG/G
1 PATCH TOPICAL DAILY
Qty: 30 PATCH | Refills: 0 | Status: SHIPPED | OUTPATIENT
Start: 2022-08-31 | End: 2022-09-30

## 2022-08-31 ASSESSMENT — ENCOUNTER SYMPTOMS
COUGH: 0
EYE DISCHARGE: 0
SHORTNESS OF BREATH: 0
COLOR CHANGE: 0
CONSTIPATION: 0
ABDOMINAL DISTENTION: 0
ABDOMINAL PAIN: 0
EYE REDNESS: 0
BACK PAIN: 1
EYE PAIN: 0
APNEA: 0
NAUSEA: 0
DIARRHEA: 0
SINUS PAIN: 0
EYE ITCHING: 0

## 2022-08-31 NOTE — PROGRESS NOTES
[unfilled]  [address] [place] [address]      PROGRESS NOTE    SUBJECTIVE:   Ann Marie Knowles is a 46 y.o. female seen for a follow up visit regarding the following chief complaint:     No chief complaint on file. HPI  Presents for request to fill out progress report from recent medical leave request.  She was placed on 80 day FMLA per psychiatrist last month due to chronic fatigue and anxiety, in addition to symptoms of uncontrolled pain, tremors and intermittent tachycardia that is ongoing. She was also referred to new pain management for ongoing pain and back pain. She has been compliant with medications and is having some benefit from this. She is requesting lidocaine patches today to assist with scapular pain and back pain. She received steroid injections that does provide temporary relief. She is following multiple care providers, including PT, pain management, orthopedic and rheumatology. Tremors were thought to be benign per neurology consult and there is no recommended intervention for this currently. Due for refill of zestoretic today. Current Outpatient Medications   Medication Sig Dispense Refill    lidocaine (LIDODERM) 5 % Place 1 patch onto the skin daily 12 hours on, 12 hours off. 30 patch 0    lisinopril-hydroCHLOROthiazide (PRINZIDE;ZESTORETIC) 20-12.5 MG per tablet Take 1 tablet by mouth daily 90 tablet 1    EVENING PRIMROSE OIL PO Take by mouth      Levomefolate Calcium POWD Take by mouth (Patient not taking: Reported on 8/18/2022)      methylfolate (DEPLIN) 7.5 MG TABS tablet Take 1 tablet by mouth in the morning. 30 tablet 5    citalopram (CELEXA) 20 MG tablet Take 1 tablet by mouth every evening 90 tablet 1    clonazePAM (KLONOPIN) 0.5 MG tablet Take 1 tablet by mouth daily as needed for Anxiety for up to 90 days.  30 tablet 2    traZODone (DESYREL) 50 MG tablet Take 0.5-1 tablets by mouth nightly 180 tablet 1    atomoxetine (STRATTERA) 80 MG capsule Take 1 capsule by mouth daily 90 capsule 1    ketoconazole (NIZORAL) 2 % cream Apply topically daily x2 weeks (Patient not taking: Reported on 8/18/2022) 30 g 1    Wheat Dextrin (BENEFIBER PO)       topiramate (TOPAMAX) 25 MG tablet Take 1 tablet by mouth 2 times daily 180 tablet 1    cyanocobalamin 1000 MCG tablet Take 1 tablet by mouth daily 90 tablet 3    LINZESS 72 MCG CAPS capsule Take 1 capsule by mouth daily      naproxen (NAPROSYN) 500 MG tablet Take 1 tablet by mouth as needed      norethindrone (AYGESTIN) 5 MG tablet Take 1 tablet by mouth daily      omeprazole (PRILOSEC) 40 MG delayed release capsule Take 1 capsule (40 mg) by mouth 2 (two) times a day 30 mins before breakfast and 30 mins before dinner      cod liver oil CAPS Take 1 capsule by mouth daily      Ubrogepant (UBRELVY) 50 MG TABS Take 1 tablet at the onset of migraine, may repeat in 2 hours. Do not take more then 3 days a week. 16 tablet 11    TURMERIC PO Take 1 tablet by mouth daily      amitriptyline (ELAVIL) 25 MG tablet TAKE 1 TO 2 TABLETS BY MOUTH EVERY EVENING      Benzoyl Peroxide 2.5 % GEL APPLY TOPICALLY TO FACE AT BEDTIME      celecoxib (CELEBREX) 200 MG capsule Take 200 mg by mouth in the morning.       vitamin D (CHOLECALCIFEROL) 12472 UNIT CAPS Take 50,000 Units by mouth every 7 days      cyclobenzaprine (FLEXERIL) 10 MG tablet Take 10 mg by mouth 3 times daily as needed      diclofenac sodium (VOLTAREN) 1 % GEL Apply 1 g topically as needed      Lidocaine, Anorectal, 5 % CREA Actual prescription: Lidocaine 5%: Neurontin/gabapentin 5% combined topical cream/gelApply to affected area up to 4 times a day as needed for pain      nystatin-triamcinolone (MYCOLOG II) 248228-1.1 UNIT/GM-% cream Apply topically 2 times daily      pramipexole (MIRAPEX) 0.125 MG tablet Take 0.25 mg by mouth      pregabalin (LYRICA) 150 MG capsule TAKE 1 CAPSULE BY MOUTH Four TIMES DAILY      vitamin E 600 units capsule Take by mouth daily       No current facility-administered medications for this visit. Allergies   Allergen Reactions    Furosemide Other (See Comments)     Edema of lower extremities      Sulfa Antibiotics Other (See Comments) and Rash     GI Upset    Other Other (See Comments)     Oral steroids       Past Medical History:   Diagnosis Date    Anxiety     DDD (degenerative disc disease), cervical 11/29/2021    C3-C7 Disc Degeneration w/ Posterior Spondylotic Disc Displaceemnts per MRI    Depressed     GERD (gastroesophageal reflux disease)     Hypertension     IBS (irritable bowel syndrome)     Insomnia     TMJ (dislocation of temporomandibular joint) 04/2021     Past Surgical History:   Procedure Laterality Date    CATARACT REMOVAL Bilateral 02/2020    COLONOSCOPY      EGD DELIVER THERMAL ENERGY SPHNCTR/CARDIA GERD      LIPECTOMY      TUBAL LIGATION       Family History   Problem Relation Age of Onset    Heart Disease Father     Hypertension Father     High Cholesterol Mother     GERD Mother      Social History     Tobacco Use    Smoking status: Never    Smokeless tobacco: Never   Substance Use Topics    Alcohol use: Never       Review of Systems   Constitutional:  Positive for fatigue. Negative for activity change, appetite change, chills and fever. HENT:  Negative for congestion, ear pain and sinus pain. Eyes:  Negative for pain, discharge, redness and itching. Respiratory:  Negative for apnea, cough and shortness of breath. Cardiovascular:  Negative for chest pain, palpitations and leg swelling. Gastrointestinal:  Negative for abdominal distention, abdominal pain, constipation, diarrhea and nausea. Endocrine: Negative for cold intolerance and heat intolerance. Genitourinary:  Negative for difficulty urinating, dysuria, enuresis and urgency. Musculoskeletal:  Positive for arthralgias, back pain and myalgias. Negative for joint swelling and neck pain. Skin:  Negative for color change and rash.    Neurological:  Positive for tremors (chronic hand). Negative for dizziness, weakness and headaches. Psychiatric/Behavioral:  Negative for behavioral problems and sleep disturbance. The patient is not nervous/anxious. OBJECTIVE:  There were no vitals taken for this visit. Physical Exam  NA    ASSESSMENT and PLAN  Diagnoses and all orders for this visit:    Bipolar depression ([facility] [address]    Chronic pain syndrome  -     lidocaine (LIDODERM) 5 %; Place 1 patch onto the skin daily 12 hours on, 12 hours off. Scapular dyskinesis  -     External Referral to Physical Therapy    Primary hypertension  -     lisinopril-hydroCHLOROthiazide (PRINZIDE;ZESTORETIC) 20-12.5 MG per tablet; Take 1 tablet by mouth daily     Pursuant to the emergency declaration under the [facility] waiver authority and the [employer] and [employer] Act, this Virtual Visit was conducted, with patient's consent, to reduce the patient's risk of exposure to COVID-19 and provide continuity of care for an established patient. Services were provided through a telephone synchronous discussion virtually to substitute for in-person clinic visit. Patient consented to virtual visit. Greater than 50% of this 15 min visit was spent counseling the patient about test results, prognosis, importance of compliance, education about disease process, benefits of medications, instructions for management of acute symptoms, and follow up plans. Follow-up and Dispositions    Return for recheck as scheduled. Federico Hernandez NP, APRN - CNP

## 2022-09-01 ENCOUNTER — HOSPITAL ENCOUNTER (OUTPATIENT)
Dept: PHYSICAL THERAPY | Age: 52
Setting detail: RECURRING SERIES
Discharge: HOME | End: 2022-09-04
Payer: COMMERCIAL

## 2022-09-01 ENCOUNTER — PATIENT MESSAGE (OUTPATIENT)
Dept: INTERNAL MEDICINE CLINIC | Facility: CLINIC | Age: 52
End: 2022-09-01

## 2022-09-01 PROCEDURE — 97110 THERAPEUTIC EXERCISES: CPT

## 2022-09-01 PROCEDURE — 97140 MANUAL THERAPY 1/> REGIONS: CPT

## 2022-09-01 RX ORDER — LIDOCAINE 50 MG/G
1 PATCH TOPICAL DAILY
Qty: 90 PATCH | OUTPATIENT
Start: 2022-09-01 | End: 2022-10-01

## 2022-09-01 ASSESSMENT — PAIN SCALES - GENERAL: PAINLEVEL_OUTOF10: 5

## 2022-09-01 NOTE — PROGRESS NOTES
Tad Duarte  : 1970  Primary: Ever Ricks Sc  Secondary:  [address] @ [address] [facility] [address]  Phone: [phone]  Fax: [phone] Plan Frequency: 2x a week for 6-8 weeks  Plan of Care/Certification Expiration Date: 22      PT Visit Info:   6          OUTPATIENT PHYSICAL THERAPY:OP NOTE TYPE: Treatment Note 2022       Episode  }Appt Desk              ICD-10: Treatment Diagnosis: Low back pain (M54.5)  Lumbago with Sciatica Right side (M54.41)  Muscle Weakness, Generalized (M62.81)  Muscle spasm of back (M62.830)   Medical/Referring Diagnosis:  Neck pain [M54.2]  Referring Physician:  Dejuan Washburn MD MD Orders:  PT Eval and Treat   Date of Onset:  Onset Date: 22     Allergies:   Furosemide, Sulfa antibiotics, and Other  Restrictions/Precautions:  Restrictions/Precautions: None  No data recorded   Interventions Planned (Treatment may consist of any combination of the following):    Current Treatment Recommendations: Strengthening; ROM; Functional mobility training; ADL/Self-care training; Endurance training; Neuromuscular re-education; Manual Therapy - Soft Tissue Mobilization; Pain management; Return to work related activity; Home exercise program; Safety education & training; Patient/Caregiver education & training; Modalities; Therapeutic activities     Subjective Comments:  Patient reports having an injection in right side of low back and new order for right shoulder  Initial:}    5/10Post Session:       3/10  Medications Last Reviewed:  2022  Updated Objective Findings:  None Today  Treatment       THERAPEUTIC EXERCISE: (32 minutes):  Exercises per grid below to improve mobility, strength and balance. Required minimal visual, verbal and manual cues to promote proper body alignment and promote proper body posture. Progressed resistance and complexity of movement as indicated.      Date:  2022 Date:  2022 Date:  8/23/2022 Date:  8/25/2022 Date:  8/30/2022 Date:  9/1/2022            Activity/Exercise Parameters Parameters Parameters      Education HEP, POC, PT goals, anatomy/pathology        Nu step  8min L6 hills 8min L6 hills 8min L6 hills 8min L6 hills 46 rocio 8min L6 hills 47 rocio   Rings     24w20ssd    L stretch  85n27mst 42v56juh 79j24wbx 90c48qdg 23h13paz   Calf stretch  9h47qmt 0s26srx 3r05zfp 4o79yrw 4v07zvr   Cat Camel   1f04smc       Alfredo pose  2q34ivb       SI self mob 7v18mvf        Piriformis stretch 8g58tup Seated 0i99izx Seated 4y72jet  Seated 9k11gnc Seated 9l16msv   LTR 10x 9f40sob 5r02pmg      Bridging 10x Alt 2x10       SB LE flexion  20x       SB LE Ext  20x       Self Mob   14a7hpa      Squat   TRX 2x10  3x5 TRX    Lateral Walk   3 laps pink  3 laps pink    Monster Walk   3 laps pink  3 laps pink    Shuttle     Bilat 3x10 62.5#  R/L  2x10 37.5# Bilat 3x10 75#  R/L  2x10 50#                           THERAPEUTIC ACTIVITY: ( 0 minutes): Activities per gid below to improve functional movement related mobility, strength and balance to improve neuro-muscular carryover to daily functional activities for improving patient's quality of life. Required visual, verbal and manual cues to promote proper body alignment and promote proper body posture/mechanics. Progressed resistance and complexity of movement as indicated. Date:  9/1/2022 Date:   Date:     Activity/Exercise Parameters Parameters Parameters                                                   MANUAL THERAPY: (3 minutes): Joint mobilization, Soft tissue mobilization was utilized and necessary because of the patient's restricted joint motion and restricted motion of soft tissue mobility.         Date  9/1/2022    Technique Used Grade  Level # Time(s) Effect while being performed          CPA, Rotation II III Lumbar SI  Improved mobility   Manual stretching   5min Improved mobility                 MET  Right SI 3min For anterior rotation right innominate                       HEP Log Date    9/1/2022   2.     3.    4.    5.              Treatment/Session Summary:    Treatment Assessment:  Patient had improved mobility after treatment  Communication/Consultation:  Spoke with patient in regards to posture in sitting and standing. Equipment provided today:  None  Recommendations/Intent for next treatment session: Next visit will focus on progression of ROM and strength.     Total Treatment Billable Duration:  40 minutes   Time In: 0800  Time Out: 0845    Kendell Malcolm PT       Charge Capture  }Post Session Pain  MedBridge Portal  MD Guidelines  Scanned Media  Benefits  MyChart    Future Appointments   Date Time Provider Aleah Preston   9/8/2022  8:00 AM Kendell Malcolm PT [facility]   9/8/2022  4:20 PM Olivier Miller MD [id number]   9/13/2022  7:30 AM Kendell Malcolm PT [facility]   9/14/2022  2:40 PM Georgi Alva MD [facility]   9/15/2022  7:15 AM Kendell Malcolm, PT [facility]   10/5/2022 10:30 AM Darylene Hoyle, MD [facility]   10/7/2022  8:30 AM Alix Gamino, APRN - CNP [facility]   10/18/2022 10:00 AM Olivier Miller MD [id number]   11/7/2022  2:30 PM Alison Hidalgo MD [facility]   4/19/2023 10:00 AM Girish Chatman DO BS [facility]

## 2022-09-02 ENCOUNTER — TELEPHONE (OUTPATIENT)
Dept: INTERNAL MEDICINE CLINIC | Facility: CLINIC | Age: 52
End: 2022-09-02

## 2022-09-02 NOTE — TELEPHONE ENCOUNTER
From: Elora Snellen  To: Richmond Tobar  Sent: 9/1/2022 9:08 AM EDT  Subject: Prior authorization     Hello  For the patches insurance wants prior authorization.  If you can complete please

## 2022-09-02 NOTE — TELEPHONE ENCOUNTER
PA approved on cover my meds for lidocaine 5% patches. [facility]:[id number];POJWLJ:[id number]; Review Type:Prior Auth; Coverage Start Date:08/03/2022; Coverage End Date:09/02/2023;

## 2022-09-06 ENCOUNTER — CLINICAL DOCUMENTATION (OUTPATIENT)
Dept: INTERNAL MEDICINE CLINIC | Facility: CLINIC | Age: 52
End: 2022-09-06

## 2022-09-06 ENCOUNTER — OFFICE VISIT (OUTPATIENT)
Dept: INTERNAL MEDICINE CLINIC | Facility: CLINIC | Age: 52
End: 2022-09-06
Payer: COMMERCIAL

## 2022-09-06 VITALS
TEMPERATURE: 98.2 F | SYSTOLIC BLOOD PRESSURE: 123 MMHG | RESPIRATION RATE: 16 BRPM | HEIGHT: 60 IN | HEART RATE: 91 BPM | BODY MASS INDEX: 36.95 KG/M2 | DIASTOLIC BLOOD PRESSURE: 69 MMHG | OXYGEN SATURATION: 99 % | WEIGHT: 188.2 LBS

## 2022-09-06 DIAGNOSIS — R21 RASH: ICD-10-CM

## 2022-09-06 DIAGNOSIS — R23.3 PETECHIAE: Primary | ICD-10-CM

## 2022-09-06 PROCEDURE — 99213 OFFICE O/P EST LOW 20 MIN: CPT | Performed by: INTERNAL MEDICINE

## 2022-09-06 RX ORDER — HYDROXYCHLOROQUINE SULFATE 200 MG/1
1 TABLET, FILM COATED ORAL DAILY
COMMUNITY
Start: 2022-08-26

## 2022-09-06 ASSESSMENT — ENCOUNTER SYMPTOMS
SHORTNESS OF BREATH: 0
CHEST TIGHTNESS: 0
ABDOMINAL PAIN: 0
NAUSEA: 0
DIARRHEA: 0
SINUS PRESSURE: 0
RHINORRHEA: 0
BLOOD IN STOOL: 0
COUGH: 0
VOMITING: 0

## 2022-09-06 NOTE — PROGRESS NOTES
Pt has been approved for Lidocaine 5% patches on 9/2/2022. Coverage Start Date:08/03/2022; Coverage End Date:09/02/2023.     Thank you    Charu Salazar

## 2022-09-06 NOTE — PROGRESS NOTES
[facility]      2022    Patient Name: Marisol Chun  :  1970    Subjective:    Chief Complaint:  Chief Complaint   Patient presents with    Rash     Pt c/o spot on arm. She's used the cream the dermatologist gave her, but it's still there. Can't get in to see dermatologist.  Pt wants to know what this is and how to get rid of it. HPI c/o rash on R forearm that she's had for 2.5 weeks; she saw Keeley Reina NP in our office 8/15/22; she sent a photo of her lesion to her Dermatologist, who prescribed steroid cream for eczema, with minimal relief; she has f/u w/ Dermatology 22;      Past Medical History:   Diagnosis Date    Anxiety     DDD (degenerative disc disease), cervical 2021    C3-C7 Disc Degeneration w/ Posterior Spondylotic Disc Displaceemnts per MRI    Depressed     GERD (gastroesophageal reflux disease)     Hypertension     IBS (irritable bowel syndrome)     Insomnia     TMJ (dislocation of temporomandibular joint) 2021       Past Surgical History:   Procedure Laterality Date    CATARACT REMOVAL Bilateral 2020    COLONOSCOPY      EGD DELIVER THERMAL ENERGY SPHNCTR/CARDIA GERD      LIPECTOMY      TUBAL LIGATION         Family History   Problem Relation Age of Onset    Heart Disease Father     Hypertension Father     High Cholesterol Mother     GERD Mother        Social History     Tobacco Use    Smoking status: Never    Smokeless tobacco: Never   Vaping Use    Vaping Use: Never used   Substance Use Topics    Alcohol use: Never    Drug use: Never                 Current Outpatient Medications:     hydroxychloroquine (PLAQUENIL) 200 MG tablet, Take 1 tablet by mouth daily, Disp: , Rfl:     lidocaine (LIDODERM) 5 %, Place 1 patch onto the skin daily 12 hours on, 12 hours off., Disp: 30 patch, Rfl: 0    lisinopril-hydroCHLOROthiazide (PRINZIDE;ZESTORETIC) 20-12.5 MG per tablet, Take 1 tablet by mouth daily, Disp: 90 tablet, Rfl: 1    EVENING PRIMROSE OIL PO, Take by mouth, Disp: , Rfl:     methylfolate (DEPLIN) 7.5 MG TABS tablet, Take 1 tablet by mouth in the morning., Disp: 30 tablet, Rfl: 5    citalopram (CELEXA) 20 MG tablet, Take 1 tablet by mouth every evening, Disp: 90 tablet, Rfl: 1    clonazePAM (KLONOPIN) 0.5 MG tablet, Take 1 tablet by mouth daily as needed for Anxiety for up to 90 days. , Disp: 30 tablet, Rfl: 2    traZODone (DESYREL) 50 MG tablet, Take 0.5-1 tablets by mouth nightly, Disp: 180 tablet, Rfl: 1    atomoxetine (STRATTERA) 80 MG capsule, Take 1 capsule by mouth daily, Disp: 90 capsule, Rfl: 1    Wheat Dextrin (BENEFIBER PO), , Disp: , Rfl:     topiramate (TOPAMAX) 25 MG tablet, Take 1 tablet by mouth 2 times daily, Disp: 180 tablet, Rfl: 1    cyanocobalamin 1000 MCG tablet, Take 1 tablet by mouth daily, Disp: 90 tablet, Rfl: 3    LINZESS 72 MCG CAPS capsule, Take 1 capsule by mouth daily, Disp: , Rfl:     naproxen (NAPROSYN) 500 MG tablet, Take 1 tablet by mouth as needed, Disp: , Rfl:     norethindrone (AYGESTIN) 5 MG tablet, Take 1 tablet by mouth daily, Disp: , Rfl:     omeprazole (PRILOSEC) 40 MG delayed release capsule, Take 1 capsule (40 mg) by mouth 2 (two) times a day 30 mins before breakfast and 30 mins before dinner, Disp: , Rfl:     cod liver oil CAPS, Take 1 capsule by mouth daily, Disp: , Rfl:     Ubrogepant (UBRELVY) 50 MG TABS, Take 1 tablet at the onset of migraine, may repeat in 2 hours.  Do not take more then 3 days a week., Disp: 16 tablet, Rfl: 11    amitriptyline (ELAVIL) 25 MG tablet, TAKE 1 TO 2 TABLETS BY MOUTH EVERY EVENING, Disp: , Rfl:     Benzoyl Peroxide 2.5 % GEL, APPLY TOPICALLY TO FACE AT BEDTIME, Disp: , Rfl:     vitamin D (CHOLECALCIFEROL) 54796 UNIT CAPS, Take 50,000 Units by mouth every 7 days, Disp: , Rfl:     diclofenac sodium (VOLTAREN) 1 % GEL, Apply 1 g topically as needed, Disp: , Rfl:     Lidocaine, Anorectal, 5 % CREA, Actual prescription: Lidocaine 5%: Neurontin/gabapentin 5% combined topical cream/gelApply to affected area up to 4 times a day as needed for pain, Disp: , Rfl:     nystatin-triamcinolone (MYCOLOG II) 258698-1.1 UNIT/GM-% cream, Apply topically 2 times daily, Disp: , Rfl:     pramipexole (MIRAPEX) 0.125 MG tablet, Take 0.25 mg by mouth, Disp: , Rfl:     pregabalin (LYRICA) 150 MG capsule, TAKE 1 CAPSULE BY MOUTH Four TIMES DAILY, Disp: , Rfl:     vitamin E 600 units capsule, Take by mouth daily, Disp: , Rfl:     Allergies   Allergen Reactions    Furosemide Other (See Comments)     Edema of lower extremities      Sulfa Antibiotics Other (See Comments) and Rash     GI Upset    Other Other (See Comments)     Oral steroids       Review of Systems   Constitutional:  Negative for chills, fatigue and fever. HENT:  Negative for congestion, postnasal drip, rhinorrhea and sinus pressure. Eyes:  Negative for visual disturbance. Respiratory:  Negative for cough, chest tightness and shortness of breath. Cardiovascular:  Negative for chest pain and palpitations. Gastrointestinal:  Negative for abdominal pain, blood in stool, diarrhea, nausea and vomiting. Genitourinary:  Negative for dysuria, frequency and urgency. Musculoskeletal:  Negative for arthralgias and myalgias. Skin: Negative. Neurological:  Negative for numbness and headaches. Hematological:  Bruises/bleeds easily. Psychiatric/Behavioral:  Negative for dysphoric mood and sleep disturbance. The patient is not nervous/anxious. Objective:  /69   Pulse 91   Temp 98.2 °F (36.8 °C) (Temporal)   Resp 16   Ht 5' (1.524 m)   Wt 188 lb 3.2 oz (85.4 kg)   SpO2 99%   BMI 36.76 kg/m²     Examination:  Physical Exam  Constitutional:       Appearance: Normal appearance. HENT:      Head: Normocephalic and atraumatic.       Right Ear: Tympanic membrane and ear canal normal.      Left Ear: Tympanic membrane and ear canal normal.      Nose: Nose normal.      Mouth/Throat:      Mouth: Mucous membranes are moist.   Eyes: Extraocular Movements: Extraocular movements intact. Conjunctiva/sclera: Conjunctivae normal.      Pupils: Pupils are equal, round, and reactive to light. Cardiovascular:      Rate and Rhythm: Normal rate and regular rhythm. Pulses: Normal pulses. Heart sounds: Normal heart sounds. Pulmonary:      Effort: Pulmonary effort is normal.      Breath sounds: Normal breath sounds. Abdominal:      General: Abdomen is flat. Bowel sounds are normal.      Palpations: Abdomen is soft. Musculoskeletal:      Cervical back: Normal range of motion and neck supple. Skin:     General: Skin is warm and dry. Findings: Bruising present. Neurological:      General: No focal deficit present. Mental Status: She is alert. Mental status is at baseline. Psychiatric:         Mood and Affect: Mood normal.         Thought Content: Thought content normal.         Assessment/Plan:    Missy Cleary was seen today for rash. Diagnoses and all orders for this visit:    Petechiae  -     Protime-INR; Future  -     APTT; Future  -     CBC with Auto Differential; Future  -     CBC with Auto Differential  -     APTT  -     Protime-INR    Rash        Follow-up and Dispositions    Return if symptoms worsen or fail to improve, for keep f/u later this month. Medication Reconciliation:  Current Medications Verified: Current medications/ immunizations were reviewed, including purpose, with patient. Family History, Social History, Current and Past Medical History was reviewed with patient and updated at today's office visit. Medication Reconciliation list was given to patient/ family. Patient was advised to discard old medication lists and provide all providers with current list at each visit and carry list with them in case of emergency.     Completed By:   Linsey Humphreys MD    [facility]  [address] [place] [address], [address], [address]  [phone]  [phone] fax  12:33 PM

## 2022-09-07 LAB
APTT PPP: 29.7 SEC (ref 24.1–35.1)
BASOPHILS # BLD: 0.1 K/UL (ref 0–0.2)
BASOPHILS NFR BLD: 0 % (ref 0–2)
DIFFERENTIAL METHOD BLD: ABNORMAL
EOSINOPHIL # BLD: 0.1 K/UL (ref 0–0.8)
EOSINOPHIL NFR BLD: 0 % (ref 0.5–7.8)
ERYTHROCYTE [DISTWIDTH] IN BLOOD BY AUTOMATED COUNT: 15.5 % (ref 11.9–14.6)
HCT VFR BLD AUTO: 42.6 % (ref 35.8–46.3)
HGB BLD-MCNC: 14 G/DL (ref 11.7–15.4)
IMM GRANULOCYTES # BLD AUTO: 0.2 K/UL (ref 0–0.5)
IMM GRANULOCYTES NFR BLD AUTO: 2 % (ref 0–5)
INR PPP: 1
LYMPHOCYTES # BLD: 2.5 K/UL (ref 0.5–4.6)
LYMPHOCYTES NFR BLD: 15 % (ref 13–44)
MCH RBC QN AUTO: 27.6 PG (ref 26.1–32.9)
MCHC RBC AUTO-ENTMCNC: 32.9 G/DL (ref 31.4–35)
MCV RBC AUTO: 83.9 FL (ref 79.6–97.8)
MONOCYTES # BLD: 1.3 K/UL (ref 0.1–1.3)
MONOCYTES NFR BLD: 8 % (ref 4–12)
NEUTS SEG # BLD: 12.3 K/UL (ref 1.7–8.2)
NEUTS SEG NFR BLD: 75 % (ref 43–78)
NRBC # BLD: 0 K/UL (ref 0–0.2)
PLATELET # BLD AUTO: 229 K/UL (ref 150–450)
PMV BLD AUTO: 12.7 FL (ref 9.4–12.3)
PROTHROMBIN TIME: 13.5 SEC (ref 12.6–14.5)
RBC # BLD AUTO: 5.08 M/UL (ref 4.05–5.2)
WBC # BLD AUTO: 16.5 K/UL (ref 4.3–11.1)

## 2022-09-09 ENCOUNTER — PATIENT MESSAGE (OUTPATIENT)
Dept: CARDIOLOGY CLINIC | Age: 52
End: 2022-09-09

## 2022-09-09 NOTE — TELEPHONE ENCOUNTER
From: Petra Lopez  To: Dr. Mary Cuadra: 9/9/2022 2:54 PM EDT  Subject: Mendel Comber  My rhematologist did testing and I have a positive MATEO and now positive for drug induced lupus. He wants to start me on hydroxychloroquine. The pharmacist had concerns with heart and current prescriptions that I am on. Are you able to advise.

## 2022-09-12 ENCOUNTER — HOSPITAL ENCOUNTER (OUTPATIENT)
Dept: PHYSICAL THERAPY | Age: 52
Setting detail: RECURRING SERIES
Discharge: HOME | End: 2022-09-15
Payer: COMMERCIAL

## 2022-09-12 PROCEDURE — 97110 THERAPEUTIC EXERCISES: CPT

## 2022-09-12 PROCEDURE — 97161 PT EVAL LOW COMPLEX 20 MIN: CPT

## 2022-09-12 NOTE — THERAPY EVALUATION
Elizabeth Henley  : 1970  Primary: Paulina Kumar Sc  Secondary:  [address] @ [address] [facility] [address]  Phone: [phone]  Fax: [phone] Plan Frequency: 2x a week for 6-8 weeks    Plan of Care/Certification Expiration Date: 22      PT Visit Info: Total # of Visits to Date: 6  Progress Note Counter: 1      Visit Count:  7    OUTPATIENT PHYSICAL THERAPY:OP NOTE TYPE: Initial Assessment 2022               Episode  Appt Desk         Treatment Diagnosis:  Pain in Right Shoulder (M25.511)  Stiffness of Right Shoulder, Not elsewhere classified (M25.611)  Medical/Referring Diagnosis:  Neck pain [M54.2]  Referring Physician:  Joey Guardado MD MD Orders:  PT Eval and Treat   Return MD Appt:    Date of Onset:  Onset Date: 22     Allergies:  Furosemide, Sulfa antibiotics, and Other  Restrictions/Precautions:    Restrictions/Precautions: None  No data recorded   Medications Last Reviewed:  2022     SUBJECTIVE   History of Injury/Illness (Reason for Referral):  Pt has had a complex history of varying orthopedic conditions stemming from a accident from last October. Pt is now coming in to work on her shoulder and neck pain. Patient Stated Goal(s):  \"Want to improve carrying and lifting strength\"  Initial:      /10 Post Session:      /10  Past Medical History/Comorbidities:   Ms. Sherine Trejo  has a past medical history of Anxiety, DDD (degenerative disc disease), cervical, Depressed, GERD (gastroesophageal reflux disease), Hypertension, IBS (irritable bowel syndrome), Insomnia, and TMJ (dislocation of temporomandibular joint). Ms. Sherine Trejo  has a past surgical history that includes egd deliver thermal energy sphnctr/cardia gerd; Cataract removal (Bilateral, 2020); lipectomy; Tubal ligation; and Colonoscopy.   Social History/Living Environment:   Lives With: Family     Prior Level of Function/Work/Activity:   Prior level of function: Independent  Occupation: Full time employment  No data recordedNo data recorded   Learning:   Does the patient/guardian have any barriers to learning?: No barriers  Will there be a co-learner?: No  What is the preferred language of the patient/guardian?: English  Is an  required?: No  How does the patient/guardian prefer to learn new concepts?: Listening; Reading; Demonstration; Pictures/Videos     Fall Risk Scale: Total Score: 0  [place] Fall Risk: Low (0-24)           OBJECTIVE       Observation/Orthostatic Postural Assessment:    [] This section not tested    Observation   Forward head rounded shoulders         Range of Motion    [] This section not tested    AROM RIGHT (degrees) LEFT (degrees)   Shoulder Flexion 165 180   Shoulder Extension N N   Shoulder ER  N N    Shoulder IR  L4 L1     Shoulder Abduction       Shoulder Scaption     PROM RIGHT (degrees) LEFT (degrees)                             Strength    [] This section not tested    Motion RIGHT LEFT   Shoulder Flexion 4/5 4/5    Shoulder Extension 4+/5 4+/5    Shoulder ER 4-/5 4/5    Shoulder IR 5/5 4/5    Shoulder Abduction 4/5 4/5    Shoulder Scaption 4/5 4/5                 Special Test   [x] This section not tested    Test/Result   Neers: Negative          Manual   [] This section not tested    Joint Directon Grade Treatment Effect     I        Palpation:  Assessed @ Initial Visit: Tenderness to Global tenderness    Neurological Screen   [] This section not tested    Test/Result     Not Indicated              Functional Mobility    [] This section not tested    Test Results   Overhead press     [employer] 10lbs     Push up     Reaching for cupboard  Mild pain            ASSESSMENT   Initial Assessment:    Makenzie Wu presents to physical therapy with decreased strength, ROM, joint mobility, functional mobility. These S/S are consistent with right shoulder pain.  Patient will benefit from skilled physical therapy for manual therapeutic techniques (as appropriate), therapeutic exercises and activities, balance and comprehensive home exercises program to address current impairments and functional limitations. Problem List: (Impacting functional limitations): Body Structures, Functions, Activity Limitations Requiring Skilled Therapeutic Intervention: Decreased functional mobility ; Decreased ROM; Decreased body mechanics; Decreased tolerance to work activity; Decreased strength; Decreased endurance; Increased pain; Decreased posture     Therapy Prognosis:   Therapy Prognosis: Good     Assessment Complexity:      PLAN   Effective Dates: 9/12/2022 TO Plan of Care/Certification Expiration Date: 11/16/22     Frequency/Duration: Plan Frequency: 2x a week for 6-8 weeks     Interventions Planned (Treatment may consist of any combination of the following):    Current Treatment Recommendations: Strengthening; ROM; Functional mobility training; ADL/Self-care training; Endurance training; Neuromuscular re-education; Manual Therapy - Soft Tissue Mobilization; Pain management; Return to work related activity; Home exercise program; Safety education & training; Patient/Caregiver education & training; Modalities; Therapeutic activities       Goals: (Goals have been discussed and agreed upon with patient.) In Progress Goal Met  Goal Not met   Short-Term Functional Goals: Time Frame: 3 weeks      1. Tonny Haque will be able to regain full ROM of right shoulder flexion. [] [] []   2. Tonny Haque will be independent with HEP. [] [] []   3. Tonny Haque will be able to carry 15 lbs in each hand farmer carry for 150 ft [] [] []         Discharge Goals: Time Frame: 6 weeks      1. Tonny Haque will be able to improve carrying to 25 lbs for 150 ft to mimic carrying his groceries. [] [] []   2. Tonny Haque will be able to place object weight 20 lbs overhead on a cupboard to mimic house related activities. [] [] []   3. Quinten Lefort will improve shoulder trength to 4+/5 grossly. [] [] []               Outcome Measure: Tool Used: Disabilities of the Arm, Shoulder and Hand (DASH) Questionnaire - Quick Version  Score:  Initial: 17/55  Most Recent: X/55 (Date: -- )   Interpretation of Score: The DASH is designed to measure the activities of daily living in person's with upper extremity dysfunction or pain. Each section is scored on a 1-5 scale, 5 representing the greatest disability. The scores of each section are added together for a total score of 55. Medical Necessity:     Quinten Lefort will benefit from skilled physical therapy (medically necessary) to address above deficits affecting participation in basic ADLs and functional mobility/tolerance. Patient will benefit from manual therapeutic techniques (stretching, joint mobilizations, soft tissue mobilization/myofascial release), therapeutic exercises and activities, postural strengthening/education, and comprehensive home exercises program to address current impairments and functional limitations. Reason For Services/Other Comments:  Patient continues to require skilled intervention due to patient continues to present with impairments assessed at initial evaluation and requiring skilled physical therapy to meet goals for PT. Total Duration:  Time In: 0815  Time Out: 0915    Regarding Tomas Cortez's therapy, I certify that the treatment plan above will be carried out by a therapist or under their direction.   Thank you for this referral,  Cristhian Marie, PT     Referring Physician Signature: Amena Gramajo MD                    Post Session Pain  Charge Capture  PT Visit Info MD Violetta Horowitz

## 2022-09-14 ENCOUNTER — OFFICE VISIT (OUTPATIENT)
Dept: INTERNAL MEDICINE CLINIC | Facility: CLINIC | Age: 52
End: 2022-09-14
Payer: COMMERCIAL

## 2022-09-14 VITALS
RESPIRATION RATE: 16 BRPM | BODY MASS INDEX: 37.11 KG/M2 | TEMPERATURE: 97.5 F | DIASTOLIC BLOOD PRESSURE: 64 MMHG | WEIGHT: 189 LBS | OXYGEN SATURATION: 99 % | HEART RATE: 110 BPM | HEIGHT: 60 IN | SYSTOLIC BLOOD PRESSURE: 103 MMHG

## 2022-09-14 DIAGNOSIS — Z23 FLU VACCINE NEED: ICD-10-CM

## 2022-09-14 DIAGNOSIS — M79.7 FIBROMYALGIA: ICD-10-CM

## 2022-09-14 DIAGNOSIS — R76.8 ANA POSITIVE: Primary | ICD-10-CM

## 2022-09-14 DIAGNOSIS — M32.9 LUPUS (HCC): ICD-10-CM

## 2022-09-14 PROCEDURE — 90674 CCIIV4 VAC NO PRSV 0.5 ML IM: CPT | Performed by: INTERNAL MEDICINE

## 2022-09-14 PROCEDURE — 90471 IMMUNIZATION ADMIN: CPT | Performed by: INTERNAL MEDICINE

## 2022-09-14 PROCEDURE — 99213 OFFICE O/P EST LOW 20 MIN: CPT | Performed by: INTERNAL MEDICINE

## 2022-09-14 RX ORDER — BELIMUMAB 200 MG/ML
200 SOLUTION SUBCUTANEOUS
COMMUNITY
Start: 2022-09-13

## 2022-09-14 ASSESSMENT — PATIENT HEALTH QUESTIONNAIRE - PHQ9
SUM OF ALL RESPONSES TO PHQ QUESTIONS 1-9: 2
SUM OF ALL RESPONSES TO PHQ9 QUESTIONS 1 & 2: 2
1. LITTLE INTEREST OR PLEASURE IN DOING THINGS: 1
2. FEELING DOWN, DEPRESSED OR HOPELESS: 1
SUM OF ALL RESPONSES TO PHQ QUESTIONS 1-9: 2

## 2022-09-14 ASSESSMENT — ENCOUNTER SYMPTOMS
SINUS PRESSURE: 0
ABDOMINAL PAIN: 0
NAUSEA: 0
COUGH: 0
VOMITING: 0
RHINORRHEA: 0
CHEST TIGHTNESS: 0
SHORTNESS OF BREATH: 0
DIARRHEA: 0
BLOOD IN STOOL: 0

## 2022-09-14 NOTE — THERAPY DISCHARGE
Monserrat Mcgowanlam  : 1970  Primary: Thierry Breana Paredes  Secondary:  Fatuma Calles @ [address] [address]  Phone: [phone]  Fax: [phone] Plan Frequency: 2x a week for 6-8 weeks  Plan of Care/Certification Expiration Date: 22      PT Visit Info:    6      OUTPATIENT PHYSICAL THERAPY:OP NOTE TYPE: Discharge Summary 2022               Episode  Appt Desk         ICD-10: Treatment Diagnosis: Low back pain (M54.5)  Lumbago with Sciatica Right side (M54.41)  Muscle Weakness, Generalized (M62.81)  Muscle spasm of back (M62.830)      Medical/Referring Diagnosis:  Neck pain [M54.2]  Referring Physician:  Gisell Evans MD MD Orders:  PT Eval and Treat   Return MD Appt:  TBD  Date of Onset:  Onset Date: 22     Allergies:  Furosemide, Sulfa antibiotics, and Other  Restrictions/Precautions:    Restrictions/Precautions: None     Medications Last Reviewed:  2022     SUBJECTIVE   History of Injury/Illness (Reason for Referral):  Patient familiar to this clinic for previous treatment of multiple body parts. She reports progressive right sided LBP which hurts in sitting or standing  Patient Stated Goal(s):  \"Normal activity without LBP\"  Initial:     5/10 Post Session:     3/10  Past Medical History/Comorbidities:   Ms. Emma Amaya  has a past medical history of Anxiety, DDD (degenerative disc disease), cervical, Depressed, GERD (gastroesophageal reflux disease), Hypertension, IBS (irritable bowel syndrome), Insomnia, and TMJ (dislocation of temporomandibular joint). Ms. Emma Amaya  has a past surgical history that includes egd deliver thermal energy sphnctr/cardia gerd; Cataract removal (Bilateral, 2020); lipectomy; Tubal ligation; and Colonoscopy.   Social History/Living Environment:   Lives With: Family     Prior Level of Function/Work/Activity:   Prior level of function: Independent  Occupation: Full time employment  No data recordedNo data recorded   Learning:   Does the patient/guardian have any barriers to learning?: No barriers  Will there be a co-learner?: No  What is the preferred language of the patient/guardian?: English  Is an  required?: No  How does the patient/guardian prefer to learn new concepts?: Listening; Reading; Demonstration; Pictures/Videos     Fall Risk Scale:    Total Score: 0  [place] Fall Risk: Low (0-24)           OBJECTIVE       Observation/Orthostatic Postural Assessment:          Slight forward posture, elevated right ilium standing, anterior rotation right innominate   Palpation:          Mild Tenderness to superior right SI jt    ROM:            AROM/PROM      Joint: Discontinuation Summary: 9/1/2022   Active ROM RIGHT LEFT   Knee Extension WNL WNL   Knee Flexion WNL WNL   Hip Flexion 105 110   Hip Abduction WNL WNL     Lumbar ROM     Movement Range Descriptor Degrees   Lumbar Flexion Mid shin Stiffness 65   Lumbar Extension Shoulder past midline Normal 10   Lumbar side bending R/L knee Tightness SB right 25                   Repeated Motion:  Direction    Frequency Symptoms Prior Symptons Post   Flexion 10x Achy right No change   Extension 10x Achy right Less symptoms         Strength:    Discontinuation Summary:9/1/2022       RIGHT LEFT   Knee Flexion (L5-S2)  5/5  5/5   Knee Extension (L3, L4)  5/5  5/5   Hip Flexion (L1, L2)  4+/5  4+/5   Hip Extension  4+/5  5/5   Hip Abduction (L5, S1)  4+/5  4+/5   Ankle Dorsiflexion (L4)  5/5  5/5   Great Toe Extension (L5)  5/5  5/5   Ankle Plantar Flexion (S1-S2)  5/5  5/5       Special Tests:  Lumbar:  Slump, SLR  Negative   SI Joint:  Anterior rotation right innominate   Hip:  SANGEETHA positive Right               Reflex Testing:    Location Left Right   Patellar (L4) normal normal   Achilles (S1) normal normal       Neurological Screen:    RADIATING SYMPTOMS: Right hip  Upper motor [place] screen         Clonus: Negative         Babinski: Negative    Functional Mobility:  Affecting participation in basic ADLs and functional tasks. Balance and Mobility:    Test Result   Timed up and Go NT   30 second Sit to Stand NT   6 Minute Walk Test NT   Single Leg Balance Right:    30sec  Left:  30sec             ASSESSMENT   Initial Assessment:    Petra Lopez presents to physical therapy with decreased strength, ROM, joint mobility, functional mobility. These S/S are consistent with Referring diagnosis. Patient will benefit from skilled physical therapy for manual therapeutic techniques (as appropriate), therapeutic exercises and activities, balance and comprehensive home exercises program to address current impairments and functional limitations. DISCONTINUATION: Petra Lopez has been seen in physical therapy from 8/16/2022 to 9/1/2022 for 6 visits. Treatment has been discontinued at this time due to  patient running out of therapy visits and wanted to start new order for right scapular dyskinesia . The below goals were met prior to discontinuation. Some goals were not met due to unable to reassess. Thank you for this referral.         Problem List: (Impacting functional limitations): Body Structures, Functions, Activity Limitations Requiring Skilled Therapeutic Intervention: Decreased functional mobility ; Decreased ROM; Decreased body mechanics; Decreased tolerance to work activity; Decreased strength; Decreased endurance; Increased pain; Decreased posture     Therapy Prognosis:   Therapy Prognosis: Good     Assessment Complexity:      PLAN   Effective Dates: 8/16/2022 TO Plan of Care/Certification Expiration Date: 11/16/22     Frequency/Duration: Plan Frequency: 2x a week for 6-8 weeks     Interventions Planned (Treatment may consist of any combination of the following):    Current Treatment Recommendations: Strengthening; ROM; Functional mobility training; ADL/Self-care training;  Endurance training; Neuromuscular re-education; Manual Therapy - Soft Tissue Mobilization; Pain management; Return to work related activity; Home exercise program; Safety education & training; Patient/Caregiver education & training; Modalities; Therapeutic activities     Goals: (Goals have been discussed and agreed upon with patient.)   Short-Term Goals~4 weeks Goal Met   1. Mohamud August will be independent with HEP 1. Date: 9/1/2022   2. Mohamud August will participate in LE stretching program to increase flexibility    2. Date: 9/1/2022   3. Mohamud August will participate in core stabilization exercises to help with stabilization during ADLs 3. Date: 9/1/2022   4. Mohamud August will participate in LE strengthening program with weights/resistance as appropriate to help with gait and elevations 4. Date: 9/1/2022   5. Mohamud August will participate in static and dynamic balance activities to decrease the risk for falls     5. Date: 9/1/2022   6. Mohamud August will tolerate manual therapy/joint mobilizations/soft tissue to increase ROM and decrease pain  6. Date: 9/1/2022              Long Term Goals~8 weeks Goal Met   1. Mohamud August will demonstrate a 9 point improvement on the Oswestry to show improvement in function 1. Date:   2. Mohamud August will report 0/10 pain at rest and during ADLs  2. Date:   3. Mohamud August will demonstrate 5/5 LE strength on manual muscle testing 3. Date: 9/1/2022   4. Mohamud August will be able to perform SLS >5 seconds bilaterally to help with gait and improve balance 4. Date: 9/1/2022                       Outcome Measure: Tool Used: Modified Oswestry Low Back Pain Questionnaire  Score:  Initial: 19/50  Most Recent: X/50 (Date: -- )   Interpretation of Score: Each section is scored on a 0-5 scale, 5 representing the greatest disability. The scores of each section are added together for a total score of 50. Reason For Services/Other Comments:  Petra Lopez has been seen in physical therapy from 8/16/2022 to 9/1/2022 for 6 visits. Treatment has been discontinued at this time due to patient running out of therapy visits and wanted to start new order for right scapular dyskinesia. The above goals were met prior to discontinuation. Some goals were not met due to unable to reassess. Thank you for this referral.    Regarding Sharon Cortez's therapy, I certify that the treatment plan above will be carried out by a therapist or under their direction. Thank you for this referral,  Kendell Malcolm, SLADE     Referring Physician Signature: Diallo Walsh MD No Signature is Required for this note.         Post Session Pain  Charge Capture   POC Link  Treatment Note Link  MD Violetta Horowitz

## 2022-09-14 NOTE — PROGRESS NOTES
[facility]      2022    Patient Name: Devan Rutherford  :  1970    Subjective:    Chief Complaint:  Chief Complaint   Patient presents with    Forms     Consult per Javier paperwork           HPI Here for f/u, usually sees Maye Holter NP in our office; she has history of positive MATEO, fibromyalgia; she saw Dr. Genny Bustillos, Rheumatology, on 22; she was diagnosed with SLE, prescribed Plaquenil 200 mg qd and weekly Benlysta injections; she has f/u scheduled 22; records reviewed; she saw a different Rheumatologist, Dr. Kay Guillaume, had comprehensive labs 22, anti-dsDNA IgG was positive, but was told she does not have Lupus; she will discuss this with Dr. Genny Bustillos at upcoming appointment; she has started taking Plaquenil 200 mg qd as prescribed; She needs forms completed today for disability; she had forms initially completed by Maye Holter NP in our office 22, but patient states wrong form was used and needs to to transcribed on current form;      Past Medical History:   Diagnosis Date    Anxiety     DDD (degenerative disc disease), cervical 2021    C3-C7 Disc Degeneration w/ Posterior Spondylotic Disc Displaceemnts per MRI    Depressed     GERD (gastroesophageal reflux disease)     Hypertension     IBS (irritable bowel syndrome)     Insomnia     TMJ (dislocation of temporomandibular joint) 2021       Past Surgical History:   Procedure Laterality Date    CATARACT REMOVAL Bilateral 2020    COLONOSCOPY      EGD DELIVER THERMAL ENERGY SPHNCTR/CARDIA GERD      LIPECTOMY      TUBAL LIGATION         Family History   Problem Relation Age of Onset    Heart Disease Father     Hypertension Father     High Cholesterol Mother     GERD Mother        Social History     Tobacco Use    Smoking status: Never    Smokeless tobacco: Never   Vaping Use    Vaping Use: Never used   Substance Use Topics    Alcohol use: Never    Drug use: Never Current Outpatient Medications:     Belimumab (BENLYSTA) 200 MG/ML SOAJ, Inject 200 mg into the skin every 7 days, Disp: , Rfl:     hydroxychloroquine (PLAQUENIL) 200 MG tablet, Take 1 tablet by mouth daily, Disp: , Rfl:     lidocaine (LIDODERM) 5 %, Place 1 patch onto the skin daily 12 hours on, 12 hours off., Disp: 30 patch, Rfl: 0    lisinopril-hydroCHLOROthiazide (PRINZIDE;ZESTORETIC) 20-12.5 MG per tablet, Take 1 tablet by mouth daily, Disp: 90 tablet, Rfl: 1    EVENING PRIMROSE OIL PO, Take by mouth, Disp: , Rfl:     methylfolate (DEPLIN) 7.5 MG TABS tablet, Take 1 tablet by mouth in the morning., Disp: 30 tablet, Rfl: 5    citalopram (CELEXA) 20 MG tablet, Take 1 tablet by mouth every evening, Disp: 90 tablet, Rfl: 1    clonazePAM (KLONOPIN) 0.5 MG tablet, Take 1 tablet by mouth daily as needed for Anxiety for up to 90 days. , Disp: 30 tablet, Rfl: 2    traZODone (DESYREL) 50 MG tablet, Take 0.5-1 tablets by mouth nightly, Disp: 180 tablet, Rfl: 1    atomoxetine (STRATTERA) 80 MG capsule, Take 1 capsule by mouth daily, Disp: 90 capsule, Rfl: 1    Wheat Dextrin (BENEFIBER PO), , Disp: , Rfl:     topiramate (TOPAMAX) 25 MG tablet, Take 1 tablet by mouth 2 times daily, Disp: 180 tablet, Rfl: 1    cyanocobalamin 1000 MCG tablet, Take 1 tablet by mouth daily, Disp: 90 tablet, Rfl: 3    LINZESS 72 MCG CAPS capsule, Take 1 capsule by mouth daily, Disp: , Rfl:     naproxen (NAPROSYN) 500 MG tablet, Take 1 tablet by mouth as needed, Disp: , Rfl:     norethindrone (AYGESTIN) 5 MG tablet, Take 1 tablet by mouth daily, Disp: , Rfl:     omeprazole (PRILOSEC) 40 MG delayed release capsule, Take 1 capsule (40 mg) by mouth 2 (two) times a day 30 mins before breakfast and 30 mins before dinner, Disp: , Rfl:     cod liver oil CAPS, Take 1 capsule by mouth daily, Disp: , Rfl:     Ubrogepant (UBRELVY) 50 MG TABS, Take 1 tablet at the onset of migraine, may repeat in 2 hours. Do not take more then 3 days a week. , Disp: 16 tablet, Rfl: 11    amitriptyline (ELAVIL) 25 MG tablet, TAKE 1 TO 2 TABLETS BY MOUTH EVERY EVENING, Disp: , Rfl:     Benzoyl Peroxide 2.5 % GEL, APPLY TOPICALLY TO FACE AT BEDTIME, Disp: , Rfl:     vitamin D (CHOLECALCIFEROL) 41185 UNIT CAPS, Take 50,000 Units by mouth every 7 days, Disp: , Rfl:     diclofenac sodium (VOLTAREN) 1 % GEL, Apply 1 g topically as needed, Disp: , Rfl:     Lidocaine, Anorectal, 5 % CREA, Actual prescription: Lidocaine 5%: Neurontin/gabapentin 5% combined topical cream/gelApply to affected area up to 4 times a day as needed for pain, Disp: , Rfl:     nystatin-triamcinolone (MYCOLOG II) 988360-7.1 UNIT/GM-% cream, Apply topically 2 times daily, Disp: , Rfl:     pramipexole (MIRAPEX) 0.125 MG tablet, Take 0.25 mg by mouth, Disp: , Rfl:     pregabalin (LYRICA) 150 MG capsule, TAKE 1 CAPSULE BY MOUTH Four TIMES DAILY, Disp: , Rfl:     vitamin E 600 units capsule, Take by mouth daily, Disp: , Rfl:     Allergies   Allergen Reactions    Furosemide Other (See Comments)     Edema of lower extremities      Sulfa Antibiotics Other (See Comments) and Rash     GI Upset    Other Other (See Comments)     Oral steroids       Review of Systems   Constitutional:  Negative for chills, fatigue and fever. HENT:  Negative for congestion, postnasal drip, rhinorrhea and sinus pressure. Eyes:  Negative for visual disturbance. Respiratory:  Negative for cough, chest tightness and shortness of breath. Cardiovascular:  Negative for chest pain and palpitations. Gastrointestinal:  Negative for abdominal pain, blood in stool, diarrhea, nausea and vomiting. Genitourinary:  Negative for dysuria, frequency and urgency. Musculoskeletal:  Positive for myalgias. Negative for arthralgias. Skin: Negative. Neurological:  Negative for numbness and headaches. Psychiatric/Behavioral:  Negative for dysphoric mood and sleep disturbance. The patient is not nervous/anxious.       Objective:  /64 (Site: Left Upper Arm, Position: Sitting, Cuff Size: Medium Adult)   Pulse (!) 110   Temp 97.5 °F (36.4 °C) (Temporal)   Resp 16   Ht 5' (1.524 m)   Wt 189 lb (85.7 kg)   SpO2 99%   BMI 36.91 kg/m²     Examination:  Physical Exam  Constitutional:       Appearance: Normal appearance. HENT:      Head: Normocephalic and atraumatic. Right Ear: Tympanic membrane and ear canal normal.      Left Ear: Tympanic membrane and ear canal normal.      Nose: Nose normal.      Mouth/Throat:      Mouth: Mucous membranes are moist.   Eyes:      Extraocular Movements: Extraocular movements intact. Conjunctiva/sclera: Conjunctivae normal.      Pupils: Pupils are equal, round, and reactive to light. Cardiovascular:      Rate and Rhythm: Normal rate and regular rhythm. Pulses: Normal pulses. Heart sounds: Normal heart sounds. Pulmonary:      Effort: Pulmonary effort is normal.      Breath sounds: Normal breath sounds. Abdominal:      General: Abdomen is flat. Bowel sounds are normal.      Palpations: Abdomen is soft. Musculoskeletal:         General: Tenderness present. Cervical back: Normal range of motion and neck supple. Skin:     General: Skin is warm and dry. Neurological:      General: No focal deficit present. Mental Status: She is alert. Mental status is at baseline. Psychiatric:         Mood and Affect: Mood normal.         Thought Content: Thought content normal.         Assessment/Plan:    Amie Solomon was seen today for forms. Diagnoses and all orders for this visit:    MATEO positive  She has upcoming appointment with Rheumatology; Flu vaccine need  -     Cancel: AMB POC RAPID INFLUENZA TEST  -     Influenza, FLUCELVAX, (age 10 mo+), IM, Preservative Free, 0.5 mL    Lupus ([facility] [address]    Fibromyalgia        Follow-up and Dispositions    Return if symptoms worsen or fail to improve, for keep f/u w/ Rj Wu NP.          Medication Reconciliation:  Current Medications Verified: Current medications/ immunizations were reviewed, including purpose, with patient. Family History, Social History, Current and Past Medical History was reviewed with patient and updated at today's office visit. Medication Reconciliation list was given to patient/ family. Patient was advised to discard old medication lists and provide all providers with current list at each visit and carry list with them in case of emergency.     Completed By:   Chantelle Clements MD    [facility] & [address], [facility]  [place], [address] [place] [address] Rd  [phone]  [phone] fax  3:05 PM

## 2022-09-15 NOTE — PROGRESS NOTES
Feliciano Artis  : 1970  Primary: Macey Going Sc  Secondary:  [address] @ [address] [facility] [address]  Phone: [phone]  Fax: [phone] Plan Frequency: 2x a week for 6-8 weeks    Plan of Care/Certification Expiration Date: 22      PT Visit Info: Total # of Visits to Date: 6  Progress Note Counter: 1     Visit Count:  7   OUTPATIENT PHYSICAL THERAPY:OP NOTE TYPE: Treatment Note 2022       Episode  }Appt Desk             Treatment Diagnosis:     Pain in Right Shoulder (M25.511)  Stiffness of Right Shoulder, Not elsewhere classified (M25.611)  Medical/Referring Diagnosis:  Neck pain [M54.2]  Referring Physician:  Sharmin Mak MD MD Orders:  PT Eval and Treat   Date of Onset:  Onset Date: 22     Allergies:   Furosemide, Sulfa antibiotics, and Other  Restrictions/Precautions:  Restrictions/Precautions: None  No data recorded   Interventions Planned (Treatment may consist of any combination of the following):    Current Treatment Recommendations: Strengthening; ROM; Functional mobility training; ADL/Self-care training; Endurance training; Neuromuscular re-education; Manual Therapy - Soft Tissue Mobilization; Pain management; Return to work related activity; Home exercise program; Safety education & training; Patient/Caregiver education & training; Modalities; Therapeutic activities     Subjective Comments:  Please see Eval for more details. Initial:}     /10Post Session:        /10  Medications Last Reviewed:  2022  Updated Objective Findings:  See evaluation note from today  Treatment     THERAPEUTIC EXERCISE: (10 minutes):    Exercises per grid below to improve mobility, strength, balance, and coordination. Progressed resistance and repetitions as indicated. Date:  2022 Date:   Date:     Activity/Exercise Parameters Parameters Parameters   education 10 min benefits of weight lifting.  Dry needling, cv exercise THERAPEUTIC ACTIVITY: ( 0 minutes): Therapeutic activities per grid below to improve mobility, strength, coordination, and dynamic movement to improve functional lifting, carrying, reaching, catching, and overhead activities. Date:  9/12/2022 Date:   Date:     Activity/Exercise Parameters Parameters Parameters                                                 MANUAL THERAPY: (0 minutes):   Joint mobilization, Soft tissue mobilization, and Manipulation was utilized and necessary because of the patient's restricted joint motion, painful spasm, loss of articular motion, and restricted motion of soft tissue. Date  9/12/2022      Technique Used Grade Level # Time(s) Effect while being performed                                                                                         HEP Log Date        2.     3.    4.     5.        POC    Recertification Expiration Date      Plan of Care/Certification Expiration Date: 11/16/22     Visit Count  7    Number of Allowed Visits              Treatment/Session Summary:      Treatment Assessment:    Please see eval for more details. Communication/Consultation:       None today   Equipment provided today:  None   Recommendations/Intent for next  treatment session:  Next visit will focus on shoulder strengthening.        Total Treatment Billable Duration:  10 minutes  Time In: 6003  Time Out: 0915    Claudia Payan, PT       Charge Capture  }Post Session Pain  PT Visit Info  [facility] Portal  MD Guidelines  Scanned Media  Benefits  MyChart    Future Appointments   Date Time Provider Aleah Preston   9/19/2022  8:15 AM Claudia Payan PT [facility] [place] [facility]   9/20/2022  4:20 PM Bahman Carreon MD [id number]   9/26/2022  8:15 AM Claudia Payan PT [facility]   9/26/2022  2:30 PM [facility]   10/3/2022  8:15 AM Claudia Payan, PT [facility]   10/5/2022 10:30 AM Magui Ivey MD [facility]

## 2022-09-19 ENCOUNTER — HOSPITAL ENCOUNTER (OUTPATIENT)
Dept: PHYSICAL THERAPY | Age: 52
Setting detail: RECURRING SERIES
Discharge: HOME | End: 2022-09-22
Payer: COMMERCIAL

## 2022-09-19 PROCEDURE — 97110 THERAPEUTIC EXERCISES: CPT

## 2022-09-19 NOTE — PROGRESS NOTES
Susanne Arguelles  : 1970  Primary: [address]  Secondary:  [address] [facility] [address]  Phone: [phone]  Fax: [phone] Plan Frequency: 2x a week for 6-8 weeks    Plan of Care/Certification Expiration Date: 22      PT Visit Info: Total # of Visits to Date: 6  Progress Note Counter: 1     Visit Count:  8   OUTPATIENT PHYSICAL THERAPY:OP NOTE TYPE: Treatment Note 2022       Episode  }Appt Desk             Treatment Diagnosis:     Pain in Right Shoulder (M25.511)  Stiffness of Right Shoulder, Not elsewhere classified (M25.611)  Medical/Referring Diagnosis:  Neck pain [M54.2]  Referring Physician:  Beto Prado MD MD Orders:  PT Eval and Treat   Date of Onset:  Onset Date: 22     Allergies:   Furosemide, Sulfa antibiotics, and Other  Restrictions/Precautions:  Restrictions/Precautions: None  No data recorded   Interventions Planned (Treatment may consist of any combination of the following):    Current Treatment Recommendations: Strengthening; ROM; Functional mobility training; ADL/Self-care training; Endurance training; Neuromuscular re-education; Manual Therapy - Soft Tissue Mobilization; Pain management; Return to work related activity; Home exercise program; Safety education & training; Patient/Caregiver education & training; Modalities; Therapeutic activities     Subjective Comments:  Pt reports that she is doing ok but her nek is bothering her. Initial:}    5/10Post Session:       5/10  Medications Last Reviewed:  2022  Updated Objective Findings:  See evaluation note from today  Treatment     THERAPEUTIC EXERCISE: (40 minutes):    Exercises per grid below to improve mobility, strength, balance, and coordination. Progressed resistance and repetitions as indicated.      Date:  2022 Date:  2022 Date:     Activity/Exercise Parameters Parameters Parameters   education 10 min benefits of weight lifting. Dry needling, cv exercise     UBE  6 min    Ring stretch  10xs    PVC stretching  Circles, abduction, twisting, shoulder flexion   Pec stretch    rows  27lbs 3x10    lats  30lbs 3x10    Banded 7  Yellow band 25xs    TRX squats  30xs    Andrade carries  10xs 4 laps        THERAPEUTIC ACTIVITY: ( 0 minutes): Therapeutic activities per grid below to improve mobility, strength, coordination, and dynamic movement to improve functional lifting, carrying, reaching, catching, and overhead activities. Date:  9/19/2022 Date:   Date:     Activity/Exercise Parameters Parameters Parameters                                                 MANUAL THERAPY: (0 minutes):   Joint mobilization, Soft tissue mobilization, and Manipulation was utilized and necessary because of the patient's restricted joint motion, painful spasm, loss of articular motion, and restricted motion of soft tissue. Date  9/19/2022      Technique Used Grade Level # Time(s) Effect while being performed                                                                                         HEP Log Date        2.     3.    4.     5.        POC    Recertification Expiration Date      Plan of Care/Certification Expiration Date: 11/16/22     Visit Count  8    Number of Allowed Visits              Treatment/Session Summary:      Treatment Assessment:        Communication/Consultation:       None today   Equipment provided today:  None   Recommendations/Intent for next  treatment session:  Next visit will focus on shoulder strengthening.        Total Treatment Billable Duration:  40 minutes  Time In: 9905  Time Out: 0900    Emile Calle, PT       Charge Capture  }Post Session Pain  PT Visit Info  [employer] [facility] Portal  MD Guidelines  Scanned Media  Benefits  MyChart    Future Appointments   Date Time Provider Aleah Preston   9/20/2022  4:20 PM Kiki Matta MD [id number]   9/26/2022  8:15 AM Emile Calle, PT [facility]   9/26/2022  2:30 PM [facility]   10/3/2022  8:15 AM Wisam Troy, PT [facility]   10/5/2022 10:30 AM Wilbur Prasad MD [facility]   10/7/2022  8:30 AM Alix Valdez, APRN - CNP [facility]   10/11/2022  8:15 AM Wisam Troy, PT [facility]   10/17/2022  8:15 AM Wisam Troy, PT [facility]   10/18/2022 10:00 AM Yoel Campos MD [id number]   10/24/2022  8:15 AM Wisam Troy, PT [facility]   4/19/2023 10:00 AM Yennifer Chatman DO [facility]

## 2022-09-20 ENCOUNTER — TELEMEDICINE (OUTPATIENT)
Dept: BEHAVIORAL/MENTAL HEALTH CLINIC | Age: 52
End: 2022-09-20
Payer: COMMERCIAL

## 2022-09-20 DIAGNOSIS — G89.4 CHRONIC PAIN DISORDER: ICD-10-CM

## 2022-09-20 DIAGNOSIS — F51.05 INSOMNIA DUE TO MENTAL DISORDER: ICD-10-CM

## 2022-09-20 DIAGNOSIS — F33.2 SEVERE EPISODE OF RECURRENT MAJOR DEPRESSIVE DISORDER, WITHOUT PSYCHOTIC FEATURES (HCC): Primary | ICD-10-CM

## 2022-09-20 DIAGNOSIS — F41.1 GENERALIZED ANXIETY DISORDER: ICD-10-CM

## 2022-09-20 PROCEDURE — 99080 SPECIAL REPORTS OR FORMS: CPT | Performed by: PSYCHIATRY & NEUROLOGY

## 2022-09-20 PROCEDURE — 99214 OFFICE O/P EST MOD 30 MIN: CPT | Performed by: PSYCHIATRY & NEUROLOGY

## 2022-09-20 RX ORDER — PREGABALIN 200 MG/1
200 CAPSULE ORAL 2 TIMES DAILY
COMMUNITY
Start: 2022-09-16

## 2022-09-20 ASSESSMENT — PATIENT HEALTH QUESTIONNAIRE - PHQ9
9. THOUGHTS THAT YOU WOULD BE BETTER OFF DEAD, OR OF HURTING YOURSELF: 0
3. TROUBLE FALLING OR STAYING ASLEEP: 3
2. FEELING DOWN, DEPRESSED OR HOPELESS: 3
7. TROUBLE CONCENTRATING ON THINGS, SUCH AS READING THE NEWSPAPER OR WATCHING TELEVISION: 3
SUM OF ALL RESPONSES TO PHQ QUESTIONS 1-9: 23
4. FEELING TIRED OR HAVING LITTLE ENERGY: 3
SUM OF ALL RESPONSES TO PHQ QUESTIONS 1-9: 23
SUM OF ALL RESPONSES TO PHQ9 QUESTIONS 1 & 2: 6
5. POOR APPETITE OR OVEREATING: 3
SUM OF ALL RESPONSES TO PHQ QUESTIONS 1-9: 23
6. FEELING BAD ABOUT YOURSELF - OR THAT YOU ARE A FAILURE OR HAVE LET YOURSELF OR YOUR FAMILY DOWN: 2
1. LITTLE INTEREST OR PLEASURE IN DOING THINGS: 3
10. IF YOU CHECKED OFF ANY PROBLEMS, HOW DIFFICULT HAVE THESE PROBLEMS MADE IT FOR YOU TO DO YOUR WORK, TAKE CARE OF THINGS AT HOME, OR GET ALONG WITH OTHER PEOPLE: 3
8. MOVING OR SPEAKING SO SLOWLY THAT OTHER PEOPLE COULD HAVE NOTICED. OR THE OPPOSITE, BEING SO FIGETY OR RESTLESS THAT YOU HAVE BEEN MOVING AROUND A LOT MORE THAN USUAL: 3
SUM OF ALL RESPONSES TO PHQ QUESTIONS 1-9: 23

## 2022-09-20 ASSESSMENT — COLUMBIA-SUICIDE SEVERITY RATING SCALE - C-SSRS
6. HAVE YOU EVER DONE ANYTHING, STARTED TO DO ANYTHING, OR PREPARED TO DO ANYTHING TO END YOUR LIFE?: NO
1. WITHIN THE PAST MONTH, HAVE YOU WISHED YOU WERE DEAD OR WISHED YOU COULD GO TO SLEEP AND NOT WAKE UP?: NO
2. HAVE YOU ACTUALLY HAD ANY THOUGHTS OF KILLING YOURSELF?: NO

## 2022-09-20 ASSESSMENT — PAIN SCALES - GENERAL: PAINLEVEL_OUTOF10: 5

## 2022-09-20 ASSESSMENT — ANXIETY QUESTIONNAIRES
7. FEELING AFRAID AS IF SOMETHING AWFUL MIGHT HAPPEN: 2
1. FEELING NERVOUS, ANXIOUS, OR ON EDGE: 3
IF YOU CHECKED OFF ANY PROBLEMS ON THIS QUESTIONNAIRE, HOW DIFFICULT HAVE THESE PROBLEMS MADE IT FOR YOU TO DO YOUR WORK, TAKE CARE OF THINGS AT HOME, OR GET ALONG WITH OTHER PEOPLE: EXTREMELY DIFFICULT
2. NOT BEING ABLE TO STOP OR CONTROL WORRYING: 2
4. TROUBLE RELAXING: 3
GAD7 TOTAL SCORE: 19
3. WORRYING TOO MUCH ABOUT DIFFERENT THINGS: 3
5. BEING SO RESTLESS THAT IT IS HARD TO SIT STILL: 3
6. BECOMING EASILY ANNOYED OR IRRITABLE: 3

## 2022-09-20 NOTE — PROGRESS NOTES
SCREENING 9/20/2022 8/1/2022 7/26/2022   Feeling nervous, anxious, or on edge Nearly every day Nearly every day Nearly every day   Not being able to stop or control worrying More than half the days More than half the days Nearly every day   Worrying too much about different things Nearly every day More than half the days Nearly every day   Trouble relaxing Nearly every day More than half the days Nearly every day   Being so restless that it is hard to sit still Nearly every day More than half the days More than half the days   Becoming easily annoyed or irritable Nearly every day More than half the days More than half the days   Feeling afraid as if something awful might happen More than half the days Several days More than half the days   OFELIA-7 Total Score 19 14 18   How difficult have these problems made it for you to do your work, take care of things at home, or get along with other people? Extremely difficult Very difficult Extremely difficult   Feeling nervous, anxious, or on edge - - -   OFELIA-7 Total Score - - -        I was in the office while conducting this encounter. Consent:  She and/or her healthcare decision maker is aware that this patient-initiated Telehealth encounter is a billable service, with coverage as determined by her insurance carrier. She is aware that she may receive a bill and has provided verbal consent to proceed: YesPatient identification was verified, and a caregiver was present when appropriate. The patient was located in a state where the provider was credentialed to provide care. This virtual visit was conducted via [address] Pursuant to the emergency declaration under the [facility] waiver authority and the [employer] Act, this Virtual  Visit was conducted to reduce the patient's risk of exposure to COVID-19 and provide continuity of care for an established patient.  Services were provided through a video synchronous discussion virtually to substitute for in-person clinic visit. Due to this being a TeleHealth evaluation, many elements of the physical examination are unable to be assessed. Total Time: minutes: 11-20 minutes. Chief complaint:  Pt says has been very depressed and anxious. Subjective:  Seen virtually for follow-up. States has been very depressed and anxious. 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 Restless legs are a problem and not sleeping. They do not know which drug caused it. She has been referred to a rheumatologist.  States needs paperwork for FMLA filled out. Filled out paperwork. Concerned about her weight gain. Has not been back to work since July. Supportive psychotherapy provided. She denies suicidal ideation/homicidal ideations. Denies symptoms psychosis.     Patient Active Problem List   Diagnosis    Hypoglycemia    Chronic tension-type headache, intractable    Ganglion cyst    Radiculopathy of cervical region    Lymphocytic colitis    Essential tremor    Uterine leiomyoma    Neuropathic pain    Numbness and tingling    Schatzki's ring    Chronic pain syndrome    B12 deficiency    Hiatal hernia with GERD    History of colonic polyps    Left carpal tunnel syndrome    Pharyngoesophageal dysphagia    Anxiety    Vitamin D deficiency    Cervical radicular pain    Bipolar depression (HCC)    Functional dyspepsia    Chronic fatigue    Irritable bowel syndrome with both constipation and diarrhea    Early satiety    OAB (overactive bladder)    De Quervain's tenosynovitis, right    Restless legs syndrome (RLS)    Encounter for medication management    Left hand pain    Nausea    High-tone pelvic floor dysfunction    GERD with esophagitis    Duodenitis    Other constipation    Terminal ileitis without complication (HCC)    Primary insomnia    Mild episode of recurrent major depressive disorder (HCC)    Erosive gastritis    Right wrist pain    Right forearm pain    Arthritis of carpometacarpal (CMC) joint of right thumb    Polyarthralgia    Palpitations    Generalized osteoarthritis    Pain of paraspinal muscle    Serologic abnormality    Lupus (HCC)    MATEO positive       Denies palpitation,SOB, Chest pain, headaches. In no acute distress. MEDICATION REVIEW:    Current Medications:    Current Outpatient Medications   Medication Sig    pregabalin (LYRICA) 200 MG capsule     hydroxychloroquine (PLAQUENIL) 200 MG tablet Take 1 tablet by mouth daily    lidocaine (LIDODERM) 5 % Place 1 patch onto the skin daily 12 hours on, 12 hours off.    lisinopril-hydroCHLOROthiazide (PRINZIDE;ZESTORETIC) 20-12.5 MG per tablet Take 1 tablet by mouth daily    EVENING PRIMROSE OIL PO Take by mouth    methylfolate (DEPLIN) 7.5 MG TABS tablet Take 1 tablet by mouth in the morning. citalopram (CELEXA) 20 MG tablet Take 1 tablet by mouth every evening    clonazePAM (KLONOPIN) 0.5 MG tablet Take 1 tablet by mouth daily as needed for Anxiety for up to 90 days. traZODone (DESYREL) 50 MG tablet Take 0.5-1 tablets by mouth nightly    atomoxetine (STRATTERA) 80 MG capsule Take 1 capsule by mouth daily    Wheat Dextrin (BENEFIBER PO)     topiramate (TOPAMAX) 25 MG tablet Take 1 tablet by mouth 2 times daily    cyanocobalamin 1000 MCG tablet Take 1 tablet by mouth daily    LINZESS 72 MCG CAPS capsule Take 1 capsule by mouth daily    naproxen (NAPROSYN) 500 MG tablet Take 1 tablet by mouth as needed    norethindrone (AYGESTIN) 5 MG tablet Take 1 tablet by mouth daily    omeprazole (PRILOSEC) 40 MG delayed release capsule Take 1 capsule (40 mg) by mouth 2 (two) times a day 30 mins before breakfast and 30 mins before dinner    cod liver oil CAPS Take 1 capsule by mouth daily    Ubrogepant (UBRELVY) 50 MG TABS Take 1 tablet at the onset of migraine, may repeat in 2 hours. Do not take more then 3 days a week.     amitriptyline (ELAVIL) 25 MG tablet TAKE 1 TO 2 TABLETS BY MOUTH EVERY EVENING    Benzoyl Peroxide 2.5 % GEL APPLY TOPICALLY TO FACE AT BEDTIME    vitamin D (CHOLECALCIFEROL) 29265 UNIT CAPS Take 50,000 Units by mouth every 7 days    diclofenac sodium (VOLTAREN) 1 % GEL Apply 1 g topically as needed    Lidocaine, Anorectal, 5 % CREA Actual prescription: Lidocaine 5%: Neurontin/gabapentin 5% combined topical cream/gelApply to affected area up to 4 times a day as needed for pain    nystatin-triamcinolone (MYCOLOG II) 985213-9.1 UNIT/GM-% cream Apply topically 2 times daily    pramipexole (MIRAPEX) 0.125 MG tablet Take 0.25 mg by mouth    vitamin E 600 units capsule Take by mouth daily    Belimumab (BENLYSTA) 200 MG/ML SOAJ Inject 200 mg into the skin every 7 days (Patient not taking: Reported on 9/20/2022)    pregabalin (LYRICA) 150 MG capsule TAKE 1 CAPSULE BY MOUTH Four TIMES DAILY (Patient not taking: Reported on 9/20/2022)     No current facility-administered medications for this visit. Allergies   Allergen Reactions    Furosemide Other (See Comments)     Edema of lower extremities      Sulfa Antibiotics Other (See Comments) and Rash     GI Upset    Other Other (See Comments)     Oral steroids       Past Medical History, Past Surgical History, Family history, Social History, and Medications were all reviewed with the patient today and updated as necessary.      Compliant with medication: Yes   Side effects from medications:  No     EXAMINATION  Musculoskeletal    GAIT AND STATION   [x] WNL   [] RESTRICTED   [] UNSTEADY WALK        [] ABNORMAL   [] UNBALANCED         PSYCHIATRIC     GENERAL APPEARANCE:   []  WELL GROOMED []     DISHEVELED   []  UNKEMPT      []  UNUSUAL/BIZZARE    [x] WNL       ATTITUDE:   [x] COOPERATIVE   [] GUARDED   [] SUSPICIOUS      [] HOSTILE                            BEHAVIOR:   [x] CALM   [] HYPERACTIVE   [] MANNERISMS      [] BIZZARE         SPEECH:   [x] NORMAL FOR CLIENT   [] SPONTANEOUS   [] SLURRED   [] WHISPERING      [] LOUD [] PRESSURED   [] ARTICULATE       EYE CONTACT:   [x] WNL   [] BLANK STARE   [] INTENSE      [] AVOIDANT         MOOD:   [] EUTHYMIC   [x] ANXIOUS   [x] DEPRESSED      [] IRRITABLE   [] ANGRY   [] APATHETIC     AFFECT:   [x] CONGRUENT WITH MOOD   [] FLAT   [] CONSTRICTED      [] INAPPROPRIATE   [] LABILE           THOUGHT PROCESS:   [x] LOGICAL/GOAL-DIRECTED   [] FOI   [] CIRCUMSANTIAL      [] INCOHERENT   [] TANGENTIAL   [] CONCRETE      [] PERSEVERATION           THOUGHT CONTENT:                DELUSIONS  [x] DENIES  [] GRANDIOSE  [] PERSECUTORY  [] Sabianism  [] REFERENCE   HALLUCINATIONS  [x] DENIES  [] AUDITORY  [] VISUAL  [] OLFACTORY  [] TACTILE     [] GUSTATORY  [] SOMATIC         OBSESSIONS  [x] DENIES  [] PRESENT         SUICIDAL IDEATION  [x] DENIES  [] PRESENT W/O PLAN  [] PRESENT W/ PLAN       HOMICIDAL IDEATION  [x] DENIES  [] PRESENT W/O PLAN  [] PRESENT W/ PLAN           JUDGEMENT:   [x] GOOD   [] FAIR   [] POOR   INSIGHT:   [x] GOOD   [] FAIR   [] POOR     COGNITION:           SENSORIUM:   [x] ALERT   [] CLOUDED   [] DROWSY     ORIENTATION:   [x] INTACT   [] TIME:  PLACE  PERSON   RECENT & REMOTE MEMORY:   [] NORMAL   [x] OTHER:                  ATTENTION:   [] INTACT   [x] MILD IMPAIRMENT   [] SEVERE IMPAIRMENT     CONCENTRATION:   [] INTACT   [x] MILD IMPAIRMENT   [] SEVERE IMPAIRMENT     LANGUAGE:   [x] AVERAGE   [] ABOVE AVERAGE   [] BELOW AVERAGE     FUND OF KNOWLEDGE:   [] UNABLE TO ASSESS AT THIS TIME   [x] AVERAGE   [] ABOVE AVERAGE   [] BELOW AVERAGE      [] GOOD TO EXCELLENT KNOWLEDGE OF CURRENT EVENTS   [] POOR TO NO KNLEDGE OF CURRENT EVENTS           ABNORMAL MOVEMENTS:   [x] NONE   [] TICS   [] TREMORS   [] BIZZARE      [] FACE   [] TRUNK   [] EXTREMETIES   [] GESTURES        SLEEP:   [] GOOD   [] FAIR   [x] POOR      MUSCLE STRENGTH AND TONE   [] WNL   [] ATROPHY   [] SPASTIC        [] FLACCID   [] COGWHEEL         Diagnoses/Impressions:    ICD-10-CM    1.  Severe episode of lab monitoring required and reason, expected length of treatment, risk of no treatment, effects on pregnancy/nursing, financial availability. Educated patient on  side effects/risks/benefits of meds including  cardiac arrhythmias, suicidal ideations,  orthostatic hypotension, serotonin syndrome, risk of jesica/hypomania from antidepressants, withdrawals from abrupt discontinuation of meds, risk of bleeding, risk of seizures, addiction potential, memory impairment with long term use of benzos, respiratory depression, high blood pressure, dizziness, drowsiness, sedation , risk of falls, Risk & benefits discussed: including but not limited to possible off-label medication usage. [x] Follow MSE for sxs improvement     I have reviewed the patients controlled substance prescription history, as maintained in the [place] prescription monitoring program, so that the prescription(s) for a  controlled substance can be given. Recommendations and Referrals: Follow up with : MD, requires monitoring of response to medication, requires monitoring of medication side effects. Time until next PMA:     Follow up with Mental Health Clinicians: psychotherapy interventions, improve level of functioning, monitoring to prevent decompensation /hospitalization, monitoring to maintain therapeutic gains, symptoms (resolving and controlled)           Psychotherapy note:                                __10_ Minutes of psychotherapy     [x]  Supportive psychotherapy, Patient discussed certain situational and personal stressors ongoing in her life at this time, weight management d/w the patient. Sleep hygiene d/w patient. Patient allowed to vent out her emotions. Scenarios were reviewed using role playing and CBT techniques in order to increase insight and decrease anxiety. []  Disposition planning      []  Dangerous and will not contract for safety in the community    **Pateint has been notified:  They are to call 911 or go to their nearest E.R. if they are experiencing a medical emergency or suicidal ideations/homicidal ideations. **  All ancillary documentation entered reviewed by provider. PLEASE NOTE:  This document has been produced in part or whole using voice recognition software. Proofread however unrecognized errors in transcription may be present. 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 Restless legs are a problem and not sleeping.

## 2022-09-24 DIAGNOSIS — E53.8 B12 DEFICIENCY: ICD-10-CM

## 2022-09-26 ENCOUNTER — HOSPITAL ENCOUNTER (OUTPATIENT)
Dept: PHYSICAL THERAPY | Age: 52
Setting detail: RECURRING SERIES
Discharge: HOME | End: 2022-09-29
Payer: COMMERCIAL

## 2022-09-26 ENCOUNTER — NURSE ONLY (OUTPATIENT)
Dept: CARDIOLOGY CLINIC | Age: 52
End: 2022-09-26
Payer: COMMERCIAL

## 2022-09-26 VITALS — DIASTOLIC BLOOD PRESSURE: 82 MMHG | SYSTOLIC BLOOD PRESSURE: 110 MMHG | HEART RATE: 105 BPM

## 2022-09-26 DIAGNOSIS — R00.2 PALPITATIONS: Primary | ICD-10-CM

## 2022-09-26 PROCEDURE — 93000 ELECTROCARDIOGRAM COMPLETE: CPT | Performed by: INTERNAL MEDICINE

## 2022-09-26 PROCEDURE — 97110 THERAPEUTIC EXERCISES: CPT

## 2022-09-26 RX ORDER — LANOLIN ALCOHOL/MO/W.PET/CERES
CREAM (GRAM) TOPICAL
Qty: 90 TABLET | Refills: 3 | OUTPATIENT
Start: 2022-09-26

## 2022-09-26 ASSESSMENT — PAIN SCALES - GENERAL: PAINLEVEL_OUTOF10: 5

## 2022-09-26 NOTE — PROGRESS NOTES
Pt came in to have EKG per Dr Travis Pham after reporting starting a new medication. Dr Elizabeth Leonard reviewed EKG and stated pt okay to leave. Made pt aware to keep follow up appt with Dr Travis Pham. Pt verbalized understanding.

## 2022-09-26 NOTE — PROGRESS NOTES
Collette Oregon  : 1970  Primary: Nenita Paredes  Secondary:  [address] [facility] [address]  Phone: [phone]  Fax: [phone] Plan Frequency: 2x a week for 6-8 weeks    Plan of Care/Certification Expiration Date: 22      PT Visit Info: Total # of Visits to Date: 6  Progress Note Counter: 1     Visit Count:  9   OUTPATIENT PHYSICAL THERAPY:OP NOTE TYPE: Treatment Note 2022       Episode  }Appt Desk             Treatment Diagnosis:     Pain in Right Shoulder (M25.511)  Stiffness of Right Shoulder, Not elsewhere classified (M25.611)  Medical/Referring Diagnosis:  Neck pain [M54.2]  Referring Physician:  Karol Mcgill MD MD Orders:  PT Eval and Treat   Date of Onset:  Onset Date: 22     Allergies:   Furosemide, Sulfa antibiotics, and Other  Restrictions/Precautions:  Restrictions/Precautions: None  No data recorded   Interventions Planned (Treatment may consist of any combination of the following):    Current Treatment Recommendations: Strengthening; ROM; Functional mobility training; ADL/Self-care training; Endurance training; Neuromuscular re-education; Manual Therapy - Soft Tissue Mobilization; Pain management; Return to work related activity; Home exercise program; Safety education & training; Patient/Caregiver education & training; Modalities; Therapeutic activities     Subjective Comments:  Pt reports that she felt a small pop  in her shoulder that cause some mild pain in her shoulder. Now her pain is lovated on the back of her shoulder closer to the frontal plane midline over the lat. Initial:}    5/10Post Session:       5/10  Medications Last Reviewed:  2022  Updated Objective Findings:  See evaluation note from today  Treatment     THERAPEUTIC EXERCISE: (40 minutes):    Exercises per grid below to improve mobility, strength, balance, and coordination. Progressed resistance and repetitions as indicated. Date:  9/12/2022 Date:  9/19/2022 Date:  9/26/2022   Activity/Exercise Parameters Parameters Parameters   education 10 min benefits of weight lifting. Dry needling, cv exercise     UBE  6 min 6 min   Ring stretch  10xs 10xs   PVC stretching  Circles, abduction, twisting, shoulder flexion   Pec stretch    rows  27lbs 3x10 30lbs 3x10   lats  30lbs 3x10 37lbs 3x10   Banded 7  Yellow band 25xs Yellow band 25xs   TRX squats  30xs    Andrade carries  10xs 4 laps 2 laps at 10lbs  3 laps at 15lbs       THERAPEUTIC ACTIVITY: ( 0 minutes): Therapeutic activities per grid below to improve mobility, strength, coordination, and dynamic movement to improve functional lifting, carrying, reaching, catching, and overhead activities. Date:  9/26/2022 Date:   Date:     Activity/Exercise Parameters Parameters Parameters                                                 MANUAL THERAPY: (0 minutes):   Joint mobilization, Soft tissue mobilization, and Manipulation was utilized and necessary because of the patient's restricted joint motion, painful spasm, loss of articular motion, and restricted motion of soft tissue. Date  9/26/2022      Technique Used Grade Level # Time(s) Effect while being performed                                                                                         HEP Log Date        2.     3.    4.     5.        POC    Recertification Expiration Date      Plan of Care/Certification Expiration Date: 11/16/22     Visit Count  9    Number of Allowed Visits              Treatment/Session Summary:      Treatment Assessment:    Pt contind with strengthening and had improvement in subjective symptoms and was pain free post treatment. Communication/Consultation:       None today   Equipment provided today:  None   Recommendations/Intent for next  treatment session:  Next visit will focus on shoulder strengthening.        Total Treatment Billable Duration:  40 minutes       Chriss Chapman, PT       Charge Capture  }Post Session Pain  PT Visit Info  [employer] Portal  MD Guidelines  Scanned Media  Benefits  MyChart    Future Appointments   Date Time Provider Aleah Preston   9/26/2022  2:30 PM [address] [facility]   10/3/2022  8:15 AM Madison Loredo, PT [facility]   10/5/2022 10:30 AM Alexandra Pierce MD [facility]   10/7/2022  9:15 AM [facility]   10/11/2022  8:15 AM Madison Loredo, PT [facility]   10/14/2022  3:00 PM Ross Rutherford, APRN - CNP [facility]   10/17/2022  8:15 AM Madison Loredo, PT [facility]   10/18/2022 10:00 AM Maryam Gifford MD [id number]   10/24/2022  8:15 AM Madison Loredo, PT [facility]   4/19/2023 10:00 AM Yennifer Chatman DO [facility]

## 2022-09-27 ENCOUNTER — PATIENT MESSAGE (OUTPATIENT)
Dept: INTERNAL MEDICINE CLINIC | Facility: CLINIC | Age: 52
End: 2022-09-27

## 2022-09-27 ENCOUNTER — TELEPHONE (OUTPATIENT)
Dept: BEHAVIORAL/MENTAL HEALTH CLINIC | Age: 52
End: 2022-09-27

## 2022-09-27 DIAGNOSIS — E53.8 B12 DEFICIENCY: ICD-10-CM

## 2022-09-27 NOTE — TELEPHONE ENCOUNTER
Received message from patient that [facility] is requesting the office note from the 7/26 visit to go with STD forms that have been completed for patient.   Also received a fax requesting records from 7/25 - present

## 2022-09-27 NOTE — TELEPHONE ENCOUNTER
From: Timur Nj  To: Matthew Peters  Sent: 9/27/2022 9:36 AM EDT  Subject: Refill     Hello  I sent in [facility] for refill on Vitamin B12. Message was sent back to me to contact office before they could process it.

## 2022-09-29 ENCOUNTER — PATIENT MESSAGE (OUTPATIENT)
Dept: INTERNAL MEDICINE CLINIC | Facility: CLINIC | Age: 52
End: 2022-09-29

## 2022-09-30 NOTE — TELEPHONE ENCOUNTER
From: Elora Snellen  To: Richmond Tobar  Sent: 9/29/2022 2:06 PM EDT  Subject: [facility] [place] called and stated the attending physician statement form sent back was blank. Are you able to complete form and send to them.  If you have questions please call me at [phone]

## 2022-10-03 ENCOUNTER — HOSPITAL ENCOUNTER (OUTPATIENT)
Dept: PHYSICAL THERAPY | Age: 52
Setting detail: RECURRING SERIES
Discharge: HOME | End: 2022-10-06
Payer: COMMERCIAL

## 2022-10-03 ENCOUNTER — TELEPHONE (OUTPATIENT)
Dept: INTERNAL MEDICINE CLINIC | Facility: CLINIC | Age: 52
End: 2022-10-03

## 2022-10-03 PROCEDURE — 97110 THERAPEUTIC EXERCISES: CPT

## 2022-10-03 ASSESSMENT — PAIN SCALES - GENERAL: PAINLEVEL_OUTOF10: 5

## 2022-10-03 NOTE — PROGRESS NOTES
Flora Ga  : 1970  Primary: Kel Castelan Sc  Secondary:  [address] @ [address] [place] [address]  Phone: [phone]  Fax: [phone] Plan Frequency: 2x a week for 6-8 weeks    Plan of Care/Certification Expiration Date: 22      PT Visit Info: Total # of Visits to Date: 6  Progress Note Counter: 1     Visit Count:  10   OUTPATIENT PHYSICAL THERAPY:OP NOTE TYPE: Treatment Note 10/3/2022       Episode  }Appt Desk             Treatment Diagnosis:     Pain in Right Shoulder (M25.511)  Stiffness of Right Shoulder, Not elsewhere classified (M25.611)  Medical/Referring Diagnosis:  Neck pain [M54.2]  Referring Physician:  Garrick Mcghee MD MD Orders:  PT Eval and Treat   Date of Onset:  Onset Date: 22     Allergies:   Furosemide, Sulfa antibiotics, and Other  Restrictions/Precautions:  Restrictions/Precautions: None  No data recorded   Interventions Planned (Treatment may consist of any combination of the following):    Current Treatment Recommendations: Strengthening; ROM; Functional mobility training; ADL/Self-care training; Endurance training; Neuromuscular re-education; Manual Therapy - Soft Tissue Mobilization; Pain management; Return to work related activity; Home exercise program; Safety education & training; Patient/Caregiver education & training; Modalities; Therapeutic activities     Subjective Comments:  Pt reports that she is doing well but is still has that burnign. Initial:}    5/10Post Session:       5/10  Medications Last Reviewed:  10/3/2022  Updated Objective Findings:  See evaluation note from today  Treatment     THERAPEUTIC EXERCISE: (40 minutes):    Exercises per grid below to improve mobility, strength, balance, and coordination. Progressed resistance and repetitions as indicated.      Date:  2022 Date:  2022 Date:  2022 Date  10/3/2022   Activity/Exercise Parameters Parameters Parameters    education 10 min benefits of weight lifting. Dry needling, cv exercise      UBE  6 min 6 min 6 min    Ring stretch  10xs 10xs 10xs   Wall angels    10xs   PVC stretching  Circles, abduction, twisting, shoulder flexion   Pec stretch  Dowel cliff   rows  27lbs 3x10 30lbs 3x10 33lbs 3x10   lats  30lbs 3x10 37lbs 3x10 40lbs 3x10   Banded 7  Yellow band 25xs Yellow band 25xs Yellow band 25xs   SLED   75lbs 1 laps  100lbs 2 laps    TRX squats  30xs     Andrade carries  10xs 4 laps 2 laps at 10lbs  3 laps at 15lbs 4 laps 15lbs        THERAPEUTIC ACTIVITY: ( 0 minutes): Therapeutic activities per grid below to improve mobility, strength, coordination, and dynamic movement to improve functional lifting, carrying, reaching, catching, and overhead activities. Date:  10/3/2022 Date:   Date:     Activity/Exercise Parameters Parameters Parameters                                                 MANUAL THERAPY: (0 minutes):   Joint mobilization, Soft tissue mobilization, and Manipulation was utilized and necessary because of the patient's restricted joint motion, painful spasm, loss of articular motion, and restricted motion of soft tissue. Date  10/3/2022      Technique Used Grade Level # Time(s) Effect while being performed                                                                                         HEP Log Date        2.     3.    4.     5.        POC    Recertification Expiration Date      Plan of Care/Certification Expiration Date: 11/16/22     Visit Count  10    Number of Allowed Visits              Treatment/Session Summary:      Treatment Assessment:    Pt continued with strengthening and mobility in her shoulder. No pain with exercises and able to tolerate progressions well. Communication/Consultation:       None today   Equipment provided today:  None   Recommendations/Intent for next  treatment session:  Next visit will focus on shoulder strengthening.        Total Treatment Billable Duration:  40 minutes  Time In: 0815  Time Out: 0900    Roseline Alvarez, PT       Charge Capture  }Post Session Pain  PT Visit Info  MedBridge Portal  MD Guidelines  Scanned Media  Benefits  MyChart    Future Appointments   Date Time Provider Aleah Kary   10/5/2022 10:30 AM Keyshawn Castro MD [facility]   10/6/2022  8:45 AM SU Rice [facility] AM[facility]   10/7/2022  9:15 AM MAT LAB MAT [facility] AMB   10/11/2022  8:15 AM Roseline Alvarez, PT [facility]   10/14/2022  3:00 PM Gibson Habermann, APRN - CNP MAT [facility]   10/17/2022  8:15 AM Roseline Alvarez, PT [facility]   10/18/2022 10:00 AM Eliezer Mishra MD [id number]   10/24/2022  8:15 AM Roseline Alvarez, PT [facility]   11/17/2022  3:00 PM Stella Aguayo MD [facility]   4/19/2023 10:00 AM Farzad Chatman DO [facility]

## 2022-10-03 NOTE — TELEPHONE ENCOUNTER
Pt has a lab appt on Friday with no active orders. Unsure of what pt needs. Can you please place lab orders? Thank you.

## 2022-10-05 ENCOUNTER — OFFICE VISIT (OUTPATIENT)
Dept: CARDIOLOGY CLINIC | Age: 52
End: 2022-10-05
Payer: COMMERCIAL

## 2022-10-05 ENCOUNTER — PATIENT MESSAGE (OUTPATIENT)
Dept: BEHAVIORAL/MENTAL HEALTH CLINIC | Age: 52
End: 2022-10-05

## 2022-10-05 VITALS
WEIGHT: 189.8 LBS | SYSTOLIC BLOOD PRESSURE: 126 MMHG | DIASTOLIC BLOOD PRESSURE: 84 MMHG | HEART RATE: 100 BPM | HEIGHT: 60 IN | BODY MASS INDEX: 37.26 KG/M2

## 2022-10-05 DIAGNOSIS — I95.1 ORTHOSTATIC HYPOTENSION: ICD-10-CM

## 2022-10-05 DIAGNOSIS — R00.2 PALPITATIONS: Primary | ICD-10-CM

## 2022-10-05 PROCEDURE — 99214 OFFICE O/P EST MOD 30 MIN: CPT | Performed by: INTERNAL MEDICINE

## 2022-10-05 RX ORDER — LISINOPRIL 5 MG/1
5 TABLET ORAL DAILY
Qty: 90 TABLET | Refills: 3 | Status: SHIPPED | OUTPATIENT
Start: 2022-10-05 | End: 2022-10-13 | Stop reason: ALTCHOICE

## 2022-10-05 ASSESSMENT — ENCOUNTER SYMPTOMS: SHORTNESS OF BREATH: 1

## 2022-10-05 NOTE — PROGRESS NOTES
[facility] CARDIOLOGY  [address], [address]  PHONE: [phone]      10/05/22    NAME:  Marlene Marc  : 1970  MRN: [id number]         SUBJECTIVE:   Marlene Marc is a 46 y.o. female seen for a follow up visit regarding the following:     Chief Complaint   Patient presents with    Palpitations    Hypotension     3 myth follow up            HPI:  Follow up  Palpitations and Hypotension (3 myth follow up)   . Follow up orthostatic hypotension. HCTZ stopped last visit, remains on lisinopril/hct, continues to have intermittent episode of near syncope when she stands. She has stopped topamax with her neurologist thinking perhaps that is contributing but no change yet. Continues to feel palpitations and believes her heart rate to be staying in the 100s. This was not the case on 24 hr monitor in May but she feels may be worse. Dyspnea going up stairs but exercising on (flat) treadmill without difficulty, admits not pushing herself otherwise and her TM does not have incline capacity           PAST CARDIAC HISTORY:  May 2022 - 24 hr monitor - normal tracing, no diary entries  Normal TMET, [facility] TMS + 6, low risk  Normal LE venous doppler             Key CAD CHF Meds            lisinopril (PRINIVIL;ZESTRIL) 5 MG tablet (Taking)    Sig - Route: Take 1 tablet by mouth daily - Oral              Past Medical History, Past Surgical History, Family history, Social History, and Medications were all reviewed with the patient today and updated as necessary. Prior to Admission medications    Medication Sig Start Date End Date Taking?  Authorizing Provider   lisinopril (PRINIVIL;ZESTRIL) 5 MG tablet Take 1 tablet by mouth daily 10/5/22  Yes Marii Eduardo MD   cyanocobalamin 1000 MCG tablet Take 1 tablet by mouth daily 22  Yes BRANDON Dubois - CNP   pregabalin (LYRICA) 200 MG capsule  22  Yes Historical Provider, MD Denise Chacko) 200 MG/ML SOAJ Inject 200 mg into the skin every 7 days 9/13/22  Yes Historical Provider, MD   hydroxychloroquine (PLAQUENIL) 200 MG tablet Take 1 tablet by mouth daily 8/26/22  Yes Historical Provider, MD   EVENING PRIMROSE OIL PO Take by mouth   Yes Historical Provider, MD   methylfolate (DEPLIN) 7.5 MG TABS tablet Take 1 tablet by mouth in the morning. 7/28/22  Yes Josue Boyle MD   citalopram (CELEXA) 20 MG tablet Take 1 tablet by mouth every evening 7/13/22  Yes Josue Boyle MD   clonazePAM (KLONOPIN) 0.5 MG tablet Take 1 tablet by mouth daily as needed for Anxiety for up to 90 days. 7/13/22 10/11/22 Yes Josue Boyle MD   traZODone (DESYREL) 50 MG tablet Take 0.5-1 tablets by mouth nightly 7/13/22  Yes Josue Boyle MD   atomoxetine (STRATTERA) 80 MG capsule Take 1 capsule by mouth daily 7/13/22  Yes Josue Boyle MD   Wheat Dextrin (BENEFIBER PO)  1/1/22  Yes Historical Provider, MD Santana Harper 72 MCG CAPS capsule Take 1 capsule by mouth daily 4/10/22  Yes Historical Provider, MD   naproxen (NAPROSYN) 500 MG tablet Take 1 tablet by mouth as needed   Yes Historical Provider, MD   omeprazole (PRILOSEC) 40 MG delayed release capsule Take 1 capsule (40 mg) by mouth 2 (two) times a day 30 mins before breakfast and 30 mins before dinner 4/19/22  Yes Historical Provider, MD   cod liver oil CAPS Take 1 capsule by mouth daily   Yes Historical Provider, MD   Ubrogepant (UBRELVY) 50 MG TABS Take 1 tablet at the onset of migraine, may repeat in 2 hours. Do not take more then 3 days a week.  6/3/22  Yes Bernardo Stovall MD   amitriptyline (ELAVIL) 25 MG tablet TAKE 1 TO 2 TABLETS BY MOUTH EVERY EVENING 12/16/21  Yes Ar Automatic Reconciliation   Benzoyl Peroxide 2.5 % GEL APPLY TOPICALLY TO FACE AT BEDTIME 8/30/21  Yes Ar Automatic Reconciliation   vitamin D (CHOLECALCIFEROL) 59930 UNIT CAPS Take 50,000 Units by mouth every 7 days 3/2/22  Yes Ar Automatic Reconciliation   diclofenac sodium (VOLTAREN) 1 % GEL Apply 1 g topically as needed 12/27/21  Yes Ar Automatic Reconciliation   Lidocaine, Anorectal, 5 % CREA Actual prescription: Lidocaine 5%: Neurontin/gabapentin 5% combined topical cream/gelApply to affected area up to 4 times a day as needed for pain 1/27/22  Yes Ar Automatic Reconciliation   nystatin-triamcinolone (MYCOLOG II) 573117-4.1 UNIT/GM-% cream Apply topically 2 times daily 8/31/21  Yes Ar Automatic Reconciliation   pramipexole (MIRAPEX) 0.125 MG tablet Take 0.25 mg by mouth 2/2/22  Yes Ar Automatic Reconciliation   vitamin E 600 units capsule Take by mouth daily   Yes Ar Automatic Reconciliation   topiramate (TOPAMAX) 25 MG tablet Take 1 tablet by mouth 2 times daily  Patient not taking: Reported on 10/5/2022 6/10/22   BRANDON Lopez - CNP   norethindrone (AYGESTIN) 5 MG tablet Take 1 tablet by mouth daily  Patient not taking: Reported on 10/5/2022 3/15/22   Historical Provider, MD   pregabalin (LYRICA) 150 MG capsule TAKE 1 CAPSULE BY MOUTH Four TIMES DAILY  Patient not taking: No sig reported 12/19/21   Ar Automatic Reconciliation     Allergies   Allergen Reactions    Furosemide Other (See Comments)     Edema of lower extremities      Sulfa Antibiotics Other (See Comments) and Rash     GI Upset    Other Other (See Comments)     Oral steroids     Past Medical History:   Diagnosis Date    Anxiety     DDD (degenerative disc disease), cervical 11/29/2021    C3-C7 Disc Degeneration w/ Posterior Spondylotic Disc Displaceemnts per MRI    Depressed     GERD (gastroesophageal reflux disease)     Hypertension     IBS (irritable bowel syndrome)     Insomnia     TMJ (dislocation of temporomandibular joint) 04/2021     Past Surgical History:   Procedure Laterality Date    CATARACT REMOVAL Bilateral 02/2020    COLONOSCOPY      EGD DELIVER THERMAL ENERGY SPHNCTR/CARDIA GERD      LIPECTOMY      TUBAL LIGATION       Family History   Problem Relation Age of Onset    Heart Disease Father     Hypertension Father     High Cholesterol Mother     GERD Mother      Social History     Tobacco Use    Smoking status: Never    Smokeless tobacco: Never   Substance Use Topics    Alcohol use: Never       ROS:    Review of Systems   Constitutional: Positive for malaise/fatigue. Cardiovascular:  Positive for palpitations. Respiratory:  Positive for shortness of breath. Neurological:  Positive for light-headedness. PHYSICAL EXAM:   /84   Pulse 100   Ht 5' (1.524 m)   Wt 189 lb 12.8 oz (86.1 kg)   BMI 37.07 kg/m²      Wt Readings from Last 3 Encounters:   10/05/22 189 lb 12.8 oz (86.1 kg)   09/14/22 189 lb (85.7 kg)   09/06/22 188 lb 3.2 oz (85.4 kg)     BP Readings from Last 3 Encounters:   10/05/22 126/84   09/26/22 110/82   09/14/22 103/64     Pulse Readings from Last 3 Encounters:   10/05/22 100   09/26/22 (!) 105   09/14/22 (!) 110           Physical Exam  Vitals reviewed. Medical problems and test results were reviewed with the patient today.      DATA REVIEW    LIPID PANEL     Lab Results   Component Value Date    CHOL 118 10/12/2021    CHOL 166 06/25/2020    CHOL 143 11/15/2019     Lab Results   Component Value Date    TRIG 52 10/12/2021    TRIG 109 06/25/2020    TRIG 88 11/15/2019     Lab Results   Component Value Date    HDL 32 (L) 10/12/2021    HDL 53 06/25/2020    HDL 48 11/15/2019     Lab Results   Component Value Date    LDLCALC 74 10/12/2021    LDLCALC 91 06/25/2020    LDLCALC 77 11/15/2019     Lab Results   Component Value Date    LABVLDL 22 06/25/2020    LABVLDL 18 11/15/2019    VLDL 12 10/12/2021     No results found for: CHOLHDLRATIO    BMP  Lab Results   Component Value Date/Time     05/20/2022 08:50 AM    K 4.0 05/20/2022 08:50 AM    CL 97 05/20/2022 08:50 AM    CO2 24 05/20/2022 08:50 AM    BUN 17 05/20/2022 08:50 AM    CREATININE 1.11 05/20/2022 08:50 AM    GLUCOSE 89 05/20/2022 08:50 AM    CALCIUM 9.0 05/20/2022 08:50 AM          EKG        CXR/IMAGING        DEVICE INTERROGATION        OUTSIDE RECORDS REVIEW    Records from outside providers have been reviewed and summarized as noted in the HPI, past history and data review sections of this note, and reviewed with patient. .       ASSESSMENT and PLAN    Emy Sanchez was seen today for palpitations and hypotension. Diagnoses and all orders for this visit:    Palpitations  -     Extended cardiac holter monitor (48 hrs - 15 days); Future    Orthostatic hypotension    Other orders  -     lisinopril (PRINIVIL;ZESTRIL) 5 MG tablet; Take 1 tablet by mouth daily        IMPRESSION:    She feels uncomfortable stopping antihypertensives entirely. Recommend removing diuretic completely and reduce lisinopril dose to 5 mg    Educated to manage intermittent non cardiogenic edema with conservative measures    Will repeat 3 day monitor to assess palpitations    Encouraged to continue regular exercise but look for ways to challenge her body with more intensive periods of activity or adding inclines, climbing stairs, etc.         Return for AFTER PROCEDURE TO REVIEW RESULTS. Thank you for allowing me to participate in this patient's care. Please call or contact me if there are any questions or concerns regarding the above.       Rafa Garcia MD  10/05/22  11:09 AM

## 2022-10-06 ENCOUNTER — OFFICE VISIT (OUTPATIENT)
Dept: ORTHOPEDIC SURGERY | Age: 52
End: 2022-10-06
Payer: COMMERCIAL

## 2022-10-06 DIAGNOSIS — G25.89 SCAPULAR DYSKINESIS: ICD-10-CM

## 2022-10-06 DIAGNOSIS — M75.22 BICEPS TENDINITIS, LEFT: Primary | ICD-10-CM

## 2022-10-06 DIAGNOSIS — M54.2 NECK PAIN: ICD-10-CM

## 2022-10-06 DIAGNOSIS — M19.012 DJD OF LEFT AC (ACROMIOCLAVICULAR) JOINT: ICD-10-CM

## 2022-10-06 PROCEDURE — 99214 OFFICE O/P EST MOD 30 MIN: CPT | Performed by: PHYSICIAN ASSISTANT

## 2022-10-06 NOTE — PROGRESS NOTES
Name: Orly Ding  YOB: 1970  Gender: female  MRN: [id number]    CC:   Chief Complaint   Patient presents with    Follow-up     Recheck left shoulder        HPI: Patient presents for follow-up of left shoulder pain. Patient notes that she has burning sensation in the scapulothoracic area. She notes that the [facility] joint injection gave her good relief back in August.  She notes that the neck pain has been intermittent. She has recently been diagnosed with fibromyalgia. No new injury, fall.     Allergies   Allergen Reactions    Furosemide Other (See Comments)     Edema of lower extremities      Sulfa Antibiotics Other (See Comments) and Rash     GI Upset    Other Other (See Comments)     Oral steroids     Past Medical History:   Diagnosis Date    Anxiety     DDD (degenerative disc disease), cervical 11/29/2021    C3-C7 Disc Degeneration w/ Posterior Spondylotic Disc Displaceemnts per MRI    Depressed     GERD (gastroesophageal reflux disease)     Hypertension     IBS (irritable bowel syndrome)     Insomnia     TMJ (dislocation of temporomandibular joint) 04/2021     Past Surgical History:   Procedure Laterality Date    CATARACT REMOVAL Bilateral 02/2020    COLONOSCOPY      EGD DELIVER THERMAL ENERGY SPHNCTR/CARDIA GERD      LIPECTOMY      TUBAL LIGATION       Family History   Problem Relation Age of Onset    Heart Disease Father     Hypertension Father     High Cholesterol Mother     GERD Mother      Social History     Socioeconomic History    Marital status:      Spouse name: Not on file    Number of children: Not on file    Years of education: Not on file    Highest education level: Not on file   Occupational History    Not on file   Tobacco Use    Smoking status: Never    Smokeless tobacco: Never   Vaping Use    Vaping Use: Never used   Substance and Sexual Activity    Alcohol use: Never    Drug use: Never    Sexual activity: Not on file   Other Topics Concern    Not on file Social History Narrative    Not on file     Social Determinants of Health     Financial Resource Strain: Not on file   Food Insecurity: Not on file   Transportation Needs: Not on file   Physical Activity: Not on file   Stress: Not on file   Social Connections: Not on file   Intimate Partner Violence: Not on file   Housing Stability: Not on file          AMB PAIN ASSESSMENT 6/15/2022   Location of Pain Neck   Location Modifiers Left;Right   Severity of Pain 7   Quality of Pain Other (Comment)   Duration of Pain Persistent   Frequency of Pain Constant   Date Pain First Started 10/12/2021   Aggravating Factors Standing;Walking; Other (Comment)   Limiting Behavior Yes   Relieving Factors Other (Comment); Ice;Exercise   Result of Injury Yes   Work-Related Injury No   Are there other pain locations you wish to document? Yes       Review of Systems  Non-contributory    PE left shoulder:    Full range of motion of the shoulder. Appropriate strength without weakness. Negative tenderness [facility] joint. Minimal tenderness to biceps tendon. Distal motor function, sensation, pulses intact. Significant tenderness to the superior medial scapular border and surrounding scapular area. A/Plan:     ICD-10-CM    1. Biceps tendinitis, left  M75.22 Amb External Referral To Pain Medicine      2. DJD of left AC (acromioclavicular) joint  M19.012 Amb External Referral To Pain Medicine      3. Scapular dyskinesis  G25.89 Amb External Referral To Pain Medicine      4. Neck pain  M54.2 Amb External Referral To Pain Medicine           I discussed with the patient continued myofascial pain and scapular dyskinesia. At this time she has tried and failed shoulder injections, therapy. We will also send her to see our spine specialist who recommended spine injections. We will send over referral to her pain management group for trigger point injections as well as cervical injections.   She is also recently been diagnosed with fibromyalgia by her rheumatologist.  Also discussed follow-up with her rheumatologist in order to help with medical management. We also discussed the use of dry needling as well as Voltaren gel. Return if symptoms worsen or fail to improve.         Nanette Landers  10/09/22

## 2022-10-07 ENCOUNTER — NURSE ONLY (OUTPATIENT)
Dept: INTERNAL MEDICINE CLINIC | Facility: CLINIC | Age: 52
End: 2022-10-07

## 2022-10-07 DIAGNOSIS — Z00.00 ROUTINE GENERAL MEDICAL EXAMINATION AT A HEALTH CARE FACILITY: Primary | ICD-10-CM

## 2022-10-07 DIAGNOSIS — Z00.00 ROUTINE GENERAL MEDICAL EXAMINATION AT A HEALTH CARE FACILITY: ICD-10-CM

## 2022-10-07 LAB
25(OH)D3 SERPL-MCNC: 99.7 NG/ML (ref 30–100)
ALBUMIN SERPL-MCNC: 3.6 G/DL (ref 3.5–5)
ALBUMIN/GLOB SERPL: 1.2 {RATIO} (ref 1.2–3.5)
ALP SERPL-CCNC: 89 U/L (ref 50–136)
ALT SERPL-CCNC: 40 U/L (ref 12–65)
ANION GAP SERPL CALC-SCNC: 6 MMOL/L (ref 4–13)
AST SERPL-CCNC: 17 U/L (ref 15–37)
BASOPHILS # BLD: 0.1 K/UL (ref 0–0.2)
BASOPHILS NFR BLD: 1 % (ref 0–2)
BILIRUB SERPL-MCNC: 0.3 MG/DL (ref 0.2–1.1)
BUN SERPL-MCNC: 13 MG/DL (ref 6–23)
CALCIUM SERPL-MCNC: 9 MG/DL (ref 8.3–10.4)
CHLORIDE SERPL-SCNC: 107 MMOL/L (ref 101–110)
CHOLEST SERPL-MCNC: 129 MG/DL
CO2 SERPL-SCNC: 31 MMOL/L (ref 21–32)
CREAT SERPL-MCNC: 1 MG/DL (ref 0.6–1)
DIFFERENTIAL METHOD BLD: ABNORMAL
EOSINOPHIL # BLD: 0.2 K/UL (ref 0–0.8)
EOSINOPHIL NFR BLD: 2 % (ref 0.5–7.8)
ERYTHROCYTE [DISTWIDTH] IN BLOOD BY AUTOMATED COUNT: 15.4 % (ref 11.9–14.6)
GLOBULIN SER CALC-MCNC: 2.9 G/DL (ref 2.3–3.5)
GLUCOSE SERPL-MCNC: 75 MG/DL (ref 65–100)
HCT VFR BLD AUTO: 41.6 % (ref 35.8–46.3)
HDLC SERPL-MCNC: 48 MG/DL (ref 40–60)
HDLC SERPL: 2.7 {RATIO}
HGB BLD-MCNC: 13.4 G/DL (ref 11.7–15.4)
IMM GRANULOCYTES # BLD AUTO: 0.1 K/UL (ref 0–0.5)
IMM GRANULOCYTES NFR BLD AUTO: 1 % (ref 0–5)
LDLC SERPL CALC-MCNC: 65.6 MG/DL
LYMPHOCYTES # BLD: 2.4 K/UL (ref 0.5–4.6)
LYMPHOCYTES NFR BLD: 25 % (ref 13–44)
MCH RBC QN AUTO: 27.5 PG (ref 26.1–32.9)
MCHC RBC AUTO-ENTMCNC: 32.2 G/DL (ref 31.4–35)
MCV RBC AUTO: 85.2 FL (ref 79.6–97.8)
MONOCYTES # BLD: 0.8 K/UL (ref 0.1–1.3)
MONOCYTES NFR BLD: 8 % (ref 4–12)
NEUTS SEG # BLD: 5.9 K/UL (ref 1.7–8.2)
NEUTS SEG NFR BLD: 63 % (ref 43–78)
NRBC # BLD: 0 K/UL (ref 0–0.2)
PLATELET # BLD AUTO: 195 K/UL (ref 150–450)
PMV BLD AUTO: 12.8 FL (ref 9.4–12.3)
POTASSIUM SERPL-SCNC: 3.9 MMOL/L (ref 3.5–5.1)
PROT SERPL-MCNC: 6.5 G/DL (ref 6.3–8.2)
RBC # BLD AUTO: 4.88 M/UL (ref 4.05–5.2)
SODIUM SERPL-SCNC: 144 MMOL/L (ref 136–145)
TRIGL SERPL-MCNC: 77 MG/DL (ref 35–150)
TSH, 3RD GENERATION: 0.53 UIU/ML (ref 0.36–3.74)
VLDLC SERPL CALC-MCNC: 15.4 MG/DL (ref 6–23)
WBC # BLD AUTO: 9.5 K/UL (ref 4.3–11.1)

## 2022-10-10 ENCOUNTER — TELEPHONE (OUTPATIENT)
Dept: CARDIOLOGY CLINIC | Age: 52
End: 2022-10-10

## 2022-10-10 DIAGNOSIS — R82.90 URINE FINDING: Primary | ICD-10-CM

## 2022-10-10 NOTE — TELEPHONE ENCOUNTER
MD Jer Melissa, RN  Caller: Unspecified (Today, 10:41 AM)  Diuretic was stopped d/t symptomatic orthostatic hypotension. She can stop lisinopril and resume hctz if she would like, but wouldn't take them both, recommend conservative edema management to include compression socks, exercise, low Na diet (she does) and leg elevation. Pt made aware of Dr. Damien Garcia recommendations. Has 25 mg HCTZ on hand. Last does combined with lisinopril was 12.5 mg. Should she resume this dose or take the 25 mg tab? Pt also states she has been using compression socks, elevating, and exercising when able. Please advise.

## 2022-10-10 NOTE — TELEPHONE ENCOUNTER
From: Viry Haque  Sent: 10/10/2022   8:39 AM EDT  To: [facility] Clinical Staff  Subject: Medication                                       Hello. I believe I am retaining fluid. I suffer with edema. Both of my legs are hurting.

## 2022-10-10 NOTE — TELEPHONE ENCOUNTER
Stephen Clark MD  You 8 minutes ago (12:44 PM)     I'd recommend the 12.5 but whichever she wishes is fine        Reviewed Dr. Raya Solis recommendations. States she will restart at 12.5 mg and send mychart message with \"thumbs up or thumbs down\" on that dose.

## 2022-10-11 ENCOUNTER — HOSPITAL ENCOUNTER (OUTPATIENT)
Dept: PHYSICAL THERAPY | Age: 52
Setting detail: RECURRING SERIES
Discharge: HOME | End: 2022-10-14
Payer: COMMERCIAL

## 2022-10-11 LAB
APPEARANCE UR: CLEAR
BACTERIA URNS QL MICRO: ABNORMAL /HPF
BILIRUB UR QL: NEGATIVE
CASTS URNS QL MICRO: ABNORMAL /LPF
COLOR UR: ABNORMAL
EPI CELLS #/AREA URNS HPF: ABNORMAL /HPF
GLUCOSE UR STRIP.AUTO-MCNC: NEGATIVE MG/DL
HGB UR QL STRIP: ABNORMAL
KETONES UR QL STRIP.AUTO: NEGATIVE MG/DL
LEUKOCYTE ESTERASE UR QL STRIP.AUTO: ABNORMAL
MUCOUS THREADS URNS QL MICRO: 0 /LPF
NITRITE UR QL STRIP.AUTO: NEGATIVE
PH UR STRIP: 6.5 [PH] (ref 5–9)
PROT UR STRIP-MCNC: NEGATIVE MG/DL
RBC #/AREA URNS HPF: ABNORMAL /HPF
SP GR UR REFRACTOMETRY: 1.01 (ref 1–1.02)
URINE CULTURE IF INDICATED: ABNORMAL
UROBILINOGEN UR QL STRIP.AUTO: 0.2 EU/DL (ref 0.2–1)
WBC URNS QL MICRO: ABNORMAL /HPF

## 2022-10-11 PROCEDURE — 97110 THERAPEUTIC EXERCISES: CPT

## 2022-10-11 NOTE — PROGRESS NOTES
Mercedes Roy  : 1970  Primary: Flavio Paredes  Secondary:  [address] @ [address] [facility] [address]  Phone: [phone]  Fax: [phone] Plan Frequency: 2x a week for 6-8 weeks    Plan of Care/Certification Expiration Date: 22      PT Visit Info: Total # of Visits to Date: 6  Progress Note Counter: 1     Visit Count:  11   OUTPATIENT PHYSICAL THERAPY:OP NOTE TYPE: Treatment Note 10/11/2022       Episode  }Appt Desk             Treatment Diagnosis:     Pain in Right Shoulder (M25.511)  Stiffness of Right Shoulder, Not elsewhere classified (M25.611)  Medical/Referring Diagnosis:  Neck pain [M54.2]  Referring Physician:  Hina Foley MD MD Orders:  PT Eval and Treat   Date of Onset:  Onset Date: 22     Allergies:   Furosemide, Sulfa antibiotics, and Other  Restrictions/Precautions:  Restrictions/Precautions: None  No data recorded   Interventions Planned (Treatment may consist of any combination of the following):    Current Treatment Recommendations: Strengthening; ROM; Functional mobility training; ADL/Self-care training; Endurance training; Neuromuscular re-education; Manual Therapy - Soft Tissue Mobilization; Pain management; Return to work related activity; Home exercise program; Safety education & training; Patient/Caregiver education & training; Modalities; Therapeutic activities     Subjective Comments:  Pt reports nothing noticeable over the weekend. Initial:}     10Post Session:        /10  Medications Last Reviewed:  10/11/2022  Updated Objective Findings:  See evaluation note from today  Treatment     THERAPEUTIC EXERCISE: (40 minutes):    Exercises per grid below to improve mobility, strength, balance, and coordination. Progressed resistance and repetitions as indicated.      Date:  2022 Date:  2022 Date:  2022 Date  10/3/2022 Date  10/11/2022   Activity/Exercise Parameters Parameters Parameters education 10 min benefits of weight lifting. Dry needling, cv exercise       UBE  6 min 6 min 6 min  6 min   Ring stretch  10xs 10xs 10xs 10xs   Wall angels    10xs    PVC stretching  Circles, abduction, twisting, shoulder flexion   Pec stretch  Dowel cliff    rows  27lbs 3x10 30lbs 3x10 33lbs 3x10    Mid rows     13lbs each side 3x10   lats  30lbs 3x10 37lbs 3x10 40lbs 3x10    Banded 7  Yellow band 25xs Yellow band 25xs Yellow band 25xs Red 10xs   SLED   75lbs 1 laps  100lbs 2 laps  2 laps 100lbs   2 laps 100lbs    TRX   30xs   25xs body weight rows   Andrade carries  10xs 4 laps 2 laps at 10lbs  3 laps at 15lbs 4 laps 15lbs  3 laps with 15lbs  1 lap with 20lbs        THERAPEUTIC ACTIVITY: ( 0 minutes): Therapeutic activities per grid below to improve mobility, strength, coordination, and dynamic movement to improve functional lifting, carrying, reaching, catching, and overhead activities. Date:  10/11/2022 Date:   Date:     Activity/Exercise Parameters Parameters Parameters                                                 MANUAL THERAPY: (0 minutes):   Joint mobilization, Soft tissue mobilization, and Manipulation was utilized and necessary because of the patient's restricted joint motion, painful spasm, loss of articular motion, and restricted motion of soft tissue. Date  10/11/2022      Technique Used Grade Level # Time(s) Effect while being performed                                                                                         HEP Log Date        2.     3.    4.     5.        POC    Recertification Expiration Date      Plan of Care/Certification Expiration Date: 11/16/22     Visit Count  11    Number of Allowed Visits              Treatment/Session Summary:      Treatment Assessment:    Pt continud with periscapular trainiing and continued to work with funcitonal movements.    Communication/Consultation:       None today   Equipment provided today:  None   Recommendations/Intent for next  treatment session:  Next visit will focus on shoulder strengthening.        Total Treatment Billable Duration:  40 minutes  Time In: 5786  Time Out: 0900    Jose Mercedes, PT       Charge Capture  }Post Session Pain  PT Visit Info  MedBridge Portal  MD Guidelines  Scanned Media  Benefits  MyChart    Future Appointments   Date Time Provider Aleah Preston   10/14/2022  3:00 PM Dilma Cooley APRN - CNP MAT [facility]   10/17/2022  8:15 AM Jose Mercedes, PT [facility]   10/18/2022 10:00 AM Chase Bashir MD [id number] AMB   10/24/2022  8:15 AM Jose Mercedes, PT [facility]   10/26/2022 10:15 AM Jacob Dobbins MD [facility] AMB   11/17/2022  3:00 PM Samuel Posada MD [facility] AMB   4/19/2023 10:00 AM Li Chatman DO [facility] AMB

## 2022-10-12 ENCOUNTER — PATIENT MESSAGE (OUTPATIENT)
Dept: INTERNAL MEDICINE CLINIC | Facility: CLINIC | Age: 52
End: 2022-10-12

## 2022-10-12 ENCOUNTER — PATIENT MESSAGE (OUTPATIENT)
Dept: NEUROLOGY | Age: 52
End: 2022-10-12

## 2022-10-12 ENCOUNTER — PATIENT MESSAGE (OUTPATIENT)
Dept: CARDIOLOGY CLINIC | Age: 52
End: 2022-10-12

## 2022-10-12 LAB
BACTERIA SPEC CULT: NORMAL
BACTERIA SPEC CULT: NORMAL
SERVICE CMNT-IMP: NORMAL

## 2022-10-12 NOTE — TELEPHONE ENCOUNTER
Leonela Huffman, have you seen a medical records request from [facility] for this pt?

## 2022-10-12 NOTE — TELEPHONE ENCOUNTER
From: Jeronimo Alexis  To: Woodrow Roach  Sent: 10/12/2022 2:38 PM EDT  Subject: Wayne Montes De Oca  Did Zoraida Lawson send a fax for medical records?

## 2022-10-13 ENCOUNTER — TELEPHONE (OUTPATIENT)
Dept: CARDIOLOGY CLINIC | Age: 52
End: 2022-10-13

## 2022-10-13 RX ORDER — TOPIRAMATE 25 MG/1
25 TABLET ORAL 2 TIMES DAILY
Qty: 180 TABLET | Refills: 3 | Status: CANCELLED | OUTPATIENT
Start: 2022-10-13

## 2022-10-13 RX ORDER — HYDROCHLOROTHIAZIDE 25 MG/1
12.5 TABLET ORAL DAILY
COMMUNITY

## 2022-10-13 NOTE — TELEPHONE ENCOUNTER
Continue with low dose hctz, compression socks, leg elevation, exercise. Given near syncope d/t orthostatic hypotension, conservative management is the recommended course.

## 2022-10-13 NOTE — TELEPHONE ENCOUNTER
Records request and attending physician statement from [facility] received today. Pt notified.

## 2022-10-13 NOTE — TELEPHONE ENCOUNTER
----- Message from Billie Torrez MA sent at 10/12/2022  5:05 PM EDT -----  Regarding: FW: Medication     ----- Message -----  From: Virgil Buchanan  Sent: 10/12/2022   4:58 PM EDT  To: [facility] Clinical Staff  Subject: Medication                                       Hello  Both feet and legs are swollen again . They went down when I had them elevated all night.

## 2022-10-14 ENCOUNTER — OFFICE VISIT (OUTPATIENT)
Dept: INTERNAL MEDICINE CLINIC | Facility: CLINIC | Age: 52
End: 2022-10-14
Payer: COMMERCIAL

## 2022-10-14 VITALS
TEMPERATURE: 97.1 F | HEIGHT: 60 IN | BODY MASS INDEX: 37.5 KG/M2 | DIASTOLIC BLOOD PRESSURE: 90 MMHG | HEART RATE: 97 BPM | OXYGEN SATURATION: 97 % | SYSTOLIC BLOOD PRESSURE: 130 MMHG | WEIGHT: 191 LBS

## 2022-10-14 DIAGNOSIS — Z00.00 ROUTINE GENERAL MEDICAL EXAMINATION AT A HEALTH CARE FACILITY: Primary | ICD-10-CM

## 2022-10-14 DIAGNOSIS — E53.8 B12 DEFICIENCY: ICD-10-CM

## 2022-10-14 DIAGNOSIS — M32.9 LUPUS (HCC): ICD-10-CM

## 2022-10-14 DIAGNOSIS — G25.81 RESTLESS LEGS SYNDROME (RLS): ICD-10-CM

## 2022-10-14 DIAGNOSIS — Z00.00 PHYSICAL EXAM: Primary | ICD-10-CM

## 2022-10-14 DIAGNOSIS — G44.89 HEADACHE SYNDROME: ICD-10-CM

## 2022-10-14 DIAGNOSIS — R06.02 SHORT OF BREATH ON EXERTION: ICD-10-CM

## 2022-10-14 DIAGNOSIS — R60.0 FLUID RETENTION IN LEGS: ICD-10-CM

## 2022-10-14 DIAGNOSIS — Z00.00 ROUTINE GENERAL MEDICAL EXAMINATION AT A HEALTH CARE FACILITY: ICD-10-CM

## 2022-10-14 DIAGNOSIS — E55.9 VITAMIN D DEFICIENCY: ICD-10-CM

## 2022-10-14 LAB
APPEARANCE UR: CLEAR
BACTERIA URNS QL MICRO: 0 /HPF
BILIRUB UR QL: NEGATIVE
CASTS URNS QL MICRO: 0 /LPF
COLOR UR: ABNORMAL
CRYSTALS URNS QL MICRO: 0 /LPF
EPI CELLS #/AREA URNS HPF: ABNORMAL /HPF
GLUCOSE UR STRIP.AUTO-MCNC: NEGATIVE MG/DL
HGB UR QL STRIP: NEGATIVE
KETONES UR QL STRIP.AUTO: NEGATIVE MG/DL
LEUKOCYTE ESTERASE UR QL STRIP.AUTO: ABNORMAL
MUCOUS THREADS URNS QL MICRO: 0 /LPF
NITRITE UR QL STRIP.AUTO: NEGATIVE
PH UR STRIP: 7.5 [PH] (ref 5–9)
PROT UR STRIP-MCNC: NEGATIVE MG/DL
RBC #/AREA URNS HPF: 0 /HPF
SP GR UR REFRACTOMETRY: 1.01 (ref 1–1.02)
URINE CULTURE IF INDICATED: ABNORMAL
UROBILINOGEN UR QL STRIP.AUTO: 0.2 EU/DL (ref 0.2–1)
WBC URNS QL MICRO: ABNORMAL /HPF

## 2022-10-14 PROCEDURE — 99396 PREV VISIT EST AGE 40-64: CPT | Performed by: NURSE PRACTITIONER

## 2022-10-14 RX ORDER — CYCLOBENZAPRINE HYDROCHLORIDE 15 MG/1
15 CAPSULE, EXTENDED RELEASE ORAL DAILY PRN
COMMUNITY
Start: 2022-10-12

## 2022-10-14 RX ORDER — PRAMIPEXOLE DIHYDROCHLORIDE 0.5 MG/1
0.5 TABLET ORAL NIGHTLY
Qty: 90 TABLET | Refills: 1 | Status: SHIPPED | OUTPATIENT
Start: 2022-10-14

## 2022-10-14 ASSESSMENT — PATIENT HEALTH QUESTIONNAIRE - PHQ9
4. FEELING TIRED OR HAVING LITTLE ENERGY: 3
3. TROUBLE FALLING OR STAYING ASLEEP: 3
SUM OF ALL RESPONSES TO PHQ QUESTIONS 1-9: 22
7. TROUBLE CONCENTRATING ON THINGS, SUCH AS READING THE NEWSPAPER OR WATCHING TELEVISION: 3
SUM OF ALL RESPONSES TO PHQ QUESTIONS 1-9: 22
5. POOR APPETITE OR OVEREATING: 2
SUM OF ALL RESPONSES TO PHQ9 QUESTIONS 1 & 2: 6
9. THOUGHTS THAT YOU WOULD BE BETTER OFF DEAD, OR OF HURTING YOURSELF: 0
8. MOVING OR SPEAKING SO SLOWLY THAT OTHER PEOPLE COULD HAVE NOTICED. OR THE OPPOSITE, BEING SO FIGETY OR RESTLESS THAT YOU HAVE BEEN MOVING AROUND A LOT MORE THAN USUAL: 3
2. FEELING DOWN, DEPRESSED OR HOPELESS: 3
1. LITTLE INTEREST OR PLEASURE IN DOING THINGS: 3
SUM OF ALL RESPONSES TO PHQ QUESTIONS 1-9: 22
6. FEELING BAD ABOUT YOURSELF - OR THAT YOU ARE A FAILURE OR HAVE LET YOURSELF OR YOUR FAMILY DOWN: 2
SUM OF ALL RESPONSES TO PHQ QUESTIONS 1-9: 22
10. IF YOU CHECKED OFF ANY PROBLEMS, HOW DIFFICULT HAVE THESE PROBLEMS MADE IT FOR YOU TO DO YOUR WORK, TAKE CARE OF THINGS AT HOME, OR GET ALONG WITH OTHER PEOPLE: 1

## 2022-10-14 ASSESSMENT — COLUMBIA-SUICIDE SEVERITY RATING SCALE - C-SSRS
2. HAVE YOU ACTUALLY HAD ANY THOUGHTS OF KILLING YOURSELF?: NO
1. WITHIN THE PAST MONTH, HAVE YOU WISHED YOU WERE DEAD OR WISHED YOU COULD GO TO SLEEP AND NOT WAKE UP?: NO
6. HAVE YOU EVER DONE ANYTHING, STARTED TO DO ANYTHING, OR PREPARED TO DO ANYTHING TO END YOUR LIFE?: NO

## 2022-10-14 NOTE — PROGRESS NOTES
[address]      PROGRESS NOTE    SUBJECTIVE:   Althea Escalante is a 46 y.o. female seen for a follow up visit regarding the following chief complaint:     Chief Complaint   Patient presents with    New Patient       HPI  Patient presents for CPE. Labs reviewed and discussed in detail. She was recently diagnosed with Lupus from her rheumatologist and has been taking plaquenil routinely for this. She also takes pain medication for fibromyalgia. She follows cardiology for palpitations and hypotension that seems to be better controlled with medication adjustments. Starting on Benlysta 2 weeks ago and tolerating. Follow up 11/22. She also follows neurology for migraines that are managed currently with topomax. Vitamin D stable with supplementation. Vision screen-UTD and having evaluation for glaucoma at [facility]. Denies skin concerns. PAP UTD 9/22. Following psychiatry routinely. Pain management: taking ER flexeril for pain, no more injections. RLS: uncontrolled with current dose of mirapex. Current Outpatient Medications   Medication Sig Dispense Refill    cyclobenzaprine (AMRIX) 15 MG extended release capsule       pramipexole (MIRAPEX) 0.5 MG tablet Take 1 tablet by mouth at bedtime 90 tablet 1    hydroCHLOROthiazide (HYDRODIURIL) 25 MG tablet Take 12.5 mg by mouth daily      cyanocobalamin 1000 MCG tablet Take 1 tablet by mouth daily 90 tablet 3    pregabalin (LYRICA) 200 MG capsule       Belimumab (BENLYSTA) 200 MG/ML SOAJ Inject 200 mg into the skin every 7 days      hydroxychloroquine (PLAQUENIL) 200 MG tablet Take 1 tablet by mouth daily      EVENING PRIMROSE OIL PO Take by mouth      methylfolate (DEPLIN) 7.5 MG TABS tablet Take 1 tablet by mouth in the morning.  30 tablet 5    citalopram (CELEXA) 20 MG tablet Take 1 tablet by mouth every evening 90 tablet 1    traZODone (DESYREL) 50 MG tablet Take 0.5-1 tablets by mouth nightly 180 tablet 1    atomoxetine (STRATTERA) 80 MG capsule Take 1 capsule by mouth daily 90 capsule 1    Wheat Dextrin (BENEFIBER PO)       LINZESS 72 MCG CAPS capsule Take 1 capsule by mouth daily      naproxen (NAPROSYN) 500 MG tablet Take 1 tablet by mouth as needed      omeprazole (PRILOSEC) 40 MG delayed release capsule Take 1 capsule (40 mg) by mouth 2 (two) times a day 30 mins before breakfast and 30 mins before dinner      cod liver oil CAPS Take 1 capsule by mouth daily      Ubrogepant (UBRELVY) 50 MG TABS Take 1 tablet at the onset of migraine, may repeat in 2 hours. Do not take more then 3 days a week. 16 tablet 11    amitriptyline (ELAVIL) 25 MG tablet TAKE 1 TO 2 TABLETS BY MOUTH EVERY EVENING      Benzoyl Peroxide 2.5 % GEL APPLY TOPICALLY TO FACE AT BEDTIME      vitamin D (CHOLECALCIFEROL) 33316 UNIT CAPS Take 50,000 Units by mouth every 7 days      diclofenac sodium (VOLTAREN) 1 % GEL Apply 1 g topically as needed      Lidocaine, Anorectal, 5 % CREA Actual prescription: Lidocaine 5%: Neurontin/gabapentin 5% combined topical cream/gelApply to affected area up to 4 times a day as needed for pain      nystatin-triamcinolone (MYCOLOG II) 739456-2.1 UNIT/GM-% cream Apply topically 2 times daily      vitamin E 600 units capsule Take by mouth daily      clonazePAM (KLONOPIN) 0.5 MG tablet Take 1 tablet by mouth daily as needed for Anxiety for up to 90 days. 30 tablet 2     No current facility-administered medications for this visit.      Allergies   Allergen Reactions    Furosemide Other (See Comments)     Edema of lower extremities      Sulfa Antibiotics Other (See Comments) and Rash     GI Upset    Other Other (See Comments)     Oral steroids       Past Medical History:   Diagnosis Date    Anxiety     DDD (degenerative disc disease), cervical 11/29/2021    C3-C7 Disc Degeneration w/ Posterior Spondylotic Disc Displaceemnts per MRI    Depressed     GERD (gastroesophageal reflux disease)     Hypertension     IBS (irritable bowel syndrome)     Insomnia     TMJ (dislocation of temporomandibular joint) 04/2021     Past Surgical History:   Procedure Laterality Date    CATARACT REMOVAL Bilateral 02/2020    COLONOSCOPY      EGD DELIVER THERMAL ENERGY SPHNCTR/CARDIA GERD      LIPECTOMY      TUBAL LIGATION       Family History   Problem Relation Age of Onset    Heart Disease Father     Hypertension Father     High Cholesterol Mother     GERD Mother      Social History     Tobacco Use    Smoking status: Never    Smokeless tobacco: Never   Substance Use Topics    Alcohol use: Never       Review of Systems      OBJECTIVE:  BP (!) 130/90 (Site: Left Upper Arm, Position: Sitting, Cuff Size: Small Adult)   Pulse 97   Temp 97.1 °F (36.2 °C) (Temporal)   Ht 5' (1.524 m)   Wt 191 lb (86.6 kg)   SpO2 97%   BMI 37.30 kg/m²      Physical Exam      ASSESSMENT and Domenica Aquino was seen today for new patient. Diagnoses and all orders for this visit:    Physical exam    Headache syndrome    Fluid retention in legs  -     Stress echocardiogram (TTE) exercise with contrast, bubble, strain, and 3D PRN; Future    Short of breath on exertion  -     Stress echocardiogram (TTE) exercise with contrast, bubble, strain, and 3D PRN; Future    Lupus (HCC)    B12 deficiency    Vitamin D deficiency    Restless legs syndrome (RLS)  -     pramipexole (MIRAPEX) 0.5 MG tablet; Take 1 tablet by mouth at bedtime      We discussed extending leave through 11/25/22 after she sees rheumatology and neurology. Continue medications with increase in mirapex to 0.5mg nightly to better manage RLS. Schedule ECHO due to ongoing dysnea and fluid retention. Annual Exam Goals:  -Reach and stay at a healthy weight. This can lower your risk of obesity, diabetes, heart disease and high blood pressure. -Get at least 30 minutes of physical activity daily.   Walking, running, swimming, cycling or playing sports are good options.  -Do not smoke or allow others to smoke around you.  -Use birth control if you do not want to have children at this time and barrier contraception to prevent or decrease risk of exposure to sexually transmitted diseases. -Use sunscreen daily, SPF of 15 or higher are best.  -See a dentist at least once yearly for checkups. -Have vision checked every 1-2 years.  -Wear a seat belt in the car.  -Avoid drinking in excess of 2 alcoholic drinks per day for men and 1 drink a day for women, or avoid drinking altogether.   -Watch closely for changes in our health and contact your doctor with any concerning problems or symptoms  -Age appropriate screening tests were updated and discussed at today's visit. -Immunization history was reviewed and updated at today's visit. Follow-up and Dispositions    Return for follow up 11/25 with me.          Gena Jonas NP, APRN - CNP

## 2022-10-17 ENCOUNTER — HOSPITAL ENCOUNTER (OUTPATIENT)
Dept: PHYSICAL THERAPY | Age: 52
Setting detail: RECURRING SERIES
Discharge: HOME | End: 2022-10-20
Payer: COMMERCIAL

## 2022-10-17 PROCEDURE — 97110 THERAPEUTIC EXERCISES: CPT

## 2022-10-17 NOTE — PROGRESS NOTES
Marisabel Knapp  : 1970  Primary: Suzy Buerger Sc  Secondary:  [id number] @ [address] [place] [id number]  Phone: [phone]  Fax: [phone] Plan Frequency: 2x a week for 6-8 weeks    Plan of Care/Certification Expiration Date: 22      PT Visit Info: Total # of Visits to Date: 6  Progress Note Counter: 1     Visit Count:  12   OUTPATIENT PHYSICAL THERAPY:OP NOTE TYPE: Treatment Note 10/17/2022       Episode  }Appt Desk             Treatment Diagnosis:     Pain in Right Shoulder (M25.511)  Stiffness of Right Shoulder, Not elsewhere classified (M25.611)  Medical/Referring Diagnosis:  Neck pain [M54.2]  Referring Physician:  Jakob Torres MD MD Orders:  PT Eval and Treat   Date of Onset:  Onset Date: 22     Allergies:   Furosemide, Sulfa antibiotics, and Other  Restrictions/Precautions:  Restrictions/Precautions: None  No data recorded   Interventions Planned (Treatment may consist of any combination of the following):    Current Treatment Recommendations: Strengthening; ROM; Functional mobility training; ADL/Self-care training; Endurance training; Neuromuscular re-education; Manual Therapy - Soft Tissue Mobilization; Pain management; Return to work related activity; Home exercise program; Safety education & training; Patient/Caregiver education & training; Modalities; Therapeutic activities     Subjective Comments:  Pt rpeorts that her shoulder has been feeling better but her back is still giving her fits  Initial:}     /10Post Session:        /10  Medications Last Reviewed:  10/17/2022  Updated Objective Findings:  See evaluation note from today  Treatment     THERAPEUTIC EXERCISE: (40 minutes):    Exercises per grid below to improve mobility, strength, balance, and coordination. Progressed resistance and repetitions as indicated.      Date:  2022 Date:  2022 Date  10/3/2022 Date  10/11/2022 Date  10/17/2022   Activity/Exercise Parameters Parameters      education        nustep     6 min hill functional  lvl 5 at end of workout to reduce soreness   UBE 6 min 6 min 6 min  6 min 6 min   Ring stretch 10xs 10xs 10xs 10xs 10xs   L stretch        Wall angels   10xs     PVC stretching Circles, abduction, twisting, shoulder flexion   Pec stretch  Dowel cliff     rows 27lbs 3x10 30lbs 3x10 33lbs 3x10  37lbs 3x10   Mid rows    13lbs each side 3x10 Red band 30xs   lats 30lbs 3x10 37lbs 3x10 40lbs 3x10  43lbs 3x10   Banded 7 Yellow band 25xs Yellow band 25xs Yellow band 25xs Red 10xs    SLED  75lbs 1 laps  100lbs 2 laps  2 laps 100lbs   2 laps 100lbs  2 laps 100lbs   2 laps 100lbs    TRX  30xs   25xs body weight rows 30xs body weight rows   Overhead press     8lbs 1x10     Andrade carries 10xs 4 laps 2 laps at 10lbs  3 laps at 15lbs 4 laps 15lbs  3 laps with 15lbs  1 lap with 20lbs  3 laps with 20lbs        THERAPEUTIC ACTIVITY: ( 0 minutes): Therapeutic activities per grid below to improve mobility, strength, coordination, and dynamic movement to improve functional lifting, carrying, reaching, catching, and overhead activities. Date:  10/17/2022 Date:   Date:     Activity/Exercise Parameters Parameters Parameters                                                 MANUAL THERAPY: (0 minutes):   Joint mobilization, Soft tissue mobilization, and Manipulation was utilized and necessary because of the patient's restricted joint motion, painful spasm, loss of articular motion, and restricted motion of soft tissue.               Date  10/17/2022      Technique Used Grade Level # Time(s) Effect while being performed                                                                                         HEP Log Date        2.     3.    4.     5.        POC    Recertification Expiration Date      Plan of Care/Certification Expiration Date: 11/16/22     Visit Count  12    Number of Allowed Visits              Treatment/Session Summary:      Treatment Assessment:    Progressed in weight training and added overhead press at self selected weight. good tolerance but needs increase shouder flexion to reduce strain on RTC   Communication/Consultation:       None today   Equipment provided today:  None   Recommendations/Intent for next  treatment session:  Next visit will focus on shoulder strengthening.        Total Treatment Billable Duration:  45 minutes  Time In: 4446  Time Out: 0900    Reynaldo Parikh, PT       Charge Capture  }Post Session Pain  PT Visit Info  [facility] Portal  MD Guidelines  Scanned Media  Benefits  MyChart    Future Appointments   Date Time Provider Aleah Chapmani   10/18/2022 10:00 AM Josue Boyle MD [id number]   10/24/2022  8:15 AM Reynaldo Parikh PT [facility]   10/26/2022 10:15 AM Kristi Fagan MD [facility]   11/17/2022  3:00 PM Bernardo Stovall MD [facility]   11/25/2022  1:30 PM Alix Covington, APRN - CNP [facility]   4/19/2023 10:00 AM Yennifer Chatman DO [facility]

## 2022-10-18 ENCOUNTER — TELEPHONE (OUTPATIENT)
Dept: CARDIOLOGY CLINIC | Age: 52
End: 2022-10-18

## 2022-10-18 ENCOUNTER — OFFICE VISIT (OUTPATIENT)
Dept: BEHAVIORAL/MENTAL HEALTH CLINIC | Age: 52
End: 2022-10-18
Payer: COMMERCIAL

## 2022-10-18 VITALS
WEIGHT: 193.4 LBS | HEART RATE: 97 BPM | TEMPERATURE: 98.7 F | SYSTOLIC BLOOD PRESSURE: 146 MMHG | HEIGHT: 60 IN | DIASTOLIC BLOOD PRESSURE: 90 MMHG | OXYGEN SATURATION: 98 % | BODY MASS INDEX: 37.97 KG/M2

## 2022-10-18 DIAGNOSIS — F51.05 INSOMNIA DUE TO MENTAL DISORDER: ICD-10-CM

## 2022-10-18 DIAGNOSIS — G89.4 CHRONIC PAIN DISORDER: ICD-10-CM

## 2022-10-18 DIAGNOSIS — R60.0 FLUID RETENTION IN LEGS: ICD-10-CM

## 2022-10-18 DIAGNOSIS — F41.1 GENERALIZED ANXIETY DISORDER: ICD-10-CM

## 2022-10-18 DIAGNOSIS — F33.2 SEVERE EPISODE OF RECURRENT MAJOR DEPRESSIVE DISORDER, WITHOUT PSYCHOTIC FEATURES (HCC): Primary | ICD-10-CM

## 2022-10-18 DIAGNOSIS — R06.02 SHORT OF BREATH ON EXERTION: Primary | ICD-10-CM

## 2022-10-18 PROCEDURE — 99214 OFFICE O/P EST MOD 30 MIN: CPT | Performed by: PSYCHIATRY & NEUROLOGY

## 2022-10-18 RX ORDER — DESVENLAFAXINE 50 MG/1
TABLET, EXTENDED RELEASE ORAL
Qty: 30 TABLET | Refills: 3 | Status: SHIPPED | OUTPATIENT
Start: 2022-10-18 | End: 2022-10-19 | Stop reason: SDUPTHER

## 2022-10-18 RX ORDER — CHOLECALCIFEROL (VITAMIN D3) 1250 MCG
CAPSULE ORAL
Qty: 12 CAPSULE | Refills: 3 | OUTPATIENT
Start: 2022-10-18

## 2022-10-18 ASSESSMENT — ANXIETY QUESTIONNAIRES
1. FEELING NERVOUS, ANXIOUS, OR ON EDGE: 3
4. TROUBLE RELAXING: 3
GAD7 TOTAL SCORE: 21
6. BECOMING EASILY ANNOYED OR IRRITABLE: 3
7. FEELING AFRAID AS IF SOMETHING AWFUL MIGHT HAPPEN: 3
5. BEING SO RESTLESS THAT IT IS HARD TO SIT STILL: 3
3. WORRYING TOO MUCH ABOUT DIFFERENT THINGS: 3
2. NOT BEING ABLE TO STOP OR CONTROL WORRYING: 3
IF YOU CHECKED OFF ANY PROBLEMS ON THIS QUESTIONNAIRE, HOW DIFFICULT HAVE THESE PROBLEMS MADE IT FOR YOU TO DO YOUR WORK, TAKE CARE OF THINGS AT HOME, OR GET ALONG WITH OTHER PEOPLE: VERY DIFFICULT

## 2022-10-18 ASSESSMENT — PATIENT HEALTH QUESTIONNAIRE - PHQ9
10. IF YOU CHECKED OFF ANY PROBLEMS, HOW DIFFICULT HAVE THESE PROBLEMS MADE IT FOR YOU TO DO YOUR WORK, TAKE CARE OF THINGS AT HOME, OR GET ALONG WITH OTHER PEOPLE: 2
4. FEELING TIRED OR HAVING LITTLE ENERGY: 3
SUM OF ALL RESPONSES TO PHQ QUESTIONS 1-9: 24
5. POOR APPETITE OR OVEREATING: 3
2. FEELING DOWN, DEPRESSED OR HOPELESS: 3
9. THOUGHTS THAT YOU WOULD BE BETTER OFF DEAD, OR OF HURTING YOURSELF: 0
8. MOVING OR SPEAKING SO SLOWLY THAT OTHER PEOPLE COULD HAVE NOTICED. OR THE OPPOSITE, BEING SO FIGETY OR RESTLESS THAT YOU HAVE BEEN MOVING AROUND A LOT MORE THAN USUAL: 3
7. TROUBLE CONCENTRATING ON THINGS, SUCH AS READING THE NEWSPAPER OR WATCHING TELEVISION: 3
SUM OF ALL RESPONSES TO PHQ9 QUESTIONS 1 & 2: 6
3. TROUBLE FALLING OR STAYING ASLEEP: 3
SUM OF ALL RESPONSES TO PHQ QUESTIONS 1-9: 24
1. LITTLE INTEREST OR PLEASURE IN DOING THINGS: 3
6. FEELING BAD ABOUT YOURSELF - OR THAT YOU ARE A FAILURE OR HAVE LET YOURSELF OR YOUR FAMILY DOWN: 3
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
## 2022-10-19 ENCOUNTER — TELEPHONE (OUTPATIENT)
Dept: BEHAVIORAL/MENTAL HEALTH CLINIC | Age: 52
End: 2022-10-19

## 2022-10-19 RX ORDER — DESVENLAFAXINE 50 MG/1
TABLET, EXTENDED RELEASE ORAL
Qty: 30 TABLET | Refills: 3 | Status: SHIPPED | OUTPATIENT
Start: 2022-10-19 | End: 2022-11-04 | Stop reason: ALTCHOICE

## 2022-10-19 NOTE — TELEPHONE ENCOUNTER
----- Message from Candida Cortez sent at 10/18/2022  6:08 PM EDT -----  Regarding: New medication   Hello  [facility] If so it may be a week before I get it and impact my appointment. Other option to call in to [facility] for just 30 day supply.

## 2022-10-19 NOTE — TELEPHONE ENCOUNTER
Patient sent the following message regarding the forms that we have submitted to the [facility].    ----- Message from Candida Cortez sent at 10/19/2022  3:48 PM EDT -----  Regarding: [facility] . They will not send another form. They just needs the notes from visits to be sent to them.

## 2022-10-19 NOTE — TELEPHONE ENCOUNTER
Patient would like the new medication sent to [facility] if possible instead of [facility].

## 2022-10-24 ENCOUNTER — HOSPITAL ENCOUNTER (OUTPATIENT)
Dept: PHYSICAL THERAPY | Age: 52
Setting detail: RECURRING SERIES
Discharge: HOME | End: 2022-10-27
Payer: COMMERCIAL

## 2022-10-24 PROCEDURE — 97110 THERAPEUTIC EXERCISES: CPT

## 2022-10-24 NOTE — PROGRESS NOTES
Marga Boeck  : 1970  Primary: [address]  Secondary:  [address] [facility] [address]  Phone: [phone]  Fax: [phone] Plan Frequency: 2x a week for 6-8 weeks    Plan of Care/Certification Expiration Date: 22      PT Visit Info: Total # of Visits to Date: 6  Progress Note Counter: 1     Visit Count:  13   OUTPATIENT PHYSICAL THERAPY:OP NOTE TYPE: Treatment Note 10/24/2022       Episode  }Appt Desk             Treatment Diagnosis:     Pain in Right Shoulder (M25.511)  Stiffness of Right Shoulder, Not elsewhere classified (M25.611)  Medical/Referring Diagnosis:  Neck pain [M54.2]  Referring Physician:  Escobar Ramsey MD MD Orders:  PT Eval and Treat   Date of Onset:  Onset Date: 22     Allergies:   Furosemide, Sulfa antibiotics, and Other  Restrictions/Precautions:  Restrictions/Precautions: None  No data recorded   Interventions Planned (Treatment may consist of any combination of the following):    Current Treatment Recommendations: Strengthening; ROM; Functional mobility training; ADL/Self-care training; Endurance training; Neuromuscular re-education; Manual Therapy - Soft Tissue Mobilization; Pain management; Return to work related activity; Home exercise program; Safety education & training; Patient/Caregiver education & training; Modalities; Therapeutic activities     Subjective Comments: Pt was late to therapy but still wanted to get in her workout     Initial:}     10Post Session:        /10  Medications Last Reviewed:  10/24/2022  Updated Objective Findings:  See evaluation note from today  Treatment     THERAPEUTIC EXERCISE: (40 minutes):    Exercises per grid below to improve mobility, strength, balance, and coordination. Progressed resistance and repetitions as indicated.      Date:  2022 Date:  2022 Date  10/3/2022 Date  10/11/2022 Date  10/17/2022 Date  10/24/2022   Activity/Exercise Parameters Parameters       education         nustep     6 min hill functional  lvl 5 at end of workout to reduce soreness    UBE 6 min 6 min 6 min  6 min 6 min 3 min   NUstep      6 min    Ring stretch 10xs 10xs 10xs 10xs 10xs 10xs   L stretch         Wall angels   10xs      PVC stretching Circles, abduction, twisting, shoulder flexion   Pec stretch  Dowel cliff      rows 27lbs 3x10 30lbs 3x10 33lbs 3x10  37lbs 3x10 40lbs 3x10   Mid rows    13lbs each side 3x10 Red band 30xs Red band 10 xs each   lats 30lbs 3x10 37lbs 3x10 40lbs 3x10  43lbs 3x10 47lbs 3x10   Banded 7 Yellow band 25xs Yellow band 25xs Yellow band 25xs Red 10xs  Red 10 xs   SLED  75lbs 1 laps  100lbs 2 laps  2 laps 100lbs   2 laps 100lbs  2 laps 100lbs   2 laps 100lbs  3 lap 125   TRX  30xs   25xs body weight rows 30xs body weight rows 30xs body weight rows   Overhead press     8lbs 1x10   8lbs 2x10   Andrade carries 10xs 4 laps 2 laps at 10lbs  3 laps at 15lbs 4 laps 15lbs  3 laps with 15lbs  1 lap with 20lbs  3 laps with 20lbs  3 laps 20lbs        THERAPEUTIC ACTIVITY: ( 0 minutes): Therapeutic activities per grid below to improve mobility, strength, coordination, and dynamic movement to improve functional lifting, carrying, reaching, catching, and overhead activities. Date:  10/24/2022 Date:   Date:     Activity/Exercise Parameters Parameters Parameters                                                 MANUAL THERAPY: (0 minutes):   Joint mobilization, Soft tissue mobilization, and Manipulation was utilized and necessary because of the patient's restricted joint motion, painful spasm, loss of articular motion, and restricted motion of soft tissue.               Date  10/24/2022      Technique Used Grade Level # Time(s) Effect while being performed                                                                                         HEP Log Date        2.     3.    4.     5.        POC    Recertification Expiration Date      Plan of Care/Certification Expiration Date: 11/16/22     Visit Count  13    Number of Allowed Visits              Treatment/Session Summary:      Treatment Assessment:     Pt continued with strengthening and able to get through exercises with less rest despite late start. Able to progress with exercises and heavier resistance. Communication/Consultation:       None today   Equipment provided today:  None   Recommendations/Intent for next  treatment session:  Next visit will focus on shoulder strengthening.        Total Treatment Billable Duration:  45 minutes  Time In: 5767  Time Out: 0900    James Miner, PT       Charge Capture  }Post Session Pain  PT Visit Info  [facility] Portal  MD Guidelines  Scanned Media  Benefits  [employer]    Future Appointments   Date Time Provider Aleah Kary   10/26/2022 10:15 AM Anita Max MD [facility]   11/8/2022 11:20 AM Unknown MD Elida [id number]   11/17/2022  3:00 PM Chavo Jorgensen MD [facility]   11/25/2022  1:30 PM Alix Cisneros, APRN - CNP [facility]   12/15/2022  3:00 PM Unknown MD Elida [id number]   4/19/2023 10:00 AM Yennifer Chatman DO BS [facility]

## 2022-10-24 NOTE — PROGRESS NOTES
Madhuri Ronda  : 1970  Primary: Demetrius Paredes  Secondary:  Tyrel Dickson @ [address] [place] [id number]  Phone: [phone]  Fax: [phone] Plan Frequency: 2x a week for 6-8 weeks    Plan of Care/Certification Expiration Date: 22      PT Visit Info: Total # of Visits to Date: 6  Progress Note Counter: 1     Visit Count:  13   OUTPATIENT PHYSICAL THERAPY:OP NOTE TYPE: Treatment Note 10/24/2022       Episode  }Appt Desk             Treatment Diagnosis:     Pain in Right Shoulder (M25.511)  Stiffness of Right Shoulder, Not elsewhere classified (M25.611)  Medical/Referring Diagnosis:  Neck pain [M54.2]  Referring Physician:  Joaquin Nelson MD MD Orders:  PT Eval and Treat   Date of Onset:  Onset Date: 22     Allergies:   Furosemide, Sulfa antibiotics, and Other  Restrictions/Precautions:  Restrictions/Precautions: None  No data recorded   Interventions Planned (Treatment may consist of any combination of the following):    Current Treatment Recommendations: Strengthening; ROM; Functional mobility training; ADL/Self-care training; Endurance training; Neuromuscular re-education; Manual Therapy - Soft Tissue Mobilization; Pain management; Return to work related activity; Home exercise program; Safety education & training; Patient/Caregiver education & training; Modalities; Therapeutic activities     Subjective Comments: Pt was late to therapy but still wanted to get in her workout     Initial:}     10Post Session:        /10  Medications Last Reviewed:  10/24/2022  Updated Objective Findings:  See evaluation note from today  Treatment     THERAPEUTIC EXERCISE: (40 minutes):    Exercises per grid below to improve mobility, strength, balance, and coordination. Progressed resistance and repetitions as indicated.      Date:  2022 Date:  2022 Date  10/3/2022 Date  10/11/2022 Date  10/17/2022 Date  10/24/2022   Activity/Exercise Parameters Parameters       education         nustep     6 min hill functional  lvl 5 at end of workout to reduce soreness    UBE 6 min 6 min 6 min  6 min 6 min 3 min   NUstep      6 min    Ring stretch 10xs 10xs 10xs 10xs 10xs 10xs   L stretch         Wall angels   10xs      PVC stretching Circles, abduction, twisting, shoulder flexion   Pec stretch  Dowel cliff      rows 27lbs 3x10 30lbs 3x10 33lbs 3x10  37lbs 3x10 40lbs 3x10   Mid rows    13lbs each side 3x10 Red band 30xs Red band 10 xs each   lats 30lbs 3x10 37lbs 3x10 40lbs 3x10  43lbs 3x10 47lbs 3x10   Banded 7 Yellow band 25xs Yellow band 25xs Yellow band 25xs Red 10xs  Red 10 xs   SLED  75lbs 1 laps  100lbs 2 laps  2 laps 100lbs   2 laps 100lbs  2 laps 100lbs   2 laps 100lbs  3 lap 125   TRX  30xs   25xs body weight rows 30xs body weight rows 30xs body weight rows   Overhead press     8lbs 1x10   8lbs 2x10   Andrade carries 10xs 4 laps 2 laps at 10lbs  3 laps at 15lbs 4 laps 15lbs  3 laps with 15lbs  1 lap with 20lbs  3 laps with 20lbs  3 laps 20lbs        THERAPEUTIC ACTIVITY: ( 0 minutes): Therapeutic activities per grid below to improve mobility, strength, coordination, and dynamic movement to improve functional lifting, carrying, reaching, catching, and overhead activities. Date:  10/24/2022 Date:   Date:     Activity/Exercise Parameters Parameters Parameters                                                 MANUAL THERAPY: (0 minutes):   Joint mobilization, Soft tissue mobilization, and Manipulation was utilized and necessary because of the patient's restricted joint motion, painful spasm, loss of articular motion, and restricted motion of soft tissue.               Date  10/24/2022      Technique Used Grade Level # Time(s) Effect while being performed                                                                                         HEP Log Date        2.     3.    4.     5.        POC    Recertification Expiration Date      Plan of Care/Certification Expiration Date: 11/16/22     Visit Count  13    Number of Allowed Visits              Treatment/Session Summary:      Treatment Assessment:     Pt continued with strengthening and able to get through exercises with less rest despite late start. Able to progress with exercises and heavier resistance. Communication/Consultation:       None today   Equipment provided today:  None   Recommendations/Intent for next  treatment session:  Next visit will focus on shoulder strengthening.        Total Treatment Billable Duration:  45 minutes  Time In: 8688  Time Out: 0900    Jaspreet Harness, PT       Charge Capture  }Post Session Pain  PT Visit Info  [facility]  MD Guidelines  Scanned Media  Benefits  Tap[employer]art    Future Appointments   Date Time Provider Aleah Preston   10/26/2022 10:15 AM Angeles Aguero MD [facility]   11/8/2022 11:20 AM Kanchan Ernst MD [id number]   11/17/2022  3:00 PM Yvon Kennedy MD [facility] AM[facility]   11/25/2022  1:30 PM Alix Livingston, APRN - CNP [facility]   12/15/2022  3:00 PM Kanchan Ernst MD [facility]   4/19/2023 10:00 AM Yennifer Chatman DO [facility]

## 2022-10-25 ENCOUNTER — PATIENT MESSAGE (OUTPATIENT)
Dept: INTERNAL MEDICINE CLINIC | Facility: CLINIC | Age: 52
End: 2022-10-25

## 2022-10-25 DIAGNOSIS — E55.9 VITAMIN D DEFICIENCY: Primary | ICD-10-CM

## 2022-10-25 NOTE — TELEPHONE ENCOUNTER
From: Royer Reyes  To: Lo Burch  Sent: 10/25/2022 11:13 AM EDT  Subject: Vitamin D3    Hello  Will you [facility].   [facility] in [facility] was to contact doctor

## 2022-10-26 ENCOUNTER — OFFICE VISIT (OUTPATIENT)
Dept: CARDIOLOGY CLINIC | Age: 52
End: 2022-10-26
Payer: COMMERCIAL

## 2022-10-26 VITALS
HEART RATE: 96 BPM | WEIGHT: 193 LBS | SYSTOLIC BLOOD PRESSURE: 132 MMHG | DIASTOLIC BLOOD PRESSURE: 90 MMHG | HEIGHT: 60 IN | BODY MASS INDEX: 37.89 KG/M2

## 2022-10-26 DIAGNOSIS — I95.1 ORTHOSTATIC HYPOTENSION: Primary | ICD-10-CM

## 2022-10-26 DIAGNOSIS — I10 ESSENTIAL HYPERTENSION: ICD-10-CM

## 2022-10-26 DIAGNOSIS — R00.2 PALPITATIONS: ICD-10-CM

## 2022-10-26 PROCEDURE — 3074F SYST BP LT 130 MM HG: CPT | Performed by: INTERNAL MEDICINE

## 2022-10-26 PROCEDURE — 3078F DIAST BP <80 MM HG: CPT | Performed by: INTERNAL MEDICINE

## 2022-10-26 PROCEDURE — 99213 OFFICE O/P EST LOW 20 MIN: CPT | Performed by: INTERNAL MEDICINE

## 2022-10-26 RX ORDER — ZOSTER VACCINE RECOMBINANT, ADJUVANTED 50 MCG/0.5
0.5 KIT INTRAMUSCULAR SEE ADMIN INSTRUCTIONS
Qty: 0.5 ML | Refills: 0 | Status: SHIPPED | OUTPATIENT
Start: 2022-10-26 | End: 2023-04-24

## 2022-10-26 ASSESSMENT — ENCOUNTER SYMPTOMS: BACK PAIN: 1

## 2022-10-26 NOTE — PROGRESS NOTES
[facility] CARDIOLOGY  [address], [address]  PHONE: [phone]      10/26/22    NAME:  Briana Clark  : 1970  MRN: [id number]         SUBJECTIVE:   Briana Clark is a 46 y.o. female seen for a follow up visit regarding the following:     Chief Complaint   Patient presents with    Palpitations    Results     Monitor and stress echo            HPI:  Follow up  Palpitations and Results (Monitor and stress echo)   . Follow up orthostatic hypotension. She complains of ringing in her ears, denies any more dizziness. Exercising some now, doing a variety like floor exercises, resistance and cardio. She attributes her palpitations to stress and pain, citing significant amounts of each, but to her credit she is exercising and doing some deep breathing. Her BP dropped 20 points with a few moments of deep breathing in office today. She notes the orthostatic symptoms resolved on half dose hctz. PAST CARDIAC HISTORY:  May 2022 - 24 hr monitor - normal tracing, no diary entries  Normal TMET, [facility] TMS + 6, low risk  Normal LE venous doppler  Oct 2022        Normal monitor symptoms of feeling anxious with SR/low grade ST        Stress echo - normal at 7 and a half minutes exercise. Normal hemodynamic response            Key CAD CHF Meds            hydroCHLOROthiazide (HYDRODIURIL) 25 MG tablet (Taking)    Class: Historical Med              Past Medical History, Past Surgical History, Family history, Social History, and Medications were all reviewed with the patient today and updated as necessary. Prior to Admission medications    Medication Sig Start Date End Date Taking?  Authorizing Provider   vitamin D (CHOLECALCIFEROL) 94797 UNIT CAPS Take 1 capsule by mouth every 7 days 10/26/22  Yes Alix Sevilla Signs, APRN - CNP   zoster recombinant adjuvanted vaccine [facility]) 50 MCG/0.5ML SUSR injection Inject 0.5 mLs into the muscle See Admin Instructions 1 dose now and repeat in 2-6 months 10/26/22 4/24/23 Yes BRANDON Mcknight CNP   desvenlafaxine succinate (PRISTIQ) 50 MG TB24 extended release tablet Take 50 mg every other day in the morning for 2 weeks then 50 mg once daily in the morning 10/19/22  Yes Kanchan Ernst MD   cyclobenzaprine (AMRIX) 15 MG extended release capsule Take 15 mg by mouth daily as needed 10/12/22  Yes Historical Provider, MD   pramipexole (MIRAPEX) 0.5 MG tablet Take 1 tablet by mouth at bedtime 10/14/22  Yes BRANDON Mcknight CNP   hydroCHLOROthiazide (HYDRODIURIL) 25 MG tablet Take 12.5 mg by mouth daily   Yes Historical Provider, MD   cyanocobalamin 1000 MCG tablet Take 1 tablet by mouth daily 9/28/22  Yes BRANDON Mcknight CNP   pregabalin (LYRICA) 200 MG capsule Take 200 mg by mouth 2 times daily.  9/16/22  Yes Historical Provider, MD   Belimumab (BENLYSTA) 200 MG/ML SOAJ Inject 200 mg into the skin every 7 days 9/13/22  Yes Historical Provider, MD   hydroxychloroquine (PLAQUENIL) 200 MG tablet Take 1 tablet by mouth daily 8/26/22  Yes Historical Provider, MD ROWDY Conwayho 56 Take by mouth   Yes Historical Provider, MD   methylfolate (DEPLIN) 7.5 MG TABS tablet Take 1 tablet by mouth in the morning. 7/28/22  Yes Kanchan Ernst MD   traZODone (DESYREL) 50 MG tablet Take 0.5-1 tablets by mouth nightly 7/13/22  Yes Kanchan Ernst MD   atomoxetine (STRATTERA) 80 MG capsule Take 1 capsule by mouth daily 7/13/22  Yes Kanchan Ernst MD   Wheat Dextrin (BENEFIBER PO)  1/1/22  Yes Historical Provider, MD Domínguez Octave 72 MCG CAPS capsule Take 1 capsule by mouth daily 4/10/22  Yes Historical Provider, MD   naproxen (NAPROSYN) 500 MG tablet Take 1 tablet by mouth as needed   Yes Historical Provider, MD   omeprazole (PRILOSEC) 40 MG delayed release capsule Take 1 capsule (40 mg) by mouth 2 (two) times a day 30 mins before breakfast and 30 mins before dinner 4/19/22  Yes Historical Provider, MD   cod liver oil CAPS Take 1 capsule by mouth daily   Yes Historical Provider, MD   Ubrogepant (UBRELVY) 50 MG TABS Take 1 tablet at the onset of migraine, may repeat in 2 hours. Do not take more then 3 days a week. 6/3/22  Yes Timothy De Guzman MD   amitriptyline (ELAVIL) 25 MG tablet TAKE 1 TO 2 TABLETS BY MOUTH EVERY EVENING 12/16/21  Yes Ar Automatic Reconciliation   Benzoyl Peroxide 2.5 % GEL APPLY TOPICALLY TO FACE AT BEDTIME 8/30/21  Yes Ar Automatic Reconciliation   diclofenac sodium (VOLTAREN) 1 % GEL Apply 1 g topically as needed 12/27/21  Yes Ar Automatic Reconciliation   Lidocaine, Anorectal, 5 % CREA Actual prescription: Lidocaine 5%: Neurontin/gabapentin 5% combined topical cream/gelApply to affected area up to 4 times a day as needed for pain 1/27/22  Yes Ar Automatic Reconciliation   nystatin-triamcinolone (MYCOLOG II) 533854-0.1 UNIT/GM-% cream Apply topically 2 times daily as needed 8/31/21  Yes Ar Automatic Reconciliation   vitamin E 600 units capsule Take by mouth daily   Yes Ar Automatic Reconciliation   clonazePAM (KLONOPIN) 0.5 MG tablet Take 1 tablet by mouth daily as needed for Anxiety for up to 90 days.   Patient not taking: Reported on 10/26/2022 7/13/22 10/18/22  Ginger Levy MD     Allergies   Allergen Reactions    Furosemide Other (See Comments)     Edema of lower extremities      Sulfa Antibiotics Other (See Comments) and Rash     GI Upset    Other Other (See Comments)     Oral steroids     Past Medical History:   Diagnosis Date    Anxiety     Chronic pain     DDD (degenerative disc disease), cervical 11/29/2021    C3-C7 Disc Degeneration w/ Posterior Spondylotic Disc Displaceemnts per MRI    Depressed     GERD (gastroesophageal reflux disease)     Headache     Hypertension     IBS (irritable bowel syndrome)     Insomnia     Neuropathy     Panic attack     Sleep difficulties     TMJ (dislocation of temporomandibular joint) 04/2021     Past Surgical History:   Procedure Laterality Date    CATARACT REMOVAL Bilateral 02/2020 COLONOSCOPY      EGD DELIVER THERMAL ENERGY SPHNCTR/CARDIA GERD      EYE SURGERY      LIPECTOMY      TUBAL LIGATION       Family History   Problem Relation Age of Onset    Heart Disease Father     Hypertension Father     High Cholesterol Mother     GERD Mother      Social History     Tobacco Use    Smoking status: Never    Smokeless tobacco: Never   Substance Use Topics    Alcohol use: Never       ROS:    Review of Systems   Constitutional: Positive for malaise/fatigue. HENT:  Positive for tinnitus. Musculoskeletal:  Positive for back pain and joint pain. Neurological:  Positive for headaches and light-headedness. PHYSICAL EXAM:   BP (!) 132/90   Pulse 96   Ht 5' (1.524 m)   Wt 193 lb (87.5 kg)   BMI 37.69 kg/m²      Wt Readings from Last 3 Encounters:   10/26/22 193 lb (87.5 kg)   10/21/22 194 lb (88 kg)   10/18/22 193 lb 6.4 oz (87.7 kg)     BP Readings from Last 3 Encounters:   10/26/22 (!) 132/90   10/21/22 125/80   10/18/22 (!) 146/90     Pulse Readings from Last 3 Encounters:   10/26/22 96   10/21/22 92   10/18/22 97           Physical Exam  Vitals reviewed. Medical problems and test results were reviewed with the patient today.      DATA REVIEW    LIPID PANEL     Lab Results   Component Value Date    CHOL 129 10/07/2022    CHOL 118 10/12/2021    CHOL 166 06/25/2020     Lab Results   Component Value Date    TRIG 77 10/07/2022    TRIG 52 10/12/2021    TRIG 109 06/25/2020     Lab Results   Component Value Date    HDL 48 10/07/2022    HDL 32 (L) 10/12/2021    HDL 53 06/25/2020     Lab Results   Component Value Date    LDLCALC 65.6 10/07/2022    LDLCALC 74 10/12/2021    LDLCALC 91 06/25/2020     Lab Results   Component Value Date    LABVLDL 15.4 10/07/2022    LABVLDL 22 06/25/2020    LABVLDL 18 11/15/2019    VLDL 12 10/12/2021     Lab Results   Component Value Date    CHOLHDLRATIO 2.7 10/07/2022       BMP  Lab Results   Component Value Date/Time     10/07/2022 09:07 AM    K 3.9 10/07/2022 09:07 AM     10/07/2022 09:07 AM    CO2 31 10/07/2022 09:07 AM    BUN 13 10/07/2022 09:07 AM    CREATININE 1.00 10/07/2022 09:07 AM    GLUCOSE 75 10/07/2022 09:07 AM    CALCIUM 9.0 10/07/2022 09:07 AM          EKG        CXR/IMAGING        DEVICE INTERROGATION        OUTSIDE RECORDS REVIEW    Records from outside providers have been reviewed and summarized as noted in the HPI, past history and data review sections of this note, and reviewed with patient. .       ASSESSMENT and PLAN    Bisi Gómez was seen today for palpitations and results. Diagnoses and all orders for this visit:    Orthostatic hypotension    Palpitations    Essential hypertension        IMPRESSION:    Normal monitor with palpitations, patient associates the feeling with stress and pain and is reassured no rhythm disturbance. BP remains mildly elevated but her orthostatic symptoms resolved on lower dose hctz Additionally, her BP here in office dropped nearly 20 points with a few moments of deep breathing, again recently confirmed in a large study to lower BP on the order of some medications when practiced regularly. Encouraged to add this to her exercise routine and congratulated ongoing efforts. Recommend staying the course for now. Should she require additional therapy would utilize a low dose BB or rate limiting CCB to counteract some of the stimulant medications she has to take for other issues. , and would do so very gradually     While orthostatic symptoms resolved she continues to have tinnitus and vertiginous symptoms, will be seeing her neurologist soon        Return if symptoms worsen or fail to improve. Thank you for allowing me to participate in this patient's care. Please call or contact me if there are any questions or concerns regarding the above.       Yusuf Meehan MD  10/26/22  10:34 AM

## 2022-10-27 ENCOUNTER — TELEPHONE (OUTPATIENT)
Dept: BEHAVIORAL/MENTAL HEALTH CLINIC | Age: 52
End: 2022-10-27

## 2022-10-27 NOTE — TELEPHONE ENCOUNTER
----- Message from Candida Cortez sent at 10/27/2022  2:05 PM EDT -----  Regarding: Scottie Salguero  having real bad shakes and nervous since starting new medication.

## 2022-11-02 ENCOUNTER — TELEMEDICINE (OUTPATIENT)
Dept: INTERNAL MEDICINE CLINIC | Facility: CLINIC | Age: 52
End: 2022-11-02
Payer: COMMERCIAL

## 2022-11-02 DIAGNOSIS — R42 VERTIGO: Primary | ICD-10-CM

## 2022-11-02 PROCEDURE — 99213 OFFICE O/P EST LOW 20 MIN: CPT | Performed by: INTERNAL MEDICINE

## 2022-11-02 RX ORDER — MECLIZINE HCL 12.5 MG/1
12.5 TABLET ORAL 3 TIMES DAILY PRN
Qty: 30 TABLET | Refills: 2 | Status: SHIPPED | OUTPATIENT
Start: 2022-11-02 | End: 2022-11-12

## 2022-11-02 ASSESSMENT — ENCOUNTER SYMPTOMS: RESPIRATORY NEGATIVE: 1

## 2022-11-02 NOTE — PROGRESS NOTES
[facility]      2022    Patient Name: Orly Ding  :  1970    Subjective:    Chief Complaint:  Chief Complaint   Patient presents with    Dizziness         HPI I was in the office while conducting this encounter. Consent:  She and/or her healthcare decision maker is aware that this patient-initiated Telehealth encounter is a billable service, with coverage as determined by her insurance carrier. She is aware that she may receive a bill and has provided verbal consent to proceed: Yes    This virtual visit was conducted via Music Messenger (MM). Pursuant to the emergency declaration under the [facility] waiver authority and the [employer] Act, this Virtual  Visit was conducted to reduce the patient's risk of exposure to COVID-19 and provide continuity of care for an established patient. Services were provided through a video synchronous discussion virtually to substitute for in-person clinic visit. Due to this being a TeleHealth evaluation, many elements of the physical examination are unable to be assessed. Total Time: minutes: 5-10 minutes.        C/o 1 day history of spinning sensation last night when lying down with nausea; she had similar symptoms today when she bent over to  something; she has spinning, swaying sensation when she turns her head to the side or closes her eyes; she thought it may be the start of a migraine, took [place], and rested for about 1 hour, and has had some relief; she denies fevers, chills, cough, runny nose; she has upcoming appointment with Neurologist; she is trying to drink more water and stay hydrated;     Past Medical History:   Diagnosis Date    Anxiety     Chronic pain     DDD (degenerative disc disease), cervical 2021    C3-C7 Disc Degeneration w/ Posterior Spondylotic Disc Displaceemnts per MRI    Depressed     GERD (gastroesophageal reflux disease)     Headache     Hypertension     IBS (irritable bowel syndrome)     Insomnia     Neuropathy     Panic attack     Sleep difficulties     TMJ (dislocation of temporomandibular joint) 04/2021       Past Surgical History:   Procedure Laterality Date    CATARACT REMOVAL Bilateral 02/2020    COLONOSCOPY      EGD DELIVER THERMAL ENERGY SPHNCTR/CARDIA GERD      EYE SURGERY      LIPECTOMY      TUBAL LIGATION         Family History   Problem Relation Age of Onset    Heart Disease Father     Hypertension Father     High Cholesterol Mother     GERD Mother        Social History     Tobacco Use    Smoking status: Never    Smokeless tobacco: Never   Vaping Use    Vaping Use: Never used   Substance Use Topics    Alcohol use: Never    Drug use: Never                 Current Outpatient Medications:     meclizine (ANTIVERT) 12.5 MG tablet, Take 1 tablet by mouth 3 times daily as needed for Dizziness or Nausea, Disp: 30 tablet, Rfl: 2    vitamin D (CHOLECALCIFEROL) 48714 UNIT CAPS, Take 1 capsule by mouth every 7 days, Disp: 12 capsule, Rfl: 3    zoster recombinant adjuvanted vaccine (SHINGRIX) 50 MCG/0.5ML SUSR injection, Inject 0.5 mLs into the muscle See Admin Instructions 1 dose now and repeat in 2-6 months, Disp: 0.5 mL, Rfl: 0    desvenlafaxine succinate (PRISTIQ) 50 MG TB24 extended release tablet, Take 50 mg every other day in the morning for 2 weeks then 50 mg once daily in the morning, Disp: 30 tablet, Rfl: 3    cyclobenzaprine (AMRIX) 15 MG extended release capsule, Take 15 mg by mouth daily as needed, Disp: , Rfl:     pramipexole (MIRAPEX) 0.5 MG tablet, Take 1 tablet by mouth at bedtime, Disp: 90 tablet, Rfl: 1    hydroCHLOROthiazide (HYDRODIURIL) 25 MG tablet, Take 12.5 mg by mouth daily, Disp: , Rfl:     cyanocobalamin 1000 MCG tablet, Take 1 tablet by mouth daily, Disp: 90 tablet, Rfl: 3    pregabalin (LYRICA) 200 MG capsule, Take 200 mg by mouth 2 times daily. , Disp: , Rfl:     Belimumab (BENLYSTA) 200 MG/ML SOAJ, Inject 200 mg into the skin every 7 days, Disp: , Rfl:     hydroxychloroquine (PLAQUENIL) 200 MG tablet, Take 1 tablet by mouth daily, Disp: , Rfl:     EVENING PRIMROSE OIL PO, Take by mouth, Disp: , Rfl:     methylfolate (DEPLIN) 7.5 MG TABS tablet, Take 1 tablet by mouth in the morning., Disp: 30 tablet, Rfl: 5    clonazePAM (KLONOPIN) 0.5 MG tablet, Take 1 tablet by mouth daily as needed for Anxiety for up to 90 days. (Patient not taking: Reported on 10/26/2022), Disp: 30 tablet, Rfl: 2    traZODone (DESYREL) 50 MG tablet, Take 0.5-1 tablets by mouth nightly, Disp: 180 tablet, Rfl: 1    atomoxetine (STRATTERA) 80 MG capsule, Take 1 capsule by mouth daily, Disp: 90 capsule, Rfl: 1    Wheat Dextrin (BENEFIBER PO), , Disp: , Rfl:     LINZESS 72 MCG CAPS capsule, Take 1 capsule by mouth daily, Disp: , Rfl:     naproxen (NAPROSYN) 500 MG tablet, Take 1 tablet by mouth as needed, Disp: , Rfl:     omeprazole (PRILOSEC) 40 MG delayed release capsule, Take 1 capsule (40 mg) by mouth 2 (two) times a day 30 mins before breakfast and 30 mins before dinner, Disp: , Rfl:     cod liver oil CAPS, Take 1 capsule by mouth daily, Disp: , Rfl:     Ubrogepant (UBRELVY) 50 MG TABS, Take 1 tablet at the onset of migraine, may repeat in 2 hours.  Do not take more then 3 days a week., Disp: 16 tablet, Rfl: 11    amitriptyline (ELAVIL) 25 MG tablet, TAKE 1 TO 2 TABLETS BY MOUTH EVERY EVENING, Disp: , Rfl:     Benzoyl Peroxide 2.5 % GEL, APPLY TOPICALLY TO FACE AT BEDTIME, Disp: , Rfl:     diclofenac sodium (VOLTAREN) 1 % GEL, Apply 1 g topically as needed, Disp: , Rfl:     Lidocaine, Anorectal, 5 % CREA, Actual prescription: Lidocaine 5%: Neurontin/gabapentin 5% combined topical cream/gelApply to affected area up to 4 times a day as needed for pain, Disp: , Rfl:     nystatin-triamcinolone (MYCOLOG II) 824157-3.1 UNIT/GM-% cream, Apply topically 2 times daily as needed, Disp: , Rfl:     vitamin E 600 units capsule, Take by mouth daily, Disp: , Rfl:     Allergies   Allergen Reactions    Furosemide Other (See Comments)     Edema of lower extremities      Sulfa Antibiotics Other (See Comments) and Rash     GI Upset    Other Other (See Comments)     Oral steroids       Review of Systems   Constitutional: Negative. HENT: Negative. Respiratory: Negative. Neurological:  Positive for dizziness, light-headedness and headaches. Objective: There were no vitals taken for this visit. Examination:  Physical Exam  Neurological:      Mental Status: She is alert. Psychiatric:         Mood and Affect: Mood normal.         Thought Content: Thought content normal.         Judgment: Judgment normal.         Assessment/Plan:    Tamara Figueroa was seen today for dizziness. Diagnoses and all orders for this visit:    Vertigo  Instructed to take medications as prescribed, and to call if no improvement in symptoms. Recommended adequate hydration, drinking 6-8 glasses of water daily;     -     meclizine (ANTIVERT) 12.5 MG tablet; Take 1 tablet by mouth 3 times daily as needed for Dizziness or Nausea        Follow-up and Dispositions    Return if symptoms worsen or fail to improve. Medication Reconciliation:  Current Medications Verified: Current medications/ immunizations were reviewed, including purpose, with patient. Family History, Social History, Current and Past Medical History was reviewed with patient and updated at today's office visit. Medication Reconciliation list was given to patient/ family. Patient was advised to discard old medication lists and provide all providers with current list at each visit and carry list with them in case of emergency.     Completed By:   Halima Novoa MD    [facility] [address], [address] [facility]  [phone]  [phone] fax  5:30 PM

## 2022-11-04 ENCOUNTER — TELEPHONE (OUTPATIENT)
Dept: BEHAVIORAL/MENTAL HEALTH CLINIC | Age: 52
End: 2022-11-04

## 2022-11-04 RX ORDER — CITALOPRAM 20 MG/1
20 TABLET ORAL EVERY EVENING
Qty: 30 TABLET | Refills: 3 | Status: SHIPPED | OUTPATIENT
Start: 2022-11-04

## 2022-11-04 NOTE — TELEPHONE ENCOUNTER
Message regarding Pristiq    ----- Message from Candida Cortez sent at 11/4/2022  7:32 AM EDT -----  Regarding: medication  I stopped because of how bad I felt. I went back to citalopram 20mg taking in the morning. And I am doing well with that.

## 2022-11-08 ENCOUNTER — OFFICE VISIT (OUTPATIENT)
Dept: BEHAVIORAL/MENTAL HEALTH CLINIC | Age: 52
End: 2022-11-08
Payer: COMMERCIAL

## 2022-11-08 VITALS
SYSTOLIC BLOOD PRESSURE: 132 MMHG | OXYGEN SATURATION: 98 % | HEART RATE: 102 BPM | TEMPERATURE: 97.6 F | WEIGHT: 192 LBS | HEIGHT: 60 IN | DIASTOLIC BLOOD PRESSURE: 82 MMHG | BODY MASS INDEX: 37.69 KG/M2

## 2022-11-08 DIAGNOSIS — F41.1 GENERALIZED ANXIETY DISORDER: ICD-10-CM

## 2022-11-08 DIAGNOSIS — F33.41 RECURRENT MAJOR DEPRESSIVE DISORDER, IN PARTIAL REMISSION (HCC): Primary | ICD-10-CM

## 2022-11-08 DIAGNOSIS — G89.4 CHRONIC PAIN DISORDER: ICD-10-CM

## 2022-11-08 DIAGNOSIS — F51.05 INSOMNIA DUE TO MENTAL DISORDER: ICD-10-CM

## 2022-11-08 PROCEDURE — 99214 OFFICE O/P EST MOD 30 MIN: CPT | Performed by: PSYCHIATRY & NEUROLOGY

## 2022-11-08 ASSESSMENT — PATIENT HEALTH QUESTIONNAIRE - PHQ9
7. TROUBLE CONCENTRATING ON THINGS, SUCH AS READING THE NEWSPAPER OR WATCHING TELEVISION: 1
SUM OF ALL RESPONSES TO PHQ QUESTIONS 1-9: 6
SUM OF ALL RESPONSES TO PHQ QUESTIONS 1-9: 6
9. THOUGHTS THAT YOU WOULD BE BETTER OFF DEAD, OR OF HURTING YOURSELF: 0
4. FEELING TIRED OR HAVING LITTLE ENERGY: 1
6. FEELING BAD ABOUT YOURSELF - OR THAT YOU ARE A FAILURE OR HAVE LET YOURSELF OR YOUR FAMILY DOWN: 0
8. MOVING OR SPEAKING SO SLOWLY THAT OTHER PEOPLE COULD HAVE NOTICED. OR THE OPPOSITE, BEING SO FIGETY OR RESTLESS THAT YOU HAVE BEEN MOVING AROUND A LOT MORE THAN USUAL: 1
SUM OF ALL RESPONSES TO PHQ QUESTIONS 1-9: 6
SUM OF ALL RESPONSES TO PHQ QUESTIONS 1-9: 6
1. LITTLE INTEREST OR PLEASURE IN DOING THINGS: 0
10. IF YOU CHECKED OFF ANY PROBLEMS, HOW DIFFICULT HAVE THESE PROBLEMS MADE IT FOR YOU TO DO YOUR WORK, TAKE CARE OF THINGS AT HOME, OR GET ALONG WITH OTHER PEOPLE: 0
2. FEELING DOWN, DEPRESSED OR HOPELESS: 1
3. TROUBLE FALLING OR STAYING ASLEEP: 1
SUM OF ALL RESPONSES TO PHQ9 QUESTIONS 1 & 2: 1
5. POOR APPETITE OR OVEREATING: 1

## 2022-11-08 ASSESSMENT — ANXIETY QUESTIONNAIRES
7. FEELING AFRAID AS IF SOMETHING AWFUL MIGHT HAPPEN: 0
4. TROUBLE RELAXING: 1
1. FEELING NERVOUS, ANXIOUS, OR ON EDGE: 1
5. BEING SO RESTLESS THAT IT IS HARD TO SIT STILL: 0
GAD7 TOTAL SCORE: 4
2. NOT BEING ABLE TO STOP OR CONTROL WORRYING: 0
IF YOU CHECKED OFF ANY PROBLEMS ON THIS QUESTIONNAIRE, HOW DIFFICULT HAVE THESE PROBLEMS MADE IT FOR YOU TO DO YOUR WORK, TAKE CARE OF THINGS AT HOME, OR GET ALONG WITH OTHER PEOPLE: NOT DIFFICULT AT ALL
3. WORRYING TOO MUCH ABOUT DIFFERENT THINGS: 1
6. BECOMING EASILY ANNOYED OR IRRITABLE: 1

## 2022-11-08 NOTE — PROGRESS NOTES
Patient:  Virgil Buchanan  Age:  46 y.o.  :  1970     SEX:  female MRN:  [id number]     RACE: Black /      SEEN:  [x]  PATIENT  []  SPOUSE []  OTHER:              PHQ-9  2022 10/18/2022 10/14/2022   Little interest or pleasure in doing things 0 3 3   Little interest or pleasure in doing things - - -   Feeling down, depressed, or hopeless 1 3 3   Trouble falling or staying asleep, or sleeping too much 1 3 3   Trouble falling or staying asleep, or sleeping too much - - -   Feeling tired or having little energy 1 3 3   Feeling tired or having little energy - - -   Poor appetite or overeating 1 3 2   Poor appetite, weight loss, or overeating - - -   Feeling bad about yourself - or that you are a failure or have let yourself or your family down 0 3 2   Feeling bad about yourself - or that you are a failure or have let yourself or your family down - - -   Trouble concentrating on things, such as reading the newspaper or watching television 1 3 3   Trouble concentrating on things such as school, work, reading, or watching TV - - -   Moving or speaking so slowly that other people could have noticed.  Or the opposite - being so fidgety or restless that you have been moving around a lot more than usual 1 3 3   Moving or speaking so slowly that other people could have noticed; or the opposite being so fidgety that others notice - - -   Thoughts that you would be better off dead, or of hurting yourself in some way 0 0 0   Thoughts of being better off dead, or hurting yourself in some way - - -   PHQ-2 Score 1 6 6   Total Score PHQ 2 - - -   PHQ-9 Total Score 6 24 22   PHQ 9 Score - - -   If you checked off any problems, how difficult have these problems made it for you to do your work, take care of things at home, or get along with other people? 0 2 1   How difficult have these problems made it for you to do your work, take care of your home and get along with others - - - OFELIA-7 SCREENING 11/8/2022 10/18/2022 9/20/2022   Feeling nervous, anxious, or on edge Several days Nearly every day Nearly every day   Not being able to stop or control worrying Not at all Nearly every day More than half the days   Worrying too much about different things Several days Nearly every day Nearly every day   Trouble relaxing Several days Nearly every day Nearly every day   Being so restless that it is hard to sit still Not at all Nearly every day Nearly every day   Becoming easily annoyed or irritable Several days Nearly every day Nearly every day   Feeling afraid as if something awful might happen Not at all Nearly every day More than half the days   OFELIA-7 Total Score 4 21 19   How difficult have these problems made it for you to do your work, take care of things at home, or get along with other people? Not difficult at all Very difficult Extremely difficult   Feeling nervous, anxious, or on edge - - -   OFELIA-7 Total Score - - -          Chief complaint:  Pt says doing better with her anxiety and depression. Subjective:  Seen for follow-up. States doing better with her anxiety and depression. She could not tolerate the Pristiq. It was making her very anxious so she stopped taking it. Back on Celexa and feeling better. Does not want to go back to work because it increases her stress anxiety and depression. She wants to continue with the disability. Last week had vertigo with headaches lightheadedness and dizziness. Sees her counselor every 2 weeks and does meditation and uses mindfulness. Supportive psychotherapy provided. She denies suicidal ideation/homicidal ideations. Denies symptoms of psychosis.     Patient Active Problem List   Diagnosis    Hypoglycemia    Chronic tension-type headache, intractable    Ganglion cyst    Radiculopathy of cervical region    Lymphocytic colitis    Essential tremor    Uterine leiomyoma    Neuropathic pain    Numbness and tingling    Schatzki's ring Chronic pain syndrome    B12 deficiency    Hiatal hernia with GERD    History of colonic polyps    Left carpal tunnel syndrome    Pharyngoesophageal dysphagia    Anxiety    Vitamin D deficiency    Cervical radicular pain    Bipolar depression ([facility])    Functional dyspepsia    Chronic fatigue    Irritable bowel syndrome with both constipation and diarrhea    Early satiety    OAB (overactive bladder)    De Quervain's tenosynovitis, right    Restless legs syndrome (RLS)    Encounter for medication management    Left hand pain    Nausea    High-tone pelvic floor dysfunction    GERD with esophagitis    Duodenitis    Other constipation    Terminal ileitis without complication ([facility])    Primary insomnia    Mild episode of recurrent major depressive disorder ([facility])    Erosive gastritis    Right wrist pain    Right forearm pain    Arthritis of carpometacarpal (CMC) joint of right thumb    Polyarthralgia    Palpitations    Generalized osteoarthritis    Pain of paraspinal muscle    Serologic abnormality    Lupus ([facility])    MATEO positive       Denies palpitation,SOB, Chest pain, headaches. In no acute distress. MEDICATION REVIEW:    Current Medications:    Current Outpatient Medications   Medication Sig    citalopram (CELEXA) 20 MG tablet Take 1 tablet by mouth every evening    meclizine (ANTIVERT) 12.5 MG tablet Take 1 tablet by mouth 3 times daily as needed for Dizziness or Nausea    vitamin D (CHOLECALCIFEROL) 37624 UNIT CAPS Take 1 capsule by mouth every 7 days    cyclobenzaprine (AMRIX) 15 MG extended release capsule Take 15 mg by mouth daily as needed    pramipexole (MIRAPEX) 0.5 MG tablet Take 1 tablet by mouth at bedtime    hydroCHLOROthiazide (HYDRODIURIL) 25 MG tablet Take 12.5 mg by mouth daily    cyanocobalamin 1000 MCG tablet Take 1 tablet by mouth daily    pregabalin (LYRICA) 200 MG capsule Take 200 mg by mouth 2 times daily.     Belimumab (BENLYSTA) 200 MG/ML SOAJ Inject 200 mg into the skin every 7 days hydroxychloroquine (PLAQUENIL) 200 MG tablet Take 1 tablet by mouth daily    EVENING PRIMROSE OIL PO Take by mouth    methylfolate (DEPLIN) 7.5 MG TABS tablet Take 1 tablet by mouth in the morning. clonazePAM (KLONOPIN) 0.5 MG tablet Take 1 tablet by mouth daily as needed for Anxiety for up to 90 days. traZODone (DESYREL) 50 MG tablet Take 0.5-1 tablets by mouth nightly    atomoxetine (STRATTERA) 80 MG capsule Take 1 capsule by mouth daily    Wheat Dextrin (BENEFIBER PO)     LINZESS 72 MCG CAPS capsule Take 1 capsule by mouth daily    naproxen (NAPROSYN) 500 MG tablet Take 1 tablet by mouth as needed    omeprazole (PRILOSEC) 40 MG delayed release capsule Take 1 capsule (40 mg) by mouth 2 (two) times a day 30 mins before breakfast and 30 mins before dinner    cod liver oil CAPS Take 1 capsule by mouth daily    Ubrogepant (UBRELVY) 50 MG TABS Take 1 tablet at the onset of migraine, may repeat in 2 hours. Do not take more then 3 days a week. amitriptyline (ELAVIL) 25 MG tablet TAKE 1 TO 2 TABLETS BY MOUTH EVERY EVENING    Benzoyl Peroxide 2.5 % GEL APPLY TOPICALLY TO FACE AT BEDTIME    diclofenac sodium (VOLTAREN) 1 % GEL Apply 1 g topically as needed    Lidocaine, Anorectal, 5 % CREA Actual prescription: Lidocaine 5%: Neurontin/gabapentin 5% combined topical cream/gelApply to affected area up to 4 times a day as needed for pain    nystatin-triamcinolone (MYCOLOG II) 380808-1.1 UNIT/GM-% cream Apply topically 2 times daily as needed    vitamin E 600 units capsule Take by mouth daily    zoster recombinant adjuvanted vaccine [facility]) 50 MCG/0.5ML SUSR injection Inject 0.5 mLs into the muscle See Admin Instructions 1 dose now and repeat in 2-6 months (Patient not taking: Reported on 11/8/2022)     No current facility-administered medications for this visit.        Allergies   Allergen Reactions    Furosemide Other (See Comments)     Edema of lower extremities      Sulfa Antibiotics Other (See Comments) and Rash GI Upset    Other Other (See Comments)     Oral steroids       Past Medical History, Past Surgical History, Family history, Social History, and Medications were all reviewed with the patient today and updated as necessary.      Compliant with medication: Yes   Side effects from medications:  No     EXAMINATION  Musculoskeletal    GAIT AND STATION   [x] WNL   [] RESTRICTED   [] UNSTEADY WALK        [] ABNORMAL   [] UNBALANCED         PSYCHIATRIC     GENERAL APPEARANCE:   []  WELL GROOMED []     DISHEVELED   []  UNKEMPT      []  UNUSUAL/BIZZARE    [x] WNL       ATTITUDE:   [x] COOPERATIVE   [] GUARDED   [] SUSPICIOUS      [] HOSTILE                            BEHAVIOR:   [x] CALM   [] HYPERACTIVE   [] MANNERISMS      [] BIZZARE         SPEECH:   [x] NORMAL FOR CLIENT   [] SPONTANEOUS   [] SLURRED   [] WHISPERING      [] LOUD   [] PRESSURED   [] ARTICULATE       EYE CONTACT:   [x] WNL   [] BLANK STARE   [] INTENSE      [] AVOIDANT         MOOD:   [] EUTHYMIC   [x] ANXIOUS   [x] DEPRESSED      [] IRRITABLE   [] ANGRY   [] APATHETIC     AFFECT:   [] CONGRUENT WITH MOOD   [] FLAT   [x] CONSTRICTED      [] INAPPROPRIATE   [] LABILE           THOUGHT PROCESS:   [x] LOGICAL/GOAL-DIRECTED   [] FOI   [] CIRCUMSANTIAL      [] INCOHERENT   [] TANGENTIAL   [] CONCRETE      [] PERSEVERATION           THOUGHT CONTENT:                DELUSIONS  [x] DENIES  [] GRANDIOSE  [] PERSECUTORY  [] Islam  [] REFERENCE   HALLUCINATIONS  [x] DENIES  [] AUDITORY  [] VISUAL  [] OLFACTORY  [] TACTILE     [] GUSTATORY  [] SOMATIC         OBSESSIONS  [x] DENIES  [] PRESENT         SUICIDAL IDEATION  [x] DENIES  [] PRESENT W/O PLAN  [] PRESENT W/ PLAN       HOMICIDAL IDEATION  [x] DENIES  [] PRESENT W/O PLAN  [] PRESENT W/ PLAN           JUDGEMENT:   [x] GOOD   [] FAIR   [] POOR   INSIGHT:   [x] GOOD   [] FAIR   [] POOR     COGNITION:           SENSORIUM:   [x] ALERT   [] CLOUDED   [] DROWSY     ORIENTATION:   [x] INTACT   [] TIME:  PLACE PERSON   RECENT & REMOTE MEMORY:   [] NORMAL   [x] OTHER:                  ATTENTION:   [] INTACT   [x] MILD IMPAIRMENT   [] SEVERE IMPAIRMENT     CONCENTRATION:   [] INTACT   [x] MILD IMPAIRMENT   [] SEVERE IMPAIRMENT     LANGUAGE:   [x] AVERAGE   [] ABOVE AVERAGE   [] BELOW AVERAGE     FUND OF KNOWLEDGE:   [] UNABLE TO ASSESS AT THIS TIME   [x] AVERAGE   [] ABOVE AVERAGE   [] BELOW AVERAGE      [] GOOD TO EXCELLENT KNOWLEDGE OF CURRENT EVENTS   [] POOR TO NO KNLEDGE OF CURRENT EVENTS           ABNORMAL MOVEMENTS:   [x] NONE   [] TICS   [] TREMORS   [] BIZZARE      [] FACE   [] TRUNK   [] EXTREMETIES   [] GESTURES        SLEEP:   [] GOOD   [x] FAIR   [] POOR      MUSCLE STRENGTH AND TONE   [x] WNL   [] ATROPHY   [] SPASTIC        [] FLACCID   [] COGWHEEL         Diagnoses/Impressions:    ICD-10-CM    1. Recurrent major depressive disorder, in partial remission ([facility] [address]  F33.41       2. Generalized anxiety disorder  F41.1       3. Insomnia due to mental disorder  F51.05       4.  Chronic pain disorder  G89.4           TREATMENT GOALS:  Symptom reduction, Medication adherence, maintain therapeutic gains    LABS/IMAGING:    []  Ordered [x]  Reviewed []  New Labs Ordered:     LAB  WBC   Date/Time Value Ref Range Status   10/07/2022 09:07 AM 9.5 4.3 - 11.1 K/uL Final     Hemoglobin   Date/Time Value Ref Range Status   10/07/2022 09:07 AM 13.4 11.7 - 15.4 g/dL Final     Hematocrit   Date/Time Value Ref Range Status   10/07/2022 09:07 AM 41.6 35.8 - 46.3 % Final     Platelets   Date/Time Value Ref Range Status   10/07/2022 09:07  150 - 450 K/uL Final     Sodium   Date/Time Value Ref Range Status   10/07/2022 09:07  136 - 145 mmol/L Final     Potassium   Date/Time Value Ref Range Status   10/07/2022 09:07 AM 3.9 3.5 - 5.1 mmol/L Final     Chloride   Date/Time Value Ref Range Status   10/07/2022 09:07  101 - 110 mmol/L Final     CO2   Date/Time Value Ref Range Status   10/07/2022 09:07 AM 31 21 - 32 mmol/L Final     BUN   Date/Time Value Ref Range Status   10/07/2022 09:07 AM 13 6 - 23 MG/DL Final     Magnesium   Date/Time Value Ref Range Status   05/12/2022 09:05 AM 2.1 1.2 - 2.6 mg/dL Final     ALT   Date/Time Value Ref Range Status   10/07/2022 09:07 AM 40 12 - 65 U/L Final     AST   Date/Time Value Ref Range Status   10/07/2022 09:07 AM 17 15 - 37 U/L Final     TSH   Date/Time Value Ref Range Status   05/02/2022 12:00 AM 0.901 0.450 - 4.500 uIU/mL Final       Please refer to the lab tab in the epic and care everywhere for the most recent lab results. Plan:     [x]  Medication ordered: Continue Strattera, Celexa, trazodone, Klonopin, L-methylfolate to target depression, anxiety, insomnia, attention and concentration. [x]  Medication education/counseling provided  Medication dosage and time to take, purpose/expected benefits/risks, common side effects, lab monitoring required and reason, expected length of treatment, risk of no treatment, effects on pregnancy/nursing, financial availability. Educated patient on  side effects/risks/benefits of meds including  cardiac arrhythmias, suicidal ideations, orthostatic hypotension, serotonin syndrome, risk of jesica/hypomania from antidepressants, withdrawals from abrupt discontinuation of meds,  risk of bleeding, risk of seizures, addiction potential, memory impairment with long term use of benzos, respiratory depression, high blood pressure, dizziness, drowsiness, sedation , risk of falls, Risk & benefits discussed: including but not limited to possible off-label medication usage. [x] Follow [facility] for sxs improvement     I have reviewed the patients controlled substance prescription history, as maintained in the [facility] prescription monitoring program, so that the prescription(s) for a  controlled substance can be given. Recommendations and Referrals:     Follow up with : MD, requires monitoring of response to medication, requires monitoring of

## 2022-11-17 ENCOUNTER — OFFICE VISIT (OUTPATIENT)
Dept: NEUROLOGY | Age: 52
End: 2022-11-17
Payer: COMMERCIAL

## 2022-11-17 VITALS
HEART RATE: 108 BPM | BODY MASS INDEX: 36.72 KG/M2 | SYSTOLIC BLOOD PRESSURE: 137 MMHG | WEIGHT: 188 LBS | DIASTOLIC BLOOD PRESSURE: 87 MMHG

## 2022-11-17 DIAGNOSIS — Z79.899 ENCOUNTER FOR MEDICATION MANAGEMENT: ICD-10-CM

## 2022-11-17 DIAGNOSIS — H81.319 VERTIGO, AURAL, UNSPECIFIED LATERALITY: Primary | ICD-10-CM

## 2022-11-17 DIAGNOSIS — H81.11 BENIGN PAROXYSMAL POSITIONAL VERTIGO OF RIGHT EAR: ICD-10-CM

## 2022-11-17 DIAGNOSIS — G43.119 INTRACTABLE MIGRAINE WITH AURA WITHOUT STATUS MIGRAINOSUS: ICD-10-CM

## 2022-11-17 PROCEDURE — 99215 OFFICE O/P EST HI 40 MIN: CPT | Performed by: PSYCHIATRY & NEUROLOGY

## 2022-11-17 RX ORDER — MECLIZINE HCL 12.5 MG/1
12.5 TABLET ORAL 3 TIMES DAILY PRN
COMMUNITY

## 2022-11-17 ASSESSMENT — ENCOUNTER SYMPTOMS
WHEEZING: 0
STRIDOR: 0
CONSTIPATION: 1
VOMITING: 0
DOUBLE VISION: 0
EYE REDNESS: 0
BACK PAIN: 1
ORTHOPNEA: 0
HEMOPTYSIS: 0
SINUS PAIN: 0
NAUSEA: 0
SHORTNESS OF BREATH: 0
BLURRED VISION: 0
PHOTOPHOBIA: 1
SPUTUM PRODUCTION: 0
HEARTBURN: 0
COUGH: 0
SORE THROAT: 0
EYE PAIN: 0
BLOOD IN STOOL: 0
DIARRHEA: 0
EYE DISCHARGE: 0
ABDOMINAL PAIN: 0

## 2022-11-17 ASSESSMENT — VISUAL ACUITY: OU: 1
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
## 2022-11-23 RX ORDER — ATOMOXETINE 80 MG/1
CAPSULE ORAL
Qty: 90 CAPSULE | Refills: 3 | OUTPATIENT
Start: 2022-11-23

## 2022-11-28 ENCOUNTER — PATIENT MESSAGE (OUTPATIENT)
Dept: NEUROLOGY | Age: 52
End: 2022-11-28

## 2022-11-28 DIAGNOSIS — G43.119 INTRACTABLE MIGRAINE WITH AURA WITHOUT STATUS MIGRAINOSUS: Primary | ICD-10-CM

## 2022-11-29 ENCOUNTER — PATIENT MESSAGE (OUTPATIENT)
Dept: BEHAVIORAL/MENTAL HEALTH CLINIC | Age: 52
End: 2022-11-29

## 2022-11-30 ENCOUNTER — TELEPHONE (OUTPATIENT)
Dept: BEHAVIORAL/MENTAL HEALTH CLINIC | Age: 52
End: 2022-11-30

## 2022-11-30 ENCOUNTER — OFFICE VISIT (OUTPATIENT)
Dept: INTERNAL MEDICINE CLINIC | Facility: CLINIC | Age: 52
End: 2022-11-30
Payer: COMMERCIAL

## 2022-11-30 VITALS
DIASTOLIC BLOOD PRESSURE: 89 MMHG | SYSTOLIC BLOOD PRESSURE: 150 MMHG | RESPIRATION RATE: 16 BRPM | TEMPERATURE: 97 F | BODY MASS INDEX: 37.11 KG/M2 | WEIGHT: 189 LBS | OXYGEN SATURATION: 98 % | HEART RATE: 98 BPM | HEIGHT: 60 IN

## 2022-11-30 DIAGNOSIS — R30.0 DYSURIA: Primary | ICD-10-CM

## 2022-11-30 DIAGNOSIS — G25.81 RLS (RESTLESS LEGS SYNDROME): ICD-10-CM

## 2022-11-30 DIAGNOSIS — H81.11 BENIGN PAROXYSMAL POSITIONAL VERTIGO OF RIGHT EAR: ICD-10-CM

## 2022-11-30 DIAGNOSIS — M15.9 GENERALIZED OSTEOARTHRITIS: ICD-10-CM

## 2022-11-30 DIAGNOSIS — G44.221 CHRONIC TENSION-TYPE HEADACHE, INTRACTABLE: ICD-10-CM

## 2022-11-30 PROCEDURE — 99214 OFFICE O/P EST MOD 30 MIN: CPT | Performed by: NURSE PRACTITIONER

## 2022-11-30 RX ORDER — PRAMIPEXOLE DIHYDROCHLORIDE 1 MG/1
1 TABLET ORAL NIGHTLY
Qty: 90 TABLET | Refills: 3 | Status: SHIPPED | OUTPATIENT
Start: 2022-11-30

## 2022-11-30 RX ORDER — ATOGEPANT 60 MG/1
TABLET ORAL
COMMUNITY

## 2022-11-30 ASSESSMENT — ENCOUNTER SYMPTOMS
COUGH: 0
EYE REDNESS: 0
SHORTNESS OF BREATH: 0
SINUS PAIN: 0
CONSTIPATION: 0
COLOR CHANGE: 0
ABDOMINAL PAIN: 0
NAUSEA: 0
EYE PAIN: 0
APNEA: 0
EYE ITCHING: 0
DIARRHEA: 0
ABDOMINAL DISTENTION: 0
BACK PAIN: 0
EYE DISCHARGE: 0

## 2022-11-30 NOTE — PROGRESS NOTES
[unfilled]  [address] [place] [id number]      PROGRESS NOTE    SUBJECTIVE:   Marla Rubio is a 46 y.o. female seen for a follow up visit regarding the following chief complaint:     Chief Complaint   Patient presents with    Follow-up       HPI  Patient presents for follow up and routine care. She is following rheumatology routinely for fibromyalgia and lupus. Notes reviewed with the following summary from visit:  11/22/2022: Interval history and diagnostic studies reviewed. Follows up for fibromyalgia and abnormal lab studies. Currently on Benlysta, plaquenil 400 mg daily. Denies any side effects. She has been experiencing vertigo lately. She has some ringing in the ears and when she has positional changes she gets dizzy and lightheaded. She has mentioned this to her primary care physician who has placed her on some medications. Neurology is referring her to ENT. She has fallen twice and has noted some locking up of the musculature around the lateral hips when climbing stairs. Having trigger point injections in neck for arthritis. She has joint pains as well. Had MRI of TMJ due to ongoing symptoms. Recommended to wear mouth guard during day and night. She is on hydroxychloroquine and started Benlysta injections in October. She has noted some easy bruising with medication. It was discussed to continue plaquenil 2 tabs daily and benlysta. CBC ordered and to return in 3 months. She follows with GI for the management of IBS and GERD. Reports her most recent colonoscopy was unremarkable. She follows with psychiatry for the management of severe anxiety and depression which she suspects worsened after loosing her job. Ongoing insomnia, she was on klonopin for this but has not taken consistently. Planning to start this. She complains of neuropathy involving her feet.  Notes constant paraesthesias and RLS at night; taking mirapex 0.5mg nightly but would like to increase this as her symptoms remain uncontrolled. She was previously suggested to have radiofrequency ablation per neurology. Follows with neurology for tremors and RLS. She was recently started on qulipta for migraine. Scheduled to see ENT for vertigo. She has been taking antivert with slight help. Symptoms ongoing. Right side prone. She has associated ringing in ears and sinus pressure. She has noted some trouble hearing but has had hearing test in the past that was normal.     Denies snoring. She reports a sleep study that was unremarkable. Taking trazodone for insomnia. Complains of weight gain. She is working on cleaning her diet. Notes she usually east small portions, but does eat fast food every other day. She is trying to eating more at home and adding more vegetables. Following nutritionist through work. Cardiology advised to keep BP regimen same due to controlled in the afternoons and that she is likely having elevations from chronic pain due to hx of hypotension. Concerns of dysuria that started yesterday. Urine collected today. Current Outpatient Medications   Medication Sig Dispense Refill    pramipexole (MIRAPEX) 1 MG tablet Take 1 tablet by mouth nightly 90 tablet 3    meclizine (ANTIVERT) 12.5 MG tablet Take 12.5 mg by mouth 3 times daily as needed      citalopram (CELEXA) 20 MG tablet Take 1 tablet by mouth every evening 30 tablet 3    vitamin D (CHOLECALCIFEROL) 39962 UNIT CAPS Take 1 capsule by mouth every 7 days 12 capsule 3    cyclobenzaprine (AMRIX) 15 MG extended release capsule Take 15 mg by mouth daily as needed      pramipexole (MIRAPEX) 0.5 MG tablet Take 1 tablet by mouth at bedtime 90 tablet 1    hydroCHLOROthiazide (HYDRODIURIL) 25 MG tablet Take 12.5 mg by mouth daily      cyanocobalamin 1000 MCG tablet Take 1 tablet by mouth daily 90 tablet 3    pregabalin (LYRICA) 200 MG capsule Take 200 mg by mouth 2 times daily.       Belimumab (BENLYSTA) 200 MG/ML SOAJ Inject 200 mg into the skin every 7 days      hydroxychloroquine (PLAQUENIL) 200 MG tablet Take 1 tablet by mouth daily      EVENING PRIMROSE OIL PO Take by mouth      methylfolate (DEPLIN) 7.5 MG TABS tablet Take 1 tablet by mouth in the morning. 30 tablet 5    traZODone (DESYREL) 50 MG tablet Take 0.5-1 tablets by mouth nightly 180 tablet 1    Wheat Dextrin (BENEFIBER PO)       LINZESS 72 MCG CAPS capsule Take 1 capsule by mouth daily      naproxen (NAPROSYN) 500 MG tablet Take 1 tablet by mouth as needed      omeprazole (PRILOSEC) 40 MG delayed release capsule Take 1 capsule (40 mg) by mouth 2 (two) times a day 30 mins before breakfast and 30 mins before dinner      cod liver oil CAPS Take 1 capsule by mouth daily      Ubrogepant (UBRELVY) 50 MG TABS Take 1 tablet at the onset of migraine, may repeat in 2 hours. Do not take more then 3 days a week.  16 tablet 11    amitriptyline (ELAVIL) 25 MG tablet TAKE 1 TO 2 TABLETS BY MOUTH EVERY EVENING      Benzoyl Peroxide 2.5 % GEL APPLY TOPICALLY TO FACE AT BEDTIME      diclofenac sodium (VOLTAREN) 1 % GEL Apply 1 g topically as needed      Lidocaine, Anorectal, 5 % CREA Actual prescription: Lidocaine 5%: Neurontin/gabapentin 5% combined topical cream/gelApply to affected area up to 4 times a day as needed for pain      nystatin-triamcinolone (MYCOLOG II) 862858-8.1 UNIT/GM-% cream Apply topically 2 times daily as needed      vitamin E 600 units capsule Take by mouth daily      Atogepant (QULIPTA) 60 MG TABS Take 60 mg by mouth daily 90 tablet 3    atomoxetine (STRATTERA) 80 MG capsule Take 1 capsule by mouth daily 30 capsule 2    Lidocaine 5 % CREA Topical 5% Lidocaine and 5% Neurontin to apply to affected area up to 4 times/day as needed for pain 45 g 2    zoster recombinant adjuvanted vaccine (SHINGRIX) 50 MCG/0.5ML SUSR injection Inject 0.5 mLs into the muscle See Admin Instructions 1 dose now and repeat in 2-6 months (Patient not taking: No sig reported) 0.5 mL 0 clonazePAM (KLONOPIN) 0.5 MG tablet Take 1 tablet by mouth daily as needed for Anxiety for up to 90 days. 30 tablet 2     No current facility-administered medications for this visit. Allergies   Allergen Reactions    Furosemide Other (See Comments)     Edema of lower extremities      Sulfa Antibiotics Other (See Comments) and Rash     GI Upset    Other Other (See Comments)     Oral steroids       Past Medical History:   Diagnosis Date    Anxiety     Chronic pain     DDD (degenerative disc disease), cervical 11/29/2021    C3-C7 Disc Degeneration w/ Posterior Spondylotic Disc Displaceemnts per MRI    Depressed     GERD (gastroesophageal reflux disease)     Headache     Hypertension     IBS (irritable bowel syndrome)     Insomnia     Neuropathy     Panic attack     Sleep difficulties     TMJ (dislocation of temporomandibular joint) 04/2021     Past Surgical History:   Procedure Laterality Date    CATARACT REMOVAL Bilateral 02/2020    COLONOSCOPY      EGD DELIVER THERMAL ENERGY SPHNCTR/CARDIA GERD      EYE SURGERY      LIPECTOMY      TUBAL LIGATION       Family History   Problem Relation Age of Onset    Heart Disease Father     Hypertension Father     High Cholesterol Mother     GERD Mother      Social History     Tobacco Use    Smoking status: Never    Smokeless tobacco: Former     Types: Chew   Substance Use Topics    Alcohol use: Never       Review of Systems   Constitutional:  Positive for fatigue. Negative for activity change, appetite change, chills and fever. HENT:  Negative for congestion, ear pain and sinus pain. Eyes:  Negative for pain, discharge, redness and itching. Respiratory:  Negative for apnea, cough and shortness of breath. Cardiovascular:  Negative for chest pain, palpitations and leg swelling. Gastrointestinal:  Negative for abdominal distention, abdominal pain, constipation, diarrhea and nausea. Endocrine: Negative for cold intolerance and heat intolerance.    Genitourinary: Negative for difficulty urinating, dysuria, enuresis and urgency. Musculoskeletal:  Positive for arthralgias. Negative for back pain, joint swelling, myalgias and neck pain. Skin:  Negative for color change and rash. Neurological:  Positive for dizziness and headaches. Negative for weakness. Psychiatric/Behavioral:  Negative for behavioral problems and sleep disturbance. The patient is not nervous/anxious. OBJECTIVE:  BP (!) 150/89 (Site: Left Upper Arm, Position: Sitting, Cuff Size: Medium Adult)   Pulse 98   Temp 97 °F (36.1 °C) (Temporal)   Resp 16   Ht 5' (1.524 m)   Wt 189 lb (85.7 kg)   SpO2 98%   BMI 36.91 kg/m²      Physical Exam  Constitutional:       General: She is not in acute distress. Appearance: Normal appearance. She is not ill-appearing or toxic-appearing. Cardiovascular:      Rate and Rhythm: Normal rate and regular rhythm. Pulmonary:      Effort: Pulmonary effort is normal. No respiratory distress. Skin:     General: Skin is warm and dry. Neurological:      Mental Status: She is alert. Mental status is at baseline. Psychiatric:         Mood and Affect: Mood normal.         Behavior: Behavior normal.         Thought Content: Thought content normal.         ASSESSMENT and PLAN  Ronal Pathak was seen today for follow-up. Diagnoses and all orders for this visit:    Lupus ([address]    Pain syndrome, chronic    Dysuria  -     Urinalysis with Reflex to Culture; Future    RLS (restless legs syndrome)  -     pramipexole (MIRAPEX) 1 MG tablet; Take 1 tablet by mouth nightly    Chronic tension-type headache, intractable    Generalized osteoarthritis    Benign paroxysmal positional vertigo of right ear    We discussed contacting with urine results. Increase mirapex to 1mg nightly to better control RLS. Continue all other medications today; advised to take klonopin more consistently per Dr. Gutierrez Less prescription to assist with anxiety induced insomnia.  Will await ENT consult to assess BPV, continue antivert for now. Follow with cardiology for ongoing HTN due to concerns of this likely being in relation to uncontrolled pain with hx of hypotension on high BP meds. Greater than 50% of this 35 min visit was spent counseling the patient about test results, prognosis, importance of compliance, education about disease process, benefits of medications, instructions for management of acute symptoms, and follow up plans. Follow-up and Dispositions    Return in about 3 months (around 2/28/2023) for recheck routine.          Hassel Hammans, NP, APRN - CNP

## 2022-11-30 NOTE — TELEPHONE ENCOUNTER
----- Message from Candida Cortez sent at 11/29/2022  6:48 PM EST -----  Regarding: Dreams   Hello  My family told me I was dreaming and talking in my sleep to the point I am restless all night. I woke one time and was startled. Is there something I need to change?

## 2022-12-01 ENCOUNTER — PROCEDURE VISIT (OUTPATIENT)
Dept: NEUROLOGY | Age: 52
End: 2022-12-01
Payer: COMMERCIAL

## 2022-12-01 DIAGNOSIS — G43.019 MIGRAINE WITHOUT AURA, INTRACTABLE, WITHOUT STATUS MIGRAINOSUS: ICD-10-CM

## 2022-12-01 DIAGNOSIS — R41.82 ALTERED MENTAL STATUS, UNSPECIFIED ALTERED MENTAL STATUS TYPE: Primary | ICD-10-CM

## 2022-12-01 PROCEDURE — 95819 EEG AWAKE AND ASLEEP: CPT | Performed by: PSYCHIATRY & NEUROLOGY
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
## 2022-12-02 ENCOUNTER — TELEPHONE (OUTPATIENT)
Dept: BEHAVIORAL/MENTAL HEALTH CLINIC | Age: 52
End: 2022-12-02

## 2022-12-02 ENCOUNTER — OFFICE VISIT (OUTPATIENT)
Dept: ORTHOPEDIC SURGERY | Age: 52
End: 2022-12-02

## 2022-12-02 DIAGNOSIS — M19.071 PRIMARY OSTEOARTHRITIS OF RIGHT FOOT: ICD-10-CM

## 2022-12-02 DIAGNOSIS — M25.571 PAIN IN RIGHT ANKLE AND JOINTS OF RIGHT FOOT: ICD-10-CM

## 2022-12-02 DIAGNOSIS — M79.672 PAIN IN LEFT FOOT: Primary | ICD-10-CM

## 2022-12-02 DIAGNOSIS — M21.619 BUNION: ICD-10-CM

## 2022-12-02 DIAGNOSIS — R30.0 DYSURIA: ICD-10-CM

## 2022-12-02 LAB
APPEARANCE UR: CLEAR
BACTERIA URNS QL MICRO: NEGATIVE /HPF
BILIRUB UR QL: NEGATIVE
CASTS URNS QL MICRO: ABNORMAL /LPF (ref 0–2)
COLOR UR: ABNORMAL
EPI CELLS #/AREA URNS HPF: ABNORMAL /HPF (ref 0–5)
GLUCOSE UR STRIP.AUTO-MCNC: NEGATIVE MG/DL
HGB UR QL STRIP: NEGATIVE
KETONES UR QL STRIP.AUTO: NEGATIVE MG/DL
LEUKOCYTE ESTERASE UR QL STRIP.AUTO: ABNORMAL
MUCOUS THREADS URNS QL MICRO: 0 /LPF
NITRITE UR QL STRIP.AUTO: NEGATIVE
PH UR STRIP: 6.5 [PH] (ref 5–9)
PROT UR STRIP-MCNC: NEGATIVE MG/DL
RBC #/AREA URNS HPF: ABNORMAL /HPF (ref 0–5)
SP GR UR REFRACTOMETRY: 1.01 (ref 1–1.02)
URINE CULTURE IF INDICATED: ABNORMAL
UROBILINOGEN UR QL STRIP.AUTO: 1 EU/DL (ref 0.2–1)
WBC URNS QL MICRO: ABNORMAL /HPF (ref 0–4)

## 2022-12-02 RX ORDER — METHYLPREDNISOLONE ACETATE 40 MG/ML
40 INJECTION, SUSPENSION INTRA-ARTICULAR; INTRALESIONAL; INTRAMUSCULAR; SOFT TISSUE ONCE
Status: COMPLETED | OUTPATIENT
Start: 2022-12-02 | End: 2022-12-02

## 2022-12-02 RX ORDER — LIDOCAINE 5% 5 G/100G
CREAM TOPICAL
Qty: 45 G | Refills: 2 | Status: SHIPPED | OUTPATIENT
Start: 2022-12-02

## 2022-12-02 RX ADMIN — METHYLPREDNISOLONE ACETATE 40 MG: 40 INJECTION, SUSPENSION INTRA-ARTICULAR; INTRALESIONAL; INTRAMUSCULAR; SOFT TISSUE at 09:17

## 2022-12-02 NOTE — LETTER
[facility]  [address] [place] [address]  Phone: [phone]  Fax: [phone]    Rachael Arita MD        December 2, 2022     Patient: Briana Clark   YOB: 1970   Date of Visit: 12/2/2022       To Whom It May Concern: It is my medical opinion that [place] Work status: Due to her orthopedic conditions, recommend permanent sedentary work with limited prolonged standing walking    If you have any questions or concerns, please don't hesitate to call.     Sincerely,        Rachael Arita MD

## 2022-12-02 NOTE — PROGRESS NOTES
The patient was prescribed and fitted with a DualAction toe spacer/bunion guard for the left foot. Patient read and signed documenting they understand and agree to [facility]'s current DME return policy.

## 2022-12-02 NOTE — LETTER
DME Patient Authorization Form    Name: Lavern Salazar  : 1970  MRN: [id number]   Age: 46 y.o. Gender: female  Delivery Address: [facility]     Diagnosis:     ICD-10-CM    1. Pain in left foot  M79.672 Dual Action Toe Spacer/Bunion Guard ()     methylPREDNISolone acetate (DEPO-MEDROL) injection 40 mg      2. Pain in right ankle and joints of right foot  M25.571 methylPREDNISolone acetate (DEPO-MEDROL) injection 40 mg           Requested DME:  DualAction Toe Spacer/Bunion Guard** - -19 ($10.00) X 1 - left        Clinical Notes:     **Indicates non-covered items by insurance. Payment expected on date of service. Electronically signed by  Provider: Nakul Peña MD__Date: 2022                            [facility]/[address] Tax ID # [id number]        Durable Medical Equipment and/or Orthotics Patient Consent     I understand that my physician has prescribed this medical supply as part of my treatment plan as a matter of Medical Necessity.  I understand that I have a choice in where I receive my prescribed orthopedic supplies and/or services.  I authorize [facility] to furnish this service/product and to provide my insurance carrier with any information requested in order to process for payment.  I instruct my insurance carrier to pay [facility] directly for these services/products.  I understand that my insurance carrier may deny payment for this supply because it is a non-covered item, deemed not medically necessary or considered experimental.   I understand that any cost not covered by my insurance carrier will be solely my financial responsibility.  I have received the Supplier Standards and have reviewed them.    I have received the prescribed item and have been fully instructed on the proper use of the above services/products.    ______ (Patient Initials) I understand that all DME items are non-returnable after being dispensed. Items still in sealed packaging may be returned up to 14 days after purchasing. [address] will replace items that are defective.    ______ (Patient Initials) I understand that Aleah Will will not file a claim with my insurance carrier for this service/product and I am waiving my right to file a claim on my own for this service/product with my insurance company as this item is NON-COVERED (Denoted by the **) by my Insurance company/policy. ______ (Patient Initials) I understand that I am responsible to bring my equipment to the hospital for any surgery. ______________________________________________  ________________________  Patient / Rolf Suazo            Thank you for considering [address] Your physician has prescribed specific medical equipment or devices for your home use. The following describes your rights and responsibilities as our customer. Right to Choose Providers: You have a choice regarding which company supplies your home medical equipment and devices, and to consult your physician in this decision. You may choose a medical supply store, a home medical equipment provider, or a specialist such as [facility]. [facility] will coordinate with your physician to provide the medical equipment or devices prescribed for your home use. Right to Service:  You have the right to considerate, respectful and nondiscriminatory care. You have the right to receive accurate and easily understood information about your health care. If you speak a foreign language, or don't understand the discussions, assistance will be provided to allow you to make informed health care decisions.   You have the right to know your treatment options and to participate in decisions about your care, including the right to accept or refuse treatment. You have the right to expect a reasonable response to your requests for treatment or service. You have the right to talk in confidence with health care providers and to have your health care information protected. You have the right to receive an explanation of your bill. You have the right to complain about the service or product you receive. Patient Responsibilities:  Please provide complete and accurate information about your health insurance benefits and make arrangements for the timely payment of your bill. [facility] will, if possible, assume responsibility for billing your insurance (Medicare, Medicaid and commercial) for the prescribed equipment or devices. If your policy does not cover the prescribed product, or only covers a portion of the bill, you are responsible for any remaining balance. Return and Exchange Policy:  [facility] will honor published  Warranties for products. [facility] will accept returns or exchanges within 14 days from the date of receipt, providin) the product must be in new condition; 2) receipt as required; and 3) used disposable and hygiene products may only be returned due to a defective product. Note: Refunds will be issued in a timely manner, please allow 4-6 weeks for processing. Complaint Procedures and [facility] [place] [place] Consumer Protection Resources:  [facility] values you as a customer, and is committed to resolving patient concerns. This commitment includes understanding and documenting your concerns, conducting a review of internal procedures, and providing you with an explanation and resolution to your concerns. Should you have any questions about our services or billing process, please contact our office at (practice phone number).   If we are unable to resolve the concern, you have the right to direct comments to the office of Consumer Protection, in the [address] or the [facility] [address] office, without fear of repercussion. DMEPOS SUPPLIER STANDARDS    A supplier must be in compliance with all applicable Federal and [facility] and regulatory requirements. A supplier must provide complete and accurate information on the DMEPOS supplier application. Any changes to this information must be reported to the [facility] [employer] within 30 days. An authorized individual (one whose signature is binding) must sign the application for billing privileges. A supplier must fill orders from its own inventory, or must contract with other companies for the purchase of items necessary to fill the order. A supplier may not contract with any entity that is currently excluded from the Medicare program, any [facility] program, or from any other Federal procurement or Nonprocurement programs. A supplier must advise beneficiaries that they may rent or purchase inexpensive or routinely purchased durable medical equipment, and of the purchase option for capped rental equipment. A supplier must notify beneficiaries of warranty coverage and honor all warranties under applicable State Law, and repair or replace free of charge Medicare covered items that are under warranty. A supplier must maintain a physical facility on an appropriate site. A supplier must permit CMS, or its agents to conduct on-site inspections to ascertain the supplier's compliance with these standards. The supplier location must be accessible to beneficiaries during reasonable business hours, and must maintain a visible sign and posted hours of operation. A supplier must maintain a primary business telephone listed under the name of the business in a [employer] or a toll free number available through directory assistance. The exclusive use of a beeper, answering machine or cell phone is prohibited.   A supplier must have comprehensive liability insurance in the amount of at least $300,000 that covers both the supplier's place of business and all customers and employees of the supplier. If the supplier manufactures its own items, this insurance must also cover product liability and completed operations. A supplier must agree not to initiate telephone contact with beneficiaries, with a few exceptions allowed. This standard prohibits suppliers from calling beneficiaries in order to solicit new business. A supplier is responsible for delivery and must instruct beneficiaries on use of Medicare covered items, and maintain proof of delivery. A supplier must answer questions, and respond to complaints of the beneficiaries, and maintain documentation of such contacts. A supplier must maintain and replace at no charge or repair directly, or through a service contract with another company, Medicare covered items it has rented to beneficiaries. A supplier must accept returns of substandard (less than full quality for the particular item) or unsuitable items (inappropriate for the beneficiary at the time it was fitted and rented or sold) from beneficiaries. A supplier must disclose these supplier standards to each beneficiary to whom it supplies a Medicare-covered item. A supplier must disclose to the government any person having ownership, financial, or control interest in the supplier. A supplier must not convey or reassign a supplier number; i.e., the supplier may not sell or allow another entity to use its Medicare billing number. A supplier must have a complaint resolution protocol established to address beneficiary complaints that relate to these standards. A record of these complaints must be maintained at the physical facility. Complaint records must include: the name, address, telephone number and health insurance claim number of the beneficiary, a summary of the complaint, and any action taken to resolve it.   A supplier must agree to furnish CMS any information required by the Medicare statute and implementing regulations. A supplier of DMEPOS and other items and services must be accredited by a CMS-approved accreditation organization in order to receive and retain a supplier billing number. The accreditation must indicate the specific products and services, for which the supplier is accredited in order for the supplier to receive payment for those specific products and services. A DMEPOS supplier must notify their accreditation organization when a new DMEPOS location is opened. The accreditation organization may accredit the new supplier location for three months after it is operational without requiring a new site visit. All DMEPOS supplier locations, whether owned or subcontracted, must meet the [employer] and be separately accredited in order to bill Medicare. An accredited supplier may be denied enrollment or their enrollment may be revoked, if CMS determines that they are not in compliance with the DMEPOS quality standards. A DMEPOS supplier must disclose upon enrollment all products and services, including the addition of new product lines for which they are seeking accreditation. If a new product line is added after enrollment, the DMEPOS supplier will be responsible for notifying the accrediting body of the new product so that the DMEPOS supplier can be re-surveyed and accredited for these new products. Must meet the surety bond requirements specified in 42 C. F.R. 424.57(c). Implementation date- May 4, 2009. A supplier must obtain oxygen from a state-licensed oxygen supplier. A supplier must maintain ordering and referring documentation consistent with provisions found in 42 C. F.R. 424.516(f). DMEPOS suppliers are prohibited from sharing a practice location with certain other Medicare providers and suppliers. DMEPOS suppliers must remain open to the public for a minimum of 30 hours per week with certain exceptions.

## 2022-12-02 NOTE — TELEPHONE ENCOUNTER
This message is in regards to Strattera. Patient was using mail order pharmacy and did not receive the medication in the mail. Is requesting a prescription be sent locally. Next appt 12/15    ----- Message from Thalia Gross. Diego sent at 12/2/2022  8:47 AM EST -----  Regarding: Medication   The medication was lost.   Can I please get it sent to [place] on [address].   I have been out for two weeks
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
## 2022-12-04 LAB
BACTERIA SPEC CULT: NORMAL
SERVICE CMNT-IMP: NORMAL

## 2022-12-04 RX ORDER — ATOMOXETINE 80 MG/1
80 CAPSULE ORAL DAILY
Qty: 30 CAPSULE | Refills: 2 | Status: SHIPPED | OUTPATIENT
Start: 2022-12-04

## 2022-12-05 RX ORDER — ATOGEPANT 60 MG/1
60 TABLET ORAL DAILY
Qty: 90 TABLET | Refills: 3 | Status: SHIPPED | OUTPATIENT
Start: 2022-12-05

## 2022-12-05 NOTE — TELEPHONE ENCOUNTER
----- Message from Nick Patterson sent at 11/28/2022  9:23 AM EST -----  Regarding: FW: Samples     ----- Message -----  From: Althea Escalante  Sent: 11/28/2022   6:53 AM EST  To: , #  Subject: Samples                                          Hello  I was fine with the Lisa Hinton. [facility].

## 2022-12-05 NOTE — TELEPHONE ENCOUNTER
The rx for Robert Toth is in the chart and ready for review and signature. Not sure of the directions.

## 2022-12-07 ENCOUNTER — TELEPHONE (OUTPATIENT)
Dept: NEUROLOGY | Age: 52
End: 2022-12-07

## 2022-12-07 ENCOUNTER — TELEPHONE (OUTPATIENT)
Dept: INTERNAL MEDICINE CLINIC | Facility: CLINIC | Age: 52
End: 2022-12-07

## 2022-12-07 ENCOUNTER — OFFICE VISIT (OUTPATIENT)
Dept: UROGYNECOLOGY | Age: 52
End: 2022-12-07
Payer: COMMERCIAL

## 2022-12-07 DIAGNOSIS — N32.81 OVERACTIVE BLADDER: Primary | ICD-10-CM

## 2022-12-07 PROCEDURE — 99212 OFFICE O/P EST SF 10 MIN: CPT | Performed by: OBSTETRICS & GYNECOLOGY

## 2022-12-07 NOTE — TELEPHONE ENCOUNTER
PA for Qulipta 60MG approved until 12/07/2023.  [facility]:[id number]

## 2022-12-07 NOTE — TELEPHONE ENCOUNTER
Pt states that she spoke with her Rheumatologist at her appt today and they stated that they do not fill out FMLA paperwork and that they will defer it to her PCP. Please advise.

## 2022-12-07 NOTE — ASSESSMENT & PLAN NOTE
Resolved with HEP. She is doing well with no complaints. She desires no further urogynecologic treatment at this time. I am releasing her back to the care of her referring doctor.

## 2022-12-07 NOTE — PROGRESS NOTES
[facility] UROGYNECOLOGY  [address]  Dept: [phone]    PCP:  Thom Hood, NUNU, APRN - CNP    12/7/2022      HPI:  Orly Ding is here to follow up on Follow-up (PT )  . Patient states having abdominal tenderness post-voids for approx. 3 weeks now with minimal odor. Patient states they have been hydrating which helps with symptoms but it stays consistent. She went to see primary care on 12/2/22- they found a UTI that was just resulted so she has not had treatment yet. She is going to walk over there for care. She had nocturia at her alst visit. She went to PT and this has been working so well. She went to see Charu Simpson and HEP. No results found for this visit on 12/07/22. There were no vitals taken for this visit. 1. Overactive bladder  Assessment & Plan:  Resolved with HEP. She is doing well with no complaints. She desires no further urogynecologic treatment at this time. I am releasing her back to the care of her referring doctor. No follow-ups on file.                       Vinay Solomon, DO

## 2022-12-08 NOTE — TELEPHONE ENCOUNTER
Per Contreras Mcfarland NP: I can only fill this out for short term leave due to treatment of existing condition. The form she gave me is not applicable and will therefore need a different form. I can fill it out but she does not qualify for anything.

## 2022-12-08 NOTE — TELEPHONE ENCOUNTER
Per Rolly Rodriguez, NP: Urine only shows strep B which is generally not treated unless pregnant, no need for antibiotic but she is welcome to discuss this with Dr. Mariposa Caruso.

## 2022-12-09 ENCOUNTER — TELEPHONE (OUTPATIENT)
Dept: ORTHOPEDIC SURGERY | Age: 52
End: 2022-12-09

## 2022-12-09 NOTE — TELEPHONE ENCOUNTER
Received the [place] std form sent to [facility] for payment 12/9/22,MB

## 2022-12-14 ENCOUNTER — TELEMEDICINE (OUTPATIENT)
Dept: INTERNAL MEDICINE CLINIC | Facility: CLINIC | Age: 52
End: 2022-12-14
Payer: COMMERCIAL

## 2022-12-14 ENCOUNTER — NURSE ONLY (OUTPATIENT)
Dept: INTERNAL MEDICINE CLINIC | Facility: CLINIC | Age: 52
End: 2022-12-14

## 2022-12-14 DIAGNOSIS — I10 PRIMARY HYPERTENSION: ICD-10-CM

## 2022-12-14 DIAGNOSIS — H81.11 BENIGN PAROXYSMAL VERTIGO, RIGHT EAR: Primary | ICD-10-CM

## 2022-12-14 DIAGNOSIS — Z01.30 BLOOD PRESSURE CHECK: Primary | ICD-10-CM

## 2022-12-14 DIAGNOSIS — H81.11 BENIGN PAROXYSMAL VERTIGO, RIGHT EAR: ICD-10-CM

## 2022-12-14 PROBLEM — K50.00 TERMINAL ILEITIS WITHOUT COMPLICATION (HCC): Status: RESOLVED | Noted: 2021-04-22 | Resolved: 2022-12-14

## 2022-12-14 LAB
ALBUMIN SERPL-MCNC: 3.5 G/DL (ref 3.5–5)
ALBUMIN/GLOB SERPL: 1 (ref 0.4–1.6)
ALP SERPL-CCNC: 109 U/L (ref 50–136)
ALT SERPL-CCNC: 48 U/L (ref 12–65)
ANION GAP SERPL CALC-SCNC: 4 MMOL/L (ref 2–11)
AST SERPL-CCNC: 23 U/L (ref 15–37)
BILIRUB SERPL-MCNC: 0.6 MG/DL (ref 0.2–1.1)
BUN SERPL-MCNC: 9 MG/DL (ref 6–23)
CALCIUM SERPL-MCNC: 9 MG/DL (ref 8.3–10.4)
CHLORIDE SERPL-SCNC: 106 MMOL/L (ref 101–110)
CO2 SERPL-SCNC: 31 MMOL/L (ref 21–32)
CREAT SERPL-MCNC: 1 MG/DL (ref 0.6–1)
GLOBULIN SER CALC-MCNC: 3.5 G/DL (ref 2.8–4.5)
GLUCOSE SERPL-MCNC: 94 MG/DL (ref 65–100)
HCT VFR BLD AUTO: 42.1 % (ref 35.8–46.3)
HGB BLD-MCNC: 13.8 G/DL (ref 11.7–15.4)
POTASSIUM SERPL-SCNC: 3.5 MMOL/L (ref 3.5–5.1)
PROT SERPL-MCNC: 7 G/DL (ref 6.3–8.2)
SODIUM SERPL-SCNC: 141 MMOL/L (ref 133–143)

## 2022-12-14 PROCEDURE — 99214 OFFICE O/P EST MOD 30 MIN: CPT | Performed by: NURSE PRACTITIONER

## 2022-12-14 ASSESSMENT — ENCOUNTER SYMPTOMS
CONSTIPATION: 0
SINUS PAIN: 0
COUGH: 0
EYE ITCHING: 0
COLOR CHANGE: 0
APNEA: 0
ABDOMINAL DISTENTION: 0
DIARRHEA: 0
SHORTNESS OF BREATH: 0
BACK PAIN: 0
NAUSEA: 0
ABDOMINAL PAIN: 0
EYE DISCHARGE: 0
EYE REDNESS: 0
EYE PAIN: 0

## 2022-12-14 NOTE — PROGRESS NOTES
Pt blood pressure was 150/98. Informed pt that cardiology will be reaching out to set up an appointment.     Thank you    Bib Ron

## 2022-12-14 NOTE — PROGRESS NOTES
[unfilled]  [address] [place] [id number]      PROGRESS NOTE    SUBJECTIVE:   Royer Reyes is a 46 y.o. female seen for a follow up visit regarding the following chief complaint:     No chief complaint on file. HPI    Patient presents with concerns of worsening vertigo. This is an ongoing problem but seems worse recently with noted increased vertigo when bending forward. She has symptoms of falling when she bends forward. The dizziness is making it difficult for her to perform normal ADLs due to difficulty walking in a straight line at times and continuous room spinning with head rotation or downward rotation. HTN: taking HCTZ 12.5mg daily. BP elevated at home 150's/80's. Planning ENT consult 1/17/23. Continuing pain management that seems to be improving with trigger point injections.     Current Outpatient Medications   Medication Sig Dispense Refill    Atogepant (QULIPTA) 60 MG TABS Take 60 mg by mouth daily 90 tablet 3    atomoxetine (STRATTERA) 80 MG capsule Take 1 capsule by mouth daily 30 capsule 2    Lidocaine 5 % CREA Topical 5% Lidocaine and 5% Neurontin to apply to affected area up to 4 times/day as needed for pain 45 g 2    pramipexole (MIRAPEX) 1 MG tablet Take 1 tablet by mouth nightly 90 tablet 3    meclizine (ANTIVERT) 12.5 MG tablet Take 12.5 mg by mouth 3 times daily as needed      citalopram (CELEXA) 20 MG tablet Take 1 tablet by mouth every evening 30 tablet 3    vitamin D (CHOLECALCIFEROL) 43109 UNIT CAPS Take 1 capsule by mouth every 7 days 12 capsule 3    zoster recombinant adjuvanted vaccine (SHINGRIX) 50 MCG/0.5ML SUSR injection Inject 0.5 mLs into the muscle See Admin Instructions 1 dose now and repeat in 2-6 months (Patient not taking: No sig reported) 0.5 mL 0    cyclobenzaprine (AMRIX) 15 MG extended release capsule Take 15 mg by mouth daily as needed      pramipexole (MIRAPEX) 0.5 MG tablet Take 1 tablet by mouth at bedtime 90 tablet 1 hydroCHLOROthiazide (HYDRODIURIL) 25 MG tablet Take 12.5 mg by mouth daily      cyanocobalamin 1000 MCG tablet Take 1 tablet by mouth daily 90 tablet 3    pregabalin (LYRICA) 200 MG capsule Take 200 mg by mouth 2 times daily. Belimumab (BENLYSTA) 200 MG/ML SOAJ Inject 200 mg into the skin every 7 days      hydroxychloroquine (PLAQUENIL) 200 MG tablet Take 1 tablet by mouth daily      EVENING PRIMROSE OIL PO Take by mouth      methylfolate (DEPLIN) 7.5 MG TABS tablet Take 1 tablet by mouth in the morning. 30 tablet 5    clonazePAM (KLONOPIN) 0.5 MG tablet Take 1 tablet by mouth daily as needed for Anxiety for up to 90 days. 30 tablet 2    traZODone (DESYREL) 50 MG tablet Take 0.5-1 tablets by mouth nightly 180 tablet 1    Wheat Dextrin (BENEFIBER PO)       LINZESS 72 MCG CAPS capsule Take 1 capsule by mouth daily      naproxen (NAPROSYN) 500 MG tablet Take 1 tablet by mouth as needed      omeprazole (PRILOSEC) 40 MG delayed release capsule Take 1 capsule (40 mg) by mouth 2 (two) times a day 30 mins before breakfast and 30 mins before dinner      cod liver oil CAPS Take 1 capsule by mouth daily      Ubrogepant (UBRELVY) 50 MG TABS Take 1 tablet at the onset of migraine, may repeat in 2 hours. Do not take more then 3 days a week. 16 tablet 11    amitriptyline (ELAVIL) 25 MG tablet TAKE 1 TO 2 TABLETS BY MOUTH EVERY EVENING      Benzoyl Peroxide 2.5 % GEL APPLY TOPICALLY TO FACE AT BEDTIME      diclofenac sodium (VOLTAREN) 1 % GEL Apply 1 g topically as needed      Lidocaine, Anorectal, 5 % CREA Actual prescription: Lidocaine 5%: Neurontin/gabapentin 5% combined topical cream/gelApply to affected area up to 4 times a day as needed for pain      nystatin-triamcinolone (MYCOLOG II) 644743-0.1 UNIT/GM-% cream Apply topically 2 times daily as needed      vitamin E 600 units capsule Take by mouth daily       No current facility-administered medications for this visit.      Allergies   Allergen Reactions    Furosemide Other (See Comments)     Edema of lower extremities      Sulfa Antibiotics Other (See Comments) and Rash     GI Upset    Other Other (See Comments)     Oral steroids       Past Medical History:   Diagnosis Date    Anxiety     Chronic pain     DDD (degenerative disc disease), cervical 11/29/2021    C3-C7 Disc Degeneration w/ Posterior Spondylotic Disc Displaceemnts per MRI    Depressed     GERD (gastroesophageal reflux disease)     Headache     Hypertension     IBS (irritable bowel syndrome)     Insomnia     Neuropathy     Panic attack     Sleep difficulties     TMJ (dislocation of temporomandibular joint) 04/2021     Past Surgical History:   Procedure Laterality Date    CATARACT REMOVAL Bilateral 02/2020    COLONOSCOPY      EGD DELIVER THERMAL ENERGY SPHNCTR/CARDIA GERD      EYE SURGERY      LIPECTOMY      TUBAL LIGATION       Family History   Problem Relation Age of Onset    Heart Disease Father     Hypertension Father     High Cholesterol Mother     GERD Mother      Social History     Tobacco Use    Smoking status: Never    Smokeless tobacco: Former     Types: Chew   Substance Use Topics    Alcohol use: Never       Review of Systems   Constitutional:  Negative for activity change, appetite change, chills, fatigue and fever. HENT:  Negative for congestion, ear pain and sinus pain. Eyes:  Negative for pain, discharge, redness and itching. Respiratory:  Negative for apnea, cough and shortness of breath. Cardiovascular:  Negative for chest pain, palpitations and leg swelling. Gastrointestinal:  Negative for abdominal distention, abdominal pain, constipation, diarrhea and nausea. Endocrine: Negative for cold intolerance and heat intolerance. Genitourinary:  Negative for difficulty urinating, dysuria, enuresis and urgency. Musculoskeletal:  Negative for arthralgias, back pain, joint swelling, myalgias and neck pain. Skin:  Negative for color change and rash.    Neurological:  Positive for dizziness (vertigo with movement). Negative for weakness and headaches. Psychiatric/Behavioral:  Negative for behavioral problems and sleep disturbance. The patient is not nervous/anxious. OBJECTIVE:  There were no vitals taken for this visit. Physical Exam  Constitutional:       General: She is not in acute distress. Appearance: Normal appearance. She is not ill-appearing or toxic-appearing. Cardiovascular:      Rate and Rhythm: Normal rate and regular rhythm. Pulmonary:      Effort: Pulmonary effort is normal. No respiratory distress. Skin:     General: Skin is warm and dry. Neurological:      Mental Status: She is alert. Mental status is at baseline. Psychiatric:         Mood and Affect: Mood normal.         Behavior: Behavior normal.         Thought Content: Thought content normal.         ASSESSMENT and PLAN  Diagnoses and all orders for this visit:    Benign paroxysmal vertigo, right ear  -     Comprehensive Metabolic Panel; Future  -     Hemoglobin and Hematocrit; Future    Primary hypertension    We discussed concerns of uncontrolled vertigo and dizziness that appears to be worsening and making it difficult for her to perform normal daily tasks. We also discussed concerns of recent elevated at home BP readings that may be contributing to this. She has not heard back from her cardiologist yet. She was advised to follow with ENT in January as scheduled and will attempt to offer extended leave in order to assist with recovery. Will plan BP check in office today and will reach out to cardiology to discuss any potential alterations to regimens. Fall risk concerns-to use assistance with ambulation to help promote stability and prevent potential falls when necessary.    Pursuant to the emergency declaration under the [facility], [facility] waiver authority and the [employer] Act, this Virtual Visit was conducted, with patient's consent, to reduce the patient's risk of exposure to COVID-19 and provide continuity of care for an established patient. Services were provided through a video synchronous discussion virtually to substitute for in-person clinic visit. Patient consented to virtual visit. Greater than 50% of this 25 min visit was spent counseling the patient about test results, prognosis, importance of compliance, education about disease process, benefits of medications, instructions for management of acute symptoms, and follow up plans. Follow-up and Dispositions    Return if symptoms worsen or fail to improve.          Woodrow Roach NP, APRN - CNP

## 2022-12-15 ENCOUNTER — OFFICE VISIT (OUTPATIENT)
Dept: BEHAVIORAL/MENTAL HEALTH CLINIC | Age: 52
End: 2022-12-15
Payer: COMMERCIAL

## 2022-12-15 VITALS
SYSTOLIC BLOOD PRESSURE: 130 MMHG | OXYGEN SATURATION: 98 % | WEIGHT: 188 LBS | DIASTOLIC BLOOD PRESSURE: 76 MMHG | BODY MASS INDEX: 36.91 KG/M2 | HEIGHT: 60 IN | HEART RATE: 105 BPM

## 2022-12-15 DIAGNOSIS — F41.1 GENERALIZED ANXIETY DISORDER: ICD-10-CM

## 2022-12-15 DIAGNOSIS — F51.05 INSOMNIA DUE TO MENTAL DISORDER: ICD-10-CM

## 2022-12-15 DIAGNOSIS — G89.4 CHRONIC PAIN DISORDER: ICD-10-CM

## 2022-12-15 DIAGNOSIS — F33.0 MILD EPISODE OF RECURRENT MAJOR DEPRESSIVE DISORDER (HCC): Primary | ICD-10-CM

## 2022-12-15 PROCEDURE — 99214 OFFICE O/P EST MOD 30 MIN: CPT | Performed by: PSYCHIATRY & NEUROLOGY

## 2022-12-15 RX ORDER — ATOMOXETINE 80 MG/1
80 CAPSULE ORAL DAILY
Qty: 90 CAPSULE | Refills: 1 | Status: SHIPPED | OUTPATIENT
Start: 2022-12-15

## 2022-12-15 RX ORDER — CITALOPRAM 20 MG/1
20 TABLET ORAL EVERY MORNING
Qty: 90 TABLET | Refills: 1 | Status: SHIPPED | OUTPATIENT
Start: 2022-12-15

## 2022-12-15 RX ORDER — CLONAZEPAM 0.5 MG/1
0.5 TABLET ORAL 2 TIMES DAILY PRN
Qty: 60 TABLET | Refills: 3 | Status: SHIPPED | OUTPATIENT
Start: 2022-12-15 | End: 2023-04-14

## 2022-12-15 ASSESSMENT — PATIENT HEALTH QUESTIONNAIRE - PHQ9
8. MOVING OR SPEAKING SO SLOWLY THAT OTHER PEOPLE COULD HAVE NOTICED. OR THE OPPOSITE, BEING SO FIGETY OR RESTLESS THAT YOU HAVE BEEN MOVING AROUND A LOT MORE THAN USUAL: 1
7. TROUBLE CONCENTRATING ON THINGS, SUCH AS READING THE NEWSPAPER OR WATCHING TELEVISION: 1
5. POOR APPETITE OR OVEREATING: 1
SUM OF ALL RESPONSES TO PHQ QUESTIONS 1-9: 8
SUM OF ALL RESPONSES TO PHQ QUESTIONS 1-9: 8
4. FEELING TIRED OR HAVING LITTLE ENERGY: 3
SUM OF ALL RESPONSES TO PHQ QUESTIONS 1-9: 8
2. FEELING DOWN, DEPRESSED OR HOPELESS: 1
3. TROUBLE FALLING OR STAYING ASLEEP: 1
10. IF YOU CHECKED OFF ANY PROBLEMS, HOW DIFFICULT HAVE THESE PROBLEMS MADE IT FOR YOU TO DO YOUR WORK, TAKE CARE OF THINGS AT HOME, OR GET ALONG WITH OTHER PEOPLE: 1
6. FEELING BAD ABOUT YOURSELF - OR THAT YOU ARE A FAILURE OR HAVE LET YOURSELF OR YOUR FAMILY DOWN: 0
SUM OF ALL RESPONSES TO PHQ9 QUESTIONS 1 & 2: 1
1. LITTLE INTEREST OR PLEASURE IN DOING THINGS: 0
SUM OF ALL RESPONSES TO PHQ QUESTIONS 1-9: 8

## 2022-12-15 ASSESSMENT — ANXIETY QUESTIONNAIRES
GAD7 TOTAL SCORE: 10
1. FEELING NERVOUS, ANXIOUS, OR ON EDGE: 2
3. WORRYING TOO MUCH ABOUT DIFFERENT THINGS: 1
2. NOT BEING ABLE TO STOP OR CONTROL WORRYING: 1
5. BEING SO RESTLESS THAT IT IS HARD TO SIT STILL: 2
7. FEELING AFRAID AS IF SOMETHING AWFUL MIGHT HAPPEN: 0
4. TROUBLE RELAXING: 3
IF YOU CHECKED OFF ANY PROBLEMS ON THIS QUESTIONNAIRE, HOW DIFFICULT HAVE THESE PROBLEMS MADE IT FOR YOU TO DO YOUR WORK, TAKE CARE OF THINGS AT HOME, OR GET ALONG WITH OTHER PEOPLE: SOMEWHAT DIFFICULT
6. BECOMING EASILY ANNOYED OR IRRITABLE: 1

## 2022-12-15 NOTE — PROGRESS NOTES
Patient:  Marisabel Knapp  Age:  46 y.o.  :  1970     SEX:  female MRN:  [id number]     RACE: Black /      SEEN:  [x]  PATIENT  []  SPOUSE []  OTHER:              PHQ-9  12/15/2022 2022 10/18/2022   Little interest or pleasure in doing things 0 0 3   Little interest or pleasure in doing things - - -   Feeling down, depressed, or hopeless 1 1 3   Trouble falling or staying asleep, or sleeping too much 1 1 3   Trouble falling or staying asleep, or sleeping too much - - -   Feeling tired or having little energy 3 1 3   Feeling tired or having little energy - - -   Poor appetite or overeating 1 1 3   Poor appetite, weight loss, or overeating - - -   Feeling bad about yourself - or that you are a failure or have let yourself or your family down 0 0 3   Feeling bad about yourself - or that you are a failure or have let yourself or your family down - - -   Trouble concentrating on things, such as reading the newspaper or watching television 1 1 3   Trouble concentrating on things such as school, work, reading, or watching TV - - -   Moving or speaking so slowly that other people could have noticed. Or the opposite - being so fidgety or restless that you have been moving around a lot more than usual 1 1 3   Moving or speaking so slowly that other people could have noticed; or the opposite being so fidgety that others notice - - -   Thoughts that you would be better off dead, or of hurting yourself in some way - 0 0   Thoughts of being better off dead, or hurting yourself in some way - - -   PHQ-2 Score 1 1 6   Total Score PHQ 2 - - -   PHQ-9 Total Score 8 6 24   PHQ 9 Score - - -   If you checked off any problems, how difficult have these problems made it for you to do your work, take care of things at home, or get along with other people?  1 0 2   How difficult have these problems made it for you to do your work, take care of your home and get along with others - - - OFELIA-7 SCREENING 12/15/2022 11/8/2022 10/18/2022   Feeling nervous, anxious, or on edge More than half the days Several days Nearly every day   Not being able to stop or control worrying Several days Not at all Nearly every day   Worrying too much about different things Several days Several days Nearly every day   Trouble relaxing Nearly every day Several days Nearly every day   Being so restless that it is hard to sit still More than half the days Not at all Nearly every day   Becoming easily annoyed or irritable Several days Several days Nearly every day   Feeling afraid as if something awful might happen Not at all Not at all Nearly every day   OFELIA-7 Total Score 10 4 21   How difficult have these problems made it for you to do your work, take care of things at home, or get along with other people? Somewhat difficult Not difficult at all Very difficult   Feeling nervous, anxious, or on edge - - -   OFELIA-7 Total Score - - -          Chief complaint:  Pt says anxiety. Subjective:  Seen for follow-up. States she still has anxiety. Driving triggers it. Especially driving and routine like today. Her off time with job ends soon and we will be receiving her paperwork from [employer]. States she went to see her mom and mom was joyous to see her. Patient states she was anxious before seeing her mother. Anxiety happens in spells when she shakes after a trigger and has nervous stomach. Has to stop and take a break and time out. Also struggling with TMJ.  has been working and paying the bills. Daughter in [place]. Son who lives with her is doing a remote job as a tech support. He suffers from sickle cell anemia. He is trying to save up money so he can move out. Patient states she sees her counselor every other week. They found a spot on her dad's kidney. Worries about everyone. Supportive psychotherapy provided. She denies suicidal ideation/homicidal ideations.   Denies symptoms of psychosis. Patient Active Problem List   Diagnosis    Hypoglycemia    Chronic tension-type headache, intractable    Ganglion cyst    Radiculopathy of cervical region    Lymphocytic colitis    Essential tremor    Uterine leiomyoma    Neuropathic pain    Numbness and tingling    Schatzki's ring    Chronic pain syndrome    B12 deficiency    Hiatal hernia with GERD    History of colonic polyps    Left carpal tunnel syndrome    Pharyngoesophageal dysphagia    Anxiety    Vitamin D deficiency    Cervical radicular pain    Bipolar depression ([facility])    Functional dyspepsia    Chronic fatigue    Irritable bowel syndrome with both constipation and diarrhea    Early satiety    De Quervain's tenosynovitis, right    Restless legs syndrome (RLS)    Encounter for medication management    Left hand pain    Nausea    High-tone pelvic floor dysfunction    GERD with esophagitis    Duodenitis    Other constipation    Primary insomnia    Mild episode of recurrent major depressive disorder ([facility])    Erosive gastritis    Right wrist pain    Right forearm pain    Arthritis of carpometacarpal (CMC) joint of right thumb    Polyarthralgia    Palpitations    Generalized osteoarthritis    Pain of paraspinal muscle    Serologic abnormality    Lupus ([facility])    MATEO positive    Benign paroxysmal positional vertigo of right ear    Vertigo, aural, unspecified laterality    Intractable migraine with aura without status migrainosus    Overactive bladder     Denies palpitation,SOB, Chest pain, headaches. In no acute distress. MEDICATION REVIEW:    Current Medications:    Current Outpatient Medications   Medication Sig    clonazePAM (KLONOPIN) 0.5 MG tablet Take 1 tablet by mouth 2 times daily as needed for Anxiety for up to 120 days.     atomoxetine (STRATTERA) 80 MG capsule Take 1 capsule by mouth daily    citalopram (CELEXA) 20 MG tablet Take 1 tablet by mouth every morning    Atogepant (QULIPTA) 60 MG TABS Take 60 mg by mouth daily    Lidocaine 5 % CREA Topical 5% Lidocaine and 5% Neurontin to apply to affected area up to 4 times/day as needed for pain    pramipexole (MIRAPEX) 1 MG tablet Take 1 tablet by mouth nightly    meclizine (ANTIVERT) 12.5 MG tablet Take 12.5 mg by mouth 3 times daily as needed    vitamin D (CHOLECALCIFEROL) 71448 UNIT CAPS Take 1 capsule by mouth every 7 days    cyclobenzaprine (AMRIX) 15 MG extended release capsule Take 15 mg by mouth daily as needed    hydroCHLOROthiazide (HYDRODIURIL) 25 MG tablet Take 12.5 mg by mouth daily    cyanocobalamin 1000 MCG tablet Take 1 tablet by mouth daily    pregabalin (LYRICA) 200 MG capsule Take 200 mg by mouth 2 times daily. Belimumab (BENLYSTA) 200 MG/ML SOAJ Inject 200 mg into the skin every 7 days    hydroxychloroquine (PLAQUENIL) 200 MG tablet Take 1 tablet by mouth daily    EVENING PRIMROSE OIL PO Take by mouth    methylfolate (DEPLIN) 7.5 MG TABS tablet Take 1 tablet by mouth in the morning. traZODone (DESYREL) 50 MG tablet Take 0.5-1 tablets by mouth nightly    Wheat Dextrin (BENEFIBER PO)     LINZESS 72 MCG CAPS capsule Take 1 capsule by mouth daily    naproxen (NAPROSYN) 500 MG tablet Take 1 tablet by mouth as needed    omeprazole (PRILOSEC) 40 MG delayed release capsule Take 1 capsule (40 mg) by mouth 2 (two) times a day 30 mins before breakfast and 30 mins before dinner    cod liver oil CAPS Take 1 capsule by mouth daily    Ubrogepant (UBRELVY) 50 MG TABS Take 1 tablet at the onset of migraine, may repeat in 2 hours. Do not take more then 3 days a week.     amitriptyline (ELAVIL) 25 MG tablet TAKE 1 TO 2 TABLETS BY MOUTH EVERY EVENING    Benzoyl Peroxide 2.5 % GEL APPLY TOPICALLY TO FACE AT BEDTIME    diclofenac sodium (VOLTAREN) 1 % GEL Apply 1 g topically as needed    Lidocaine, Anorectal, 5 % CREA Actual prescription: Lidocaine 5%: Neurontin/gabapentin 5% combined topical cream/gelApply to affected area up to 4 times a day as needed for pain    nystatin-triamcinolone (MYCOLOG II) 330908-4.1 UNIT/GM-% cream Apply topically 2 times daily as needed    vitamin E 600 units capsule Take by mouth daily    zoster recombinant adjuvanted vaccine [facility]) 50 MCG/0.5ML SUSR injection Inject 0.5 mLs into the muscle See Admin Instructions 1 dose now and repeat in 2-6 months (Patient not taking: No sig reported)    pramipexole (MIRAPEX) 0.5 MG tablet Take 1 tablet by mouth at bedtime (Patient not taking: Reported on 12/15/2022)     No current facility-administered medications for this visit. Allergies   Allergen Reactions    Furosemide Other (See Comments)     Edema of lower extremities      Sulfa Antibiotics Other (See Comments) and Rash     GI Upset    Other Other (See Comments)     Oral steroids       Past Medical History, Past Surgical History, Family history, Social History, and Medications were all reviewed with the patient today and updated as necessary.      Compliant with medication: Yes   Side effects from medications:  No     EXAMINATION  Musculoskeletal    GAIT AND STATION   [x] WNL   [] RESTRICTED   [] UNSTEADY WALK        [] ABNORMAL   [] UNBALANCED         PSYCHIATRIC     GENERAL APPEARANCE:   [x]  WELL GROOMED []     DISHEVELED   []  UNKEMPT      []  UNUSUAL/BIZZARE    [] WNL       ATTITUDE:   [x] COOPERATIVE   [] GUARDED   [] SUSPICIOUS      [] HOSTILE                            BEHAVIOR:   [x] CALM   [] HYPERACTIVE   [] MANNERISMS      [] BIZZARE         SPEECH:   [x] NORMAL FOR CLIENT   [] SPONTANEOUS   [] SLURRED   [] WHISPERING      [] LOUD   [] PRESSURED   [] ARTICULATE       EYE CONTACT:   [x] WNL   [] BLANK STARE   [] INTENSE      [] AVOIDANT         MOOD:   [] EUTHYMIC   [x] ANXIOUS   [x] DEPRESSED      [] IRRITABLE   [] ANGRY   [] APATHETIC     AFFECT:   [x] CONGRUENT WITH MOOD   [] FLAT   [] CONSTRICTED      [] INAPPROPRIATE   [] LABILE           THOUGHT PROCESS:   [x] LOGICAL/GOAL-DIRECTED   [] FOI   [] CIRCUMSANTIAL      [] INCOHERENT   [] TANGENTIAL   [] CONCRETE [] PERSEVERATION           THOUGHT CONTENT:                DELUSIONS  [x] DENIES  [] GRANDIOSE  [] PERSECUTORY  [] Jehovah's witness  [] REFERENCE   HALLUCINATIONS  [x] DENIES  [] AUDITORY  [] VISUAL  [] OLFACTORY  [] TACTILE     [] GUSTATORY  [] SOMATIC         OBSESSIONS  [x] DENIES  [] PRESENT         SUICIDAL IDEATION  [x] DENIES  [] PRESENT W/O PLAN  [] PRESENT W/ PLAN       HOMICIDAL IDEATION  [x] DENIES  [] PRESENT W/O PLAN  [] PRESENT W/ PLAN           JUDGEMENT:   [x] GOOD   [] FAIR   [] POOR   INSIGHT:   [x] GOOD   [] FAIR   [] POOR     COGNITION:           SENSORIUM:   [x] ALERT   [] CLOUDED   [] DROWSY     ORIENTATION:   [x] INTACT   [] TIME:  PLACE  PERSON   RECENT & REMOTE MEMORY:   [] NORMAL   [x] OTHER:                  ATTENTION:   [] INTACT   [x] MILD IMPAIRMENT   [] SEVERE IMPAIRMENT     CONCENTRATION:   [] INTACT   [x] MILD IMPAIRMENT   [] SEVERE IMPAIRMENT     LANGUAGE:   [x] AVERAGE   [] ABOVE AVERAGE   [] BELOW AVERAGE     FUND OF KNOWLEDGE:   [] UNABLE TO ASSESS AT THIS TIME   [x] AVERAGE   [] ABOVE AVERAGE   [] BELOW AVERAGE      [] GOOD TO EXCELLENT KNOWLEDGE OF CURRENT EVENTS   [] POOR TO NO KNLEDGE OF CURRENT EVENTS           ABNORMAL MOVEMENTS:   [x] NONE   [] TICS   [] TREMORS   [] BIZZARE      [] FACE   [] TRUNK   [] EXTREMETIES   [] GESTURES        SLEEP:   [] GOOD   [x] FAIR   [] POOR      MUSCLE STRENGTH AND TONE   [] WNL   [] ATROPHY   [] SPASTIC        [] FLACCID   [] COGWHEEL         Diagnoses/Impressions:    ICD-10-CM    1. Mild episode of recurrent major depressive disorder ([facility]  F33.0       2. Generalized anxiety disorder  F41.1 clonazePAM (KLONOPIN) 0.5 MG tablet      3. Insomnia due to mental disorder  F51.05       4.  Chronic pain disorder  G89.4           TREATMENT GOALS:  Symptom reduction, Medication adherence, maintain therapeutic gains    LABS/IMAGING:    []  Ordered [x]  Reviewed []  New Labs Ordered:     LAB  WBC   Date/Time Value Ref Range Status   10/07/2022 09:07 AM 9.5 4.3 - 11.1 K/uL Final     Hemoglobin   Date/Time Value Ref Range Status   12/14/2022 09:56 AM 13.8 11.7 - 15.4 g/dL Final     Hematocrit   Date/Time Value Ref Range Status   12/14/2022 09:56 AM 42.1 35.8 - 46.3 % Final     Platelets   Date/Time Value Ref Range Status   10/07/2022 09:07  150 - 450 K/uL Final     Sodium   Date/Time Value Ref Range Status   12/14/2022 09:56  133 - 143 mmol/L Final     Potassium   Date/Time Value Ref Range Status   12/14/2022 09:56 AM 3.5 3.5 - 5.1 mmol/L Final     Chloride   Date/Time Value Ref Range Status   12/14/2022 09:56  101 - 110 mmol/L Final     CO2   Date/Time Value Ref Range Status   12/14/2022 09:56 AM 31 21 - 32 mmol/L Final     BUN   Date/Time Value Ref Range Status   12/14/2022 09:56 AM 9 6 - 23 MG/DL Final     Magnesium   Date/Time Value Ref Range Status   05/12/2022 09:05 AM 2.1 1.2 - 2.6 mg/dL Final     ALT   Date/Time Value Ref Range Status   12/14/2022 09:56 AM 48 12 - 65 U/L Final     AST   Date/Time Value Ref Range Status   12/14/2022 09:56 AM 23 15 - 37 U/L Final     TSH   Date/Time Value Ref Range Status   05/02/2022 12:00 AM 0.901 0.450 - 4.500 uIU/mL Final       Please refer to the lab tab in the epic and care everywhere for the most recent lab results. Plan:     [x]  Medication ordered: Strattera, Celexa, Klonopin and trazodone to target depression, anxiety, insomnia. [x]  Medication education/counseling provided  Medication dosage and time to take, purpose/expected benefits/risks, common side effects, lab monitoring required and reason, expected length of treatment, risk of no treatment, effects on pregnancy/nursing, financial availability.  Educated patient on  side effects/risks/benefits of meds including  cardiac arrhythmias, suicidal ideations, orthostatic hypotension, serotonin syndrome, risk of jesica/hypomania from antidepressants, withdrawals from abrupt discontinuation of meds,risk of bleeding, risk of seizures, addiction potential, memory impairment with long term use of benzos, respiratory depression, high blood pressure, dizziness, drowsiness, sedation , risk of falls, Risk & benefits discussed: including but not limited to possible off-label medication usage. [x] Follow MSE for sxs improvement     I have reviewed the patients controlled substance prescription history, as maintained in the [place] prescription monitoring program, so that the prescription(s) for a  controlled substance can be given. Recommendations and Referrals: Follow up with : MD, requires monitoring of response to medication, requires monitoring of medication side effects. Time until next PMA:     Follow up with Mental Health Clinicians: psychotherapy interventions, improve level of functioning, monitoring to prevent decompensation /hospitalization, monitoring to maintain therapeutic gains, symptoms (resolving and controlled)           Psychotherapy note:                                __10_ Minutes of psychotherapy     [x]  Supportive psychotherapy, Patient discussed certain situational and personal stressors ongoing in her life at this time, weight management d/w the patient. Sleep hygiene d/w patient. Patient allowed to vent out her emotions. Scenarios were reviewed using role playing and CBT techniques in order to increase insight and decrease anxiety. []  Disposition planning      []  Dangerous and will not contract for safety in the community    **[place] has been notified: They are to call 911 or go to their nearest E.R. if they are experiencing a medical emergency or suicidal ideations/homicidal ideations. **  All ancillary documentation entered reviewed by provider. PLEASE NOTE:  This document has been produced in part or whole using voice recognition software. Proofread however unrecognized errors in transcription may be present.         Jone Mclean MD

## 2022-12-16 ENCOUNTER — TELEPHONE (OUTPATIENT)
Dept: BEHAVIORAL/MENTAL HEALTH CLINIC | Age: 52
End: 2022-12-16

## 2022-12-16 NOTE — TELEPHONE ENCOUNTER
Received forms from the [facility]. Patient needs to know if she needs a new appt or if this can be filled out based upon the visit from 12/15. They also require all records to be sent from 10/1/22-present. Forms placed for Dr Brittanie Heaton to review.

## 2022-12-19 ENCOUNTER — TELEPHONE (OUTPATIENT)
Dept: BEHAVIORAL/MENTAL HEALTH CLINIC | Age: 52
End: 2022-12-19

## 2022-12-19 NOTE — TELEPHONE ENCOUNTER
I received the following message from patient \"One thing I forgot to review with you is tremors. Neurologist said it is due to physiological related to aniexty. \"

## 2022-12-20 ENCOUNTER — NURSE ONLY (OUTPATIENT)
Dept: CARDIOLOGY CLINIC | Age: 52
End: 2022-12-20

## 2022-12-20 VITALS
SYSTOLIC BLOOD PRESSURE: 158 MMHG | DIASTOLIC BLOOD PRESSURE: 90 MMHG | HEART RATE: 96 BPM | BODY MASS INDEX: 36.91 KG/M2 | WEIGHT: 188 LBS | HEIGHT: 60 IN

## 2022-12-20 DIAGNOSIS — I10 ESSENTIAL HYPERTENSION: Primary | ICD-10-CM

## 2022-12-20 RX ORDER — METOPROLOL SUCCINATE 25 MG/1
25 TABLET, EXTENDED RELEASE ORAL DAILY
Qty: 30 TABLET | Refills: 5 | Status: SHIPPED | OUTPATIENT
Start: 2022-12-20

## 2022-12-20 NOTE — PROGRESS NOTES
Pt came in for bp check per Dr. Siri Spring. Checked pt vitals & presented to Dr. Siri Spring. Per Dr. Siri Spring: start pt on metoprolol succinate 25mg daily. Set up appt with Dr. Siri Spring 8-12 weeks. Remind pt to practice deep breathing & calming exercises. Pt voiced understanding. Rx sent to Dr. Siri Spring to sign. Appt scheduled 2/21/23.

## 2022-12-28 ENCOUNTER — TELEPHONE (OUTPATIENT)
Dept: ORTHOPEDIC SURGERY | Age: 52
End: 2022-12-28

## 2022-12-30 ENCOUNTER — TELEPHONE (OUTPATIENT)
Dept: BEHAVIORAL/MENTAL HEALTH CLINIC | Age: 52
End: 2022-12-30

## 2022-12-30 NOTE — TELEPHONE ENCOUNTER
The [facility] is requesting the records  from 10/1/22 to present to go with the forms that were just completed for patient.

## 2022-12-31 ENCOUNTER — PATIENT MESSAGE (OUTPATIENT)
Dept: INTERNAL MEDICINE CLINIC | Facility: CLINIC | Age: 52
End: 2022-12-31

## 2022-12-31 DIAGNOSIS — G25.81 RLS (RESTLESS LEGS SYNDROME): ICD-10-CM

## 2023-01-04 ENCOUNTER — TELEPHONE (OUTPATIENT)
Dept: BEHAVIORAL/MENTAL HEALTH CLINIC | Age: 53
End: 2023-01-04

## 2023-01-04 NOTE — TELEPHONE ENCOUNTER
From: Samuel Wilhelm  To: [facility]  Sent: 12/31/2022 10:12 AM EST  Subject: Authorization     [facility] cancelled my order for pramipexole 1 mg due to no response on authorization.  Will you please check to see if you received this request

## 2023-01-04 NOTE — TELEPHONE ENCOUNTER
----- Message from Candida Cortez sent at 1/3/2023  5:31 PM EST -----  Regarding: Change   Hello  I have been very nervous and jittery for the past couple weeks. I am also having issues with fine hand manipulation. My hand is shaking and when writing something long the writing gets worse.

## 2023-01-04 NOTE — TELEPHONE ENCOUNTER
Patient stated \"I have one Dr Davie Mascorro with [facility].   He states it is not neurological.\"

## 2023-01-05 ENCOUNTER — OFFICE VISIT (OUTPATIENT)
Dept: INTERNAL MEDICINE CLINIC | Facility: CLINIC | Age: 53
End: 2023-01-05
Payer: COMMERCIAL

## 2023-01-05 VITALS
SYSTOLIC BLOOD PRESSURE: 140 MMHG | WEIGHT: 186 LBS | TEMPERATURE: 97.2 F | DIASTOLIC BLOOD PRESSURE: 90 MMHG | HEIGHT: 60 IN | BODY MASS INDEX: 36.52 KG/M2 | HEART RATE: 88 BPM | OXYGEN SATURATION: 97 %

## 2023-01-05 DIAGNOSIS — R23.3 BRUISING, SPONTANEOUS: Primary | ICD-10-CM

## 2023-01-05 DIAGNOSIS — G57.01 PIRIFORMIS SYNDROME OF RIGHT SIDE: ICD-10-CM

## 2023-01-05 DIAGNOSIS — T14.8XXD DELAYED WOUND HEALING: ICD-10-CM

## 2023-01-05 PROCEDURE — 99214 OFFICE O/P EST MOD 30 MIN: CPT | Performed by: NURSE PRACTITIONER

## 2023-01-05 RX ORDER — PRAMIPEXOLE DIHYDROCHLORIDE 1 MG/1
1 TABLET ORAL NIGHTLY
Qty: 90 TABLET | Refills: 3 | Status: SHIPPED | OUTPATIENT
Start: 2023-01-05

## 2023-01-05 ASSESSMENT — ENCOUNTER SYMPTOMS
NAUSEA: 0
ABDOMINAL DISTENTION: 0
ABDOMINAL PAIN: 0
EYE DISCHARGE: 0
COLOR CHANGE: 0
SHORTNESS OF BREATH: 0
SINUS PAIN: 0
EYE REDNESS: 0
DIARRHEA: 0
COUGH: 0
CONSTIPATION: 0
BACK PAIN: 0
EYE PAIN: 0
EYE ITCHING: 0
APNEA: 0

## 2023-01-05 ASSESSMENT — PATIENT HEALTH QUESTIONNAIRE - PHQ9
SUM OF ALL RESPONSES TO PHQ QUESTIONS 1-9: 0
SUM OF ALL RESPONSES TO PHQ9 QUESTIONS 1 & 2: 0
SUM OF ALL RESPONSES TO PHQ QUESTIONS 1-9: 0
1. LITTLE INTEREST OR PLEASURE IN DOING THINGS: 0
SUM OF ALL RESPONSES TO PHQ QUESTIONS 1-9: 0
SUM OF ALL RESPONSES TO PHQ QUESTIONS 1-9: 0
2. FEELING DOWN, DEPRESSED OR HOPELESS: 0

## 2023-01-05 NOTE — PROGRESS NOTES
[address]      PROGRESS NOTE    SUBJECTIVE:   Robert Hood is a 46 y.o. female seen for a follow up visit regarding the following chief complaint:     Chief Complaint   Patient presents with    Bleeding/Bruising    Hip Pain     R       Hip Pain       Patient follows up for concerns of ongoing spontaneous bruising on right arm that for several weeks. She denies any known injury. She has a past history of lupus and has been following rheumatology for management and taking Benlysta routinely. She also notes some delayed wound healing and is having some scant bleeding from small superficial pinpoint hole on lower left leg for the past month. Abdomen with some bruising. Denies any pain, swelling, hematuria or nosebleeds. Additional complaints of right hip pain. Following pain management for this. Receiving steroid injections without resolution. She is complaining of radiating pain down right leg and worse with driving and siting in car. Current Outpatient Medications   Medication Sig Dispense Refill    pramipexole (MIRAPEX) 1 MG tablet Take 1 tablet by mouth nightly 90 tablet 3    Multiple Vitamins-Minerals (MULTIVITAMIN WOMEN 50+ PO) Take by mouth      metoprolol succinate (TOPROL XL) 25 MG extended release tablet Take 1 tablet by mouth daily 30 tablet 5    clonazePAM (KLONOPIN) 0.5 MG tablet Take 1 tablet by mouth 2 times daily as needed for Anxiety for up to 120 days.  60 tablet 3    atomoxetine (STRATTERA) 80 MG capsule Take 1 capsule by mouth daily 90 capsule 1    citalopram (CELEXA) 20 MG tablet Take 1 tablet by mouth every morning 90 tablet 1    Atogepant (QULIPTA) 60 MG TABS Take 60 mg by mouth daily (Patient taking differently: Take 60 mg by mouth every other day) 90 tablet 3    Lidocaine 5 % CREA Topical 5% Lidocaine and 5% Neurontin to apply to affected area up to 4 times/day as needed for pain 45 g 2    meclizine (ANTIVERT) 12.5 MG tablet Take 12.5 mg by mouth 3 times daily as needed      vitamin D (CHOLECALCIFEROL) 43264 UNIT CAPS Take 1 capsule by mouth every 7 days 12 capsule 3    cyclobenzaprine (AMRIX) 15 MG extended release capsule Take 15 mg by mouth daily as needed      hydroCHLOROthiazide (HYDRODIURIL) 25 MG tablet Take 12.5 mg by mouth daily      cyanocobalamin 1000 MCG tablet Take 1 tablet by mouth daily 90 tablet 3    pregabalin (LYRICA) 200 MG capsule Take 200 mg by mouth 2 times daily. Belimumab (BENLYSTA) 200 MG/ML SOAJ Inject 200 mg into the skin every 7 days      hydroxychloroquine (PLAQUENIL) 200 MG tablet Take 1 tablet by mouth daily      EVENING PRIMROSE OIL PO Take by mouth      methylfolate (DEPLIN) 7.5 MG TABS tablet Take 1 tablet by mouth in the morning. 30 tablet 5    traZODone (DESYREL) 50 MG tablet Take 0.5-1 tablets by mouth nightly 180 tablet 1    Wheat Dextrin (BENEFIBER PO)       LINZESS 72 MCG CAPS capsule Take 1 capsule by mouth daily      naproxen (NAPROSYN) 500 MG tablet Take 1 tablet by mouth as needed      omeprazole (PRILOSEC) 40 MG delayed release capsule Take 1 capsule (40 mg) by mouth 2 (two) times a day 30 mins before breakfast and 30 mins before dinner      cod liver oil CAPS Take 1 capsule by mouth daily      Ubrogepant (UBRELVY) 50 MG TABS Take 1 tablet at the onset of migraine, may repeat in 2 hours. Do not take more then 3 days a week.  16 tablet 11    amitriptyline (ELAVIL) 25 MG tablet TAKE 1 TABLET BY MOUTH EVERY EVENING      Benzoyl Peroxide 2.5 % GEL APPLY TOPICALLY TO FACE AT BEDTIME      diclofenac sodium (VOLTAREN) 1 % GEL Apply 1 g topically as needed      Lidocaine, Anorectal, 5 % CREA Actual prescription: Lidocaine 5%: Neurontin/gabapentin 5% combined topical cream/gelApply to affected area up to 4 times a day as needed for pain      nystatin-triamcinolone (MYCOLOG II) 386477-7.1 UNIT/GM-% cream Apply topically 2 times daily as needed      vitamin E 600 units capsule Take by mouth daily No current facility-administered medications for this visit. Allergies   Allergen Reactions    Furosemide Other (See Comments)     Edema of lower extremities      Sulfa Antibiotics Other (See Comments) and Rash     GI Upset    Other Other (See Comments)     Oral steroids       Past Medical History:   Diagnosis Date    Anxiety     Chronic pain     DDD (degenerative disc disease), cervical 11/29/2021    C3-C7 Disc Degeneration w/ Posterior Spondylotic Disc Displaceemnts per MRI    Depressed     GERD (gastroesophageal reflux disease)     Headache     Hypertension     IBS (irritable bowel syndrome)     Insomnia     Neuropathy     Panic attack     Sleep difficulties     TMJ (dislocation of temporomandibular joint) 04/2021     Past Surgical History:   Procedure Laterality Date    CATARACT REMOVAL Bilateral 02/2020    COLONOSCOPY      EGD DELIVER THERMAL ENERGY SPHNCTR/CARDIA GERD      EYE SURGERY      LIPECTOMY      TUBAL LIGATION       Family History   Problem Relation Age of Onset    Heart Disease Father     Hypertension Father     High Cholesterol Mother     GERD Mother      Social History     Tobacco Use    Smoking status: Never    Smokeless tobacco: Former     Types: Chew   Substance Use Topics    Alcohol use: Never       Review of Systems   Constitutional:  Negative for activity change, appetite change, chills, fatigue and fever. HENT:  Negative for congestion, ear pain and sinus pain. Eyes:  Negative for pain, discharge, redness and itching. Respiratory:  Negative for apnea, cough and shortness of breath. Cardiovascular:  Negative for chest pain, palpitations and leg swelling. Gastrointestinal:  Negative for abdominal distention, abdominal pain, constipation, diarrhea and nausea. Endocrine: Negative for cold intolerance and heat intolerance. Bruises easily   Genitourinary:  Negative for difficulty urinating, dysuria, enuresis and urgency.    Musculoskeletal:  Negative for arthralgias, back pain, joint swelling, myalgias and neck pain.        Right hip pain   Skin:  Negative for color change and rash.        Delayed wound healing   Neurological:  Negative for dizziness, weakness and headaches.   Psychiatric/Behavioral:  Negative for behavioral problems and sleep disturbance. The patient is not nervous/anxious.        OBJECTIVE:  BP (!) 140/90 (Site: Left Upper Arm, Position: Sitting, Cuff Size: Large Adult)   Pulse 88   Temp 97.2 °F (36.2 °C) (Temporal)   Ht 5' (1.524 m)   Wt 186 lb (84.4 kg)   SpO2 97%   BMI 36.33 kg/m²      Physical Exam  Constitutional:       General: She is not in acute distress.     Appearance: Normal appearance. She is not ill-appearing or toxic-appearing.   Cardiovascular:      Rate and Rhythm: Normal rate and regular rhythm.   Pulmonary:      Effort: Pulmonary effort is normal. No respiratory distress.   Musculoskeletal:         General: Tenderness (pain at piriformis muscle with deep palpation) present.   Skin:     General: Skin is warm and dry.   Neurological:      Mental Status: She is alert. Mental status is at baseline.   Psychiatric:         Mood and Affect: Mood normal.         Behavior: Behavior normal.         Thought Content: Thought content normal.         ASSESSMENT and PLAN  Sarah was seen today for bleeding/bruising and hip pain.    Diagnoses and all orders for this visit:    Bruising, spontaneous  -     CBC with Auto Differential; Future  -     Protime-INR; Future  -     APTT; Future  -     Von Willebrand Antigen; Future  -     Platelet Function Assay; Future    Delayed wound healing  -     CBC with Auto Differential; Future    Piriformis syndrome of right side  -     XR HIP RIGHT (2-3 VIEWS); Future    We discussed following with pain management and obtaining hip xray per request. Obtain baseline labs for concerns of bruising and delayed wound healing, although this seems atypical with it being on one extremity primarily. Will consider hematology  referral pending workup. Greater than 50% of this 35 min visit was spent counseling the patient about test results, prognosis, importance of compliance, education about disease process, benefits of medications, instructions for management of acute symptoms, and follow up plans. Follow-up and Dispositions    Return for labs/xray anytime. Taylor Parks NP, APRN - CNP

## 2023-01-06 ENCOUNTER — HOSPITAL ENCOUNTER (OUTPATIENT)
Dept: GENERAL RADIOLOGY | Age: 53
Discharge: HOME | End: 2023-01-06
Payer: COMMERCIAL

## 2023-01-06 DIAGNOSIS — G57.01 PIRIFORMIS SYNDROME OF RIGHT SIDE: ICD-10-CM

## 2023-01-06 DIAGNOSIS — R23.3 BRUISING, SPONTANEOUS: ICD-10-CM

## 2023-01-06 DIAGNOSIS — T14.8XXD DELAYED WOUND HEALING: ICD-10-CM

## 2023-01-06 LAB
APTT PPP: 31.6 SEC (ref 24.5–34.2)
INR PPP: 1
PROTHROMBIN TIME: 13.4 SEC (ref 12.6–14.3)

## 2023-01-06 PROCEDURE — 73502 X-RAY EXAM HIP UNI 2-3 VIEWS: CPT

## 2023-01-07 LAB
BASOPHILS # BLD: 0.1 K/UL (ref 0–0.2)
BASOPHILS NFR BLD: 1 % (ref 0–2)
DIFFERENTIAL METHOD BLD: ABNORMAL
EOSINOPHIL # BLD: 0.1 K/UL (ref 0–0.8)
EOSINOPHIL NFR BLD: 1 % (ref 0.5–7.8)
ERYTHROCYTE [DISTWIDTH] IN BLOOD BY AUTOMATED COUNT: 16.8 % (ref 11.9–14.6)
HCT VFR BLD AUTO: 41.8 % (ref 35.8–46.3)
HGB BLD-MCNC: 13 G/DL (ref 11.7–15.4)
IMM GRANULOCYTES # BLD AUTO: 0.1 K/UL (ref 0–0.5)
IMM GRANULOCYTES NFR BLD AUTO: 1 % (ref 0–5)
LYMPHOCYTES # BLD: 1.7 K/UL (ref 0.5–4.6)
LYMPHOCYTES NFR BLD: 18 % (ref 13–44)
MCH RBC QN AUTO: 26.4 PG (ref 26.1–32.9)
MCHC RBC AUTO-ENTMCNC: 31.1 G/DL (ref 31.4–35)
MCV RBC AUTO: 85 FL (ref 82–102)
MONOCYTES # BLD: 0.8 K/UL (ref 0.1–1.3)
MONOCYTES NFR BLD: 9 % (ref 4–12)
NEUTS SEG # BLD: 6.9 K/UL (ref 1.7–8.2)
NEUTS SEG NFR BLD: 71 % (ref 43–78)
NRBC # BLD: 0 K/UL (ref 0–0.2)
PLATELET # BLD AUTO: 219 K/UL (ref 150–450)
PMV BLD AUTO: 13.7 FL (ref 9.4–12.3)
RBC # BLD AUTO: 4.92 M/UL (ref 4.05–5.2)
WBC # BLD AUTO: 9.8 K/UL (ref 4.3–11.1)

## 2023-01-08 LAB — VWF AG ACT/NOR PPP IA: 188 % (ref 50–200)

## 2023-01-12 ENCOUNTER — OFFICE VISIT (OUTPATIENT)
Dept: BEHAVIORAL/MENTAL HEALTH CLINIC | Age: 53
End: 2023-01-12
Payer: COMMERCIAL

## 2023-01-12 VITALS
SYSTOLIC BLOOD PRESSURE: 122 MMHG | TEMPERATURE: 97.7 F | OXYGEN SATURATION: 97 % | DIASTOLIC BLOOD PRESSURE: 82 MMHG | BODY MASS INDEX: 35.93 KG/M2 | WEIGHT: 183 LBS | HEIGHT: 60 IN | HEART RATE: 82 BPM

## 2023-01-12 DIAGNOSIS — G89.4 CHRONIC PAIN DISORDER: ICD-10-CM

## 2023-01-12 DIAGNOSIS — F51.05 INSOMNIA DUE TO MENTAL DISORDER: ICD-10-CM

## 2023-01-12 DIAGNOSIS — F41.1 GENERALIZED ANXIETY DISORDER: ICD-10-CM

## 2023-01-12 DIAGNOSIS — F33.0 MILD EPISODE OF RECURRENT MAJOR DEPRESSIVE DISORDER (HCC): Primary | ICD-10-CM

## 2023-01-12 DIAGNOSIS — R23.3 BRUISING, SPONTANEOUS: Primary | ICD-10-CM

## 2023-01-12 PROCEDURE — 99214 OFFICE O/P EST MOD 30 MIN: CPT | Performed by: PSYCHIATRY & NEUROLOGY

## 2023-01-12 ASSESSMENT — PATIENT HEALTH QUESTIONNAIRE - PHQ9
10. IF YOU CHECKED OFF ANY PROBLEMS, HOW DIFFICULT HAVE THESE PROBLEMS MADE IT FOR YOU TO DO YOUR WORK, TAKE CARE OF THINGS AT HOME, OR GET ALONG WITH OTHER PEOPLE: 2
8. MOVING OR SPEAKING SO SLOWLY THAT OTHER PEOPLE COULD HAVE NOTICED. OR THE OPPOSITE, BEING SO FIGETY OR RESTLESS THAT YOU HAVE BEEN MOVING AROUND A LOT MORE THAN USUAL: 2
4. FEELING TIRED OR HAVING LITTLE ENERGY: 2
3. TROUBLE FALLING OR STAYING ASLEEP: 1
6. FEELING BAD ABOUT YOURSELF - OR THAT YOU ARE A FAILURE OR HAVE LET YOURSELF OR YOUR FAMILY DOWN: 0
SUM OF ALL RESPONSES TO PHQ QUESTIONS 1-9: 9
1. LITTLE INTEREST OR PLEASURE IN DOING THINGS: 1
SUM OF ALL RESPONSES TO PHQ QUESTIONS 1-9: 9
SUM OF ALL RESPONSES TO PHQ9 QUESTIONS 1 & 2: 2
2. FEELING DOWN, DEPRESSED OR HOPELESS: 1
9. THOUGHTS THAT YOU WOULD BE BETTER OFF DEAD, OR OF HURTING YOURSELF: 0
7. TROUBLE CONCENTRATING ON THINGS, SUCH AS READING THE NEWSPAPER OR WATCHING TELEVISION: 1
5. POOR APPETITE OR OVEREATING: 1

## 2023-01-12 ASSESSMENT — ANXIETY QUESTIONNAIRES
4. TROUBLE RELAXING: 3
3. WORRYING TOO MUCH ABOUT DIFFERENT THINGS: 2
2. NOT BEING ABLE TO STOP OR CONTROL WORRYING: 1
7. FEELING AFRAID AS IF SOMETHING AWFUL MIGHT HAPPEN: 0
IF YOU CHECKED OFF ANY PROBLEMS ON THIS QUESTIONNAIRE, HOW DIFFICULT HAVE THESE PROBLEMS MADE IT FOR YOU TO DO YOUR WORK, TAKE CARE OF THINGS AT HOME, OR GET ALONG WITH OTHER PEOPLE: VERY DIFFICULT
6. BECOMING EASILY ANNOYED OR IRRITABLE: 1
GAD7 TOTAL SCORE: 12
5. BEING SO RESTLESS THAT IT IS HARD TO SIT STILL: 2
1. FEELING NERVOUS, ANXIOUS, OR ON EDGE: 3

## 2023-01-12 NOTE — PROGRESS NOTES
Patient:  Lavern Salazar  Age:  48 y.o.  :  1970     SEX:  female MRN:  [id number]     RACE: Black /      SEEN:  [x]  PATIENT  []  SPOUSE []  OTHER:              PHQ-9  2023 2023 12/15/2022   Little interest or pleasure in doing things 1 0 0   Little interest or pleasure in doing things - - -   Feeling down, depressed, or hopeless 1 0 1   Trouble falling or staying asleep, or sleeping too much 1 - 1   Trouble falling or staying asleep, or sleeping too much - - -   Feeling tired or having little energy 2 - 3   Feeling tired or having little energy - - -   Poor appetite or overeating 1 - 1   Poor appetite, weight loss, or overeating - - -   Feeling bad about yourself - or that you are a failure or have let yourself or your family down 0 - 0   Feeling bad about yourself - or that you are a failure or have let yourself or your family down - - -   Trouble concentrating on things, such as reading the newspaper or watching television 1 - 1   Trouble concentrating on things such as school, work, reading, or watching TV - - -   Moving or speaking so slowly that other people could have noticed. Or the opposite - being so fidgety or restless that you have been moving around a lot more than usual 2 - 1   Moving or speaking so slowly that other people could have noticed; or the opposite being so fidgety that others notice - - -   Thoughts that you would be better off dead, or of hurting yourself in some way 0 - -   Thoughts of being better off dead, or hurting yourself in some way - - -   PHQ-2 Score 2 0 1   Total Score PHQ 2 - - -   PHQ-9 Total Score 9 0 8   PHQ 9 Score - - -   If you checked off any problems, how difficult have these problems made it for you to do your work, take care of things at home, or get along with other people?  2 - 1   How difficult have these problems made it for you to do your work, take care of your home and get along with others - - -       OFELIA-7 SCREENING 1/12/2023 12/15/2022 11/8/2022   Feeling nervous, anxious, or on edge Nearly every day More than half the days Several days   Not being able to stop or control worrying Several days Several days Not at all   Worrying too much about different things More than half the days Several days Several days   Trouble relaxing Nearly every day Nearly every day Several days   Being so restless that it is hard to sit still More than half the days More than half the days Not at all   Becoming easily annoyed or irritable Several days Several days Several days   Feeling afraid as if something awful might happen Not at all Not at all Not at all   OFELIA-7 Total Score 12 10 4   How difficult have these problems made it for you to do your work, take care of things at home, or get along with other people? Very difficult Somewhat difficult Not difficult at all   Feeling nervous, anxious, or on edge - - -   OFELIA-7 Total Score - - -          Chief complaint:  Pt says she has been stressed out with Enid [employer] because she has sent melanophages of notes but they are not satisfied. Subjective:  Seen for follow-up. States has been stressed out with Enid Edwards for her [facility] paperwork. Has sent them millions of pages of notes but they are not satisfied. They think she is sitting in a corporate office but she has to go into the production area as a part of her job description as a . She has been having an anxiety depression insomnia vertigo and her BP has been running high because of the stress of this job. States stress of the job has broken her down and she is crying. They think it is just an office job and should not affect her health. She has been going for physical therapy for her shoulders and neck because of inflammation. Pain level is almost 7 and 8 impacting her walking and driving. She has back and leg pain. Takes muscle relaxants. Gets an injection at the end of this month for back and leg pain. Ankles have been swollen. Not able to lose weight. States she has tremors in her hand and her neurologist said they were psychological tremors. Her neurologist note reviewed which says they are benign physiological tremors. No treatment recommended at this time. Offered to prescribe propranolol for tremors but she declines. States she worked for 9 to 10 years with this company. Wished her belated happy birthday. States she bought a cake square from [employer] as a gift for herself and FaceTime with her daughter. Son lives with them and works. Supportive psychotherapy provided. She denies suicidal ideation/homicidal ideations. Denies symptoms psychosis.     Patient Active Problem List   Diagnosis    Hypoglycemia    Chronic tension-type headache, intractable    Ganglion cyst    Radiculopathy of cervical region    Lymphocytic colitis    Essential tremor    Uterine leiomyoma    Neuropathic pain    Numbness and tingling    Schatzki's ring    Chronic pain syndrome    B12 deficiency    Hiatal hernia with GERD    History of colonic polyps    Left carpal tunnel syndrome    Pharyngoesophageal dysphagia    Anxiety    Vitamin D deficiency    Cervical radicular pain    Bipolar depression (HCC)    Functional dyspepsia    Chronic fatigue    Irritable bowel syndrome with both constipation and diarrhea    Early satiety    De Quervain's tenosynovitis, right    Restless legs syndrome (RLS)    Encounter for medication management    Left hand pain    Nausea    High-tone pelvic floor dysfunction    GERD with esophagitis    Duodenitis    Other constipation    Primary insomnia    Mild episode of recurrent major depressive disorder (HCC)    Erosive gastritis    Right wrist pain    Right forearm pain    Arthritis of carpometacarpal (CMC) joint of right thumb    Polyarthralgia    Palpitations    Generalized osteoarthritis    Pain of paraspinal muscle    Serologic abnormality    Lupus (HCC)    MATEO positive    Benign paroxysmal positional vertigo of right ear    Vertigo, aural, unspecified laterality    Intractable migraine with aura without status migrainosus    Overactive bladder       Denies palpitation,SOB, Chest pain, headaches. In no acute distress. MEDICATION REVIEW:    Current Medications:    Current Outpatient Medications   Medication Sig    NONFORMULARY Turmeric/Ginger Gummy    pramipexole (MIRAPEX) 1 MG tablet Take 1 tablet by mouth nightly    Multiple Vitamins-Minerals (MULTIVITAMIN WOMEN 50+ PO) Take by mouth    metoprolol succinate (TOPROL XL) 25 MG extended release tablet Take 1 tablet by mouth daily    clonazePAM (KLONOPIN) 0.5 MG tablet Take 1 tablet by mouth 2 times daily as needed for Anxiety for up to 120 days. atomoxetine (STRATTERA) 80 MG capsule Take 1 capsule by mouth daily    citalopram (CELEXA) 20 MG tablet Take 1 tablet by mouth every morning    Atogepant (QULIPTA) 60 MG TABS Take 60 mg by mouth daily (Patient taking differently: Take 60 mg by mouth every other day)    Lidocaine 5 % CREA Topical 5% Lidocaine and 5% Neurontin to apply to affected area up to 4 times/day as needed for pain    meclizine (ANTIVERT) 12.5 MG tablet Take 12.5 mg by mouth 3 times daily as needed    vitamin D (CHOLECALCIFEROL) 16427 UNIT CAPS Take 1 capsule by mouth every 7 days    cyclobenzaprine (AMRIX) 15 MG extended release capsule Take 15 mg by mouth daily as needed    hydroCHLOROthiazide (HYDRODIURIL) 25 MG tablet Take 12.5 mg by mouth daily    cyanocobalamin 1000 MCG tablet Take 1 tablet by mouth daily    pregabalin (LYRICA) 200 MG capsule Take 200 mg by mouth 2 times daily. Belimumab (BENLYSTA) 200 MG/ML SOAJ Inject 200 mg into the skin every 7 days    hydroxychloroquine (PLAQUENIL) 200 MG tablet Take 1 tablet by mouth daily    EVENING PRIMROSE OIL PO Take by mouth    methylfolate (DEPLIN) 7.5 MG TABS tablet Take 1 tablet by mouth in the morning.     traZODone (DESYREL) 50 MG tablet Take 0.5-1 tablets by mouth nightly Wheat Dextrin (BENEFIBER PO)     LINZESS 72 MCG CAPS capsule Take 1 capsule by mouth daily    naproxen (NAPROSYN) 500 MG tablet Take 1 tablet by mouth as needed    omeprazole (PRILOSEC) 40 MG delayed release capsule Take 1 capsule (40 mg) by mouth 2 (two) times a day 30 mins before breakfast and 30 mins before dinner    cod liver oil CAPS Take 1 capsule by mouth daily    Ubrogepant (UBRELVY) 50 MG TABS Take 1 tablet at the onset of migraine, may repeat in 2 hours. Do not take more then 3 days a week. amitriptyline (ELAVIL) 25 MG tablet TAKE 1 TABLET BY MOUTH EVERY EVENING    Benzoyl Peroxide 2.5 % GEL APPLY TOPICALLY TO FACE AT BEDTIME    diclofenac sodium (VOLTAREN) 1 % GEL Apply 1 g topically as needed    Lidocaine, Anorectal, 5 % CREA Actual prescription: Lidocaine 5%: Neurontin/gabapentin 5% combined topical cream/gelApply to affected area up to 4 times a day as needed for pain    nystatin-triamcinolone (MYCOLOG II) 856143-3.1 UNIT/GM-% cream Apply topically 2 times daily as needed    vitamin E 600 units capsule Take by mouth daily     No current facility-administered medications for this visit. Allergies   Allergen Reactions    Furosemide Other (See Comments)     Edema of lower extremities      Sulfa Antibiotics Other (See Comments) and Rash     GI Upset    Other Other (See Comments)     Oral steroids       Past Medical History, Past Surgical History, Family history, Social History, and Medications were all reviewed with the patient today and updated as necessary.      Compliant with medication: Yes   Side effects from medications:  No     EXAMINATION  Musculoskeletal    GAIT AND STATION   [x] WNL   [] RESTRICTED   [] UNSTEADY WALK        [] ABNORMAL   [] UNBALANCED         PSYCHIATRIC     GENERAL APPEARANCE:   []  WELL GROOMED [x]     DISHEVELED   []  UNKEMPT      []  UNUSUAL/BIZZARE    [] WNL       ATTITUDE:   [x] COOPERATIVE   [] GUARDED   [] SUSPICIOUS      [] HOSTILE BEHAVIOR:   [x] CALM   [] HYPERACTIVE   [] MANNERISMS      [] BIZZARE         SPEECH:   [x] NORMAL FOR CLIENT   [] SPONTANEOUS   [] SLURRED   [] WHISPERING      [] LOUD   [] PRESSURED   [] ARTICULATE       EYE CONTACT:   [] WNL   [] BLANK STARE   [] INTENSE      [x] AVOIDANT         MOOD:   [] EUTHYMIC   [x] ANXIOUS   [x] DEPRESSED      [] IRRITABLE   [] ANGRY   [] APATHETIC     AFFECT:   [x] CONGRUENT WITH MOOD   [] FLAT   [] CONSTRICTED      [] INAPPROPRIATE   [] LABILE           THOUGHT PROCESS:   [x] LOGICAL/GOAL-DIRECTED   [] FOI   [] CIRCUMSANTIAL      [] INCOHERENT   [] TANGENTIAL   [] CONCRETE      [] PERSEVERATION           THOUGHT CONTENT:                DELUSIONS  [x] DENIES  [] GRANDIOSE  [] PERSECUTORY  [] Latter day  [] REFERENCE   HALLUCINATIONS  [x] DENIES  [] AUDITORY  [] VISUAL  [] OLFACTORY  [] TACTILE     [] GUSTATORY  [] SOMATIC         OBSESSIONS  [x] DENIES  [] PRESENT         SUICIDAL IDEATION  [x] DENIES  [] PRESENT W/O PLAN  [] PRESENT W/ PLAN       HOMICIDAL IDEATION  [x] DENIES  [] PRESENT W/O PLAN  [] PRESENT W/ PLAN           JUDGEMENT:   [x] GOOD   [] FAIR   [] POOR   INSIGHT:   [x] GOOD   [] FAIR   [] POOR     COGNITION:           SENSORIUM:   [x] ALERT   [] CLOUDED   [] DROWSY     ORIENTATION:   [x] INTACT   [] TIME:  PLACE  PERSON   RECENT & REMOTE MEMORY:   [] NORMAL   [x] OTHER:                  ATTENTION:   [x] INTACT   [] MILD IMPAIRMENT   [] SEVERE IMPAIRMENT     CONCENTRATION:   [x] INTACT   [] MILD IMPAIRMENT   [] SEVERE IMPAIRMENT     LANGUAGE:   [x] AVERAGE   [] ABOVE AVERAGE   [] BELOW AVERAGE     FUND OF KNOWLEDGE:   [] UNABLE TO ASSESS AT THIS TIME   [x] AVERAGE   [] ABOVE AVERAGE   [] BELOW AVERAGE      [] GOOD TO EXCELLENT KNOWLEDGE OF CURRENT EVENTS   [] POOR TO NO KNLEDGE OF CURRENT EVENTS           ABNORMAL MOVEMENTS:   [x] NONE   [] TICS   [] TREMORS   [] BIZZARE      [] FACE   [] TRUNK   [] EXTREMETIES   [] GESTURES        SLEEP:   [] GOOD   [x] FAIR   [] POOR MUSCLE STRENGTH AND TONE   [] WNL   [] ATROPHY   [] SPASTIC        [] FLACCID   [] COGWHEEL         Diagnoses/Impressions:    ICD-10-CM    1. Mild episode of recurrent major depressive disorder (HCC)  F33.0       2. Generalized anxiety disorder  F41.1       3. Insomnia due to mental disorder  F51.05       4. Chronic pain disorder  G89.4           TREATMENT GOALS:  Symptom reduction, Medication adherence, maintain therapeutic gains    LABS/IMAGING:    []  Ordered [x]  Reviewed []  New Labs Ordered:     LAB  WBC   Date/Time Value Ref Range Status   01/06/2023 08:51 AM 9.8 4.3 - 11.1 K/uL Final     Hemoglobin   Date/Time Value Ref Range Status   01/06/2023 08:51 AM 13.0 11.7 - 15.4 g/dL Final     Hematocrit   Date/Time Value Ref Range Status   01/06/2023 08:51 AM 41.8 35.8 - 46.3 % Final     Platelets   Date/Time Value Ref Range Status   01/06/2023 08:51  150 - 450 K/uL Final     Sodium   Date/Time Value Ref Range Status   12/14/2022 09:56  133 - 143 mmol/L Final     Potassium   Date/Time Value Ref Range Status   12/14/2022 09:56 AM 3.5 3.5 - 5.1 mmol/L Final     Chloride   Date/Time Value Ref Range Status   12/14/2022 09:56  101 - 110 mmol/L Final     CO2   Date/Time Value Ref Range Status   12/14/2022 09:56 AM 31 21 - 32 mmol/L Final     BUN   Date/Time Value Ref Range Status   12/14/2022 09:56 AM 9 6 - 23 MG/DL Final     Magnesium   Date/Time Value Ref Range Status   05/12/2022 09:05 AM 2.1 1.2 - 2.6 mg/dL Final     ALT   Date/Time Value Ref Range Status   12/14/2022 09:56 AM 48 12 - 65 U/L Final     AST   Date/Time Value Ref Range Status   12/14/2022 09:56 AM 23 15 - 37 U/L Final     TSH   Date/Time Value Ref Range Status   05/02/2022 12:00 AM 0.901 0.450 - 4.500 uIU/mL Final       Please refer to the lab tab in the epic and care everywhere for the most recent lab results.     Plan:     [x]  Medication ordered: Continue current meds at the current doses to target depression, anxiety  and insomnia    [x]  Medication education/counseling provided  Medication dosage and time to take, purpose/expected benefits/risks, common side effects, lab monitoring required and reason, expected length of treatment, risk of no treatment, effects on pregnancy/nursing, financial availability. Educated patient on  side effects/risks/benefits of meds including  cardiac arrhythmias, suicidal ideations,  orthostatic hypotension, serotonin syndrome, risk of jesica/hypomania from antidepressants, withdrawals from abrupt discontinuation of meds, risk of bleeding, risk of seizures, addiction potential, memory impairment with long term use of benzos, respiratory depression, high blood pressure, dizziness, drowsiness, sedation , risk of falls, Risk & benefits discussed: including but not limited to possible off-label medication usage. [x] Follow MSE for sxs improvement     I have reviewed the patients controlled substance prescription history, as maintained in the [place] prescription monitoring program, so that the prescription(s) for a  controlled substance can be given. Recommendations and Referrals: Follow up with : MD, requires monitoring of response to medication, requires monitoring of medication side effects. Time until next PMA:     Follow up with Mental Health Clinicians: psychotherapy interventions, improve level of functioning, monitoring to prevent decompensation /hospitalization, monitoring to maintain therapeutic gains, symptoms (resolving and controlled)           Psychotherapy note:                                __15_ Minutes of psychotherapy     [x]  Supportive psychotherapy, Patient discussed certain situational and personal stressors ongoing in her life at this time, weight management d/w the patient. Sleep hygiene d/w patient. Patient allowed to vent out her emotions. Scenarios were reviewed using role playing and CBT techniques in order to increase insight and decrease anxiety. []  Disposition planning      []  Dangerous and will not contract for safety in the community    **Pateint has been notified: They are to call 911 or go to their nearest E.R. if they are experiencing a medical emergency or suicidal ideations/homicidal ideations. **  All ancillary documentation entered reviewed by provider. PLEASE NOTE:  This document has been produced in part or whole using voice recognition software. Proofread however unrecognized errors in transcription may be present.         Marko Teresa MD

## 2023-01-13 ENCOUNTER — TELEPHONE (OUTPATIENT)
Dept: BEHAVIORAL/MENTAL HEALTH CLINIC | Age: 53
End: 2023-01-13

## 2023-01-13 DIAGNOSIS — R23.3 BRUISING, SPONTANEOUS: ICD-10-CM

## 2023-01-13 DIAGNOSIS — R23.3 BRUISING, SPONTANEOUS: Primary | ICD-10-CM

## 2023-01-13 LAB — ASPIRIN: 444 ARU (ref 620–672)

## 2023-01-13 NOTE — TELEPHONE ENCOUNTER
----- Message from Candida Cortez sent at 1/13/2023  1:18 PM EST -----  Regarding: Weight gain   Hello  You stated the medicine may a contributer to no improvement in weight loss. I would like to review at our next visit how to change my regime. Also you asked about me having,\"the will\". I try and try and something causes me to go down. Yesterday I really took it to heart and cried that I am not getting any better. My life since 2012 had been some form of trauma. I don't know the medical definition of a breakdown but December 21 when my own mother turned on me and I get listed in the police as domestic disturbance. I am heading into a new era and kellie leave me hopeless.

## 2023-01-16 NOTE — TELEPHONE ENCOUNTER
Patient advised she has an upcoming appt with therapy and will discuss and has appt 2/23 with Dr Mumtaz Bailon.

## 2023-01-24 ENCOUNTER — TELEPHONE (OUTPATIENT)
Dept: INTERNAL MEDICINE CLINIC | Facility: CLINIC | Age: 53
End: 2023-01-24

## 2023-01-24 NOTE — TELEPHONE ENCOUNTER
----- Message from Candida Cortez sent at 1/24/2023 11:31 AM EST -----  Regarding: Nancy Morelos paperwork   Claudeen Gosselin will see. d you a new form to complete by transferring the information from Short term. I have exhausted the short term time and have to do the same paperwork for longer. I am not happy this has to be done. The specialist will send in medical notes. The ENT visit confirmed vertigo but he want to send me out to have a test done and start some form of physical therapy. I still can't see Hemotoloy until February 21. Looks like I am getting smaller flare ups on my legs. If we need to set up a yele visit that is fine.

## 2023-01-25 NOTE — TELEPHONE ENCOUNTER
Per Sandor Left: Please let Julio Rome know that I have no indication for further disability based on vertigo or her current treatment regimens for chronic disease management. Rheumatology or ENT would have to initiate this on their end but I will not be able to further this from my side.    [facility]

## 2023-02-01 ENCOUNTER — TELEPHONE (OUTPATIENT)
Dept: BEHAVIORAL/MENTAL HEALTH CLINIC | Age: 53
End: 2023-02-01

## 2023-02-01 NOTE — TELEPHONE ENCOUNTER
----- Message from Candida Cortez sent at 2/1/2023 12:48 PM EST -----  Regarding: Sleeping   For about the past month I am waking up early. It started with 4 am now it's 3 am.   Nothing has changed on medication side. Also I want to do a medication review to see if I can discontinue any of them. Patient does have appt on 2/7.

## 2023-02-07 ENCOUNTER — OFFICE VISIT (OUTPATIENT)
Dept: BEHAVIORAL/MENTAL HEALTH CLINIC | Age: 53
End: 2023-02-07
Payer: COMMERCIAL

## 2023-02-07 VITALS
WEIGHT: 187 LBS | OXYGEN SATURATION: 96 % | TEMPERATURE: 98 F | DIASTOLIC BLOOD PRESSURE: 86 MMHG | HEART RATE: 75 BPM | BODY MASS INDEX: 36.71 KG/M2 | SYSTOLIC BLOOD PRESSURE: 138 MMHG | HEIGHT: 60 IN

## 2023-02-07 DIAGNOSIS — F41.1 GENERALIZED ANXIETY DISORDER: ICD-10-CM

## 2023-02-07 DIAGNOSIS — G89.4 CHRONIC PAIN DISORDER: ICD-10-CM

## 2023-02-07 DIAGNOSIS — F33.0 MILD EPISODE OF RECURRENT MAJOR DEPRESSIVE DISORDER (HCC): Primary | ICD-10-CM

## 2023-02-07 DIAGNOSIS — F51.05 INSOMNIA DUE TO MENTAL DISORDER: ICD-10-CM

## 2023-02-07 PROCEDURE — 99214 OFFICE O/P EST MOD 30 MIN: CPT | Performed by: PSYCHIATRY & NEUROLOGY

## 2023-02-07 RX ORDER — TRAZODONE HYDROCHLORIDE 50 MG/1
50-100 TABLET ORAL NIGHTLY
Qty: 180 TABLET | Refills: 1 | Status: SHIPPED | OUTPATIENT
Start: 2023-02-07

## 2023-02-07 ASSESSMENT — PATIENT HEALTH QUESTIONNAIRE - PHQ9
1. LITTLE INTEREST OR PLEASURE IN DOING THINGS: 1
10. IF YOU CHECKED OFF ANY PROBLEMS, HOW DIFFICULT HAVE THESE PROBLEMS MADE IT FOR YOU TO DO YOUR WORK, TAKE CARE OF THINGS AT HOME, OR GET ALONG WITH OTHER PEOPLE: 1
8. MOVING OR SPEAKING SO SLOWLY THAT OTHER PEOPLE COULD HAVE NOTICED. OR THE OPPOSITE, BEING SO FIGETY OR RESTLESS THAT YOU HAVE BEEN MOVING AROUND A LOT MORE THAN USUAL: 1
4. FEELING TIRED OR HAVING LITTLE ENERGY: 2
SUM OF ALL RESPONSES TO PHQ QUESTIONS 1-9: 11
SUM OF ALL RESPONSES TO PHQ QUESTIONS 1-9: 11
6. FEELING BAD ABOUT YOURSELF - OR THAT YOU ARE A FAILURE OR HAVE LET YOURSELF OR YOUR FAMILY DOWN: 1
7. TROUBLE CONCENTRATING ON THINGS, SUCH AS READING THE NEWSPAPER OR WATCHING TELEVISION: 1
SUM OF ALL RESPONSES TO PHQ QUESTIONS 1-9: 11
SUM OF ALL RESPONSES TO PHQ QUESTIONS 1-9: 11
2. FEELING DOWN, DEPRESSED OR HOPELESS: 1
5. POOR APPETITE OR OVEREATING: 1
9. THOUGHTS THAT YOU WOULD BE BETTER OFF DEAD, OR OF HURTING YOURSELF: 0
SUM OF ALL RESPONSES TO PHQ9 QUESTIONS 1 & 2: 2
3. TROUBLE FALLING OR STAYING ASLEEP: 3

## 2023-02-07 ASSESSMENT — ANXIETY QUESTIONNAIRES
GAD7 TOTAL SCORE: 8
3. WORRYING TOO MUCH ABOUT DIFFERENT THINGS: 1
7. FEELING AFRAID AS IF SOMETHING AWFUL MIGHT HAPPEN: 0
IF YOU CHECKED OFF ANY PROBLEMS ON THIS QUESTIONNAIRE, HOW DIFFICULT HAVE THESE PROBLEMS MADE IT FOR YOU TO DO YOUR WORK, TAKE CARE OF THINGS AT HOME, OR GET ALONG WITH OTHER PEOPLE: SOMEWHAT DIFFICULT
4. TROUBLE RELAXING: 2
6. BECOMING EASILY ANNOYED OR IRRITABLE: 1
2. NOT BEING ABLE TO STOP OR CONTROL WORRYING: 1
1. FEELING NERVOUS, ANXIOUS, OR ON EDGE: 2
5. BEING SO RESTLESS THAT IT IS HARD TO SIT STILL: 1

## 2023-02-07 NOTE — PROGRESS NOTES
Patient:  Eliot Wilcox  Age:  48 y.o.  :  1970     SEX:  female MRN:  [id number]     RACE: Black /      SEEN:  [x]  PATIENT  []  SPOUSE []  OTHER:              PHQ-9  2023   Little interest or pleasure in doing things 1 1 0   Little interest or pleasure in doing things - - -   Feeling down, depressed, or hopeless 1 1 0   Trouble falling or staying asleep, or sleeping too much 3 1 -   Trouble falling or staying asleep, or sleeping too much - - -   Feeling tired or having little energy 2 2 -   Feeling tired or having little energy - - -   Poor appetite or overeating 1 1 -   Poor appetite, weight loss, or overeating - - -   Feeling bad about yourself - or that you are a failure or have let yourself or your family down 1 0 -   Feeling bad about yourself - or that you are a failure or have let yourself or your family down - - -   Trouble concentrating on things, such as reading the newspaper or watching television 1 1 -   Trouble concentrating on things such as school, work, reading, or watching TV - - -   Moving or speaking so slowly that other people could have noticed. Or the opposite - being so fidgety or restless that you have been moving around a lot more than usual 1 2 -   Moving or speaking so slowly that other people could have noticed; or the opposite being so fidgety that others notice - - -   Thoughts that you would be better off dead, or of hurting yourself in some way 0 0 -   Thoughts of being better off dead, or hurting yourself in some way - - -   PHQ-2 Score 2 2 0   Total Score PHQ 2 - - -   PHQ-9 Total Score 11 9 0   PHQ 9 Score - - -   If you checked off any problems, how difficult have these problems made it for you to do your work, take care of things at home, or get along with other people?  1 2 -   How difficult have these problems made it for you to do your work, take care of your home and get along with others - - -       OFELIA-7 SCREENING 2/7/2023 1/12/2023 12/15/2022   Feeling nervous, anxious, or on edge More than half the days Nearly every day More than half the days   Not being able to stop or control worrying Several days Several days Several days   Worrying too much about different things Several days More than half the days Several days   Trouble relaxing More than half the days Nearly every day Nearly every day   Being so restless that it is hard to sit still Several days More than half the days More than half the days   Becoming easily annoyed or irritable Several days Several days Several days   Feeling afraid as if something awful might happen Not at all Not at all Not at all   OFELIA-7 Total Score 8 12 10   How difficult have these problems made it for you to do your work, take care of things at home, or get along with other people? Somewhat difficult Very difficult Somewhat difficult   Feeling nervous, anxious, or on edge - - -   OFELIA-7 Total Score - - -        I was in the office while conducting this encounter. Consent:  She and/or her healthcare decision maker is aware that this patient-initiated Telehealth encounter is a billable service, with coverage as determined by her insurance carrier. She is aware that she may receive a bill and has provided verbal consent to proceed: YesPatient identification was verified, and a caregiver was present when appropriate. The patient was located in a state where the provider was credentialed to provide care. This virtual visit was conducted via [address] Pursuant to the emergency declaration under the [facility] Critical access [facility] waiver authority and the [facility] [employer] [employer] Act, this Virtual  Visit was conducted to reduce the patient's risk of exposure to COVID-19 and provide continuity of care for an established patient.  Services were provided through a video synchronous discussion virtually to substitute for in-person clinic visit. Due to this being a TeleHealth evaluation, many elements of the physical examination are unable to be assessed. Total Time: minutes: 11-20 minutes. Chief complaint:  Pt says she keeps tired and is not sleeping well. Subjective:  Seen for follow-up. States has been keeping tired and not sleeping well. Vertigo is bad. Does not nap in the daytime. Brings in the report of her VNG test.  States she ran out of time on her job and is unemployed now. Has been running into stuff climbing stairs. States job stress was too high. Could not do it anymore. Worked for them for 10 years. Still attending physical therapy at [address] [place] [address]. Very stiff when he wakes up in the morning. Back and leg pain still high. Still has tremors and drops stuff. Supportive psychotherapy provided. Patient allowed to vent her emotions. She denies suicidal ideation/homicidal ideations. Denies symptoms of psychosis.     Patient Active Problem List   Diagnosis    Hypoglycemia    Chronic tension-type headache, intractable    Ganglion cyst    Radiculopathy of cervical region    Lymphocytic colitis    Essential tremor    Uterine leiomyoma    Neuropathic pain    Numbness and tingling    Schatzki's ring    Chronic pain syndrome    B12 deficiency    Hiatal hernia with GERD    History of colonic polyps    Left carpal tunnel syndrome    Pharyngoesophageal dysphagia    Anxiety    Vitamin D deficiency    Cervical radicular pain    Bipolar depression (HCC)    Functional dyspepsia    Chronic fatigue    Irritable bowel syndrome with both constipation and diarrhea    Early satiety    De Quervain's tenosynovitis, right    Restless legs syndrome (RLS)    Encounter for medication management    Left hand pain    Nausea    High-tone pelvic floor dysfunction    GERD with esophagitis    Duodenitis    Other constipation    Primary insomnia    Mild episode of recurrent major depressive disorder (HCC)    Erosive gastritis Right wrist pain    Right forearm pain    Arthritis of carpometacarpal (CMC) joint of right thumb    Polyarthralgia    Palpitations    Generalized osteoarthritis    Pain of paraspinal muscle    Serologic abnormality    Lupus (HCC)    MATEO positive    Benign paroxysmal positional vertigo of right ear    Vertigo, aural, unspecified laterality    Intractable migraine with aura without status migrainosus    Overactive bladder       Denies palpitation,SOB, Chest pain, headaches. In no acute distress. MEDICATION REVIEW:    Current Medications:    Current Outpatient Medications   Medication Sig    traZODone (DESYREL) 50 MG tablet Take 1-2 tablets by mouth nightly    NONFORMULARY Turmeric/Ginger Gummy    pramipexole (MIRAPEX) 1 MG tablet Take 1 tablet by mouth nightly    Multiple Vitamins-Minerals (MULTIVITAMIN WOMEN 50+ PO) Take by mouth    metoprolol succinate (TOPROL XL) 25 MG extended release tablet Take 1 tablet by mouth daily    clonazePAM (KLONOPIN) 0.5 MG tablet Take 1 tablet by mouth 2 times daily as needed for Anxiety for up to 120 days. atomoxetine (STRATTERA) 80 MG capsule Take 1 capsule by mouth daily    citalopram (CELEXA) 20 MG tablet Take 1 tablet by mouth every morning    Atogepant (QULIPTA) 60 MG TABS Take 60 mg by mouth daily (Patient taking differently: Take 60 mg by mouth every other day)    Lidocaine 5 % CREA Topical 5% Lidocaine and 5% Neurontin to apply to affected area up to 4 times/day as needed for pain    meclizine (ANTIVERT) 12.5 MG tablet Take 12.5 mg by mouth 3 times daily as needed    vitamin D (CHOLECALCIFEROL) 15508 UNIT CAPS Take 1 capsule by mouth every 7 days    cyclobenzaprine (AMRIX) 15 MG extended release capsule Take 15 mg by mouth daily as needed    hydroCHLOROthiazide (HYDRODIURIL) 25 MG tablet Take 12.5 mg by mouth daily    cyanocobalamin 1000 MCG tablet Take 1 tablet by mouth daily    pregabalin (LYRICA) 200 MG capsule Take 200 mg by mouth 2 times daily.     Belimumab (BENLYSTA) 200 MG/ML SOAJ Inject 200 mg into the skin every 7 days    hydroxychloroquine (PLAQUENIL) 200 MG tablet Take 1 tablet by mouth daily    EVENING PRIMROSE OIL PO Take by mouth    methylfolate (DEPLIN) 7.5 MG TABS tablet Take 1 tablet by mouth in the morning. Wheat Dextrin (BENEFIBER PO)     LINZESS 72 MCG CAPS capsule Take 1 capsule by mouth daily    naproxen (NAPROSYN) 500 MG tablet Take 1 tablet by mouth as needed    omeprazole (PRILOSEC) 40 MG delayed release capsule Take 1 capsule (40 mg) by mouth 2 (two) times a day 30 mins before breakfast and 30 mins before dinner    cod liver oil CAPS Take 1 capsule by mouth daily    Ubrogepant (UBRELVY) 50 MG TABS Take 1 tablet at the onset of migraine, may repeat in 2 hours. Do not take more then 3 days a week. Benzoyl Peroxide 2.5 % GEL APPLY TOPICALLY TO FACE AT BEDTIME    diclofenac sodium (VOLTAREN) 1 % GEL Apply 1 g topically as needed    Lidocaine, Anorectal, 5 % CREA Actual prescription: Lidocaine 5%: Neurontin/gabapentin 5% combined topical cream/gelApply to affected area up to 4 times a day as needed for pain    nystatin-triamcinolone (MYCOLOG II) 485976-6.1 UNIT/GM-% cream Apply topically 2 times daily as needed    vitamin E 600 units capsule Take by mouth daily     No current facility-administered medications for this visit. Allergies   Allergen Reactions    Furosemide Other (See Comments)     Edema of lower extremities      Sulfa Antibiotics Other (See Comments) and Rash     GI Upset    Other Other (See Comments)     Oral steroids       Past Medical History, Past Surgical History, Family history, Social History, and Medications were all reviewed with the patient today and updated as necessary.      Compliant with medication: Yes   Side effects from medications:  No     EXAMINATION  Musculoskeletal    GAIT AND STATION   [x] WNL   [] RESTRICTED   [] UNSTEADY WALK        [] ABNORMAL   [] UNBALANCED         PSYCHIATRIC     GENERAL APPEARANCE: []  WELL GROOMED [x]     DISHEVELED   []  UNKEMPT      []  UNUSUAL/BIZZARE    [] WNL       ATTITUDE:   [x] COOPERATIVE   [] GUARDED   [] SUSPICIOUS      [] HOSTILE                            BEHAVIOR:   [x] CALM   [] HYPERACTIVE   [] MANNERISMS      [] BIZZARE         SPEECH:   [x] NORMAL FOR CLIENT   [] SPONTANEOUS   [] SLURRED   [] WHISPERING      [] LOUD   [] PRESSURED   [] ARTICULATE       EYE CONTACT:   [x] WNL   [] BLANK STARE   [] INTENSE      [] AVOIDANT         MOOD:   [] EUTHYMIC   [x] ANXIOUS   [x] DEPRESSED      [] IRRITABLE   [] ANGRY   [] APATHETIC     AFFECT:   [x] CONGRUENT WITH MOOD   [] FLAT   [] CONSTRICTED      [] INAPPROPRIATE   [] LABILE           THOUGHT PROCESS:   [x] LOGICAL/GOAL-DIRECTED   [] FOI   [] CIRCUMSANTIAL      [] INCOHERENT   [] TANGENTIAL   [] CONCRETE      [] PERSEVERATION           THOUGHT CONTENT:                DELUSIONS  [x] DENIES  [] GRANDIOSE  [] PERSECUTORY  [] Uatsdin  [] REFERENCE   HALLUCINATIONS  [x] DENIES  [] AUDITORY  [] VISUAL  [] OLFACTORY  [] TACTILE     [] GUSTATORY  [] SOMATIC         OBSESSIONS  [x] DENIES  [] PRESENT         SUICIDAL IDEATION  [x] DENIES  [] PRESENT W/O PLAN  [] PRESENT W/ PLAN       HOMICIDAL IDEATION  [x] DENIES  [] PRESENT W/O PLAN  [] PRESENT W/ PLAN           JUDGEMENT:   [x] GOOD   [] FAIR   [] POOR   INSIGHT:   [x] GOOD   [] FAIR   [] POOR     COGNITION:           SENSORIUM:   [x] ALERT   [] CLOUDED   [] DROWSY     ORIENTATION:   [x] INTACT   [] TIME:  PLACE  PERSON   RECENT & REMOTE MEMORY:   [] NORMAL   [x] OTHER:                  ATTENTION:   [] INTACT   [x] MILD IMPAIRMENT   [] SEVERE IMPAIRMENT     CONCENTRATION:   [] INTACT   [x] MILD IMPAIRMENT   [] SEVERE IMPAIRMENT     LANGUAGE:   [x] AVERAGE   [] ABOVE AVERAGE   [] BELOW AVERAGE     FUND OF KNOWLEDGE:   [] UNABLE TO ASSESS AT THIS TIME   [x] AVERAGE   [] ABOVE AVERAGE   [] BELOW AVERAGE      [] GOOD TO EXCELLENT KNOWLEDGE OF CURRENT EVENTS   [] POOR TO NO KNLEDGE OF CURRENT EVENTS           ABNORMAL MOVEMENTS:   [x] NONE   [] TICS   [] TREMORS   [] BIZZARE      [] FACE   [] TRUNK   [] EXTREMETIES   [] GESTURES        SLEEP:   [] GOOD   [] FAIR   [x] POOR      MUSCLE STRENGTH AND TONE   [] WNL   [] ATROPHY   [] SPASTIC        [] FLACCID   [] COGWHEEL         Diagnoses/Impressions:    ICD-10-CM    1. Mild episode of recurrent major depressive disorder ([facility] [address]  F33.0       2. Generalized anxiety disorder  F41.1       3. Insomnia due to mental disorder  F51.05       4.  Chronic pain disorder  G89.4           TREATMENT GOALS:  Symptom reduction, Medication adherence, maintain therapeutic gains    LABS/IMAGING:    []  Ordered [x]  Reviewed []  New Labs Ordered:     LAB  WBC   Date/Time Value Ref Range Status   01/06/2023 08:51 AM 9.8 4.3 - 11.1 K/uL Final     Hemoglobin   Date/Time Value Ref Range Status   01/06/2023 08:51 AM 13.0 11.7 - 15.4 g/dL Final     Hematocrit   Date/Time Value Ref Range Status   01/06/2023 08:51 AM 41.8 35.8 - 46.3 % Final     Platelets   Date/Time Value Ref Range Status   01/06/2023 08:51  150 - 450 K/uL Final     Sodium   Date/Time Value Ref Range Status   12/14/2022 09:56  133 - 143 mmol/L Final     Potassium   Date/Time Value Ref Range Status   12/14/2022 09:56 AM 3.5 3.5 - 5.1 mmol/L Final     Chloride   Date/Time Value Ref Range Status   12/14/2022 09:56  101 - 110 mmol/L Final     CO2   Date/Time Value Ref Range Status   12/14/2022 09:56 AM 31 21 - 32 mmol/L Final     BUN   Date/Time Value Ref Range Status   12/14/2022 09:56 AM 9 6 - 23 MG/DL Final     Magnesium   Date/Time Value Ref Range Status   05/12/2022 09:05 AM 2.1 1.2 - 2.6 mg/dL Final     ALT   Date/Time Value Ref Range Status   12/14/2022 09:56 AM 48 12 - 65 U/L Final     AST   Date/Time Value Ref Range Status   12/14/2022 09:56 AM 23 15 - 37 U/L Final     TSH   Date/Time Value Ref Range Status   05/02/2022 12:00 AM 0.901 0.450 - 4.500 uIU/mL Final       Please refer to the lab tab in the epic and care everywhere for the most recent lab results. Plan:     [x]  Medication ordered: Add trazodone to target insomnia. Continue rest of the medications at the current doses. [x]  Medication education/counseling provided  Medication dosage and time to take, purpose/expected benefits/risks, common side effects, lab monitoring required and reason, expected length of treatment, risk of no treatment, effects on pregnancy/nursing, financial availability discussed. Educated patient on  side effects/risks/benefits of meds including  cardiac arrhythmias, suicidal ideations,  orthostatic hypotension, serotonin syndrome, risk of jesica/hypomania from antidepressants, withdrawals from abrupt discontinuation of meds, risk of bleeding, risk of seizures, addiction potential, memory impairment with long term use of benzos, respiratory depression, high blood pressure, dizziness, drowsiness, sedation , risk of falls, Risk & benefits discussed: including but not limited to possible off-label medication usage. [x] Follow MSE for sxs improvement     I have reviewed the patients controlled substance prescription history, as maintained in the [place] prescription monitoring program, so that the prescription(s) for a  controlled substance can be given. Recommendations and Referrals: Follow up with : MD, requires monitoring of response to medication, requires monitoring of medication side effects.     Time until next PMA:     Follow up with Mental Health Clinicians: psychotherapy interventions, improve level of functioning, monitoring to prevent decompensation /hospitalization, monitoring to maintain therapeutic gains, symptoms (resolving and controlled)           Psychotherapy note:                                __10_ Minutes of psychotherapy     [x]  Supportive psychotherapy, Patient discussed certain situational and personal stressors ongoing in her life at this time, weight management d/w the patient. Sleep hygiene d/w patient. Patient allowed to vent out her emotions. Scenarios were reviewed using role playing and CBT techniques in order to increase insight and decrease anxiety. []  Disposition planning      []  Dangerous and will not contract for safety in the community    **Pateint has been notified: They are to call 911 or go to their nearest E.R. if they are experiencing a medical emergency or suicidal ideations/homicidal ideations. **  All ancillary documentation entered reviewed by provider. PLEASE NOTE:  This document has been produced in part or whole using voice recognition software. Proofread however unrecognized errors in transcription may be present.         Kalli Moran MD

## 2023-02-09 ENCOUNTER — OFFICE VISIT (OUTPATIENT)
Dept: ORTHOPEDIC SURGERY | Age: 53
End: 2023-02-09
Payer: COMMERCIAL

## 2023-02-09 ENCOUNTER — TELEPHONE (OUTPATIENT)
Dept: INTERNAL MEDICINE CLINIC | Facility: CLINIC | Age: 53
End: 2023-02-09

## 2023-02-09 ENCOUNTER — TELEPHONE (OUTPATIENT)
Dept: NEUROLOGY | Age: 53
End: 2023-02-09

## 2023-02-09 DIAGNOSIS — G57.02 PIRIFORMIS SYNDROME OF LEFT SIDE: ICD-10-CM

## 2023-02-09 DIAGNOSIS — M76.899 HAMSTRING TENDINITIS: Primary | ICD-10-CM

## 2023-02-09 PROCEDURE — 99213 OFFICE O/P EST LOW 20 MIN: CPT | Performed by: SPECIALIST/TECHNOLOGIST

## 2023-02-09 NOTE — TELEPHONE ENCOUNTER
Pt called in stating that her psychiatrist wants her back on Topamax instead of Malathi Mcbride. Please Advise.

## 2023-02-09 NOTE — TELEPHONE ENCOUNTER
----- Message from Candida Cortez sent at 2/9/2023 10:20 AM EST -----  Regarding: Topamax   Hello  Dr Va Gee wants me to start back on the topamax. What was the concern when I stopped it. Was it related to tremors. I sent message to Dr Phylicia Ortega but no response.

## 2023-02-09 NOTE — PROGRESS NOTES
Name: Merlyn Flores  YOB: 1970  Gender: female  MRN: [id number]      CC: Follow-up (Left hip)       HPI: Merlyn Flores is a 48 y.o. female who returns for follow up on her left hip femoroacetabular impingement. She was treated with an intra-articular injection in May, 2022 and ended up with great relief of her symptoms. Over the past month she has started having pain in her posterior buttocks down to her knee. The pain typically does not radiate to the front of the thigh. In the past her pain management physician has injected her piriformis on the right side. Physical Examination:  General: no acute distress  Lungs: breathing easily  CV: regular rhythm by pulse  Left Hip: Tenderness to palpation deep to the gluteal muscle. Tenderness to palpation in the muscle belly and hamstring tendons. Active hip flexion to 100 degrees with with pain in the posterior aspect of the hip. Passive external rotation to 40 degrees, internal rotation to 5. Pain with resisted knee flexion. Negative Richard's. Pain with FADIR. Negative Stinchfield, negative straight leg raise. Assessment:     ICD-10-CM    1. Hamstring tendinitis  M76.899 Amb External Referral To Physical Therapy      2. Piriformis syndrome of left side  G57.02 External Referral To Pain Medicine          Plan:   The majority of the patient's left sided hip pain may be due to piriformis syndrome and hamstring tendinitis. I discussed with the patient conservative treatment options to include return to her pain management doctor for consideration of a piriformis injection on the left and formal physical therapy. We will order formal physical therapy today which I think will be the mainstay of her treatment. Return to see me as needed.             Angella Bangura, P  Orthopaedics and Sports Medicine

## 2023-02-10 ENCOUNTER — TELEPHONE (OUTPATIENT)
Dept: BEHAVIORAL/MENTAL HEALTH CLINIC | Age: 53
End: 2023-02-10

## 2023-02-10 ENCOUNTER — TELEPHONE (OUTPATIENT)
Dept: NEUROLOGY | Age: 53
End: 2023-02-10

## 2023-02-10 ENCOUNTER — TELEPHONE (OUTPATIENT)
Dept: ORTHOPEDIC SURGERY | Age: 53
End: 2023-02-10

## 2023-02-10 ENCOUNTER — OFFICE VISIT (OUTPATIENT)
Dept: ORTHOPEDIC SURGERY | Age: 53
End: 2023-02-10

## 2023-02-10 VITALS — HEIGHT: 60 IN | WEIGHT: 187 LBS | BODY MASS INDEX: 36.71 KG/M2

## 2023-02-10 DIAGNOSIS — M79.672 PAIN IN LEFT FOOT: Primary | ICD-10-CM

## 2023-02-10 DIAGNOSIS — M21.619 BUNION: ICD-10-CM

## 2023-02-10 NOTE — LETTER
DME Patient Authorization Form    Name: Ulises Coker  : 1970  MRN: [id number]   Age: 48 y.o. Gender: female  Delivery Address: [facility]     Diagnosis:     ICD-10-CM    1. Pain in left foot  M79.672 XR FOOT LEFT (MIN 3 VIEWS)     Dual Action Toe Spacer/Bunion Guard ()      2. Bunion  M21.619 Dual Action Toe Spacer/Bunion Guard ()           Requested DME:  DualAction Toe Spacer/Bunion Guard** - -76 ($10.00) X 1 - left        Clinical Notes:     **Indicates non-covered items by insurance. Payment expected on date of service. Electronically signed by  Provider: Lenore Medina MD__Date: 2/10/2023                            Formerly [facility]/[address] Tax ID # [id number]        Durable Medical Equipment and/or Orthotics Patient Consent     I understand that my physician has prescribed this medical supply as part of my treatment plan as a matter of Medical Necessity.  I understand that I have a choice in where I receive my prescribed orthopedic supplies and/or services.  I authorize [facility] to furnish this service/product and to provide my insurance carrier with any information requested in order to process for payment.  I instruct my insurance carrier to pay [facility] directly for these services/products.  I understand that my insurance carrier may deny payment for this supply because it is a non-covered item, deemed not medically necessary or considered experimental.   I understand that any cost not covered by my insurance carrier will be solely my financial responsibility.  I have received the Supplier Standards and have reviewed them.    I have received the prescribed item and have been fully instructed on the proper use of the above services/products.    ______ (Patient Initials) I understand that all DME items are non-returnable after being dispensed. Items still in sealed packaging may be returned up to 14 days after purchasing. [address] will replace items that are defective.    ______ (Patient Initials) I understand that Aleah Will will not file a claim with my insurance carrier for this service/product and I am waiving my right to file a claim on my own for this service/product with my insurance company as this item is NON-COVERED (Denoted by the **) by my Insurance company/policy. ______ (Patient Initials) I understand that I am responsible to bring my equipment to the hospital for any surgery. ______________________________________________  ________________________  Patient / Isidro Stevens            Thank you for considering [address] Your physician has prescribed specific medical equipment or devices for your home use. The following describes your rights and responsibilities as our customer. Right to Choose Providers: You have a choice regarding which company supplies your home medical equipment and devices, and to consult your physician in this decision. You may choose a medical supply store, a home medical equipment provider, or a specialist such as [facility]. [facility] will coordinate with your physician to provide the medical equipment or devices prescribed for your home use. Right to Service:  You have the right to considerate, respectful and nondiscriminatory care. You have the right to receive accurate and easily understood information about your health care. If you speak a foreign language, or don't understand the discussions, assistance will be provided to allow you to make informed health care decisions. You have the right to know your treatment options and to participate in decisions about your care, including the right to accept or refuse treatment.   You have the right to expect a reasonable response to your requests for treatment or service. You have the right to talk in confidence with health care providers and to have your health care information protected. You have the right to receive an explanation of your bill. You have the right to complain about the service or product you receive. Patient Responsibilities:  Please provide complete and accurate information about your health insurance benefits and make arrangements for the timely payment of your bill. [facility] will, if possible, assume responsibility for billing your insurance (Medicare, Medicaid and commercial) for the prescribed equipment or devices. If your policy does not cover the prescribed product, or only covers a portion of the bill, you are responsible for any remaining balance. Return and Exchange Policy:  [facility] will honor published  Warranties for products. [facility] will accept returns or exchanges within 14 days from the date of receipt, providin) the product must be in new condition; 2) receipt as required; and 3) used disposable and hygiene products may only be returned due to a defective product. Note: Refunds will be issued in a timely manner, please allow 4-6 weeks for processing. Complaint Procedures and DME Consumer Protection Resources:  [facility] values you as a customer, and is committed to resolving patient concerns. This commitment includes understanding and documenting your concerns, conducting a review of internal procedures, and providing you with an explanation and resolution to your concerns. Should you have any questions about our services or billing process, please contact our office at (practice phone number). If we are unable to resolve the concern, you have the right to direct comments to the office of Consumer Protection, in the [address] or the [facility] office, without fear of repercussion.     Cherelle Garrison STANDARDS    A supplier must be in compliance with all applicable Federal and State licensure and regulatory requirements. A supplier must provide complete and accurate information on the DMEPOS supplier application. Any changes to this information must be reported to the [facility] within 30 days. An authorized individual (one whose signature is binding) must sign the application for billing privileges. A supplier must fill orders from its own inventory, or must contract with other companies for the purchase of items necessary to fill the order. A supplier may not contract with any entity that is currently excluded from the Medicare program, any [facility] program, or from any other Federal procurement or Nonprocurement programs. A supplier must advise beneficiaries that they may rent or purchase inexpensive or routinely purchased durable medical equipment, and of the purchase option for capped rental equipment. A supplier must notify beneficiaries of warranty coverage and honor all warranties under applicable State Law, and repair or replace free of charge Medicare covered items that are under warranty. A supplier must maintain a physical facility on an appropriate site. A supplier must permit CMS, or its agents to conduct on-site inspections to ascertain the supplier's compliance with these standards. The supplier location must be accessible to beneficiaries during reasonable business hours, and must maintain a visible sign and posted hours of operation. A supplier must maintain a primary business telephone listed under the name of the business in a [employer] or a toll free number available through directory assistance. The exclusive use of a beeper, answering machine or cell phone is prohibited.   A supplier must have comprehensive liability insurance in the amount of at least $300,000 that covers both the supplier's place of business and all customers and employees of the supplier. If the supplier manufactures its own items, this insurance must also cover product liability and completed operations. A supplier must agree not to initiate telephone contact with beneficiaries, with a few exceptions allowed. This standard prohibits suppliers from calling beneficiaries in order to solicit new business. A supplier is responsible for delivery and must instruct beneficiaries on use of Medicare covered items, and maintain proof of delivery. A supplier must answer questions, and respond to complaints of the beneficiaries, and maintain documentation of such contacts. A supplier must maintain and replace at no charge or repair directly, or through a service contract with another company, Medicare covered items it has rented to beneficiaries. A supplier must accept returns of substandard (less than full quality for the particular item) or unsuitable items (inappropriate for the beneficiary at the time it was fitted and rented or sold) from beneficiaries. A supplier must disclose these supplier standards to each beneficiary to whom it supplies a Medicare-covered item. A supplier must disclose to the government any person having ownership, financial, or control interest in the supplier. A supplier must not convey or reassign a supplier number; i.e., the supplier may not sell or allow another entity to use its Medicare billing number. A supplier must have a complaint resolution protocol established to address beneficiary complaints that relate to these standards. A record of these complaints must be maintained at the physical facility. Complaint records must include: the name, address, telephone number and health insurance claim number of the beneficiary, a summary of the complaint, and any action taken to resolve it. A supplier must agree to furnish CMS any information required by the Medicare statute and implementing regulations.   A supplier of DMEPOS and other items and services must be accredited by a CMS-approved accreditation organization in order to receive and retain a supplier billing number. The accreditation must indicate the specific products and services, for which the supplier is accredited in order for the supplier to receive payment for those specific products and services. A DMEPOS supplier must notify their accreditation organization when a new DMEPOS location is opened. The accreditation organization may accredit the new supplier location for three months after it is operational without requiring a new site visit. All DMEPOS supplier locations, whether owned or subcontracted, must meet the [employer] and be separately accredited in order to bill Medicare. An accredited supplier may be denied enrollment or their enrollment may be revoked, if CMS determines that they are not in compliance with the DMEPOS quality standards. A DMEPOS supplier must disclose upon enrollment all products and services, including the addition of new product lines for which they are seeking accreditation. If a new product line is added after enrollment, the DMEPOS supplier will be responsible for notifying the accrediting body of the new product so that the DMEPOS supplier can be re-surveyed and accredited for these new products. Must meet the surety bond requirements specified in 42 C. F.R. 424.57(c). Implementation date- May 4, 2009. A supplier must obtain oxygen from a state-licensed oxygen supplier. A supplier must maintain ordering and referring documentation consistent with provisions found in 42 C. F.R. 424.516(f). DMEPOS suppliers are prohibited from sharing a practice location with certain other Medicare providers and suppliers. DMEPOS suppliers must remain open to the public for a minimum of 30 hours per week with certain exceptions.

## 2023-02-10 NOTE — TELEPHONE ENCOUNTER
----- Message from Candida Cortez sent at 2/10/2023  8:19 AM EST -----  Regarding: Medication   Hello   Please call Dr Shorty Batres office at [phone] to discuss changing medication to topamax and the dosage. Tavo Wilson she stated you could call as it should be iny notes from last visit.
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
## 2023-02-10 NOTE — TELEPHONE ENCOUNTER
Pt's disability is needing some medical information as well as a medical note stating whether or not pt is able to go back to work with or without restrictions.

## 2023-02-10 NOTE — PROGRESS NOTES
The patient was prescribed and given a DualAction toe spacer/bunion guard for the left toe. Patient read and signed documenting they understand and agree to [facility]'s current DME return policy.

## 2023-02-10 NOTE — TELEPHONE ENCOUNTER
Patient is needing to know what dose of Topamax that Dr Marshal Barrera is possibly suggesting that she start. Advised that it was discussed at the visit.

## 2023-02-16 ENCOUNTER — TELEPHONE (OUTPATIENT)
Dept: INTERNAL MEDICINE CLINIC | Facility: CLINIC | Age: 53
End: 2023-02-16

## 2023-02-17 ENCOUNTER — TELEPHONE (OUTPATIENT)
Dept: BEHAVIORAL/MENTAL HEALTH CLINIC | Age: 53
End: 2023-02-17

## 2023-02-17 ENCOUNTER — PATIENT MESSAGE (OUTPATIENT)
Dept: INTERNAL MEDICINE CLINIC | Facility: CLINIC | Age: 53
End: 2023-02-17

## 2023-02-17 NOTE — TELEPHONE ENCOUNTER
From: Kostas Leon  To: Dione Ritchie  Sent: 2/17/2023 1:20 PM EST  Subject: Unusual     Hello  I have been very unusual with behavior. I understand the vertigo. But the dropping items, notting remember things that I am in the process of doing example today I wanted to turn down [facility] [place] and turn down radio. Walking to do something and can't remember but turn around sit down and and think about it and it comes back. My fine manipulation is off and my shaking is courtney but eventually the item will be out of place. Who do I need you to refer me to? I feel like I am losing control of myself.

## 2023-02-17 NOTE — TELEPHONE ENCOUNTER
Signed release form has been received requesting the records from 7/26/22-2/9/2023 from the [facility].    ----- Message from Thalia Cortez sent at 2/15/2023  5:04 PM EST -----  Regarding: Cirilo Winchester will receive a fax from [facility]. Unfortunately you will need to send all medical records from 7/25/2022 or first day of treatment after that date until present. Please try to complete this as soon as possible.     Fax number [phone]

## 2023-02-20 NOTE — PROGRESS NOTES
[facility] CARDIOLOGY  [address] [place] [address], [address] [facility], [address] [place] [address]  PHONE: [phone]      23    NAME:  Desirae Montero  : 1970  MRN: [id number]         SUBJECTIVE:   Desirae Montero is a 48 y.o. female seen for a follow up visit regarding the following:     Chief Complaint   Patient presents with    Follow-up    Hypertension              HPI:  Follow up  Follow-up and Hypertension   . Follow up orthostatic hypotension, essential hypertension, anxiety. BP in office dropped 20 points with deep breathing, was encouraged to add as a practice, had bp check in office, added low dose BB, she has felt her BP much better, feels that after 10 years it is finally stable. She has since seen ENT and confirmed severe vertigo. She complains of inability to maintain sleep, has spoken with her therapist and psychiatrist. I encouraged her to read about sleep hygiene, her mental health providers have advised gradually going to bed later and other routine practices. She is exercising every day. She does deep breathing as part of her Bible reading each morning and will add a few minutes in the evening as well. PAST CARDIAC HISTORY:  May 2022 - 24 hr monitor - normal tracing, no diary entries  Normal TMET, [facility] TMS + 6, low risk  Normal LE venous doppler  Oct 2022        Normal monitor symptoms of feeling anxious with SR/low grade ST        Stress echo - normal at 7 and a half minutes exercise. Normal hemodynamic response          Key CAD CHF Meds            metoprolol succinate (TOPROL XL) 25 MG extended release tablet (Taking)    Sig - Route: Take 1 tablet by mouth daily - Oral    hydroCHLOROthiazide (HYDRODIURIL) 25 MG tablet (Taking)    Class: Historical Med          Key Antihyperglycemic Medications       Patient is on no antihyperglycemic meds.                 Past Medical History, Past Surgical History, Family history, Social History, and Medications were all reviewed with the patient today and updated as necessary. Prior to Admission medications    Medication Sig Start Date End Date Taking? Authorizing Provider   topiramate (TOPAMAX) 50 MG tablet Take 50 mg by mouth 2 times daily   Yes Historical Provider, MD   traZODone (DESYREL) 50 MG tablet Take 1-2 tablets by mouth nightly 2/7/23  Yes Nancy Hernandez MD   NONFORMULARY Turmeric/Ginger Gummy   Yes Historical Provider, MD   pramipexole (MIRAPEX) 1 MG tablet Take 1 tablet by mouth nightly 1/5/23  Yes BRANDON Reynoso CNP   Multiple Vitamins-Minerals (MULTIVITAMIN WOMEN 50+ PO) Take by mouth   Yes Historical Provider, MD   metoprolol succinate (TOPROL XL) 25 MG extended release tablet Take 1 tablet by mouth daily 12/20/22  Yes Myke Resendez MD   clonazePAM (KLONOPIN) 0.5 MG tablet Take 1 tablet by mouth 2 times daily as needed for Anxiety for up to 120 days. 12/15/22 4/14/23 Yes Nancy Hernandez MD   atomoxetine (STRATTERA) 80 MG capsule Take 1 capsule by mouth daily 12/15/22  Yes Nancy Hernandez MD   citalopram (CELEXA) 20 MG tablet Take 1 tablet by mouth every morning 12/15/22  Yes Nancy Hernandez MD   Lidocaine 5 % CREA Topical 5% Lidocaine and 5% Neurontin to apply to affected area up to 4 times/day as needed for pain 12/2/22  Yes Valentina Nam MD   meclizine (ANTIVERT) 12.5 MG tablet Take 12.5 mg by mouth 3 times daily as needed   Yes Historical Provider, MD   vitamin D (CHOLECALCIFEROL) 00635 UNIT CAPS Take 1 capsule by mouth every 7 days 10/26/22  Yes BRANDON Reynoso CNP   cyclobenzaprine (AMRIX) 15 MG extended release capsule Take 15 mg by mouth daily as needed 10/12/22  Yes Historical Provider, MD   hydroCHLOROthiazide (HYDRODIURIL) 25 MG tablet Take 12.5 mg by mouth daily   Yes Historical Provider, MD   cyanocobalamin 1000 MCG tablet Take 1 tablet by mouth daily 9/28/22  Yes BRANDON Reynoso CNP   pregabalin (LYRICA) 200 MG capsule Take 200 mg by mouth 2 times daily.  9/16/22  Yes Historical Provider, MD   Belimumab (BENLYSTA) 200 MG/ML SOAJ Inject 200 mg into the skin every 7 days 9/13/22  Yes Historical Provider, MD   hydroxychloroquine (PLAQUENIL) 200 MG tablet Take 1 tablet by mouth daily 8/26/22  Yes Historical Provider, MD   EVENING PRIMROSE OIL PO Take by mouth   Yes Historical Provider, MD   methylfolate (DEPLIN) 7.5 MG TABS tablet Take 1 tablet by mouth in the morning. 7/28/22  Yes Marko Teresa MD   Wheat Dextrin (BENEFIBER PO)  1/1/22  Yes Historical Provider, MD Raciel Pop 72 MCG CAPS capsule Take 1 capsule by mouth daily 4/10/22  Yes Historical Provider, MD   naproxen (NAPROSYN) 500 MG tablet Take 1 tablet by mouth as needed   Yes Historical Provider, MD   omeprazole (PRILOSEC) 40 MG delayed release capsule Take 1 capsule (40 mg) by mouth 2 (two) times a day 30 mins before breakfast and 30 mins before dinner 4/19/22  Yes Historical Provider, MD   cod liver oil CAPS Take 1 capsule by mouth daily   Yes Historical Provider, MD   Ubrogepant (UBRELVY) 50 MG TABS Take 1 tablet at the onset of migraine, may repeat in 2 hours. Do not take more then 3 days a week.  6/3/22  Yes Osmin France MD   Benzoyl Peroxide 2.5 % GEL APPLY TOPICALLY TO FACE AT BEDTIME 8/30/21  Yes Ar Automatic Reconciliation   diclofenac sodium (VOLTAREN) 1 % GEL Apply 1 g topically as needed 12/27/21  Yes Ar Automatic Reconciliation   Lidocaine, Anorectal, 5 % CREA Actual prescription: Lidocaine 5%: Neurontin/gabapentin 5% combined topical cream/gelApply to affected area up to 4 times a day as needed for pain 1/27/22  Yes Ar Automatic Reconciliation   nystatin-triamcinolone (MYCOLOG II) 063633-4.1 UNIT/GM-% cream Apply topically 2 times daily as needed 8/31/21  Yes Ar Automatic Reconciliation   vitamin E 600 units capsule Take by mouth daily   Yes Ar Automatic Reconciliation     Allergies   Allergen Reactions    Furosemide Other (See Comments)     Edema of lower extremities      Sulfa Antibiotics Other (See Comments) and Rash GI Upset    Other Other (See Comments)     Oral steroids     Past Medical History:   Diagnosis Date    Anxiety     Chronic pain     DDD (degenerative disc disease), cervical 11/29/2021    C3-C7 Disc Degeneration w/ Posterior Spondylotic Disc Displaceemnts per MRI    Depressed     GERD (gastroesophageal reflux disease)     Headache     Hypertension     IBS (irritable bowel syndrome)     Insomnia     Neuropathy     Panic attack     Sleep difficulties     TMJ (dislocation of temporomandibular joint) 04/2021     Past Surgical History:   Procedure Laterality Date    CATARACT REMOVAL Bilateral 02/2020    COLONOSCOPY      EGD DELIVER THERMAL ENERGY SPHNCTR/CARDIA GERD      EYE SURGERY      LIPECTOMY      TUBAL LIGATION       Family History   Problem Relation Age of Onset    Heart Disease Father     Hypertension Father     High Cholesterol Mother     GERD Mother      Social History     Tobacco Use    Smoking status: Never    Smokeless tobacco: Former     Types: Chew   Substance Use Topics    Alcohol use: Never       ROS:    Review of Systems   Neurological:  Positive for vertigo. Psychiatric/Behavioral:  The patient has insomnia. PHYSICAL EXAM:   /74 (Site: Left Upper Arm, Position: Sitting)   Pulse 84   Ht 5' (1.524 m)   Wt 185 lb 3.2 oz (84 kg)   BMI 36.17 kg/m²      Wt Readings from Last 3 Encounters:   02/21/23 185 lb 3.2 oz (84 kg)   02/10/23 187 lb (84.8 kg)   02/07/23 187 lb (84.8 kg)     BP Readings from Last 3 Encounters:   02/21/23 126/74   02/07/23 138/86   01/12/23 122/82     Pulse Readings from Last 3 Encounters:   02/21/23 84   02/07/23 75   01/12/23 82           Physical Exam  Constitutional:       Appearance: Normal appearance. HENT:      Head: Normocephalic and atraumatic. Eyes:      Conjunctiva/sclera: Conjunctivae normal.   Neck:      Vascular: No carotid bruit. Cardiovascular:      Rate and Rhythm: Normal rate and regular rhythm. Heart sounds: No murmur heard.     No friction rub. No gallop. Pulmonary:      Effort: No respiratory distress. Breath sounds: No wheezing or rales. Musculoskeletal:         General: No swelling. Cervical back: Neck supple. Skin:     General: Skin is warm and dry. Neurological:      General: No focal deficit present. Mental Status: She is alert. Psychiatric:         Mood and Affect: Mood normal.         Behavior: Behavior normal.       Medical problems and test results were reviewed with the patient today. DATA REVIEW    LIPID PANEL     Lab Results   Component Value Date    CHOL 129 10/07/2022    CHOL 118 10/12/2021    CHOL 166 06/25/2020     Lab Results   Component Value Date    TRIG 77 10/07/2022    TRIG 52 10/12/2021    TRIG 109 06/25/2020     Lab Results   Component Value Date    HDL 48 10/07/2022    HDL 32 (L) 10/12/2021    HDL 53 06/25/2020     Lab Results   Component Value Date    LDLCALC 65.6 10/07/2022    LDLCALC 74 10/12/2021    LDLCALC 91 06/25/2020     Lab Results   Component Value Date    LABVLDL 15.4 10/07/2022    LABVLDL 22 06/25/2020    LABVLDL 18 11/15/2019    VLDL 12 10/12/2021     Lab Results   Component Value Date    CHOLHDLRATIO 2.7 10/07/2022       BMP  Lab Results   Component Value Date/Time     12/14/2022 09:56 AM    K 3.5 12/14/2022 09:56 AM     12/14/2022 09:56 AM    CO2 31 12/14/2022 09:56 AM    BUN 9 12/14/2022 09:56 AM    CREATININE 1.00 12/14/2022 09:56 AM    GLUCOSE 94 12/14/2022 09:56 AM    CALCIUM 9.0 12/14/2022 09:56 AM          EKG        CXR/IMAGING        DEVICE INTERROGATION        OUTSIDE RECORDS REVIEW    Records from outside providers have been reviewed and summarized as noted in the HPI, past history and data review sections of this note, and reviewed with patient. .       ASSESSMENT and PLAN    Kerrin Osler was seen today for follow-up and hypertension.     Diagnoses and all orders for this visit:    Orthostatic hypotension    Essential hypertension        IMPRESSION:    BP controlled, orthostatic symptoms improved, continue current meds and follow up prn, refills per primary care since stable. Vertigo being addressed by ENT, awaiting appointment for vestibular rehab therapy        Return if symptoms worsen or fail to improve. Thank you for allowing me to participate in this patient's care. Please call or contact me if there are any questions or concerns regarding the above.       Chance Bryan MD  02/21/23  9:19 AM

## 2023-02-21 ENCOUNTER — OFFICE VISIT (OUTPATIENT)
Dept: CARDIOLOGY CLINIC | Age: 53
End: 2023-02-21
Payer: COMMERCIAL

## 2023-02-21 VITALS
HEIGHT: 60 IN | BODY MASS INDEX: 36.36 KG/M2 | HEART RATE: 84 BPM | SYSTOLIC BLOOD PRESSURE: 126 MMHG | WEIGHT: 185.2 LBS | DIASTOLIC BLOOD PRESSURE: 74 MMHG

## 2023-02-21 DIAGNOSIS — I95.1 ORTHOSTATIC HYPOTENSION: Primary | ICD-10-CM

## 2023-02-21 DIAGNOSIS — I10 ESSENTIAL HYPERTENSION: ICD-10-CM

## 2023-02-21 PROCEDURE — 3074F SYST BP LT 130 MM HG: CPT | Performed by: INTERNAL MEDICINE

## 2023-02-21 PROCEDURE — 3078F DIAST BP <80 MM HG: CPT | Performed by: INTERNAL MEDICINE

## 2023-02-21 PROCEDURE — 99213 OFFICE O/P EST LOW 20 MIN: CPT | Performed by: INTERNAL MEDICINE

## 2023-02-21 RX ORDER — TOPIRAMATE 50 MG/1
50 TABLET, FILM COATED ORAL 2 TIMES DAILY
COMMUNITY

## 2023-02-23 ENCOUNTER — TELEMEDICINE (OUTPATIENT)
Dept: BEHAVIORAL/MENTAL HEALTH CLINIC | Age: 53
End: 2023-02-23

## 2023-02-23 ENCOUNTER — TELEPHONE (OUTPATIENT)
Dept: ORTHOPEDIC SURGERY | Age: 53
End: 2023-02-23

## 2023-02-23 DIAGNOSIS — F51.05 INSOMNIA DUE TO MENTAL DISORDER: ICD-10-CM

## 2023-02-23 DIAGNOSIS — G89.4 CHRONIC PAIN DISORDER: ICD-10-CM

## 2023-02-23 DIAGNOSIS — F33.0 MILD EPISODE OF RECURRENT MAJOR DEPRESSIVE DISORDER (HCC): Primary | ICD-10-CM

## 2023-02-23 DIAGNOSIS — F41.1 GENERALIZED ANXIETY DISORDER: ICD-10-CM

## 2023-02-23 PROCEDURE — 99213 OFFICE O/P EST LOW 20 MIN: CPT | Performed by: PSYCHIATRY & NEUROLOGY

## 2023-02-23 ASSESSMENT — ANXIETY QUESTIONNAIRES
IF YOU CHECKED OFF ANY PROBLEMS ON THIS QUESTIONNAIRE, HOW DIFFICULT HAVE THESE PROBLEMS MADE IT FOR YOU TO DO YOUR WORK, TAKE CARE OF THINGS AT HOME, OR GET ALONG WITH OTHER PEOPLE: SOMEWHAT DIFFICULT
5. BEING SO RESTLESS THAT IT IS HARD TO SIT STILL: SEVERAL DAYS
3. WORRYING TOO MUCH ABOUT DIFFERENT THINGS: SEVERAL DAYS
4. TROUBLE RELAXING: SEVERAL DAYS
2. NOT BEING ABLE TO STOP OR CONTROL WORRYING: SEVERAL DAYS
7. FEELING AFRAID AS IF SOMETHING AWFUL MIGHT HAPPEN: NOT AT ALL
GAD7 TOTAL SCORE: 6
7. FEELING AFRAID AS IF SOMETHING AWFUL MIGHT HAPPEN: 0
5. BEING SO RESTLESS THAT IT IS HARD TO SIT STILL: 1
6. BECOMING EASILY ANNOYED OR IRRITABLE: SEVERAL DAYS
2. NOT BEING ABLE TO STOP OR CONTROL WORRYING: 1
IF YOU CHECKED OFF ANY PROBLEMS ON THIS QUESTIONNAIRE, HOW DIFFICULT HAVE THESE PROBLEMS MADE IT FOR YOU TO DO YOUR WORK, TAKE CARE OF THINGS AT HOME, OR GET ALONG WITH OTHER PEOPLE: SOMEWHAT DIFFICULT
4. TROUBLE RELAXING: 1
6. BECOMING EASILY ANNOYED OR IRRITABLE: 1
1. FEELING NERVOUS, ANXIOUS, OR ON EDGE: SEVERAL DAYS
1. FEELING NERVOUS, ANXIOUS, OR ON EDGE: 1
3. WORRYING TOO MUCH ABOUT DIFFERENT THINGS: 1

## 2023-02-23 ASSESSMENT — PATIENT HEALTH QUESTIONNAIRE - PHQ9
SUM OF ALL RESPONSES TO PHQ QUESTIONS 1-9: 12
1. LITTLE INTEREST OR PLEASURE IN DOING THINGS: SEVERAL DAYS
SUM OF ALL RESPONSES TO PHQ QUESTIONS 1-9: 12
7. TROUBLE CONCENTRATING ON THINGS, SUCH AS READING THE NEWSPAPER OR WATCHING TELEVISION: SEVERAL DAYS
2. FEELING DOWN, DEPRESSED OR HOPELESS: MORE THAN HALF THE DAYS
4. FEELING TIRED OR HAVING LITTLE ENERGY: 2
8. MOVING OR SPEAKING SO SLOWLY THAT OTHER PEOPLE COULD HAVE NOTICED. OR THE OPPOSITE, BEING SO FIGETY OR RESTLESS THAT YOU HAVE BEEN MOVING AROUND A LOT MORE THAN USUAL: 1
5. POOR APPETITE OR OVEREATING: SEVERAL DAYS
1. LITTLE INTEREST OR PLEASURE IN DOING THINGS: 1
6. FEELING BAD ABOUT YOURSELF - OR THAT YOU ARE A FAILURE OR HAVE LET YOURSELF OR YOUR FAMILY DOWN: 1
SUM OF ALL RESPONSES TO PHQ9 QUESTIONS 1 & 2: 3
9. THOUGHTS THAT YOU WOULD BE BETTER OFF DEAD, OR OF HURTING YOURSELF: NOT AT ALL
SUM OF ALL RESPONSES TO PHQ QUESTIONS 1-9: 12
3. TROUBLE FALLING OR STAYING ASLEEP: NEARLY EVERY DAY
8. MOVING OR SPEAKING SO SLOWLY THAT OTHER PEOPLE COULD HAVE NOTICED. OR THE OPPOSITE - BEING SO FIDGETY OR RESTLESS THAT YOU HAVE BEEN MOVING AROUND A LOT MORE THAN USUAL: SEVERAL DAYS
SUM OF ALL RESPONSES TO PHQ9 QUESTIONS 1 & 2: 3
5. POOR APPETITE OR OVEREATING: 1
7. TROUBLE CONCENTRATING ON THINGS, SUCH AS READING THE NEWSPAPER OR WATCHING TELEVISION: 1
2. FEELING DOWN, DEPRESSED OR HOPELESS: 2
9. THOUGHTS THAT YOU WOULD BE BETTER OFF DEAD, OR OF HURTING YOURSELF: 0
3. TROUBLE FALLING OR STAYING ASLEEP: 3
SUM OF ALL RESPONSES TO PHQ QUESTIONS 1-9: 12
6. FEELING BAD ABOUT YOURSELF - OR THAT YOU ARE A FAILURE OR HAVE LET YOURSELF OR YOUR FAMILY DOWN: SEVERAL DAYS
4. FEELING TIRED OR HAVING LITTLE ENERGY: MORE THAN HALF THE DAYS
10. IF YOU CHECKED OFF ANY PROBLEMS, HOW DIFFICULT HAVE THESE PROBLEMS MADE IT FOR YOU TO DO YOUR WORK, TAKE CARE OF THINGS AT HOME, OR GET ALONG WITH OTHER PEOPLE: 1
SUM OF ALL RESPONSES TO PHQ QUESTIONS 1-9: 12
10. IF YOU CHECKED OFF ANY PROBLEMS, HOW DIFFICULT HAVE THESE PROBLEMS MADE IT FOR YOU TO DO YOUR WORK, TAKE CARE OF THINGS AT HOME, OR GET ALONG WITH OTHER PEOPLE: SOMEWHAT DIFFICULT

## 2023-02-23 NOTE — PROGRESS NOTES
Patient:  Nathaly Damian  Age:  48 y.o.  :  1970     SEX:  female MRN:  [id number]     RACE: Black /      SEEN:  [x]  PATIENT  []  SPOUSE []  OTHER:              PHQ-9  2023   Little interest or pleasure in doing things 1 1 1   Little interest or pleasure in doing things - - -   Feeling down, depressed, or hopeless 2 1 1   Trouble falling or staying asleep, or sleeping too much 3 3 1   Trouble falling or staying asleep, or sleeping too much - - -   Feeling tired or having little energy 2 2 2   Feeling tired or having little energy - - -   Poor appetite or overeating 1 1 1   Poor appetite, weight loss, or overeating - - -   Feeling bad about yourself - or that you are a failure or have let yourself or your family down 1 1 0   Feeling bad about yourself - or that you are a failure or have let yourself or your family down - - -   Trouble concentrating on things, such as reading the newspaper or watching television 1 1 1   Trouble concentrating on things such as school, work, reading, or watching TV - - -   Moving or speaking so slowly that other people could have noticed. Or the opposite - being so fidgety or restless that you have been moving around a lot more than usual 1 1 2   Moving or speaking so slowly that other people could have noticed; or the opposite being so fidgety that others notice - - -   Thoughts that you would be better off dead, or of hurting yourself in some way 0 0 0   Thoughts of being better off dead, or hurting yourself in some way - - -   PHQ-2 Score 3 2 2   Total Score PHQ 2 - - -   PHQ-9 Total Score 12 11 9   PHQ 9 Score - - -   If you checked off any problems, how difficult have these problems made it for you to do your work, take care of things at home, or get along with other people?  1 1 2   How difficult have these problems made it for you to do your work, take care of your home and get along with others - - -       OFELIA-7 SCREENING 2/23/2023 2/7/2023 1/12/2023   Feeling nervous, anxious, or on edge Several days More than half the days Nearly every day   Not being able to stop or control worrying Several days Several days Several days   Worrying too much about different things Several days Several days More than half the days   Trouble relaxing Several days More than half the days Nearly every day   Being so restless that it is hard to sit still Several days Several days More than half the days   Becoming easily annoyed or irritable Several days Several days Several days   Feeling afraid as if something awful might happen Not at all Not at all Not at all   OFELIA-7 Total Score 6 8 12   How difficult have these problems made it for you to do your work, take care of things at home, or get along with other people? Somewhat difficult Somewhat difficult Very difficult   Feeling nervous, anxious, or on edge - - -   OFELIA-7 Total Score - - -        I was in the office while conducting this encounter. Consent:  She and/or her healthcare decision maker is aware that this patient-initiated Telehealth encounter is a billable service, with coverage as determined by her insurance carrier. She is aware that she may receive a bill and has provided verbal consent to proceed: YesPatient identification was verified, and a caregiver was present when appropriate. The patient was located in a state where the provider was credentialed to provide care. This virtual visit was conducted via [address] Pursuant to the emergency declaration under the [facility], [facility] waiver authority and the [employer] Act, this Virtual  Visit was conducted to reduce the patient's risk of exposure to COVID-19 and provide continuity of care for an established patient. Services were provided through a video synchronous discussion virtually to substitute for in-person clinic visit.   Due to this being a TeleHealth evaluation, many elements of the physical examination are unable to be assessed. Total Time: minutes: 11-20 minutes. Chief complaint:  Pt says she is discombobulated. Subjective:  Seen virtually for follow-up. States is discombobulated. Has not had the paycheck for 2 months. Heart food is giving her a hard time. States she has had vertigo and falling every day. Feels that she is tilting backwards when walking. When going downstairs becomes fast-paced. They have taken her off of Benlysta. Keeps tender all over. Feels weak getting up in the morning. Every morning wakes up at  4:00 no matter what time she goes to bed. Advised to consult with her PCP and neurologist for her vertigo. Supportive psychotherapy provided. Patient allowed to vent her emotions. She denies suicidal ideation/homicidal ideations. Denies symptoms of psychosis.     Patient Active Problem List   Diagnosis    Hypoglycemia    Chronic tension-type headache, intractable    Ganglion cyst    Radiculopathy of cervical region    Lymphocytic colitis    Essential tremor    Uterine leiomyoma    Neuropathic pain    Numbness and tingling    Schatzki's ring    Chronic pain syndrome    B12 deficiency    Hiatal hernia with GERD    History of colonic polyps    Left carpal tunnel syndrome    Pharyngoesophageal dysphagia    Anxiety    Vitamin D deficiency    Cervical radicular pain    Bipolar depression (HCC)    Functional dyspepsia    Chronic fatigue    Irritable bowel syndrome with both constipation and diarrhea    Early satiety    De Quervain's tenosynovitis, right    Restless legs syndrome (RLS)    Encounter for medication management    Left hand pain    Nausea    High-tone pelvic floor dysfunction    GERD with esophagitis    Duodenitis    Other constipation    Primary insomnia    Mild episode of recurrent major depressive disorder (HCC)    Erosive gastritis    Right wrist pain    Right forearm pain    Arthritis of carpometacarpal (CMC) joint of right thumb    Polyarthralgia    Palpitations    Generalized osteoarthritis    Pain of paraspinal muscle    Serologic abnormality    Lupus (HCC)    MATEO positive    Benign paroxysmal positional vertigo of right ear    Vertigo, aural, unspecified laterality    Intractable migraine with aura without status migrainosus    Overactive bladder       Denies palpitation,SOB, Chest pain, headaches. In no acute distress. MEDICATION REVIEW:    Current Medications:    Current Outpatient Medications   Medication Sig    topiramate (TOPAMAX) 50 MG tablet Take 50 mg by mouth 2 times daily    traZODone (DESYREL) 50 MG tablet Take 1-2 tablets by mouth nightly    NONFORMULARY Turmeric/Ginger Gummy    pramipexole (MIRAPEX) 1 MG tablet Take 1 tablet by mouth nightly    Multiple Vitamins-Minerals (MULTIVITAMIN WOMEN 50+ PO) Take by mouth    metoprolol succinate (TOPROL XL) 25 MG extended release tablet Take 1 tablet by mouth daily    clonazePAM (KLONOPIN) 0.5 MG tablet Take 1 tablet by mouth 2 times daily as needed for Anxiety for up to 120 days. atomoxetine (STRATTERA) 80 MG capsule Take 1 capsule by mouth daily    citalopram (CELEXA) 20 MG tablet Take 1 tablet by mouth every morning    Lidocaine 5 % CREA Topical 5% Lidocaine and 5% Neurontin to apply to affected area up to 4 times/day as needed for pain    meclizine (ANTIVERT) 12.5 MG tablet Take 12.5 mg by mouth 3 times daily as needed    vitamin D (CHOLECALCIFEROL) 32794 UNIT CAPS Take 1 capsule by mouth every 7 days    cyclobenzaprine (AMRIX) 15 MG extended release capsule Take 15 mg by mouth daily as needed    hydroCHLOROthiazide (HYDRODIURIL) 25 MG tablet Take 12.5 mg by mouth daily    cyanocobalamin 1000 MCG tablet Take 1 tablet by mouth daily    pregabalin (LYRICA) 200 MG capsule Take 200 mg by mouth 2 times daily.     hydroxychloroquine (PLAQUENIL) 200 MG tablet Take 1 tablet by mouth daily    EVENING PRIMROSE OIL PO Take by mouth methylfolate (DEPLIN) 7.5 MG TABS tablet Take 1 tablet by mouth in the morning. Wheat Dextrin (BENEFIBER PO)     LINZESS 72 MCG CAPS capsule Take 1 capsule by mouth daily    naproxen (NAPROSYN) 500 MG tablet Take 1 tablet by mouth as needed    omeprazole (PRILOSEC) 40 MG delayed release capsule Take 1 capsule (40 mg) by mouth 2 (two) times a day 30 mins before breakfast and 30 mins before dinner    cod liver oil CAPS Take 1 capsule by mouth daily    Ubrogepant (UBRELVY) 50 MG TABS Take 1 tablet at the onset of migraine, may repeat in 2 hours. Do not take more then 3 days a week. Benzoyl Peroxide 2.5 % GEL APPLY TOPICALLY TO FACE AT BEDTIME    diclofenac sodium (VOLTAREN) 1 % GEL Apply 1 g topically as needed    Lidocaine, Anorectal, 5 % CREA Actual prescription: Lidocaine 5%: Neurontin/gabapentin 5% combined topical cream/gelApply to affected area up to 4 times a day as needed for pain    nystatin-triamcinolone (MYCOLOG II) 230973-9.1 UNIT/GM-% cream Apply topically 2 times daily as needed    vitamin E 600 units capsule Take by mouth daily    mupirocin (BACTROBAN) 2 % ointment Apply topically 2 times daily. Belimumab (BENLYSTA) 200 MG/ML SOAJ Inject 200 mg into the skin every 7 days     No current facility-administered medications for this visit. Allergies   Allergen Reactions    Furosemide Other (See Comments)     Edema of lower extremities      Sulfa Antibiotics Other (See Comments) and Rash     GI Upset    Other Other (See Comments)     Oral steroids       Past Medical History, Past Surgical History, Family history, Social History, and Medications were all reviewed with the patient today and updated as necessary.      Compliant with medication: Yes   Side effects from medications:  No     EXAMINATION  Musculoskeletal    GAIT AND STATION   [] WNL   [] RESTRICTED   [] UNSTEADY WALK        [] ABNORMAL   [] UNBALANCED         PSYCHIATRIC     GENERAL APPEARANCE:   []  WELL GROOMED []     DISHEVELED   [] UNKEMPT      []  UNUSUAL/BIZZARE    [x] WNL       ATTITUDE:   [x] COOPERATIVE   [] GUARDED   [] SUSPICIOUS      [] HOSTILE                            BEHAVIOR:   [x] CALM   [] HYPERACTIVE   [] MANNERISMS      [] BIZZARE         SPEECH:   [x] NORMAL FOR CLIENT   [] SPONTANEOUS   [] SLURRED   [] WHISPERING      [] LOUD   [] PRESSURED   [] ARTICULATE       EYE CONTACT:   [x] WNL   [] BLANK STARE   [] INTENSE      [] AVOIDANT         MOOD:   [] EUTHYMIC   [x] ANXIOUS   [x] DEPRESSED      [] IRRITABLE   [] ANGRY   [] APATHETIC     AFFECT:   [x] CONGRUENT WITH MOOD   [] FLAT   [] CONSTRICTED      [] INAPPROPRIATE   [] LABILE           THOUGHT PROCESS:   [x] LOGICAL/GOAL-DIRECTED   [] FOI   [] CIRCUMSANTIAL      [] INCOHERENT   [] TANGENTIAL   [] CONCRETE      [] PERSEVERATION           THOUGHT CONTENT:                DELUSIONS  [x] DENIES  [] GRANDIOSE  [] PERSECUTORY  [] Hoahaoism  [] REFERENCE   HALLUCINATIONS  [x] DENIES  [] AUDITORY  [] VISUAL  [] OLFACTORY  [] TACTILE     [] GUSTATORY  [] SOMATIC         OBSESSIONS  [x] DENIES  [] PRESENT         SUICIDAL IDEATION  [x] DENIES  [] PRESENT W/O PLAN  [] PRESENT W/ PLAN       HOMICIDAL IDEATION  [x] DENIES  [] PRESENT W/O PLAN  [] PRESENT W/ PLAN           JUDGEMENT:   [x] GOOD   [] FAIR   [] POOR   INSIGHT:   [x] GOOD   [] FAIR   [] POOR     COGNITION:           SENSORIUM:   [x] ALERT   [] CLOUDED   [] DROWSY     ORIENTATION:   [x] INTACT   [] TIME:  PLACE  PERSON   RECENT & REMOTE MEMORY:   [] NORMAL   [x] OTHER:                  ATTENTION:   [] INTACT   [x] MILD IMPAIRMENT   [] SEVERE IMPAIRMENT     CONCENTRATION:   [] INTACT   [x] MILD IMPAIRMENT   [] SEVERE IMPAIRMENT     LANGUAGE:   [x] AVERAGE   [] ABOVE AVERAGE   [] BELOW AVERAGE     FUND OF KNOWLEDGE:   [] UNABLE TO ASSESS AT THIS TIME   [x] AVERAGE   [] ABOVE AVERAGE   [] BELOW AVERAGE      [] GOOD TO EXCELLENT KNOWLEDGE OF CURRENT EVENTS   [] POOR TO NO KNLEDGE OF CURRENT EVENTS           ABNORMAL MOVEMENTS: [x] NONE   [] TICS   [] TREMORS   [] BIZZARE      [] FACE   [] TRUNK   [] EXTREMETIES   [] GESTURES        SLEEP:   [] GOOD   [] FAIR   [x] POOR      MUSCLE STRENGTH AND TONE   [] WNL   [] ATROPHY   [] SPASTIC        [] FLACCID   [] COGWHEEL         Diagnoses/Impressions:    ICD-10-CM    1. Mild episode of recurrent major depressive disorder ([facility] [address]  F33.0       2. Generalized anxiety disorder  F41.1       3. Insomnia due to mental disorder  F51.05       4.  Chronic pain disorder  G89.4           TREATMENT GOALS:  Symptom reduction, Medication adherence, maintain therapeutic gains    LABS/IMAGING:    []  Ordered [x]  Reviewed []  New Labs Ordered:     LAB  WBC   Date/Time Value Ref Range Status   01/06/2023 08:51 AM 9.8 4.3 - 11.1 K/uL Final     Hemoglobin   Date/Time Value Ref Range Status   01/06/2023 08:51 AM 13.0 11.7 - 15.4 g/dL Final     Hematocrit   Date/Time Value Ref Range Status   01/06/2023 08:51 AM 41.8 35.8 - 46.3 % Final     Platelets   Date/Time Value Ref Range Status   01/06/2023 08:51  150 - 450 K/uL Final     Sodium   Date/Time Value Ref Range Status   12/14/2022 09:56  133 - 143 mmol/L Final     Potassium   Date/Time Value Ref Range Status   12/14/2022 09:56 AM 3.5 3.5 - 5.1 mmol/L Final     Chloride   Date/Time Value Ref Range Status   12/14/2022 09:56  101 - 110 mmol/L Final     CO2   Date/Time Value Ref Range Status   12/14/2022 09:56 AM 31 21 - 32 mmol/L Final     BUN   Date/Time Value Ref Range Status   12/14/2022 09:56 AM 9 6 - 23 MG/DL Final     Magnesium   Date/Time Value Ref Range Status   05/12/2022 09:05 AM 2.1 1.2 - 2.6 mg/dL Final     ALT   Date/Time Value Ref Range Status   12/14/2022 09:56 AM 48 12 - 65 U/L Final     AST   Date/Time Value Ref Range Status   12/14/2022 09:56 AM 23 15 - 37 U/L Final     TSH   Date/Time Value Ref Range Status   05/02/2022 12:00 AM 0.901 0.450 - 4.500 uIU/mL Final       Please refer to the lab tab in the epic and care everywhere for the most recent lab results. Plan:     [x]  Medication ordered: Continue trazodone,  Klonopin, Strattera, citalopram, L-methylfolate to target depression, anxiety, insomnia, attention and concentration. [x]  Medication education/counseling provided  Medication dosage and time to take, purpose/expected benefits/risks, common side effects, lab monitoring required and reason, expected length of treatment, risk of no treatment, effects on pregnancy/nursing, financial availability discussed. Educated patient on  side effects/risks/benefits of meds including cardiac arrhythmias, suicidal ideations, orthostatic hypotension, serotonin syndrome, risk of jesica/hypomania from antidepressants, withdrawals from abrupt discontinuation of meds, risk of bleeding, risk of seizures, addiction potential, memory impairment with long term use of benzos, respiratory depression, high blood pressure, dizziness, drowsiness, sedation , risk of falls, Risk & benefits discussed: including but not limited to possible off-label medication usage. [x] Follow MSE for sxs improvement     I have reviewed the patients controlled substance prescription history, as maintained in the [place] prescription monitoring program, so that the prescription(s) for a  controlled substance can be given. Recommendations and Referrals: Follow up with : MD, requires monitoring of response to medication, requires monitoring of medication side effects.     Time until next PMA:     Follow up with Mental Health Clinicians: psychotherapy interventions, improve level of functioning, monitoring to prevent decompensation /hospitalization, monitoring to maintain therapeutic gains, symptoms (resolving and controlled)           Psychotherapy note:                                __10_ Minutes of psychotherapy     [x]  Supportive psychotherapy, Patient discussed certain situational and personal stressors ongoing in her life at this time, weight management d/w the patient. Sleep hygiene d/w patient. Patient allowed to vent out her emotions. Scenarios were reviewed using role playing and CBT techniques in order to increase insight and decrease anxiety. []  Disposition planning      []  Dangerous and will not contract for safety in the community    **Pateint has been notified: They are to call 911 or go to their nearest E.R. if they are experiencing a medical emergency or suicidal ideations/homicidal ideations. **  All ancillary documentation entered reviewed by provider. PLEASE NOTE:  This document has been produced in part or whole using voice recognition software. Proofread however unrecognized errors in transcription may be present.         Richard Marlow MD

## 2023-02-24 ENCOUNTER — TELEMEDICINE (OUTPATIENT)
Dept: INTERNAL MEDICINE CLINIC | Facility: CLINIC | Age: 53
End: 2023-02-24
Payer: COMMERCIAL

## 2023-02-24 DIAGNOSIS — L30.1 DERMATITIS, DYSHIDROTIC: Primary | ICD-10-CM

## 2023-02-24 DIAGNOSIS — T23.029A BURN INJURY OF SKIN OF FINGER: ICD-10-CM

## 2023-02-24 PROCEDURE — 99214 OFFICE O/P EST MOD 30 MIN: CPT | Performed by: NURSE PRACTITIONER

## 2023-02-24 ASSESSMENT — ENCOUNTER SYMPTOMS
SINUS PAIN: 0
EYE DISCHARGE: 0
EYE REDNESS: 0
CONSTIPATION: 0
ABDOMINAL PAIN: 0
SHORTNESS OF BREATH: 0
EYE ITCHING: 0
DIARRHEA: 0
BACK PAIN: 0
ABDOMINAL DISTENTION: 0
APNEA: 0
EYE PAIN: 0
COLOR CHANGE: 0
NAUSEA: 0
COUGH: 0
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
## 2023-02-28 ENCOUNTER — PATIENT MESSAGE (OUTPATIENT)
Dept: ORTHOPEDIC SURGERY | Age: 53
End: 2023-02-28

## 2023-02-28 SDOH — ECONOMIC STABILITY: FOOD INSECURITY: WITHIN THE PAST 12 MONTHS, YOU WORRIED THAT YOUR FOOD WOULD RUN OUT BEFORE YOU GOT MONEY TO BUY MORE.: NEVER TRUE

## 2023-02-28 SDOH — ECONOMIC STABILITY: HOUSING INSECURITY
IN THE LAST 12 MONTHS, WAS THERE A TIME WHEN YOU DID NOT HAVE A STEADY PLACE TO SLEEP OR SLEPT IN A SHELTER (INCLUDING NOW)?: NO

## 2023-02-28 SDOH — ECONOMIC STABILITY: INCOME INSECURITY: HOW HARD IS IT FOR YOU TO PAY FOR THE VERY BASICS LIKE FOOD, HOUSING, MEDICAL CARE, AND HEATING?: NOT HARD AT ALL

## 2023-02-28 SDOH — ECONOMIC STABILITY: FOOD INSECURITY: WITHIN THE PAST 12 MONTHS, THE FOOD YOU BOUGHT JUST DIDN'T LAST AND YOU DIDN'T HAVE MONEY TO GET MORE.: NEVER TRUE

## 2023-02-28 SDOH — ECONOMIC STABILITY: TRANSPORTATION INSECURITY
IN THE PAST 12 MONTHS, HAS LACK OF TRANSPORTATION KEPT YOU FROM MEETINGS, WORK, OR FROM GETTING THINGS NEEDED FOR DAILY LIVING?: NO

## 2023-02-28 NOTE — LETTER
I recommended a 4 month handicap parking placard for the reason, walking 100 feet nonstop will aggravate their existing orthopedic condition, also the orthopedic condition creates a substantial limitation in routine walking. This permit is of medical necessity secondary to an impairment of mobility. Sincerely,    Susan Pace.  [address] [facility]  License #[id number]

## 2023-02-28 NOTE — TELEPHONE ENCOUNTER
From: Kusum Jimenez  To: Dr. Sherine Rico  Sent: 2/28/2023 11:23 AM EST  Subject: Handicap placard     Hello  You completed my form last year and it is up for renewal. Will you do the renewal?

## 2023-03-01 ENCOUNTER — OFFICE VISIT (OUTPATIENT)
Dept: INTERNAL MEDICINE CLINIC | Facility: CLINIC | Age: 53
End: 2023-03-01
Payer: COMMERCIAL

## 2023-03-01 VITALS
HEART RATE: 93 BPM | HEIGHT: 60 IN | TEMPERATURE: 97.5 F | SYSTOLIC BLOOD PRESSURE: 140 MMHG | DIASTOLIC BLOOD PRESSURE: 100 MMHG | OXYGEN SATURATION: 99 % | BODY MASS INDEX: 36.12 KG/M2 | WEIGHT: 184 LBS

## 2023-03-01 DIAGNOSIS — M25.50 POLYARTHRALGIA: ICD-10-CM

## 2023-03-01 DIAGNOSIS — L03.90 WOUND CELLULITIS: Primary | ICD-10-CM

## 2023-03-01 DIAGNOSIS — F41.9 ANXIETY: ICD-10-CM

## 2023-03-01 DIAGNOSIS — M32.9 LUPUS (HCC): ICD-10-CM

## 2023-03-01 DIAGNOSIS — F31.9 BIPOLAR DEPRESSION (HCC): ICD-10-CM

## 2023-03-01 DIAGNOSIS — F51.01 PRIMARY INSOMNIA: ICD-10-CM

## 2023-03-01 PROCEDURE — 99214 OFFICE O/P EST MOD 30 MIN: CPT | Performed by: NURSE PRACTITIONER

## 2023-03-01 RX ORDER — CEPHALEXIN 250 MG/5ML
500 POWDER, FOR SUSPENSION ORAL 3 TIMES DAILY
Qty: 300 ML | Refills: 0 | Status: SHIPPED | OUTPATIENT
Start: 2023-03-01 | End: 2023-03-11

## 2023-03-01 RX ORDER — BETAMETHASONE DIPROPIONATE 0.05 %
OINTMENT (GRAM) TOPICAL
COMMUNITY
Start: 2023-02-27

## 2023-03-01 ASSESSMENT — ENCOUNTER SYMPTOMS
NAUSEA: 0
ABDOMINAL PAIN: 0
EYE DISCHARGE: 0
ABDOMINAL DISTENTION: 0
EYE REDNESS: 0
EYE ITCHING: 0
COUGH: 0
SHORTNESS OF BREATH: 0
SINUS PAIN: 0
APNEA: 0
DIARRHEA: 0
CONSTIPATION: 0
COLOR CHANGE: 0
EYE PAIN: 0
BACK PAIN: 0

## 2023-03-01 ASSESSMENT — PATIENT HEALTH QUESTIONNAIRE - PHQ9
SUM OF ALL RESPONSES TO PHQ QUESTIONS 1-9: 2
SUM OF ALL RESPONSES TO PHQ9 QUESTIONS 1 & 2: 2
SUM OF ALL RESPONSES TO PHQ QUESTIONS 1-9: 2
1. LITTLE INTEREST OR PLEASURE IN DOING THINGS: 1
2. FEELING DOWN, DEPRESSED OR HOPELESS: 1

## 2023-03-01 NOTE — PROGRESS NOTES
[address] [place] [address] Court, [address] [facility]  [phone]    Patient Name:  Lolis Bland  YOB: 1970      Office Visit 3/2/2023    CHIEF COMPLAINT:    Chief Complaint   Patient presents with    Follow-up     HYPERSOMNIA         HISTORY OF PRESENT ILLNESS:  Patient is a 47 yo female seen today for follow up of hypersomnia. She was last seen in sleep medicine on 08/18/2022 after having a negative sleep study completed. She was still having severe hypersomnia with an Shelley score of 19/24. She is also followed by Dr. Keshawn Napoles with psychiatry and is on Klonopin for anxiety, trazodone for insomnia, Strattera for ADD, amitriptyline for insomnia, and Mirapex for restless leg syndrome. The provider who saw her last did contemplate may be doing a stimulant to help with hypersomnia and was going to reach out to Dr. Keshawn Napoles to discuss medications. Patient returns today and states that Dr. Keshawn Napoles wanted her to come back here to further discuss her sleep patterns and rhythms. She has been using a Fitbit at night to monitor her levels of sleep and is concerned that she has lower levels of deep sleep. I did advise her that the Fitbit is not real accurate in interpreting this information as the previous sleep study was that she had where her sleep efficiency was 93.1%. We did discuss her sleep schedule and she states that she is currently going to bed at 730-8:30 PM and awakening at 330-4 AM.  She denies having any problems initially going to sleep and states that she sleeps without awakening unless she has to go to the bathroom which happens on occasion. She has been guided by her primary and Dr. Keshawn Napoles to try to go to bed later at night so she will not awaken as early in the morning. I agree with this plan and advised her to continue to try to go to bed 30 minutes later each night because her current sleep hours she is getting a full 8 hours of sleep.   She is also avoiding caffeine. I did advise her that just these 2 changes alone have probably contributed to her Willow scale and sleepiness being improved to mild hypersomnia with today's Willow scale being 14/24. She is agreeable to do a 2-week sleep log and return it to me for review so we can see if there are any trends that could be contributing to her daytime sleepiness. I also asked her to reach out to Dr. Taniya Gonzalez so we can discuss her medications and may be think about stimulant therapy in the future if her hypersomnia worsens or fails to improve. She denies any major medical changes over the last year. Her blood pressure is controlled today.     Sleep Medicine 3/2/2023 8/18/2022   Sitting and reading 1 3   Watching TV 3 3   Sitting, inactive in a public place (e.g. a theatre or a meeting) 1 2   As a passenger in a car for an hour without a break 1 1   Lying down to rest in the afternoon when circumstances permit 3 3   Sitting and talking to someone 1 2   Sitting quietly after a lunch without alcohol 3 3   In a car, while stopped for a few minutes in traffic 1 2   Willow Sleepiness Score 14 19          Past Medical History:   Diagnosis Date    Anxiety     Chronic pain     DDD (degenerative disc disease), cervical 11/29/2021    C3-C7 Disc Degeneration w/ Posterior Spondylotic Disc Displaceemnts per MRI    Depressed     GERD (gastroesophageal reflux disease)     Headache     Hypertension     IBS (irritable bowel syndrome)     Insomnia     Neuropathy     Panic attack     Sleep difficulties     TMJ (dislocation of temporomandibular joint) 04/2021    Vestibular migraine     Vestibular neuritis          Patient Active Problem List   Diagnosis    Hypoglycemia    Chronic tension-type headache, intractable    Ganglion cyst    Radiculopathy of cervical region    Lymphocytic colitis    Essential tremor    Uterine leiomyoma    Neuropathic pain    Numbness and tingling    Schatzki's ring    Chronic pain syndrome    B12 deficiency    Hiatal hernia with GERD    History of colonic polyps    Left carpal tunnel syndrome    Pharyngoesophageal dysphagia    Anxiety    Vitamin D deficiency    Cervical radicular pain    Bipolar depression (HCC)    Functional dyspepsia    Chronic fatigue    Irritable bowel syndrome with both constipation and diarrhea    Early satiety    De Quervain's tenosynovitis, right    Restless legs syndrome (RLS)    Encounter for medication management    Left hand pain    Nausea    High-tone pelvic floor dysfunction    GERD with esophagitis    Duodenitis    Other constipation    Primary insomnia    Mild episode of recurrent major depressive disorder (HCC)    Erosive gastritis    Right wrist pain    Right forearm pain    Arthritis of carpometacarpal (CMC) joint of right thumb    Polyarthralgia    Palpitations    Generalized osteoarthritis    Pain of paraspinal muscle    Serologic abnormality    Lupus ([facility])    MATEO positive    Benign paroxysmal positional vertigo of right ear    Vertigo, aural, unspecified laterality    Intractable migraine with aura without status migrainosus    Overactive bladder          Past Surgical History:   Procedure Laterality Date    CATARACT REMOVAL Bilateral 02/2020    COLONOSCOPY      EGD DELIVER THERMAL ENERGY SPHNCTR/CARDIA GERD      EYE SURGERY      LIPECTOMY      TUBAL LIGATION             Social History     Socioeconomic History    Marital status:      Spouse name: Not on file    Number of children: Not on file    Years of education: Not on file    Highest education level: Not on file   Occupational History    Not on file   Tobacco Use    Smoking status: Never    Smokeless tobacco: Former     Types: Chew   Vaping Use    Vaping Use: Never used   Substance and Sexual Activity    Alcohol use: Never    Drug use: Never    Sexual activity: Not Currently     Partners: Male   Other Topics Concern    Not on file   Social History Narrative    Not on file     Social Determinants of Health Financial Resource Strain: Not on file   Food Insecurity: Not on file   Transportation Needs: Not on file   Physical Activity: Not on file   Stress: Not on file   Social Connections: Not on file   Intimate Partner Violence: Not on file   Housing Stability: Not on file         Family History   Problem Relation Age of Onset    Heart Disease Father     Hypertension Father     High Cholesterol Mother     GERD Mother          Allergies   Allergen Reactions    Furosemide Other (See Comments)     Edema of lower extremities      Sulfa Antibiotics Other (See Comments) and Rash     GI Upset    Other Other (See Comments)     Oral steroids         Current Outpatient Medications   Medication Sig    betamethasone dipropionate 0.05 % ointment APPLY TO AFFECTED AREAS ON HANDS THREE TIMES DAILY    cephALEXin (KEFLEX) 250 MG/5ML suspension Take 10 mLs by mouth 3 times daily for 10 days    mupirocin (BACTROBAN) 2 % ointment Apply topically 2 times daily. topiramate (TOPAMAX) 50 MG tablet Take 50 mg by mouth 2 times daily    traZODone (DESYREL) 50 MG tablet Take 1-2 tablets by mouth nightly    NONFORMULARY Turmeric/Ginger Gummy    pramipexole (MIRAPEX) 1 MG tablet Take 1 tablet by mouth nightly    Multiple Vitamins-Minerals (MULTIVITAMIN WOMEN 50+ PO) Take by mouth    metoprolol succinate (TOPROL XL) 25 MG extended release tablet Take 1 tablet by mouth daily    clonazePAM (KLONOPIN) 0.5 MG tablet Take 1 tablet by mouth 2 times daily as needed for Anxiety for up to 120 days.     atomoxetine (STRATTERA) 80 MG capsule Take 1 capsule by mouth daily    citalopram (CELEXA) 20 MG tablet Take 1 tablet by mouth every morning    Lidocaine 5 % CREA Topical 5% Lidocaine and 5% Neurontin to apply to affected area up to 4 times/day as needed for pain    meclizine (ANTIVERT) 12.5 MG tablet Take 12.5 mg by mouth 3 times daily as needed    vitamin D (CHOLECALCIFEROL) 45886 UNIT CAPS Take 1 capsule by mouth every 7 days    cyclobenzaprine (AMRIX) 15 MG extended release capsule Take 15 mg by mouth daily as needed    hydroCHLOROthiazide (HYDRODIURIL) 25 MG tablet Take 12.5 mg by mouth daily    cyanocobalamin 1000 MCG tablet Take 1 tablet by mouth daily    pregabalin (LYRICA) 200 MG capsule Take 200 mg by mouth 2 times daily. hydroxychloroquine (PLAQUENIL) 200 MG tablet Take 1 tablet by mouth daily    EVENING PRIMROSE OIL PO Take by mouth    methylfolate (DEPLIN) 7.5 MG TABS tablet Take 1 tablet by mouth in the morning. Wheat Dextrin (BENEFIBER PO)     LINZESS 72 MCG CAPS capsule Take 1 capsule by mouth daily    naproxen (NAPROSYN) 500 MG tablet Take 1 tablet by mouth as needed    omeprazole (PRILOSEC) 40 MG delayed release capsule Take 1 capsule (40 mg) by mouth 2 (two) times a day 30 mins before breakfast and 30 mins before dinner    cod liver oil CAPS Take 1 capsule by mouth daily    Ubrogepant (UBRELVY) 50 MG TABS Take 1 tablet at the onset of migraine, may repeat in 2 hours. Do not take more then 3 days a week. Benzoyl Peroxide 2.5 % GEL APPLY TOPICALLY TO FACE AT BEDTIME    diclofenac sodium (VOLTAREN) 1 % GEL Apply 1 g topically as needed    Lidocaine, Anorectal, 5 % CREA Actual prescription: Lidocaine 5%: Neurontin/gabapentin 5% combined topical cream/gelApply to affected area up to 4 times a day as needed for pain    nystatin-triamcinolone (MYCOLOG II) 555496-6.1 UNIT/GM-% cream Apply topically 2 times daily as needed    vitamin E 600 units capsule Take by mouth daily    triamcinolone (KENALOG) 0.1 % ointment Apply topically 2 times daily for 7 days (Patient not taking: Reported on 3/2/2023)     No current facility-administered medications for this visit. REVIEW OF SYSTEMS:   CONSTITUTIONAL:   There is no history of fever, chills, night sweats, weight loss, weight gain, persistent fatigue, or lethargy/hypersomnolence. CARDIAC:   No chest pain, pressure, discomfort, palpitations, orthopnea, murmurs, or edema.    GI:   No dysphagia, heartburn reflux, nausea/vomiting, diarrhea, abdominal pain, or bleeding. NEURO:   There is no history of AMS, persistent headache, decreased level of consciousness, seizures, or motor or sensory deficits. PHYSICAL EXAM:    Vitals:    03/02/23 1425   BP: 131/85   Pulse: 89   Temp: 97.9 °F (36.6 °C)   TempSrc: Skin   SpO2: 100%   Weight: 184 lb (83.5 kg)   Height: 5' (1.524 m)        Body mass index is 35.94 kg/m². GENERAL APPEARANCE:   The patient is obese and in no respiratory distress. HEENT:   PERRL. Conjunctivae unremarkable. Nasal mucosa is without epistaxis, exudate, or polyps. Nares patent bilateral.  Gums and dentition are unremarkable. There is oropharyngeal narrowing. Arguello score 2. NECK/LYMPHATIC:   Symmetrical with no elevation of jugular venous pulsation. Trachea midline. No thyroid enlargement. No cervical adenopathy. LUNGS:   Normal respiratory effort with symmetrical lung expansion. Breath sounds clear. HEART:   There is a regular rate and rhythm. No murmur, rub, or gallop. There is no edema in the lower extremities. ABDOMEN:   Soft and non-tender. No hepatosplenomegaly. Bowel sounds are normal.     NEURO:   The patient is alert and oriented to person, place, and time. Memory appears intact and mood is normal.  No gross sensorimotor deficits are present. ASSESSMENT:  (Medical Decision Making)       ICD-10-CM    1. Hypersomnia  G47.10 [place] score has improved to 14/24. Anticipate this is due to her working on her sleep schedule and trying to go to bed later at night to be able to sleep longer in the morning and decreasing caffeine daily and avoiding during the evening. Did inform patient to reach out to Dr. Ayana Turner so we could discuss potential stimulant therapy in the future if needed. 2. RLS (restless legs syndrome)  G25.81 Leg movements are well controlled on pramipexole.         3. Primary insomnia  F51.01 Patient is falling asleep easily. She is on trazodone and amitriptyline nightly. 4. Sleep state misperception  F51.02 Patient states she does not feel that she is getting into a deep sleep and sometimes does not feel that she is sleeping. She had great sleep efficiency on sleep study. 5. Poor sleep hygiene  R85.478 Patient continuing to work on sleep schedule by going to sleep later at night so that she does not awaken so early in the mornings. PLAN:    Contact Dr. Lise Murguia so we can discuss potential medication changes if needed  Continue with Dr. Lise Murguia for management of anxiety/stress, ADD  Recommendations as above  Complete 2-week sleep log and return to office  Follow-up as needed if symptoms of hypersomnia fail to improve or worsen. No orders of the defined types were placed in this encounter. Collaborating Physician: Dr. Barbara Unger    Over 50% of today's office visit was spent in face to face time reviewing test results, prognosis, importance of compliance, education about disease process, benefits of medications, instructions for management of acute flare-ups, and follow up plans. Total face to face time spent with patient was 20 minutes.         [address], APRN - CNP  Electronically signed

## 2023-03-01 NOTE — PROGRESS NOTES
[facility] [place]  [address] [place] [phone]      PROGRESS NOTE    SUBJECTIVE:   Eliot Wilcox is a 48 y.o. female seen for a follow up visit regarding the following chief complaint:     Chief Complaint   Patient presents with    Follow-up     3 month follow up. HPI    Patient presents for follow up. She is having some worsening pain around recent skin burn on right hand. Trying topical mupirocin BID. No drainage. She has additional concerns of worsening anxiety and trouble coping; she is following Dr. Margie Mishra for this with next appointment May. ENT: planning testing EMG/NCV for ongoing vertigo. She had vestibular test with diagnosis of vestibular migraines and neuritis (inner ear weakness). Following Rheumatology for Lupus. Following PT for hamstring tendonitis of left leg. Podiatry: planning surgery on left great toe. Awaiting TMJ surgery. Sleep wake cycle off, she is planning sleep medicine consult; She is feeling fatigued often. Current Outpatient Medications   Medication Sig Dispense Refill    betamethasone dipropionate 0.05 % ointment APPLY TO AFFECTED AREAS ON HANDS THREE TIMES DAILY      cephALEXin (KEFLEX) 250 MG/5ML suspension Take 10 mLs by mouth 3 times daily for 10 days 300 mL 0    mupirocin (BACTROBAN) 2 % ointment Apply topically 2 times daily.  1 each 0    triamcinolone (KENALOG) 0.1 % ointment Apply topically 2 times daily for 7 days 15 g 1    topiramate (TOPAMAX) 50 MG tablet Take 50 mg by mouth 2 times daily      traZODone (DESYREL) 50 MG tablet Take 1-2 tablets by mouth nightly 180 tablet 1    NONFORMULARY Turmeric/Amina Gummy      pramipexole (MIRAPEX) 1 MG tablet Take 1 tablet by mouth nightly 90 tablet 3    Multiple Vitamins-Minerals (MULTIVITAMIN WOMEN 50+ PO) Take by mouth      metoprolol succinate (TOPROL XL) 25 MG extended release tablet Take 1 tablet by mouth daily 30 tablet 5    clonazePAM (KLONOPIN) 0.5 MG tablet Take 1 tablet by mouth 2 times daily as needed for Anxiety for up to 120 days. 60 tablet 3    atomoxetine (STRATTERA) 80 MG capsule Take 1 capsule by mouth daily 90 capsule 1    citalopram (CELEXA) 20 MG tablet Take 1 tablet by mouth every morning 90 tablet 1    Lidocaine 5 % CREA Topical 5% Lidocaine and 5% Neurontin to apply to affected area up to 4 times/day as needed for pain 45 g 2    meclizine (ANTIVERT) 12.5 MG tablet Take 12.5 mg by mouth 3 times daily as needed      vitamin D (CHOLECALCIFEROL) 98791 UNIT CAPS Take 1 capsule by mouth every 7 days 12 capsule 3    cyclobenzaprine (AMRIX) 15 MG extended release capsule Take 15 mg by mouth daily as needed      hydroCHLOROthiazide (HYDRODIURIL) 25 MG tablet Take 12.5 mg by mouth daily      cyanocobalamin 1000 MCG tablet Take 1 tablet by mouth daily 90 tablet 3    pregabalin (LYRICA) 200 MG capsule Take 200 mg by mouth 2 times daily. hydroxychloroquine (PLAQUENIL) 200 MG tablet Take 1 tablet by mouth daily      EVENING PRIMROSE OIL PO Take by mouth      methylfolate (DEPLIN) 7.5 MG TABS tablet Take 1 tablet by mouth in the morning. 30 tablet 5    Wheat Dextrin (BENEFIBER PO)       LINZESS 72 MCG CAPS capsule Take 1 capsule by mouth daily      naproxen (NAPROSYN) 500 MG tablet Take 1 tablet by mouth as needed      omeprazole (PRILOSEC) 40 MG delayed release capsule Take 1 capsule (40 mg) by mouth 2 (two) times a day 30 mins before breakfast and 30 mins before dinner      cod liver oil CAPS Take 1 capsule by mouth daily      Ubrogepant (UBRELVY) 50 MG TABS Take 1 tablet at the onset of migraine, may repeat in 2 hours. Do not take more then 3 days a week.  16 tablet 11    Benzoyl Peroxide 2.5 % GEL APPLY TOPICALLY TO FACE AT BEDTIME      diclofenac sodium (VOLTAREN) 1 % GEL Apply 1 g topically as needed      Lidocaine, Anorectal, 5 % CREA Actual prescription: Lidocaine 5%: Neurontin/gabapentin 5% combined topical cream/gelApply to affected area up to 4 times a day as needed for pain      nystatin-triamcinolone (MYCOLOG II) 848334-5.1 UNIT/GM-% cream Apply topically 2 times daily as needed      vitamin E 600 units capsule Take by mouth daily       No current facility-administered medications for this visit. Allergies   Allergen Reactions    Furosemide Other (See Comments)     Edema of lower extremities      Sulfa Antibiotics Other (See Comments) and Rash     GI Upset    Other Other (See Comments)     Oral steroids       Past Medical History:   Diagnosis Date    Anxiety     Chronic pain     DDD (degenerative disc disease), cervical 11/29/2021    C3-C7 Disc Degeneration w/ Posterior Spondylotic Disc Displaceemnts per MRI    Depressed     GERD (gastroesophageal reflux disease)     Headache     Hypertension     IBS (irritable bowel syndrome)     Insomnia     Neuropathy     Panic attack     Sleep difficulties     TMJ (dislocation of temporomandibular joint) 04/2021    Vestibular migraine     Vestibular neuritis      Past Surgical History:   Procedure Laterality Date    CATARACT REMOVAL Bilateral 02/2020    COLONOSCOPY      EGD DELIVER THERMAL ENERGY SPHNCTR/CARDIA GERD      EYE SURGERY      LIPECTOMY      TUBAL LIGATION       Family History   Problem Relation Age of Onset    Heart Disease Father     Hypertension Father     High Cholesterol Mother     GERD Mother      Social History     Tobacco Use    Smoking status: Never    Smokeless tobacco: Former     Types: Chew   Substance Use Topics    Alcohol use: Never       Review of Systems   Constitutional:  Positive for fatigue. Negative for activity change, appetite change, chills and fever. HENT:  Negative for congestion, ear pain and sinus pain. Eyes:  Negative for pain, discharge, redness and itching. Respiratory:  Negative for apnea, cough and shortness of breath. Cardiovascular:  Negative for chest pain, palpitations and leg swelling.    Gastrointestinal:  Negative for abdominal distention, abdominal pain, constipation, diarrhea and nausea. Endocrine: Negative for cold intolerance and heat intolerance. Genitourinary:  Negative for difficulty urinating, dysuria, enuresis and urgency. Musculoskeletal:  Positive for arthralgias and myalgias. Negative for back pain, joint swelling and neck pain. Skin:  Positive for rash. Negative for color change. Neurological:  Negative for dizziness, weakness and headaches. Psychiatric/Behavioral:  Positive for sleep disturbance. Negative for behavioral problems. The patient is not nervous/anxious. Lack of motivation, depressed mood, anxious at times       OBJECTIVE:  BP (!) 140/100 (Site: Left Upper Arm, Position: Sitting, Cuff Size: Large Adult)   Pulse 93   Temp 97.5 °F (36.4 °C) (Temporal)   Ht 5' (1.524 m)   Wt 184 lb (83.5 kg)   SpO2 99%   BMI 35.94 kg/m²      Physical Exam  Constitutional:       General: She is not in acute distress. Appearance: Normal appearance. She is not ill-appearing or toxic-appearing. Cardiovascular:      Rate and Rhythm: Normal rate and regular rhythm. Pulmonary:      Effort: Pulmonary effort is normal. No respiratory distress. Musculoskeletal:      Comments: Moderate erythema around scabbing on right hand digit   Skin:     General: Skin is warm and dry. Neurological:      Mental Status: She is alert. Mental status is at baseline. Psychiatric:         Mood and Affect: Mood normal.         Behavior: Behavior normal.         Thought Content: Thought content normal.         ASSESSMENT and PLAN  Adele Moreno was seen today for follow-up. Diagnoses and all orders for this visit:    Wound cellulitis  -     cephALEXin (KEFLEX) 250 MG/5ML suspension; Take 10 mLs by mouth 3 times daily for 10 days    Anxiety    Bipolar depression ([address]    Lupus ([address]    Primary insomnia    Polyarthralgia    Start on keflex for cellulitis; all other diseases managed by specialists and do not warrant changes to treatment plan based on acute findings today.     Greater than 50% of this 35 min visit was spent counseling the patient about test results, prognosis, importance of compliance, education about disease process, benefits of medications, instructions for management of acute symptoms, and follow up plans. Follow-up and Dispositions    Return in about 3 months (around 6/1/2023) for recheck routine.          Elizabeth Day, BRANDON - CNP

## 2023-03-02 ENCOUNTER — OFFICE VISIT (OUTPATIENT)
Dept: SLEEP MEDICINE | Age: 53
End: 2023-03-02
Payer: COMMERCIAL

## 2023-03-02 VITALS
TEMPERATURE: 97.9 F | BODY MASS INDEX: 36.12 KG/M2 | SYSTOLIC BLOOD PRESSURE: 131 MMHG | HEIGHT: 60 IN | WEIGHT: 184 LBS | HEART RATE: 89 BPM | DIASTOLIC BLOOD PRESSURE: 85 MMHG | OXYGEN SATURATION: 100 %

## 2023-03-02 DIAGNOSIS — G47.10 HYPERSOMNIA: Primary | ICD-10-CM

## 2023-03-02 DIAGNOSIS — Z72.821 POOR SLEEP HYGIENE: ICD-10-CM

## 2023-03-02 DIAGNOSIS — F51.02 SLEEP STATE MISPERCEPTION: ICD-10-CM

## 2023-03-02 DIAGNOSIS — F51.01 PRIMARY INSOMNIA: ICD-10-CM

## 2023-03-02 DIAGNOSIS — G25.81 RLS (RESTLESS LEGS SYNDROME): ICD-10-CM

## 2023-03-02 PROCEDURE — 99213 OFFICE O/P EST LOW 20 MIN: CPT | Performed by: NURSE PRACTITIONER

## 2023-03-02 ASSESSMENT — SLEEP AND FATIGUE QUESTIONNAIRES
HOW LIKELY ARE YOU TO NOD OFF OR FALL ASLEEP IN A CAR, WHILE STOPPED FOR A FEW MINUTES IN TRAFFIC: 1
HOW LIKELY ARE YOU TO NOD OFF OR FALL ASLEEP WHILE LYING DOWN TO REST IN THE AFTERNOON WHEN CIRCUMSTANCES PERMIT: 3
HOW LIKELY ARE YOU TO NOD OFF OR FALL ASLEEP WHEN YOU ARE A PASSENGER IN A CAR FOR AN HOUR WITHOUT A BREAK: 1
HOW LIKELY ARE YOU TO NOD OFF OR FALL ASLEEP WHILE SITTING AND TALKING TO SOMEONE: 1
ESS TOTAL SCORE: 14
HOW LIKELY ARE YOU TO NOD OFF OR FALL ASLEEP WHILE SITTING AND READING: 1
HOW LIKELY ARE YOU TO NOD OFF OR FALL ASLEEP WHILE SITTING INACTIVE IN A PUBLIC PLACE: 1
HOW LIKELY ARE YOU TO NOD OFF OR FALL ASLEEP WHILE WATCHING TV: 3
HOW LIKELY ARE YOU TO NOD OFF OR FALL ASLEEP WHILE SITTING QUIETLY AFTER LUNCH WITHOUT ALCOHOL: 3

## 2023-03-02 NOTE — PATIENT INSTRUCTIONS
Contact Dr. Erin Bryant so we can discuss potential medication changes if needed  Continue with Dr. Erin Bryant for management of anxiety/stress, ADD  Recommendations as above  Complete 2-week sleep log and return to office  Follow-up as needed if symptoms of hypersomnia fail to improve or worsen.

## 2023-03-03 ENCOUNTER — TELEPHONE (OUTPATIENT)
Dept: BEHAVIORAL/MENTAL HEALTH CLINIC | Age: 53
End: 2023-03-03

## 2023-03-03 NOTE — TELEPHONE ENCOUNTER
Notes from sleep medicine office visit 3/2 in chart.    ----- Message from Candida Cortez sent at 3/2/2023  5:07 PM EST -----  Regarding: Sleep Medicine   Hello  I did a follow up appointment with sleep medicine due to waking up still at 3:30 or 4:00 am. As you stated the body only needs 6 to 7 hours which is what I am getting. I saw [employer] and he would like you to contact him about what may be a cause or an option to change one medication due to hypersomnia. The straterra and change maybe to a stimulant like provigia. I am not sure what is going on but he doesn't want me to take naps and I am doing a two week sleep log.

## 2023-03-07 ENCOUNTER — PATIENT MESSAGE (OUTPATIENT)
Dept: INTERNAL MEDICINE CLINIC | Facility: CLINIC | Age: 53
End: 2023-03-07

## 2023-03-07 NOTE — TELEPHONE ENCOUNTER
From: Gavin Villafana  To: Tiffany Fregoso  Sent: 3/7/2023 5:00 AM EST  Subject: Question     Hello  Did you refer me to Dr Magali Titus for my lumbar? . If so I have the solution.

## 2023-03-08 ENCOUNTER — PATIENT MESSAGE (OUTPATIENT)
Dept: INTERNAL MEDICINE CLINIC | Facility: CLINIC | Age: 53
End: 2023-03-08

## 2023-03-08 NOTE — TELEPHONE ENCOUNTER
From: Reji Saeed  To: Gibson Miller  Sent: 3/8/2023 8:57 AM EST  Subject: Counselor     Imer  I saw Norma Rizzo today. He thinks I need to review with you inpatient observation. I look really bad. Fatigue has got the best of me and my blood pressure is back up. At my last visit it was elevated.

## 2023-03-09 ENCOUNTER — OFFICE VISIT (OUTPATIENT)
Dept: BEHAVIORAL/MENTAL HEALTH CLINIC | Age: 53
End: 2023-03-09

## 2023-03-09 VITALS
HEIGHT: 60 IN | HEART RATE: 93 BPM | BODY MASS INDEX: 35.69 KG/M2 | OXYGEN SATURATION: 94 % | SYSTOLIC BLOOD PRESSURE: 153 MMHG | WEIGHT: 181.8 LBS | DIASTOLIC BLOOD PRESSURE: 88 MMHG | TEMPERATURE: 97.9 F

## 2023-03-09 DIAGNOSIS — F33.0 MILD EPISODE OF RECURRENT MAJOR DEPRESSIVE DISORDER (HCC): Primary | ICD-10-CM

## 2023-03-09 DIAGNOSIS — G89.4 CHRONIC PAIN DISORDER: ICD-10-CM

## 2023-03-09 DIAGNOSIS — F41.1 GENERALIZED ANXIETY DISORDER: ICD-10-CM

## 2023-03-09 DIAGNOSIS — F51.05 INSOMNIA DUE TO MENTAL DISORDER: ICD-10-CM

## 2023-03-09 ASSESSMENT — ANXIETY QUESTIONNAIRES
GAD7 TOTAL SCORE: 10
3. WORRYING TOO MUCH ABOUT DIFFERENT THINGS: 1
5. BEING SO RESTLESS THAT IT IS HARD TO SIT STILL: 2
6. BECOMING EASILY ANNOYED OR IRRITABLE: 1
7. FEELING AFRAID AS IF SOMETHING AWFUL MIGHT HAPPEN: 0
2. NOT BEING ABLE TO STOP OR CONTROL WORRYING: 1
1. FEELING NERVOUS, ANXIOUS, OR ON EDGE: 3
4. TROUBLE RELAXING: 2
IF YOU CHECKED OFF ANY PROBLEMS ON THIS QUESTIONNAIRE, HOW DIFFICULT HAVE THESE PROBLEMS MADE IT FOR YOU TO DO YOUR WORK, TAKE CARE OF THINGS AT HOME, OR GET ALONG WITH OTHER PEOPLE: VERY DIFFICULT

## 2023-03-09 ASSESSMENT — PATIENT HEALTH QUESTIONNAIRE - PHQ9
SUM OF ALL RESPONSES TO PHQ QUESTIONS 1-9: 12
7. TROUBLE CONCENTRATING ON THINGS, SUCH AS READING THE NEWSPAPER OR WATCHING TELEVISION: 1
9. THOUGHTS THAT YOU WOULD BE BETTER OFF DEAD, OR OF HURTING YOURSELF: 0
SUM OF ALL RESPONSES TO PHQ QUESTIONS 1-9: 12
4. FEELING TIRED OR HAVING LITTLE ENERGY: 3
6. FEELING BAD ABOUT YOURSELF - OR THAT YOU ARE A FAILURE OR HAVE LET YOURSELF OR YOUR FAMILY DOWN: 1
8. MOVING OR SPEAKING SO SLOWLY THAT OTHER PEOPLE COULD HAVE NOTICED. OR THE OPPOSITE, BEING SO FIGETY OR RESTLESS THAT YOU HAVE BEEN MOVING AROUND A LOT MORE THAN USUAL: 1
5. POOR APPETITE OR OVEREATING: 1
3. TROUBLE FALLING OR STAYING ASLEEP: 1
SUM OF ALL RESPONSES TO PHQ QUESTIONS 1-9: 12
1. LITTLE INTEREST OR PLEASURE IN DOING THINGS: 2
10. IF YOU CHECKED OFF ANY PROBLEMS, HOW DIFFICULT HAVE THESE PROBLEMS MADE IT FOR YOU TO DO YOUR WORK, TAKE CARE OF THINGS AT HOME, OR GET ALONG WITH OTHER PEOPLE: 2
2. FEELING DOWN, DEPRESSED OR HOPELESS: 2
SUM OF ALL RESPONSES TO PHQ9 QUESTIONS 1 & 2: 4
SUM OF ALL RESPONSES TO PHQ QUESTIONS 1-9: 12

## 2023-03-09 NOTE — PROGRESS NOTES
Patient:  Tessa Rocha  Age:  48 y.o.  :  1970     SEX:  female MRN:  [id number]     RACE: Black /      SEEN:  [x]  PATIENT  []  SPOUSE []  OTHER:              PHQ-9  3/9/2023 3/1/2023 2023   Little interest or pleasure in doing things 2 1 1   Little interest or pleasure in doing things - - -   Feeling down, depressed, or hopeless 2 1 2   Trouble falling or staying asleep, or sleeping too much 1 - 3   Trouble falling or staying asleep, or sleeping too much - - -   Feeling tired or having little energy 3 - 2   Feeling tired or having little energy - - -   Poor appetite or overeating 1 - 1   Poor appetite, weight loss, or overeating - - -   Feeling bad about yourself - or that you are a failure or have let yourself or your family down 1 - 1   Feeling bad about yourself - or that you are a failure or have let yourself or your family down - - -   Trouble concentrating on things, such as reading the newspaper or watching television 1 - 1   Trouble concentrating on things such as school, work, reading, or watching TV - - -   Moving or speaking so slowly that other people could have noticed. Or the opposite - being so fidgety or restless that you have been moving around a lot more than usual 1 - 1   Moving or speaking so slowly that other people could have noticed; or the opposite being so fidgety that others notice - - -   Thoughts that you would be better off dead, or of hurting yourself in some way 0 - 0   Thoughts of being better off dead, or hurting yourself in some way - - -   PHQ-2 Score 4 2 3   Total Score PHQ 2 - - -   PHQ-9 Total Score 12 2 12   PHQ 9 Score - - -   If you checked off any problems, how difficult have these problems made it for you to do your work, take care of things at home, or get along with other people?  2 - 1   How difficult have these problems made it for you to do your work, take care of your home and get along with others - - -       OFELIA-7 SCREENING 3/9/2023 2/23/2023 2/7/2023   Feeling nervous, anxious, or on edge Nearly every day Several days More than half the days   Not being able to stop or control worrying Several days Several days Several days   Worrying too much about different things Several days Several days Several days   Trouble relaxing More than half the days Several days More than half the days   Being so restless that it is hard to sit still More than half the days Several days Several days   Becoming easily annoyed or irritable Several days Several days Several days   Feeling afraid as if something awful might happen Not at all Not at all Not at all   OFELIA-7 Total Score 10 6 8   How difficult have these problems made it for you to do your work, take care of things at home, or get along with other people? Very difficult Somewhat difficult Somewhat difficult   Feeling nervous, anxious, or on edge - - -   OFELIA-7 Total Score - - -          Chief complaint:  Pt says she has been very stressed out dealing with [employer]. Subjective:  Seen for follow-up. States has been very stressed out dealing with [employer]. They have another form for her to be filled out. States she cannot work because of her physical health issues. She has been diagnosed with vestibular neuritis causing dizziness and vertigo. States they had her job description wrong. She is not a corporate . States her PCP is not willing to fill the form for her. Supportive psychotherapy provided. Form filled out for her today again. She denies suicidal ideation/homicidal ideations. Denies symptoms psychosis.     Patient Active Problem List   Diagnosis    Hypoglycemia    Chronic tension-type headache, intractable    Ganglion cyst    Radiculopathy of cervical region    Lymphocytic colitis    Essential tremor    Uterine leiomyoma    Neuropathic pain    Numbness and tingling    Schatzki's ring    Chronic pain syndrome    B12 deficiency    Hiatal hernia with GERD History of colonic polyps    Left carpal tunnel syndrome    Pharyngoesophageal dysphagia    Anxiety    Vitamin D deficiency    Cervical radicular pain    Bipolar depression ([facility])    Functional dyspepsia    Chronic fatigue    Irritable bowel syndrome with both constipation and diarrhea    Early satiety    De Quervain's tenosynovitis, right    Restless legs syndrome (RLS)    Encounter for medication management    Left hand pain    Nausea    High-tone pelvic floor dysfunction    GERD with esophagitis    Duodenitis    Other constipation    Primary insomnia    Mild episode of recurrent major depressive disorder ([facility])    Erosive gastritis    Right wrist pain    Right forearm pain    Arthritis of carpometacarpal (CMC) joint of right thumb    Polyarthralgia    Palpitations    Generalized osteoarthritis    Pain of paraspinal muscle    Serologic abnormality    Lupus ([facility])    MATEO positive    Benign paroxysmal positional vertigo of right ear    Vertigo, aural, unspecified laterality    Intractable migraine with aura without status migrainosus    Overactive bladder       Denies palpitation,SOB, Chest pain, headaches. In no acute distress.        MEDICATION REVIEW:    Current Medications:    Current Outpatient Medications   Medication Sig    betamethasone dipropionate 0.05 % ointment APPLY TO AFFECTED AREAS ON HANDS THREE TIMES DAILY    cephALEXin (KEFLEX) 250 MG/5ML suspension Take 10 mLs by mouth 3 times daily for 10 days    topiramate (TOPAMAX) 50 MG tablet Take 50 mg by mouth 2 times daily    traZODone (DESYREL) 50 MG tablet Take 1-2 tablets by mouth nightly    NONFORMULARY Turmeric/Ginger Gummy    pramipexole (MIRAPEX) 1 MG tablet Take 1 tablet by mouth nightly    Multiple Vitamins-Minerals (MULTIVITAMIN WOMEN 50+ PO) Take by mouth    metoprolol succinate (TOPROL XL) 25 MG extended release tablet Take 1 tablet by mouth daily    clonazePAM (KLONOPIN) 0.5 MG tablet Take 1 tablet by mouth 2 times daily as needed for Anxiety for up to 120 days. atomoxetine (STRATTERA) 80 MG capsule Take 1 capsule by mouth daily    citalopram (CELEXA) 20 MG tablet Take 1 tablet by mouth every morning    Lidocaine 5 % CREA Topical 5% Lidocaine and 5% Neurontin to apply to affected area up to 4 times/day as needed for pain    meclizine (ANTIVERT) 12.5 MG tablet Take 12.5 mg by mouth 3 times daily as needed    vitamin D (CHOLECALCIFEROL) 21551 UNIT CAPS Take 1 capsule by mouth every 7 days    cyclobenzaprine (AMRIX) 15 MG extended release capsule Take 15 mg by mouth daily as needed    hydroCHLOROthiazide (HYDRODIURIL) 25 MG tablet Take 12.5 mg by mouth daily    cyanocobalamin 1000 MCG tablet Take 1 tablet by mouth daily    pregabalin (LYRICA) 200 MG capsule Take 200 mg by mouth 2 times daily. hydroxychloroquine (PLAQUENIL) 200 MG tablet Take 1 tablet by mouth daily    EVENING PRIMROSE OIL PO Take by mouth    methylfolate (DEPLIN) 7.5 MG TABS tablet Take 1 tablet by mouth in the morning. Wheat Dextrin (BENEFIBER PO)     LINZESS 72 MCG CAPS capsule Take 1 capsule by mouth daily    naproxen (NAPROSYN) 500 MG tablet Take 1 tablet by mouth as needed    omeprazole (PRILOSEC) 40 MG delayed release capsule Take 1 capsule (40 mg) by mouth 2 (two) times a day 30 mins before breakfast and 30 mins before dinner    cod liver oil CAPS Take 1 capsule by mouth daily    Ubrogepant (UBRELVY) 50 MG TABS Take 1 tablet at the onset of migraine, may repeat in 2 hours. Do not take more then 3 days a week.     Benzoyl Peroxide 2.5 % GEL APPLY TOPICALLY TO FACE AT BEDTIME    diclofenac sodium (VOLTAREN) 1 % GEL Apply 1 g topically as needed    Lidocaine, Anorectal, 5 % CREA Actual prescription: Lidocaine 5%: Neurontin/gabapentin 5% combined topical cream/gelApply to affected area up to 4 times a day as needed for pain    nystatin-triamcinolone (MYCOLOG II) 784991-3.1 UNIT/GM-% cream Apply topically 2 times daily as needed    vitamin E 600 units capsule Take by mouth daily No current facility-administered medications for this visit. Allergies   Allergen Reactions    Furosemide Other (See Comments)     Edema of lower extremities      Sulfa Antibiotics Other (See Comments) and Rash     GI Upset    Other Other (See Comments)     Oral steroids       Past Medical History, Past Surgical History, Family history, Social History, and Medications were all reviewed with the patient today and updated as necessary.      Compliant with medication: Yes   Side effects from medications:  No     EXAMINATION  Musculoskeletal    GAIT AND STATION   [x] WNL   [] RESTRICTED   [] UNSTEADY WALK        [] ABNORMAL   [] UNBALANCED         PSYCHIATRIC     GENERAL APPEARANCE:   []  WELL GROOMED []     DISHEVELED   []  UNKEMPT      []  UNUSUAL/BIZZARE    [x] WNL       ATTITUDE:   [x] COOPERATIVE   [] GUARDED   [] SUSPICIOUS      [] HOSTILE                            BEHAVIOR:   [x] CALM   [] HYPERACTIVE   [] MANNERISMS      [] BIZZARE         SPEECH:   [x] NORMAL FOR CLIENT   [] SPONTANEOUS   [] SLURRED   [] WHISPERING      [] LOUD   [] PRESSURED   [] ARTICULATE       EYE CONTACT:   [x] WNL   [] BLANK STARE   [] INTENSE      [] AVOIDANT         MOOD:   [] EUTHYMIC   [x] ANXIOUS   [x] DEPRESSED      [] IRRITABLE   [] ANGRY   [] APATHETIC     AFFECT:   [x] CONGRUENT WITH MOOD   [] FLAT   [] CONSTRICTED      [] INAPPROPRIATE   [] LABILE           THOUGHT PROCESS:   [x] LOGICAL/GOAL-DIRECTED   [] FOI   [] CIRCUMSANTIAL      [] INCOHERENT   [] TANGENTIAL   [] CONCRETE      [] PERSEVERATION           THOUGHT CONTENT:                DELUSIONS  [x] DENIES  [] GRANDIOSE  [] PERSECUTORY  [] Denominational  [] REFERENCE   HALLUCINATIONS  [x] DENIES  [] AUDITORY  [] VISUAL  [] OLFACTORY  [] TACTILE     [] GUSTATORY  [] SOMATIC         OBSESSIONS  [x] DENIES  [] PRESENT         SUICIDAL IDEATION  [x] DENIES  [] PRESENT W/O PLAN  [] PRESENT W/ PLAN       HOMICIDAL IDEATION  [x] DENIES  [] PRESENT W/O PLAN  [] PRESENT W/ PLAN           JUDGEMENT:   [x] GOOD   [] FAIR   [] POOR   INSIGHT:   [x] GOOD   [] FAIR   [] POOR     COGNITION:           SENSORIUM:   [x] ALERT   [] CLOUDED   [] DROWSY     ORIENTATION:   [x] INTACT   [] TIME:  PLACE  PERSON   RECENT & REMOTE MEMORY:   [] NORMAL   [x] OTHER:                  ATTENTION:   [] INTACT   [x] MILD IMPAIRMENT   [] SEVERE IMPAIRMENT     CONCENTRATION:   [] INTACT   [x] MILD IMPAIRMENT   [] SEVERE IMPAIRMENT     LANGUAGE:   [x] AVERAGE   [] ABOVE AVERAGE   [] BELOW AVERAGE     FUND OF KNOWLEDGE:   [] UNABLE TO ASSESS AT THIS TIME   [x] AVERAGE   [] ABOVE AVERAGE   [] BELOW AVERAGE      [] GOOD TO EXCELLENT KNOWLEDGE OF CURRENT EVENTS   [] POOR TO NO KNLEDGE OF CURRENT EVENTS           ABNORMAL MOVEMENTS:   [x] NONE   [] TICS   [] TREMORS   [] BIZZARE      [] FACE   [] TRUNK   [] EXTREMETIES   [] GESTURES        SLEEP:   [x] GOOD   [] FAIR   [] POOR      MUSCLE STRENGTH AND TONE   [] WNL   [] ATROPHY   [] SPASTIC        [] FLACCID   [] COGWHEEL         Diagnoses/Impressions:    ICD-10-CM    1. [facility]  F33.0       2. Generalized anxiety disorder  F41.1       3. Insomnia due to mental disorder  F51.05       4.  Chronic pain disorder  G89.4           TREATMENT GOALS:  Symptom reduction, Medication adherence, maintain therapeutic gains    LABS/IMAGING:    []  Ordered [x]  Reviewed []  New Labs Ordered:     LAB  WBC   Date/Time Value Ref Range Status   01/06/2023 08:51 AM 9.8 4.3 - 11.1 K/uL Final     Hemoglobin   Date/Time Value Ref Range Status   01/06/2023 08:51 AM 13.0 11.7 - 15.4 g/dL Final     Hematocrit   Date/Time Value Ref Range Status   01/06/2023 08:51 AM 41.8 35.8 - 46.3 % Final     Platelets   Date/Time Value Ref Range Status   01/06/2023 08:51  150 - 450 K/uL Final     Sodium   Date/Time Value Ref Range Status   12/14/2022 09:56  133 - 143 mmol/L Final     Potassium   Date/Time Value Ref Range Status   12/14/2022 09:56 AM 3.5 3.5 - 5.1 mmol/L Final     Chloride   Date/Time Value Ref Range Status   12/14/2022 09:56  101 - 110 mmol/L Final     CO2   Date/Time Value Ref Range Status   12/14/2022 09:56 AM 31 21 - 32 mmol/L Final     BUN   Date/Time Value Ref Range Status   12/14/2022 09:56 AM 9 6 - 23 MG/DL Final     Magnesium   Date/Time Value Ref Range Status   05/12/2022 09:05 AM 2.1 1.2 - 2.6 mg/dL Final     ALT   Date/Time Value Ref Range Status   12/14/2022 09:56 AM 48 12 - 65 U/L Final     AST   Date/Time Value Ref Range Status   12/14/2022 09:56 AM 23 15 - 37 U/L Final     TSH   Date/Time Value Ref Range Status   05/02/2022 12:00 AM 0.901 0.450 - 4.500 uIU/mL Final       Please refer to the lab tab in the epic and care everywhere for the most recent lab results. Plan:     [x]  Medication ordered: Continue current medications at the current doses. [x]  Medication education/counseling provided  Medication dosage and time to take, purpose/expected benefits/risks, common side effects, lab monitoring required and reason, expected length of treatment, risk of no treatment, effects on pregnancy/nursing, financial availability discussed. Educated patient on  side effects/risks/benefits of meds including  cardiac arrhythmias, suicidal ideations, orthostatic hypotension, serotonin syndrome, risk of jesica/hypomania from antidepressants, withdrawals from abrupt discontinuation of meds, risk of rash, risk of infection, risk of bleeding, risk of seizures, addiction potential, memory impairment with long term use of benzos, respiratory depression, high blood pressure, dizziness, drowsiness, sedation , risk of falls, Risk & benefits discussed: including but not limited to possible off-label medication usage.        [x] Follow MSE for sxs improvement     I have reviewed the patients controlled substance prescription history, as maintained in the [place] prescription monitoring program, so that the prescription(s) for a controlled substance can be given. Recommendations and Referrals: Follow up with : MD, requires monitoring of response to medication, requires monitoring of medication side effects. Time until next PMA:     Follow up with Mental Health Clinicians: psychotherapy interventions, improve level of functioning, monitoring to prevent decompensation /hospitalization, monitoring to maintain therapeutic gains, symptoms (resolving and controlled)           Psychotherapy note:                                __10_ Minutes of psychotherapy     [x]  Supportive psychotherapy, Patient discussed certain situational and personal stressors ongoing in her life at this time, weight management d/w the patient. Sleep hygiene d/w patient. Patient allowed to vent out her emotions. Scenarios were reviewed using role playing and CBT techniques in order to increase insight and decrease anxiety. []  Disposition planning      []  Dangerous and will not contract for safety in the community    **[employer] has been notified: They are to call 911 or go to their nearest E.R. if they are experiencing a medical emergency or suicidal ideations/homicidal ideations. **  All ancillary documentation entered reviewed by provider. PLEASE NOTE:  This document has been produced in part or whole using voice recognition software. Proofread however unrecognized errors in transcription may be present. Vignesh Mcnally MD          Diagnosed with vestibular migraine, vestibular neuritis about a week ago. Had a nervous breakdown last week dealing with [email or web address].

## 2023-03-10 ENCOUNTER — TELEPHONE (OUTPATIENT)
Dept: BEHAVIORAL/MENTAL HEALTH CLINIC | Age: 53
End: 2023-03-10

## 2023-03-10 ENCOUNTER — OFFICE VISIT (OUTPATIENT)
Dept: INTERNAL MEDICINE CLINIC | Facility: CLINIC | Age: 53
End: 2023-03-10
Payer: COMMERCIAL

## 2023-03-10 VITALS
DIASTOLIC BLOOD PRESSURE: 80 MMHG | BODY MASS INDEX: 35.93 KG/M2 | TEMPERATURE: 97.2 F | HEART RATE: 99 BPM | HEIGHT: 60 IN | SYSTOLIC BLOOD PRESSURE: 130 MMHG | OXYGEN SATURATION: 98 % | WEIGHT: 183 LBS

## 2023-03-10 DIAGNOSIS — R26.89 BALANCE PROBLEM: Primary | ICD-10-CM

## 2023-03-10 DIAGNOSIS — H81.23: ICD-10-CM

## 2023-03-10 LAB — HBA1C MFR BLD: 5.2 %

## 2023-03-10 PROCEDURE — 93000 ELECTROCARDIOGRAM COMPLETE: CPT | Performed by: NURSE PRACTITIONER

## 2023-03-10 PROCEDURE — 99214 OFFICE O/P EST MOD 30 MIN: CPT | Performed by: NURSE PRACTITIONER

## 2023-03-10 PROCEDURE — 83036 HEMOGLOBIN GLYCOSYLATED A1C: CPT | Performed by: NURSE PRACTITIONER

## 2023-03-10 SDOH — ECONOMIC STABILITY: FOOD INSECURITY: WITHIN THE PAST 12 MONTHS, THE FOOD YOU BOUGHT JUST DIDN'T LAST AND YOU DIDN'T HAVE MONEY TO GET MORE.: PATIENT DECLINED

## 2023-03-10 SDOH — ECONOMIC STABILITY: FOOD INSECURITY: WITHIN THE PAST 12 MONTHS, YOU WORRIED THAT YOUR FOOD WOULD RUN OUT BEFORE YOU GOT MONEY TO BUY MORE.: PATIENT DECLINED

## 2023-03-10 SDOH — ECONOMIC STABILITY: HOUSING INSECURITY
IN THE LAST 12 MONTHS, WAS THERE A TIME WHEN YOU DID NOT HAVE A STEADY PLACE TO SLEEP OR SLEPT IN A SHELTER (INCLUDING NOW)?: PATIENT REFUSED

## 2023-03-10 SDOH — ECONOMIC STABILITY: INCOME INSECURITY: HOW HARD IS IT FOR YOU TO PAY FOR THE VERY BASICS LIKE FOOD, HOUSING, MEDICAL CARE, AND HEATING?: PATIENT DECLINED

## 2023-03-10 ASSESSMENT — ENCOUNTER SYMPTOMS
NAUSEA: 0
COLOR CHANGE: 0
EYE DISCHARGE: 0
DIARRHEA: 0
ABDOMINAL PAIN: 0
BACK PAIN: 1
SINUS PAIN: 0
EYE ITCHING: 0
COUGH: 0
EYE REDNESS: 0
CONSTIPATION: 0
SHORTNESS OF BREATH: 0
ABDOMINAL DISTENTION: 0
APNEA: 0
EYE PAIN: 0

## 2023-03-10 NOTE — PROGRESS NOTES
[unfilled]  [address] [place] [id number]      PROGRESS NOTE    SUBJECTIVE:   Gavin Villafana is a 48 y.o. female seen for a follow up visit regarding the following chief complaint:     Chief Complaint   Patient presents with    Dizziness     Patient c/o dizziness and balance issues. Patient states she has inner ear weakness and vestibular migraines and vestibular neuritis. Dizziness  Associated symptoms include arthralgias. Pertinent negatives include no abdominal pain, chest pain, chills, congestion, coughing, fatigue, fever, headaches, joint swelling, myalgias, nausea, neck pain, rash or weakness. Patient with multiple complex ailments presents with concerns of ongoing dizziness and balance issues. This is a chronic problem but worse recently; She is currently attending vestibular rehab for this. Notes from 2.28 reviewed which indicate the following summary:    Assessment: Pt is 48 yof with 1 year history of dizziness, vertigo and imbalance. Pt presents with clinical findings consistent with vestibular hypofunction affecting (B) sides of insidious etiology but suspect most likely due to migraines as well as a viral event affecting vestibular neuritis. Pt has (L) > (R) VOR deficits. Postural control reveals a mix of laterality in static tasks and (L) side deficit during dynamic gait. Pt also has poor use of ankle strategy. Pt will benefit from OP PT for vestibular rehab to minimize dizziness as well as to maximize balance strategies and gaze stability to allow return to normal activity with reduced fall risk and improved quality of life. Patient is here today due to ongoing balance issues. She states she was told not to use antivert due to interference with therapy. She is also following neurology for migraine management. Next physical medicine and rheumatology appt. 3/17. She is still driving.  Patient was seen by psychiatry recently with no changes to treatment regimen as symptoms seem to be controlled, but note today states decreasing trazodone. EKG SR, regular without signs of distress. Orthostatic blood pressures are normal. A1c normal today. She also notes that her back pain is uncontrolled. She follows pain management and orthopedic for management. Pain worse with sitting and ambulating for long periods. She uses topical lidocaine that provides much relief. She recently followed Dr. Ziyad Eddy for piriformis syndrome and arthropath of lumbar facet joint. She also follows PT for neck pain and left hip pain. Current Outpatient Medications   Medication Sig Dispense Refill    betamethasone dipropionate 0.05 % ointment APPLY TO AFFECTED AREAS ON HANDS THREE TIMES DAILY      cephALEXin (KEFLEX) 250 MG/5ML suspension Take 10 mLs by mouth 3 times daily for 10 days 300 mL 0    topiramate (TOPAMAX) 50 MG tablet Take 50 mg by mouth 2 times daily      traZODone (DESYREL) 50 MG tablet Take 1-2 tablets by mouth nightly 180 tablet 1    NONFORMULARY Turmeric/Ginger Gummy      pramipexole (MIRAPEX) 1 MG tablet Take 1 tablet by mouth nightly 90 tablet 3    Multiple Vitamins-Minerals (MULTIVITAMIN WOMEN 50+ PO) Take by mouth      metoprolol succinate (TOPROL XL) 25 MG extended release tablet Take 1 tablet by mouth daily 30 tablet 5    clonazePAM (KLONOPIN) 0.5 MG tablet Take 1 tablet by mouth 2 times daily as needed for Anxiety for up to 120 days.  60 tablet 3    atomoxetine (STRATTERA) 80 MG capsule Take 1 capsule by mouth daily 90 capsule 1    citalopram (CELEXA) 20 MG tablet Take 1 tablet by mouth every morning 90 tablet 1    Lidocaine 5 % CREA Topical 5% Lidocaine and 5% Neurontin to apply to affected area up to 4 times/day as needed for pain 45 g 2    meclizine (ANTIVERT) 12.5 MG tablet Take 12.5 mg by mouth 3 times daily as needed      vitamin D (CHOLECALCIFEROL) 13506 UNIT CAPS Take 1 capsule by mouth every 7 days 12 capsule 3    cyclobenzaprine (AMRIX) 15 MG extended release capsule Take 15 mg by mouth daily as needed      hydroCHLOROthiazide (HYDRODIURIL) 25 MG tablet Take 12.5 mg by mouth daily      cyanocobalamin 1000 MCG tablet Take 1 tablet by mouth daily 90 tablet 3    pregabalin (LYRICA) 200 MG capsule Take 200 mg by mouth 2 times daily. hydroxychloroquine (PLAQUENIL) 200 MG tablet Take 1 tablet by mouth daily      EVENING PRIMROSE OIL PO Take by mouth      methylfolate (DEPLIN) 7.5 MG TABS tablet Take 1 tablet by mouth in the morning. 30 tablet 5    Wheat Dextrin (BENEFIBER PO)       LINZESS 72 MCG CAPS capsule Take 1 capsule by mouth daily      naproxen (NAPROSYN) 500 MG tablet Take 1 tablet by mouth as needed      omeprazole (PRILOSEC) 40 MG delayed release capsule Take 1 capsule (40 mg) by mouth 2 (two) times a day 30 mins before breakfast and 30 mins before dinner      cod liver oil CAPS Take 1 capsule by mouth daily      Ubrogepant (UBRELVY) 50 MG TABS Take 1 tablet at the onset of migraine, may repeat in 2 hours. Do not take more then 3 days a week. 16 tablet 11    Benzoyl Peroxide 2.5 % GEL APPLY TOPICALLY TO FACE AT BEDTIME      diclofenac sodium (VOLTAREN) 1 % GEL Apply 1 g topically as needed      Lidocaine, Anorectal, 5 % CREA Actual prescription: Lidocaine 5%: Neurontin/gabapentin 5% combined topical cream/gelApply to affected area up to 4 times a day as needed for pain      nystatin-triamcinolone (MYCOLOG II) 369581-1.1 UNIT/GM-% cream Apply topically 2 times daily as needed      vitamin E 600 units capsule Take by mouth daily       No current facility-administered medications for this visit.      Allergies   Allergen Reactions    Furosemide Other (See Comments)     Edema of lower extremities      Sulfa Antibiotics Other (See Comments) and Rash     GI Upset    Other Other (See Comments)     Oral steroids       Past Medical History:   Diagnosis Date    Anxiety     Chronic pain     DDD (degenerative disc disease), cervical 11/29/2021    C3-C7 Disc Degeneration w/ Posterior Spondylotic Disc Displaceemnts per MRI    Depressed     GERD (gastroesophageal reflux disease)     Headache     Hypertension     IBS (irritable bowel syndrome)     Insomnia     Neuropathy     Panic attack     Sleep difficulties     TMJ (dislocation of temporomandibular joint) 04/2021    Vestibular migraine     Vestibular neuritis      Past Surgical History:   Procedure Laterality Date    CATARACT REMOVAL Bilateral 02/2020    COLONOSCOPY      EGD DELIVER THERMAL ENERGY SPHNCTR/CARDIA GERD      EYE SURGERY      LIPECTOMY      TUBAL LIGATION       Family History   Problem Relation Age of [place]set    Heart Disease Father     Hypertension Father     High Cholesterol Mother     GERD Mother      Social History     Tobacco Use    Smoking status: Never    Smokeless tobacco: Former     Types: Chew   Substance Use Topics    Alcohol use: Never       Review of Systems   Constitutional:  Negative for activity change, appetite change, chills, fatigue and fever. HENT:  Negative for congestion, ear pain and sinus pain. Eyes:  Negative for pain, discharge, redness and itching. Respiratory:  Negative for apnea, cough and shortness of breath. Cardiovascular:  Negative for chest pain, palpitations and leg swelling. Gastrointestinal:  Negative for abdominal distention, abdominal pain, constipation, diarrhea and nausea. Endocrine: Negative for cold intolerance and heat intolerance. Genitourinary:  Negative for difficulty urinating, dysuria, enuresis and urgency. Musculoskeletal:  Positive for arthralgias and back pain. Negative for joint swelling, myalgias and neck pain. Skin:  Negative for color change and rash. Neurological:  Positive for dizziness (off balance). Negative for weakness and headaches. Psychiatric/Behavioral:  Negative for behavioral problems and sleep disturbance. The patient is not nervous/anxious.         OBJECTIVE:  /80 (Site: Left Upper Arm, Position: Supine, Cuff Size: Large Adult)   Pulse 99   Temp 97.2 °F (36.2 °C) (Temporal)   Ht 5' (1.524 m)   Wt 183 lb (83 kg)   SpO2 98%   BMI 35.74 kg/m²      Physical Exam  Constitutional:       General: She is not in acute distress. Appearance: Normal appearance. She is not ill-appearing or toxic-appearing. Cardiovascular:      Rate and Rhythm: Normal rate and regular rhythm. Pulmonary:      Effort: Pulmonary effort is normal. No respiratory distress. Musculoskeletal:         General: Normal range of motion. Skin:     General: Skin is warm and dry. Neurological:      Mental Status: She is alert. Mental status is at baseline. Psychiatric:         Mood and Affect: Mood normal.         Behavior: Behavior normal.         Thought Content: Thought content normal.         ASSESSMENT and PLAN  Kiko Joshua was seen today for dizziness. Diagnoses and all orders for this visit:    Balance problem  -     EKG 12 Lead  -     Cancel: Hemoglobin A1C; Future  -     AMB POC HEMOGLOBIN A1C    Chronic vestibular neuritis, bilateral      We discussed normal EKG, A1C, orthos; Reiterated vestibular neuritis and to continue with physical rehab for management. I do not feel that she is a candidate for physical disability, she is limited with her ability to sit and stand for long periods and could benefit from intermittent breaks. She agreed that her psychosocial health needs to be better addressed as she continues to struggle with PTSD, anxiety and panic regarding health issues. Due to complexity of patient health problems it was advised that she follow an MD/DO to better manage and continue her care. She was in agreement with this. Follow-up and Dispositions    Return for recheck with MD to evaluate. Duncan Gowers, APRN - CNP no

## 2023-03-10 NOTE — TELEPHONE ENCOUNTER
----- Message from Candida Cortez sent at 3/10/2023  4:17 AM EST -----  Regarding: Medication   Hello  I went to one trazadone. And 1/2 Clonazepam.  When I stopped Clonazepam before I had nightmares. One the trazadone I want to do next step of 1/2 pill.

## 2023-03-10 NOTE — TELEPHONE ENCOUNTER
Spoke to patient.  Stated that she started having nightmares and dreams when she tried to stop Klonopin in the past and is unable to stop the Klonopin all together.   She wants to try a half a Klonopin and half a trazodone to see how that works but does not want to come off of Klonopin all together due to nightmares.

## 2023-03-15 ENCOUNTER — HOSPITAL ENCOUNTER (EMERGENCY)
Age: 53
Discharge: HOME | End: 2023-03-15
Attending: EMERGENCY MEDICINE | Admitting: EMERGENCY MEDICINE
Payer: COMMERCIAL

## 2023-03-15 ENCOUNTER — APPOINTMENT (OUTPATIENT)
Dept: GENERAL RADIOLOGY | Age: 53
End: 2023-03-15
Payer: COMMERCIAL

## 2023-03-15 VITALS
BODY MASS INDEX: 34.95 KG/M2 | OXYGEN SATURATION: 100 % | SYSTOLIC BLOOD PRESSURE: 136 MMHG | DIASTOLIC BLOOD PRESSURE: 82 MMHG | HEART RATE: 83 BPM | WEIGHT: 178 LBS | HEIGHT: 60 IN | RESPIRATION RATE: 18 BRPM | TEMPERATURE: 98.1 F

## 2023-03-15 DIAGNOSIS — S93.601A SPRAIN OF RIGHT FOOT, INITIAL ENCOUNTER: ICD-10-CM
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
## 2023-03-15 DIAGNOSIS — S96.911A STRAIN OF RIGHT ANKLE, INITIAL ENCOUNTER: ICD-10-CM

## 2023-03-15 DIAGNOSIS — S80.212A ABRASION, LEFT KNEE, INITIAL ENCOUNTER: ICD-10-CM

## 2023-03-15 PROCEDURE — 99284 EMERGENCY DEPT VISIT MOD MDM: CPT

## 2023-03-15 PROCEDURE — 90714 TD VACC NO PRESV 7 YRS+ IM: CPT

## 2023-03-15 PROCEDURE — 6360000002 HC RX W HCPCS

## 2023-03-15 PROCEDURE — 73610 X-RAY EXAM OF ANKLE: CPT

## 2023-03-15 PROCEDURE — 73630 X-RAY EXAM OF FOOT: CPT

## 2023-03-15 PROCEDURE — 90471 IMMUNIZATION ADMIN: CPT

## 2023-03-15 PROCEDURE — 73562 X-RAY EXAM OF KNEE 3: CPT

## 2023-03-15 RX ADMIN — CLOSTRIDIUM TETANI TOXOID ANTIGEN (FORMALDEHYDE INACTIVATED) AND CORYNEBACTERIUM DIPHTHERIAE TOXOID ANTIGEN (FORMALDEHYDE INACTIVATED) 0.5 ML: 5; 2 INJECTION, SUSPENSION INTRAMUSCULAR at 11:45

## 2023-03-15 ASSESSMENT — PAIN - FUNCTIONAL ASSESSMENT
PAIN_FUNCTIONAL_ASSESSMENT: 0-10
PAIN_FUNCTIONAL_ASSESSMENT: 0-10

## 2023-03-15 ASSESSMENT — PAIN DESCRIPTION - ORIENTATION
ORIENTATION: LEFT
ORIENTATION: LEFT

## 2023-03-15 ASSESSMENT — PAIN SCALES - GENERAL
PAINLEVEL_OUTOF10: 6
PAINLEVEL_OUTOF10: 6
PAINLEVEL_OUTOF10: 9

## 2023-03-15 ASSESSMENT — ENCOUNTER SYMPTOMS
ABDOMINAL PAIN: 0
SHORTNESS OF BREATH: 0
COLOR CHANGE: 0
NAUSEA: 0
DIARRHEA: 0
ROS SKIN COMMENTS: ABRASION TO LEFT KNEE
VOMITING: 0

## 2023-03-15 ASSESSMENT — PAIN DESCRIPTION - LOCATION
LOCATION: KNEE
LOCATION: KNEE

## 2023-03-15 NOTE — Clinical Note
Angie Bowman was seen and treated in our emergency department on 3/15/2023. She may return to work on 03/17/2023. If you have any questions or concerns, please don't hesitate to call.       SU Brown

## 2023-03-15 NOTE — Clinical Note
Bessy Spain was seen and treated in our emergency department on 3/15/2023. She may return to work on . If you have any questions or concerns, please don't hesitate to call.       Homer King MD

## 2023-03-15 NOTE — DISCHARGE INSTRUCTIONS
You were evaluated in the emergency department today after fall in the parking lot    X-ray of your left knee, right ankle and right foot are negative for fractures or dislocations    Updated your tetanus today in the emergency department    Wound cleaned    Recommend cleaning wound at least twice daily and placing nonstick gauze pad over wound.   You can place triple antibiotic ointment over wound    Recommend icing right foot and ankle in 20-minute increments for pain, recommend elevation of right ankle and right foot    Recommend contacting your primary care provider today to schedule follow-up within the week    Return to the emergency department if you develop increased redness or warmth to your left knee, development of fevers or redness spreading up the leg

## 2023-03-15 NOTE — ED TRIAGE NOTES
Patient advises she was walking in parking lot and \"rolled\" her right foot a fell down. Patient complaining of right foot pain and left knee with abrasions, unable to visualize in triage, bandaged currently. No loss of consciousness during incident.

## 2023-03-15 NOTE — ED PROVIDER NOTES
Emergency Department Provider Note                   PCP:                BRANDON Adams CNP               Age: 48 y.o. Sex: female     DISPOSITION Decision To Discharge 03/15/2023 11:17:46 AM       ICD-10-CM    1. Fall from standing, initial encounter  W19. XXXA       2. Abrasion, left knee, initial encounter  S80.212A       3. Strain of right ankle, initial encounter  S96.911A       4. Sprain of right foot, initial encounter  S93.601A           MEDICAL DECISION MAKING  Complexity of Problems Addressed:  Two or more acute illness or injury    Data Reviewed and Analyzed:  Category 1:   I reviewed records from an external source: provider visit notes from PCP. I reviewed records from an external source: provider visit notes from outside specialist.  I ordered each unique test.  I reviewed the results of each unique test.        Category 2:   I independently ordered and reviewed the X-rays. X-ray left knee, x-ray right ankle, x-ray right foot and I am in agreement with radiologist interpretation    Category 3: Discussion of management or test interpretation. Summary 68-year-old female presents emergency department today with chief complaint of right ankle and right foot and left knee pain after mechanical fall today. Patient states she came to the hospital to  imaging disc and stepped in a hole and rolled her right ankle in the parking lot. States she fell onto her left knee and caught herself with her hands. Patient unsure when last tetanus was given. Patient denies striking her head or loss of consciousness or anticoagulation. Patient has no other complaints at this time. On physical exam patient has a skin tear/abrasion to anterior aspect of left knee. Patient is tender to palpation lateral and medial malleolus of right ankle as well as base of right fifth metatarsal.  Will obtain x-ray imaging of left knee, right ankle and right foot. Rest of physical exam is reassuring.   No tenderness to palpation along midline C-spine, T-spine, L-spine, no tenderness to palpation bilateral upper extremities. It is neurovascularly intact. X-rays of left knee, right ankle, right foot negative for acute bony abnormality, fracture or dislocation. I personally cleaned wound and placed nonstick bandage over skin tear along with some triple antibiotic ointment. Offered patient Ace bandage for left ankle and left foot and patient declined. Patient's tetanus updated    Based on history, physical exam, work-up today in the emergency department, I do not feel additional imaging nor any lab work is warranted at this time. No urgent or emergent findings. Patient is stable and can be discharged home with follow-up to her primary care provider. Discussed physical exam findings, treatment, follow-up with patient  Answered any questions that she had. Patient inquired about who is going to pay for her ER visit as the injury occurred in the parking lot of [address] [place] [address]. Notified charge nurse who spoke with patient and gave patient contact information for the people she needs to speak with regarding injuries today. Patient was agreeable to this. Included signs and symptoms that would warrant prompt return to emergency department and discharge paperwork    History obtained from patient  Reviewed notes from primary care provider, neurology, orthopedic  Ordered and reviewed imaging today. I am in agreement with radiologist interpretation  No indication for lab work  No indication for EKG  Patient very involved in shared decision-making  Patient discharged home in stable condition. She is to follow-up with primary care. Return to ED precautions reiterated.       ED Course as of 03/15/23 1159   Wed Mar 15, 2023   1025 Patient declined any pain medication [JG]   1058 X-ray of left knee negative for acute bony abnormality, fracture, dislocation  X-ray of right ankle negative for acute bony abnormality, fracture, dislocation  X-ray of right foot negative for acute bony abnormality, fracture, dislocation [JG]   1115 Patient declined Ace bandage [JG]   1115 Wound cleaned by me  Nonstick bandage placed and triple antibiotic ointment [JG]      ED Course User Index  [JG] Bushra Means, PA       Risk of Complications and/or Morbidity of Patient Management:  OTC drug management performed and Patient was discharged risks and benefits of hospitalization were considered     Elaine Gamez is a 48 y.o. female who presents to the Emergency Department with chief complaint of    Chief Complaint   Patient presents with    Fall      51-year-old female with history of generalized osteoarthritis, lupus, neuropathy, hypertension, GERD, degenerative disc disease presents to the emergency department today with chief complaint of right ankle and foot pain and left knee pain after mechanical fall today. Patient states she was walking through the parking lot on her way to medical records when her right ankle rolled and she fell to the ground landing on her left knee. Patient states she was able to brace herself with her hands. Denies striking her head or loss of consciousness or anticoagulation. Patient's denies any other complaints at this time. Patient unsure when last tetanus was given. The history is provided by the patient. No  was used. Review of Systems   Constitutional:  Negative for chills, fatigue and fever. Respiratory:  Negative for shortness of breath. Cardiovascular:  Negative for chest pain and palpitations. Gastrointestinal:  Negative for abdominal pain, diarrhea, nausea and vomiting. Musculoskeletal:         Right ankle pain, right foot pain, left knee pain   Skin:  Negative for color change. Abrasion to left knee   Neurological:  Negative for weakness and headaches. All other systems reviewed and are negative. Vitals signs and nursing note reviewed.    Patient Vitals for the past 4 hrs:   Temp Pulse Resp BP SpO2   03/15/23 1148 -- 83 18 136/82 100 %   03/15/23 0950 98.1 °F (36.7 °C) 89 16 (!) 142/92 100 %          Physical Exam  Vitals and nursing note reviewed. Constitutional:       General: She is not in acute distress. Appearance: Normal appearance. She is obese. She is not ill-appearing, toxic-appearing or diaphoretic. HENT:      Head: Normocephalic and atraumatic. Nose: Nose normal.      Mouth/Throat:      Mouth: Mucous membranes are moist.      Pharynx: Oropharynx is clear. Eyes:      Extraocular Movements: Extraocular movements intact. Conjunctiva/sclera: Conjunctivae normal.   Cardiovascular:      Rate and Rhythm: Normal rate. Pulses: Normal pulses. Heart sounds: Normal heart sounds. Comments: Bilateral radial and PT pulses are 2+ and equal  Pulmonary:      Effort: Pulmonary effort is normal.      Breath sounds: Normal breath sounds. Abdominal:      General: Bowel sounds are normal.      Palpations: Abdomen is soft. Musculoskeletal:         General: Tenderness (Tenderness to palpation lateral medial malleolus of right ankle, tenderness to palpation over base of right fifth metatarsal, tenderness to palpation anterior aspect of left knee) present. Normal range of motion. Cervical back: Normal range of motion. Right lower leg: No edema. Left lower leg: No edema. Comments: Full active range of motion left knee and right ankle  No tenderness to palpation right Achilles, right calcaneus, tarsals of right foot, metatarsals 1 through 4 of right foot  No tenderness to palpation midline C-spine, T-spine or L-spine   Skin:     General: Skin is warm and dry. Capillary Refill: Capillary refill takes less than 2 seconds. Comments: Abrasion anterior aspect of left knee-see photo   Neurological:      General: No focal deficit present. Mental Status: She is alert and oriented to person, place, and time. Comments: Sensation intact bilateral lower extremities   Psychiatric:         Mood and Affect: Mood normal.         Behavior: Behavior normal.         Thought Content:  Thought content normal.         Judgment: Judgment normal.        Procedures       Orders Placed This Encounter   Procedures    XR ANKLE RIGHT (MIN 3 VIEWS)    XR FOOT RIGHT (MIN 3 VIEWS)    XR KNEE LEFT (3 VIEWS)        Medications   tetanus & diphtheria toxoids (adult) 5-2 LFU injection 0.5 mL (0.5 mLs IntraMUSCular Given 3/15/23 1145)       New Prescriptions    No medications on file        Past Medical History:   Diagnosis Date    Anxiety     Chronic pain     DDD (degenerative disc disease), cervical 11/29/2021    C3-C7 Disc Degeneration w/ Posterior Spondylotic Disc Displaceemnts per MRI    Depressed     GERD (gastroesophageal reflux disease)     Headache     Hypertension     IBS (irritable bowel syndrome)     Insomnia     Neuropathy     Panic attack     Sleep difficulties     TMJ (dislocation of temporomandibular joint) 04/2021    Vestibular migraine     Vestibular neuritis         Past Surgical History:   Procedure Laterality Date    CATARACT REMOVAL Bilateral 02/2020    COLONOSCOPY      EGD DELIVER THERMAL ENERGY SPHNCTR/CARDIA GERD      EYE SURGERY      LIPECTOMY      TUBAL LIGATION          Family History   Problem Relation Age of Onset    Heart Disease Father     Hypertension Father     High Cholesterol Mother     GERD Mother         Social History     Socioeconomic History    Marital status:    Tobacco Use    Smoking status: Never    Smokeless tobacco: Former     Types: Chew   Vaping Use    Vaping Use: Never used   Substance and Sexual Activity    Alcohol use: Never    Drug use: Never    Sexual activity: Not Currently     Partners: Male     Social Determinants of Health     Financial Resource Strain: Unknown    Difficulty of Paying Living Expenses: Patient refused   Food Insecurity: Unknown    Worried About Running Out of Food in the Last Year: Patient refused    Ran Out of Food in the Last Year: Patient refused   Transportation Needs: Unknown    Lack of Transportation (Non-Medical): Patient refused   Housing Stability: Unknown    Unstable Housing in the Last Year: Patient refused        Allergies: Furosemide, Sulfa antibiotics, and Other    Previous Medications    ATOMOXETINE (STRATTERA) 80 MG CAPSULE    Take 1 capsule by mouth daily    BENZOYL PEROXIDE 2.5 % GEL    APPLY TOPICALLY TO FACE AT BEDTIME    BETAMETHASONE DIPROPIONATE 0.05 % OINTMENT    APPLY TO AFFECTED AREAS ON HANDS THREE TIMES DAILY    CITALOPRAM (CELEXA) 20 MG TABLET    Take 1 tablet by mouth every morning    CLONAZEPAM (KLONOPIN) 0.5 MG TABLET    Take 1 tablet by mouth 2 times daily as needed for Anxiety for up to 120 days.    COD LIVER OIL CAPS    Take 1 capsule by mouth daily    CYANOCOBALAMIN 1000 MCG TABLET    Take 1 tablet by mouth daily    CYCLOBENZAPRINE (AMRIX) 15 MG EXTENDED RELEASE CAPSULE    Take 15 mg by mouth daily as needed    DICLOFENAC SODIUM (VOLTAREN) 1 % GEL    Apply 1 g topically as needed    EVENING PRIMROSE OIL PO    Take by mouth    HYDROCHLOROTHIAZIDE (HYDRODIURIL) 25 MG TABLET    Take 12.5 mg by mouth daily    HYDROXYCHLOROQUINE (PLAQUENIL) 200 MG TABLET    Take 1 tablet by mouth daily    LIDOCAINE 5 % CREA    Topical 5% Lidocaine and 5% Neurontin to apply to affected area up to 4 times/day as needed for pain    LIDOCAINE, ANORECTAL, 5 % CREA    Actual prescription: Lidocaine 5%: Neurontin/gabapentin 5% combined topical cream/gelApply to affected area up to 4 times a day as needed for pain    LINZESS 72 MCG CAPS CAPSULE    Take 1 capsule by mouth daily    MECLIZINE (ANTIVERT) 12.5 MG TABLET    Take 12.5 mg by mouth 3 times daily as needed    METHYLFOLATE (DEPLIN) 7.5 MG TABS TABLET    Take 1 tablet by mouth in the morning.    METOPROLOL SUCCINATE (TOPROL XL) 25 MG EXTENDED RELEASE TABLET    Take 1 tablet by mouth daily    MULTIPLE VITAMINS-MINERALS  (MULTIVITAMIN WOMEN 50+ PO)    Take by mouth    NAPROXEN (NAPROSYN) 500 MG TABLET    Take 1 tablet by mouth as needed    NONFORMULARY    Turmeric/Ginger Gummy    NYSTATIN-TRIAMCINOLONE (MYCOLOG II) 897562-6.1 UNIT/GM-% CREAM    Apply topically 2 times daily as needed    OMEPRAZOLE (PRILOSEC) 40 MG DELAYED RELEASE CAPSULE    Take 1 capsule (40 mg) by mouth 2 (two) times a day 30 mins before breakfast and 30 mins before dinner    PRAMIPEXOLE (MIRAPEX) 1 MG TABLET    Take 1 tablet by mouth nightly    PREGABALIN (LYRICA) 200 MG CAPSULE    Take 200 mg by mouth 2 times daily. TOPIRAMATE (TOPAMAX) 50 MG TABLET    Take 50 mg by mouth 2 times daily    TRAZODONE (DESYREL) 50 MG TABLET    Take 1-2 tablets by mouth nightly    UBROGEPANT (UBRELVY) 50 MG TABS    Take 1 tablet at the onset of migraine, may repeat in 2 hours. Do not take more then 3 days a week. VITAMIN D (CHOLECALCIFEROL) 09525 UNIT CAPS    Take 1 capsule by mouth every 7 days    VITAMIN E 600 UNITS CAPSULE    Take by mouth daily    WHEAT DEXTRIN (BENEFIBER PO)            Results for orders placed or performed during the hospital encounter of 03/15/23   XR ANKLE RIGHT (MIN 3 VIEWS)    Narrative    History: Tenderness to palpation with right foot and ankle pain    EXAM: Right foot and ankle series    FINDINGS:    Right foot: There is no acute fracture, dislocation, or additional acute bony  abnormality. Right ankle: No acute fracture, dislocation, or additional bony abnormality. Impression    No acute findings. XR FOOT RIGHT (MIN 3 VIEWS)    Narrative    History: Tenderness to palpation with right foot and ankle pain    EXAM: Right foot and ankle series    FINDINGS:    Right foot: There is no acute fracture, dislocation, or additional acute bony  abnormality. Right ankle: No acute fracture, dislocation, or additional bony abnormality. Impression    No acute findings.    XR KNEE LEFT (3 VIEWS)    Narrative    History: Left knee pain after injury    3 views left knee    FINDINGS: No fracture, dislocation, or joint effusion. Impression    No acute findings. XR ANKLE RIGHT (MIN 3 VIEWS)   Final Result   No acute findings. XR FOOT RIGHT (MIN 3 VIEWS)   Final Result   No acute findings. XR KNEE LEFT (3 VIEWS)   Final Result   No acute findings. Voice dictation software was used during the making of this note. This software is not perfect and grammatical and other typographical errors may be present. This note has not been completely proofread for errors.       Pepito Santana, [id number]  03/15/23 4071

## 2023-03-16 ENCOUNTER — TELEMEDICINE (OUTPATIENT)
Dept: INTERNAL MEDICINE CLINIC | Facility: CLINIC | Age: 53
End: 2023-03-16
Payer: COMMERCIAL
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
## 2023-03-16 DIAGNOSIS — Z09 HOSPITAL DISCHARGE FOLLOW-UP: Primary | ICD-10-CM

## 2023-03-16 DIAGNOSIS — S80.212A ABRASION OF LEFT KNEE, INITIAL ENCOUNTER: ICD-10-CM

## 2023-03-16 DIAGNOSIS — S60.419D ABRASION OF FINGER OF RIGHT HAND, SUBSEQUENT ENCOUNTER: ICD-10-CM

## 2023-03-16 PROCEDURE — 99214 OFFICE O/P EST MOD 30 MIN: CPT | Performed by: NURSE PRACTITIONER

## 2023-03-16 RX ORDER — AMOXICILLIN 250 MG/5ML
POWDER, FOR SUSPENSION ORAL
Qty: 400 ML | Refills: 0 | Status: SHIPPED | OUTPATIENT
Start: 2023-03-16

## 2023-03-16 ASSESSMENT — ENCOUNTER SYMPTOMS
APNEA: 0
EYE DISCHARGE: 0
COLOR CHANGE: 0
SINUS PAIN: 0
DIARRHEA: 0
CONSTIPATION: 0
ABDOMINAL PAIN: 0
ABDOMINAL DISTENTION: 0
BACK PAIN: 0
EYE PAIN: 0
EYE ITCHING: 0
EYE REDNESS: 0
SHORTNESS OF BREATH: 0
NAUSEA: 0
COUGH: 0

## 2023-03-16 NOTE — PROGRESS NOTES
[unfilled]  [address] [place], [place] [id number]      PROGRESS NOTE    SUBJECTIVE:   Briana Clark is a 48 y.o. female seen for a follow up visit regarding the following chief complaint:     No chief complaint on file. HPI    Patient presents for ER follow up yesterday. She had tripped in the parking lot and fell onto her left knee causing a superficial abrasion. This was tended to in the ER. She had also sprained her right ankle and foot. ER notes reviewed with normal xrays. She is bearing weight, has some tenderness with ambulation. Right hand finger with some redness and swelling near wound. Finished oral keflex and symptoms are slightly improved, some scabbing. Current Outpatient Medications   Medication Sig Dispense Refill    amoxicillin (AMOXIL) 250 MG/5ML suspension Take 875mg PO BID for 10 days. 400 mL 0    betamethasone dipropionate 0.05 % ointment APPLY TO AFFECTED AREAS ON HANDS THREE TIMES DAILY      topiramate (TOPAMAX) 50 MG tablet Take 50 mg by mouth 2 times daily      traZODone (DESYREL) 50 MG tablet Take 1-2 tablets by mouth nightly 180 tablet 1    NONFORMULARY Turmeric/Ginger Gummy      pramipexole (MIRAPEX) 1 MG tablet Take 1 tablet by mouth nightly 90 tablet 3    Multiple Vitamins-Minerals (MULTIVITAMIN WOMEN 50+ PO) Take by mouth      metoprolol succinate (TOPROL XL) 25 MG extended release tablet Take 1 tablet by mouth daily 30 tablet 5    clonazePAM (KLONOPIN) 0.5 MG tablet Take 1 tablet by mouth 2 times daily as needed for Anxiety for up to 120 days.  60 tablet 3    atomoxetine (STRATTERA) 80 MG capsule Take 1 capsule by mouth daily 90 capsule 1    citalopram (CELEXA) 20 MG tablet Take 1 tablet by mouth every morning 90 tablet 1    Lidocaine 5 % CREA Topical 5% Lidocaine and 5% Neurontin to apply to affected area up to 4 times/day as needed for pain 45 g 2    meclizine (ANTIVERT) 12.5 MG tablet Take 12.5 mg by mouth 3 times daily as needed      vitamin D (CHOLECALCIFEROL) 97725 UNIT CAPS Take 1 capsule by mouth every 7 days 12 capsule 3    cyclobenzaprine (AMRIX) 15 MG extended release capsule Take 15 mg by mouth daily as needed      hydroCHLOROthiazide (HYDRODIURIL) 25 MG tablet Take 12.5 mg by mouth daily      cyanocobalamin 1000 MCG tablet Take 1 tablet by mouth daily 90 tablet 3    pregabalin (LYRICA) 200 MG capsule Take 200 mg by mouth 2 times daily. hydroxychloroquine (PLAQUENIL) 200 MG tablet Take 1 tablet by mouth daily      EVENING PRIMROSE OIL PO Take by mouth      methylfolate (DEPLIN) 7.5 MG TABS tablet Take 1 tablet by mouth in the morning. 30 tablet 5    Wheat Dextrin (BENEFIBER PO)       LINZESS 72 MCG CAPS capsule Take 1 capsule by mouth daily      naproxen (NAPROSYN) 500 MG tablet Take 1 tablet by mouth as needed      omeprazole (PRILOSEC) 40 MG delayed release capsule Take 1 capsule (40 mg) by mouth 2 (two) times a day 30 mins before breakfast and 30 mins before dinner      cod liver oil CAPS Take 1 capsule by mouth daily      Ubrogepant (UBRELVY) 50 MG TABS Take 1 tablet at the onset of migraine, may repeat in 2 hours. Do not take more then 3 days a week. 16 tablet 11    Benzoyl Peroxide 2.5 % GEL APPLY TOPICALLY TO FACE AT BEDTIME      diclofenac sodium (VOLTAREN) 1 % GEL Apply 1 g topically as needed      Lidocaine, Anorectal, 5 % CREA Actual prescription: Lidocaine 5%: Neurontin/gabapentin 5% combined topical cream/gelApply to affected area up to 4 times a day as needed for pain      nystatin-triamcinolone (MYCOLOG II) 578041-7.1 UNIT/GM-% cream Apply topically 2 times daily as needed      vitamin E 600 units capsule Take by mouth daily       No current facility-administered medications for this visit.      Allergies   Allergen Reactions    Furosemide Other (See Comments)     Edema of lower extremities      Sulfa Antibiotics Other (See Comments) and Rash     GI Upset    Other Other (See Comments)     Oral steroids       Past Medical History:   Diagnosis Date    Anxiety     Chronic pain     DDD (degenerative disc disease), cervical 11/29/2021    C3-C7 Disc Degeneration w/ Posterior Spondylotic Disc Displaceemnts per MRI    Depressed     GERD (gastroesophageal reflux disease)     Headache     Hypertension     IBS (irritable bowel syndrome)     Insomnia     Neuropathy     Panic attack     Sleep difficulties     TMJ (dislocation of temporomandibular joint) 04/2021    Vestibular migraine     Vestibular neuritis      Past Surgical History:   Procedure Laterality Date    CATARACT REMOVAL Bilateral 02/2020    COLONOSCOPY      EGD DELIVER THERMAL ENERGY SPHNCTR/CARDIA GERD      EYE SURGERY      LIPECTOMY      TUBAL LIGATION       Family History   Problem Relation Age of Onset    Heart Disease Father     Hypertension Father     High Cholesterol Mother     GERD Mother      Social History     Tobacco Use    Smoking status: Never    Smokeless tobacco: Former     Types: Chew   Substance Use Topics    Alcohol use: Never       Review of Systems   Constitutional:  Negative for activity change, appetite change, chills, fatigue and fever. HENT:  Negative for congestion, ear pain and sinus pain. Eyes:  Negative for pain, discharge, redness and itching. Respiratory:  Negative for apnea, cough and shortness of breath. Cardiovascular:  Negative for chest pain, palpitations and leg swelling. Gastrointestinal:  Negative for abdominal distention, abdominal pain, constipation, diarrhea and nausea. Endocrine: Negative for cold intolerance and heat intolerance. Genitourinary:  Negative for difficulty urinating, dysuria, enuresis and urgency. Musculoskeletal:  Positive for arthralgias. Negative for back pain, joint swelling, myalgias and neck pain. Skin:  Positive for wound. Negative for color change and rash. Neurological:  Negative for dizziness, weakness and headaches.    Psychiatric/Behavioral:  Negative for behavioral problems and sleep disturbance. The patient is not nervous/anxious. OBJECTIVE:  There were no vitals taken for this visit. Physical Exam  Constitutional:       General: She is not in acute distress. Appearance: Normal appearance. She is not ill-appearing or toxic-appearing. Cardiovascular:      Rate and Rhythm: Normal rate and regular rhythm. Pulmonary:      Effort: Pulmonary effort is normal. No respiratory distress. Skin:     General: Skin is warm and dry. Comments: Open wound on left knee without bleeding or obvious drainage/redness. Right hand finger with some redness, mild edema and  to touch; no obvious drainage. Neurological:      Mental Status: She is alert. Mental status is at baseline. Psychiatric:         Mood and Affect: Mood normal.         Behavior: Behavior normal.         Thought Content: Thought content normal.         ASSESSMENT and PLAN  Diagnoses and all orders for this visit:    Hospital discharge follow-up    Abrasion of finger of right hand, subsequent encounter  Comments:  improving  Orders:  -     amoxicillin (AMOXIL) 250 MG/5ML suspension; Take 875mg PO BID for 10 days. Abrasion of left knee, initial encounter  -     amoxicillin (AMOXIL) 250 MG/5ML suspension; Take 875mg PO BID for 10 days. Fall, initial encounter    Prophylactic treatment coverage with amoxil due to recent wound on left knee in adjunct to resolving abrasion on right hand. Follow up to establish with MD. Will continue care for 28 days per policy. Pursuant to the emergency declaration under the [facility], 1135 waiver authority and the [employer] and [employer] Act, this Virtual Visit was conducted, with patient's consent, to reduce the patient's risk of exposure to COVID-19 and provide continuity of care for an established patient.  Services were provided through a video synchronous discussion virtually to substitute for in-person clinic visit. Patient consented to virtual visit. Follow-up and Dispositions    Return for care continuation with MD/DO to establish.          Gibson Habermann, BRANDON - CNP

## 2023-03-17 ENCOUNTER — TELEPHONE (OUTPATIENT)
Dept: ORTHOPEDIC SURGERY | Age: 53
End: 2023-03-17

## 2023-03-17 ENCOUNTER — TELEPHONE (OUTPATIENT)
Dept: INTERNAL MEDICINE CLINIC | Facility: CLINIC | Age: 53
End: 2023-03-17

## 2023-03-17 NOTE — TELEPHONE ENCOUNTER
----- Message from Candida Cortez sent at 3/17/2023  4:25 AM EDT -----  Regarding: Appointment Heel  Hello  My heel is cracked. I am not sure if it is necessary to keep appointment. You may want to respond via my chart.

## 2023-03-17 NOTE — TELEPHONE ENCOUNTER
Called and spoke to pt , pt will be seen by a dermatologist on wed pt requested her apt with  to be cancel.

## 2023-03-27 ENCOUNTER — TELEMEDICINE (OUTPATIENT)
Dept: BEHAVIORAL/MENTAL HEALTH CLINIC | Age: 53
End: 2023-03-27
Payer: COMMERCIAL

## 2023-03-27 DIAGNOSIS — F51.05 INSOMNIA DUE TO MENTAL DISORDER: ICD-10-CM

## 2023-03-27 DIAGNOSIS — F41.1 GENERALIZED ANXIETY DISORDER: ICD-10-CM

## 2023-03-27 DIAGNOSIS — G89.4 CHRONIC PAIN DISORDER: ICD-10-CM

## 2023-03-27 DIAGNOSIS — F33.0 MILD EPISODE OF RECURRENT MAJOR DEPRESSIVE DISORDER (HCC): Primary | ICD-10-CM

## 2023-03-27 PROCEDURE — 99213 OFFICE O/P EST LOW 20 MIN: CPT | Performed by: PSYCHIATRY & NEUROLOGY

## 2023-03-27 RX ORDER — MELOXICAM 15 MG/1
15 TABLET ORAL DAILY
COMMUNITY
Start: 2023-03-20

## 2023-03-27 RX ORDER — CYCLOBENZAPRINE HCL 10 MG
10 TABLET ORAL 3 TIMES DAILY PRN
COMMUNITY
Start: 2023-03-23

## 2023-03-27 ASSESSMENT — PATIENT HEALTH QUESTIONNAIRE - PHQ9
7. TROUBLE CONCENTRATING ON THINGS, SUCH AS READING THE NEWSPAPER OR WATCHING TELEVISION: 1
SUM OF ALL RESPONSES TO PHQ QUESTIONS 1-9: 10
1. LITTLE INTEREST OR PLEASURE IN DOING THINGS: 1
8. MOVING OR SPEAKING SO SLOWLY THAT OTHER PEOPLE COULD HAVE NOTICED. OR THE OPPOSITE, BEING SO FIGETY OR RESTLESS THAT YOU HAVE BEEN MOVING AROUND A LOT MORE THAN USUAL: 2
SUM OF ALL RESPONSES TO PHQ QUESTIONS 1-9: 10
SUM OF ALL RESPONSES TO PHQ9 QUESTIONS 1 & 2: 2
6. FEELING BAD ABOUT YOURSELF - OR THAT YOU ARE A FAILURE OR HAVE LET YOURSELF OR YOUR FAMILY DOWN: 1
9. THOUGHTS THAT YOU WOULD BE BETTER OFF DEAD, OR OF HURTING YOURSELF: 0
5. POOR APPETITE OR OVEREATING: 1
4. FEELING TIRED OR HAVING LITTLE ENERGY: 2
2. FEELING DOWN, DEPRESSED OR HOPELESS: 1
SUM OF ALL RESPONSES TO PHQ QUESTIONS 1-9: 10
10. IF YOU CHECKED OFF ANY PROBLEMS, HOW DIFFICULT HAVE THESE PROBLEMS MADE IT FOR YOU TO DO YOUR WORK, TAKE CARE OF THINGS AT HOME, OR GET ALONG WITH OTHER PEOPLE: 1
3. TROUBLE FALLING OR STAYING ASLEEP: 1
SUM OF ALL RESPONSES TO PHQ QUESTIONS 1-9: 10

## 2023-03-27 ASSESSMENT — ANXIETY QUESTIONNAIRES
4. TROUBLE RELAXING: 2
5. BEING SO RESTLESS THAT IT IS HARD TO SIT STILL: 2
6. BECOMING EASILY ANNOYED OR IRRITABLE: 1
IF YOU CHECKED OFF ANY PROBLEMS ON THIS QUESTIONNAIRE, HOW DIFFICULT HAVE THESE PROBLEMS MADE IT FOR YOU TO DO YOUR WORK, TAKE CARE OF THINGS AT HOME, OR GET ALONG WITH OTHER PEOPLE: VERY DIFFICULT
3. WORRYING TOO MUCH ABOUT DIFFERENT THINGS: 0
7. FEELING AFRAID AS IF SOMETHING AWFUL MIGHT HAPPEN: 0
2. NOT BEING ABLE TO STOP OR CONTROL WORRYING: 1
GAD7 TOTAL SCORE: 9
1. FEELING NERVOUS, ANXIOUS, OR ON EDGE: 3

## 2023-03-27 NOTE — PROGRESS NOTES
been notified: They are to call 911 or go to their nearest E.R. if they are experiencing a medical emergency or suicidal ideations/homicidal ideations. **  All ancillary documentation entered reviewed by provider. PLEASE NOTE:  This document has been produced in part or whole using voice recognition software. Proofread however unrecognized errors in transcription may be present.         Yoel Campos MD          Diagnosed with Vestibular migraines and vestibular neuritis 2/28/23

## 2023-03-28 RX ORDER — TOPIRAMATE 50 MG/1
TABLET, FILM COATED ORAL
Qty: 60 TABLET | Refills: 11 | OUTPATIENT
Start: 2023-03-28

## 2023-03-29 ENCOUNTER — APPOINTMENT (OUTPATIENT)
Dept: ULTRASOUND IMAGING | Age: 53
End: 2023-03-29
Payer: COMMERCIAL

## 2023-03-29 ENCOUNTER — HOSPITAL ENCOUNTER (EMERGENCY)
Age: 53
Discharge: HOME | End: 2023-03-29
Attending: EMERGENCY MEDICINE
Payer: COMMERCIAL

## 2023-03-29 ENCOUNTER — TELEPHONE (OUTPATIENT)
Dept: BEHAVIORAL/MENTAL HEALTH CLINIC | Age: 53
End: 2023-03-29

## 2023-03-29 VITALS
HEART RATE: 88 BPM | RESPIRATION RATE: 16 BRPM | TEMPERATURE: 97.9 F | BODY MASS INDEX: 35.34 KG/M2 | DIASTOLIC BLOOD PRESSURE: 71 MMHG | HEIGHT: 60 IN | SYSTOLIC BLOOD PRESSURE: 134 MMHG | WEIGHT: 180 LBS | OXYGEN SATURATION: 100 %

## 2023-03-29 DIAGNOSIS — M32.9 LUPUS (HCC): Primary | ICD-10-CM

## 2023-03-29 DIAGNOSIS — R60.0 EDEMA OF LEFT LOWER LEG: Primary | ICD-10-CM

## 2023-03-29 DIAGNOSIS — M35.3 POLYMYALGIA (HCC): ICD-10-CM

## 2023-03-29 PROCEDURE — 93971 EXTREMITY STUDY: CPT

## 2023-03-29 PROCEDURE — 99284 EMERGENCY DEPT VISIT MOD MDM: CPT

## 2023-03-29 ASSESSMENT — PAIN DESCRIPTION - PAIN TYPE: TYPE: ACUTE PAIN

## 2023-03-29 ASSESSMENT — PAIN DESCRIPTION - DESCRIPTORS: DESCRIPTORS: ACHING

## 2023-03-29 ASSESSMENT — PAIN SCALES - GENERAL: PAINLEVEL_OUTOF10: 5

## 2023-03-29 ASSESSMENT — PAIN - FUNCTIONAL ASSESSMENT: PAIN_FUNCTIONAL_ASSESSMENT: 0-10

## 2023-03-29 ASSESSMENT — PAIN DESCRIPTION - LOCATION: LOCATION: KNEE

## 2023-03-29 ASSESSMENT — PAIN DESCRIPTION - ORIENTATION: ORIENTATION: LEFT

## 2023-03-29 NOTE — ED NOTES
I have reviewed discharge instructions with the patient. The patient verbalized understanding. Patient left ED via Discharge Method: ambulatory to Home with self    Opportunity for questions and clarification provided. Patient given 0 scripts. To continue your aftercare when you leave the hospital, you may receive an automated call from our care team to check in on how you are doing. This is a free service and part of our promise to provide the best care and service to meet your aftercare needs.  If you have questions, or wish to unsubscribe from this service please call [phone]. Thank you for Choosing our [facility] Emergency Department.        Satish Calhoun RN  03/29/23 [id number]

## 2023-03-29 NOTE — ED TRIAGE NOTES
Patient ambulatory in ED with complaint of pain to her left knee. Patient states she fell in the parking lot at [address] and was seen in the ED. Bandaid on patient's left knee. No bleeding noted at this time. Patient states concern over knee pain. Patient has not done RICE the past week. She has taken meloxicam for pain.

## 2023-03-29 NOTE — TELEPHONE ENCOUNTER
Dr Cristina Morrow called to speak with Dr Melvi Figueroa directly regarding patient's disability claim. He would not leave a message other than he is required to speak with Dr Melvi Figueroa directly.     He can be reached up until 5:00PM at [phone]

## 2023-03-29 NOTE — DISCHARGE INSTRUCTIONS
As we discussed, the wound on your anterior knee looks like it is healing well. Continue your good treatment thus far using topical antibiotic ointment and changing her bandage. This should continue to heal slowly over time. The swelling in your leg does not appear to be related to a blood clot. I think it is more likely to be dependent edema. Elevate your leg while you are sleeping and is much as possible when at home to see if the swelling begins to resolve. If you have any change in symptoms or anything else concerning arises, please return to the emergency department for further evaluation.

## 2023-03-30 ENCOUNTER — TELEPHONE (OUTPATIENT)
Dept: NEUROLOGY | Age: 53
End: 2023-03-30

## 2023-03-30 DIAGNOSIS — G43.119 INTRACTABLE MIGRAINE WITH AURA WITHOUT STATUS MIGRAINOSUS: Primary | ICD-10-CM

## 2023-03-30 RX ORDER — TOPIRAMATE 50 MG/1
50 TABLET, FILM COATED ORAL 2 TIMES DAILY
Qty: 180 TABLET | Refills: 3 | Status: SHIPPED | OUTPATIENT
Start: 2023-03-30 | End: 2023-06-28

## 2023-03-30 RX ORDER — TOPIRAMATE 50 MG/1
50 TABLET, FILM COATED ORAL 2 TIMES DAILY
Qty: 180 TABLET | Refills: 3 | Status: SHIPPED | OUTPATIENT
Start: 2023-03-30 | End: 2023-03-30 | Stop reason: CLARIF

## 2023-04-04 ENCOUNTER — OFFICE VISIT (OUTPATIENT)
Dept: ORTHOPEDIC SURGERY | Age: 53
End: 2023-04-04

## 2023-04-04 DIAGNOSIS — M21.619 BUNION: Primary | ICD-10-CM

## 2023-04-04 DIAGNOSIS — M79.672 PAIN IN LEFT FOOT: ICD-10-CM

## 2023-04-04 NOTE — PROGRESS NOTES
The patient was prescribed and given a DualAction toe spacer/bunion guard for the left foot. Patient read and signed documenting they understand and agree to [facility]'s current DME return policy.

## 2023-04-05 ENCOUNTER — HOSPITAL ENCOUNTER (EMERGENCY)
Age: 53
Discharge: HOME | End: 2023-04-05
Attending: EMERGENCY MEDICINE
Payer: COMMERCIAL

## 2023-04-05 VITALS
HEIGHT: 60 IN | BODY MASS INDEX: 35.53 KG/M2 | HEART RATE: 84 BPM | TEMPERATURE: 98.2 F | SYSTOLIC BLOOD PRESSURE: 135 MMHG | WEIGHT: 181 LBS | DIASTOLIC BLOOD PRESSURE: 86 MMHG | OXYGEN SATURATION: 98 % | RESPIRATION RATE: 17 BRPM

## 2023-04-05 DIAGNOSIS — S61.215A LACERATION OF LEFT RING FINGER WITHOUT FOREIGN BODY WITHOUT DAMAGE TO NAIL, INITIAL ENCOUNTER: Primary | ICD-10-CM

## 2023-04-05 PROCEDURE — 99282 EMERGENCY DEPT VISIT SF MDM: CPT

## 2023-04-05 PROCEDURE — 12001 RPR S/N/AX/GEN/TRNK 2.5CM/<: CPT

## 2023-04-05 ASSESSMENT — PAIN DESCRIPTION - LOCATION: LOCATION: FINGER (COMMENT WHICH ONE)

## 2023-04-05 ASSESSMENT — PAIN - FUNCTIONAL ASSESSMENT: PAIN_FUNCTIONAL_ASSESSMENT: 0-10

## 2023-04-05 ASSESSMENT — PAIN SCALES - GENERAL: PAINLEVEL_OUTOF10: 5

## 2023-04-05 ASSESSMENT — PAIN DESCRIPTION - ORIENTATION: ORIENTATION: LEFT

## 2023-04-05 NOTE — ED TRIAGE NOTES
Pt ambulatory to ED from home with steady gait. Pt reports cutting left anterior ring finger with scissors while at home around 4pm. Reports she hasn't been able to stop the bleeding. Bleeding controlled on assessment with finger wrap pt put on at home. Last tetanus shot 3 weeks ago.

## 2023-04-05 NOTE — TELEPHONE ENCOUNTER
Patient came by and signed release form authorizing for Dr Elizabeth Bender to call Dr Reji Branham.

## 2023-04-06 NOTE — ED PROVIDER NOTES
Resource Strain: Unknown    Difficulty of Paying Living Expenses: Patient refused   Food Insecurity: Unknown    Worried About Running Out of Food in the Last Year: Patient refused    [address] in the Last Year: Patient refused   Transportation Needs: Unknown    Lack of Transportation (Non-Medical): Patient refused   Housing Stability: Unknown    Unstable Housing in the Last Year: Patient refused        Previous Medications    AMOXICILLIN (AMOXIL) 250 MG/5ML SUSPENSION    Take 875mg PO BID for 10 days. ATOMOXETINE (STRATTERA) 80 MG CAPSULE    Take 1 capsule by mouth daily    BENZOYL PEROXIDE 2.5 % GEL    APPLY TOPICALLY TO FACE AT BEDTIME    BETAMETHASONE DIPROPIONATE 0.05 % OINTMENT    APPLY TO AFFECTED AREAS ON HANDS THREE TIMES DAILY    CITALOPRAM (CELEXA) 20 MG TABLET    Take 1 tablet by mouth every morning    CLONAZEPAM (KLONOPIN) 0.5 MG TABLET    Take 1 tablet by mouth 2 times daily as needed for Anxiety for up to 120 days.     COD LIVER OIL CAPS    Take 1 capsule by mouth daily    CYANOCOBALAMIN 1000 MCG TABLET    Take 1 tablet by mouth daily    CYCLOBENZAPRINE (AMRIX) 15 MG EXTENDED RELEASE CAPSULE    Take 15 mg by mouth daily as needed    CYCLOBENZAPRINE (FLEXERIL) 10 MG TABLET    10 mg 3 times daily as needed    DICLOFENAC SODIUM (VOLTAREN) 1 % GEL    Apply 1 g topically as needed    EVENING PRIMROSE OIL PO    Take by mouth    HYDROCHLOROTHIAZIDE (HYDRODIURIL) 25 MG TABLET    Take 12.5 mg by mouth daily    HYDROXYCHLOROQUINE (PLAQUENIL) 200 MG TABLET    Take 1 tablet by mouth daily    HYOSCYAMINE (LEVSIN/SL) 125 MCG SUBLINGUAL TABLET        LIDOCAINE 5 % CREA    Topical 5% Lidocaine and 5% Neurontin to apply to affected area up to 4 times/day as needed for pain    LIDOCAINE, ANORECTAL, 5 % CREA    Actual prescription: Lidocaine 5%: Neurontin/gabapentin 5% combined topical cream/gelApply to affected area up to 4 times a day as needed for pain    LINZESS 72 MCG CAPS CAPSULE    Take 1 capsule by mouth

## 2023-04-11 NOTE — TELEPHONE ENCOUNTER
Donna Oliveira comes into clinic today at the request of MEET Griffiths, Ordering Provider for urethral dilation.     Patient's urethra was cleansed with an antiseptic towelette. Urethra was slowly dilated with 24, 26 and a 28 Austrian sound. Patient tolerated procedure well.     This service provided today was under the supervising provider of the day Dr. Rueda, who was available if needed     Charleen Samuel EMT   Patient notified

## 2023-05-02 DIAGNOSIS — M79.672 PAIN IN LEFT FOOT: Primary | ICD-10-CM

## 2023-05-04 ENCOUNTER — OFFICE VISIT (OUTPATIENT)
Dept: BEHAVIORAL/MENTAL HEALTH CLINIC | Age: 53
End: 2023-05-04
Payer: COMMERCIAL

## 2023-05-04 VITALS
BODY MASS INDEX: 36.52 KG/M2 | SYSTOLIC BLOOD PRESSURE: 138 MMHG | TEMPERATURE: 97.8 F | DIASTOLIC BLOOD PRESSURE: 84 MMHG | OXYGEN SATURATION: 98 % | HEART RATE: 88 BPM | HEIGHT: 60 IN | WEIGHT: 186 LBS

## 2023-05-04 DIAGNOSIS — F51.05 INSOMNIA DUE TO MENTAL DISORDER: ICD-10-CM

## 2023-05-04 DIAGNOSIS — Z65.8 PSYCHOSOCIAL STRESSORS: ICD-10-CM

## 2023-05-04 DIAGNOSIS — F41.1 GENERALIZED ANXIETY DISORDER: ICD-10-CM

## 2023-05-04 DIAGNOSIS — G89.4 CHRONIC PAIN DISORDER: ICD-10-CM

## 2023-05-04 DIAGNOSIS — F33.1 MODERATE EPISODE OF RECURRENT MAJOR DEPRESSIVE DISORDER (HCC): Primary | ICD-10-CM

## 2023-05-04 PROCEDURE — 99214 OFFICE O/P EST MOD 30 MIN: CPT | Performed by: PSYCHIATRY & NEUROLOGY

## 2023-05-04 RX ORDER — ATOMOXETINE 80 MG/1
80 CAPSULE ORAL DAILY
Qty: 90 CAPSULE | Refills: 1 | Status: SHIPPED | OUTPATIENT
Start: 2023-05-04

## 2023-05-04 RX ORDER — CLONAZEPAM 0.5 MG/1
0.5 TABLET ORAL 2 TIMES DAILY PRN
Qty: 60 TABLET | Refills: 3 | Status: SHIPPED | OUTPATIENT
Start: 2023-05-04 | End: 2023-09-01

## 2023-05-04 RX ORDER — CITALOPRAM 20 MG/1
20 TABLET ORAL EVERY MORNING
Qty: 90 TABLET | Refills: 1 | Status: SHIPPED | OUTPATIENT
Start: 2023-05-04

## 2023-05-04 ASSESSMENT — ANXIETY QUESTIONNAIRES
GAD7 TOTAL SCORE: 15
IF YOU CHECKED OFF ANY PROBLEMS ON THIS QUESTIONNAIRE, HOW DIFFICULT HAVE THESE PROBLEMS MADE IT FOR YOU TO DO YOUR WORK, TAKE CARE OF THINGS AT HOME, OR GET ALONG WITH OTHER PEOPLE: EXTREMELY DIFFICULT
4. TROUBLE RELAXING: 3
3. WORRYING TOO MUCH ABOUT DIFFERENT THINGS: 3
6. BECOMING EASILY ANNOYED OR IRRITABLE: 1
7. FEELING AFRAID AS IF SOMETHING AWFUL MIGHT HAPPEN: 0
5. BEING SO RESTLESS THAT IT IS HARD TO SIT STILL: 2
1. FEELING NERVOUS, ANXIOUS, OR ON EDGE: 3
2. NOT BEING ABLE TO STOP OR CONTROL WORRYING: 3

## 2023-05-04 ASSESSMENT — PATIENT HEALTH QUESTIONNAIRE - PHQ9
SUM OF ALL RESPONSES TO PHQ QUESTIONS 1-9: 16
SUM OF ALL RESPONSES TO PHQ QUESTIONS 1-9: 16
4. FEELING TIRED OR HAVING LITTLE ENERGY: 2
SUM OF ALL RESPONSES TO PHQ9 QUESTIONS 1 & 2: 5
8. MOVING OR SPEAKING SO SLOWLY THAT OTHER PEOPLE COULD HAVE NOTICED. OR THE OPPOSITE, BEING SO FIGETY OR RESTLESS THAT YOU HAVE BEEN MOVING AROUND A LOT MORE THAN USUAL: 2
5. POOR APPETITE OR OVEREATING: 2
9. THOUGHTS THAT YOU WOULD BE BETTER OFF DEAD, OR OF HURTING YOURSELF: 0
SUM OF ALL RESPONSES TO PHQ QUESTIONS 1-9: 16
10. IF YOU CHECKED OFF ANY PROBLEMS, HOW DIFFICULT HAVE THESE PROBLEMS MADE IT FOR YOU TO DO YOUR WORK, TAKE CARE OF THINGS AT HOME, OR GET ALONG WITH OTHER PEOPLE: 3
2. FEELING DOWN, DEPRESSED OR HOPELESS: 3
6. FEELING BAD ABOUT YOURSELF - OR THAT YOU ARE A FAILURE OR HAVE LET YOURSELF OR YOUR FAMILY DOWN: 3
7. TROUBLE CONCENTRATING ON THINGS, SUCH AS READING THE NEWSPAPER OR WATCHING TELEVISION: 1
SUM OF ALL RESPONSES TO PHQ QUESTIONS 1-9: 16
3. TROUBLE FALLING OR STAYING ASLEEP: 1
1. LITTLE INTEREST OR PLEASURE IN DOING THINGS: 2

## 2023-05-21 NOTE — TELEPHONE ENCOUNTER
Patient was notified by provider regarding request for her to find an MD/DO who is willing to manage her care due to the complexity of her health issues. She was understanding and in agreement to contacting [facility] Monday to seek out a new PCP. She was also given a 30 day notice of coverage until she starts at another practice. [NL] : warm, clear [FreeTextEntry4] : + nasal congestion present

## 2023-05-24 ENCOUNTER — PROCEDURE VISIT (OUTPATIENT)
Dept: NEUROLOGY | Age: 53
End: 2023-05-24

## 2023-05-24 VITALS — OXYGEN SATURATION: 92 % | WEIGHT: 185 LBS | BODY MASS INDEX: 36.13 KG/M2 | HEART RATE: 60 BPM

## 2023-05-24 DIAGNOSIS — R20.2 PARESTHESIA OF LEFT LOWER EXTREMITY: Primary | ICD-10-CM

## 2023-05-24 NOTE — PROGRESS NOTES
EMG/Nerve Conduction Study Procedure Note  [address] [facility], [address] [place] [address]   [phone]      Hx:    Exam:     48 y.o.  RH AA female with sensation tingling lowers - for EMG today[de-identified] (last EMG elsewhere for uppers last summer was CTS followed by pain mgt [facility]) today lower - the tingling only at the LLE. No weakness - exam neg LLE today. No myokymia, fasciculation, atrophy. PCP = EDDIE Novoa; rheum = Dr Franklyn Leos; Shonna Meckel pain mgt Dr Sophy Hartman. Ref: NP Stephen Mckenzie. Tech[de-identified]   Tamara Garcia  Hgt[de-identified]  5'0\"          Summary            needle EMG of extremity with conduction velocity testing lower extremities. Controlled environmental factors / EMG lab. Temperature. NCV : sensory segments:    Normal bilateral sural sensory conduction velocities. NCV plantar sensory segments:     Normal bilateral plantar SCV. NCV Motor MCV segments:     Normal bilateral peroneal bilateral tibial MCV. F-wave studies:         Normal bilateral peroneal and tibial F waves. H-REFLEX Studies:    Normal bilateral H-reflexes. NEEDLE EMG:   Tested muscles[de-identified]    Left TA MG VL muscles = normal =  Normal insertional activity and interference pattern/recruitment. No fasciculations fibrillations positive sharp waves. Normal MUP. No BSS AP. No giant MUP. No myotonia. No upper motor neuron sign. INTERPRETATION:              INTERPRETATION:       NORMAL STUDIES. THERE IS NO ELECTROPHYSIOLOGIC EVIDENCE OF NEUROPATHY, DENERVATION, RADICULOPATHY, PLEXOPATHY, MYOPATHY, MYOTONIA, OR FASCICULATIONS IN THE TESTED SEGMENTS  OF  LOWER   EXTREMITIES. CONCLUSION:      NORMAL LOWER EXTREMITY STUDIES. Procedure Details:       FURTHER clinical correlation is recommended and warranted as studies are within normal limits. No neuropathy identified. No denervation etc.                                                         Patient is made aware.              CONCLUSION:

## 2023-06-19 ENCOUNTER — OFFICE VISIT (OUTPATIENT)
Dept: NEUROLOGY | Age: 53
End: 2023-06-19
Payer: COMMERCIAL

## 2023-06-19 VITALS
SYSTOLIC BLOOD PRESSURE: 114 MMHG | DIASTOLIC BLOOD PRESSURE: 75 MMHG | WEIGHT: 182 LBS | BODY MASS INDEX: 35.73 KG/M2 | OXYGEN SATURATION: 98 % | HEART RATE: 94 BPM | HEIGHT: 60 IN

## 2023-06-19 DIAGNOSIS — M79.2 NEUROPATHIC PAIN: ICD-10-CM

## 2023-06-19 DIAGNOSIS — Z79.899 ENCOUNTER FOR MEDICATION MANAGEMENT: ICD-10-CM

## 2023-06-19 DIAGNOSIS — G43.119 INTRACTABLE MIGRAINE WITH AURA WITHOUT STATUS MIGRAINOSUS: ICD-10-CM

## 2023-06-19 DIAGNOSIS — G25.81 RESTLESS LEGS SYNDROME (RLS): ICD-10-CM

## 2023-06-19 DIAGNOSIS — G43.019 INTRACTABLE MIGRAINE WITHOUT AURA AND WITHOUT STATUS MIGRAINOSUS: Primary | ICD-10-CM

## 2023-06-19 PROCEDURE — 99215 OFFICE O/P EST HI 40 MIN: CPT | Performed by: PSYCHIATRY & NEUROLOGY

## 2023-06-19 RX ORDER — OLMESARTAN MEDOXOMIL 20 MG/1
TABLET ORAL
COMMUNITY
Start: 2023-06-16

## 2023-06-19 ASSESSMENT — ENCOUNTER SYMPTOMS: BLURRED VISION: 1

## 2023-06-19 ASSESSMENT — VISUAL ACUITY: OU: 1

## 2023-06-19 NOTE — PROGRESS NOTES
Neurological:      Mental Status: She is alert and easily aroused. Mental status is at baseline. Cranial Nerves: No cranial nerve deficit, dysarthria or facial asymmetry. Sensory: Sensation is intact. No sensory deficit. Motor: No weakness, tremor or seizure activity. Coordination: Coordination normal.      Gait: Gait is intact. Gait normal.      Comments: PERRLA  normal facial etc movt. Normal postional movements today. No dyskinesia. Psychiatric:         Attention and Perception: Attention normal.         Mood and Affect: Mood normal.         Speech: Speech normal.         Behavior: Behavior normal. Behavior is cooperative. Neurologic Exam     Mental Status   Speech: speech is normal   Level of consciousness: alert  Knowledge: good. Normal comprehension. Gait, Coordination, and Reflexes     Gait  Gait: normal    Tremor   Resting tremor: absent  Intention tremor: absent  Action tremor: absent  There is no tic, twitch, tonic or clonic activity noted. No dyskinesia. Assessment & Plan     Darrel Kothari was seen today for other, migraine and headache. Diagnoses and all orders for this visit:    Intractable migraine without aura and without status migrainosus  -     Fremanezumab-vfrm SOAJ 225 mg    Neuropathic pain    Intractable migraine with aura without status migrainosus    Encounter for medication management    Restless legs syndrome (RLS)      D/C topiramate( wthdraw). Start Ajovy injections. OK Gilmer Handing  Continue headache and spell migraine diary. For Mgraine Protocol Infusion. .. schedule. Order sheet placed. [id number] Elli Montero for as above. All drugs and rx reviewed fully with patient and acknowledged specific to neurology as well. Differential diagnostic decisions and thoughts reviewed and discussed. Updating to patient identification, coordinate neurology visits, reasons for follow, review neurologic problems.           Extensive time[de-identified]  Total time  45

## 2023-06-22 RX ORDER — SODIUM CHLORIDE 9 MG/ML
INJECTION, SOLUTION INTRAVENOUS CONTINUOUS
OUTPATIENT
Start: 2023-06-26

## 2023-06-22 RX ORDER — 0.9 % SODIUM CHLORIDE 0.9 %
1000 INTRAVENOUS SOLUTION INTRAVENOUS ONCE
Start: 2023-06-26 | End: 2023-06-26

## 2023-06-22 RX ORDER — SODIUM CHLORIDE 9 MG/ML
5-250 INJECTION, SOLUTION INTRAVENOUS PRN
OUTPATIENT
Start: 2023-06-26

## 2023-06-22 RX ORDER — DIPHENHYDRAMINE HYDROCHLORIDE 50 MG/ML
25 INJECTION INTRAMUSCULAR; INTRAVENOUS ONCE
OUTPATIENT
Start: 2023-06-26 | End: 2023-06-26

## 2023-06-22 RX ORDER — EPINEPHRINE 1 MG/ML
0.3 INJECTION, SOLUTION, CONCENTRATE INTRAVENOUS PRN
OUTPATIENT
Start: 2023-06-26

## 2023-06-22 RX ORDER — ALBUTEROL SULFATE 90 UG/1
4 AEROSOL, METERED RESPIRATORY (INHALATION) PRN
OUTPATIENT
Start: 2023-06-26

## 2023-06-22 RX ORDER — ACETAMINOPHEN 325 MG/1
650 TABLET ORAL
OUTPATIENT
Start: 2023-06-26

## 2023-06-22 RX ORDER — FAMOTIDINE 10 MG/ML
20 INJECTION, SOLUTION INTRAVENOUS
OUTPATIENT
Start: 2023-06-26

## 2023-06-22 RX ORDER — SODIUM CHLORIDE 0.9 % (FLUSH) 0.9 %
5-40 SYRINGE (ML) INJECTION PRN
OUTPATIENT
Start: 2023-06-26

## 2023-06-22 RX ORDER — DIPHENHYDRAMINE HYDROCHLORIDE 50 MG/ML
50 INJECTION INTRAMUSCULAR; INTRAVENOUS
OUTPATIENT
Start: 2023-06-26

## 2023-06-22 RX ORDER — HEPARIN SODIUM (PORCINE) LOCK FLUSH IV SOLN 100 UNIT/ML 100 UNIT/ML
500 SOLUTION INTRAVENOUS PRN
OUTPATIENT
Start: 2023-06-26

## 2023-06-22 RX ORDER — FAMOTIDINE 10 MG/ML
20 INJECTION, SOLUTION INTRAVENOUS ONCE
Start: 2023-06-26 | End: 2023-06-26

## 2023-06-22 RX ORDER — ONDANSETRON 2 MG/ML
8 INJECTION INTRAMUSCULAR; INTRAVENOUS
OUTPATIENT
Start: 2023-06-26

## 2023-06-29 ENCOUNTER — TELEPHONE (OUTPATIENT)
Dept: NEUROLOGY | Age: 53
End: 2023-06-29

## 2023-07-10 ENCOUNTER — NURSE ONLY (OUTPATIENT)
Age: 53
End: 2023-07-10

## 2023-07-10 VITALS
BODY MASS INDEX: 34.57 KG/M2 | SYSTOLIC BLOOD PRESSURE: 119 MMHG | RESPIRATION RATE: 16 BRPM | HEART RATE: 79 BPM | WEIGHT: 177 LBS | OXYGEN SATURATION: 100 % | DIASTOLIC BLOOD PRESSURE: 75 MMHG | TEMPERATURE: 97.3 F

## 2023-07-10 DIAGNOSIS — G43.119 INTRACTABLE MIGRAINE WITH AURA WITHOUT STATUS MIGRAINOSUS: Primary | ICD-10-CM

## 2023-07-10 RX ORDER — EPINEPHRINE 1 MG/ML
0.3 INJECTION, SOLUTION, CONCENTRATE INTRAVENOUS PRN
Status: CANCELLED | OUTPATIENT
Start: 2023-07-11

## 2023-07-10 RX ORDER — ONDANSETRON 2 MG/ML
8 INJECTION INTRAMUSCULAR; INTRAVENOUS
Status: CANCELLED | OUTPATIENT
Start: 2023-07-11

## 2023-07-10 RX ORDER — DIPHENHYDRAMINE HYDROCHLORIDE 50 MG/ML
25 INJECTION INTRAMUSCULAR; INTRAVENOUS ONCE
Status: COMPLETED | OUTPATIENT
Start: 2023-07-10 | End: 2023-07-10

## 2023-07-10 RX ORDER — FAMOTIDINE 10 MG/ML
20 INJECTION, SOLUTION INTRAVENOUS
Status: CANCELLED | OUTPATIENT
Start: 2023-07-11

## 2023-07-10 RX ORDER — FAMOTIDINE 10 MG/ML
20 INJECTION, SOLUTION INTRAVENOUS ONCE
Status: CANCELLED
Start: 2023-07-11 | End: 2023-07-11

## 2023-07-10 RX ORDER — SODIUM CHLORIDE 0.9 % (FLUSH) 0.9 %
5-40 SYRINGE (ML) INJECTION PRN
Status: CANCELLED | OUTPATIENT
Start: 2023-07-11

## 2023-07-10 RX ORDER — SODIUM CHLORIDE 9 MG/ML
5-250 INJECTION, SOLUTION INTRAVENOUS PRN
Status: CANCELLED | OUTPATIENT
Start: 2023-07-11

## 2023-07-10 RX ORDER — 0.9 % SODIUM CHLORIDE 0.9 %
1000 INTRAVENOUS SOLUTION INTRAVENOUS ONCE
Status: COMPLETED | OUTPATIENT
Start: 2023-07-10 | End: 2023-07-10

## 2023-07-10 RX ORDER — SODIUM CHLORIDE 9 MG/ML
INJECTION, SOLUTION INTRAVENOUS CONTINUOUS
Status: CANCELLED | OUTPATIENT
Start: 2023-07-11

## 2023-07-10 RX ORDER — ALBUTEROL SULFATE 90 UG/1
4 AEROSOL, METERED RESPIRATORY (INHALATION) PRN
Status: CANCELLED | OUTPATIENT
Start: 2023-07-11

## 2023-07-10 RX ORDER — DIPHENHYDRAMINE HYDROCHLORIDE 50 MG/ML
50 INJECTION INTRAMUSCULAR; INTRAVENOUS
Status: CANCELLED | OUTPATIENT
Start: 2023-07-11

## 2023-07-10 RX ORDER — ACETAMINOPHEN 325 MG/1
650 TABLET ORAL
Status: CANCELLED | OUTPATIENT
Start: 2023-07-11

## 2023-07-10 RX ORDER — 0.9 % SODIUM CHLORIDE 0.9 %
1000 INTRAVENOUS SOLUTION INTRAVENOUS ONCE
Status: CANCELLED
Start: 2023-07-11 | End: 2023-07-11

## 2023-07-10 RX ORDER — HEPARIN SODIUM (PORCINE) LOCK FLUSH IV SOLN 100 UNIT/ML 100 UNIT/ML
500 SOLUTION INTRAVENOUS PRN
Status: CANCELLED | OUTPATIENT
Start: 2023-07-11

## 2023-07-10 RX ORDER — FAMOTIDINE 10 MG/ML
20 INJECTION, SOLUTION INTRAVENOUS ONCE
Status: COMPLETED | OUTPATIENT
Start: 2023-07-10 | End: 2023-07-10

## 2023-07-10 RX ORDER — DIPHENHYDRAMINE HYDROCHLORIDE 50 MG/ML
25 INJECTION INTRAMUSCULAR; INTRAVENOUS ONCE
Status: CANCELLED | OUTPATIENT
Start: 2023-07-11 | End: 2023-07-11

## 2023-07-10 RX ADMIN — DIPHENHYDRAMINE HYDROCHLORIDE 25 MG: 50 INJECTION INTRAMUSCULAR; INTRAVENOUS at 09:43

## 2023-07-10 RX ADMIN — Medication 1000 ML: at 09:20

## 2023-07-10 RX ADMIN — FAMOTIDINE 20 MG: 10 INJECTION, SOLUTION INTRAVENOUS at 09:35

## 2023-07-10 NOTE — PROGRESS NOTES
[facility]  [address] [facility], [address] [place] [address] [address], [address]   Office : [phone], Fax: [phone]       Migraine therapy infusion today. Medication given per Orders     Patient tolerated the infusion well. IV inserted by: meka  IV Insertion [id number]  Pepcid given from 7770-5238  Diphenhydramine given from 3514-3260  Toradol given from 1009-5423  Depakote given from 5487-5153  Magnesium given from 5528-0024    Next infusion for 07/11/2023  Patient discharged feeling well and instructed to call the office with any post-infusion issues. Pre-Infusion Questionnaire  Has your insurance changed or have you received a new card since your last visit? No  Have you had an infection since your last infusion? N/a  Have you recently had GI problems, open wounds, urinary problems, or chest pain? No  Are you currently taking antibiotics? No  Have you recently had or are you scheduled for any surgical procedures? No  Did you take an antihistamine today? No  Have you received any vaccinations recently? No  When was your last appointment with one of our providers?  06/19/20236  When was your last infusion? N/a  12. Are you in a skilled nursing facility?       No    Reviewed and Confirmed by Cristiano Bruce

## 2023-07-11 ENCOUNTER — NURSE ONLY (OUTPATIENT)
Age: 53
End: 2023-07-11

## 2023-07-11 VITALS
OXYGEN SATURATION: 100 % | DIASTOLIC BLOOD PRESSURE: 72 MMHG | BODY MASS INDEX: 34.96 KG/M2 | RESPIRATION RATE: 16 BRPM | WEIGHT: 179 LBS | TEMPERATURE: 97.2 F | SYSTOLIC BLOOD PRESSURE: 132 MMHG | HEART RATE: 85 BPM

## 2023-07-11 DIAGNOSIS — G43.119 INTRACTABLE MIGRAINE WITH AURA WITHOUT STATUS MIGRAINOSUS: Primary | ICD-10-CM

## 2023-07-11 RX ORDER — 0.9 % SODIUM CHLORIDE 0.9 %
1000 INTRAVENOUS SOLUTION INTRAVENOUS ONCE
Status: CANCELLED
Start: 2023-07-12 | End: 2023-07-12

## 2023-07-11 RX ORDER — SODIUM CHLORIDE 0.9 % (FLUSH) 0.9 %
5-40 SYRINGE (ML) INJECTION PRN
OUTPATIENT
Start: 2023-07-12

## 2023-07-11 RX ORDER — SODIUM CHLORIDE 9 MG/ML
INJECTION, SOLUTION INTRAVENOUS CONTINUOUS
OUTPATIENT
Start: 2023-07-12

## 2023-07-11 RX ORDER — ACETAMINOPHEN 325 MG/1
650 TABLET ORAL
OUTPATIENT
Start: 2023-07-12

## 2023-07-11 RX ORDER — ALBUTEROL SULFATE 90 UG/1
4 AEROSOL, METERED RESPIRATORY (INHALATION) PRN
OUTPATIENT
Start: 2023-07-12

## 2023-07-11 RX ORDER — EPINEPHRINE 1 MG/ML
0.3 INJECTION, SOLUTION, CONCENTRATE INTRAVENOUS PRN
OUTPATIENT
Start: 2023-07-12

## 2023-07-11 RX ORDER — ONDANSETRON 2 MG/ML
8 INJECTION INTRAMUSCULAR; INTRAVENOUS
OUTPATIENT
Start: 2023-07-12

## 2023-07-11 RX ORDER — 0.9 % SODIUM CHLORIDE 0.9 %
1000 INTRAVENOUS SOLUTION INTRAVENOUS ONCE
Status: COMPLETED | OUTPATIENT
Start: 2023-07-11 | End: 2023-07-11

## 2023-07-11 RX ORDER — DIPHENHYDRAMINE HYDROCHLORIDE 50 MG/ML
25 INJECTION INTRAMUSCULAR; INTRAVENOUS ONCE
Status: CANCELLED | OUTPATIENT
Start: 2023-07-12 | End: 2023-07-12

## 2023-07-11 RX ORDER — DIPHENHYDRAMINE HYDROCHLORIDE 50 MG/ML
50 INJECTION INTRAMUSCULAR; INTRAVENOUS
OUTPATIENT
Start: 2023-07-12

## 2023-07-11 RX ORDER — DIPHENHYDRAMINE HYDROCHLORIDE 50 MG/ML
25 INJECTION INTRAMUSCULAR; INTRAVENOUS ONCE
Status: COMPLETED | OUTPATIENT
Start: 2023-07-11 | End: 2023-07-11

## 2023-07-11 RX ORDER — FAMOTIDINE 10 MG/ML
20 INJECTION, SOLUTION INTRAVENOUS ONCE
Status: COMPLETED | OUTPATIENT
Start: 2023-07-11 | End: 2023-07-11

## 2023-07-11 RX ORDER — FAMOTIDINE 10 MG/ML
20 INJECTION, SOLUTION INTRAVENOUS ONCE
Status: CANCELLED
Start: 2023-07-12 | End: 2023-07-12

## 2023-07-11 RX ORDER — HEPARIN SODIUM (PORCINE) LOCK FLUSH IV SOLN 100 UNIT/ML 100 UNIT/ML
500 SOLUTION INTRAVENOUS PRN
OUTPATIENT
Start: 2023-07-12

## 2023-07-11 RX ORDER — FAMOTIDINE 10 MG/ML
20 INJECTION, SOLUTION INTRAVENOUS
OUTPATIENT
Start: 2023-07-12

## 2023-07-11 RX ORDER — SODIUM CHLORIDE 9 MG/ML
5-250 INJECTION, SOLUTION INTRAVENOUS PRN
OUTPATIENT
Start: 2023-07-12

## 2023-07-11 RX ADMIN — Medication 1000 ML: at 09:45

## 2023-07-11 RX ADMIN — DIPHENHYDRAMINE HYDROCHLORIDE 25 MG: 50 INJECTION INTRAMUSCULAR; INTRAVENOUS at 09:56

## 2023-07-11 RX ADMIN — FAMOTIDINE 20 MG: 10 INJECTION, SOLUTION INTRAVENOUS at 09:47

## 2023-07-11 NOTE — PROGRESS NOTES
[facility]  [address] [facility], [address] [place] [address] [address], [address]   Office : [phone], Fax: [phone]       Migraine Therapy infused today. Medication given per MD Orders     Patient tolerated the infusion well. IV inserted by: YOLANDA  IV Insertion time:0940  Pepcid given IVP @ 0947  Diphenhydramine given 7071-3798  O2 given from 7955-7443 at 5 L  Ketorolac given from 1352-7055  Valproate 500mg given from 4366-8604  Magnesium given from 1760-9027  Normal saline infused from 6485-7086  Dr. Jagruti Mclean stopped by and spoke with patient while in the infusion area. Patient given Anjovy by MD.      Patient discharged feeling well and instructed to call the office with any post-infusion issues. Pre-Infusion Questionnaire  Has your insurance changed or have you received a new card since your last visit? No  Have you had an infection since your last infusion? No  Have you recently had GI problems, open wounds, urinary problems, or chest pain? No  Are you currently taking antibiotics? No  Have you recently had or are you scheduled for any surgical procedures? No  Did you take an antihistamine today? No  Have you received any vaccinations recently? No  When was your last appointment with one of our providers?  06/19/2023  When was your last infusion?        07/10/2023  12. Are you in a skilled nursing facility?       No    Reviewed and Confirmed by Tomas Dominguez

## 2023-07-12 ENCOUNTER — NURSE ONLY (OUTPATIENT)
Age: 53
End: 2023-07-12

## 2023-07-12 VITALS
BODY MASS INDEX: 35.39 KG/M2 | TEMPERATURE: 97.8 F | SYSTOLIC BLOOD PRESSURE: 136 MMHG | WEIGHT: 181.22 LBS | HEART RATE: 79 BPM | DIASTOLIC BLOOD PRESSURE: 84 MMHG

## 2023-07-12 DIAGNOSIS — G43.119 INTRACTABLE MIGRAINE WITH AURA WITHOUT STATUS MIGRAINOSUS: Primary | ICD-10-CM

## 2023-07-12 RX ORDER — 0.9 % SODIUM CHLORIDE 0.9 %
1000 INTRAVENOUS SOLUTION INTRAVENOUS ONCE
Status: CANCELLED
Start: 2023-07-13 | End: 2023-07-13

## 2023-07-12 RX ORDER — FAMOTIDINE 10 MG/ML
20 INJECTION, SOLUTION INTRAVENOUS ONCE
Status: CANCELLED
Start: 2023-07-13 | End: 2023-07-13

## 2023-07-12 RX ORDER — 0.9 % SODIUM CHLORIDE 0.9 %
1000 INTRAVENOUS SOLUTION INTRAVENOUS ONCE
Status: DISCONTINUED | OUTPATIENT
Start: 2023-07-12 | End: 2023-07-12 | Stop reason: HOSPADM

## 2023-07-12 RX ORDER — FAMOTIDINE 10 MG/ML
20 INJECTION, SOLUTION INTRAVENOUS
OUTPATIENT
Start: 2023-07-13

## 2023-07-12 RX ORDER — DIPHENHYDRAMINE HYDROCHLORIDE 50 MG/ML
25 INJECTION INTRAMUSCULAR; INTRAVENOUS ONCE
Status: COMPLETED | OUTPATIENT
Start: 2023-07-12 | End: 2023-07-12

## 2023-07-12 RX ORDER — HEPARIN SODIUM (PORCINE) LOCK FLUSH IV SOLN 100 UNIT/ML 100 UNIT/ML
500 SOLUTION INTRAVENOUS PRN
OUTPATIENT
Start: 2023-07-13

## 2023-07-12 RX ORDER — ALBUTEROL SULFATE 90 UG/1
4 AEROSOL, METERED RESPIRATORY (INHALATION) PRN
OUTPATIENT
Start: 2023-07-13

## 2023-07-12 RX ORDER — DIPHENHYDRAMINE HYDROCHLORIDE 50 MG/ML
50 INJECTION INTRAMUSCULAR; INTRAVENOUS
OUTPATIENT
Start: 2023-07-13

## 2023-07-12 RX ORDER — ACETAMINOPHEN 325 MG/1
650 TABLET ORAL
OUTPATIENT
Start: 2023-07-13

## 2023-07-12 RX ORDER — DIPHENHYDRAMINE HYDROCHLORIDE 50 MG/ML
25 INJECTION INTRAMUSCULAR; INTRAVENOUS ONCE
Status: CANCELLED | OUTPATIENT
Start: 2023-07-13 | End: 2023-07-13

## 2023-07-12 RX ORDER — SODIUM CHLORIDE 0.9 % (FLUSH) 0.9 %
5-40 SYRINGE (ML) INJECTION PRN
OUTPATIENT
Start: 2023-07-13

## 2023-07-12 RX ORDER — EPINEPHRINE 1 MG/ML
0.3 INJECTION, SOLUTION, CONCENTRATE INTRAVENOUS PRN
OUTPATIENT
Start: 2023-07-13

## 2023-07-12 RX ORDER — SODIUM CHLORIDE 9 MG/ML
INJECTION, SOLUTION INTRAVENOUS CONTINUOUS
OUTPATIENT
Start: 2023-07-13

## 2023-07-12 RX ORDER — SODIUM CHLORIDE 9 MG/ML
5-250 INJECTION, SOLUTION INTRAVENOUS PRN
OUTPATIENT
Start: 2023-07-13

## 2023-07-12 RX ORDER — FAMOTIDINE 10 MG/ML
20 INJECTION, SOLUTION INTRAVENOUS ONCE
Status: COMPLETED | OUTPATIENT
Start: 2023-07-12 | End: 2023-07-12

## 2023-07-12 RX ORDER — ONDANSETRON 2 MG/ML
8 INJECTION INTRAMUSCULAR; INTRAVENOUS
OUTPATIENT
Start: 2023-07-13

## 2023-07-12 RX ADMIN — FAMOTIDINE 20 MG: 10 INJECTION, SOLUTION INTRAVENOUS at 09:35

## 2023-07-12 RX ADMIN — DIPHENHYDRAMINE HYDROCHLORIDE 25 MG: 50 INJECTION INTRAMUSCULAR; INTRAVENOUS at 09:35

## 2023-07-12 NOTE — PROGRESS NOTES
Arrived to the [address] [facility]. Migraine Therapy completed. Ketorolac start time: 0954 stop time:1009  Valproate start time: 1023 stop time:1045  Magnesium Sulfate start time: 1052 stop time:1400. Patient instructed to report any side affects to ordering provider. Patient tolerated without complications. Any issues or concerns during appointment: NO.  Patient aware of next infusion appointment on 1/15/24 at 1100. Discharged ambulatory.

## 2023-07-17 ENCOUNTER — TELEPHONE (OUTPATIENT)
Dept: BEHAVIORAL/MENTAL HEALTH CLINIC | Age: 53
End: 2023-07-17

## 2023-07-17 ENCOUNTER — TELEPHONE (OUTPATIENT)
Dept: NEUROLOGY | Age: 53
End: 2023-07-17

## 2023-07-17 NOTE — TELEPHONE ENCOUNTER
Pt called about disability paperwork, I explained to pt that PCP would need to fill out any paperwork. Pt stated that her disability  wanted her diagnoses from a Neurologist not the PCP. Pt stated that she is very off balance and that she has lost vision while driving. Pt has tried infusions and wanted to schedule an appt with you to discuss her new problems.

## 2023-07-17 NOTE — TELEPHONE ENCOUNTER
----- Message from MARK Cortez sent at 7/17/2023  9:01 AM EDT -----  Regarding: Panic   Contact: [phone]  Imer Rosenberg  For the past two months I started having dreams flashbacks on past stress from jobs, but last night I went into a panic attack and anxiety. I felt like I was gripped with fear and scared. I have been restless but nothing like this. Any suggestions ?   I knew we spoke about another specialist but it would be out of pocket cost.

## 2023-07-18 ENCOUNTER — TELEMEDICINE (OUTPATIENT)
Dept: BEHAVIORAL/MENTAL HEALTH CLINIC | Age: 53
End: 2023-07-18
Payer: COMMERCIAL

## 2023-07-18 DIAGNOSIS — F41.1 GENERALIZED ANXIETY DISORDER: ICD-10-CM

## 2023-07-18 DIAGNOSIS — G89.4 CHRONIC PAIN DISORDER: ICD-10-CM

## 2023-07-18 DIAGNOSIS — F51.05 INSOMNIA DUE TO MENTAL DISORDER: ICD-10-CM

## 2023-07-18 DIAGNOSIS — F33.0 MILD EPISODE OF RECURRENT MAJOR DEPRESSIVE DISORDER (HCC): Primary | ICD-10-CM

## 2023-07-18 DIAGNOSIS — Z65.8 PSYCHOSOCIAL STRESSORS: ICD-10-CM

## 2023-07-18 PROCEDURE — 99213 OFFICE O/P EST LOW 20 MIN: CPT | Performed by: PSYCHIATRY & NEUROLOGY

## 2023-07-18 RX ORDER — CLOBETASOL PROPIONATE 0.5 MG/G
CREAM TOPICAL
COMMUNITY
Start: 2023-07-06

## 2023-07-18 RX ORDER — ONDANSETRON 4 MG/1
4 TABLET, FILM COATED ORAL EVERY 8 HOURS PRN
COMMUNITY

## 2023-07-18 RX ORDER — PHENYLEPHRINE/DIPHENHYDRAMINE 5-12.5MG/5
SOLUTION, ORAL ORAL
COMMUNITY

## 2023-07-18 ASSESSMENT — PATIENT HEALTH QUESTIONNAIRE - PHQ9
SUM OF ALL RESPONSES TO PHQ9 QUESTIONS 1 & 2: 3
7. TROUBLE CONCENTRATING ON THINGS, SUCH AS READING THE NEWSPAPER OR WATCHING TELEVISION: 1
2. FEELING DOWN, DEPRESSED OR HOPELESS: 2
10. IF YOU CHECKED OFF ANY PROBLEMS, HOW DIFFICULT HAVE THESE PROBLEMS MADE IT FOR YOU TO DO YOUR WORK, TAKE CARE OF THINGS AT HOME, OR GET ALONG WITH OTHER PEOPLE: 1
1. LITTLE INTEREST OR PLEASURE IN DOING THINGS: 1
9. THOUGHTS THAT YOU WOULD BE BETTER OFF DEAD, OR OF HURTING YOURSELF: 0
SUM OF ALL RESPONSES TO PHQ QUESTIONS 1-9: 13
SUM OF ALL RESPONSES TO PHQ QUESTIONS 1-9: 13
3. TROUBLE FALLING OR STAYING ASLEEP: 2
8. MOVING OR SPEAKING SO SLOWLY THAT OTHER PEOPLE COULD HAVE NOTICED. OR THE OPPOSITE, BEING SO FIGETY OR RESTLESS THAT YOU HAVE BEEN MOVING AROUND A LOT MORE THAN USUAL: 1
6. FEELING BAD ABOUT YOURSELF - OR THAT YOU ARE A FAILURE OR HAVE LET YOURSELF OR YOUR FAMILY DOWN: 2
5. POOR APPETITE OR OVEREATING: 1
SUM OF ALL RESPONSES TO PHQ QUESTIONS 1-9: 13
SUM OF ALL RESPONSES TO PHQ QUESTIONS 1-9: 13
4. FEELING TIRED OR HAVING LITTLE ENERGY: 3

## 2023-07-18 ASSESSMENT — ANXIETY QUESTIONNAIRES
5. BEING SO RESTLESS THAT IT IS HARD TO SIT STILL: 2
7. FEELING AFRAID AS IF SOMETHING AWFUL MIGHT HAPPEN: 1
4. TROUBLE RELAXING: 2
IF YOU CHECKED OFF ANY PROBLEMS ON THIS QUESTIONNAIRE, HOW DIFFICULT HAVE THESE PROBLEMS MADE IT FOR YOU TO DO YOUR WORK, TAKE CARE OF THINGS AT HOME, OR GET ALONG WITH OTHER PEOPLE: VERY DIFFICULT
6. BECOMING EASILY ANNOYED OR IRRITABLE: 1
1. FEELING NERVOUS, ANXIOUS, OR ON EDGE: 3
3. WORRYING TOO MUCH ABOUT DIFFERENT THINGS: 2
GAD7 TOTAL SCORE: 14
2. NOT BEING ABLE TO STOP OR CONTROL WORRYING: 3

## 2023-07-18 NOTE — PROGRESS NOTES
Patient:  Cristiano Bruce  Age:  48 y.o.  :  1970     SEX:  female MRN:  [id number]     RACE: Black /      SEEN:  [x]  PATIENT  []  SPOUSE []  OTHER:              PHQ-9  2023 2023 3/27/2023   Little interest or pleasure in doing things 1 2 1   Little interest or pleasure in doing things - - -   Feeling down, depressed, or hopeless 2 3 1   Trouble falling or staying asleep, or sleeping too much 2 1 1   Trouble falling or staying asleep, or sleeping too much - - -   Feeling tired or having little energy 3 2 2   Feeling tired or having little energy - - -   Poor appetite or overeating 1 2 1   Poor appetite, weight loss, or overeating - - -   Feeling bad about yourself - or that you are a failure or have let yourself or your family down 2 3 1   Feeling bad about yourself - or that you are a failure or have let yourself or your family down - - -   Trouble concentrating on things, such as reading the newspaper or watching television 1 1 1   Trouble concentrating on things such as school, work, reading, or watching TV - - -   Moving or speaking so slowly that other people could have noticed. Or the opposite - being so fidgety or restless that you have been moving around a lot more than usual 1 2 2   Moving or speaking so slowly that other people could have noticed; or the opposite being so fidgety that others notice - - -   Thoughts that you would be better off dead, or of hurting yourself in some way 0 0 0   Thoughts of being better off dead, or hurting yourself in some way - - -   PHQ-2 Score 3 5 2   Total Score PHQ 2 - - -   PHQ-9 Total Score 13 16 10   PHQ 9 Score - - -   If you checked off any problems, how difficult have these problems made it for you to do your work, take care of things at home, or get along with other people?  1 3 1   How difficult have these problems made it for you to do your work, take care of your home and get along with others - - -       OFELIA-7

## 2023-07-19 ENCOUNTER — TELEPHONE (OUTPATIENT)
Dept: NEUROLOGY | Age: 53
End: 2023-07-19

## 2023-07-19 NOTE — TELEPHONE ENCOUNTER
Pt requesting that medication go to [facility]. Fremanezumab-vfrm SOAJ 225 mg  I am not able to pend the med.   [address] [facility], [address] - [address] [place] [address] [address] [facility] [phone]

## 2023-07-21 ENCOUNTER — TELEPHONE (OUTPATIENT)
Dept: NEUROLOGY | Age: 53
End: 2023-07-21

## 2023-07-21 DIAGNOSIS — G43.019 INTRACTABLE MIGRAINE WITHOUT AURA AND WITHOUT STATUS MIGRAINOSUS: Primary | ICD-10-CM

## 2023-08-01 ENCOUNTER — TELEMEDICINE (OUTPATIENT)
Dept: BEHAVIORAL/MENTAL HEALTH CLINIC | Age: 53
End: 2023-08-01
Payer: COMMERCIAL

## 2023-08-01 DIAGNOSIS — Z65.8 PSYCHOSOCIAL STRESSORS: ICD-10-CM

## 2023-08-01 DIAGNOSIS — F51.05 INSOMNIA DUE TO MENTAL DISORDER: ICD-10-CM

## 2023-08-01 DIAGNOSIS — F41.1 GENERALIZED ANXIETY DISORDER: ICD-10-CM

## 2023-08-01 DIAGNOSIS — F33.1 MODERATE EPISODE OF RECURRENT MAJOR DEPRESSIVE DISORDER (HCC): Primary | ICD-10-CM

## 2023-08-01 DIAGNOSIS — R53.82 CHRONIC FATIGUE: ICD-10-CM

## 2023-08-01 DIAGNOSIS — F06.31 DEPRESSIVE DISORDER DUE TO ANOTHER MEDICAL CONDITION WITH DEPRESSIVE FEATURES: ICD-10-CM

## 2023-08-01 DIAGNOSIS — F06.4 ANXIETY DISORDER DUE TO GENERAL MEDICAL CONDITION: ICD-10-CM

## 2023-08-01 DIAGNOSIS — G89.4 CHRONIC PAIN DISORDER: ICD-10-CM

## 2023-08-01 DIAGNOSIS — M32.9 LUPUS (HCC): ICD-10-CM

## 2023-08-01 PROCEDURE — 99214 OFFICE O/P EST MOD 30 MIN: CPT | Performed by: PSYCHIATRY & NEUROLOGY

## 2023-08-01 RX ORDER — TRAZODONE HYDROCHLORIDE 50 MG/1
50-100 TABLET ORAL NIGHTLY
Qty: 180 TABLET | Refills: 0 | Status: SHIPPED | OUTPATIENT
Start: 2023-08-01 | End: 2023-08-04 | Stop reason: SDUPTHER

## 2023-08-01 RX ORDER — CLONAZEPAM 0.5 MG/1
0.5 TABLET ORAL 2 TIMES DAILY PRN
Qty: 60 TABLET | Refills: 3 | Status: SHIPPED | OUTPATIENT
Start: 2023-08-01 | End: 2023-11-29

## 2023-08-01 RX ORDER — ATOMOXETINE 80 MG/1
80 CAPSULE ORAL DAILY
Qty: 90 CAPSULE | Refills: 0 | Status: SHIPPED | OUTPATIENT
Start: 2023-08-01

## 2023-08-01 RX ORDER — TRAZODONE HYDROCHLORIDE 50 MG/1
TABLET ORAL
Qty: 180 TABLET | Refills: 3 | OUTPATIENT
Start: 2023-08-01

## 2023-08-01 RX ORDER — CITALOPRAM 20 MG/1
20 TABLET ORAL EVERY MORNING
Qty: 90 TABLET | Refills: 0 | Status: SHIPPED | OUTPATIENT
Start: 2023-08-01

## 2023-08-01 ASSESSMENT — ANXIETY QUESTIONNAIRES
GAD7 TOTAL SCORE: 11
6. BECOMING EASILY ANNOYED OR IRRITABLE: 1
3. WORRYING TOO MUCH ABOUT DIFFERENT THINGS: 1
4. TROUBLE RELAXING: 3
1. FEELING NERVOUS, ANXIOUS, OR ON EDGE: 3
2. NOT BEING ABLE TO STOP OR CONTROL WORRYING: 1
7. FEELING AFRAID AS IF SOMETHING AWFUL MIGHT HAPPEN: 1
IF YOU CHECKED OFF ANY PROBLEMS ON THIS QUESTIONNAIRE, HOW DIFFICULT HAVE THESE PROBLEMS MADE IT FOR YOU TO DO YOUR WORK, TAKE CARE OF THINGS AT HOME, OR GET ALONG WITH OTHER PEOPLE: VERY DIFFICULT
5. BEING SO RESTLESS THAT IT IS HARD TO SIT STILL: 1

## 2023-08-01 ASSESSMENT — PATIENT HEALTH QUESTIONNAIRE - PHQ9
4. FEELING TIRED OR HAVING LITTLE ENERGY: 3
SUM OF ALL RESPONSES TO PHQ QUESTIONS 1-9: 15
SUM OF ALL RESPONSES TO PHQ QUESTIONS 1-9: 15
7. TROUBLE CONCENTRATING ON THINGS, SUCH AS READING THE NEWSPAPER OR WATCHING TELEVISION: 1
8. MOVING OR SPEAKING SO SLOWLY THAT OTHER PEOPLE COULD HAVE NOTICED. OR THE OPPOSITE, BEING SO FIGETY OR RESTLESS THAT YOU HAVE BEEN MOVING AROUND A LOT MORE THAN USUAL: 1
SUM OF ALL RESPONSES TO PHQ QUESTIONS 1-9: 15
5. POOR APPETITE OR OVEREATING: 1
SUM OF ALL RESPONSES TO PHQ QUESTIONS 1-9: 15
SUM OF ALL RESPONSES TO PHQ9 QUESTIONS 1 & 2: 5
9. THOUGHTS THAT YOU WOULD BE BETTER OFF DEAD, OR OF HURTING YOURSELF: 0
6. FEELING BAD ABOUT YOURSELF - OR THAT YOU ARE A FAILURE OR HAVE LET YOURSELF OR YOUR FAMILY DOWN: 1
1. LITTLE INTEREST OR PLEASURE IN DOING THINGS: 2
2. FEELING DOWN, DEPRESSED OR HOPELESS: 3
10. IF YOU CHECKED OFF ANY PROBLEMS, HOW DIFFICULT HAVE THESE PROBLEMS MADE IT FOR YOU TO DO YOUR WORK, TAKE CARE OF THINGS AT HOME, OR GET ALONG WITH OTHER PEOPLE: 2
3. TROUBLE FALLING OR STAYING ASLEEP: 3

## 2023-08-01 NOTE — PROGRESS NOTES
Patient:  Trista Cheema  Age:  48 y.o.  :  1970     SEX:  female MRN:  [id number]     RACE: Black /      SEEN:  [x]  PATIENT  []  SPOUSE []  OTHER:              PHQ-9  2023   Little interest or pleasure in doing things 2 1 2   Little interest or pleasure in doing things - - -   Feeling down, depressed, or hopeless 3 2 3   Trouble falling or staying asleep, or sleeping too much 3 2 1   Trouble falling or staying asleep, or sleeping too much - - -   Feeling tired or having little energy 3 3 2   Feeling tired or having little energy - - -   Poor appetite or overeating 1 1 2   Poor appetite, weight loss, or overeating - - -   Feeling bad about yourself - or that you are a failure or have let yourself or your family down 1 2 3   Feeling bad about yourself - or that you are a failure or have let yourself or your family down - - -   Trouble concentrating on things, such as reading the newspaper or watching television 1 1 1   Trouble concentrating on things such as school, work, reading, or watching TV - - -   Moving or speaking so slowly that other people could have noticed. Or the opposite - being so fidgety or restless that you have been moving around a lot more than usual 1 1 2   Moving or speaking so slowly that other people could have noticed; or the opposite being so fidgety that others notice - - -   Thoughts that you would be better off dead, or of hurting yourself in some way 0 0 0   Thoughts of being better off dead, or hurting yourself in some way - - -   PHQ-2 Score 5 3 5   Total Score PHQ 2 - - -   PHQ-9 Total Score 15 13 16   PHQ 9 Score - - -   If you checked off any problems, how difficult have these problems made it for you to do your work, take care of things at home, or get along with other people?  2 1 3   How difficult have these problems made it for you to do your work, take care of your home and get along with others - - -       OFELIA-7

## 2023-08-03 ENCOUNTER — TELEPHONE (OUTPATIENT)
Dept: BEHAVIORAL/MENTAL HEALTH CLINIC | Age: 53
End: 2023-08-03

## 2023-08-03 NOTE — TELEPHONE ENCOUNTER
Pharmacy sent request for new prescription to be sent to clarify Trazodone instructions Current instructions Take 1-2 tablets by mouth nightly 1/2 tablet nightly

## 2023-08-04 RX ORDER — TRAZODONE HYDROCHLORIDE 50 MG/1
50-100 TABLET ORAL NIGHTLY
Qty: 180 TABLET | Refills: 0 | Status: SHIPPED | OUTPATIENT
Start: 2023-08-04

## 2023-09-05 DIAGNOSIS — E55.9 VITAMIN D DEFICIENCY: ICD-10-CM

## 2023-09-05 RX ORDER — CHOLECALCIFEROL (VITAMIN D3) 1250 MCG
CAPSULE ORAL
Qty: 12 CAPSULE | Refills: 3 | OUTPATIENT
Start: 2023-09-05

## 2023-09-13 ENCOUNTER — PATIENT MESSAGE (OUTPATIENT)
Dept: BEHAVIORAL/MENTAL HEALTH CLINIC | Age: 53
End: 2023-09-13

## 2023-09-13 NOTE — TELEPHONE ENCOUNTER
Spoke with patient. Dr Polina Hernandez will be out of town. Patient advised that she will wait until her appt but will reach out if any other concerns and will continue to send outside lab results as they come in so that they can be scanned into her chart.

## 2023-09-13 NOTE — TELEPHONE ENCOUNTER
From: Nohelia Moreno  To: Dr. Alonso Barrier: 9/13/2023 11:51 AM EDT  Subject: Earlier appointment     [facility] Dr Polina Hernandez   Are there any earlier appointments in September. I need to review my health issues and impact on me mentally.

## 2023-09-28 ENCOUNTER — HOSPITAL ENCOUNTER (OUTPATIENT)
Dept: ULTRASOUND IMAGING | Age: 53
Discharge: HOME | End: 2023-09-30
Payer: COMMERCIAL

## 2023-09-28 DIAGNOSIS — M79.652 LEFT THIGH PAIN: ICD-10-CM

## 2023-09-28 PROCEDURE — 93971 EXTREMITY STUDY: CPT

## 2023-10-01 ENCOUNTER — HOSPITAL ENCOUNTER (EMERGENCY)
Age: 53
Discharge: HOME | End: 2023-10-01
Attending: EMERGENCY MEDICINE
Payer: COMMERCIAL

## 2023-10-01 VITALS
TEMPERATURE: 97.9 F | RESPIRATION RATE: 16 BRPM | SYSTOLIC BLOOD PRESSURE: 124 MMHG | OXYGEN SATURATION: 99 % | DIASTOLIC BLOOD PRESSURE: 84 MMHG | WEIGHT: 169 LBS | BODY MASS INDEX: 33.18 KG/M2 | HEART RATE: 78 BPM | HEIGHT: 60 IN

## 2023-10-01 DIAGNOSIS — M79.89 LEG SWELLING: Primary | ICD-10-CM

## 2023-10-01 LAB
ANION GAP SERPL CALC-SCNC: 7 MMOL/L (ref 2–11)
BASOPHILS # BLD: 0.1 K/UL (ref 0–0.2)
BASOPHILS NFR BLD: 1 % (ref 0–2)
BUN SERPL-MCNC: 7 MG/DL (ref 6–23)
CALCIUM SERPL-MCNC: 8.5 MG/DL (ref 8.3–10.4)
CHLORIDE SERPL-SCNC: 106 MMOL/L (ref 98–107)
CO2 SERPL-SCNC: 27 MMOL/L (ref 21–32)
CREAT SERPL-MCNC: 0.8 MG/DL (ref 0.6–1)
DIFFERENTIAL METHOD BLD: NORMAL
EOSINOPHIL # BLD: 0.3 K/UL (ref 0–0.8)
EOSINOPHIL NFR BLD: 3 % (ref 0.5–7.8)
ERYTHROCYTE [DISTWIDTH] IN BLOOD BY AUTOMATED COUNT: 14.6 % (ref 11.9–14.6)
GLUCOSE SERPL-MCNC: 84 MG/DL (ref 65–100)
HCT VFR BLD AUTO: 36 % (ref 35.8–46.3)
HGB BLD-MCNC: 11.9 G/DL (ref 11.7–15.4)
IMM GRANULOCYTES # BLD AUTO: 0.1 K/UL (ref 0–0.5)
IMM GRANULOCYTES NFR BLD AUTO: 1 % (ref 0–5)
LYMPHOCYTES # BLD: 1.7 K/UL (ref 0.5–4.6)
LYMPHOCYTES NFR BLD: 18 % (ref 13–44)
MCH RBC QN AUTO: 27.1 PG (ref 26.1–32.9)
MCHC RBC AUTO-ENTMCNC: 33.1 G/DL (ref 31.4–35)
MCV RBC AUTO: 82 FL (ref 82–102)
MONOCYTES # BLD: 0.7 K/UL (ref 0.1–1.3)
MONOCYTES NFR BLD: 8 % (ref 4–12)
NEUTS SEG # BLD: 6.4 K/UL (ref 1.7–8.2)
NEUTS SEG NFR BLD: 69 % (ref 43–78)
NRBC # BLD: 0 K/UL (ref 0–0.2)
PLATELET # BLD AUTO: 167 K/UL (ref 150–450)
PMV BLD AUTO: 12 FL (ref 9.4–12.3)
POTASSIUM SERPL-SCNC: 4.2 MMOL/L (ref 3.5–5.1)
RBC # BLD AUTO: 4.39 M/UL (ref 4.05–5.2)
SODIUM SERPL-SCNC: 140 MMOL/L (ref 133–143)
WBC # BLD AUTO: 9.2 K/UL (ref 4.3–11.1)

## 2023-10-01 PROCEDURE — 96374 THER/PROPH/DIAG INJ IV PUSH: CPT

## 2023-10-01 PROCEDURE — 6360000002 HC RX W HCPCS: Performed by: EMERGENCY MEDICINE

## 2023-10-01 PROCEDURE — 96372 THER/PROPH/DIAG INJ SC/IM: CPT

## 2023-10-01 PROCEDURE — 80048 BASIC METABOLIC PNL TOTAL CA: CPT

## 2023-10-01 PROCEDURE — 85025 COMPLETE CBC W/AUTO DIFF WBC: CPT

## 2023-10-01 PROCEDURE — 99284 EMERGENCY DEPT VISIT MOD MDM: CPT

## 2023-10-01 RX ORDER — HYDROCORTISONE 10 MG/1
10 TABLET ORAL DAILY
COMMUNITY

## 2023-10-01 RX ORDER — FEXOFENADINE HCL 180 MG/1
180 TABLET ORAL DAILY
COMMUNITY

## 2023-10-01 RX ORDER — DROPERIDOL 2.5 MG/ML
1.25 INJECTION, SOLUTION INTRAMUSCULAR; INTRAVENOUS EVERY 6 HOURS PRN
Status: DISCONTINUED | OUTPATIENT
Start: 2023-10-01 | End: 2023-10-01 | Stop reason: HOSPADM

## 2023-10-01 RX ORDER — FAMOTIDINE 20 MG/1
20 TABLET, FILM COATED ORAL 2 TIMES DAILY
COMMUNITY

## 2023-10-01 RX ORDER — FUROSEMIDE 10 MG/ML
40 INJECTION INTRAMUSCULAR; INTRAVENOUS ONCE
Status: COMPLETED | OUTPATIENT
Start: 2023-10-01 | End: 2023-10-01

## 2023-10-01 RX ORDER — ATOGEPANT 60 MG/1
60 TABLET ORAL
COMMUNITY

## 2023-10-01 RX ADMIN — DROPERIDOL 1.25 MG: 2.5 INJECTION, SOLUTION INTRAMUSCULAR; INTRAVENOUS at 10:22

## 2023-10-01 RX ADMIN — FUROSEMIDE 40 MG: 10 INJECTION, SOLUTION INTRAMUSCULAR; INTRAVENOUS at 10:33

## 2023-10-01 ASSESSMENT — LIFESTYLE VARIABLES
HOW OFTEN DO YOU HAVE A DRINK CONTAINING ALCOHOL: NEVER
HOW MANY STANDARD DRINKS CONTAINING ALCOHOL DO YOU HAVE ON A TYPICAL DAY: PATIENT DOES NOT DRINK

## 2023-10-01 ASSESSMENT — PAIN SCALES - GENERAL: PAINLEVEL_OUTOF10: 4

## 2023-10-01 ASSESSMENT — PAIN - FUNCTIONAL ASSESSMENT: PAIN_FUNCTIONAL_ASSESSMENT: 0-10

## 2023-10-01 ASSESSMENT — PAIN DESCRIPTION - LOCATION: LOCATION: HAND;FOOT

## 2023-10-01 ASSESSMENT — PAIN DESCRIPTION - ORIENTATION: ORIENTATION: RIGHT;LEFT

## 2023-10-01 NOTE — ED TRIAGE NOTES
Pt to ED c/o swelling to hands and feet following her infusion for Lupus. Pt states she has gained 5 pounds in the past 3 days. Pt denies shortness of breath. She has pain in her feet from swelling and a headache.

## 2023-10-01 NOTE — ED NOTES
Pricked middle finger on left hand when engaging safety lock on needle after giving pt IM shot. Pt aware. Blood being drawn and necessary steps being taken per protocol.      Queenie Livingston RN  10/01/23 [id number]

## 2023-10-01 NOTE — ED PROVIDER NOTES
Emergency Department Provider Note       PCP: Ventura Taylor MD   Age: 48 y.o. Sex: female     DISPOSITION Decision To Discharge 10/01/2023 10:56:50 AM       ICD-10-CM    1. Leg swelling  M79.89           Medical Decision Making     Complexity of Problems Addressed:  Complexity of Problem: 1 chronic illness with exacerbation. Data Reviewed and Analyzed:  I independently ordered and reviewed each unique test.  I reviewed external records: provider visit note from outside specialist.  I reviewed external records: previous lab results from outside ED. echo from 2022 with a normal EF          Discussion of management or test interpretation. Patient gets most of her care at [facility]. I am unable to see what she got as an infusion. Her edema is minimal.  I think she is here because she would like a dose of IV Lasix which I offered to her. She was given IV Lasix and I ordered her some migraine medicine, droperidol, and told her that I would like to check her labs if she is not had them in some time. When I reviewed her labs she both here and at [facility] she has not had renal function in some time. Her labs are completely unremarkable. I discussed with her at length that she needs to discuss further treatment with her primary care provider as she is on a multitude of medications. Risk of Complications and/or Morbidity of Patient Management:  Prescription drug management performed    History      Presents with complaint of bilateral lower extremities feeling swollen and painful. Patient states she also has a migraine and is wearing sunglasses. She states she had an infusion several days ago and that is when the swelling hurt. She states this morning it started hurting. She states she called the infusion center and they told her to go to the ER. No shortness of breath. She has a history of lupus.   She states she has been to the emergency department for this before and was given IV and oral Lasix but

## 2023-10-12 ENCOUNTER — OFFICE VISIT (OUTPATIENT)
Dept: BEHAVIORAL/MENTAL HEALTH CLINIC | Age: 53
End: 2023-10-12
Payer: COMMERCIAL

## 2023-10-12 VITALS
WEIGHT: 167 LBS | SYSTOLIC BLOOD PRESSURE: 128 MMHG | HEIGHT: 60 IN | HEART RATE: 84 BPM | OXYGEN SATURATION: 98 % | DIASTOLIC BLOOD PRESSURE: 82 MMHG | BODY MASS INDEX: 32.79 KG/M2 | TEMPERATURE: 97.6 F

## 2023-10-12 DIAGNOSIS — F33.0 MILD EPISODE OF RECURRENT MAJOR DEPRESSIVE DISORDER (HCC): Primary | ICD-10-CM

## 2023-10-12 DIAGNOSIS — F51.05 INSOMNIA DUE TO MENTAL DISORDER: ICD-10-CM

## 2023-10-12 DIAGNOSIS — G89.4 CHRONIC PAIN DISORDER: ICD-10-CM

## 2023-10-12 DIAGNOSIS — M32.9 LUPUS (HCC): ICD-10-CM

## 2023-10-12 DIAGNOSIS — F41.1 GENERALIZED ANXIETY DISORDER: ICD-10-CM

## 2023-10-12 DIAGNOSIS — F06.31 DEPRESSIVE DISORDER DUE TO ANOTHER MEDICAL CONDITION WITH DEPRESSIVE FEATURES: ICD-10-CM

## 2023-10-12 DIAGNOSIS — R53.82 CHRONIC FATIGUE: ICD-10-CM

## 2023-10-12 DIAGNOSIS — F06.4 ANXIETY DISORDER DUE TO GENERAL MEDICAL CONDITION: ICD-10-CM

## 2023-10-12 DIAGNOSIS — Z65.8 PSYCHOSOCIAL STRESSORS: ICD-10-CM

## 2023-10-12 PROCEDURE — 99214 OFFICE O/P EST MOD 30 MIN: CPT | Performed by: PSYCHIATRY & NEUROLOGY

## 2023-10-12 RX ORDER — TRAZODONE HYDROCHLORIDE 50 MG/1
50 TABLET ORAL NIGHTLY
Qty: 90 TABLET | Refills: 1 | Status: SHIPPED | OUTPATIENT
Start: 2023-10-12

## 2023-10-12 RX ORDER — ATOMOXETINE 80 MG/1
80 CAPSULE ORAL DAILY
Qty: 90 CAPSULE | Refills: 1 | Status: SHIPPED | OUTPATIENT
Start: 2023-10-12

## 2023-10-12 RX ORDER — CLONAZEPAM 0.5 MG/1
0.5 TABLET ORAL 2 TIMES DAILY PRN
Qty: 60 TABLET | Refills: 3 | Status: SHIPPED | OUTPATIENT
Start: 2023-10-12 | End: 2024-02-09

## 2023-10-12 RX ORDER — CITALOPRAM 20 MG/1
TABLET ORAL
Qty: 90 TABLET | Refills: 1 | Status: SHIPPED | OUTPATIENT
Start: 2023-10-12

## 2023-10-12 RX ORDER — CITALOPRAM HYDROBROMIDE 10 MG/1
TABLET ORAL
Qty: 90 TABLET | Refills: 1 | Status: SHIPPED | OUTPATIENT
Start: 2023-10-12

## 2023-10-12 ASSESSMENT — PATIENT HEALTH QUESTIONNAIRE - PHQ9
SUM OF ALL RESPONSES TO PHQ QUESTIONS 1-9: 14
SUM OF ALL RESPONSES TO PHQ9 QUESTIONS 1 & 2: 4
2. FEELING DOWN, DEPRESSED OR HOPELESS: 2
9. THOUGHTS THAT YOU WOULD BE BETTER OFF DEAD, OR OF HURTING YOURSELF: 0
SUM OF ALL RESPONSES TO PHQ QUESTIONS 1-9: 14
5. POOR APPETITE OR OVEREATING: 2
6. FEELING BAD ABOUT YOURSELF - OR THAT YOU ARE A FAILURE OR HAVE LET YOURSELF OR YOUR FAMILY DOWN: 2
4. FEELING TIRED OR HAVING LITTLE ENERGY: 2
3. TROUBLE FALLING OR STAYING ASLEEP: 2
SUM OF ALL RESPONSES TO PHQ QUESTIONS 1-9: 14
SUM OF ALL RESPONSES TO PHQ QUESTIONS 1-9: 14
1. LITTLE INTEREST OR PLEASURE IN DOING THINGS: 2
10. IF YOU CHECKED OFF ANY PROBLEMS, HOW DIFFICULT HAVE THESE PROBLEMS MADE IT FOR YOU TO DO YOUR WORK, TAKE CARE OF THINGS AT HOME, OR GET ALONG WITH OTHER PEOPLE: 2
8. MOVING OR SPEAKING SO SLOWLY THAT OTHER PEOPLE COULD HAVE NOTICED. OR THE OPPOSITE, BEING SO FIGETY OR RESTLESS THAT YOU HAVE BEEN MOVING AROUND A LOT MORE THAN USUAL: 1
7. TROUBLE CONCENTRATING ON THINGS, SUCH AS READING THE NEWSPAPER OR WATCHING TELEVISION: 1

## 2023-10-12 ASSESSMENT — ANXIETY QUESTIONNAIRES
5. BEING SO RESTLESS THAT IT IS HARD TO SIT STILL: 1
4. TROUBLE RELAXING: 2
1. FEELING NERVOUS, ANXIOUS, OR ON EDGE: 2
7. FEELING AFRAID AS IF SOMETHING AWFUL MIGHT HAPPEN: 1
3. WORRYING TOO MUCH ABOUT DIFFERENT THINGS: 2
GAD7 TOTAL SCORE: 10
6. BECOMING EASILY ANNOYED OR IRRITABLE: 1
2. NOT BEING ABLE TO STOP OR CONTROL WORRYING: 1
IF YOU CHECKED OFF ANY PROBLEMS ON THIS QUESTIONNAIRE, HOW DIFFICULT HAVE THESE PROBLEMS MADE IT FOR YOU TO DO YOUR WORK, TAKE CARE OF THINGS AT HOME, OR GET ALONG WITH OTHER PEOPLE: SOMEWHAT DIFFICULT

## 2023-10-12 NOTE — PROGRESS NOTES
Patient:  Cuong Armendariz  Age:  48 y.o.  :  1970     SEX:  female MRN:  [id number]     RACE: Black /      SEEN:  [x]  PATIENT  []  SPOUSE []  OTHER:                  10/12/2023     9:58 AM 2023     1:04 PM 2023    10:11 AM   PHQ-9    Little interest or pleasure in doing things 2 2 1   Feeling down, depressed, or hopeless 2 3 2   Trouble falling or staying asleep, or sleeping too much 2 3 2   Feeling tired or having little energy 2 3 3   Poor appetite or overeating 2 1 1   Feeling bad about yourself - or that you are a failure or have let yourself or your family down 2 1 2   Trouble concentrating on things, such as reading the newspaper or watching television 1 1 1   Moving or speaking so slowly that other people could have noticed. Or the opposite - being so fidgety or restless that you have been moving around a lot more than usual 1 1 1   Thoughts that you would be better off dead, or of hurting yourself in some way 0 0 0   PHQ-2 Score 4 5 3   PHQ-9 Total Score 14 15 13   If you checked off any problems, how difficult have these problems made it for you to do your work, take care of things at home, or get along with other people?  2 2 1           10/12/2023     9:59 AM 2023     1:08 PM 2023    10:14 AM   OFELIA-7 SCREENING   Feeling nervous, anxious, or on edge More than half the days Nearly every day Nearly every day   Not being able to stop or control worrying Several days Several days Nearly every day   Worrying too much about different things More than half the days Several days More than half the days   Trouble relaxing More than half the days Nearly every day More than half the days   Being so restless that it is hard to sit still Several days Several days More than half the days   Becoming easily annoyed or irritable Several days Several days Several days   Feeling afraid as if something awful might happen Several days Several days Several days   OFELIA-7

## 2024-01-03 ENCOUNTER — OFFICE VISIT (OUTPATIENT)
Dept: UROGYNECOLOGY | Age: 54
End: 2024-01-03
Payer: COMMERCIAL

## 2024-01-03 DIAGNOSIS — N32.81 OVERACTIVE BLADDER: Primary | ICD-10-CM

## 2024-01-03 LAB
BILIRUBIN, URINE, POC: NEGATIVE
BLOOD URINE, POC: NEGATIVE
GLUCOSE URINE, POC: NEGATIVE
KETONES, URINE, POC: NEGATIVE
LEUKOCYTE ESTERASE, URINE, POC: NEGATIVE
NITRITE, URINE, POC: NEGATIVE
PH, URINE, POC: 7 (ref 4.6–8)
PROTEIN,URINE, POC: NEGATIVE
PVR, POC: 84 CC
SPECIFIC GRAVITY, URINE, POC: 1.01 (ref 1–1.03)
URINALYSIS CLARITY, POC: CLEAR
URINALYSIS COLOR, POC: YELLOW
UROBILINOGEN, POC: NORMAL

## 2024-01-03 PROCEDURE — 99212 OFFICE O/P EST SF 10 MIN: CPT | Performed by: OBSTETRICS & GYNECOLOGY

## 2024-01-03 PROCEDURE — 81003 URINALYSIS AUTO W/O SCOPE: CPT | Performed by: OBSTETRICS & GYNECOLOGY

## 2024-01-03 PROCEDURE — 51798 US URINE CAPACITY MEASURE: CPT | Performed by: OBSTETRICS & GYNECOLOGY

## 2024-01-03 NOTE — PROGRESS NOTES
[facility] UROGYNECOLOGY  [address]  [address]  [facility] [address]  Dept: [phone]    PCP:  Chloe Rizzo MD    1/3/2024      HPI:  Sarah Cortez is here to follow up on Follow-up (OAB)  .  She was last seen 12/2022 for OAB. She is here today with complaints of OAB, urgency, frequency and feeling like she can't empty her bladder. The symptoms have been back for about 3 months. She occassionally leaks. She uses one thin panty liner a day, she leaks very small amounts. She has been to PT with success in the past. She is doing her HEP now.      She goes to the bathroom hourly during the day and 1-2 times overnight.     She drinks 32oz of tea all day, she drinks 2-cans of coke during the day.     She has new diagnosis of lupus, renal insufficiency and iron deficiency. She is on an infusion for lupus and iron at this time.       Results for orders placed or performed in visit on 01/03/24   AMB POC URINALYSIS DIP STICK AUTO W/O MICRO   Result Value Ref Range    Color, Urine, POC Yellow     Clarity, Urine, POC Clear     Glucose, Urine, POC Negative     Bilirubin, Urine, POC Negative     Ketones, Urine, POC Negative     Specific Gravity, Urine, POC 1.010 1.001 - 1.035    Blood, Urine, POC Negative     pH, Urine, POC 7 4.6 - 8.0    Protein, Urine, POC Negative     Urobilinogen, POC Normal     Nitrite, Urine, POC Negative     Leukocyte Esterase, Urine, POC Negative    AMB POC PVR, DANNY,POST-VOID RES,US,NON-IMAGING   Result Value Ref Range    PVR, POC 84 cc       There were no vitals taken for this visit.        1. Overactive bladder  Assessment & Plan:  She UA, culture and bladder scan were done in office today due to some of her symptoms.     She needs to eliminate tea (32oz) and 2 cans of coke.   Cont with HEP  I did discuss medications: she does not want to try medications.     Orders:  -     AMB POC URINALYSIS DIP STICK AUTO W/O MICRO  -     AMB POC PVR, DANNY,POST-VOID

## 2024-01-03 NOTE — ASSESSMENT & PLAN NOTE
She UA, culture and bladder scan were done in office today due to some of her symptoms.     She needs to eliminate tea (32oz) and 2 cans of coke.   Cont with HEP  I did discuss medications: she does not want to try medications.

## 2024-01-06 LAB
BACTERIA SPEC CULT: NORMAL
BACTERIA SPEC CULT: NORMAL
SERVICE CMNT-IMP: NORMAL

## 2024-01-09 ENCOUNTER — TELEPHONE (OUTPATIENT)
Dept: BEHAVIORAL/MENTAL HEALTH CLINIC | Age: 54
End: 2024-01-09

## 2024-01-09 NOTE — TELEPHONE ENCOUNTER
----- Message from Sarah Cortez sent at 1/9/2024  4:14 AM EST -----  Regarding: [employer]   Contact: [phone]  Imer Saenz informed me they would sent out paperwork for status update.  One question on my telephonic interview with the  was if the recent diagnosis of adrenal insufficiency played a part in the anxiety and depression.  She is trying to see if there is a previous correlation that we were not aware of from the initial case and especially now with continued treatment.

## 2024-01-11 ENCOUNTER — NURSE ONLY (OUTPATIENT)
Dept: UROGYNECOLOGY | Age: 54
End: 2024-01-11

## 2024-01-11 ENCOUNTER — TELEPHONE (OUTPATIENT)
Dept: BEHAVIORAL/MENTAL HEALTH CLINIC | Age: 54
End: 2024-01-11

## 2024-01-11 DIAGNOSIS — N32.81 OVERACTIVE BLADDER: Primary | ICD-10-CM

## 2024-01-11 NOTE — PROGRESS NOTES
[facility] UROGYNECOLOGY  [address]  [address]  [place] [id number]  Dept: [phone]    PCP:  Chloe Rizzo MD    1/11/2024      HPI:  Sarah Cortez is here to follow up on Follow-up (Repeat urine culture)  .  She was in last week. Her urine culture came back contaminated. She is here today to have a repeat urine culture sent.     She states that since she has decreased her bladder irritants, she can already feel a difference in her symptoms.       No results found for this visit on 01/11/24.    There were no vitals taken for this visit.    Catheterized specimen was obtained to get a sterile sampe since the last one was \"contaminated\"    1. Overactive bladder  Assessment & Plan:  Culture sent today from a cathed specimen.    Orders:  -     AMB POC URINALYSIS DIP STICK AUTO W/O MICRO  -     Culture, Urine; Future  -     INSERT,NON-INDWELLING BLADDER CATHETER     No follow-ups on file.                      Yennifer Chatman DO

## 2024-01-11 NOTE — TELEPHONE ENCOUNTER
Received forms from the [employer] along with signed records request for records dated 6/1/2023-1/4/2024    Forms have been placed for Dr Nelson for review.

## 2024-01-14 LAB
BACTERIA SPEC CULT: NORMAL
SERVICE CMNT-IMP: NORMAL

## 2024-01-16 ENCOUNTER — OFFICE VISIT (OUTPATIENT)
Dept: BEHAVIORAL/MENTAL HEALTH CLINIC | Age: 54
End: 2024-01-16
Payer: COMMERCIAL

## 2024-01-16 VITALS
BODY MASS INDEX: 31.61 KG/M2 | OXYGEN SATURATION: 97 % | TEMPERATURE: 98.5 F | SYSTOLIC BLOOD PRESSURE: 156 MMHG | HEIGHT: 60 IN | WEIGHT: 161 LBS | HEART RATE: 70 BPM | DIASTOLIC BLOOD PRESSURE: 88 MMHG

## 2024-01-16 DIAGNOSIS — F41.1 GENERALIZED ANXIETY DISORDER: ICD-10-CM

## 2024-01-16 DIAGNOSIS — M32.9 LUPUS (HCC): ICD-10-CM

## 2024-01-16 DIAGNOSIS — G89.4 CHRONIC PAIN DISORDER: ICD-10-CM

## 2024-01-16 DIAGNOSIS — R53.82 CHRONIC FATIGUE: ICD-10-CM

## 2024-01-16 DIAGNOSIS — F51.05 INSOMNIA DUE TO MENTAL DISORDER: ICD-10-CM

## 2024-01-16 DIAGNOSIS — F06.4 ANXIETY DISORDER DUE TO GENERAL MEDICAL CONDITION: ICD-10-CM

## 2024-01-16 DIAGNOSIS — F06.31 DEPRESSIVE DISORDER DUE TO ANOTHER MEDICAL CONDITION WITH DEPRESSIVE FEATURES: ICD-10-CM

## 2024-01-16 DIAGNOSIS — F33.1 MODERATE EPISODE OF RECURRENT MAJOR DEPRESSIVE DISORDER (HCC): Primary | ICD-10-CM

## 2024-01-16 PROCEDURE — 99214 OFFICE O/P EST MOD 30 MIN: CPT | Performed by: PSYCHIATRY & NEUROLOGY

## 2024-01-16 RX ORDER — ATOMOXETINE 80 MG/1
80 CAPSULE ORAL DAILY
Qty: 90 CAPSULE | Refills: 1 | Status: SHIPPED | OUTPATIENT
Start: 2024-01-16

## 2024-01-16 RX ORDER — CYANOCOBALAMIN (VITAMIN B-12) 2500 MCG
TABLET, SUBLINGUAL SUBLINGUAL DAILY
COMMUNITY
Start: 2023-11-12

## 2024-01-16 RX ORDER — NAPROXEN SODIUM 220 MG
220 TABLET ORAL PRN
COMMUNITY

## 2024-01-16 RX ORDER — CLONAZEPAM 0.5 MG/1
0.5 TABLET ORAL 2 TIMES DAILY PRN
Qty: 60 TABLET | Refills: 3 | Status: SHIPPED | OUTPATIENT
Start: 2024-01-16 | End: 2024-05-15

## 2024-01-16 RX ORDER — CITALOPRAM 20 MG/1
TABLET ORAL
Qty: 90 TABLET | Refills: 1 | Status: SHIPPED | OUTPATIENT
Start: 2024-01-16

## 2024-01-16 RX ORDER — TRAZODONE HYDROCHLORIDE 50 MG/1
50 TABLET ORAL NIGHTLY
Qty: 90 TABLET | Refills: 1 | Status: SHIPPED | OUTPATIENT
Start: 2024-01-16

## 2024-01-16 RX ORDER — CITALOPRAM HYDROBROMIDE 10 MG/1
TABLET ORAL
Qty: 90 TABLET | Refills: 1 | Status: SHIPPED | OUTPATIENT
Start: 2024-01-16

## 2024-01-16 RX ORDER — TRAMADOL HYDROCHLORIDE 50 MG/1
50 TABLET ORAL EVERY 12 HOURS PRN
COMMUNITY
Start: 2023-12-15

## 2024-01-16 ASSESSMENT — PATIENT HEALTH QUESTIONNAIRE - PHQ9
3. TROUBLE FALLING OR STAYING ASLEEP: 3
5. POOR APPETITE OR OVEREATING: 2
SUM OF ALL RESPONSES TO PHQ QUESTIONS 1-9: 15
7. TROUBLE CONCENTRATING ON THINGS, SUCH AS READING THE NEWSPAPER OR WATCHING TELEVISION: 1
SUM OF ALL RESPONSES TO PHQ9 QUESTIONS 1 & 2: 3
SUM OF ALL RESPONSES TO PHQ QUESTIONS 1-9: 15
4. FEELING TIRED OR HAVING LITTLE ENERGY: 3
SUM OF ALL RESPONSES TO PHQ QUESTIONS 1-9: 15
SUM OF ALL RESPONSES TO PHQ QUESTIONS 1-9: 15
10. IF YOU CHECKED OFF ANY PROBLEMS, HOW DIFFICULT HAVE THESE PROBLEMS MADE IT FOR YOU TO DO YOUR WORK, TAKE CARE OF THINGS AT HOME, OR GET ALONG WITH OTHER PEOPLE: 3
8. MOVING OR SPEAKING SO SLOWLY THAT OTHER PEOPLE COULD HAVE NOTICED. OR THE OPPOSITE, BEING SO FIGETY OR RESTLESS THAT YOU HAVE BEEN MOVING AROUND A LOT MORE THAN USUAL: 2
2. FEELING DOWN, DEPRESSED OR HOPELESS: 2
1. LITTLE INTEREST OR PLEASURE IN DOING THINGS: 1
9. THOUGHTS THAT YOU WOULD BE BETTER OFF DEAD, OR OF HURTING YOURSELF: 0
6. FEELING BAD ABOUT YOURSELF - OR THAT YOU ARE A FAILURE OR HAVE LET YOURSELF OR YOUR FAMILY DOWN: 1

## 2024-01-16 ASSESSMENT — ANXIETY QUESTIONNAIRES
1. FEELING NERVOUS, ANXIOUS, OR ON EDGE: 3
6. BECOMING EASILY ANNOYED OR IRRITABLE: 1
IF YOU CHECKED OFF ANY PROBLEMS ON THIS QUESTIONNAIRE, HOW DIFFICULT HAVE THESE PROBLEMS MADE IT FOR YOU TO DO YOUR WORK, TAKE CARE OF THINGS AT HOME, OR GET ALONG WITH OTHER PEOPLE: VERY DIFFICULT
3. WORRYING TOO MUCH ABOUT DIFFERENT THINGS: 1
7. FEELING AFRAID AS IF SOMETHING AWFUL MIGHT HAPPEN: 1
2. NOT BEING ABLE TO STOP OR CONTROL WORRYING: 1
5. BEING SO RESTLESS THAT IT IS HARD TO SIT STILL: 1

## 2024-01-16 NOTE — PROGRESS NOTES
Patient:  Sarah Cortez  Age:  54 y.o.  :  1970     SEX:  female MRN:  [id number]     RACE: Black /      SEEN:  [x]  PATIENT  []  SPOUSE []  OTHER:                  2024    10:24 AM 10/12/2023     9:58 AM 2023     1:04 PM   PHQ-9    Little interest or pleasure in doing things 1 2 2   Feeling down, depressed, or hopeless 2 2 3   Trouble falling or staying asleep, or sleeping too much 3 2 3   Feeling tired or having little energy 3 2 3   Poor appetite or overeating 2 2 1   Feeling bad about yourself - or that you are a failure or have let yourself or your family down 1 2 1   Trouble concentrating on things, such as reading the newspaper or watching television 1 1 1   Moving or speaking so slowly that other people could have noticed. Or the opposite - being so fidgety or restless that you have been moving around a lot more than usual 2 1 1   Thoughts that you would be better off dead, or of hurting yourself in some way 0 0 0   PHQ-2 Score 3 4 5   PHQ-9 Total Score 15 14 15   If you checked off any problems, how difficult have these problems made it for you to do your work, take care of things at home, or get along with other people? 3 2 2           2024    10:25 AM 10/12/2023     9:59 AM 2023     1:08 PM   OFELIA-7 SCREENING   Feeling nervous, anxious, or on edge Nearly every day More than half the days Nearly every day   Not being able to stop or control worrying Several days Several days Several days   Worrying too much about different things Several days More than half the days Several days   Trouble relaxing  More than half the days Nearly every day   Being so restless that it is hard to sit still Several days Several days Several days   Becoming easily annoyed or irritable Several days Several days Several days   Feeling afraid as if something awful might happen Several days Several days Several days   OFELIA-7 Total Score  10 11   How difficult have these

## 2024-01-22 ENCOUNTER — TELEPHONE (OUTPATIENT)
Dept: BEHAVIORAL/MENTAL HEALTH CLINIC | Age: 54
End: 2024-01-22

## 2024-01-22 NOTE — TELEPHONE ENCOUNTER
[employer] called inquiring to know if pt's form and medical records have been faxed to them.  Please return call to representative Cierra, at [phone], ext. [phone]

## 2024-01-31 ENCOUNTER — OFFICE VISIT (OUTPATIENT)
Dept: ORTHOPEDIC SURGERY | Age: 54
End: 2024-01-31
Payer: COMMERCIAL

## 2024-01-31 DIAGNOSIS — M19.072 PRIMARY OSTEOARTHRITIS OF LEFT FOOT: ICD-10-CM

## 2024-01-31 DIAGNOSIS — M21.619 BUNION: Primary | ICD-10-CM

## 2024-01-31 DIAGNOSIS — M19.071 PRIMARY OSTEOARTHRITIS OF RIGHT FOOT: ICD-10-CM

## 2024-01-31 PROCEDURE — 99213 OFFICE O/P EST LOW 20 MIN: CPT | Performed by: ORTHOPAEDIC SURGERY
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
## 2024-02-05 ENCOUNTER — PATIENT MESSAGE (OUTPATIENT)
Dept: ORTHOPEDIC SURGERY | Age: 54
End: 2024-02-05

## 2024-02-05 ENCOUNTER — OFFICE VISIT (OUTPATIENT)
Dept: ORTHOPEDIC SURGERY | Age: 54
End: 2024-02-05
Payer: COMMERCIAL

## 2024-02-05 DIAGNOSIS — M25.552 LEFT HIP PAIN: Primary | ICD-10-CM

## 2024-02-05 PROCEDURE — 99213 OFFICE O/P EST LOW 20 MIN: CPT | Performed by: ORTHOPAEDIC SURGERY

## 2024-02-05 NOTE — TELEPHONE ENCOUNTER
From: Sarah Cortez  To: Dr. Darwin Wang  Sent: 2/5/2024 12:50 PM EST  Subject: Handicap parking pass    [facility] Dr. Muir  Are you able to renew my form for my handicap parking pass. I have appointment next week to get the custom [place] braces.

## 2024-02-05 NOTE — PROGRESS NOTES
Name: Sarah Cortez  YOB: 1970  Gender: female  MRN: [id number]      CC: Hip Pain (L)     dfg  HPI: Sarah Cortez is a 54 y.o. female who returns for follow up on  ***        Physical Examination:  General: no acute distress  Lungs: breathing easily  CV: regular rhythm by pulse  {body part:39776}:***        Assessment:     ICD-10-CM    1. Left hip pain  M25.552 XR HIP 2-3 VW W PELVIS LEFT          Plan:   ***            Ad Small MD, FAAOS  Orthopaedics and Sports Medicine

## 2024-02-05 NOTE — PROGRESS NOTES
Name: Sarah Cortez  YOB: 1970  Gender: female  MRN: [id number]    CC: Hip Pain (L)     HPI: Sarah Cortez is a 54 y.o. female who returns for follow up on her left hip. She was previously seen in June of 2022. In the last year she has been diagnosed with adrenal insufficiency, fibromyalgia and lupus. Since the adrenal insufficiency diagnosis she is not able to get anymore steroid shots.  She is now taking tramadol for pain control and it does help significantly.  Her concern today is that her hip is getting worse.  She has been in physical therapy for this left hip and lower extremity from January 2023 to September 2023.  She states that she was doing physical therapy she saw significant improvement.  She is here today because her pain is worse in this left lower extremity.  She notes pain that goes from her back all the way to her knee.  She notes pins-and-needles feeling in the feeling of pressure.  She also has a lot of pain along her greater throat bursa and down the side of her left femur.  She does utilize Biofreeze and states that it helped significantly for about 2 hours.    Physical Examination:  General: no acute distress  Lungs: breathing easily  CV: regular rhythm by pulse  Left Hip:   No pain with log roll today. TTP along the greater trochanteric bursa. TTP along IT band. No pain with SANGEETHA or FADIR testing today.  Negative Stinchfield test.  Extensor mechanism intact.  TTP over hamstring tendons. Calf is soft and nontender to palpation. DP pulse 2+. Dorsi and plantar flexion mechanism.     Assessment:     ICD-10-CM    1. Left hip pain  M25.552 XR HIP 2-3 VW W PELVIS LEFT     Amb External Referral To Physical Therapy          Plan:     I do think that physical therapy will be the mainstay of her treatment.  She is already in physical therapy at [facility] in [place], for her migraines and she would like to go back there for her left lower extremity pain.  She has

## 2024-02-07 ENCOUNTER — OFFICE VISIT (OUTPATIENT)
Dept: UROGYNECOLOGY | Age: 54
End: 2024-02-07

## 2024-02-07 DIAGNOSIS — R39.9 UTI SYMPTOMS: Primary | ICD-10-CM

## 2024-02-07 DIAGNOSIS — N32.81 OVERACTIVE BLADDER: ICD-10-CM

## 2024-02-07 LAB
BILIRUBIN, URINE, POC: NEGATIVE
BLOOD URINE, POC: NEGATIVE
GLUCOSE URINE, POC: NEGATIVE
KETONES, URINE, POC: NEGATIVE
LEUKOCYTE ESTERASE, URINE, POC: NORMAL
NITRITE, URINE, POC: NEGATIVE
PH, URINE, POC: 7 (ref 4.6–8)
PROTEIN,URINE, POC: NEGATIVE
SPECIFIC GRAVITY, URINE, POC: 1.02 (ref 1–1.03)
URINALYSIS CLARITY, POC: CLEAR
URINALYSIS COLOR, POC: YELLOW
UROBILINOGEN, POC: NORMAL

## 2024-02-07 NOTE — ASSESSMENT & PLAN NOTE
Urinary specimen sent.   Cont to avoid lemonade and soda  Cont with HEP.    She is going to cont her therapy. She is not leaking urine and has not for 1.5 years. She does not wear pads anymore.     She has an apt with GYN tomorrow to eval a boil and yeast infection.

## 2024-02-07 NOTE — PROGRESS NOTES
[facility] UROGYNECOLOGY  [address]  [address]  [facility] [address]  Dept: [phone]    PCP:  Chloe Rizzo MD    2/7/2024      HPI:  Sarah Cortez is here to follow up on Follow-up (OAB)  .    She was last seen 1/2024. She has cut soda and the 16oz tea she makes last all day. She notices a huge difference with the cut back of these irritants. She is done with PT and does her HEP. She goes to the bathroom a normal amount. She feels she is not emptying her bladder, but her last apt she had the same complaint and her PVR rwas 84cc. She leaks rarely but does still leak occassionally.     She is complaining of UTI symptoms today so she left a urine specimen. UA was negative in office, but a culture was sent.     She feels she has a boil and/or a yeast infection. She will get this looked at by her Gyn 2/8/24.     Results for orders placed or performed in visit on 02/07/24   AMB POC URINALYSIS DIP STICK AUTO W/O MICRO   Result Value Ref Range    Color, Urine, POC Yellow     Clarity, Urine, POC Clear     Glucose, Urine, POC Negative     Bilirubin, Urine, POC Negative     Ketones, Urine, POC Negative     Specific Gravity, Urine, POC 1.020 1.001 - 1.035    Blood, Urine, POC Negative     pH, Urine, POC 7 4.6 - 8.0    Protein, Urine, POC Negative     Urobilinogen, POC Normal     Nitrite, Urine, POC Negative     Leukocyte Esterase, Urine, POC Small        There were no vitals taken for this visit.          1. UTI symptoms  -     AMB POC URINALYSIS DIP STICK AUTO W/O MICRO  -     Culture, Urine; Future  2. Overactive bladder  Assessment & Plan:  Urinary specimen sent.   Cont to avoid lemonade and soda  Cont with HEP.    She is going to cont her therapy. She is not leaking urine and has not for 1.5 years. She does not wear pads anymore.     She has an apt with GYN tomorrow to eval a boil and yeast infection.       No follow-ups on file.                      Yennifer Chatman DO

## 2024-02-08 ENCOUNTER — CLINICAL DOCUMENTATION (OUTPATIENT)
Dept: ORTHOPEDIC SURGERY | Age: 54
End: 2024-02-08

## 2024-02-09 LAB
BACTERIA SPEC CULT: NORMAL
BACTERIA SPEC CULT: NORMAL
SERVICE CMNT-IMP: NORMAL

## 2024-02-15 ENCOUNTER — HOSPITAL ENCOUNTER (OUTPATIENT)
Dept: ULTRASOUND IMAGING | Age: 54
Discharge: HOME | End: 2024-02-15
Payer: COMMERCIAL

## 2024-02-15 DIAGNOSIS — M79.651 RIGHT THIGH PAIN: ICD-10-CM

## 2024-02-15 PROCEDURE — 93971 EXTREMITY STUDY: CPT

## 2024-03-08 DIAGNOSIS — M25.552 LEFT HIP PAIN: Primary | ICD-10-CM

## 2024-03-08 DIAGNOSIS — M25.562 LEFT KNEE PAIN, UNSPECIFIED CHRONICITY: ICD-10-CM

## 2024-03-20 ENCOUNTER — OFFICE VISIT (OUTPATIENT)
Dept: ORTHOPEDIC SURGERY | Age: 54
End: 2024-03-20
Payer: COMMERCIAL

## 2024-03-20 DIAGNOSIS — M18.12 PRIMARY OSTEOARTHRITIS OF FIRST CARPOMETACARPAL JOINT OF LEFT HAND: ICD-10-CM

## 2024-03-20 DIAGNOSIS — M79.641 PAIN OF RIGHT HAND: Primary | ICD-10-CM

## 2024-03-20 DIAGNOSIS — R20.2 TINGLING OF BOTH UPPER EXTREMITIES: ICD-10-CM

## 2024-03-20 DIAGNOSIS — M18.11 ARTHRITIS OF CARPOMETACARPAL (CMC) JOINT OF RIGHT THUMB: ICD-10-CM

## 2024-03-20 DIAGNOSIS — M79.642 LEFT HAND PAIN: ICD-10-CM

## 2024-03-20 PROCEDURE — 99214 OFFICE O/P EST MOD 30 MIN: CPT | Performed by: NURSE PRACTITIONER

## 2024-03-21 NOTE — PROGRESS NOTES
Patient was fit for a CMC splint for patients right hand/joint. I demonstrated that the thumb slides into the opening and Velcro on the dorsal side of the hand. The strap continues around the thumb and Velcro's again on the ventral side of hand or palm, and then continues through the thumb and first finger to Velcro again on the dorsal side of hand.     Patient read and signed documenting they understand and agree to [facility]'s current DME return policy.   
forearm..    Imaging / Electrodiagnostic Tests:     X-rays include a 3 view right hand are reviewed.  No acute findings     An ultrasound at the bedside performed by Dr. Polanco demonstrates inflammation around her tendons.    Assessment:   1. Pain of right hand    2. Arthritis of carpometacarpal (CMC) joint of right thumb    3. Tingling of both upper extremities    4. Left hand pain    5. Primary osteoarthritis of first carpometacarpal joint of left hand        Plan:   We discussed the diagnosis and different treatment options. We discussed observation, therapy, antiinflammatory medications and other pertinent treatment modalities.    After discussing in detail the patient elects to proceed with EMGs to evaluate for bilateral carpal tunnel syndrome.  Will give her new braces for her bilateral thumb CMC arthritis.  We will see her back after her nerve conduction studies are completed.  The inflammation around her tendons is most likely related to her lupus.     Overall time of this visit taking into account reviewing the chart, face to face time with the patient counseling him on his condition as well as documentation after chart was over 35 minutes.     Patient voiced accordance and understanding of the information provided and the formulated plan. All questions were answered to the patient's satisfaction during the encounter.    4 This is a chronic illness with exacerbation, progression, or side effect of treatment  Treatment at this time: Brace and EMG/NCV    BRANDON Cook - CNP  Orthopaedic Surgery  03/20/24  12:50 PM

## 2024-03-24 ASSESSMENT — PATIENT HEALTH QUESTIONNAIRE - PHQ9
SUM OF ALL RESPONSES TO PHQ QUESTIONS 1-9: 15
9. THOUGHTS THAT YOU WOULD BE BETTER OFF DEAD, OR OF HURTING YOURSELF: NOT AT ALL
3. TROUBLE FALLING OR STAYING ASLEEP: MORE THAN HALF THE DAYS
10. IF YOU CHECKED OFF ANY PROBLEMS, HOW DIFFICULT HAVE THESE PROBLEMS MADE IT FOR YOU TO DO YOUR WORK, TAKE CARE OF THINGS AT HOME, OR GET ALONG WITH OTHER PEOPLE: VERY DIFFICULT
SUM OF ALL RESPONSES TO PHQ QUESTIONS 1-9: 15
8. MOVING OR SPEAKING SO SLOWLY THAT OTHER PEOPLE COULD HAVE NOTICED. OR THE OPPOSITE, BEING SO FIGETY OR RESTLESS THAT YOU HAVE BEEN MOVING AROUND A LOT MORE THAN USUAL: MORE THAN HALF THE DAYS
2. FEELING DOWN, DEPRESSED OR HOPELESS: NEARLY EVERY DAY
9. THOUGHTS THAT YOU WOULD BE BETTER OFF DEAD, OR OF HURTING YOURSELF: NOT AT ALL
1. LITTLE INTEREST OR PLEASURE IN DOING THINGS: SEVERAL DAYS
7. TROUBLE CONCENTRATING ON THINGS, SUCH AS READING THE NEWSPAPER OR WATCHING TELEVISION: SEVERAL DAYS
SUM OF ALL RESPONSES TO PHQ QUESTIONS 1-9: 15
4. FEELING TIRED OR HAVING LITTLE ENERGY: MORE THAN HALF THE DAYS
1. LITTLE INTEREST OR PLEASURE IN DOING THINGS: SEVERAL DAYS
5. POOR APPETITE OR OVEREATING: MORE THAN HALF THE DAYS
2. FEELING DOWN, DEPRESSED OR HOPELESS: NEARLY EVERY DAY
SUM OF ALL RESPONSES TO PHQ QUESTIONS 1-9: 15
10. IF YOU CHECKED OFF ANY PROBLEMS, HOW DIFFICULT HAVE THESE PROBLEMS MADE IT FOR YOU TO DO YOUR WORK, TAKE CARE OF THINGS AT HOME, OR GET ALONG WITH OTHER PEOPLE: VERY DIFFICULT
6. FEELING BAD ABOUT YOURSELF - OR THAT YOU ARE A FAILURE OR HAVE LET YOURSELF OR YOUR FAMILY DOWN: MORE THAN HALF THE DAYS
6. FEELING BAD ABOUT YOURSELF - OR THAT YOU ARE A FAILURE OR HAVE LET YOURSELF OR YOUR FAMILY DOWN: MORE THAN HALF THE DAYS
8. MOVING OR SPEAKING SO SLOWLY THAT OTHER PEOPLE COULD HAVE NOTICED. OR THE OPPOSITE - BEING SO FIDGETY OR RESTLESS THAT YOU HAVE BEEN MOVING AROUND A LOT MORE THAN USUAL: MORE THAN HALF THE DAYS
SUM OF ALL RESPONSES TO PHQ9 QUESTIONS 1 & 2: 4
3. TROUBLE FALLING OR STAYING ASLEEP: MORE THAN HALF THE DAYS
4. FEELING TIRED OR HAVING LITTLE ENERGY: MORE THAN HALF THE DAYS
SUM OF ALL RESPONSES TO PHQ QUESTIONS 1-9: 15
7. TROUBLE CONCENTRATING ON THINGS, SUCH AS READING THE NEWSPAPER OR WATCHING TELEVISION: SEVERAL DAYS
5. POOR APPETITE OR OVEREATING: MORE THAN HALF THE DAYS

## 2024-03-24 ASSESSMENT — ANXIETY QUESTIONNAIRES
7. FEELING AFRAID AS IF SOMETHING AWFUL MIGHT HAPPEN: SEVERAL DAYS
IF YOU CHECKED OFF ANY PROBLEMS ON THIS QUESTIONNAIRE, HOW DIFFICULT HAVE THESE PROBLEMS MADE IT FOR YOU TO DO YOUR WORK, TAKE CARE OF THINGS AT HOME, OR GET ALONG WITH OTHER PEOPLE: VERY DIFFICULT
3. WORRYING TOO MUCH ABOUT DIFFERENT THINGS: MORE THAN HALF THE DAYS
4. TROUBLE RELAXING: MORE THAN HALF THE DAYS
6. BECOMING EASILY ANNOYED OR IRRITABLE: MORE THAN HALF THE DAYS
5. BEING SO RESTLESS THAT IT IS HARD TO SIT STILL: MORE THAN HALF THE DAYS
2. NOT BEING ABLE TO STOP OR CONTROL WORRYING: MORE THAN HALF THE DAYS
6. BECOMING EASILY ANNOYED OR IRRITABLE: MORE THAN HALF THE DAYS
IF YOU CHECKED OFF ANY PROBLEMS ON THIS QUESTIONNAIRE, HOW DIFFICULT HAVE THESE PROBLEMS MADE IT FOR YOU TO DO YOUR WORK, TAKE CARE OF THINGS AT HOME, OR GET ALONG WITH OTHER PEOPLE: VERY DIFFICULT
4. TROUBLE RELAXING: MORE THAN HALF THE DAYS
1. FEELING NERVOUS, ANXIOUS, OR ON EDGE: MORE THAN HALF THE DAYS
5. BEING SO RESTLESS THAT IT IS HARD TO SIT STILL: MORE THAN HALF THE DAYS
7. FEELING AFRAID AS IF SOMETHING AWFUL MIGHT HAPPEN: SEVERAL DAYS
2. NOT BEING ABLE TO STOP OR CONTROL WORRYING: MORE THAN HALF THE DAYS
3. WORRYING TOO MUCH ABOUT DIFFERENT THINGS: MORE THAN HALF THE DAYS
1. FEELING NERVOUS, ANXIOUS, OR ON EDGE: MORE THAN HALF THE DAYS
GAD7 TOTAL SCORE: 13

## 2024-03-25 ENCOUNTER — HOSPITAL ENCOUNTER (OUTPATIENT)
Dept: ULTRASOUND IMAGING | Age: 54
Discharge: HOME | End: 2024-03-27
Payer: COMMERCIAL

## 2024-03-25 DIAGNOSIS — M79.641 PAIN OF RIGHT HAND: ICD-10-CM

## 2024-03-25 PROCEDURE — 76882 US LMTD JT/FCL EVL NVASC XTR: CPT

## 2024-03-27 ENCOUNTER — TELEMEDICINE (OUTPATIENT)
Dept: BEHAVIORAL/MENTAL HEALTH CLINIC | Age: 54
End: 2024-03-27
Payer: COMMERCIAL

## 2024-03-27 DIAGNOSIS — R53.82 CHRONIC FATIGUE: ICD-10-CM

## 2024-03-27 DIAGNOSIS — F51.05 INSOMNIA DUE TO MENTAL DISORDER: ICD-10-CM

## 2024-03-27 DIAGNOSIS — F33.1 MODERATE EPISODE OF RECURRENT MAJOR DEPRESSIVE DISORDER (HCC): Primary | ICD-10-CM

## 2024-03-27 DIAGNOSIS — F06.31 DEPRESSIVE DISORDER DUE TO ANOTHER MEDICAL CONDITION WITH DEPRESSIVE FEATURES: ICD-10-CM

## 2024-03-27 DIAGNOSIS — F41.1 GENERALIZED ANXIETY DISORDER: ICD-10-CM

## 2024-03-27 DIAGNOSIS — G89.4 CHRONIC PAIN DISORDER: ICD-10-CM

## 2024-03-27 DIAGNOSIS — F06.4 ANXIETY DISORDER DUE TO GENERAL MEDICAL CONDITION: ICD-10-CM

## 2024-03-27 DIAGNOSIS — M32.9 LUPUS (HCC): ICD-10-CM

## 2024-03-27 DIAGNOSIS — Z65.8 PSYCHOSOCIAL STRESSORS: ICD-10-CM

## 2024-03-27 PROCEDURE — 99214 OFFICE O/P EST MOD 30 MIN: CPT | Performed by: PSYCHIATRY & NEUROLOGY

## 2024-03-27 RX ORDER — CLONAZEPAM 0.5 MG/1
0.5 TABLET ORAL 2 TIMES DAILY PRN
Qty: 60 TABLET | Refills: 3 | Status: SHIPPED | OUTPATIENT
Start: 2024-03-27 | End: 2024-07-25

## 2024-03-27 RX ORDER — TRAZODONE HYDROCHLORIDE 50 MG/1
50 TABLET ORAL NIGHTLY
Qty: 90 TABLET | Refills: 1 | Status: SHIPPED | OUTPATIENT
Start: 2024-03-27

## 2024-03-27 RX ORDER — ATOMOXETINE 80 MG/1
80 CAPSULE ORAL DAILY
Qty: 90 CAPSULE | Refills: 1 | Status: SHIPPED | OUTPATIENT
Start: 2024-03-27

## 2024-03-27 RX ORDER — CITALOPRAM HYDROBROMIDE 10 MG/1
TABLET ORAL
Qty: 90 TABLET | Refills: 1 | Status: SHIPPED | OUTPATIENT
Start: 2024-03-27

## 2024-03-27 RX ORDER — CITALOPRAM 20 MG/1
TABLET ORAL
Qty: 90 TABLET | Refills: 1 | Status: SHIPPED | OUTPATIENT
Start: 2024-03-27

## 2024-03-27 NOTE — PROGRESS NOTES
not contract for safety in the community    **[place] has been notified: They are to call 911 or go to their nearest E.R. if they are experiencing a medical emergency or suicidal ideations/homicidal ideations.**  All ancillary documentation entered reviewed by provider.      PLEASE NOTE:  This document has been produced in part or whole using voice recognition software. Proofread however unrecognized errors in transcription may be present.    MD Sarah Licea, was evaluated through a synchronous (real-time) audio-video encounter. The patient (or guardian if applicable) is aware that this is a billable service, which includes applicable co-pays. This Virtual Visit was conducted with patient's (and/or legal guardian's) consent. Patient identification was verified, and a caregiver was present when appropriate.   The patient was located at Other: hospital with her son  Provider was located at Home (Appt Dept State): [place]   confirmed       Total time spent for this encounter: Not billed by time    --Anca Nelson MD on 3/27/2024 at 10:52 AM    An electronic signature was used to authenticate this note.

## 2024-03-28 ENCOUNTER — TELEPHONE (OUTPATIENT)
Dept: ORTHOPEDIC SURGERY | Age: 54
End: 2024-03-28

## 2024-03-28 NOTE — TELEPHONE ENCOUNTER
I returned patient call. No answer, voicemail is full. Im unable to leave a message. Patient needs to schedule follow up appointment to go over results on a Tuesday, Wednesday or Friday after her EMG/NCV on 4/24/24.

## 2024-04-08 ENCOUNTER — TELEPHONE (OUTPATIENT)
Dept: ORTHOPEDIC SURGERY | Age: 54
End: 2024-04-08

## 2024-04-08 ENCOUNTER — OFFICE VISIT (OUTPATIENT)
Dept: ORTHOPEDIC SURGERY | Age: 54
End: 2024-04-08
Payer: COMMERCIAL

## 2024-04-08 DIAGNOSIS — M76.899 HAMSTRING TENDINITIS: Primary | ICD-10-CM

## 2024-04-08 PROCEDURE — 99213 OFFICE O/P EST LOW 20 MIN: CPT | Performed by: ORTHOPAEDIC SURGERY

## 2024-04-08 NOTE — PROGRESS NOTES
Name: Sarah Cortez  YOB: 1970  Gender: female  MRN: [id number]      CC: Hip Pain (L)       HPI: Sarah Cortez is a 54 y.o. female who returns for follow up on her left hip.  She states that since her last visit she has made tremendous progress.  Her physical therapist has been doing a lot of work on her hamstrings.  This has alleviated most of her hip and hamstring pain.  She still notes pain in the back of her knee.  It is still very tender to touch.        Physical Examination:  General: no acute distress  Lungs: breathing easily  CV: regular rhythm by pulse  Left Hip: Much improved range of motion she has some vague tenderness palpation throughout the hamstrings and some tenderness throughout the biceps going down the posterior lateral knee no focal joint line tenderness.  This all appears to be muscular is recreated with hamstring palpation and strength testing.  Negative straight leg raise motor and sensory intact.        Assessment:     ICD-10-CM    1. Hamstring tendinitis  M76.899           Plan:   She is improving with physical therapy this will continue to be the mainstay of treatment we placed another order today 2 times a week for 8 weeks she can follow-up with us as needed            Ad Small MD, FAAOS  Orthopaedics and Sports Medicine

## 2024-04-08 NOTE — TELEPHONE ENCOUNTER
Pt came in to see Dr. Small today and requested that Dr. Wang or a member of his team reach out to her. She had a question about a 2nd opinion.

## 2024-04-10 NOTE — TELEPHONE ENCOUNTER
Called and spoke to pt , pt will obtain records and bring them to the office next week for  to review.

## 2024-04-18 ENCOUNTER — OFFICE VISIT (OUTPATIENT)
Dept: ORTHOPEDIC SURGERY | Age: 54
End: 2024-04-18
Payer: COMMERCIAL

## 2024-04-18 DIAGNOSIS — M25.522 LEFT ELBOW PAIN: Primary | ICD-10-CM

## 2024-04-18 DIAGNOSIS — S46.212A BICEPS TENDON RUPTURE, LEFT, INITIAL ENCOUNTER: ICD-10-CM

## 2024-04-18 PROCEDURE — M5028 MISC REPAREL ELBOW: HCPCS | Performed by: NURSE PRACTITIONER

## 2024-04-18 PROCEDURE — 99214 OFFICE O/P EST MOD 30 MIN: CPT | Performed by: NURSE PRACTITIONER

## 2024-04-18 NOTE — PROGRESS NOTES
Orthopaedic Hand Clinic Note    Name: Sarah Cortez  YOB: 1970  Gender: female  MRN: [id number]      CC: Patient seen today for left elbow pain    HPI: Sarah Cortez is a 54 y.o. female right hand dominant with a chief complaint of left elbow pain.  She reports over 3 weeks ago she was helping to pull her 25-year-old son up out of bed.  She said she lifted him under his arm with her left elbow.  She said she felt immediate burning and pain in the left elbow.  She is on anti-inflammatories.  She is unable to take steroids.  She has tried rest, ice, home exercises.  She reports that the pain is no better.  She saw her PCP on April 5.  X-rays were obtained.    ROS/Meds/PSH/PMH/FH/SH: I personally reviewed the patients standard intake form.  Pertinents are discussed in the HPI    Physical Examination:  General: Awake and alert.  HEENT: Normocephalic, atraumatic  CV/Pulm: Breathing even and unlabored  Skin: No obvious rashes noted.  Lymphatic: No obvious evidence of lymphedema or lymphadenopathy    Musculoskeletal Exam:  Examination on the left upper extremity demonstrates cap refill < 5 seconds in all fingers  Patient has swelling to the left elbow.  She is nontender over the insertion of the triceps.  She has no pain with resisted elbow extension.  She is nontender over the lateral condyle or medial epicondyle.  She is very tender at the insertion of the biceps tendon.  She has pain with resisted elbow flexion.  Her hook test is present.  She denies paresthesia.  She has good capillary refill.    Imaging / Electrodiagnostic Tests:     X-rays include a 3 view of the left elbow were taken at her PCPs office.  That report is negative for fracture.    Assessment:   1. Left elbow pain    2. Biceps tendon rupture, left, initial encounter        Plan:   We discussed the diagnosis and different treatment options. We discussed observation, therapy, antiinflammatory medications and other

## 2024-04-19 NOTE — PROGRESS NOTES
The patient was prescribed and fitted with a Reparel elbow sleeve for the left arm, size medium.     Patient read and signed documenting they understand and agree to [facility]'s current DME return policy.

## 2024-04-24 ENCOUNTER — TELEPHONE (OUTPATIENT)
Dept: ORTHOPEDIC SURGERY | Age: 54
End: 2024-04-24

## 2024-04-24 ENCOUNTER — PROCEDURE VISIT (OUTPATIENT)
Dept: NEUROLOGY | Age: 54
End: 2024-04-24

## 2024-04-24 VITALS — WEIGHT: 166 LBS | OXYGEN SATURATION: 93 % | HEART RATE: 79 BPM | BODY MASS INDEX: 32.42 KG/M2

## 2024-04-24 DIAGNOSIS — G56.03 CARPAL TUNNEL SYNDROME, BILATERAL: ICD-10-CM

## 2024-04-24 DIAGNOSIS — G56.22 CUBITAL TUNNEL SYNDROME ON LEFT: ICD-10-CM

## 2024-04-24 DIAGNOSIS — G56.03 BILATERAL CARPAL TUNNEL SYNDROME: ICD-10-CM

## 2024-04-24 DIAGNOSIS — R20.2 NUMBNESS AND TINGLING IN BOTH HANDS: Primary | ICD-10-CM

## 2024-04-24 DIAGNOSIS — R20.0 NUMBNESS AND TINGLING IN BOTH HANDS: Primary | ICD-10-CM

## 2024-04-24 NOTE — TELEPHONE ENCOUNTER
Called pt to let her know that her mother's 2nd opinion notes have been reviewed and are available for  at the ES .

## 2024-04-24 NOTE — PROGRESS NOTES
EMG/Nerve Conduction Study Procedure Note  [address]    [address]  [place]  [address]   [phone]      Hx:    Exam:     54 y.o. M B[employer]    female   right handed for EMG upper extremities bilateral numbness pain tingling right more than left fingers.  Swelling in the wrist.  Arthritis pain.  Left CMC arthritis.  History of multi abnormalities including lupus RA OA fibromyalgia.  No atrophy.  Referring: CHUNG Genao   PCP: Dr. Tamica Novoa  Technologist: Janice Bell  Height: 5 foot        Summary    NCV upper extremities with selected needle EMG.                  Controlled environmental factors / EMG lab.  Temperature.   NCV : sensory segments:    Abnormal = slowed right median distal SCV.  Normal left median and bilateral ulnar bilateral radial SCV.  NCV transcarpal sensory segments:     Abnormal = slow bilateral transcarpal median SCV with normal transcarpal ulnar SCV and a peak difference of latency on the right at 0.52 ms; on the left at 0.42 ms (UL = 0.20 ms).  NCV Motor MCV segments:     Normal bilateral median and bilateral ulnar MCV except for slight slowing mild slowing of the left ulnar nerve at the elbow with attenuated CMAP but with normal terminal latency.  F-wave studies:        Normal bilateral median and bilateral ulnar F-wave latencies.      NEEDLE EMG:   Tested muscles::    Left FCU FDI APB ADM = normal.  Right FDI APB = normal.    Normal insertional activity and interference pattern/recruitment.  No fasciculations fibrillations positive sharp waves.  Normal MUP.  No BSS AP.  No giant MUP.  No myotonia.  No upper motor neuron sign.          INTERPRETATION:     THESE FINDINGS ARE ELECTROPHYSIOLOGIC EVIDENCE OF BILATERAL MEDIAN NERVE ENTRAPMENT AT THE WRIST OF A MILD MINIMAL DEGREE AND SOME LIKELY ENTRAPPED MILD ULNAR NEUROPATHY AT THE ELBOW ON THE LEFT.  NO AXONAL DENERVATION.  NO FIBRILLATION POSITIVE SHARP WAVES ETC.               CONCLUSION:   Compatible with bilateral

## 2024-04-30 ENCOUNTER — OFFICE VISIT (OUTPATIENT)
Age: 54
End: 2024-04-30
Payer: COMMERCIAL

## 2024-04-30 DIAGNOSIS — G56.02 LEFT CARPAL TUNNEL SYNDROME: ICD-10-CM

## 2024-04-30 DIAGNOSIS — M79.642 LEFT HAND PAIN: Primary | ICD-10-CM

## 2024-04-30 DIAGNOSIS — G56.22 CUBITAL TUNNEL SYNDROME ON LEFT: ICD-10-CM

## 2024-04-30 PROCEDURE — L3908 WHO COCK-UP NONMOLDE PRE OTS: HCPCS | Performed by: NURSE PRACTITIONER

## 2024-04-30 PROCEDURE — 99214 OFFICE O/P EST MOD 30 MIN: CPT | Performed by: NURSE PRACTITIONER

## 2024-04-30 RX ORDER — MELOXICAM 15 MG/1
15 TABLET ORAL DAILY
Qty: 42 TABLET | Refills: 0 | Status: SHIPPED | OUTPATIENT
Start: 2024-04-30 | End: 2024-06-11

## 2024-04-30 NOTE — PROGRESS NOTES
Orthopaedic Hand Clinic Note    Name: Sarah Cortez  YOB: 1970  Gender: female  MRN: [id number]      Follow up visit:   1. Left hand pain    2. Cubital tunnel syndrome on left    3. Left carpal tunnel syndrome        HPI: Sarah Cortez is a 54 y.o. female who is following up for left elbow pain.  She is here to discuss her nerve conduction studies and her MRI.  She reports that since her left elbow was injured that her left shoulder has become more painful and she has noted loss of motion.  She is seen SU Cooley in the past.    ROS/Meds/PSH/PMH/FH/SH: I personally reviewed the patients standard intake form.  Pertinents are discussed in the HPI    Physical Examination:    Musculoskeletal Examination:  Examination on the left upper extremity demonstrates cap refill < 5 seconds in all fingers  Patient has swelling to the left elbow. She is nontender over the insertion of the triceps. She has no pain with resisted elbow extension. She is nontender over the lateral condyle or medial epicondyle. She is non tender at the insertion of the biceps tendon. She has pain with resisted elbow flexion. Her hook test is present. She denies paresthesia. She has good capillary refill    Imaging / Electrodiagnostic Tests:     EMGs from Dr. Dmitry English's office are reviewed.  Patient has mild bilateral carpal tunnel syndrome.  She also has mild cubital tunnel syndrome on the left    Assessment:     ICD-10-CM    1. Left hand pain  M79.642 [facility]     Ambulatory Referral to DME     meloxicam (MOBIC) 15 MG tablet      2. Cubital tunnel syndrome on left  G56.22 [facility]     Ambulatory Referral to DME     meloxicam (MOBIC) 15 MG tablet      3. Left carpal tunnel syndrome  G56.02 [facility]     Ambulatory Referral to DME     meloxicam (MOBIC) 15 MG tablet          Plan:   We discussed the diagnosis and

## 2024-05-01 NOTE — PROGRESS NOTES
Patient was fitted and instructed on an  Wrist Splint for patients left wrist. Patient is aware the hand slides in the brace with the thumb placed threw the thumb hole. I demonstrated the correct way to tighten the brace straps to allow for a comfortable fit. Patient understood the correct way to wear the brace.    Patient read and signed documenting they understand and agree to [facility]'s current DME return policy.

## 2024-05-13 ENCOUNTER — OFFICE VISIT (OUTPATIENT)
Dept: ORTHOPEDIC SURGERY | Age: 54
End: 2024-05-13
Payer: COMMERCIAL

## 2024-05-13 DIAGNOSIS — M25.512 LEFT SHOULDER PAIN, UNSPECIFIED CHRONICITY: Primary | ICD-10-CM

## 2024-05-13 DIAGNOSIS — M67.922 TENDINOPATHY OF LEFT BICEPS TENDON: ICD-10-CM

## 2024-05-13 PROCEDURE — 99214 OFFICE O/P EST MOD 30 MIN: CPT | Performed by: PHYSICIAN ASSISTANT

## 2024-05-13 NOTE — PROGRESS NOTES
Years of education: Not on file    Highest education level: Not on file   Occupational History    Not on file   Tobacco Use    Smoking status: Never    Smokeless tobacco: Former     Types: Chew   Vaping Use    Vaping Use: Never used   Substance and Sexual Activity    Alcohol use: Never    Drug use: Never    Sexual activity: Not Currently     Partners: Male   Other Topics Concern    Not on file   Social History Narrative    Not on file     Social Determinants of Health     Financial Resource Strain: Patient Declined (3/10/2023)    Overall Financial Resource Strain (CARDIA)     Difficulty of Paying Living Expenses: Patient declined   Food Insecurity: Not on file (3/10/2023)   Transportation Needs: Unknown (3/10/2023)    PRAPARE - Transportation     Lack of Transportation (Medical): Not on file     Lack of Transportation (Non-Medical): Patient declined   Physical Activity: Not on file   Stress: Not on file   Social Connections: Not on file   Intimate Partner Violence: Not on file   Housing Stability: Unknown (3/10/2023)    Housing Stability Vital Sign     Unable to Pay for Housing in the Last Year: Not on file     Number of Places Lived in the Last Year: Not on file     Unstable Housing in the Last Year: Patient refused              6/15/2022    10:19 AM   AMB PAIN ASSESSMENT   Location of Pain Neck   Location Modifiers Left;Right   Severity of Pain 7   Quality of Pain Other (Comment)   Duration of Pain Persistent   Frequency of Pain Constant   Date Pain First Started 10/12/2021   Aggravating Factors Standing;Walking;Other (Comment)   Limiting Behavior Yes   Relieving Factors Other (Comment);Ice;Exercise   Result of Injury Yes   Work-Related Injury No   Are there other pain locations you wish to document? Yes       Review of Systems  Non-contributory    PE:    Left shoulder  Full ROM of the left shoulder  There is appropriate strength with pain associated with abduction and external rotation.   There is pain with

## 2024-05-25 ASSESSMENT — PATIENT HEALTH QUESTIONNAIRE - PHQ9
SUM OF ALL RESPONSES TO PHQ QUESTIONS 1-9: 10
6. FEELING BAD ABOUT YOURSELF - OR THAT YOU ARE A FAILURE OR HAVE LET YOURSELF OR YOUR FAMILY DOWN: SEVERAL DAYS
2. FEELING DOWN, DEPRESSED OR HOPELESS: MORE THAN HALF THE DAYS
8. MOVING OR SPEAKING SO SLOWLY THAT OTHER PEOPLE COULD HAVE NOTICED. OR THE OPPOSITE - BEING SO FIDGETY OR RESTLESS THAT YOU HAVE BEEN MOVING AROUND A LOT MORE THAN USUAL: SEVERAL DAYS
2. FEELING DOWN, DEPRESSED OR HOPELESS: MORE THAN HALF THE DAYS
8. MOVING OR SPEAKING SO SLOWLY THAT OTHER PEOPLE COULD HAVE NOTICED. OR THE OPPOSITE, BEING SO FIGETY OR RESTLESS THAT YOU HAVE BEEN MOVING AROUND A LOT MORE THAN USUAL: SEVERAL DAYS
4. FEELING TIRED OR HAVING LITTLE ENERGY: MORE THAN HALF THE DAYS
4. FEELING TIRED OR HAVING LITTLE ENERGY: MORE THAN HALF THE DAYS
6. FEELING BAD ABOUT YOURSELF - OR THAT YOU ARE A FAILURE OR HAVE LET YOURSELF OR YOUR FAMILY DOWN: SEVERAL DAYS
5. POOR APPETITE OR OVEREATING: SEVERAL DAYS
SUM OF ALL RESPONSES TO PHQ9 QUESTIONS 1 & 2: 3
1. LITTLE INTEREST OR PLEASURE IN DOING THINGS: SEVERAL DAYS
1. LITTLE INTEREST OR PLEASURE IN DOING THINGS: SEVERAL DAYS
SUM OF ALL RESPONSES TO PHQ QUESTIONS 1-9: 10
7. TROUBLE CONCENTRATING ON THINGS, SUCH AS READING THE NEWSPAPER OR WATCHING TELEVISION: SEVERAL DAYS
9. THOUGHTS THAT YOU WOULD BE BETTER OFF DEAD, OR OF HURTING YOURSELF: NOT AT ALL
3. TROUBLE FALLING OR STAYING ASLEEP: SEVERAL DAYS
SUM OF ALL RESPONSES TO PHQ QUESTIONS 1-9: 10
10. IF YOU CHECKED OFF ANY PROBLEMS, HOW DIFFICULT HAVE THESE PROBLEMS MADE IT FOR YOU TO DO YOUR WORK, TAKE CARE OF THINGS AT HOME, OR GET ALONG WITH OTHER PEOPLE: SOMEWHAT DIFFICULT
3. TROUBLE FALLING OR STAYING ASLEEP: SEVERAL DAYS
7. TROUBLE CONCENTRATING ON THINGS, SUCH AS READING THE NEWSPAPER OR WATCHING TELEVISION: SEVERAL DAYS
SUM OF ALL RESPONSES TO PHQ QUESTIONS 1-9: 10
9. THOUGHTS THAT YOU WOULD BE BETTER OFF DEAD, OR OF HURTING YOURSELF: NOT AT ALL
5. POOR APPETITE OR OVEREATING: SEVERAL DAYS
10. IF YOU CHECKED OFF ANY PROBLEMS, HOW DIFFICULT HAVE THESE PROBLEMS MADE IT FOR YOU TO DO YOUR WORK, TAKE CARE OF THINGS AT HOME, OR GET ALONG WITH OTHER PEOPLE: SOMEWHAT DIFFICULT
SUM OF ALL RESPONSES TO PHQ QUESTIONS 1-9: 10

## 2024-05-25 ASSESSMENT — ANXIETY QUESTIONNAIRES
3. WORRYING TOO MUCH ABOUT DIFFERENT THINGS: MORE THAN HALF THE DAYS
5. BEING SO RESTLESS THAT IT IS HARD TO SIT STILL: MORE THAN HALF THE DAYS
6. BECOMING EASILY ANNOYED OR IRRITABLE: SEVERAL DAYS
4. TROUBLE RELAXING: MORE THAN HALF THE DAYS
1. FEELING NERVOUS, ANXIOUS, OR ON EDGE: MORE THAN HALF THE DAYS
7. FEELING AFRAID AS IF SOMETHING AWFUL MIGHT HAPPEN: SEVERAL DAYS
IF YOU CHECKED OFF ANY PROBLEMS ON THIS QUESTIONNAIRE, HOW DIFFICULT HAVE THESE PROBLEMS MADE IT FOR YOU TO DO YOUR WORK, TAKE CARE OF THINGS AT HOME, OR GET ALONG WITH OTHER PEOPLE: VERY DIFFICULT
3. WORRYING TOO MUCH ABOUT DIFFERENT THINGS: MORE THAN HALF THE DAYS
1. FEELING NERVOUS, ANXIOUS, OR ON EDGE: MORE THAN HALF THE DAYS
6. BECOMING EASILY ANNOYED OR IRRITABLE: SEVERAL DAYS
7. FEELING AFRAID AS IF SOMETHING AWFUL MIGHT HAPPEN: SEVERAL DAYS
IF YOU CHECKED OFF ANY PROBLEMS ON THIS QUESTIONNAIRE, HOW DIFFICULT HAVE THESE PROBLEMS MADE IT FOR YOU TO DO YOUR WORK, TAKE CARE OF THINGS AT HOME, OR GET ALONG WITH OTHER PEOPLE: VERY DIFFICULT
5. BEING SO RESTLESS THAT IT IS HARD TO SIT STILL: MORE THAN HALF THE DAYS
GAD7 TOTAL SCORE: 12
2. NOT BEING ABLE TO STOP OR CONTROL WORRYING: MORE THAN HALF THE DAYS
2. NOT BEING ABLE TO STOP OR CONTROL WORRYING: MORE THAN HALF THE DAYS
4. TROUBLE RELAXING: MORE THAN HALF THE DAYS

## 2024-05-28 ENCOUNTER — TELEMEDICINE (OUTPATIENT)
Dept: BEHAVIORAL/MENTAL HEALTH CLINIC | Age: 54
End: 2024-05-28
Payer: COMMERCIAL

## 2024-05-28 DIAGNOSIS — Z65.8 PSYCHOSOCIAL STRESSORS: ICD-10-CM

## 2024-05-28 DIAGNOSIS — F51.05 INSOMNIA DUE TO MENTAL DISORDER: ICD-10-CM

## 2024-05-28 DIAGNOSIS — M32.9 LUPUS (HCC): ICD-10-CM

## 2024-05-28 DIAGNOSIS — F06.4 ANXIETY DISORDER DUE TO GENERAL MEDICAL CONDITION: ICD-10-CM

## 2024-05-28 DIAGNOSIS — F98.8 ATTENTION DEFICIT DISORDER, UNSPECIFIED HYPERACTIVITY PRESENCE: ICD-10-CM

## 2024-05-28 DIAGNOSIS — G93.32 CHRONIC FATIGUE SYNDROME: ICD-10-CM

## 2024-05-28 DIAGNOSIS — F33.0 MILD EPISODE OF RECURRENT MAJOR DEPRESSIVE DISORDER (HCC): Primary | ICD-10-CM

## 2024-05-28 DIAGNOSIS — F06.31 DEPRESSIVE DISORDER DUE TO ANOTHER MEDICAL CONDITION WITH DEPRESSIVE FEATURES: ICD-10-CM

## 2024-05-28 DIAGNOSIS — G89.4 CHRONIC PAIN DISORDER: ICD-10-CM

## 2024-05-28 DIAGNOSIS — F41.1 GENERALIZED ANXIETY DISORDER: ICD-10-CM

## 2024-05-28 PROCEDURE — 99214 OFFICE O/P EST MOD 30 MIN: CPT | Performed by: PSYCHIATRY & NEUROLOGY

## 2024-05-28 RX ORDER — DEXTROAMPHETAMINE SACCHARATE, AMPHETAMINE ASPARTATE, DEXTROAMPHETAMINE SULFATE AND AMPHETAMINE SULFATE 1.25; 1.25; 1.25; 1.25 MG/1; MG/1; MG/1; MG/1
5 TABLET ORAL 2 TIMES DAILY
Qty: 60 TABLET | Refills: 0 | Status: SHIPPED | OUTPATIENT
Start: 2024-05-28 | End: 2024-06-27

## 2024-05-28 RX ORDER — CLONAZEPAM 0.5 MG/1
0.5 TABLET ORAL 2 TIMES DAILY PRN
Qty: 60 TABLET | Refills: 3 | Status: CANCELLED | OUTPATIENT
Start: 2024-05-28 | End: 2024-09-25

## 2024-05-28 RX ORDER — CLONAZEPAM 0.5 MG/1
0.5 TABLET ORAL 2 TIMES DAILY PRN
Qty: 60 TABLET | Refills: 3 | Status: SHIPPED | OUTPATIENT
Start: 2024-05-28 | End: 2024-09-25

## 2024-05-28 RX ORDER — CITALOPRAM 20 MG/1
TABLET ORAL
Qty: 90 TABLET | Refills: 1 | Status: SHIPPED | OUTPATIENT
Start: 2024-05-28

## 2024-05-28 RX ORDER — ATOMOXETINE 40 MG/1
40 CAPSULE ORAL DAILY
Qty: 14 CAPSULE | Refills: 0 | Status: SHIPPED | OUTPATIENT
Start: 2024-05-28

## 2024-05-28 RX ORDER — CITALOPRAM HYDROBROMIDE 10 MG/1
TABLET ORAL
Qty: 90 TABLET | Refills: 1 | Status: CANCELLED | OUTPATIENT
Start: 2024-05-28

## 2024-05-28 RX ORDER — TRAZODONE HYDROCHLORIDE 50 MG/1
50 TABLET ORAL NIGHTLY
Qty: 90 TABLET | Refills: 1 | Status: CANCELLED | OUTPATIENT
Start: 2024-05-28

## 2024-05-28 RX ORDER — TRAZODONE HYDROCHLORIDE 50 MG/1
50 TABLET ORAL NIGHTLY
Qty: 90 TABLET | Refills: 1 | Status: SHIPPED | OUTPATIENT
Start: 2024-05-28

## 2024-05-28 RX ORDER — CITALOPRAM 20 MG/1
TABLET ORAL
Qty: 90 TABLET | Refills: 1 | Status: CANCELLED | OUTPATIENT
Start: 2024-05-28

## 2024-05-28 RX ORDER — GALCANEZUMAB 120 MG/ML
INJECTION, SOLUTION SUBCUTANEOUS
COMMUNITY
Start: 2024-04-13

## 2024-05-28 RX ORDER — LEVOMEFOLATE CALCIUM 7.5 MG TABLET
7.5 TABLET DAILY
Qty: 30 TABLET | Refills: 5 | Status: CANCELLED | OUTPATIENT
Start: 2024-05-28

## 2024-05-28 RX ORDER — CITALOPRAM HYDROBROMIDE 10 MG/1
TABLET ORAL
Qty: 90 TABLET | Refills: 1 | Status: SHIPPED | OUTPATIENT
Start: 2024-05-28

## 2024-05-28 NOTE — PROGRESS NOTES
psychosis, insomnia, anxiety, risk of bleeding, risk of seizures, addiction potential, memory impairment with long term use of benzos, respiratory depression, high blood pressure, dizziness, drowsiness, sedation , risk of falls, Risk & benefits discussed: including but not limited to possible off-label medication usage.     [x] Patient encouraged to contact the clinic if experiencing any adverse reactions with medications.      [x] Follow MSE for sxs improvement     I have reviewed the patient’s controlled substance prescription history, as maintained in the [place] prescription monitoring program, so that the prescription(s) for a  controlled substance can be given.    Recommendations and Referrals:    Follow up with : MD, requires monitoring of response to medication, requires monitoring of medication side effects.    Time until next PMA: 3 months    Follow up with Mental Health Clinicians recommended for : psychotherapy interventions,  monitoring to prevent decompensation /hospitalization, monitoring to maintain therapeutic gains, monitoring symptoms (resolving and controlled), to improve level of functioning,         Psychotherapy note:                                __10_ Minutes of psychotherapy     [x]  Supportive psychotherapy provided to improve self-esteem, psychological functioning, and adaptive skills. Patient discussed certain situational and personal stressors ongoing in her life at this time, weight management d/w the patient. Sleep hygiene d/w patient. Patient allowed to vent out her emotions.  Scenarios were reviewed using CBT  techniques in order to increase insight and decrease anxiety.    Dysfunctional cognitions challenged and alternate options dicussed.      []  Disposition planning      []  Dangerous and will not contract for safety in the community    **Pateint has been notified: They are to call 911 or go to their nearest E.R. if they are experiencing a medical emergency or suicidal

## 2024-06-06 ENCOUNTER — PATIENT MESSAGE (OUTPATIENT)
Dept: BEHAVIORAL/MENTAL HEALTH CLINIC | Age: 54
End: 2024-06-06

## 2024-06-06 ENCOUNTER — TELEPHONE (OUTPATIENT)
Dept: BEHAVIORAL/MENTAL HEALTH CLINIC | Age: 54
End: 2024-06-06

## 2024-06-06 NOTE — TELEPHONE ENCOUNTER
----- Message from Sarah Cortez sent at 6/6/2024  6:17 AM EDT -----  Regarding: Medication   Contact: [phone]  Good morning   Not sure if this has to do with the medication change but I am just constantly crying.    I am overwhelmed with stuff but now I just cry.

## 2024-06-07 NOTE — TELEPHONE ENCOUNTER
Sent Tinyp[facility]st message to patient. Per Dr Nelson, she has asked if she would like to go back on Strattera.

## 2024-06-10 NOTE — TELEPHONE ENCOUNTER
Patient has advised that she would like to go back on Strattera.    She has also received a test result back for Cortisol level at 5.5 ug/dl.

## 2024-06-10 NOTE — TELEPHONE ENCOUNTER
----- Message from Sarah Cortez sent at 6/7/2024  2:05 PM EDT -----  Regarding: Medication   Contact: [phone]  Yes please.  I got another text result so at this point I am not sure why I feel so bad.

## 2024-06-12 ENCOUNTER — OFFICE VISIT (OUTPATIENT)
Dept: ORTHOPEDIC SURGERY | Age: 54
End: 2024-06-12

## 2024-06-12 DIAGNOSIS — M67.922 TENDINOPATHY OF LEFT BICEPS TENDON: ICD-10-CM

## 2024-06-12 DIAGNOSIS — M25.512 LEFT SHOULDER PAIN, UNSPECIFIED CHRONICITY: Primary | ICD-10-CM

## 2024-06-12 RX ORDER — KETOROLAC TROMETHAMINE 30 MG/ML
30 INJECTION, SOLUTION INTRAMUSCULAR; INTRAVENOUS ONCE
Qty: 1 ML | Refills: 0
Start: 2024-06-12 | End: 2024-06-12

## 2024-06-12 NOTE — PROGRESS NOTES
infection, steroid flare, increased blood sugar, fat necrosis, skin discoloration, and injury to blood vessels or nerves, the patient verbally consented to proceed with a shoulder injection.  They understand that as part of the decision-making process to not proceed with definitive major surgery for their issue we are using an injection as an alternative.  The affected leftshoulder was sterilely prepped in standard fashion and injected with 1 cc of Toradol 30 mg/mL, 2 cc of 1% Lidocaine, and 2 cc of 0.5% Marcaine into the anterior shoulder near the biceps tendon.  The patient tolerated the injection well.     No follow-ups on file.        SU Cooley  06/12/24

## 2024-06-13 RX ORDER — ATOMOXETINE 40 MG/1
CAPSULE ORAL
Qty: 7 CAPSULE | Refills: 0 | Status: SHIPPED | OUTPATIENT
Start: 2024-06-13

## 2024-06-13 RX ORDER — ATOMOXETINE 80 MG/1
80 CAPSULE ORAL DAILY
Qty: 90 CAPSULE | Refills: 1 | Status: SHIPPED | OUTPATIENT
Start: 2024-06-13 | End: 2024-12-10

## 2024-07-01 RX ORDER — ATOMOXETINE 40 MG/1
CAPSULE ORAL
Qty: 7 CAPSULE | Refills: 51 | OUTPATIENT
Start: 2024-07-01

## 2024-07-23 RX ORDER — ATOMOXETINE 40 MG/1
CAPSULE ORAL
Refills: 0 | OUTPATIENT
Start: 2024-07-23

## 2024-07-29 RX ORDER — AMLODIPINE BESYLATE 5 MG/1
5 TABLET ORAL DAILY
COMMUNITY
Start: 2024-06-24

## 2024-07-29 NOTE — PROGRESS NOTES
Previous interventions:  Procedure Date Results   ***                                     Previous medication trials: Listed:  Class Medication Results   NSAIDs ***    Oral steroids     Tylenol     Antiepileptics     Antidepressants     Relaxants Flexeril 10mg    Topicals/transdermaI patches     Narcotics Tramadol 50mg      Previous tests/studies:  Study Date Results   MRI SHOULDER LEFT WO CONTRAS  8.12.2022 EXAMINATION: MRI SHOULDER LEFT WO CONTRAST 8/12/2022 9:05 AM     ACCESSION NUMBER: [id number]     COMPARISON: Left shoulder MRI 11/29/2021     INDICATION: Primary osteoarthritis, left shoulder; Bicipital tendinitis, left  shoulder . Assess for internal drainage      TECHNIQUE: Dedicated MRI of the left shoulder was performed with multiplanar  multiecho technique.     FINDINGS:     Alignment: Anatomical.     Acromioclavicular Arch  Acromioclavicular Joint: Moderate osteoarthritis with joint space narrowing,  subchondral edema and cyst formation, and small effusion. No os acromiale.  Lateral downsloping of the acromion. Moderate subacromial/subdeltoid bursitis.  Subacromial Spur: Absent.     Rotator Cuff:   Supraspinatus: Atrophic tendinosis without focal tear.  Infraspinatus: Tendinosis with low-grade intrasubstance tearing inferior  insertional fibers.  Subscapularis: Tendinosis without focal tear     Labroligamentous Complex:   Labrum: Intact  Biceps Tendon: Intact     Articular Cartilage and Bones: Cartilage thinning without full thickness  cartilage defect or subchondral changes. Physiologic joint effusion.     Other: No evidence of mass effect in the region of the quadrilateral space or  suprascapular nerve.     IMPRESSION:  1.  Infraspinatus tendinosis with low-grade intrasubstance tearing inferior  distal insertional footprint.  2.  Supraspinatus atrophic tendinosis without focal tear.  3.  Moderate subacromial/subdeltoid bursitis.                              Previous therapies:  Type of Therapy Date

## 2024-08-01 ASSESSMENT — ANXIETY QUESTIONNAIRES
6. BECOMING EASILY ANNOYED OR IRRITABLE: MORE THAN HALF THE DAYS
1. FEELING NERVOUS, ANXIOUS, OR ON EDGE: MORE THAN HALF THE DAYS
1. FEELING NERVOUS, ANXIOUS, OR ON EDGE: MORE THAN HALF THE DAYS
5. BEING SO RESTLESS THAT IT IS HARD TO SIT STILL: MORE THAN HALF THE DAYS
GAD7 TOTAL SCORE: 14
6. BECOMING EASILY ANNOYED OR IRRITABLE: MORE THAN HALF THE DAYS
4. TROUBLE RELAXING: MORE THAN HALF THE DAYS
IF YOU CHECKED OFF ANY PROBLEMS ON THIS QUESTIONNAIRE, HOW DIFFICULT HAVE THESE PROBLEMS MADE IT FOR YOU TO DO YOUR WORK, TAKE CARE OF THINGS AT HOME, OR GET ALONG WITH OTHER PEOPLE: VERY DIFFICULT
3. WORRYING TOO MUCH ABOUT DIFFERENT THINGS: MORE THAN HALF THE DAYS
5. BEING SO RESTLESS THAT IT IS HARD TO SIT STILL: MORE THAN HALF THE DAYS
7. FEELING AFRAID AS IF SOMETHING AWFUL MIGHT HAPPEN: MORE THAN HALF THE DAYS
7. FEELING AFRAID AS IF SOMETHING AWFUL MIGHT HAPPEN: MORE THAN HALF THE DAYS
4. TROUBLE RELAXING: MORE THAN HALF THE DAYS
3. WORRYING TOO MUCH ABOUT DIFFERENT THINGS: MORE THAN HALF THE DAYS
IF YOU CHECKED OFF ANY PROBLEMS ON THIS QUESTIONNAIRE, HOW DIFFICULT HAVE THESE PROBLEMS MADE IT FOR YOU TO DO YOUR WORK, TAKE CARE OF THINGS AT HOME, OR GET ALONG WITH OTHER PEOPLE: VERY DIFFICULT
2. NOT BEING ABLE TO STOP OR CONTROL WORRYING: MORE THAN HALF THE DAYS
2. NOT BEING ABLE TO STOP OR CONTROL WORRYING: MORE THAN HALF THE DAYS

## 2024-08-01 ASSESSMENT — PATIENT HEALTH QUESTIONNAIRE - PHQ9
2. FEELING DOWN, DEPRESSED OR HOPELESS: MORE THAN HALF THE DAYS
1. LITTLE INTEREST OR PLEASURE IN DOING THINGS: SEVERAL DAYS
6. FEELING BAD ABOUT YOURSELF - OR THAT YOU ARE A FAILURE OR HAVE LET YOURSELF OR YOUR FAMILY DOWN: MORE THAN HALF THE DAYS
3. TROUBLE FALLING OR STAYING ASLEEP: MORE THAN HALF THE DAYS
SUM OF ALL RESPONSES TO PHQ QUESTIONS 1-9: 13
5. POOR APPETITE OR OVEREATING: MORE THAN HALF THE DAYS
4. FEELING TIRED OR HAVING LITTLE ENERGY: MORE THAN HALF THE DAYS
SUM OF ALL RESPONSES TO PHQ9 QUESTIONS 1 & 2: 3
3. TROUBLE FALLING OR STAYING ASLEEP: MORE THAN HALF THE DAYS
10. IF YOU CHECKED OFF ANY PROBLEMS, HOW DIFFICULT HAVE THESE PROBLEMS MADE IT FOR YOU TO DO YOUR WORK, TAKE CARE OF THINGS AT HOME, OR GET ALONG WITH OTHER PEOPLE: VERY DIFFICULT
8. MOVING OR SPEAKING SO SLOWLY THAT OTHER PEOPLE COULD HAVE NOTICED. OR THE OPPOSITE - BEING SO FIDGETY OR RESTLESS THAT YOU HAVE BEEN MOVING AROUND A LOT MORE THAN USUAL: SEVERAL DAYS
7. TROUBLE CONCENTRATING ON THINGS, SUCH AS READING THE NEWSPAPER OR WATCHING TELEVISION: SEVERAL DAYS
2. FEELING DOWN, DEPRESSED OR HOPELESS: MORE THAN HALF THE DAYS
4. FEELING TIRED OR HAVING LITTLE ENERGY: MORE THAN HALF THE DAYS
10. IF YOU CHECKED OFF ANY PROBLEMS, HOW DIFFICULT HAVE THESE PROBLEMS MADE IT FOR YOU TO DO YOUR WORK, TAKE CARE OF THINGS AT HOME, OR GET ALONG WITH OTHER PEOPLE: VERY DIFFICULT
SUM OF ALL RESPONSES TO PHQ QUESTIONS 1-9: 13
1. LITTLE INTEREST OR PLEASURE IN DOING THINGS: SEVERAL DAYS
SUM OF ALL RESPONSES TO PHQ QUESTIONS 1-9: 13
8. MOVING OR SPEAKING SO SLOWLY THAT OTHER PEOPLE COULD HAVE NOTICED. OR THE OPPOSITE, BEING SO FIGETY OR RESTLESS THAT YOU HAVE BEEN MOVING AROUND A LOT MORE THAN USUAL: SEVERAL DAYS
9. THOUGHTS THAT YOU WOULD BE BETTER OFF DEAD, OR OF HURTING YOURSELF: NOT AT ALL
6. FEELING BAD ABOUT YOURSELF - OR THAT YOU ARE A FAILURE OR HAVE LET YOURSELF OR YOUR FAMILY DOWN: MORE THAN HALF THE DAYS
SUM OF ALL RESPONSES TO PHQ QUESTIONS 1-9: 13
9. THOUGHTS THAT YOU WOULD BE BETTER OFF DEAD, OR OF HURTING YOURSELF: NOT AT ALL
5. POOR APPETITE OR OVEREATING: MORE THAN HALF THE DAYS
SUM OF ALL RESPONSES TO PHQ QUESTIONS 1-9: 13
7. TROUBLE CONCENTRATING ON THINGS, SUCH AS READING THE NEWSPAPER OR WATCHING TELEVISION: SEVERAL DAYS

## 2024-08-02 ENCOUNTER — TELEMEDICINE (OUTPATIENT)
Dept: BEHAVIORAL/MENTAL HEALTH CLINIC | Age: 54
End: 2024-08-02
Payer: COMMERCIAL

## 2024-08-02 DIAGNOSIS — F06.4 ANXIETY DISORDER DUE TO GENERAL MEDICAL CONDITION: ICD-10-CM

## 2024-08-02 DIAGNOSIS — M32.9 LUPUS (HCC): ICD-10-CM

## 2024-08-02 DIAGNOSIS — F41.1 GENERALIZED ANXIETY DISORDER: ICD-10-CM

## 2024-08-02 DIAGNOSIS — G89.4 CHRONIC PAIN DISORDER: ICD-10-CM

## 2024-08-02 DIAGNOSIS — G93.32 CHRONIC FATIGUE SYNDROME: ICD-10-CM

## 2024-08-02 DIAGNOSIS — F98.8 ATTENTION DEFICIT DISORDER, UNSPECIFIED HYPERACTIVITY PRESENCE: ICD-10-CM

## 2024-08-02 DIAGNOSIS — Z65.8 PSYCHOSOCIAL STRESSORS: ICD-10-CM

## 2024-08-02 DIAGNOSIS — F33.0 MILD EPISODE OF RECURRENT MAJOR DEPRESSIVE DISORDER (HCC): Primary | ICD-10-CM

## 2024-08-02 DIAGNOSIS — F06.31 DEPRESSIVE DISORDER DUE TO ANOTHER MEDICAL CONDITION WITH DEPRESSIVE FEATURES: ICD-10-CM

## 2024-08-02 PROCEDURE — 99214 OFFICE O/P EST MOD 30 MIN: CPT | Performed by: PSYCHIATRY & NEUROLOGY

## 2024-08-02 RX ORDER — CITALOPRAM HYDROBROMIDE 10 MG/1
TABLET ORAL
Qty: 90 TABLET | Refills: 1 | Status: SHIPPED | OUTPATIENT
Start: 2024-08-02

## 2024-08-02 RX ORDER — CITALOPRAM 20 MG/1
TABLET ORAL
Qty: 90 TABLET | Refills: 1 | Status: SHIPPED | OUTPATIENT
Start: 2024-08-02

## 2024-08-02 RX ORDER — ATOMOXETINE 80 MG/1
80 CAPSULE ORAL DAILY
Qty: 90 CAPSULE | Refills: 1 | Status: SHIPPED | OUTPATIENT
Start: 2024-08-02 | End: 2025-01-29

## 2024-08-02 RX ORDER — UBROGEPANT 100 MG/1
1 TABLET ORAL
COMMUNITY
Start: 2024-07-25

## 2024-08-02 RX ORDER — CLONAZEPAM 0.5 MG/1
0.5 TABLET ORAL 2 TIMES DAILY PRN
Qty: 60 TABLET | Refills: 3 | Status: SHIPPED | OUTPATIENT
Start: 2024-08-02 | End: 2024-11-30

## 2024-08-02 RX ORDER — TRAZODONE HYDROCHLORIDE 50 MG/1
50 TABLET ORAL NIGHTLY
Qty: 90 TABLET | Refills: 1 | Status: SHIPPED | OUTPATIENT
Start: 2024-08-02

## 2024-08-02 NOTE — PROGRESS NOTES
months    Follow up with Mental Health Clinicians recommended for : psychotherapy interventions,  monitoring to prevent decompensation /hospitalization, monitoring to maintain therapeutic gains, monitoring symptoms (resolving and controlled), to improve level of functioning,         Psychotherapy note:                                __10_ Minutes of psychotherapy     [x]  Supportive psychotherapy provided to improve self-esteem, psychological functioning, and adaptive skills. Patient discussed certain situational and personal stressors ongoing in her life at this time, weight management d/w the patient. Sleep hygiene d/w patient. Patient allowed to vent out her emotions.  Scenarios were reviewed using CBT  techniques in order to increase insight and decrease anxiety.    Dysfunctional cognitions challenged  and alternate options dicussed.      []  Disposition planning      []  Dangerous and will not contract for safety in the community    **Pateint has been notified: They are to call 911 or go to their nearest E.R. if they are experiencing a medical emergency or suicidal ideations/homicidal ideations.**  All ancillary documentation entered reviewed by provider.      PLEASE NOTE:  This document has been produced in part or whole using voice recognition software. Proofread however unrecognized errors in transcription may be present.    MD Sarah Licea, was evaluated through a synchronous (real-time) audio-video encounter. The patient (or guardian if applicable) is aware that this is a billable service, which includes applicable co-pays. This Virtual Visit was conducted with patient's (and/or legal guardian's) consent. Patient identification was verified, and a caregiver was present when appropriate.   The patient was located at Home: [address] [place] [id number]  Provider was located at Facility (Appt Dept): [address] [phone]  Confirm you are

## 2024-08-05 ENCOUNTER — OFFICE VISIT (OUTPATIENT)
Age: 54
End: 2024-08-05
Payer: COMMERCIAL

## 2024-08-05 DIAGNOSIS — G89.29 CHRONIC LEFT SHOULDER PAIN: Primary | ICD-10-CM

## 2024-08-05 DIAGNOSIS — M25.512 CHRONIC LEFT SHOULDER PAIN: Primary | ICD-10-CM

## 2024-08-05 PROCEDURE — 99204 OFFICE O/P NEW MOD 45 MIN: CPT | Performed by: ANESTHESIOLOGY

## 2024-08-05 NOTE — PROGRESS NOTES
Chronic Pain Consult Note      Plan:     A comprehensive pain management plan may consist of the following: Testing, Therapy, Medications, Interventions, Consults, and Follow up.    Chronic left shoulder pain  Patient is adrenally suppressed and cannot receive steroid-based injections  Currently undergoing physical therapy  Status post Toradol injection  Scheduled for left suprascapular nerve block with potential RFA pending response  Fibromyalgia  Status post trials of antiepileptics without benefit or experience side effects  Lupus  Receiving tramadol per outside prescriber  Chronic pain syndrome  Encourage continuation of active healthy lifestyle    General Recommendations: The pain condition that the patient suffers from is best treated with a multidisciplinary approach that involves an increase in physical activity to prevent de-conditioning and worsening of the pain cycle, as well as psychological counseling (formal and/or informal) to address the co morbid psychological effects of pain. Treatment will often involve judicious use of pain medications and interventional procedures to decrease the pain, allowing the patient to participate in the physical activity that will ultimately produce long-lasting pain reductions. The goal of the multidisciplinary approach is to return the patient to a higher level of overall function and to restore their ability to perform activities of daily living.      Referring Provider: Haile Lee PA  Assessment:      Chief Complaint: No chief complaint on file.      Sarah Cortez is a 54 y.o. female [facility] for the following diagnoses:    Diagnosis:  No diagnosis found.      Subjective:      HPI:  Ms. Cortez is seen in consultation at the request of Haile Lee PA for evaluation and recommendations regarding the above diagnoses and the below HPI.    HPI on08/05/24: 54-year-old female presents for evaluation treatment of left

## 2024-08-06 ENCOUNTER — OFFICE VISIT (OUTPATIENT)
Age: 54
End: 2024-08-06
Payer: COMMERCIAL

## 2024-08-06 DIAGNOSIS — M79.631 RIGHT FOREARM PAIN: ICD-10-CM

## 2024-08-06 DIAGNOSIS — R22.31 FOREARM MASS, RIGHT: Primary | ICD-10-CM

## 2024-08-06 PROCEDURE — 99213 OFFICE O/P EST LOW 20 MIN: CPT | Performed by: NURSE PRACTITIONER

## 2024-08-06 NOTE — PROGRESS NOTES
Orthopaedic Hand Clinic Note    Name: Sarah Cortez  YOB: 1970  Gender: female  MRN: [id number]      CC: Patient seen today for right forearm pain.    HPI: Sarah Cortez is a 54 y.o. female right hand dominant with a chief complaint of right forearm pain.  She said over the last several weeks she has noticed pain and swelling to her right forearm.  She and her physical therapist have noticed nodules or cyst in the right forearm.  She said they are tender to touch.    ROS/Meds/PSH/PMH/FH/SH: I personally reviewed the patients standard intake form.  Pertinents are discussed in the HPI    Physical Examination:  General: Awake and alert.  HEENT: Normocephalic, atraumatic  CV/Pulm: Breathing even and unlabored  Skin: No obvious rashes noted.  Lymphatic: No obvious evidence of lymphedema or lymphadenopathy    Musculoskeletal Exam:  Examination on the right upper extremity demonstrates cap refill < 5 seconds in all fingers  Patient has mild swelling to the right forearm, compared to the contralateral side.  She has several areas on the volar aspect that are firm and tender to palpation.  She denies paresthesia.  She does have some discomfort at the lateral epicondyle and medial epicondyle.  She has no pain over the olecranon.    Imaging / Electrodiagnostic Tests:     None    Assessment:   1. Forearm mass, right    2. Right forearm pain        Plan:   We discussed the diagnosis and different treatment options. We discussed observation, therapy, antiinflammatory medications and other pertinent treatment modalities.    After discussing in detail the patient elects to proceed with MRI to evaluate the areas of concern.  I will reassess her progress back after MRIs completed on today where Dr. Polanco is in clinic..     Patient voiced accordance and understanding of the information provided and the formulated plan. All questions were answered to the patient's satisfaction during the

## 2024-08-09 ENCOUNTER — HOSPITAL ENCOUNTER (OUTPATIENT)
Age: 54
Discharge: HOME | End: 2024-08-09
Payer: COMMERCIAL

## 2024-08-09 DIAGNOSIS — M79.631 RIGHT FOREARM PAIN: ICD-10-CM

## 2024-08-09 DIAGNOSIS — R22.31 FOREARM MASS, RIGHT: ICD-10-CM

## 2024-08-09 PROCEDURE — A9579 GAD-BASE MR CONTRAST NOS,1ML: HCPCS | Performed by: NURSE PRACTITIONER

## 2024-08-09 PROCEDURE — 6360000004 HC RX CONTRAST MEDICATION: Performed by: NURSE PRACTITIONER

## 2024-08-09 PROCEDURE — 73220 MRI UPPR EXTREMITY W/O&W/DYE: CPT

## 2024-08-09 RX ADMIN — GADOTERIDOL 15 ML: 279.3 INJECTION, SOLUTION INTRAVENOUS at 08:59

## 2024-08-16 ENCOUNTER — OFFICE VISIT (OUTPATIENT)
Dept: ORTHOPEDIC SURGERY | Age: 54
End: 2024-08-16
Payer: COMMERCIAL

## 2024-08-16 DIAGNOSIS — M25.512 LEFT SHOULDER PAIN, UNSPECIFIED CHRONICITY: ICD-10-CM

## 2024-08-16 DIAGNOSIS — M67.922 TENDINOPATHY OF LEFT BICEPS TENDON: Primary | ICD-10-CM

## 2024-08-16 PROCEDURE — 99214 OFFICE O/P EST MOD 30 MIN: CPT | Performed by: PHYSICIAN ASSISTANT

## 2024-08-21 NOTE — PROGRESS NOTES
Name: Sarah Cortez  YOB: 1970  Gender: female  MRN: [id number]    CC:   Chief Complaint   Patient presents with    Follow-up     Left        HPI: Patient presents for follow-up evaluation of the left shoulder.  Patient previously had a Toradol injection into the biceps tendon on 6-.  The patient did have some relief with this injection but it did not last a long time.  The patient also saw Dr. Rodriguez on 8-6-24 and has been scheduled for an ablation.  Recall that we were working on conservative measures as the patient had other medical conditions going on.  She has been having issues with her adrenal glands which are still unstable.  However, she was cleared for hernia surgery in July which she tolerated well.  She has done physical therapy but feels as though she has plateaued.  Denies numbness and tingling.    Allergies   Allergen Reactions    Furosemide Other (See Comments)     Edema of lower extremities      Sulfa Antibiotics Other (See Comments) and Rash     GI Upset    Adhesive Tape     Other Other (See Comments)     Oral steroids     Past Medical History:   Diagnosis Date    Anxiety     Chronic pain     DDD (degenerative disc disease), cervical 11/29/2021    C3-C7 Disc Degeneration w/ Posterior Spondylotic Disc Displaceemnts per MRI    Depressed     GERD (gastroesophageal reflux disease)     Headache     Hypertension     IBS (irritable bowel syndrome)     Insomnia     Neuropathy     Panic attack     Sleep difficulties     TMJ (dislocation of temporomandibular joint) 04/2021    Vestibular migraine     Vestibular neuritis      Past Surgical History:   Procedure Laterality Date    CATARACT REMOVAL Bilateral 02/2020    COLONOSCOPY      EGD DELIVER THERMAL ENERGY SPHNCTR/CARDIA GERD      EYE SURGERY      HIATAL HERNIA REPAIR  07/29/2024    also a procedure to help with acid reflux    LIPECTOMY      TUBAL LIGATION      UPPER GASTROINTESTINAL ENDOSCOPY       Family History

## 2024-09-03 ENCOUNTER — OFFICE VISIT (OUTPATIENT)
Dept: ORTHOPEDIC SURGERY | Age: 54
End: 2024-09-03
Payer: COMMERCIAL

## 2024-09-03 DIAGNOSIS — M67.922 TENDINOPATHY OF LEFT BICEPS TENDON: Primary | ICD-10-CM

## 2024-09-03 DIAGNOSIS — M25.512 LEFT SHOULDER PAIN, UNSPECIFIED CHRONICITY: ICD-10-CM

## 2024-09-03 PROCEDURE — 99213 OFFICE O/P EST LOW 20 MIN: CPT | Performed by: ORTHOPAEDIC SURGERY

## 2024-09-03 NOTE — PROGRESS NOTES
Name: Sarah Soto Cortez  YOB: 1970  Gender: female  MRN: [id number]    CC:   Chief Complaint   Patient presents with    Follow-up     MRI results left shoulder   Left shoulder pain    HPI:  This patient presents for MRI results review who has previously seen multiple providers in this practice for several injuries.  The patient has been seeing my PA.  Patient had a toradol injection into the biceps tendon 6-12-24 as she is unable to have steroids  due to adrenal insufficiency.  She had some relief with CSI but has felt she has plateaued.   Patient also had an ablation scheduled by Dr. Rodriguez due to not wanting to proceed surgically secondary to underlying medical conditions.  The patient was cleared for hernia surgery so may be healthy enough to proceed surgically with her shoulder if indicated.     Allergies   Allergen Reactions    Furosemide Other (See Comments)     Edema of lower extremities      Sulfa Antibiotics Other (See Comments) and Rash     GI Upset    Adhesive Tape     Other Other (See Comments)     Oral steroids     Past Medical History:   Diagnosis Date    Anxiety     Chronic pain     DDD (degenerative disc disease), cervical 11/29/2021    C3-C7 Disc Degeneration w/ Posterior Spondylotic Disc Displaceemnts per MRI    Depressed     GERD (gastroesophageal reflux disease)     Headache     Hypertension     IBS (irritable bowel syndrome)     Insomnia     Neuropathy     Panic attack     Sleep difficulties     TMJ (dislocation of temporomandibular joint) 04/2021    Vestibular migraine     Vestibular neuritis      Past Surgical History:   Procedure Laterality Date    CATARACT REMOVAL Bilateral 02/2020    COLONOSCOPY      EGD DELIVER THERMAL ENERGY SPHNCTR/CARDIA GERD      EYE SURGERY      HIATAL HERNIA REPAIR  07/29/2024    also a procedure to help with acid reflux    LIPECTOMY      TUBAL LIGATION      UPPER GASTROINTESTINAL ENDOSCOPY       Family History   Problem Relation Age of Onset

## 2024-09-17 ENCOUNTER — OFFICE VISIT (OUTPATIENT)
Dept: ORTHOPEDIC SURGERY | Age: 54
End: 2024-09-17
Payer: COMMERCIAL

## 2024-09-17 DIAGNOSIS — M25.512 CHRONIC LEFT SHOULDER PAIN: Primary | ICD-10-CM

## 2024-09-17 DIAGNOSIS — G89.29 CHRONIC LEFT SHOULDER PAIN: Primary | ICD-10-CM

## 2024-09-17 PROCEDURE — 64418 NJX AA&/STRD SPRSCAP NRV: CPT | Performed by: ANESTHESIOLOGY

## 2024-09-17 PROCEDURE — 77002 NEEDLE LOCALIZATION BY XRAY: CPT | Performed by: ANESTHESIOLOGY

## 2024-10-04 ENCOUNTER — TELEPHONE (OUTPATIENT)
Dept: ORTHOPEDIC SURGERY | Age: 54
End: 2024-10-04

## 2024-10-04 NOTE — TELEPHONE ENCOUNTER
Please advise me, does DR see for vas necrosis, thinks her knee and feet are connected /will dr see her for this?

## 2024-10-09 ENCOUNTER — OFFICE VISIT (OUTPATIENT)
Age: 54
End: 2024-10-09
Payer: COMMERCIAL

## 2024-10-09 DIAGNOSIS — M77.11 RIGHT TENNIS ELBOW: Primary | ICD-10-CM

## 2024-10-09 PROCEDURE — 20551 NJX 1 TENDON ORIGIN/INSJ: CPT | Performed by: NURSE PRACTITIONER

## 2024-10-09 PROCEDURE — 99214 OFFICE O/P EST MOD 30 MIN: CPT | Performed by: NURSE PRACTITIONER

## 2024-10-09 RX ORDER — KETOROLAC TROMETHAMINE 30 MG/ML
30 INJECTION, SOLUTION INTRAMUSCULAR; INTRAVENOUS ONCE
Status: COMPLETED | OUTPATIENT
Start: 2024-10-09 | End: 2024-10-09

## 2024-10-09 RX ORDER — NAPROXEN 500 MG/1
500 TABLET ORAL DAILY
Qty: 60 TABLET | Refills: 0 | Status: SHIPPED | OUTPATIENT
Start: 2024-10-09 | End: 2024-12-08

## 2024-10-09 RX ADMIN — KETOROLAC TROMETHAMINE 30 MG: 30 INJECTION, SOLUTION INTRAMUSCULAR; INTRAVENOUS at 16:33

## 2024-10-09 NOTE — PROGRESS NOTES
Orthopaedic Hand Surgery Note    Name: Sarah Soto Cortez  YOB: 1970  Gender: female  MRN: [id number]    CC: Patient seen today for right elbow pain    HPI: Patient is a 54 y.o. female with a chief complaint of right elbow pain. Pain is located on the lateral side of the elbow and radiates down the forearm, denies numbness and paresthesias.  Symptoms have been going on for 5 days, pain is aggravated with lifting heavy objects, alleviated by rest. Other complaints include pain with driving.  She states that the compressive sleeve made her pain worse.  She notes improvement temporarily with a heating pad, compounded amitriptyline cream, and naproxen.  She is unable to have steroids due to adrenal insufficiency.    ROS/Meds/PSH/PMH/FH/SH: I personally reviewed the patients standard intake form.  Pertinents are discussed in the HPI    Physical Examination:  Musculoskeletal:   Examination on the right upper extremity demonstrates normal sensation to light touch in the median, ulnar and radial distribution.  There is  severe tenderness on the lateral epicondyle, no tenderness on the medial epicondyle or the olecranon, pain is aggravated by resisted wrist and finger extension, there is no pain with palpation of the radial tunnel, negative Tinel along the radial nerve tract, no pain with resisted supination, no pain with resisted pronation. Elbow range of motion is full and pain free.    Imaging / Electrodiagnostic Tests:     None    Assessment:     ICD-10-CM    1. Right tennis elbow  M77.11 Ambulatory Referral to [facility]     ketorolac (TORADOL) injection 30 mg          Plan:  We discussed the diagnosis and different treatment options. We discussed observation, bracing, cortisone injections, therapy and lateral epicondyle debridement with tendon reattachment surgery.  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

## 2024-10-15 ENCOUNTER — OFFICE VISIT (OUTPATIENT)
Dept: ORTHOPEDIC SURGERY | Age: 54
End: 2024-10-15
Payer: COMMERCIAL

## 2024-10-15 ENCOUNTER — OFFICE VISIT (OUTPATIENT)
Age: 54
End: 2024-10-15
Payer: COMMERCIAL

## 2024-10-15 DIAGNOSIS — M17.12 ARTHRITIS OF LEFT KNEE: ICD-10-CM

## 2024-10-15 DIAGNOSIS — M25.562 LEFT KNEE PAIN, UNSPECIFIED CHRONICITY: Primary | ICD-10-CM

## 2024-10-15 DIAGNOSIS — M77.11 LATERAL EPICONDYLITIS OF RIGHT ELBOW: ICD-10-CM

## 2024-10-15 DIAGNOSIS — M25.512 CHRONIC LEFT SHOULDER PAIN: Primary | ICD-10-CM

## 2024-10-15 DIAGNOSIS — G89.29 CHRONIC LEFT SHOULDER PAIN: Primary | ICD-10-CM

## 2024-10-15 DIAGNOSIS — M87.88 KNEE PAIN WITH AVASCULAR NECROSIS DETERMINED BY X-RAY: ICD-10-CM

## 2024-10-15 PROCEDURE — 99213 OFFICE O/P EST LOW 20 MIN: CPT | Performed by: PHYSICIAN ASSISTANT

## 2024-10-15 PROCEDURE — 99214 OFFICE O/P EST MOD 30 MIN: CPT | Performed by: ORTHOPAEDIC SURGERY

## 2024-10-15 NOTE — PROGRESS NOTES
Name: Sarah Soto Cortez  YOB: 1970  Gender: female  MRN: [id number]    CC:   Chief Complaint   Patient presents with    Knee Pain     Left knee     Left  KNEE PAIN; STIFFNESS     HPI: Established patient previously seen for the shoulder presents for evaluation of left knee pain.  She states that patient had several falls in the  that caused her knee to hurt.  She states that she has been limping for a long time which ultimately caused some ankle and low back pain as well.  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  In regards to her knee, she states that this causes her to limp and feels it will occasionally give out.  She has used a brace and has previously used a cane or holds onto things when ambulating to prevent her from falling.  She notes some swelling about the knee.  She notes that she avoids squatting down but when she used to do that it did pop a lot.  Recall the patient is unable to have any Type of corticosteroid injection given her adrenal insufficiency.  Has had prior physical therapy.    Allergies   Allergen Reactions    Furosemide Other (See Comments)     Edema of lower extremities      Sulfa Antibiotics Other (See Comments) and Rash     GI Upset    Adhesive Tape     Other Other (See Comments)     Oral steroids     Past Medical History:   Diagnosis Date    Anxiety     Chronic pain     DDD (degenerative disc disease), cervical 11/29/2021    C3-C7 Disc Degeneration w/ Posterior Spondylotic Disc Displaceemnts per MRI    Depressed     GERD (gastroesophageal reflux disease)     Headache     Hypertension     IBS (irritable bowel syndrome)     Insomnia     Neuropathy     Panic attack     Sleep difficulties     TMJ (dislocation of temporomandibular joint) 04/2021    Vestibular migraine     Vestibular neuritis      Past Surgical History:   Procedure Laterality Date    CATARACT REMOVAL

## 2024-10-15 NOTE — PROGRESS NOTES
Chronic Pain Consult Note      Plan:     A comprehensive pain management plan may consist of the following: Testing, Therapy, Medications, Interventions, Consults, and Follow up.    Chronic left shoulder pain  Patient is adrenally suppressed and cannot receive steroid-based injections  Currently undergoing physical therapy  Status post Toradol injection  She is 4 weeks from her suprascapular block with ongoing pain relief. She did not have steroid involved in the injection. I suspect this acted more like a trigger point injection and has relaxed chronically inflamed/irritated muscles given it should not have started working a week after the fact. Recommend f/u in 8 weeks to gauge ongoing efficacy and then prn after that if still doing well.   Right Tennis Elbow  Continue with recommendations per Danielle Mccord.   Fibromyalgia  Status post trials of antiepileptics without benefit or experience side effects  Lupus  Receiving tramadol per outside prescriber  Chronic pain syndrome  Encourage continuation of active healthy lifestyle    General Recommendations: The pain condition that the patient suffers from is best treated with a multidisciplinary approach that involves an increase in physical activity to prevent de-conditioning and worsening of the pain cycle, as well as psychological counseling (formal and/or informal) to address the co morbid psychological effects of pain. Treatment will often involve judicious use of pain medications and interventional procedures to decrease the pain, allowing the patient to participate in the physical activity that will ultimately produce long-lasting pain reductions. The goal of the multidisciplinary approach is to return the patient to a higher level of overall function and to restore their ability to perform activities of daily living.      Referring Provider: No ref. provider found  Assessment:      Chief Complaint: post procedure (S/p left suprascapular nerve block/Pain level

## 2024-11-02 ASSESSMENT — PATIENT HEALTH QUESTIONNAIRE - PHQ9
8. MOVING OR SPEAKING SO SLOWLY THAT OTHER PEOPLE COULD HAVE NOTICED. OR THE OPPOSITE, BEING SO FIGETY OR RESTLESS THAT YOU HAVE BEEN MOVING AROUND A LOT MORE THAN USUAL: SEVERAL DAYS
3. TROUBLE FALLING OR STAYING ASLEEP: MORE THAN HALF THE DAYS
6. FEELING BAD ABOUT YOURSELF - OR THAT YOU ARE A FAILURE OR HAVE LET YOURSELF OR YOUR FAMILY DOWN: MORE THAN HALF THE DAYS
8. MOVING OR SPEAKING SO SLOWLY THAT OTHER PEOPLE COULD HAVE NOTICED. OR THE OPPOSITE - BEING SO FIDGETY OR RESTLESS THAT YOU HAVE BEEN MOVING AROUND A LOT MORE THAN USUAL: SEVERAL DAYS
SUM OF ALL RESPONSES TO PHQ QUESTIONS 1-9: 14
10. IF YOU CHECKED OFF ANY PROBLEMS, HOW DIFFICULT HAVE THESE PROBLEMS MADE IT FOR YOU TO DO YOUR WORK, TAKE CARE OF THINGS AT HOME, OR GET ALONG WITH OTHER PEOPLE: VERY DIFFICULT
2. FEELING DOWN, DEPRESSED OR HOPELESS: MORE THAN HALF THE DAYS
SUM OF ALL RESPONSES TO PHQ9 QUESTIONS 1 & 2: 3
1. LITTLE INTEREST OR PLEASURE IN DOING THINGS: SEVERAL DAYS
5. POOR APPETITE OR OVEREATING: MORE THAN HALF THE DAYS
9. THOUGHTS THAT YOU WOULD BE BETTER OFF DEAD, OR OF HURTING YOURSELF: NOT AT ALL
4. FEELING TIRED OR HAVING LITTLE ENERGY: NEARLY EVERY DAY
2. FEELING DOWN, DEPRESSED OR HOPELESS: MORE THAN HALF THE DAYS
6. FEELING BAD ABOUT YOURSELF - OR THAT YOU ARE A FAILURE OR HAVE LET YOURSELF OR YOUR FAMILY DOWN: MORE THAN HALF THE DAYS
9. THOUGHTS THAT YOU WOULD BE BETTER OFF DEAD, OR OF HURTING YOURSELF: NOT AT ALL
4. FEELING TIRED OR HAVING LITTLE ENERGY: NEARLY EVERY DAY
SUM OF ALL RESPONSES TO PHQ QUESTIONS 1-9: 14
7. TROUBLE CONCENTRATING ON THINGS, SUCH AS READING THE NEWSPAPER OR WATCHING TELEVISION: SEVERAL DAYS
10. IF YOU CHECKED OFF ANY PROBLEMS, HOW DIFFICULT HAVE THESE PROBLEMS MADE IT FOR YOU TO DO YOUR WORK, TAKE CARE OF THINGS AT HOME, OR GET ALONG WITH OTHER PEOPLE: VERY DIFFICULT
1. LITTLE INTEREST OR PLEASURE IN DOING THINGS: SEVERAL DAYS
SUM OF ALL RESPONSES TO PHQ QUESTIONS 1-9: 14
7. TROUBLE CONCENTRATING ON THINGS, SUCH AS READING THE NEWSPAPER OR WATCHING TELEVISION: SEVERAL DAYS
SUM OF ALL RESPONSES TO PHQ QUESTIONS 1-9: 14
SUM OF ALL RESPONSES TO PHQ QUESTIONS 1-9: 14
5. POOR APPETITE OR OVEREATING: MORE THAN HALF THE DAYS
3. TROUBLE FALLING OR STAYING ASLEEP: MORE THAN HALF THE DAYS

## 2024-11-02 ASSESSMENT — ANXIETY QUESTIONNAIRES
7. FEELING AFRAID AS IF SOMETHING AWFUL MIGHT HAPPEN: MORE THAN HALF THE DAYS
6. BECOMING EASILY ANNOYED OR IRRITABLE: MORE THAN HALF THE DAYS
5. BEING SO RESTLESS THAT IT IS HARD TO SIT STILL: MORE THAN HALF THE DAYS
3. WORRYING TOO MUCH ABOUT DIFFERENT THINGS: MORE THAN HALF THE DAYS
IF YOU CHECKED OFF ANY PROBLEMS ON THIS QUESTIONNAIRE, HOW DIFFICULT HAVE THESE PROBLEMS MADE IT FOR YOU TO DO YOUR WORK, TAKE CARE OF THINGS AT HOME, OR GET ALONG WITH OTHER PEOPLE: VERY DIFFICULT
1. FEELING NERVOUS, ANXIOUS, OR ON EDGE: MORE THAN HALF THE DAYS
4. TROUBLE RELAXING: MORE THAN HALF THE DAYS
1. FEELING NERVOUS, ANXIOUS, OR ON EDGE: MORE THAN HALF THE DAYS
5. BEING SO RESTLESS THAT IT IS HARD TO SIT STILL: MORE THAN HALF THE DAYS
2. NOT BEING ABLE TO STOP OR CONTROL WORRYING: MORE THAN HALF THE DAYS
3. WORRYING TOO MUCH ABOUT DIFFERENT THINGS: MORE THAN HALF THE DAYS
GAD7 TOTAL SCORE: 14
4. TROUBLE RELAXING: MORE THAN HALF THE DAYS
IF YOU CHECKED OFF ANY PROBLEMS ON THIS QUESTIONNAIRE, HOW DIFFICULT HAVE THESE PROBLEMS MADE IT FOR YOU TO DO YOUR WORK, TAKE CARE OF THINGS AT HOME, OR GET ALONG WITH OTHER PEOPLE: VERY DIFFICULT
7. FEELING AFRAID AS IF SOMETHING AWFUL MIGHT HAPPEN: MORE THAN HALF THE DAYS
2. NOT BEING ABLE TO STOP OR CONTROL WORRYING: MORE THAN HALF THE DAYS
6. BECOMING EASILY ANNOYED OR IRRITABLE: MORE THAN HALF THE DAYS

## 2024-11-04 ENCOUNTER — TELEMEDICINE (OUTPATIENT)
Dept: BEHAVIORAL/MENTAL HEALTH CLINIC | Age: 54
End: 2024-11-04
Payer: COMMERCIAL

## 2024-11-04 DIAGNOSIS — F33.0 MILD EPISODE OF RECURRENT MAJOR DEPRESSIVE DISORDER (HCC): Primary | ICD-10-CM

## 2024-11-04 DIAGNOSIS — G89.4 CHRONIC PAIN DISORDER: ICD-10-CM

## 2024-11-04 DIAGNOSIS — F06.4 ANXIETY DISORDER DUE TO GENERAL MEDICAL CONDITION: ICD-10-CM

## 2024-11-04 DIAGNOSIS — Z65.8 PSYCHOSOCIAL STRESSORS: ICD-10-CM

## 2024-11-04 DIAGNOSIS — F41.1 GENERALIZED ANXIETY DISORDER: ICD-10-CM

## 2024-11-04 DIAGNOSIS — F51.05 INSOMNIA DUE TO MENTAL DISORDER: ICD-10-CM

## 2024-11-04 DIAGNOSIS — F06.31 DEPRESSIVE DISORDER DUE TO ANOTHER MEDICAL CONDITION WITH DEPRESSIVE FEATURES: ICD-10-CM

## 2024-11-04 DIAGNOSIS — G93.32 CHRONIC FATIGUE SYNDROME: ICD-10-CM

## 2024-11-04 PROCEDURE — 99214 OFFICE O/P EST MOD 30 MIN: CPT | Performed by: PSYCHIATRY & NEUROLOGY

## 2024-11-04 RX ORDER — CLONAZEPAM 0.5 MG/1
0.5 TABLET ORAL 2 TIMES DAILY PRN
Qty: 60 TABLET | Refills: 3 | Status: SHIPPED | OUTPATIENT
Start: 2024-11-04 | End: 2025-03-04

## 2024-11-04 RX ORDER — ATOMOXETINE 80 MG/1
80 CAPSULE ORAL DAILY
Qty: 90 CAPSULE | Refills: 1 | Status: SHIPPED | OUTPATIENT
Start: 2024-11-04 | End: 2025-05-03

## 2024-11-04 RX ORDER — LEVOMEFOLATE CALCIUM 7.5 MG TABLET
7.5 TABLET DAILY
Qty: 30 TABLET | Refills: 5 | Status: CANCELLED | OUTPATIENT
Start: 2024-11-04

## 2024-11-04 RX ORDER — TRAZODONE HYDROCHLORIDE 50 MG/1
50 TABLET, FILM COATED ORAL NIGHTLY
Qty: 90 TABLET | Refills: 1 | Status: SHIPPED | OUTPATIENT
Start: 2024-11-04

## 2024-11-04 RX ORDER — CITALOPRAM HYDROBROMIDE 20 MG/1
TABLET ORAL
Qty: 90 TABLET | Refills: 1 | Status: SHIPPED | OUTPATIENT
Start: 2024-11-04

## 2024-11-04 RX ORDER — CITALOPRAM HYDROBROMIDE 10 MG/1
TABLET ORAL
Qty: 90 TABLET | Refills: 1 | Status: SHIPPED | OUTPATIENT
Start: 2024-11-04

## 2024-11-04 NOTE — PROGRESS NOTES
Patient:  Sarah Cortez  Age:  54 y.o.  :  1970     SEX:  female MRN:  [id number]     RACE: Black /      SEEN:  [x]  PATIENT  []  SPOUSE []  OTHER:                  2024     7:46 PM 2024     1:41 PM 2024     7:57 AM   PHQ-9    Little interest or pleasure in doing things 1 1 1   Feeling down, depressed, or hopeless 2 2 2   Trouble falling or staying asleep, or sleeping too much 2 2 1   Feeling tired or having little energy 3 2 2   Poor appetite or overeating 2 2 1   Feeling bad about yourself - or that you are a failure or have let yourself or your family down 2 2 1   Trouble concentrating on things, such as reading the newspaper or watching television 1 1 1   Moving or speaking so slowly that other people could have noticed. Or the opposite - being so fidgety or restless that you have been moving around a lot more than usual 1 1 1   Thoughts that you would be better off dead, or of hurting yourself in some way 0 0 0   PHQ-2 Score 3 3 3   PHQ-9 Total Score 14 13 10   If you checked off any problems, how difficult have these problems made it for you to do your work, take care of things at home, or get along with other people? 2 2 1           2024     7:45 PM 2024     1:40 PM 2024     7:56 AM   OFELIA-7 SCREENING   Feeling nervous, anxious, or on edge More than half the days More than half the days More than half the days   Not being able to stop or control worrying More than half the days More than half the days More than half the days   Worrying too much about different things More than half the days More than half the days More than half the days   Trouble relaxing More than half the days More than half the days More than half the days   Being so restless that it is hard to sit still More than half the days More than half the days More than half the days   Becoming easily annoyed or irritable More than half the days More than half the days Several

## 2024-11-12 ENCOUNTER — OFFICE VISIT (OUTPATIENT)
Dept: ORTHOPEDIC SURGERY | Age: 54
End: 2024-11-12
Payer: COMMERCIAL

## 2024-11-12 DIAGNOSIS — M79.601 RIGHT ARM PAIN: ICD-10-CM

## 2024-11-12 DIAGNOSIS — M19.011 ARTHRITIS OF RIGHT ACROMIOCLAVICULAR JOINT: Primary | ICD-10-CM

## 2024-11-12 PROCEDURE — 99214 OFFICE O/P EST MOD 30 MIN: CPT | Performed by: ORTHOPAEDIC SURGERY

## 2024-11-12 NOTE — PROGRESS NOTES
Name: Sarah Soto Cortez  YOB: 1970  Gender: female  MRN: [id number]    CC:   Chief Complaint   Patient presents with    Shoulder Pain     R Shoulder         HPI: Established patient previously seen for the left shoulder and left knee presents for evaluation of right shoulder pain.  The patient has been in treatment with upper extremity and has seen Tessie for the wrist and forearm.  She states that she also had a lateral epicondyle injection which did not give alleviation of symptoms.  She states that she has weakness which comes from the hand wrist and goes up into the shoulder.  Over the last 2 weeks she has noticed some popping.  She denies pain associated with the popping and states her biggest complaint is the weakness.  No new injury.  Sounds like she is getting set up for potential nerve block.  She has been compliant with the brace which she states helps some.    Allergies   Allergen Reactions    Furosemide Other (See Comments)     Edema of lower extremities      Sulfa Antibiotics Other (See Comments) and Rash     GI Upset    Adhesive Tape     Other Other (See Comments)     Oral steroids     Past Medical History:   Diagnosis Date    Anxiety     Chronic pain     DDD (degenerative disc disease), cervical 11/29/2021    C3-C7 Disc Degeneration w/ Posterior Spondylotic Disc Displaceemnts per MRI    Depressed     GERD (gastroesophageal reflux disease)     Headache     Hypertension     IBS (irritable bowel syndrome)     Insomnia     Neuropathy     Panic attack     Sleep difficulties     TMJ (dislocation of temporomandibular joint) 04/2021    Vestibular migraine     Vestibular neuritis      Past Surgical History:   Procedure Laterality Date    CATARACT REMOVAL Bilateral 02/2020    COLONOSCOPY      EGD DELIVER THERMAL ENERGY SPHNCTR/CARDIA GERD      EYE SURGERY      HIATAL HERNIA REPAIR  07/29/2024    also a procedure to help with acid reflux    LIPECTOMY      TUBAL LIGATION      UPPER

## 2024-11-20 ENCOUNTER — CLINICAL DOCUMENTATION (OUTPATIENT)
Dept: ORTHOPEDIC SURGERY | Age: 54
End: 2024-11-20

## 2024-11-21 ENCOUNTER — TELEPHONE (OUTPATIENT)
Dept: BEHAVIORAL/MENTAL HEALTH CLINIC | Age: 54
End: 2024-11-21

## 2024-11-21 ENCOUNTER — OFFICE VISIT (OUTPATIENT)
Dept: ORTHOPEDIC SURGERY | Age: 54
End: 2024-11-21
Payer: COMMERCIAL

## 2024-11-21 DIAGNOSIS — M17.12 ARTHRITIS OF LEFT KNEE: ICD-10-CM

## 2024-11-21 DIAGNOSIS — M25.562 LEFT KNEE PAIN, UNSPECIFIED CHRONICITY: Primary | ICD-10-CM

## 2024-11-21 PROCEDURE — 20610 DRAIN/INJ JOINT/BURSA W/O US: CPT | Performed by: PHYSICIAN ASSISTANT

## 2024-11-21 NOTE — TELEPHONE ENCOUNTER
Imer Saenz will fax over forms to complete for my recertification on my long term disability.  You can do the same as last year which I think was a physician narrative on my condition and impact with working.       Thank you

## 2024-11-22 ENCOUNTER — TELEPHONE (OUTPATIENT)
Dept: BEHAVIORAL/MENTAL HEALTH CLINIC | Age: 54
End: 2024-11-22

## 2024-11-22 NOTE — TELEPHONE ENCOUNTER
Received form for completion from [employer]. Placed for Dr Nelson for review.       They also sent a release to request records from 2/1/24-present to be faxed to them.

## 2024-11-25 DIAGNOSIS — M77.11 RIGHT TENNIS ELBOW: Primary | ICD-10-CM

## 2024-11-25 RX ORDER — HYALURONATE SODIUM 10 MG/ML
20 SYRINGE (ML) INTRAARTICULAR ONCE
Status: COMPLETED | OUTPATIENT
Start: 2024-11-25 | End: 2024-11-25

## 2024-11-25 RX ADMIN — Medication 20 MG: at 12:00

## 2024-11-25 NOTE — PROGRESS NOTES
Name: Sarah Soto Cortez  YOB: 1970  Gender: female  MRN: [id number]    CC:   Chief Complaint   Patient presents with    Injections     Left knee euflexxa #1   left knee pain, OA    Patient is here for #1 of 3 visco injections.  All questions answered.     PROCEDURE NOTE:  After discussion of risks and benefits including but not limited to pain, infection, skin discoloration, and injury to blood vessels or nerves the patient verbally consented to proceed with injection of the affected knee.  The affected left knee was sterilely prepped in standard fashion and injected with 2 cc of Euflexxa. The patient tolerated the injection well.  Will follow up as scheduled.    Disposition: The patient is to restrict their activity for 48 hours.     Review of Systems  NC      We discussed comparing old images to new images.  We have reached out to [facility] as we do not have access to old images.     ICD-10-CM    1. Left knee pain, unspecified chronicity  M25.562 sodium hyaluronate (EUFLEXXA, HYALGAN) injection 20 mg     DRAIN/INJECT LARGE JOINT/BURSA      2. Arthritis of left knee  M17.12 sodium hyaluronate (EUFLEXXA, HYALGAN) injection 20 mg     DRAIN/INJECT LARGE JOINT/BURSA           SU Cooley  11/25/24

## 2024-12-05 ENCOUNTER — OFFICE VISIT (OUTPATIENT)
Dept: ORTHOPEDIC SURGERY | Age: 54
End: 2024-12-05
Payer: COMMERCIAL

## 2024-12-05 DIAGNOSIS — M17.12 ARTHRITIS OF LEFT KNEE: Primary | ICD-10-CM

## 2024-12-05 PROCEDURE — 20610 DRAIN/INJ JOINT/BURSA W/O US: CPT | Performed by: PHYSICIAN ASSISTANT

## 2024-12-05 RX ORDER — HYALURONATE SODIUM 10 MG/ML
20 SYRINGE (ML) INTRAARTICULAR ONCE
Status: COMPLETED | OUTPATIENT
Start: 2024-12-05 | End: 2024-12-05

## 2024-12-05 RX ADMIN — Medication 20 MG: at 12:09

## 2024-12-05 NOTE — PROGRESS NOTES
Name: Sarah Soto Cortez  YOB: 1970  Gender: female  MRN: [id number]    CC:   Chief Complaint   Patient presents with    Injections     Left knee euflexxa #2   left knee pain, OA    Patient is here for #2 of 3 visco injections.  All questions answered.     PROCEDURE NOTE:  After discussion of risks and benefits including but not limited to pain, infection, skin discoloration, and injury to blood vessels or nerves the patient verbally consented to proceed with injection of the affected knee.  The affected left knee was sterilely prepped in standard fashion and injected with 2 cc of Euflexxa. The patient tolerated the injection well.  Will follow up as scheduled.    Disposition: The patient is to restrict their activity for 48 hours.     Review of Systems  NC      ICD-10-CM    1. Arthritis of left knee  M17.12 sodium hyaluronate (EUFLEXXA, HYALGAN) injection 20 mg     DRAIN/INJECT LARGE JOINT/BURSA           SU Cooley  12/05/24

## 2024-12-06 ENCOUNTER — CLINICAL DOCUMENTATION (OUTPATIENT)
Dept: ORTHOPEDIC SURGERY | Age: 54
End: 2024-12-06

## 2024-12-09 ENCOUNTER — OFFICE VISIT (OUTPATIENT)
Age: 54
End: 2024-12-09
Payer: COMMERCIAL

## 2024-12-09 DIAGNOSIS — M25.531 RIGHT WRIST PAIN: Primary | ICD-10-CM

## 2024-12-09 DIAGNOSIS — M77.11 RIGHT TENNIS ELBOW: ICD-10-CM

## 2024-12-09 DIAGNOSIS — M65.939 FCR (FLEXOR CARPI RADIALIS) TENOSYNOVITIS: ICD-10-CM

## 2024-12-09 PROCEDURE — M5029 MISC REPAREL WRIST: HCPCS | Performed by: NURSE PRACTITIONER

## 2024-12-09 PROCEDURE — 99214 OFFICE O/P EST MOD 30 MIN: CPT | Performed by: NURSE PRACTITIONER

## 2024-12-09 NOTE — PROGRESS NOTES
Orthopaedic Hand Surgery Note    Name: Sarah Soto Cortez  YOB: 1970  Gender: female  MRN: [id number]    Follow up: Right elbow Pain    HPI: Patient is a 54 y.o. female who return for evaluation of right lateral epicondylitis.  On the last visit we provided a brace and a Toradol injection.  She reports the injection did not help at all.  She started therapy shortly after.  She has had 1 visit and she feels that that has been extremely helpful.  She also is wearing a tennis elbow strap.    10/9/24 Patient is a 54 y.o. female with a chief complaint of right elbow pain. Pain is located on the lateral side of the elbow and radiates down the forearm, denies numbness and paresthesias.  Symptoms have been going on for 5 days, pain is aggravated with lifting heavy objects, alleviated by rest. Other complaints include pain with driving.  She states that the compressive sleeve made her pain worse.  She notes improvement temporarily with a heating pad, compounded amitriptyline cream, and naproxen.  She is unable to have steroids due to adrenal insufficiency.     ROS/Meds/PSH/PMH/FH/SH: I personally reviewed the patients standard intake form.  Pertinents are discussed in the HPI    Physical Examination:    Musculoskeletal:   Examination on the rightupper extremity demonstrates normal sensation to light touch in the median, ulnar and radial distribution, cap refill < 5 seconds in all fingers.  There is mild tenderness on the lateral epicondyle, no tenderness on the medial epicondyle or the olecranon, pain on the lateral elbow is aggravated by resisted wrist and finger extension, there is no pain with palpation of the radial tunnel, negative Tinel along the radial nerve tract, no pain with resisted supination, no pain with resisted pronation.  Patient has mild tenderness to the volar aspect of the right wrist.  She does have pain with resisted finger flexion.  There is no crepitus noted.    Imaging /

## 2024-12-11 ENCOUNTER — OFFICE VISIT (OUTPATIENT)
Age: 54
End: 2024-12-11
Payer: COMMERCIAL

## 2024-12-11 DIAGNOSIS — M77.11 LATERAL EPICONDYLITIS OF RIGHT ELBOW: Primary | ICD-10-CM

## 2024-12-11 PROCEDURE — 99213 OFFICE O/P EST LOW 20 MIN: CPT | Performed by: ANESTHESIOLOGY

## 2024-12-11 NOTE — PROGRESS NOTES
CATARACT REMOVAL Bilateral 02/2020    COLONOSCOPY      EGD DELIVER THERMAL ENERGY SPHNCTR/CARDIA GERD      EYE SURGERY      HIATAL HERNIA REPAIR  07/29/2024    also a procedure to help with acid reflux    LIPECTOMY      TUBAL LIGATION      UPPER GASTROINTESTINAL ENDOSCOPY          Family History   Problem Relation Age of Onset    Heart Disease Father     Hypertension Father     High Cholesterol Mother     GERD Mother            Social History     Socioeconomic History    Marital status:      Spouse name: Not on file    Number of children: Not on file    Years of education: Not on file    Highest education level: Not on file   Occupational History    Not on file   Tobacco Use    Smoking status: Never    Smokeless tobacco: Former     Types: Chew   Vaping Use    Vaping status: Never Used   Substance and Sexual Activity    Alcohol use: Never    Drug use: Never    Sexual activity: Not Currently     Partners: Male   Other Topics Concern    Not on file   Social History Narrative    Not on file     Social Determinants of Health     Financial Resource Strain: Low Risk  (7/30/2024)    Received from [facility]    Financial Resource Strain     Difficulty Paying Living Expenses: Not very hard     Difficulty Paying Medical Expenses: No   Food Insecurity: No Food Insecurity (7/30/2024)    Received from [facility]    Food Insecurity     Worried about Running Out of Food in the Last Year: Never true     Ran Out of Food in the Last Year: Never true   Transportation Needs: No Transportation Needs (7/30/2024)    Received from [facility]    Transportation Needs     Lack of Transportation: No   Physical Activity: Not on file (7/30/2024)   Stress: No Stress Concern Present (7/30/2024)    Received from [facility]    Stress     Feeling of Stress : Only a little   Social Connections: Socially Integrated (7/30/2024)    Received from [facility]

## 2024-12-11 NOTE — PROGRESS NOTES
The patient was prescribed and fitted with a wrist sleeve for the right wrist.     Patient read and signed documenting they understand and agree to [facility]'s current DME return policy.

## 2024-12-12 ENCOUNTER — OFFICE VISIT (OUTPATIENT)
Dept: ORTHOPEDIC SURGERY | Age: 54
End: 2024-12-12
Payer: COMMERCIAL

## 2024-12-12 DIAGNOSIS — M17.12 ARTHRITIS OF LEFT KNEE: Primary | ICD-10-CM

## 2024-12-12 PROCEDURE — 20610 DRAIN/INJ JOINT/BURSA W/O US: CPT | Performed by: PHYSICIAN ASSISTANT

## 2024-12-12 RX ORDER — HYALURONATE SODIUM 10 MG/ML
20 SYRINGE (ML) INTRAARTICULAR ONCE
Status: COMPLETED | OUTPATIENT
Start: 2024-12-12 | End: 2024-12-12

## 2024-12-12 RX ADMIN — Medication 20 MG: at 10:21

## 2024-12-12 NOTE — PROGRESS NOTES
Name: Sarah Soto Cortez  YOB: 1970  Gender: female  MRN: [id number]    CC:   Chief Complaint   Patient presents with    Injections     Left knee euflexxa #3   left knee pain, OA    Patient is here for #3 of 3 visco injections.  All questions answered.     PROCEDURE NOTE:  After discussion of risks and benefits including but not limited to pain, infection, skin discoloration, and injury to blood vessels or nerves the patient verbally consented to proceed with injection of the affected knee.  The affected left knee was sterilely prepped in standard fashion and injected with 2 cc of Euflexxa. The patient tolerated the injection well.  Will follow up as scheduled.    Disposition: The patient is to restrict their activity for 48 hours.     Review of Systems  NC      ICD-10-CM    1. Arthritis of left knee  M17.12 sodium hyaluronate (EUFLEXXA, HYALGAN) injection 20 mg     DRAIN/INJECT LARGE JOINT/BURSA           SU Cooley  12/12/24

## 2024-12-16 ENCOUNTER — PATIENT MESSAGE (OUTPATIENT)
Dept: BEHAVIORAL/MENTAL HEALTH CLINIC | Age: 54
End: 2024-12-16

## 2024-12-17 ENCOUNTER — CLINICAL DOCUMENTATION (OUTPATIENT)
Dept: ORTHOPEDIC SURGERY | Age: 54
End: 2024-12-17

## 2025-01-02 ENCOUNTER — PATIENT MESSAGE (OUTPATIENT)
Dept: BEHAVIORAL/MENTAL HEALTH CLINIC | Age: 55
End: 2025-01-02
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
## 2025-01-07 ENCOUNTER — OFFICE VISIT (OUTPATIENT)
Dept: ORTHOPEDIC SURGERY | Age: 55
End: 2025-01-07
Payer: COMMERCIAL

## 2025-01-07 DIAGNOSIS — M79.674 PAIN IN RIGHT TOE(S): Primary | ICD-10-CM

## 2025-01-07 DIAGNOSIS — M79.671 RIGHT FOOT PAIN: ICD-10-CM

## 2025-01-07 DIAGNOSIS — M20.5X1 ACQUIRED CLAW TOE OF RIGHT FOOT: ICD-10-CM

## 2025-01-07 PROCEDURE — 99213 OFFICE O/P EST LOW 20 MIN: CPT | Performed by: ORTHOPAEDIC SURGERY

## 2025-01-07 NOTE — PROGRESS NOTES
MRI scan without contrast: Impression:  1. Mild calcaneocuboid osteoarthrosis.  2. No evidence of tarsal coalition, sinus Tarsi syndrome, or peroneal tendon pathology    11/15/2021: She presented with a new problem in the left ankle/foot.  Dr. Chase and pain management for left lower extremity numbness and pain  12/16/2021: She presented with foot pain that differed in frequency and in intensity.  Right hindfoot injection  01/12/2022: Ms. Thomson [facility]UN[facility] Omalley reported left foot pain since December 2021.  She reported injuring her left foot and inner thigh at [employer].    01/06/2022: [facility] primary care with negative x-rays... MRI scan ordered.  01/29/2022: Left foot MRI scan: Radiologic impression:  Unremarkable study.  No abnormality seen adjacent to the marker     05/13/2022: ...multiple providers.  She reports rheumatology diagnosed Fibromyalgia.    12/02/2022: She presented to assess to 2 conditions:  Left medial great toe pain: Right hindfoot pain that could radiate up her shin  Right hindfoot injection  02/09/2023: Greg Gelacio Cisneros NP diagnosed: ...\" piriformis syndrome and hamstring tendinitis. ...\"  02/10/2023: Ms. Cortez presented for increased left great toe shooting pain even at rest      01/31/2024: Initial visit: Chronic bilateral ankle/hindfoot pain felt hindfoot arthritic driven: Left bunion: [place] braces:    Injection: Not applicable    XR: Right: Standing AP lateral mortise ankle, AP oblique foot taken today with hindfoot arthritis; midfoot arthritis; minimal hallux varus; no obvious evidence for AVN collapse  XR Impression:  As above      Assessment:    Bilateral chronic hindfoot arthritis  Left bunion  Right mild hallux varus  Right 2-5 MTP synovitis, early claw toes    Plan:   The patient and I discussed the above assessment. We explored treatment options.     01/07/2025:  Bilateral chronic hindfoot arthritis: Prior prescribed [place] braces: She is stable in regular shoes at

## 2025-01-08 ENCOUNTER — OFFICE VISIT (OUTPATIENT)
Age: 55
End: 2025-01-08
Payer: COMMERCIAL

## 2025-01-08 DIAGNOSIS — M77.11 LATERAL EPICONDYLITIS OF RIGHT ELBOW: Primary | ICD-10-CM

## 2025-01-08 PROCEDURE — 20610 DRAIN/INJ JOINT/BURSA W/O US: CPT | Performed by: ANESTHESIOLOGY

## 2025-01-08 RX ORDER — BUPIVACAINE HYDROCHLORIDE 5 MG/ML
10 INJECTION, SOLUTION EPIDURAL; INTRACAUDAL ONCE
Status: COMPLETED | OUTPATIENT
Start: 2025-01-08 | End: 2025-01-08

## 2025-01-08 RX ADMIN — BUPIVACAINE HYDROCHLORIDE 50 MG: 5 INJECTION, SOLUTION EPIDURAL; INTRACAUDAL at 14:56

## 2025-01-08 NOTE — PROGRESS NOTES
Procedure: Right side lateral epicondylitis injection     Pre-op Diagnosis: Lateral epicondylitis    Post-op Diagnosis: Same     Anesthesia: Local only     Complications: None     Indication: As above    Procedure note:  After informed written consent was obtained from the patient. The patient was placed in a seated position. The targeted lateral elbow area was palpated for the point of maximal tenderness over the area where tennis elbow pain is present typically. Then 4 mils of 0.25% bupivacaine was injected. Another spot at the proximal right forearm extensor was also palpated and injected over the insertion of the forearm extensor muscles where remaining injection was performed. The patient tolerated the procedure well. There were no obvious complications were encountered. The patient was discharged in an awake and alert condition.

## 2025-01-09 ENCOUNTER — TELEMEDICINE (OUTPATIENT)
Dept: BEHAVIORAL/MENTAL HEALTH CLINIC | Age: 55
End: 2025-01-09
Payer: COMMERCIAL

## 2025-01-09 DIAGNOSIS — F06.31 DEPRESSIVE DISORDER DUE TO ANOTHER MEDICAL CONDITION WITH DEPRESSIVE FEATURES: ICD-10-CM

## 2025-01-09 DIAGNOSIS — F06.4 ANXIETY DISORDER DUE TO GENERAL MEDICAL CONDITION: ICD-10-CM

## 2025-01-09 DIAGNOSIS — G93.32 CHRONIC FATIGUE SYNDROME: ICD-10-CM

## 2025-01-09 DIAGNOSIS — F33.1 MODERATE EPISODE OF RECURRENT MAJOR DEPRESSIVE DISORDER (HCC): Primary | ICD-10-CM

## 2025-01-09 DIAGNOSIS — Z65.8 PSYCHOSOCIAL STRESSORS: ICD-10-CM

## 2025-01-09 DIAGNOSIS — F51.05 INSOMNIA DUE TO MENTAL DISORDER: ICD-10-CM

## 2025-01-09 DIAGNOSIS — G89.4 CHRONIC PAIN DISORDER: ICD-10-CM

## 2025-01-09 DIAGNOSIS — F41.1 GENERALIZED ANXIETY DISORDER: ICD-10-CM

## 2025-01-09 PROCEDURE — 99214 OFFICE O/P EST MOD 30 MIN: CPT | Performed by: PSYCHIATRY & NEUROLOGY

## 2025-01-09 RX ORDER — HYDROXYZINE HYDROCHLORIDE 25 MG/1
25 TABLET, FILM COATED ORAL EVERY 8 HOURS PRN
COMMUNITY
Start: 2024-11-20

## 2025-01-09 RX ORDER — CLONAZEPAM 0.5 MG/1
0.5 TABLET ORAL 2 TIMES DAILY PRN
Qty: 60 TABLET | Refills: 3 | Status: SHIPPED | OUTPATIENT
Start: 2025-01-09 | End: 2025-05-09

## 2025-01-09 RX ORDER — ATOMOXETINE 80 MG/1
80 CAPSULE ORAL DAILY
Qty: 90 CAPSULE | Refills: 1 | Status: SHIPPED | OUTPATIENT
Start: 2025-01-09 | End: 2025-07-08

## 2025-01-09 RX ORDER — CYCLOBENZAPRINE HCL 5 MG
5 TABLET ORAL
COMMUNITY
Start: 2024-12-31

## 2025-01-09 RX ORDER — CITALOPRAM HYDROBROMIDE 20 MG/1
TABLET ORAL
Qty: 90 TABLET | Refills: 1 | Status: SHIPPED | OUTPATIENT
Start: 2025-01-09

## 2025-01-09 RX ORDER — CITALOPRAM HYDROBROMIDE 10 MG/1
TABLET ORAL
Qty: 90 TABLET | Refills: 1 | Status: SHIPPED | OUTPATIENT
Start: 2025-01-09

## 2025-01-09 RX ORDER — TRAZODONE HYDROCHLORIDE 50 MG/1
50 TABLET, FILM COATED ORAL NIGHTLY
Qty: 90 TABLET | Refills: 1 | Status: SHIPPED | OUTPATIENT
Start: 2025-01-09

## 2025-01-09 ASSESSMENT — ANXIETY QUESTIONNAIRES
7. FEELING AFRAID AS IF SOMETHING AWFUL MIGHT HAPPEN: NEARLY EVERY DAY
1. FEELING NERVOUS, ANXIOUS, OR ON EDGE: NEARLY EVERY DAY
3. WORRYING TOO MUCH ABOUT DIFFERENT THINGS: NEARLY EVERY DAY
2. NOT BEING ABLE TO STOP OR CONTROL WORRYING: NEARLY EVERY DAY
4. TROUBLE RELAXING: NEARLY EVERY DAY
6. BECOMING EASILY ANNOYED OR IRRITABLE: NEARLY EVERY DAY
5. BEING SO RESTLESS THAT IT IS HARD TO SIT STILL: NEARLY EVERY DAY
GAD7 TOTAL SCORE: 21
IF YOU CHECKED OFF ANY PROBLEMS ON THIS QUESTIONNAIRE, HOW DIFFICULT HAVE THESE PROBLEMS MADE IT FOR YOU TO DO YOUR WORK, TAKE CARE OF THINGS AT HOME, OR GET ALONG WITH OTHER PEOPLE: VERY DIFFICULT

## 2025-01-09 ASSESSMENT — PATIENT HEALTH QUESTIONNAIRE - PHQ9
2. FEELING DOWN, DEPRESSED OR HOPELESS: MORE THAN HALF THE DAYS
9. THOUGHTS THAT YOU WOULD BE BETTER OFF DEAD, OR OF HURTING YOURSELF: NOT AT ALL
SUM OF ALL RESPONSES TO PHQ QUESTIONS 1-9: 19
10. IF YOU CHECKED OFF ANY PROBLEMS, HOW DIFFICULT HAVE THESE PROBLEMS MADE IT FOR YOU TO DO YOUR WORK, TAKE CARE OF THINGS AT HOME, OR GET ALONG WITH OTHER PEOPLE: VERY DIFFICULT
SUM OF ALL RESPONSES TO PHQ QUESTIONS 1-9: 19
6. FEELING BAD ABOUT YOURSELF - OR THAT YOU ARE A FAILURE OR HAVE LET YOURSELF OR YOUR FAMILY DOWN: MORE THAN HALF THE DAYS
4. FEELING TIRED OR HAVING LITTLE ENERGY: NEARLY EVERY DAY
SUM OF ALL RESPONSES TO PHQ9 QUESTIONS 1 & 2: 4
5. POOR APPETITE OR OVEREATING: NEARLY EVERY DAY
1. LITTLE INTEREST OR PLEASURE IN DOING THINGS: MORE THAN HALF THE DAYS
8. MOVING OR SPEAKING SO SLOWLY THAT OTHER PEOPLE COULD HAVE NOTICED. OR THE OPPOSITE, BEING SO FIGETY OR RESTLESS THAT YOU HAVE BEEN MOVING AROUND A LOT MORE THAN USUAL: MORE THAN HALF THE DAYS
3. TROUBLE FALLING OR STAYING ASLEEP: NEARLY EVERY DAY
7. TROUBLE CONCENTRATING ON THINGS, SUCH AS READING THE NEWSPAPER OR WATCHING TELEVISION: MORE THAN HALF THE DAYS

## 2025-01-09 NOTE — PROGRESS NOTES
evaluated through a synchronous (real-time) audio-video encounter. The patient (or guardian if applicable) is aware that this is a billable service, which includes applicable co-pays. This Virtual Visit was conducted with patient's (and/or legal guardian's) consent. Patient identification was verified, and a caregiver was present when appropriate.   The patient was located at Home: [address] [place] [id number]  Provider was located at Facility (Appt Dept): [address] [place] [id number]  Confirm you are appropriately licensed, registered, or certified to deliver care in the state where the patient is located as indicated above. If you are not or unsure, please re-schedule the visit: Yes, I confirm.        Total time spent for this encounter: Not billed by time    --Anca Nelson MD on 1/9/2025 at 8:20 PM    An electronic signature was used to authenticate this note.

## 2025-01-10 ENCOUNTER — TELEPHONE (OUTPATIENT)
Dept: BEHAVIORAL/MENTAL HEALTH CLINIC | Age: 55
End: 2025-01-10

## 2025-01-10 ENCOUNTER — PATIENT MESSAGE (OUTPATIENT)
Dept: BEHAVIORAL/MENTAL HEALTH CLINIC | Age: 55
End: 2025-01-10

## 2025-01-10 NOTE — TELEPHONE ENCOUNTER
The [facility] records dated 2/1/24 - present in addition to the forms that were completed.

## 2025-01-10 NOTE — TELEPHONE ENCOUNTER
Attempted multiple times to fax forms to the [employer] but they would not transmit properly. Emailed via [employer].    Fax [phone]   Email [email or web address]

## 2025-01-15 NOTE — TELEPHONE ENCOUNTER
Attempted multiple times to fax and fax will not go through. Emailed via [employer] to email provided in the forms.

## 2025-01-17 ENCOUNTER — PATIENT MESSAGE (OUTPATIENT)
Dept: BEHAVIORAL/MENTAL HEALTH CLINIC | Age: 55
End: 2025-01-17

## 2025-01-30 ENCOUNTER — TELEMEDICINE (OUTPATIENT)
Dept: BEHAVIORAL/MENTAL HEALTH CLINIC | Age: 55
End: 2025-01-30
Payer: COMMERCIAL

## 2025-01-30 DIAGNOSIS — F06.4 ANXIETY DISORDER DUE TO GENERAL MEDICAL CONDITION: ICD-10-CM

## 2025-01-30 DIAGNOSIS — F33.2 SEVERE EPISODE OF RECURRENT MAJOR DEPRESSIVE DISORDER, WITHOUT PSYCHOTIC FEATURES (HCC): Primary | ICD-10-CM

## 2025-01-30 DIAGNOSIS — G93.32 CHRONIC FATIGUE SYNDROME: ICD-10-CM

## 2025-01-30 DIAGNOSIS — F51.05 INSOMNIA DUE TO MENTAL DISORDER: ICD-10-CM

## 2025-01-30 DIAGNOSIS — F06.31 DEPRESSIVE DISORDER DUE TO ANOTHER MEDICAL CONDITION WITH DEPRESSIVE FEATURES: ICD-10-CM

## 2025-01-30 DIAGNOSIS — Z65.8 PSYCHOSOCIAL STRESSORS: ICD-10-CM

## 2025-01-30 DIAGNOSIS — F41.1 GENERALIZED ANXIETY DISORDER: ICD-10-CM

## 2025-01-30 DIAGNOSIS — G89.4 CHRONIC PAIN DISORDER: ICD-10-CM

## 2025-01-30 PROCEDURE — 99214 OFFICE O/P EST MOD 30 MIN: CPT | Performed by: PSYCHIATRY & NEUROLOGY

## 2025-01-30 RX ORDER — CITALOPRAM HYDROBROMIDE 10 MG/1
TABLET ORAL
Qty: 90 TABLET | Refills: 1 | Status: CANCELLED | OUTPATIENT
Start: 2025-01-30

## 2025-01-30 RX ORDER — TRAZODONE HYDROCHLORIDE 50 MG/1
50 TABLET, FILM COATED ORAL NIGHTLY
Qty: 90 TABLET | Refills: 1 | Status: CANCELLED | OUTPATIENT
Start: 2025-01-30

## 2025-01-30 RX ORDER — ATOMOXETINE 80 MG/1
80 CAPSULE ORAL DAILY
Qty: 90 CAPSULE | Refills: 1 | Status: CANCELLED | OUTPATIENT
Start: 2025-01-30 | End: 2025-07-29

## 2025-01-30 RX ORDER — LEVOMEFOLATE CALCIUM 7.5 MG TABLET
7.5 TABLET DAILY
Qty: 30 TABLET | Refills: 5 | Status: CANCELLED | OUTPATIENT
Start: 2025-01-30

## 2025-01-30 RX ORDER — CLONAZEPAM 0.5 MG/1
0.5 TABLET ORAL 2 TIMES DAILY PRN
Qty: 60 TABLET | Refills: 3 | Status: CANCELLED | OUTPATIENT
Start: 2025-01-30 | End: 2025-05-30

## 2025-01-30 RX ORDER — CITALOPRAM HYDROBROMIDE 20 MG/1
TABLET ORAL
Qty: 90 TABLET | Refills: 1 | Status: CANCELLED | OUTPATIENT
Start: 2025-01-30

## 2025-01-30 ASSESSMENT — PATIENT HEALTH QUESTIONNAIRE - PHQ9
SUM OF ALL RESPONSES TO PHQ QUESTIONS 1-9: 20
7. TROUBLE CONCENTRATING ON THINGS, SUCH AS READING THE NEWSPAPER OR WATCHING TELEVISION: NEARLY EVERY DAY
9. THOUGHTS THAT YOU WOULD BE BETTER OFF DEAD, OR OF HURTING YOURSELF: NOT AT ALL
SUM OF ALL RESPONSES TO PHQ QUESTIONS 1-9: 20
10. IF YOU CHECKED OFF ANY PROBLEMS, HOW DIFFICULT HAVE THESE PROBLEMS MADE IT FOR YOU TO DO YOUR WORK, TAKE CARE OF THINGS AT HOME, OR GET ALONG WITH OTHER PEOPLE: VERY DIFFICULT
SUM OF ALL RESPONSES TO PHQ QUESTIONS 1-9: 20
8. MOVING OR SPEAKING SO SLOWLY THAT OTHER PEOPLE COULD HAVE NOTICED. OR THE OPPOSITE, BEING SO FIGETY OR RESTLESS THAT YOU HAVE BEEN MOVING AROUND A LOT MORE THAN USUAL: NEARLY EVERY DAY
5. POOR APPETITE OR OVEREATING: MORE THAN HALF THE DAYS
6. FEELING BAD ABOUT YOURSELF - OR THAT YOU ARE A FAILURE OR HAVE LET YOURSELF OR YOUR FAMILY DOWN: MORE THAN HALF THE DAYS
4. FEELING TIRED OR HAVING LITTLE ENERGY: NEARLY EVERY DAY
2. FEELING DOWN, DEPRESSED OR HOPELESS: NEARLY EVERY DAY
3. TROUBLE FALLING OR STAYING ASLEEP: MORE THAN HALF THE DAYS
1. LITTLE INTEREST OR PLEASURE IN DOING THINGS: MORE THAN HALF THE DAYS
SUM OF ALL RESPONSES TO PHQ QUESTIONS 1-9: 20
SUM OF ALL RESPONSES TO PHQ9 QUESTIONS 1 & 2: 5

## 2025-01-30 ASSESSMENT — ANXIETY QUESTIONNAIRES
1. FEELING NERVOUS, ANXIOUS, OR ON EDGE: NEARLY EVERY DAY
GAD7 TOTAL SCORE: 20
7. FEELING AFRAID AS IF SOMETHING AWFUL MIGHT HAPPEN: MORE THAN HALF THE DAYS
2. NOT BEING ABLE TO STOP OR CONTROL WORRYING: NEARLY EVERY DAY
IF YOU CHECKED OFF ANY PROBLEMS ON THIS QUESTIONNAIRE, HOW DIFFICULT HAVE THESE PROBLEMS MADE IT FOR YOU TO DO YOUR WORK, TAKE CARE OF THINGS AT HOME, OR GET ALONG WITH OTHER PEOPLE: VERY DIFFICULT
6. BECOMING EASILY ANNOYED OR IRRITABLE: NEARLY EVERY DAY
3. WORRYING TOO MUCH ABOUT DIFFERENT THINGS: NEARLY EVERY DAY
5. BEING SO RESTLESS THAT IT IS HARD TO SIT STILL: NEARLY EVERY DAY
4. TROUBLE RELAXING: NEARLY EVERY DAY

## 2025-01-30 ASSESSMENT — COLUMBIA-SUICIDE SEVERITY RATING SCALE - C-SSRS
6. HAVE YOU EVER DONE ANYTHING, STARTED TO DO ANYTHING, OR PREPARED TO DO ANYTHING TO END YOUR LIFE?: NO
2. HAVE YOU ACTUALLY HAD ANY THOUGHTS OF KILLING YOURSELF?: NO
1. WITHIN THE PAST MONTH, HAVE YOU WISHED YOU WERE DEAD OR WISHED YOU COULD GO TO SLEEP AND NOT WAKE UP?: NO

## 2025-01-30 NOTE — PROGRESS NOTES
Patient:  Sarah Cortez  Age:  55 y.o.  :  1970     SEX:  female MRN:  [id number]     RACE: Black /      SEEN:  [x]  PATIENT  []  SPOUSE []  OTHER:                  2025    10:25 AM 2025    10:56 AM 2024     7:46 PM   PHQ-9    Little interest or pleasure in doing things 2 2 1   Feeling down, depressed, or hopeless 3 2 2   Trouble falling or staying asleep, or sleeping too much 2 3 2   Feeling tired or having little energy 3 3 3   Poor appetite or overeating 2 3 2   Feeling bad about yourself - or that you are a failure or have let yourself or your family down 2 2 2   Trouble concentrating on things, such as reading the newspaper or watching television 3 2 1   Moving or speaking so slowly that other people could have noticed. Or the opposite - being so fidgety or restless that you have been moving around a lot more than usual 3 2 1   Thoughts that you would be better off dead, or of hurting yourself in some way 0 0 0   PHQ-2 Score 5 4 3   PHQ-9 Total Score 20 19 14   If you checked off any problems, how difficult have these problems made it for you to do your work, take care of things at home, or get along with other people? 2 2 2           2025    10:27 AM 2025    10:59 AM 2024     7:45 PM   OFELIA-7 SCREENING   Feeling nervous, anxious, or on edge Nearly every day Nearly every day More than half the days   Not being able to stop or control worrying Nearly every day Nearly every day More than half the days   Worrying too much about different things Nearly every day Nearly every day More than half the days   Trouble relaxing Nearly every day Nearly every day More than half the days   Being so restless that it is hard to sit still Nearly every day Nearly every day More than half the days   Becoming easily annoyed or irritable Nearly every day Nearly every day More than half the days   Feeling afraid as if something awful might happen More than half

## 2025-02-05 ENCOUNTER — OFFICE VISIT (OUTPATIENT)
Age: 55
End: 2025-02-05
Payer: COMMERCIAL

## 2025-02-05 DIAGNOSIS — M79.18 MYALGIA, OTHER SITE: Primary | ICD-10-CM

## 2025-02-05 PROCEDURE — 20553 NJX 1/MLT TRIGGER POINTS 3/>: CPT | Performed by: ANESTHESIOLOGY

## 2025-02-05 NOTE — PROGRESS NOTES
Procedure Date: 2025      Location: [facility]       Procedure: trigger point       Time Out performed prior to start of the procedure:       Jackson Rodriguez MD   performed the following reviews on Sarah Cortez 1970 prior to the start of the procedure:       patient was identified by name and     agreement on procedure being performed was verified   risks and benefits explained to patient by the provider  procedure site verified as   patient was positioned for comfort   consent signed and verified for procedure       Time:  12:55 PM        Procedure performed by:   Jackson Rodriguez MD        Patient assisted by:   RA FLOYD MA

## 2025-02-05 NOTE — PROGRESS NOTES
NAME: Sarah Cortez   ID:[id number]   :1970  DOS:2025    Procedure: Trigger Point Injections into: Right forearm    Pre-op Diagnosis: Myalgia    Post-op Diagnosis: Same     Anesthesia: Local only     Complications: None     Indication: Myalgia    Procedure note:  After informed written consent was obtained from the patient. The patient was placed in a seated position, leaning forward. The above muscle trigger points were identified with the patient's assistance and marked. A total of 3 sites were identified. Each site was prepped with chlorohexidine. Each site was injected using a 25 or 27-gauge needle after negative aspiration. Total of 1cc of 0.25% bupivacaine was injected at each site, needle manipulated and 10sec of pressure was applied. The patient tolerated the procedure well. There were no obvious complications were encountered. The patient was discharged in an awake and alert condition.      Pre/Post percentage relief: 50%      Plan: The patient will follow-up with me in approximately 3 to 4 weeks to determine efficacy as well as further treatment regimen.     Jackson Rodriguez MD

## 2025-02-21 ENCOUNTER — PATIENT MESSAGE (OUTPATIENT)
Dept: BEHAVIORAL/MENTAL HEALTH CLINIC | Age: 55
End: 2025-02-21

## 2025-02-21 NOTE — TELEPHONE ENCOUNTER
Patient sent message regarding how she is feeling. Stated that she will be at her appt next week but wanted to let Dr Nelson know she is feeling.

## 2025-02-24 ENCOUNTER — TELEMEDICINE (OUTPATIENT)
Dept: BEHAVIORAL/MENTAL HEALTH CLINIC | Age: 55
End: 2025-02-24
Payer: COMMERCIAL

## 2025-02-24 DIAGNOSIS — F41.1 GENERALIZED ANXIETY DISORDER: ICD-10-CM

## 2025-02-24 DIAGNOSIS — Z65.8 PSYCHOSOCIAL STRESSORS: ICD-10-CM

## 2025-02-24 DIAGNOSIS — G89.4 CHRONIC PAIN DISORDER: ICD-10-CM

## 2025-02-24 DIAGNOSIS — G93.32 CHRONIC FATIGUE SYNDROME: ICD-10-CM

## 2025-02-24 DIAGNOSIS — F33.2 SEVERE EPISODE OF RECURRENT MAJOR DEPRESSIVE DISORDER, WITHOUT PSYCHOTIC FEATURES (HCC): Primary | ICD-10-CM

## 2025-02-24 DIAGNOSIS — F06.31 DEPRESSIVE DISORDER DUE TO ANOTHER MEDICAL CONDITION WITH DEPRESSIVE FEATURES: ICD-10-CM

## 2025-02-24 DIAGNOSIS — F51.05 INSOMNIA DUE TO MENTAL DISORDER: ICD-10-CM

## 2025-02-24 DIAGNOSIS — F06.4 ANXIETY DISORDER DUE TO GENERAL MEDICAL CONDITION: ICD-10-CM

## 2025-02-24 PROCEDURE — 99214 OFFICE O/P EST MOD 30 MIN: CPT | Performed by: PSYCHIATRY & NEUROLOGY

## 2025-02-24 RX ORDER — ATOMOXETINE 80 MG/1
80 CAPSULE ORAL DAILY
Qty: 90 CAPSULE | Refills: 1 | Status: CANCELLED | OUTPATIENT
Start: 2025-02-24 | End: 2025-08-23

## 2025-02-24 RX ORDER — LEVOMEFOLATE CALCIUM 7.5 MG TABLET
7.5 TABLET DAILY
Qty: 30 TABLET | Refills: 5 | Status: CANCELLED | OUTPATIENT
Start: 2025-02-24

## 2025-02-24 RX ORDER — CITALOPRAM HYDROBROMIDE 40 MG/1
40 TABLET ORAL EVERY MORNING
Qty: 90 TABLET | Refills: 1 | Status: SHIPPED | OUTPATIENT
Start: 2025-02-24

## 2025-02-24 RX ORDER — CLONAZEPAM 0.5 MG/1
0.5 TABLET ORAL 2 TIMES DAILY PRN
Qty: 60 TABLET | Refills: 3 | Status: CANCELLED | OUTPATIENT
Start: 2025-02-24 | End: 2025-06-24

## 2025-02-24 RX ORDER — CITALOPRAM HYDROBROMIDE 20 MG/1
TABLET ORAL
Qty: 90 TABLET | Refills: 1 | Status: CANCELLED | OUTPATIENT
Start: 2025-02-24

## 2025-02-24 RX ORDER — CITALOPRAM HYDROBROMIDE 10 MG/1
TABLET ORAL
Qty: 90 TABLET | Refills: 1 | Status: CANCELLED | OUTPATIENT
Start: 2025-02-24

## 2025-02-24 RX ORDER — TRAZODONE HYDROCHLORIDE 50 MG/1
50 TABLET ORAL NIGHTLY
Qty: 90 TABLET | Refills: 1 | Status: CANCELLED | OUTPATIENT
Start: 2025-02-24

## 2025-02-24 RX ORDER — LOTEPREDNOL ETABONATE 3.8 MG/G
GEL OPHTHALMIC
COMMUNITY
Start: 2025-02-10

## 2025-02-24 ASSESSMENT — PATIENT HEALTH QUESTIONNAIRE - PHQ9
6. FEELING BAD ABOUT YOURSELF - OR THAT YOU ARE A FAILURE OR HAVE LET YOURSELF OR YOUR FAMILY DOWN: MORE THAN HALF THE DAYS
SUM OF ALL RESPONSES TO PHQ QUESTIONS 1-9: 22
9. THOUGHTS THAT YOU WOULD BE BETTER OFF DEAD, OR OF HURTING YOURSELF: NOT AT ALL
SUM OF ALL RESPONSES TO PHQ QUESTIONS 1-9: 22
1. LITTLE INTEREST OR PLEASURE IN DOING THINGS: NEARLY EVERY DAY
5. POOR APPETITE OR OVEREATING: NEARLY EVERY DAY
SUM OF ALL RESPONSES TO PHQ QUESTIONS 1-9: 22
3. TROUBLE FALLING OR STAYING ASLEEP: NEARLY EVERY DAY
SUM OF ALL RESPONSES TO PHQ9 QUESTIONS 1 & 2: 6
4. FEELING TIRED OR HAVING LITTLE ENERGY: NEARLY EVERY DAY
SUM OF ALL RESPONSES TO PHQ QUESTIONS 1-9: 22
10. IF YOU CHECKED OFF ANY PROBLEMS, HOW DIFFICULT HAVE THESE PROBLEMS MADE IT FOR YOU TO DO YOUR WORK, TAKE CARE OF THINGS AT HOME, OR GET ALONG WITH OTHER PEOPLE: EXTREMELY DIFFICULT
2. FEELING DOWN, DEPRESSED OR HOPELESS: NEARLY EVERY DAY
8. MOVING OR SPEAKING SO SLOWLY THAT OTHER PEOPLE COULD HAVE NOTICED. OR THE OPPOSITE, BEING SO FIGETY OR RESTLESS THAT YOU HAVE BEEN MOVING AROUND A LOT MORE THAN USUAL: MORE THAN HALF THE DAYS
7. TROUBLE CONCENTRATING ON THINGS, SUCH AS READING THE NEWSPAPER OR WATCHING TELEVISION: NEARLY EVERY DAY

## 2025-02-24 ASSESSMENT — ANXIETY QUESTIONNAIRES
IF YOU CHECKED OFF ANY PROBLEMS ON THIS QUESTIONNAIRE, HOW DIFFICULT HAVE THESE PROBLEMS MADE IT FOR YOU TO DO YOUR WORK, TAKE CARE OF THINGS AT HOME, OR GET ALONG WITH OTHER PEOPLE: EXTREMELY DIFFICULT
1. FEELING NERVOUS, ANXIOUS, OR ON EDGE: NEARLY EVERY DAY
7. FEELING AFRAID AS IF SOMETHING AWFUL MIGHT HAPPEN: MORE THAN HALF THE DAYS
4. TROUBLE RELAXING: NEARLY EVERY DAY
5. BEING SO RESTLESS THAT IT IS HARD TO SIT STILL: NEARLY EVERY DAY
2. NOT BEING ABLE TO STOP OR CONTROL WORRYING: NEARLY EVERY DAY
GAD7 TOTAL SCORE: 20
3. WORRYING TOO MUCH ABOUT DIFFERENT THINGS: NEARLY EVERY DAY
6. BECOMING EASILY ANNOYED OR IRRITABLE: NEARLY EVERY DAY

## 2025-02-24 ASSESSMENT — COLUMBIA-SUICIDE SEVERITY RATING SCALE - C-SSRS
2. HAVE YOU ACTUALLY HAD ANY THOUGHTS OF KILLING YOURSELF?: NO
6. HAVE YOU EVER DONE ANYTHING, STARTED TO DO ANYTHING, OR PREPARED TO DO ANYTHING TO END YOUR LIFE?: NO
1. WITHIN THE PAST MONTH, HAVE YOU WISHED YOU WERE DEAD OR WISHED YOU COULD GO TO SLEEP AND NOT WAKE UP?: NO

## 2025-02-24 NOTE — PROGRESS NOTES
Patient:  Sarah Cortez  Age:  55 y.o.  :  1970     SEX:  female MRN:  [id number]     RACE: Black /      SEEN:  [x]  PATIENT  []  SPOUSE []  OTHER:                  2025    10:13 AM 2025    10:25 AM 2025    10:56 AM   PHQ-9    Little interest or pleasure in doing things 3 2 2   Feeling down, depressed, or hopeless 3 3 2   Trouble falling or staying asleep, or sleeping too much 3 2 3   Feeling tired or having little energy 3 3 3   Poor appetite or overeating 3 2 3   Feeling bad about yourself - or that you are a failure or have let yourself or your family down 2 2 2   Trouble concentrating on things, such as reading the newspaper or watching television 3 3 2   Moving or speaking so slowly that other people could have noticed. Or the opposite - being so fidgety or restless that you have been moving around a lot more than usual 2 3 2   Thoughts that you would be better off dead, or of hurting yourself in some way 0 0 0   PHQ-2 Score 6 5 4   PHQ-9 Total Score 22 20 19   If you checked off any problems, how difficult have these problems made it for you to do your work, take care of things at home, or get along with other people? 3 2 2           2025    10:15 AM 2025    10:27 AM 2025    10:59 AM   OFELIA-7 SCREENING   Feeling nervous, anxious, or on edge Nearly every day Nearly every day Nearly every day   Not being able to stop or control worrying Nearly every day Nearly every day Nearly every day   Worrying too much about different things Nearly every day Nearly every day Nearly every day   Trouble relaxing Nearly every day Nearly every day Nearly every day   Being so restless that it is hard to sit still Nearly every day Nearly every day Nearly every day   Becoming easily annoyed or irritable Nearly every day Nearly every day Nearly every day   Feeling afraid as if something awful might happen More than half the days More than half the days Nearly

## 2025-02-25 ASSESSMENT — PATIENT HEALTH QUESTIONNAIRE - PHQ9
4. FEELING TIRED OR HAVING LITTLE ENERGY: NEARLY EVERY DAY
1. LITTLE INTEREST OR PLEASURE IN DOING THINGS: NEARLY EVERY DAY
SUM OF ALL RESPONSES TO PHQ9 QUESTIONS 1 & 2: 6
SUM OF ALL RESPONSES TO PHQ QUESTIONS 1-9: 19
2. FEELING DOWN, DEPRESSED OR HOPELESS: NEARLY EVERY DAY
8. MOVING OR SPEAKING SO SLOWLY THAT OTHER PEOPLE COULD HAVE NOTICED. OR THE OPPOSITE - BEING SO FIDGETY OR RESTLESS THAT YOU HAVE BEEN MOVING AROUND A LOT MORE THAN USUAL: MORE THAN HALF THE DAYS
SUM OF ALL RESPONSES TO PHQ QUESTIONS 1-9: 19
SUM OF ALL RESPONSES TO PHQ QUESTIONS 1-9: 19
7. TROUBLE CONCENTRATING ON THINGS, SUCH AS READING THE NEWSPAPER OR WATCHING TELEVISION: MORE THAN HALF THE DAYS
3. TROUBLE FALLING OR STAYING ASLEEP: MORE THAN HALF THE DAYS
SUM OF ALL RESPONSES TO PHQ QUESTIONS 1-9: 19
10. IF YOU CHECKED OFF ANY PROBLEMS, HOW DIFFICULT HAVE THESE PROBLEMS MADE IT FOR YOU TO DO YOUR WORK, TAKE CARE OF THINGS AT HOME, OR GET ALONG WITH OTHER PEOPLE: EXTREMELY DIFFICULT
1. LITTLE INTEREST OR PLEASURE IN DOING THINGS: NEARLY EVERY DAY
3. TROUBLE FALLING OR STAYING ASLEEP: MORE THAN HALF THE DAYS
7. TROUBLE CONCENTRATING ON THINGS, SUCH AS READING THE NEWSPAPER OR WATCHING TELEVISION: MORE THAN HALF THE DAYS
8. MOVING OR SPEAKING SO SLOWLY THAT OTHER PEOPLE COULD HAVE NOTICED. OR THE OPPOSITE, BEING SO FIGETY OR RESTLESS THAT YOU HAVE BEEN MOVING AROUND A LOT MORE THAN USUAL: MORE THAN HALF THE DAYS
SUM OF ALL RESPONSES TO PHQ QUESTIONS 1-9: 19
6. FEELING BAD ABOUT YOURSELF - OR THAT YOU ARE A FAILURE OR HAVE LET YOURSELF OR YOUR FAMILY DOWN: MORE THAN HALF THE DAYS
5. POOR APPETITE OR OVEREATING: MORE THAN HALF THE DAYS
2. FEELING DOWN, DEPRESSED OR HOPELESS: NEARLY EVERY DAY
4. FEELING TIRED OR HAVING LITTLE ENERGY: NEARLY EVERY DAY
10. IF YOU CHECKED OFF ANY PROBLEMS, HOW DIFFICULT HAVE THESE PROBLEMS MADE IT FOR YOU TO DO YOUR WORK, TAKE CARE OF THINGS AT HOME, OR GET ALONG WITH OTHER PEOPLE: EXTREMELY DIFFICULT
9. THOUGHTS THAT YOU WOULD BE BETTER OFF DEAD, OR OF HURTING YOURSELF: NOT AT ALL
9. THOUGHTS THAT YOU WOULD BE BETTER OFF DEAD, OR OF HURTING YOURSELF: NOT AT ALL
5. POOR APPETITE OR OVEREATING: MORE THAN HALF THE DAYS
6. FEELING BAD ABOUT YOURSELF - OR THAT YOU ARE A FAILURE OR HAVE LET YOURSELF OR YOUR FAMILY DOWN: MORE THAN HALF THE DAYS

## 2025-02-25 ASSESSMENT — ANXIETY QUESTIONNAIRES
4. TROUBLE RELAXING: NEARLY EVERY DAY
3. WORRYING TOO MUCH ABOUT DIFFERENT THINGS: NEARLY EVERY DAY
6. BECOMING EASILY ANNOYED OR IRRITABLE: NEARLY EVERY DAY
2. NOT BEING ABLE TO STOP OR CONTROL WORRYING: NEARLY EVERY DAY
4. TROUBLE RELAXING: NEARLY EVERY DAY
3. WORRYING TOO MUCH ABOUT DIFFERENT THINGS: NEARLY EVERY DAY
2. NOT BEING ABLE TO STOP OR CONTROL WORRYING: NEARLY EVERY DAY
5. BEING SO RESTLESS THAT IT IS HARD TO SIT STILL: NEARLY EVERY DAY
GAD7 TOTAL SCORE: 20
IF YOU CHECKED OFF ANY PROBLEMS ON THIS QUESTIONNAIRE, HOW DIFFICULT HAVE THESE PROBLEMS MADE IT FOR YOU TO DO YOUR WORK, TAKE CARE OF THINGS AT HOME, OR GET ALONG WITH OTHER PEOPLE: EXTREMELY DIFFICULT
1. FEELING NERVOUS, ANXIOUS, OR ON EDGE: NEARLY EVERY DAY
5. BEING SO RESTLESS THAT IT IS HARD TO SIT STILL: NEARLY EVERY DAY
7. FEELING AFRAID AS IF SOMETHING AWFUL MIGHT HAPPEN: MORE THAN HALF THE DAYS
6. BECOMING EASILY ANNOYED OR IRRITABLE: NEARLY EVERY DAY
1. FEELING NERVOUS, ANXIOUS, OR ON EDGE: NEARLY EVERY DAY
7. FEELING AFRAID AS IF SOMETHING AWFUL MIGHT HAPPEN: MORE THAN HALF THE DAYS
IF YOU CHECKED OFF ANY PROBLEMS ON THIS QUESTIONNAIRE, HOW DIFFICULT HAVE THESE PROBLEMS MADE IT FOR YOU TO DO YOUR WORK, TAKE CARE OF THINGS AT HOME, OR GET ALONG WITH OTHER PEOPLE: EXTREMELY DIFFICULT

## 2025-02-27 ENCOUNTER — OFFICE VISIT (OUTPATIENT)
Dept: BEHAVIORAL/MENTAL HEALTH CLINIC | Age: 55
End: 2025-02-27
Payer: COMMERCIAL

## 2025-02-27 VITALS
BODY MASS INDEX: 32.39 KG/M2 | OXYGEN SATURATION: 98 % | WEIGHT: 176 LBS | DIASTOLIC BLOOD PRESSURE: 84 MMHG | HEART RATE: 85 BPM | HEIGHT: 62 IN | TEMPERATURE: 97.6 F | SYSTOLIC BLOOD PRESSURE: 134 MMHG

## 2025-02-27 DIAGNOSIS — Z65.8 PSYCHOSOCIAL STRESSORS: ICD-10-CM

## 2025-02-27 DIAGNOSIS — F41.1 GENERALIZED ANXIETY DISORDER: ICD-10-CM

## 2025-02-27 DIAGNOSIS — F33.2 SEVERE EPISODE OF RECURRENT MAJOR DEPRESSIVE DISORDER, WITHOUT PSYCHOTIC FEATURES (HCC): Primary | ICD-10-CM

## 2025-02-27 DIAGNOSIS — F06.4 ANXIETY DISORDER DUE TO GENERAL MEDICAL CONDITION: ICD-10-CM

## 2025-02-27 DIAGNOSIS — G89.4 CHRONIC PAIN DISORDER: ICD-10-CM

## 2025-02-27 DIAGNOSIS — F51.05 INSOMNIA DUE TO MENTAL DISORDER: ICD-10-CM

## 2025-02-27 DIAGNOSIS — G93.32 CHRONIC FATIGUE SYNDROME: ICD-10-CM

## 2025-02-27 DIAGNOSIS — F06.31 DEPRESSIVE DISORDER DUE TO ANOTHER MEDICAL CONDITION WITH DEPRESSIVE FEATURES: ICD-10-CM

## 2025-02-27 PROCEDURE — 99214 OFFICE O/P EST MOD 30 MIN: CPT | Performed by: PSYCHIATRY & NEUROLOGY

## 2025-02-27 RX ORDER — CITALOPRAM HYDROBROMIDE 10 MG/1
TABLET ORAL
Qty: 90 TABLET | Refills: 1 | Status: CANCELLED | OUTPATIENT
Start: 2025-02-27

## 2025-02-27 RX ORDER — LEVOMEFOLATE CALCIUM 7.5 MG TABLET
7.5 TABLET DAILY
Qty: 30 TABLET | Refills: 5 | Status: CANCELLED | OUTPATIENT
Start: 2025-02-27

## 2025-02-27 RX ORDER — CLONAZEPAM 0.5 MG/1
0.5 TABLET ORAL 2 TIMES DAILY PRN
Qty: 60 TABLET | Refills: 3 | Status: CANCELLED | OUTPATIENT
Start: 2025-02-27 | End: 2025-06-27

## 2025-02-27 RX ORDER — CITALOPRAM HYDROBROMIDE 40 MG/1
40 TABLET ORAL EVERY MORNING
Qty: 90 TABLET | Refills: 1 | Status: CANCELLED | OUTPATIENT
Start: 2025-02-27

## 2025-02-27 RX ORDER — ATOMOXETINE 80 MG/1
80 CAPSULE ORAL DAILY
Qty: 90 CAPSULE | Refills: 1 | Status: CANCELLED | OUTPATIENT
Start: 2025-02-27 | End: 2025-08-26

## 2025-02-27 RX ORDER — TRAZODONE HYDROCHLORIDE 50 MG/1
50 TABLET ORAL NIGHTLY
Qty: 90 TABLET | Refills: 1 | Status: CANCELLED | OUTPATIENT
Start: 2025-02-27

## 2025-02-27 NOTE — PROGRESS NOTES
Patient:  Sarah Cortez  Age:  55 y.o.  :  1970     SEX:  female MRN:  [id number]     RACE: Black /      SEEN:  [x]  PATIENT  []  SPOUSE []  OTHER:                  2025     9:42 AM 2025    10:13 AM 2025    10:25 AM   PHQ-9    Little interest or pleasure in doing things 3 3 2   Feeling down, depressed, or hopeless 3 3 3   Trouble falling or staying asleep, or sleeping too much 2 3 2   Feeling tired or having little energy 3 3 3   Poor appetite or overeating 2 3 2   Feeling bad about yourself - or that you are a failure or have let yourself or your family down 2 2 2   Trouble concentrating on things, such as reading the newspaper or watching television 2 3 3   Moving or speaking so slowly that other people could have noticed. Or the opposite - being so fidgety or restless that you have been moving around a lot more than usual 2 2 3   Thoughts that you would be better off dead, or of hurting yourself in some way 0 0 0   PHQ-2 Score 6 6 5   PHQ-9 Total Score 19 22 20   If you checked off any problems, how difficult have these problems made it for you to do your work, take care of things at home, or get along with other people? 3 3 2           2025     9:41 AM 2025    10:15 AM 2025    10:27 AM   OFELIA-7 SCREENING   Feeling nervous, anxious, or on edge Nearly every day Nearly every day Nearly every day   Not being able to stop or control worrying Nearly every day Nearly every day Nearly every day   Worrying too much about different things Nearly every day Nearly every day Nearly every day   Trouble relaxing Nearly every day Nearly every day Nearly every day   Being so restless that it is hard to sit still Nearly every day Nearly every day Nearly every day   Becoming easily annoyed or irritable Nearly every day Nearly every day Nearly every day   Feeling afraid as if something awful might happen More than half the days More than half the days More

## 2025-03-23 NOTE — PROGRESS NOTES
Ms Cortez is a f/u for TPIs in February performed at ***. She also underwent R lateral epicondyle injection on 1/8/25.

## 2025-03-24 ENCOUNTER — OFFICE VISIT (OUTPATIENT)
Age: 55
End: 2025-03-24
Payer: COMMERCIAL

## 2025-03-24 ENCOUNTER — PATIENT MESSAGE (OUTPATIENT)
Dept: BEHAVIORAL/MENTAL HEALTH CLINIC | Age: 55
End: 2025-03-24

## 2025-03-24 DIAGNOSIS — M70.62 TROCHANTERIC BURSITIS OF LEFT HIP: Primary | ICD-10-CM

## 2025-03-24 DIAGNOSIS — M53.3 SACROILIAC JOINT DYSFUNCTION OF LEFT SIDE: ICD-10-CM

## 2025-03-24 PROCEDURE — 99214 OFFICE O/P EST MOD 30 MIN: CPT | Performed by: PHYSICIAN ASSISTANT

## 2025-03-24 ASSESSMENT — ENCOUNTER SYMPTOMS
ALLERGIC/IMMUNOLOGIC NEGATIVE: 1
ABDOMINAL PAIN: 0
SHORTNESS OF BREATH: 0
EYES NEGATIVE: 1

## 2025-03-24 NOTE — PROGRESS NOTES
Chronic Pain Consult Note      Plan:     A comprehensive pain management plan may consist of the following: Testing, Therapy, Medications, Interventions, Consults, and Follow up.    Chronic left shoulder pain  Patient is adrenally suppressed and cannot receive steroid-based injections  Currently undergoing physical therapy  Doing well after TPIs in the cervical area on the L side  Right Tennis Elbow  Doing well after injection with resolution of tennis elbow trigger point injections.   Trochanteric bursitis L side  Plan for bupivocaine ONLY injection in GTB L side   Sacroiliac joint pain, L side  Plan for bupivocaine ONLY injection in SI joint.   Fibromyalgia  Status post trials of antiepileptics without benefit or experience side effects  Lupus  Receiving tramadol per outside prescriber  Chronic pain syndrome  Encourage continuation of active healthy lifestyle    General Recommendations: The pain condition that the patient suffers from is best treated with a multidisciplinary approach that involves an increase in physical activity to prevent de-conditioning and worsening of the pain cycle, as well as psychological counseling (formal and/or informal) to address the co morbid psychological effects of pain. Treatment will often involve judicious use of pain medications and interventional procedures to decrease the pain, allowing the patient to participate in the physical activity that will ultimately produce long-lasting pain reductions. The goal of the multidisciplinary approach is to return the patient to a higher level of overall function and to restore their ability to perform activities of daily living.      Referring Provider: No ref. provider found  Assessment:      Chief Complaint: Follow-up      Sarah Cortez is a 55 y.o. female [facility]:    Diagnosis:  1. Trochanteric bursitis of left hip    2. Sacroiliac joint dysfunction of left side

## 2025-03-24 NOTE — TELEPHONE ENCOUNTER
Spoke with patient and she is going to see if there are any forms form completion rather than needing a letter. Patient will let us know if there are any forms.

## 2025-03-25 NOTE — TELEPHONE ENCOUNTER
Spoke with Dr Nelson and she did advise that she cannot write a letter, however, if they have any forms they would like completed she can do that or if they would like records sent, they can request those. Spoke with patient again to confirm with her that I did speak with Dr Nelson and this information came directly from Dr Nelson. Patient stated that she spoke with the  yesterday to let them know that she had sent the information to us so that she would be in compliance with their request and she knew and understood.

## 2025-03-30 DIAGNOSIS — F41.1 GENERALIZED ANXIETY DISORDER: ICD-10-CM

## 2025-03-31 RX ORDER — CLONAZEPAM 0.5 MG/1
TABLET ORAL
Refills: 0 | OUTPATIENT
Start: 2025-03-31

## 2025-04-11 NOTE — PROGRESS NOTES
Procedure Date: 2025      Location: [facility]       Procedure: LEFT GTB AND LEFT SI JOINT INJ (BUPIV ONLY FOR SI)       Time Out performed prior to start of the procedure:       Jackson Rodriguez MD performed the following reviews on Sarah Cortez 1970 prior to the start of the procedure:       patient was identified by name and     agreement on procedure being performed was verified   risks and benefits explained to patient by the provider  procedure site verified as Left  patient was positioned for comfort   consent signed and verified for procedure       Time:  8:21 AM        Procedure performed by:   Jackson Rodriguez MD      Patient assisted by:   RACHELLE CABRERA

## 2025-04-14 ENCOUNTER — OFFICE VISIT (OUTPATIENT)
Age: 55
End: 2025-04-14
Payer: COMMERCIAL

## 2025-04-14 DIAGNOSIS — M70.62 TROCHANTERIC BURSITIS OF LEFT HIP: Primary | ICD-10-CM

## 2025-04-14 DIAGNOSIS — M53.3 SACROILIAC JOINT DYSFUNCTION OF LEFT SIDE: ICD-10-CM

## 2025-04-14 PROCEDURE — 20610 DRAIN/INJ JOINT/BURSA W/O US: CPT | Performed by: ANESTHESIOLOGY

## 2025-04-14 PROCEDURE — 27096 INJECT SACROILIAC JOINT: CPT | Performed by: ANESTHESIOLOGY

## 2025-04-14 RX ORDER — METHYLPREDNISOLONE ACETATE 40 MG/ML
40 INJECTION, SUSPENSION INTRA-ARTICULAR; INTRALESIONAL; INTRAMUSCULAR; SOFT TISSUE ONCE
Status: SHIPPED | OUTPATIENT
Start: 2025-04-14

## 2025-04-14 NOTE — PROGRESS NOTES
NAME: Sarah Cortez   ID:[id number]   :1970    Location: 390    Procedure: Left sacroiliac Joint and greater trochanteric bursa injection Under Fluoroscopic Imaging     Pre-op Diagnosis: Sacroillitis/bursitis    Post-op Diagnosis: Same     Anesthesia: Local only     Complications: None    After confirming written and informed consent and discussing the risk, benefits and alternatives for the procedure, the patient had the correct site marked by the physician performing the procedure. The specific risks of bleeding and infection were discussed. The patient was taken to the fluoroscopy suite. A pulse oximeter was placed, and verbal and visual monitoring were maintained throughout the procedure.    The patient was then placed in the prone position. The skin overlying the lumbo-sacral spine and the sacroiliac joint was then prepped with chlorhexidine gluconate and a sterile drape was placed. Appropriate sterile attire was worn, including sterile gloves. A time out was then performed involving the physician, radiation technologist and the patient.    Next, the anatomy of the sacroiliac joint was then identified using fluoroscopic guidance. The appropriate sacroiliac joint(s) was identified using simi-posterior fluoroscopy. An oblique view was then obtained to line up the anterior and posterior components of the joint. The skin overlying the expected trajectory of the block needle was then anesthetized with 3 ml of 1% lidocaine. Next, depending on patient habitus a 22G or 25G 3 inch Quincke needle was then advanced using a coaxial technique to the inferior/posterior aspect of the joint, just under the posterior superior iliac spine. Upon contacting the joint capsule, negative aspiration was performed to confirm no intravenous access.  Next, 4 mL of 0.25% bupivacaine was injected    Then the skin overlying the ipsilateral hip was cleaned with CHG. Sterile towels were applied as a drape.  The greater

## 2025-04-17 ENCOUNTER — OFFICE VISIT (OUTPATIENT)
Dept: ORTHOPEDIC SURGERY | Age: 55
End: 2025-04-17
Payer: COMMERCIAL

## 2025-04-17 DIAGNOSIS — M25.552 LEFT HIP PAIN: Primary | ICD-10-CM

## 2025-04-17 PROCEDURE — 99213 OFFICE O/P EST LOW 20 MIN: CPT | Performed by: ORTHOPAEDIC SURGERY

## 2025-04-17 NOTE — PROGRESS NOTES
Name: Sarah Cortez  YOB: 1970  Gender: female  MRN: [id number]      CC: Hip Pain (L)       HPI: Sarah Cortez is a 55 y.o. female who returns for follow up on the left hip.  She reports continued lateral hip pain, she had recent injection in her back with pain management.  She is seeking a letter stating if her hip issues are related to her knee injuries.  She says she is having some pain in her hip but she is really not interested in trying to treat that she just wants a letter trying to prove these are related        Physical Examination:  General: no acute distress  Lungs: breathing easily  CV: regular rhythm by pulse  Left Hip: Diffuse soft tissue tenderness around the hip no groin pain most of this is laterally and posterior laterally        Assessment:     ICD-10-CM    1. Left hip pain  M25.552           Plan:   Ms. Cortez only wanted to be interested in trying to obtain a letter for this.  I am not the treating physician for her knee.  I have not evaluated her knee.  I did watch her ambulate down the michael she does ambulate with an antalgic and quad avoidance type gait which may be contributing to her hip pain.  She was given a letter that her hip pain may be related to her knee pain I do not need to see her back in the future if she is not interested in treatment for her hip she can just seek treatment for that with pain management.  She can follow-up with her knee provider Dr. Amaral if needed for her knee            Ad Small MD, FAAOS  Orthopaedics and Sports Medicine

## 2025-04-21 ENCOUNTER — TELEMEDICINE (OUTPATIENT)
Dept: BEHAVIORAL/MENTAL HEALTH CLINIC | Age: 55
End: 2025-04-21
Payer: COMMERCIAL

## 2025-04-21 DIAGNOSIS — F06.31 DEPRESSIVE DISORDER DUE TO ANOTHER MEDICAL CONDITION WITH DEPRESSIVE FEATURES: ICD-10-CM

## 2025-04-21 DIAGNOSIS — F06.4 ANXIETY DISORDER DUE TO GENERAL MEDICAL CONDITION: ICD-10-CM

## 2025-04-21 DIAGNOSIS — F33.1 MODERATE EPISODE OF RECURRENT MAJOR DEPRESSIVE DISORDER (HCC): Primary | ICD-10-CM

## 2025-04-21 DIAGNOSIS — Z65.8 PSYCHOSOCIAL STRESSORS: ICD-10-CM

## 2025-04-21 DIAGNOSIS — F51.05 INSOMNIA DUE TO MENTAL DISORDER: ICD-10-CM

## 2025-04-21 DIAGNOSIS — G93.32 CHRONIC FATIGUE SYNDROME: ICD-10-CM

## 2025-04-21 DIAGNOSIS — F41.1 GENERALIZED ANXIETY DISORDER: ICD-10-CM

## 2025-04-21 DIAGNOSIS — G89.4 CHRONIC PAIN DISORDER: ICD-10-CM

## 2025-04-21 PROCEDURE — 99214 OFFICE O/P EST MOD 30 MIN: CPT | Performed by: PSYCHIATRY & NEUROLOGY

## 2025-04-21 RX ORDER — ATOMOXETINE 80 MG/1
80 CAPSULE ORAL DAILY
Qty: 90 CAPSULE | Refills: 1 | Status: SHIPPED | OUTPATIENT
Start: 2025-07-08 | End: 2026-01-04

## 2025-04-21 RX ORDER — CITALOPRAM HYDROBROMIDE 40 MG/1
40 TABLET ORAL EVERY MORNING
Qty: 90 TABLET | Refills: 1 | Status: SHIPPED | OUTPATIENT
Start: 2025-04-21

## 2025-04-21 RX ORDER — DUPILUMAB 300 MG/2ML
300 INJECTION, SOLUTION SUBCUTANEOUS
COMMUNITY
Start: 2025-03-23

## 2025-04-21 RX ORDER — LEVOMEFOLATE CALCIUM 7.5 MG TABLET
7.5 TABLET DAILY
Qty: 30 TABLET | Refills: 5 | Status: CANCELLED | OUTPATIENT
Start: 2025-04-21

## 2025-04-21 RX ORDER — PREDNISOLONE ACETATE 10 MG/ML
1 SUSPENSION/ DROPS OPHTHALMIC 3 TIMES DAILY PRN
COMMUNITY
Start: 2025-03-05

## 2025-04-21 RX ORDER — CLONAZEPAM 0.5 MG/1
0.5 TABLET ORAL 2 TIMES DAILY PRN
Qty: 60 TABLET | Refills: 3 | Status: SHIPPED | OUTPATIENT
Start: 2025-05-09 | End: 2025-09-06

## 2025-04-21 RX ORDER — TRAZODONE HYDROCHLORIDE 50 MG/1
50 TABLET ORAL NIGHTLY
Qty: 90 TABLET | Refills: 1 | Status: SHIPPED | OUTPATIENT
Start: 2025-07-08

## 2025-04-21 ASSESSMENT — ANXIETY QUESTIONNAIRES
5. BEING SO RESTLESS THAT IT IS HARD TO SIT STILL: MORE THAN HALF THE DAYS
1. FEELING NERVOUS, ANXIOUS, OR ON EDGE: NEARLY EVERY DAY
7. FEELING AFRAID AS IF SOMETHING AWFUL MIGHT HAPPEN: SEVERAL DAYS
GAD7 TOTAL SCORE: 14
IF YOU CHECKED OFF ANY PROBLEMS ON THIS QUESTIONNAIRE, HOW DIFFICULT HAVE THESE PROBLEMS MADE IT FOR YOU TO DO YOUR WORK, TAKE CARE OF THINGS AT HOME, OR GET ALONG WITH OTHER PEOPLE: VERY DIFFICULT
2. NOT BEING ABLE TO STOP OR CONTROL WORRYING: MORE THAN HALF THE DAYS
4. TROUBLE RELAXING: MORE THAN HALF THE DAYS
3. WORRYING TOO MUCH ABOUT DIFFERENT THINGS: MORE THAN HALF THE DAYS
6. BECOMING EASILY ANNOYED OR IRRITABLE: MORE THAN HALF THE DAYS

## 2025-04-21 ASSESSMENT — PATIENT HEALTH QUESTIONNAIRE - PHQ9
2. FEELING DOWN, DEPRESSED OR HOPELESS: MORE THAN HALF THE DAYS
9. THOUGHTS THAT YOU WOULD BE BETTER OFF DEAD, OR OF HURTING YOURSELF: NOT AT ALL
10. IF YOU CHECKED OFF ANY PROBLEMS, HOW DIFFICULT HAVE THESE PROBLEMS MADE IT FOR YOU TO DO YOUR WORK, TAKE CARE OF THINGS AT HOME, OR GET ALONG WITH OTHER PEOPLE: VERY DIFFICULT
1. LITTLE INTEREST OR PLEASURE IN DOING THINGS: MORE THAN HALF THE DAYS
6. FEELING BAD ABOUT YOURSELF - OR THAT YOU ARE A FAILURE OR HAVE LET YOURSELF OR YOUR FAMILY DOWN: NOT AT ALL
SUM OF ALL RESPONSES TO PHQ QUESTIONS 1-9: 17
SUM OF ALL RESPONSES TO PHQ QUESTIONS 1-9: 17
7. TROUBLE CONCENTRATING ON THINGS, SUCH AS READING THE NEWSPAPER OR WATCHING TELEVISION: NEARLY EVERY DAY
8. MOVING OR SPEAKING SO SLOWLY THAT OTHER PEOPLE COULD HAVE NOTICED. OR THE OPPOSITE, BEING SO FIGETY OR RESTLESS THAT YOU HAVE BEEN MOVING AROUND A LOT MORE THAN USUAL: MORE THAN HALF THE DAYS
4. FEELING TIRED OR HAVING LITTLE ENERGY: NEARLY EVERY DAY
SUM OF ALL RESPONSES TO PHQ QUESTIONS 1-9: 17
SUM OF ALL RESPONSES TO PHQ QUESTIONS 1-9: 17
3. TROUBLE FALLING OR STAYING ASLEEP: NEARLY EVERY DAY
5. POOR APPETITE OR OVEREATING: MORE THAN HALF THE DAYS

## 2025-04-21 NOTE — PROGRESS NOTES
effects/risks/benefits of meds including cardiac arrhythmias, suicidal ideations, orthostatic hypotension, serotonin syndrome, risk of jesica/hypomania from antidepressants, withdrawals from abrupt discontinuation of meds,  risk of bleeding, risk of seizures, addiction potential, memory impairment with long term use of benzos, respiratory depression, high blood pressure, dizziness, drowsiness, sedation , risk of falls, Risk & benefits discussed: including but not limited to possible off-label medication usage.     [x] Patient encouraged to contact the clinic if experiencing any adverse reactions with medications.     [x] Follow MSE for sxs improvement     I have reviewed the patient’s controlled substance prescription history, as maintained in the [place] prescription monitoring program, so that the prescription(s) for a  controlled substance can be given.    Recommendations and Referrals:    Follow up with : MD, requires monitoring of response to medication, requires monitoring of medication side effects.    Time until next PMA: 1 month    Follow up with Mental Health Clinicians recommended for : psychotherapy interventions,  monitoring to prevent decompensation /hospitalization, monitoring to maintain therapeutic gains, monitoring symptoms (resolving and controlled), to improve level of functioning,           Psychotherapy note:                                __10_ Minutes of psychotherapy     [x]  Supportive psychotherapy provided to improve self-esteem, psychological functioning, and adaptive skills.  Patient discussed certain situational and personal stressors ongoing in her life at this time, weight management d/w the patient. Sleep hygiene d/w patient. Patient allowed to vent out her emotions. Active listening were provided,   Scenarios were reviewed using CBT  techniques in order to increase insight and decrease anxiety.  Dysfunctional cognitions challenged and alternate options discussed. Strengths

## 2025-05-22 ENCOUNTER — TELEPHONE (OUTPATIENT)
Dept: BEHAVIORAL/MENTAL HEALTH CLINIC | Age: 55
End: 2025-05-22

## 2025-05-29 ENCOUNTER — TELEPHONE (OUTPATIENT)
Dept: BEHAVIORAL/MENTAL HEALTH CLINIC | Age: 55
End: 2025-05-29

## 2025-07-08 ENCOUNTER — OFFICE VISIT (OUTPATIENT)
Age: 55
End: 2025-07-08
Payer: COMMERCIAL

## 2025-07-08 DIAGNOSIS — M79.18 MYALGIA, OTHER SITE: Primary | ICD-10-CM

## 2025-07-08 DIAGNOSIS — M54.51 VERTEBROGENIC LOW BACK PAIN: ICD-10-CM

## 2025-07-08 PROCEDURE — 99214 OFFICE O/P EST MOD 30 MIN: CPT | Performed by: PHYSICIAN ASSISTANT

## 2025-07-08 PROCEDURE — 96372 THER/PROPH/DIAG INJ SC/IM: CPT | Performed by: PHYSICIAN ASSISTANT

## 2025-07-08 RX ORDER — KETOROLAC TROMETHAMINE 30 MG/ML
60 INJECTION, SOLUTION INTRAMUSCULAR; INTRAVENOUS ONCE
Status: COMPLETED | OUTPATIENT
Start: 2025-07-08 | End: 2025-07-08

## 2025-07-08 RX ADMIN — KETOROLAC TROMETHAMINE 60 MG: 30 INJECTION, SOLUTION INTRAMUSCULAR; INTRAVENOUS at 09:50

## 2025-07-08 ASSESSMENT — ENCOUNTER SYMPTOMS
ALLERGIC/IMMUNOLOGIC NEGATIVE: 1
ABDOMINAL PAIN: 0
EYES NEGATIVE: 1
SHORTNESS OF BREATH: 0

## 2025-07-08 NOTE — PROGRESS NOTES
Ms. Cortez is a follow-up for a left SI and GTB injection with anesthetic only secondary to being adrenally suppressed.  
1 tablet by mouth daily 90 tablet 3    hydroxychloroquine (PLAQUENIL) 200 MG tablet Take 1 tablet by mouth daily      EVENING PRIMROSE OIL PO Take by mouth (Patient not taking: Reported on 4/21/2025)      methylfolate (DEPLIN) 7.5 MG TABS tablet Take 1 tablet by mouth in the morning. 30 tablet 5    Wheat Dextrin (BENEFIBER PO)  (Patient not taking: Reported on 4/21/2025)      LINZESS 72 MCG CAPS capsule Take 1 capsule by mouth daily      omeprazole (PRILOSEC) 40 MG delayed release capsule Take 1 capsule (40 mg) by mouth 2 (two) times a day 30 mins before breakfast and 30 mins before dinner (Patient not taking: Reported on 4/21/2025)      cod liver oil CAPS Take 1 capsule by mouth daily      Ubrogepant (UBRELVY) 50 MG TABS Take 1 tablet at the onset of migraine, may repeat in 2 hours. Do not take more then 3 days a week. (Patient not taking: Reported on 4/21/2025) 16 tablet 11    Benzoyl Peroxide 2.5 % GEL APPLY TOPICALLY TO FACE AT BEDTIME      diclofenac sodium (VOLTAREN) 1 % GEL Apply 1 g topically as needed      Lidocaine, Anorectal, 5 % CREA Actual prescription: Lidocaine 5%: Neurontin/gabapentin 5% combined topical cream/gelApply to affected area up to 4 times a day as needed for pain      nystatin-triamcinolone (MYCOLOG II) 447004-3.1 UNIT/GM-% cream Apply topically 2 times daily as needed      vitamin E 600 units capsule Take by mouth daily       Facility-Administered Encounter Medications as of 7/8/2025   Medication Dose Route Frequency Provider Last Rate Last Admin    methylPREDNISolone acetate (DEPO-MEDROL) injection 40 mg  40 mg Intra-artICUlar Once         Fremanezumab-vfrm SOAJ 225 mg  225 mg SubCUTAneous Q30 Days Dmitry English MD              Review of Systems   Constitutional:  Negative for chills, fatigue, fever and unexpected weight change.   HENT:  Negative for congestion and ear pain.    Eyes: Negative.    Respiratory:  Negative for shortness of breath.    Cardiovascular:  Negative for chest

## 2025-07-15 ENCOUNTER — OFFICE VISIT (OUTPATIENT)
Dept: BEHAVIORAL/MENTAL HEALTH CLINIC | Age: 55
End: 2025-07-15
Payer: COMMERCIAL

## 2025-07-15 VITALS
HEART RATE: 78 BPM | OXYGEN SATURATION: 96 % | SYSTOLIC BLOOD PRESSURE: 126 MMHG | DIASTOLIC BLOOD PRESSURE: 88 MMHG | WEIGHT: 179 LBS | HEIGHT: 62 IN | BODY MASS INDEX: 32.94 KG/M2 | TEMPERATURE: 98.2 F

## 2025-07-15 DIAGNOSIS — Z65.8 PSYCHOSOCIAL STRESSORS: ICD-10-CM

## 2025-07-15 DIAGNOSIS — F41.1 GENERALIZED ANXIETY DISORDER: ICD-10-CM

## 2025-07-15 DIAGNOSIS — F06.4 ANXIETY DISORDER DUE TO GENERAL MEDICAL CONDITION: ICD-10-CM

## 2025-07-15 DIAGNOSIS — G93.32 CHRONIC FATIGUE SYNDROME: ICD-10-CM

## 2025-07-15 DIAGNOSIS — M32.9 SYSTEMIC LUPUS ERYTHEMATOSUS, UNSPECIFIED SLE TYPE, UNSPECIFIED ORGAN INVOLVEMENT STATUS (HCC): ICD-10-CM

## 2025-07-15 DIAGNOSIS — F33.1 MODERATE EPISODE OF RECURRENT MAJOR DEPRESSIVE DISORDER (HCC): Primary | ICD-10-CM

## 2025-07-15 DIAGNOSIS — G89.4 CHRONIC PAIN DISORDER: ICD-10-CM

## 2025-07-15 DIAGNOSIS — F06.31 DEPRESSIVE DISORDER DUE TO ANOTHER MEDICAL CONDITION WITH DEPRESSIVE FEATURES: ICD-10-CM

## 2025-07-15 DIAGNOSIS — F51.05 INSOMNIA DUE TO MENTAL DISORDER: ICD-10-CM

## 2025-07-15 PROCEDURE — 99214 OFFICE O/P EST MOD 30 MIN: CPT | Performed by: PSYCHIATRY & NEUROLOGY

## 2025-07-15 RX ORDER — CITALOPRAM HYDROBROMIDE 40 MG/1
40 TABLET ORAL EVERY MORNING
Qty: 90 TABLET | Refills: 1 | Status: SHIPPED | OUTPATIENT
Start: 2025-07-15

## 2025-07-15 RX ORDER — ATOMOXETINE 80 MG/1
80 CAPSULE ORAL DAILY
Qty: 90 CAPSULE | Refills: 1 | Status: SHIPPED | OUTPATIENT
Start: 2025-07-15 | End: 2026-01-11

## 2025-07-15 RX ORDER — TRAZODONE HYDROCHLORIDE 50 MG/1
50 TABLET ORAL NIGHTLY
Qty: 90 TABLET | Refills: 1 | Status: SHIPPED | OUTPATIENT
Start: 2025-07-15

## 2025-07-15 RX ORDER — CLONAZEPAM 0.5 MG/1
0.5 TABLET ORAL 2 TIMES DAILY PRN
Qty: 60 TABLET | Refills: 3 | Status: SHIPPED | OUTPATIENT
Start: 2025-07-15 | End: 2025-11-12

## 2025-07-15 RX ORDER — LEVOMEFOLATE CALCIUM 7.5 MG TABLET
7.5 TABLET DAILY
Qty: 30 TABLET | Refills: 5 | Status: CANCELLED | OUTPATIENT
Start: 2025-07-15

## 2025-07-15 ASSESSMENT — PATIENT HEALTH QUESTIONNAIRE - PHQ9
5. POOR APPETITE OR OVEREATING: MORE THAN HALF THE DAYS
8. MOVING OR SPEAKING SO SLOWLY THAT OTHER PEOPLE COULD HAVE NOTICED. OR THE OPPOSITE, BEING SO FIGETY OR RESTLESS THAT YOU HAVE BEEN MOVING AROUND A LOT MORE THAN USUAL: MORE THAN HALF THE DAYS
SUM OF ALL RESPONSES TO PHQ QUESTIONS 1-9: 16
4. FEELING TIRED OR HAVING LITTLE ENERGY: NEARLY EVERY DAY
SUM OF ALL RESPONSES TO PHQ QUESTIONS 1-9: 16
1. LITTLE INTEREST OR PLEASURE IN DOING THINGS: MORE THAN HALF THE DAYS
SUM OF ALL RESPONSES TO PHQ QUESTIONS 1-9: 16
7. TROUBLE CONCENTRATING ON THINGS, SUCH AS READING THE NEWSPAPER OR WATCHING TELEVISION: MORE THAN HALF THE DAYS
6. FEELING BAD ABOUT YOURSELF - OR THAT YOU ARE A FAILURE OR HAVE LET YOURSELF OR YOUR FAMILY DOWN: SEVERAL DAYS
SUM OF ALL RESPONSES TO PHQ QUESTIONS 1-9: 16
2. FEELING DOWN, DEPRESSED OR HOPELESS: MORE THAN HALF THE DAYS
3. TROUBLE FALLING OR STAYING ASLEEP: MORE THAN HALF THE DAYS
10. IF YOU CHECKED OFF ANY PROBLEMS, HOW DIFFICULT HAVE THESE PROBLEMS MADE IT FOR YOU TO DO YOUR WORK, TAKE CARE OF THINGS AT HOME, OR GET ALONG WITH OTHER PEOPLE: VERY DIFFICULT
9. THOUGHTS THAT YOU WOULD BE BETTER OFF DEAD, OR OF HURTING YOURSELF: NOT AT ALL

## 2025-07-15 NOTE — PROGRESS NOTES
small support system in place. She is compliant with her medical appointments and follows her doctor's advice.    She recently experienced a three-day headache, which was relieved after taking her monthly dose of amitriptyline. However, she experiences cluster headaches when the medication wears off. She is on amitriptyline.    She is under the care of a rheumatologist for lupus and receives monthly infusions. She also experiences neuropathy in her feet, which sometimes results in loss of sensation.    Marital Status:  but living  from her .  They live in the same house but on different things.  No relationship and no intimacy.  States it is cheaper to have him there then .  Sleep: Sleep quality varies, often depending on pain levels.     The patient (or guardian, if applicable) and other individuals in attendance with the patient were advised that Artificial Intelligence will be utilized during this visit to record, process the conversation to generate a clinical note, and support improvement of the AI technology. The patient (or guardian, if applicable) and other individuals in attendance at the appointment consented to the use of AI, including the recording.        Patient Active Problem List   Diagnosis    Hypoglycemia    Chronic tension-type headache, intractable    Ganglion cyst    Radiculopathy of cervical region    Lymphocytic colitis    Essential tremor    Uterine leiomyoma    Neuropathic pain    Numbness and tingling    Schatzki's ring    Chronic pain syndrome    B12 deficiency    Hiatal hernia with GERD    History of colonic polyps    Left carpal tunnel syndrome    Pharyngoesophageal dysphagia    Anxiety    Vitamin D deficiency    Cervical radicular pain    Bipolar depression (HCC)    Functional dyspepsia    Chronic fatigue    Irritable bowel syndrome with both constipation and diarrhea    Early satiety    De Quervain's tenosynovitis, right    Restless legs syndrome (RLS)

## 2025-08-20 ENCOUNTER — OFFICE VISIT (OUTPATIENT)
Age: 55
End: 2025-08-20
Payer: COMMERCIAL

## 2025-08-20 DIAGNOSIS — M47.816 FACET ARTHRITIS OF LUMBAR REGION: Primary | ICD-10-CM

## 2025-08-20 PROCEDURE — 99214 OFFICE O/P EST MOD 30 MIN: CPT | Performed by: PHYSICIAN ASSISTANT

## 2025-08-20 ASSESSMENT — ENCOUNTER SYMPTOMS
SHORTNESS OF BREATH: 0
EYES NEGATIVE: 1
ABDOMINAL PAIN: 0
ALLERGIC/IMMUNOLOGIC NEGATIVE: 1